# Patient Record
Sex: FEMALE | Race: WHITE | NOT HISPANIC OR LATINO | Employment: OTHER | URBAN - METROPOLITAN AREA
[De-identification: names, ages, dates, MRNs, and addresses within clinical notes are randomized per-mention and may not be internally consistent; named-entity substitution may affect disease eponyms.]

---

## 2017-01-04 ENCOUNTER — OFFICE VISIT (OUTPATIENT)
Dept: INTERNAL MEDICINE | Facility: CLINIC | Age: 67
End: 2017-01-04
Payer: COMMERCIAL

## 2017-01-04 VITALS
OXYGEN SATURATION: 95 % | SYSTOLIC BLOOD PRESSURE: 127 MMHG | HEIGHT: 68 IN | HEART RATE: 90 BPM | DIASTOLIC BLOOD PRESSURE: 68 MMHG | BODY MASS INDEX: 36.56 KG/M2 | WEIGHT: 241.2 LBS | TEMPERATURE: 97 F

## 2017-01-04 DIAGNOSIS — E11.40 CONTROLLED TYPE 2 DIABETES MELLITUS WITH DIABETIC NEUROPATHY, WITHOUT LONG-TERM CURRENT USE OF INSULIN (H): ICD-10-CM

## 2017-01-04 DIAGNOSIS — Z12.31 VISIT FOR SCREENING MAMMOGRAM: ICD-10-CM

## 2017-01-04 DIAGNOSIS — Z78.0 ASYMPTOMATIC POSTMENOPAUSAL STATUS: ICD-10-CM

## 2017-01-04 DIAGNOSIS — R19.7 DIARRHEA OF PRESUMED INFECTIOUS ORIGIN: ICD-10-CM

## 2017-01-04 DIAGNOSIS — Z11.59 NEED FOR HEPATITIS C SCREENING TEST: ICD-10-CM

## 2017-01-04 DIAGNOSIS — Z23 NEED FOR PROPHYLACTIC VACCINATION AGAINST STREPTOCOCCUS PNEUMONIAE (PNEUMOCOCCUS): ICD-10-CM

## 2017-01-04 DIAGNOSIS — Z23 NEED FOR PROPHYLACTIC VACCINATION WITH TETANUS-DIPHTHERIA (TD): ICD-10-CM

## 2017-01-04 DIAGNOSIS — Z23 NEED FOR PROPHYLACTIC VACCINATION AND INOCULATION AGAINST INFLUENZA: ICD-10-CM

## 2017-01-04 DIAGNOSIS — F17.200 NEEDS SMOKING CESSATION EDUCATION: ICD-10-CM

## 2017-01-04 DIAGNOSIS — R82.90 NONSPECIFIC FINDING ON EXAMINATION OF URINE: ICD-10-CM

## 2017-01-04 DIAGNOSIS — R35.0 URINARY FREQUENCY: Primary | ICD-10-CM

## 2017-01-04 DIAGNOSIS — Z13.89 SCREENING FOR DIABETIC PERIPHERAL NEUROPATHY: ICD-10-CM

## 2017-01-04 LAB
ALBUMIN UR-MCNC: 30 MG/DL
ANION GAP SERPL CALCULATED.3IONS-SCNC: 12 MMOL/L (ref 3–14)
APPEARANCE UR: CLEAR
BACTERIA #/AREA URNS HPF: ABNORMAL /HPF
BILIRUB UR QL STRIP: NEGATIVE
BUN SERPL-MCNC: 39 MG/DL (ref 7–30)
CALCIUM SERPL-MCNC: 10 MG/DL (ref 8.5–10.1)
CHLORIDE SERPL-SCNC: 98 MMOL/L (ref 94–109)
CO2 SERPL-SCNC: 27 MMOL/L (ref 20–32)
COLOR UR AUTO: YELLOW
CREAT SERPL-MCNC: 1.82 MG/DL (ref 0.52–1.04)
GFR SERPL CREATININE-BSD FRML MDRD: 28 ML/MIN/1.7M2
GLUCOSE SERPL-MCNC: 127 MG/DL (ref 70–99)
GLUCOSE UR STRIP-MCNC: 100 MG/DL
HGB UR QL STRIP: ABNORMAL
KETONES UR STRIP-MCNC: NEGATIVE MG/DL
LDLC SERPL DIRECT ASSAY-MCNC: 135 MG/DL
LEUKOCYTE ESTERASE UR QL STRIP: ABNORMAL
NITRATE UR QL: POSITIVE
NON-SQ EPI CELLS #/AREA URNS LPF: ABNORMAL /LPF
PH UR STRIP: 6 PH (ref 5–7)
POTASSIUM SERPL-SCNC: 4.3 MMOL/L (ref 3.4–5.3)
RBC #/AREA URNS AUTO: ABNORMAL /HPF (ref 0–2)
SODIUM SERPL-SCNC: 137 MMOL/L (ref 133–144)
SP GR UR STRIP: 1.02 (ref 1–1.03)
T4 FREE SERPL-MCNC: 1.06 NG/DL (ref 0.76–1.46)
TSH SERPL DL<=0.005 MIU/L-ACNC: 5.55 MU/L (ref 0.4–4)
URN SPEC COLLECT METH UR: ABNORMAL
UROBILINOGEN UR STRIP-ACNC: 0.2 EU/DL (ref 0.2–1)
WBC #/AREA URNS AUTO: ABNORMAL /HPF (ref 0–2)
WBC CLUMPS #/AREA URNS HPF: PRESENT /HPF

## 2017-01-04 PROCEDURE — 86803 HEPATITIS C AB TEST: CPT | Mod: 90 | Performed by: INTERNAL MEDICINE

## 2017-01-04 PROCEDURE — 84439 ASSAY OF FREE THYROXINE: CPT | Performed by: INTERNAL MEDICINE

## 2017-01-04 PROCEDURE — 81001 URINALYSIS AUTO W/SCOPE: CPT | Performed by: INTERNAL MEDICINE

## 2017-01-04 PROCEDURE — 87186 SC STD MICRODIL/AGAR DIL: CPT | Mod: 90 | Performed by: INTERNAL MEDICINE

## 2017-01-04 PROCEDURE — 80048 BASIC METABOLIC PNL TOTAL CA: CPT | Performed by: INTERNAL MEDICINE

## 2017-01-04 PROCEDURE — 99203 OFFICE O/P NEW LOW 30 MIN: CPT | Mod: 25 | Performed by: INTERNAL MEDICINE

## 2017-01-04 PROCEDURE — 83721 ASSAY OF BLOOD LIPOPROTEIN: CPT | Performed by: INTERNAL MEDICINE

## 2017-01-04 PROCEDURE — 82043 UR ALBUMIN QUANTITATIVE: CPT | Performed by: INTERNAL MEDICINE

## 2017-01-04 PROCEDURE — 87086 URINE CULTURE/COLONY COUNT: CPT | Performed by: INTERNAL MEDICINE

## 2017-01-04 PROCEDURE — 99000 SPECIMEN HANDLING OFFICE-LAB: CPT | Performed by: INTERNAL MEDICINE

## 2017-01-04 PROCEDURE — 84443 ASSAY THYROID STIM HORMONE: CPT | Performed by: INTERNAL MEDICINE

## 2017-01-04 PROCEDURE — 36415 COLL VENOUS BLD VENIPUNCTURE: CPT | Performed by: INTERNAL MEDICINE

## 2017-01-04 PROCEDURE — 87088 URINE BACTERIA CULTURE: CPT | Mod: 90 | Performed by: INTERNAL MEDICINE

## 2017-01-04 RX ORDER — LEVOFLOXACIN 250 MG/1
250 TABLET, FILM COATED ORAL DAILY
Qty: 3 TABLET | Refills: 0 | Status: SHIPPED | OUTPATIENT
Start: 2017-01-04 | End: 2017-08-08

## 2017-01-04 NOTE — PATIENT INSTRUCTIONS
Please drink 8 cups (64 ounces) of non-caffeinated beverages a day.   We will let you know your test results by [facility], especially the urine test.  Please let us know if you diarrhea persists into next week or sooner if it gets worse.   You may use Imodium as needed for your diarrhea.      Please go to the Emergency room if you develop severe abdominal pain, a fever of 100.4F or above, or the inability to tolerate fluids, worsening nausea/vomiting or any blood in your urine or stool.    We do advise at least once every 6 month visits for chronic conditions.

## 2017-01-04 NOTE — PROGRESS NOTES
SUBJECTIVE:                                                    Yadi Iniguez is a 66 year old female who presents to clinic today for the following health issues:    PMH as noted, including Type 2 Diabetes Mellitus, plus the patient reports an essential tremor (and being seen by a Neurologist, Dr Voss at [facility]).    Patient is here to establish care and discuss bladder issues:    Urinary frequency:    For the past 10 days, she has had urin frequency; no dysuria. No burning on urination.   No f/c.  Her blood sugars have been in the 300s-usually between 150-180, now in the 300s.  She is on lantus 50u qhs and Novolog which she takes without a sliding scale, as 22 units with each meal.    No vomiting but she does have nausea at times. She does have a history of periods of high blood sugars which last a few weeks, without a clear etiology. No current MI or arthritis, or stress or infections symptoms other than GI below.  Denies eating a less-DM friendly diet recently.  The urinary symptoms came before the GI symptoms.  No history of kidney stones.  Had a UTI years ago.   No noted hematuria.    She does have flank pain-b/l, but it does change with position.   No vaginal discharge or itching.      Loose stools:   Also for 10 days.  BMs per day: BID usually.  vague abd pain throughout the whole abdomen and  She has noticed recent loose stools.    No f/c.   No vomiting but she does have nausea at times.  No sick contacts.   No recent travel.  No antibiotics in the last 3 months  No dark or bloody stools.   No new foods. She does not feel that certain foods make it feel worse.   There is some mucus in the stool.  Gassiness: a bit more than normal.  Heartburn? No.    No preceding constipation.    No recent changes in medications         Problem list and histories reviewed & adjusted, as indicated.  Additional history: as documented    Patient Active Problem List   Diagnosis     Hypothyroidism     Hypertension goal  BP (blood pressure) < 140/90     Type 2 diabetes, HbA1C goal < 8% (H)     Hyperlipidemia LDL goal <100     Moderate major depression (H)     Incontinence of urine     Neuropathy in diabetes (H)     Eczema     Left lumbar radiculopathy     Advanced directives, counseling/discussion     Health Care Home     Past Surgical History   Procedure Laterality Date     Cholecystectomy, open       C vaginal hysterectomy       Cl aff surgical pathology       Prophylactic repair retinal break, pneumatic retinopexy, combined Left 9/2/2016       Social History   Substance Use Topics     Smoking status: Current Every Day Smoker -- 0.50 packs/day     Types: Cigarettes     Smokeless tobacco: Not on file      Comment: 8/2005     Alcohol Use: Yes     Family History   Problem Relation Age of Onset     Musculoskeletal Disorder Mother      MD     CANCER Father      mesothelioma     HEART DISEASE Brother      CHF      Neurologic Disorder Brother      MS         Current Outpatient Prescriptions   Medication Sig Dispense Refill     Cholecalciferol (VITAMIN D3 PO) Take 10,000 Units by mouth daily       Omega-3 Fatty Acids (OMEGA-3 FISH OIL PO) Take 1 g by mouth       CARVEDILOL PO Take 12.5 mg by mouth 2 times daily (with meals)       FENOFIBRATE PO Take 54 mg by mouth daily       LEVOTHYROXINE SODIUM PO Take 150 mcg by mouth       PRAVASTATIN SODIUM PO Take 40 mg by mouth every evening       PRIMIDONE PO Take 50 mg by mouth 2 times daily       Insulin Aspart (NOVOLOG SC) Inject 20 Units Subcutaneous 3 times daily (with meals)       levofloxacin (LEVAQUIN) 250 MG tablet Take 1 tablet (250 mg) by mouth daily 3 tablet 0     insulin glargine (LANTUS) 100 UNIT/ML injection Inject 45 Units Subcutaneous At Bedtime. (Patient taking differently: Inject 50 Units Subcutaneous At Bedtime ) 3 Month 3     metoprolol (LOPRESSOR) 50 MG tablet Take 1 tablet by mouth 2 times daily. 60 tablet 11     paroxetine (PAXIL) 40 MG tablet Take 1 tablet by mouth daily.  "(Patient taking differently: Take 20 mg by mouth 2 times daily ) 90 tablet 3     Clobetasol Propionate 0.05 % LIQD Externally apply 1 dose topically 2 times daily as needed. 1 Bottle 5     ASPIRIN 81 MG OR TABS ONE DAILY*  3     VITAMIN C 1000 MG OR TABS 1 TABLET DAILY AT DINNER       VITAMIN E 400 UNIT OR TABS 1 tab a day        CENTRUM SILVER OR TABS 1 TABLET DAILY       losartan (COZAAR) 50 MG tablet Take 1 tablet (50 mg) by mouth daily       ==============================================================  ROS:  Constitutional, HEENT, cardiovascular, pulmonary, GI, , musculoskeletal, neuro, skin, endocrine and psych systems are negative, except as otherwise noted.       OBJECTIVE:                                                    /68 mmHg  Pulse 90  Temp(Src) 97  F (36.1  C) (Oral)  Ht 5' 7.5\" (1.715 m)  Wt 241 lb 3.2 oz (109.408 kg)  BMI 37.20 kg/m2  SpO2 95%  Body mass index is 37.2 kg/(m^2).     GENERAL APPEARANCE: healthy, alert and in no distress  EYES: Eyes grossly normal to inspection, and conjunctivae and sclerae normal  HENT: head normocephalic and atraumatic and mouth without ulcers or lesions, oropharynx clear and oral mucous membranes moist  NECK: no noticeable adenopathy, no asymmetry, masses, or scars   RESP: lungs clear to auscultation - no rales, rhonchi or wheezes  CV: regular rate and rhythm, normal S1 S2, no S3 or S4, no murmur, click or rub, no peripheral edema and peripheral pulses strong  ABDOMEN: soft, nontender, no hepatosplenomegaly, no masses and bowel sounds normal  MS: no musculoskeletal defects are noted and gait is age appropriate without ataxia  SKIN: no suspicious lesions or rashes  NEURO: mentation intact and speech normal  PSYCH: mentation appears normal and affect normal/bright.    Results for orders placed or performed in visit on 01/04/17   Hepatitis C Screen Reflex to HCV RNA Quant and Genotype   Result Value Ref Range    Hepatitis C Antibody  NR     " Nonreactive   Assay performance characteristics have not been established for newborns,   infants, and children     Albumin Random Urine Quantitative   Result Value Ref Range    Creatinine Urine 202 mg/dL    Albumin Urine mg/L 260 mg/L    Albumin Urine mg/g Cr 128.71 (H) 0 - 25 mg/g Cr   TSH WITH FREE T4 REFLEX   Result Value Ref Range    TSH 5.55 (H) 0.40 - 4.00 mU/L   Basic metabolic panel   Result Value Ref Range    Sodium 137 133 - 144 mmol/L    Potassium 4.3 3.4 - 5.3 mmol/L    Chloride 98 94 - 109 mmol/L    Carbon Dioxide 27 20 - 32 mmol/L    Anion Gap 12 3 - 14 mmol/L    Glucose 127 (H) 70 - 99 mg/dL    Urea Nitrogen 39 (H) 7 - 30 mg/dL    Creatinine 1.82 (H) 0.52 - 1.04 mg/dL    GFR Estimate 28 (L) >60 mL/min/1.7m2    GFR Estimate If Black 33 (L) >60 mL/min/1.7m2    Calcium 10.0 8.5 - 10.1 mg/dL   LDL cholesterol direct   Result Value Ref Range    LDL Cholesterol Direct 135 (H) <100 mg/dL   *UA reflex to Microscopic and Culture ([facility] (except [place] and [place])   Result Value Ref Range    Color Urine Yellow     Appearance Urine Clear     Glucose Urine 100 (A) NEG mg/dL    Bilirubin Urine Negative NEG    Ketones Urine Negative NEG mg/dL    Specific Gravity Urine 1.020 1.003 - 1.035    Blood Urine Trace (A) NEG    pH Urine 6.0 5.0 - 7.0 pH    Protein Albumin Urine 30 (A) NEG mg/dL    Urobilinogen Urine 0.2 0.2 - 1.0 EU/dL    Nitrite Urine Positive (A) NEG    Leukocyte Esterase Urine Small (A) NEG    Source Midstream Urine    Urine Microscopic   Result Value Ref Range    WBC Urine 25-50 (A) 0 - 2 /HPF    RBC Urine O - 2 0 - 2 /HPF    WBC Clumps Present (A) NEG /HPF    Squamous Epithelial /LPF Urine Many (A) FEW /LPF    Bacteria Urine Many (A) NEG /HPF   T4 free   Result Value Ref Range    T4 Free 1.06 0.76 - 1.46 ng/dL   Urine Culture Aerobic Bacterial   Result Value Ref Range    Specimen Description Midstream Urine     Culture Micro >100,000 colonies/mL Escherichia coli  (A)     Micro Report Status FINAL 01/06/2017     Organism: >100,000 colonies/mL Escherichia coli        Susceptibility    >100,000 colonies/ml escherichia coli (galina) -  (no method available)     AMPICILLIN >=32 Resistant  ug/mL     CEFAZOLIN Value in next row  ug/mL      <=4 SusceptibleCefazolin GALINA breakpoints are for the treatment of uncomplicated urinary tract infections.  For the treatment of systemic infections, please contact the laboratory for additional testing.     CEFOXITIN Value in next row  ug/mL      <=4 SusceptibleCefazolin GALINA breakpoints are for the treatment of uncomplicated urinary tract infections.  For the treatment of systemic infections, please contact the laboratory for additional testing.     CEFTAZIDIME Value in next row  ug/mL      <=4 SusceptibleCefazolin GALINA breakpoints are for the treatment of uncomplicated urinary tract infections.  For the treatment of systemic infections, please contact the laboratory for additional testing.     CEFTRIAXONE Value in next row  ug/mL      <=4 SusceptibleCefazolin GALINA breakpoints are for the treatment of uncomplicated urinary tract infections.  For the treatment of systemic infections, please contact the laboratory for additional testing.     CIPROFLOXACIN Value in next row  ug/mL      <=4 SusceptibleCefazolin GALINA breakpoints are for the treatment of uncomplicated urinary tract infections.  For the treatment of systemic infections, please contact the laboratory for additional testing.     GENTAMICIN Value in next row  ug/mL      <=4 SusceptibleCefazolin GALINA breakpoints are for the treatment of uncomplicated urinary tract infections.  For the treatment of systemic infections, please contact the laboratory for additional testing.     LEVOFLOXACIN Value in next row  ug/mL      <=4 SusceptibleCefazolin GALINA breakpoints are for the treatment of uncomplicated urinary tract infections.  For the treatment of systemic infections, please contact the laboratory for  additional testing.     NITROFURANTOIN Value in next row  ug/mL      <=4 SusceptibleCefazolin SULEMAN breakpoints are for the treatment of uncomplicated urinary tract infections.  For the treatment of systemic infections, please contact the laboratory for additional testing.     TOBRAMYCIN Value in next row  ug/mL      <=4 SusceptibleCefazolin SULEMAN breakpoints are for the treatment of uncomplicated urinary tract infections.  For the treatment of systemic infections, please contact the laboratory for additional testing.     Trimethoprim/Sulfa Value in next row  ug/mL      <=4 SusceptibleCefazolin SULEMAN breakpoints are for the treatment of uncomplicated urinary tract infections.  For the treatment of systemic infections, please contact the laboratory for additional testing.     AMPICILLIN/SULBACTAM Value in next row  ug/mL      <=4 SusceptibleCefazolin SULEMAN breakpoints are for the treatment of uncomplicated urinary tract infections.  For the treatment of systemic infections, please contact the laboratory for additional testing.     Piperacillin/Tazo Value in next row  ug/mL      <=4 SusceptibleCefazolin SULEMAN breakpoints are for the treatment of uncomplicated urinary tract infections.  For the treatment of systemic infections, please contact the laboratory for additional testing.     CEFEPIME Value in next row  ug/mL      <=4 SusceptibleCefazolin SULEMAN breakpoints are for the treatment of uncomplicated urinary tract infections.  For the treatment of systemic infections, please contact the laboratory for additional testing.         ASSESSMENT/PLAN:                                                        ICD-10-CM    1. Urinary frequency R35.0 *UA reflex to Microscopic and Culture ([facility] (except [place] and [place])     levofloxacin (LEVAQUIN) 250 MG tablet   2. Controlled type 2 diabetes mellitus with diabetic neuropathy, without long-term current use of insulin (H) E11.40 Albumin Random Urine  Quantitative     TSH WITH FREE T4 REFLEX     Basic metabolic panel     LDL cholesterol direct   3. Diarrhea of presumed infectious origin A09    4. Asymptomatic postmenopausal status Z78.0 DEXA HIP/PELVIS/SPINE - Future     Urine Microscopic     T4 free   5. Visit for screening mammogram Z12.31 MA SCREENING DIGITAL BILAT - Future  (s+30)   6. Need for hepatitis C screening test Z11.59    7. Screening for diabetic peripheral neuropathy Z13.89 Hepatitis C Screen Reflex to HCV RNA Quant and Genotype   8. Need for prophylactic vaccination and inoculation against influenza Z23    9. Need for prophylactic vaccination against Streptococcus pneumoniae (pneumococcus) Z23    10. Need for prophylactic vaccination with tetanus-diphtheria (TD) Z23    11. Needs smoking cessation education F17.200 NOVU Referral Smoking Cessation   12. Nonspecific finding on examination of urine R82.90 Urine Culture Aerobic Bacterial     (R35.0) Urinary frequency  (primary encounter diagnosis)  Comment: UA c/w UTI  Plan:  3 days levofloxacin was Rxed.  UA reflex to Microscopic and Culture         ([facility] and [facility] (except         [place] and [place]), levofloxacin         (LEVAQUIN) 250 MG tablet            (E11.40) Controlled type 2 diabetes mellitus with diabetic neuropathy, without long-term current use of insulin (H)  Comment:   per abstracted records from [facility]  A1C was 7.4% on 10.13.16.  Recent hyperglycemia likely d/t the current UTI and viral infectious gastroenteritis.  Plan: Albumin Random Urine Quantitative, TSH WITH         FREE T4 REFLEX, Basic metabolic panel, LDL         cholesterol direct     Continue current regimen for now, treat infection(s)    (A09) Diarrhea of presumed infectious origin  Comment:   most c/w viral infectious gastroenteritis   NOTE: labs showed an elevated Creatinine-(may well be due to hypovolemia from the diarrhea)  Plan: As per orders above and patient instructions below.   See  Results note regarding labs (and elevated Cr): decrease Losartan, return to clinic 2 weeks, to reassess by physical exam, recheck BP and recheck the BMP. (may be able to restart losartan if the suspected hypovolemia resolved and if Cr returns to its baseline...).     (Z78.0) Asymptomatic postmenopausal status  Comment:   Plan: DEXA HIP/PELVIS/SPINE - Future, Urine         Microscopic, T4 free          (Z12.31) Visit for screening mammogram  Comment:   Plan: MA SCREENING DIGITAL BILAT - Future  (s+30)         (Z11.59) Need for hepatitis C screening test    (Z13.89) Screening for diabetic peripheral neuropathy  Comment:   Plan: Hepatitis C Screen Reflex to HCV RNA Quant and         Genotype        (Z23) Need for prophylactic vaccination and inoculation against influenza    (Z23) Need for prophylactic vaccination against Streptococcus pneumoniae (pneumococcus)    (Z23) Need for prophylactic vaccination with tetanus-diphtheria (TD)      (F17.200) Needs smoking cessation education  Comment:   Plan: NOVU Referral Smoking Cessation               Patient Instructions   Please drink 8 cups (64 ounces) of non-caffeinated beverages a day.   [facility] test.  Please let us know if you diarrhea persists into next week or sooner if it gets worse.   You may use Imodium as needed for your diarrhea.      Please go to the Emergency room if you develop severe abdominal pain, a fever of 100.4F or above, or the inability to tolerate fluids, worsening nausea/vomiting or any blood in your urine or stool.    We do advise at least once every 6 month visits for chronic conditions.                     Hannah Cee MD  [facility]

## 2017-01-04 NOTE — MR AVS SNAPSHOT
After Visit Summary   1/4/2017    Yadi Iniguez    MRN: [id number]           Patient Information     Date Of Birth          1950        Visit Information        Provider Department      1/4/2017 11:20 AM Hannah Cee MD [facility]        Today's Diagnoses     Asymptomatic postmenopausal status    -  1     Visit for screening mammogram         Need for hepatitis C screening test         Screening for diabetic peripheral neuropathy         Need for prophylactic vaccination and inoculation against influenza         Need for prophylactic vaccination against Streptococcus pneumoniae (pneumococcus)         Need for prophylactic vaccination with tetanus-diphtheria (TD)         Needs smoking cessation education         Controlled type 2 diabetes mellitus with diabetic neuropathy, without long-term current use of insulin (H)         Urinary frequency           Care Instructions    Please drink 8 cups (64 ounces) of non-caffeinated beverages a day.   We will let you know your test results by MyChart, especially the urine test.  Please let us know if you diarrhea persists into next week or sooner if it gets worse.   You may use Imodium as needed for your diarrhea.      Please go to the Emergency room if you develop severe abdominal pain, a fever of 100.4F or above, or the inability to tolerate fluids, worsening nausea/vomiting or any blood in your urine or stool.    We do advise at least once every 6 month visits for chronic conditions.          Follow-ups after your visit        Additional Services     NOVU Referral Smoking Cessation       [place] online at [email or web address]                  Future tests that were ordered for you today     Open Future Orders        Priority Expected Expires Ordered    DEXA HIP/PELVIS/SPINE - Future Routine  1/4/2018 1/4/2017    MA SCREENING DIGITAL BILAT - Future  (s+30) Routine  1/4/2018 1/4/2017            Who to contact     If  "you have questions or need follow up information about today's clinic visit or your schedule please contact [facility] [place] directly at [phone].  Normal or non-critical lab and imaging results will be communicated to you by MyChart, letter or phone within 4 business days after the clinic has received the results. If you do not hear from us within 7 days, please contact the clinic through [employer] or phone. If you have a critical or abnormal lab result, we will notify you by phone as soon as possible.  Submit refill requests through [employer] or call your pharmacy and they will forward the refill request to us. Please allow 3 business days for your refill to be completed.          Additional Information About Your Visit        [employer] Information     [employer] lets you send messages to your doctor, view your test results, renew your prescriptions, schedule appointments and more. To sign up, go to [email or web address] . Click on \"Log in\" on the left side of the screen, which will take you to the Welcome page. Then click on \"Sign up Now\" on the right side of the page.     You will be asked to enter the access code listed below, as well as some personal information. Please follow the directions to create your username and password.     Your access code is: [email or web address]  Expires: 2017 12:33 PM     Your access code will  in 90 days. If you need help or a new code, please call your [place] clinic or [phone].        Care EveryWhere ID     This is your Care EveryWhere ID. This could be used by other organizations to access your [place] medical records  [id number]        Your Vitals Were     Pulse Temperature Height BMI (Body Mass Index) Pulse Oximetry       90 97  F (36.1  C) (Oral) 5' 7.5\" (1.715 m) 37.20 kg/m2 95%        Blood Pressure from Last 3 Encounters:   17 127/68   11 130/71   11 120/70    Weight from Last 3 Encounters:   17 241 lb 3.2 oz (109.408 kg) "   06/09/11 211 lb (95.709 kg)   04/28/11 211 lb 3.2 oz (95.8 kg)              We Performed the Following     *UA reflex to Microscopic and Culture ([facility] [place] [facility] (except [place] and [place])     Albumin Random Urine Quantitative     Basic metabolic panel     DEPRESSION ACTION PLAN (DAP) Order [[id number]]     Hepatitis C Screen Reflex to HCV RNA Quant and Genotype     LDL cholesterol direct     NOVU Referral Smoking Cessation     TSH WITH FREE T4 REFLEX          Today's Medication Changes          These changes are accurate as of: 1/4/17 12:33 PM.  If you have any questions, ask your nurse or doctor.               These medicines have changed or have updated prescriptions.        Dose/Directions    insulin glargine 100 UNIT/ML injection   Commonly known as:  LANTUS   This may have changed:  how much to take   Used for:  Type 2 diabetes, HbA1C goal < 8% (H)        Dose:  45 Units   Inject 45 Units Subcutaneous At Bedtime.   Quantity:  3 Month   Refills:  3       LEVOTHYROXINE SODIUM PO   This may have changed:  Another medication with the same name was removed. Continue taking this medication, and follow the directions you see here.   Changed by:  Hananh Cee MD        Dose:  150 mcg   Take 150 mcg by mouth   Refills:  0       NOVOLOG SC   This may have changed:  Another medication with the same name was removed. Continue taking this medication, and follow the directions you see here.   Changed by:  Hannah Cee MD        Dose:  20 Units   Inject 20 Units Subcutaneous 3 times daily (with meals)   Refills:  0       PARoxetine 40 MG tablet   Commonly known as:  PAXIL   This may have changed:    - how much to take  - when to take this   Used for:  Moderate major depression (H)        Dose:  1 tablet   Take 1 tablet by mouth daily.   Quantity:  90 tablet   Refills:  3                Primary Care Provider Office Phone # Fax #    Hannah Cee MD  [phone]       [facility] [address] [facility] [address]        Thank you!     Thank you for choosing [facility]  for your care. Our goal is always to provide you with excellent care. Hearing back from our patients is one way we can continue to improve our services. Please take a few minutes to complete the written survey that you may receive in the mail after your visit with us. Thank you!             Your Updated Medication List - Protect others around you: Learn how to safely use, store and throw away your medicines at www.disposemymeds.org.          This list is accurate as of: 1/4/17 12:33 PM.  Always use your most recent med list.                   Brand Name Dispense Instructions for use    ascorbic acid 1000 MG Tabs    vitamin C     1 TABLET DAILY AT DINNER       aspirin 81 MG tablet      ONE DAILY*       CARVEDILOL PO      Take 12.5 mg by mouth 2 times daily (with meals)       CENTRUM SILVER per tablet      1 TABLET DAILY       clobetasol propionate 0.05 % Liqd     1 Bottle    Externally apply 1 dose topically 2 times daily as needed.       FENOFIBRATE PO      Take 54 mg by mouth daily       insulin glargine 100 UNIT/ML injection    LANTUS    3 Month    Inject 45 Units Subcutaneous At Bedtime.       LEVOTHYROXINE SODIUM PO      Take 150 mcg by mouth       LOSARTAN POTASSIUM PO      Take 100 mg by mouth daily       metoprolol 50 MG tablet    LOPRESSOR    60 tablet    Take 1 tablet by mouth 2 times daily.       NOVOLOG SC      Inject 20 Units Subcutaneous 3 times daily (with meals)       OMEGA-3 FISH OIL PO      Take 1 g by mouth       PARoxetine 40 MG tablet    PAXIL    90 tablet    Take 1 tablet by mouth daily.       PRAVASTATIN SODIUM PO      Take 40 mg by mouth every evening       PRIMIDONE PO      Take 50 mg by mouth 2 times daily       VITAMIN D3 PO      Take 10,000 Units by mouth daily       vitamin E 400 UNITS Tabs      1 tab a day

## 2017-01-05 ENCOUNTER — TELEPHONE (OUTPATIENT)
Dept: INTERNAL MEDICINE | Facility: CLINIC | Age: 67
End: 2017-01-05

## 2017-01-05 LAB
CREAT UR-MCNC: 202 MG/DL
HCV AB SERPL QL IA: NORMAL
MICROALBUMIN UR-MCNC: 260 MG/L
MICROALBUMIN/CREAT UR: 128.71 MG/G CR (ref 0–25)

## 2017-01-05 RX ORDER — LOSARTAN POTASSIUM 50 MG/1
50 TABLET ORAL DAILY
COMMUNITY
Start: 2017-01-05 | End: 2017-01-22 | Stop reason: SINTOL

## 2017-01-05 NOTE — TELEPHONE ENCOUNTER
Please call and advise the patient as follows, and also send a [employer] message with the same text and attach recent test results [statements which are in bold and in square brackets, like this sentence, is for clinic staff and should not be included in the text of the letter to the patient]:    Dear Yadi,     The rest of your tests have finished processing.    They show that your kidneys have slowed down a bit since your last test (at [facility]) on 10.13.16. I would advise you to:  1) decrease your losartan from 100mg to 50mg daily.  2) schedule a clinic visit in 2 weeks with me where we will reassess your blood pressure and recheck your labs. [Please offer to assist the patient with making this appointment].  3) as advised before, please drink 6-8 cups (48-64 ounces) of non-caffeinated beverages a day.    Your urine shows some extra protein in it but it is likely due to your current urinary tract infection (UTI).  You have been advised regarding UTI treatment yesterday.  Your thyroid hormone level is within normal limits for you.  Your limited cholesterol level is stable.    Please use [employer] or call our clinic at [phone] if you have any questions.     Hannah Cee MD

## 2017-01-05 NOTE — TELEPHONE ENCOUNTER
Sent [employer] message and called patient informed pt of providers note as written below, pt verbalized good understanding.     Updated med list.    We made follow up appointment.    AYE Gonzalez RN

## 2017-01-05 NOTE — PROGRESS NOTES
Quick Note:    Please see Telephone message.   Regarding the UA: patient was called last night and informed of the UA being c/w UTI-she was Rxed levofloxacin (3d) and advised to contact the clinic if not better in 3 days, sooner if worse. The patient had no questions.   Dr Hannah Cee MD.     ______

## 2017-01-06 LAB
BACTERIA SPEC CULT: ABNORMAL
MICRO REPORT STATUS: ABNORMAL
MICROORGANISM SPEC CULT: ABNORMAL
SPECIMEN SOURCE: ABNORMAL

## 2017-01-06 NOTE — PROGRESS NOTES
Quick Note:    Dear Yadi,     Your test results are attached.   Your urine culture has processed.  The antibiotic that you are on should be able to kill the bacteria causing your UTI (urinary tract infection).    Please notify me via [employer] or contact the clinic at [phone] if you have any questions.    Hannah Cee MD  ______

## 2017-01-10 ENCOUNTER — RADIANT APPOINTMENT (OUTPATIENT)
Dept: MAMMOGRAPHY | Facility: CLINIC | Age: 67
End: 2017-01-10
Attending: INTERNAL MEDICINE
Payer: COMMERCIAL

## 2017-01-10 ENCOUNTER — RADIANT APPOINTMENT (OUTPATIENT)
Dept: ULTRASOUND IMAGING | Facility: CLINIC | Age: 67
End: 2017-01-10
Attending: INTERNAL MEDICINE
Payer: COMMERCIAL

## 2017-01-10 DIAGNOSIS — N64.4 BREAST PAIN, LEFT: ICD-10-CM

## 2017-01-10 PROCEDURE — 77062 BREAST TOMOSYNTHESIS BI: CPT | Performed by: RADIOLOGY

## 2017-01-10 PROCEDURE — 76642 ULTRASOUND BREAST LIMITED: CPT | Mod: LT | Performed by: RADIOLOGY

## 2017-01-10 PROCEDURE — G0204 DX MAMMO INCL CAD BI: HCPCS | Performed by: RADIOLOGY

## 2017-01-18 ENCOUNTER — OFFICE VISIT (OUTPATIENT)
Dept: INTERNAL MEDICINE | Facility: CLINIC | Age: 67
End: 2017-01-18
Payer: COMMERCIAL

## 2017-01-18 VITALS
BODY MASS INDEX: 37.6 KG/M2 | OXYGEN SATURATION: 95 % | WEIGHT: 243.8 LBS | HEART RATE: 90 BPM | SYSTOLIC BLOOD PRESSURE: 137 MMHG | DIASTOLIC BLOOD PRESSURE: 72 MMHG | TEMPERATURE: 98 F

## 2017-01-18 DIAGNOSIS — F17.200 NEEDS SMOKING CESSATION EDUCATION: ICD-10-CM

## 2017-01-18 DIAGNOSIS — Z78.0 ASYMPTOMATIC POSTMENOPAUSAL STATUS: ICD-10-CM

## 2017-01-18 DIAGNOSIS — R79.89 ELEVATED SERUM CREATININE: Primary | ICD-10-CM

## 2017-01-18 DIAGNOSIS — I10 BENIGN ESSENTIAL HYPERTENSION: ICD-10-CM

## 2017-01-18 DIAGNOSIS — Z23 NEED FOR PROPHYLACTIC VACCINATION WITH TETANUS-DIPHTHERIA (TD): ICD-10-CM

## 2017-01-18 DIAGNOSIS — Z13.89 SCREENING FOR DIABETIC PERIPHERAL NEUROPATHY: ICD-10-CM

## 2017-01-18 DIAGNOSIS — Z23 NEED FOR PROPHYLACTIC VACCINATION AGAINST STREPTOCOCCUS PNEUMONIAE (PNEUMOCOCCUS): ICD-10-CM

## 2017-01-18 LAB
ANION GAP SERPL CALCULATED.3IONS-SCNC: 8 MMOL/L (ref 3–14)
BUN SERPL-MCNC: 34 MG/DL (ref 7–30)
CALCIUM SERPL-MCNC: 9.5 MG/DL (ref 8.5–10.1)
CHLORIDE SERPL-SCNC: 97 MMOL/L (ref 94–109)
CO2 SERPL-SCNC: 30 MMOL/L (ref 20–32)
CREAT SERPL-MCNC: 1.58 MG/DL (ref 0.52–1.04)
GFR SERPL CREATININE-BSD FRML MDRD: 33 ML/MIN/1.7M2
GLUCOSE SERPL-MCNC: 208 MG/DL (ref 70–99)
POTASSIUM SERPL-SCNC: 4.5 MMOL/L (ref 3.4–5.3)
SODIUM SERPL-SCNC: 135 MMOL/L (ref 133–144)

## 2017-01-18 PROCEDURE — 36415 COLL VENOUS BLD VENIPUNCTURE: CPT | Performed by: INTERNAL MEDICINE

## 2017-01-18 PROCEDURE — 80048 BASIC METABOLIC PNL TOTAL CA: CPT | Performed by: INTERNAL MEDICINE

## 2017-01-18 PROCEDURE — 99207 C FOOT EXAM  NO CHARGE: CPT | Performed by: INTERNAL MEDICINE

## 2017-01-18 PROCEDURE — 99213 OFFICE O/P EST LOW 20 MIN: CPT | Performed by: INTERNAL MEDICINE

## 2017-01-18 NOTE — MR AVS SNAPSHOT
After Visit Summary   1/18/2017    Yadi Iniguez    MRN: [id number]           Patient Information     Date Of Birth          1950        Visit Information        Provider Department      1/18/2017 11:20 AM Hannah Cee MD [facility]        Today's Diagnoses     Asymptomatic postmenopausal status    -  1     Screening for diabetic peripheral neuropathy         Need for prophylactic vaccination against Streptococcus pneumoniae (pneumococcus)         Need for prophylactic vaccination with tetanus-diphtheria (TD)         Needs smoking cessation education         Elevated serum creatinine           Care Instructions    Please continue the losartan 50mg daily for now.  We will let you know your test results by [facility] and we will let you know if you need to make further blood pressure medication or other adjustments.   Please measure your blood pressures at home at least 3 times a week and let us know if they are consistently more than 140/90 or if they are ever less than 90/60.    We do advise at least once every 6 month visits for chronic conditions.  You have indicated that you have your next appointment with us in July (we will plan on doing the foot exam at that time).  Regarding your mood: you have indicated that you feel that your mood symptoms are well controlled. You may continue your current Paxil dose and we will plan on revisiting this during your July visit.            Follow-ups after your visit        Additional Services     NOV Referral Smoking Cessation       [place] online at [email or web address]                  Your next 10 appointments already scheduled     Jul 05, 2017 10:20 AM   Office Visit with Hannah Cee MD   [facility]    [address] [address] [phone]           Bring a current list of meds and any records pertaining to this visit.  For Physicals,  please bring immunization records and any forms needing to be filled out.  Please arrive 10 minutes early to complete paperwork.              Who to contact     If you have questions or need follow up information about today's clinic visit or your schedule please contact [facility] directly at [phone].  Normal or non-critical lab and imaging results will be communicated to you by MyChart, letter or phone within 4 business days after the clinic has received the results. If you do not hear from us within 7 days, please contact the clinic through [employer] or phone. If you have a critical or abnormal lab result, we will notify you by phone as soon as possible.  Submit refill requests through [employer] or call your pharmacy and they will forward the refill request to us. Please allow 3 business days for your refill to be completed.          Additional Information About Your Visit        MyChart Information     [employer] gives you secure access to your electronic health record. If you see a primary care provider, you can also send messages to your care team and make appointments. If you have questions, please call your primary care clinic.  If you do not have a primary care provider, please call [phone] and they will assist you.        Care EveryWhere ID     This is your Care EveryWhere ID. This could be used by other organizations to access your [facility] medical records  [id number]        Your Vitals Were     Pulse Temperature Pulse Oximetry             90 98  F (36.7  C) (Oral) 95%          Blood Pressure from Last 3 Encounters:   01/18/17 137/72   01/04/17 127/68   06/09/11 130/71    Weight from Last 3 Encounters:   01/18/17 243 lb 12.8 oz (110.587 kg)   01/04/17 241 lb 3.2 oz (109.408 kg)   06/09/11 211 lb (95.709 kg)              We Performed the Following     Basic metabolic panel     FOOT EXAM  NO CHARGE [47213.114]     NOVU Referral Smoking Cessation          Today's Medication Changes           These changes are accurate as of: 1/18/17 12:10 PM.  If you have any questions, ask your nurse or doctor.               These medicines have changed or have updated prescriptions.        Dose/Directions    insulin glargine 100 UNIT/ML injection   Commonly known as:  LANTUS   This may have changed:  how much to take   Used for:  Type 2 diabetes, HbA1C goal < 8% (H)        Dose:  45 Units   Inject 45 Units Subcutaneous At Bedtime.   Quantity:  3 Month   Refills:  3       PARoxetine 40 MG tablet   Commonly known as:  PAXIL   This may have changed:    - how much to take  - when to take this   Used for:  Moderate major depression (H)        Dose:  1 tablet   Take 1 tablet by mouth daily.   Quantity:  90 tablet   Refills:  3                Primary Care Provider Office Phone # Fax #    Hannah Cee [phone]       [facility] [address] [address] [address]        Thank you!     Thank you for choosing [facility]  for your care. Our goal is always to provide you with excellent care. Hearing back from our patients is one way we can continue to improve our services. Please take a few minutes to complete the written survey that you may receive in the mail after your visit with us. Thank you!             Your Updated Medication List - Protect others around you: Learn how to safely use, store and throw away your medicines at www.disposemymeds.org.          This list is accurate as of: 1/18/17 12:10 PM.  Always use your most recent med list.                   Brand Name Dispense Instructions for use    ascorbic acid 1000 MG Tabs    vitamin C     1 TABLET DAILY AT DINNER       aspirin 81 MG tablet      ONE DAILY*       CARVEDILOL PO      Take 12.5 mg by mouth 2 times daily (with meals)       CENTRUM SILVER per tablet      1 TABLET DAILY       clobetasol propionate 0.05 % Liqd     1 Bottle    Externally apply 1 dose topically 2 times daily as needed.        FENOFIBRATE PO      Take 54 mg by mouth daily       insulin glargine 100 UNIT/ML injection    LANTUS    3 Month    Inject 45 Units Subcutaneous At Bedtime.       levofloxacin 250 MG tablet    LEVAQUIN    3 tablet    Take 1 tablet (250 mg) by mouth daily       LEVOTHYROXINE SODIUM PO      Take 150 mcg by mouth       losartan 50 MG tablet    COZAAR     Take 1 tablet (50 mg) by mouth daily       metoprolol 50 MG tablet    LOPRESSOR    60 tablet    Take 1 tablet by mouth 2 times daily.       NOVOLOG SC      Inject 20 Units Subcutaneous 3 times daily (with meals)       OMEGA-3 FISH OIL PO      Take 1 g by mouth       PARoxetine 40 MG tablet    PAXIL    90 tablet    Take 1 tablet by mouth daily.       PRAVASTATIN SODIUM PO      Take 40 mg by mouth every evening       PRIMIDONE PO      Take 50 mg by mouth 2 times daily       VITAMIN D3 PO      Take 10,000 Units by mouth daily       vitamin E 400 UNITS Tabs      1 tab a day

## 2017-01-18 NOTE — PATIENT INSTRUCTIONS
Please continue the losartan 50mg daily for now.  We will let you know your test results by [facility] and we will let you know if you need to make further blood pressure medication or other adjustments.   Please measure your blood pressures at home at least 3 times a week and let us know if they are consistently more than 140/90 or if they are ever less than 90/60.    We do advise at least once every 6 month visits for chronic conditions.  You have indicated that you have your next appointment with us in July (we will plan on doing the foot exam at that time).  Regarding your mood: you have indicated that you feel that your mood symptoms are well-controlled. You may continue your current Paxil dose and we will plan on revisiting this during your July visit.

## 2017-01-18 NOTE — NURSING NOTE
"Chief Complaint   Patient presents with     Hypertension       Initial /72 mmHg  Pulse 90  Temp(Src) 98  F (36.7  C) (Oral)  Wt 243 lb 12.8 oz (110.587 kg)  SpO2 95% Estimated body mass index is 37.6 kg/(m^2) as calculated from the following:    Height as of 1/4/17: 5' 7.5\" (1.715 m).    Weight as of this encounter: 243 lb 12.8 oz (110.587 kg).  BP completed using cuff size: antwon Franco, ANABEL    "

## 2017-01-18 NOTE — PROGRESS NOTES
SUBJECTIVE:                                                    Yadi Iniguez is a 66 year old female who presents to clinic today for the following health issues:      Hypertension Follow-up      Outpatient blood pressures are not being checked.    Low Salt Diet: no added salt       Amount of exercise or physical activity: very little    Problems taking medications regularly: No    Medication side effects: none    Diet: regular (no restrictions) and low salt    Patient denies chest pain, chest pressure, shortness of breath, palpitations, headaches, visual changes, or any noted lower extremity swelling.     (the UTI symptoms have completely resolved).        Problem list and histories reviewed & adjusted, as indicated.  Additional history: as documented    Patient Active Problem List   Diagnosis     Hypothyroidism     Hypertension goal BP (blood pressure) < 140/90     Type 2 diabetes, HbA1C goal < 8% (H)     Hyperlipidemia LDL goal <100     Moderate major depression (H)     Incontinence of urine     Neuropathy in diabetes (H)     Eczema     Left lumbar radiculopathy     Advanced directives, counseling/discussion     Health Care Home     Past Surgical History   Procedure Laterality Date     Cholecystectomy, open       C vaginal hysterectomy       Cl aff surgical pathology       Prophylactic repair retinal break, pneumatic retinopexy, combined Left 9/2/2016       Social History   Substance Use Topics     Smoking status: Current Every Day Smoker -- 0.50 packs/day     Types: Cigarettes     Smokeless tobacco: Not on file      Comment: 8/2005     Alcohol Use: Yes     Family History   Problem Relation Age of Onset     Musculoskeletal Disorder Mother      MD     Muscular Disorder Mother      CANCER Father      mesothelioma     HEART DISEASE Brother      angioplasty     Neurologic Disorder Brother      ms         Current Outpatient Prescriptions   Medication Sig Dispense Refill     losartan (COZAAR) 50 MG tablet Take 1  tablet (50 mg) by mouth daily       Cholecalciferol (VITAMIN D3 PO) Take 10,000 Units by mouth daily       Omega-3 Fatty Acids (OMEGA-3 FISH OIL PO) Take 1 g by mouth       CARVEDILOL PO Take 12.5 mg by mouth 2 times daily (with meals)       FENOFIBRATE PO Take 54 mg by mouth daily       LEVOTHYROXINE SODIUM PO Take 150 mcg by mouth       PRAVASTATIN SODIUM PO Take 40 mg by mouth every evening       PRIMIDONE PO Take 50 mg by mouth 2 times daily       Insulin Aspart (NOVOLOG SC) Inject 20 Units Subcutaneous 3 times daily (with meals)       levofloxacin (LEVAQUIN) 250 MG tablet Take 1 tablet (250 mg) by mouth daily 3 tablet 0     insulin glargine (LANTUS) 100 UNIT/ML injection Inject 45 Units Subcutaneous At Bedtime. (Patient taking differently: Inject 50 Units Subcutaneous At Bedtime ) 3 Month 3     metoprolol (LOPRESSOR) 50 MG tablet Take 1 tablet by mouth 2 times daily. 60 tablet 11     paroxetine (PAXIL) 40 MG tablet Take 1 tablet by mouth daily. (Patient taking differently: Take 20 mg by mouth 2 times daily ) 90 tablet 3     Clobetasol Propionate 0.05 % LIQD Externally apply 1 dose topically 2 times daily as needed. 1 Bottle 5     ASPIRIN 81 MG OR TABS ONE DAILY*  3     VITAMIN C 1000 MG OR TABS 1 TABLET DAILY AT DINNER       VITAMIN E 400 UNIT OR TABS 1 tab a day        CENTRUM SILVER OR TABS 1 TABLET DAILY       ==============================================================  ROS:  Constitutional, HEENT, cardiovascular, pulmonary, GI, , musculoskeletal, neuro, skin, endocrine and psych systems are negative, except as otherwise noted.       OBJECTIVE:                                                    /72 mmHg  Pulse 90  Temp(Src) 98  F (36.7  C) (Oral)  Wt 243 lb 12.8 oz (110.587 kg)  SpO2 95%  Body mass index is 37.6 kg/(m^2).     GENERAL APPEARANCE: healthy, alert and in no distress  EYES: Eyes grossly normal to inspection, and conjunctivae and sclerae normal  HENT: head normocephalic and  atraumatic and mouth without ulcers or lesions, oropharynx clear and oral mucous membranes moist  NECK: no noticeable adenopathy, no asymmetry, masses, or scars   RESP: lungs clear to auscultation - no rales, rhonchi or wheezes  CV: regular rate and rhythm, normal S1 S2, no S3 or S4, no murmur, click or rub, no peripheral edema and peripheral pulses strong  ABDOMEN: soft, nontender, no hepatosplenomegaly, no masses and bowel sounds normal  MS: no musculoskeletal defects are noted and gait is age appropriate without ataxia  SKIN: no suspicious lesions or rashes  NEURO: mentation intact and speech normal  PSYCH: mentation appears normal and affect normal/bright.     Results for orders placed or performed in visit on 01/18/17   Basic metabolic panel   Result Value Ref Range    Sodium 135 133 - 144 mmol/L    Potassium 4.5 3.4 - 5.3 mmol/L    Chloride 97 94 - 109 mmol/L    Carbon Dioxide 30 20 - 32 mmol/L    Anion Gap 8 3 - 14 mmol/L    Glucose 208 (H) 70 - 99 mg/dL    Urea Nitrogen 34 (H) 7 - 30 mg/dL    Creatinine 1.58 (H) 0.52 - 1.04 mg/dL    GFR Estimate 33 (L) >60 mL/min/1.7m2    GFR Estimate If Black 39 (L) >60 mL/min/1.7m2    Calcium 9.5 8.5 - 10.1 mg/dL        ASSESSMENT/PLAN:                                                        ICD-10-CM    1. Elevated serum creatinine R79.89 Basic metabolic panel   2. Benign essential hypertension I10 amLODIPine (NORVASC) 10 MG tablet   3. Asymptomatic postmenopausal status Z78.0    4. Screening for diabetic peripheral neuropathy Z13.89 FOOT EXAM  NO CHARGE [46238.114]   5. Need for prophylactic vaccination against Streptococcus pneumoniae (pneumococcus) Z23    6. Need for prophylactic vaccination with tetanus-diphtheria (TD) Z23    7. Needs smoking cessation education F17.200 NOVU Referral Smoking Cessation     (R79.89) Elevated serum creatinine  (primary encounter diagnosis)  Comment: today the Cr improved but has still not returned to its 10/2016 values (see  CareEverywhere); furthermore, outside labs show a pattern od gradual but persistent worsening of the Cr. Recent urine alb a bit high, but this was in the context of a  UTI  Plan: Basic metabolic panel        See the results note-Cr still has not returned to baseline, advised to d/c losartan, start amlodipine 10mg/d and to be seen in 1 month    (I10) Benign essential hypertension  Comment: controlled, still  Plan:       See the results note-Cr still has not returned to baseline, advised to d/c losartan, start amlodipine 10mg/d and to be seen in 1 month  Continue the moderate dose of Coreg  amLODIPine (NORVASC) 10 MG tablet             (Z13.89) Screening for diabetic peripheral neuropathy  Comment:   Plan: FOOT EXAM  NO CHARGE [31877.114]      (Z23) Need for prophylactic vaccination against Streptococcus pneumoniae (pneumococcus)      (Z23) Need for prophylactic vaccination with tetanus-diphtheria (TD)      (F17.200) Needs smoking cessation education  Comment:   Plan: NOVU Referral Smoking Cessation            Patient Instructions   Please continue the losartan 50mg daily for now.  [facility] and we will let you know if you need to make further blood pressure medication or other adjustments.   Please measure your blood pressures at home at least 3 times a week and let us know if they are consistently more than 140/90 or if they are ever less than 90/60.    We do advise at least once every 6 month visits for chronic conditions.  You have indicated that you have your next appointment with us in July (we will plan on doing the foot exam at that time).  Regarding your mood: you have indicated that you feel that your mood symptoms are well-controlled. You may continue your current Paxil dose and we will plan on revisiting this during your July visit.                         Hannah Cee MD  [facility]

## 2017-01-19 ASSESSMENT — PATIENT HEALTH QUESTIONNAIRE - PHQ9: SUM OF ALL RESPONSES TO PHQ QUESTIONS 1-9: 9

## 2017-01-22 ENCOUNTER — TELEPHONE (OUTPATIENT)
Dept: INTERNAL MEDICINE | Facility: CLINIC | Age: 67
End: 2017-01-22

## 2017-01-22 RX ORDER — AMLODIPINE BESYLATE 10 MG/1
10 TABLET ORAL DAILY
Qty: 30 TABLET | Refills: 1 | Status: SHIPPED | OUTPATIENT
Start: 2017-01-22 | End: 2017-04-03

## 2017-01-22 NOTE — TELEPHONE ENCOUNTER
Please call and advise the patient as follows, and also send a [employer] message with the same text and attach recent test results [statements which are in bold and in square brackets, like this sentence, is for clinic staff and should not be included in the text of the letter to the patient]:    Dear Yadi,     Your kidney function has slightly improved, but is still not back to its October numbers.   There would therefore advise you to:  1) stop the Losartan  2) start a medication called Amlodipine-I sent a prescription to your pharmacy. Please be aware that the most common side effect of this medication is leg swelling. It is otherwise well-tolerated.  3) please return to clinic in a month, to recheck your blood pressure and symptoms and to also repeat lab tests for your kidney function at that time. [Please offer to assist the patient with making this appointment].      Please use [employer] or call our clinic at [phone] if you have any questions.     Hannah Cee MD

## 2017-01-23 ENCOUNTER — MYC MEDICAL ADVICE (OUTPATIENT)
Dept: INTERNAL MEDICINE | Facility: CLINIC | Age: 67
End: 2017-01-23

## 2017-01-23 DIAGNOSIS — I10 BENIGN ESSENTIAL HYPERTENSION: Primary | ICD-10-CM

## 2017-01-23 RX ORDER — CARVEDILOL 25 MG/1
25 TABLET ORAL 2 TIMES DAILY WITH MEALS
Qty: 60 TABLET | Refills: 1 | Status: SHIPPED | OUTPATIENT
Start: 2017-01-23 | End: 2017-04-10

## 2017-01-23 RX ORDER — HYDROCHLOROTHIAZIDE 50 MG/1
50 TABLET ORAL DAILY
Qty: 90 TABLET | Refills: 3 | COMMUNITY
Start: 2017-01-23 | End: 2017-01-23 | Stop reason: SINTOL

## 2017-01-23 NOTE — TELEPHONE ENCOUNTER
I was not aware of this, but the Reconcile Dispensed history does indeed show that the patient has been on hydrochlorothiazide 50mg daily.  Please advise the patient to stop her hydrochlorothiazide 50mg, to increase her Coreg from 12.5mg twice a day to 25mg twice a day (I will send a new prescription for the Coreg), to continue her Norvasc 10mg daily, and we will plan to see her in a month. Please let the patient know that we are stopping the hydrochlorothiazide because it can slow down kidney function, and her recent kidney function has been a bit slow.  Thank you,  Hannah Cee MD

## 2017-01-23 NOTE — TELEPHONE ENCOUNTER
Was this discussed at last ov? This is not on current med list-even as historical.  To provider to advise.  JOSE GonzalezN RN

## 2017-01-23 NOTE — TELEPHONE ENCOUNTER
Called patient, informed pt of providers note as written below, pt verbalized good understanding.      Sent [employer] message.    Made one moth follow up appointment.    JOSE GonzalezN RN

## 2017-01-24 NOTE — TELEPHONE ENCOUNTER
Last Read in [employer]   1/24/2017  7:57 AM by Yadi Iniguez           Patient read message.  AYE Gonzalez RN

## 2017-02-02 ENCOUNTER — TRANSFERRED RECORDS (OUTPATIENT)
Dept: HEALTH INFORMATION MANAGEMENT | Facility: CLINIC | Age: 67
End: 2017-02-02

## 2017-02-22 PROBLEM — N18.30 CKD (CHRONIC KIDNEY DISEASE) STAGE 3, GFR 30-59 ML/MIN (H): Status: ACTIVE | Noted: 2017-02-22

## 2017-03-01 ENCOUNTER — OFFICE VISIT (OUTPATIENT)
Dept: INTERNAL MEDICINE | Facility: CLINIC | Age: 67
End: 2017-03-01
Payer: COMMERCIAL

## 2017-03-01 ENCOUNTER — TELEPHONE (OUTPATIENT)
Dept: FAMILY MEDICINE | Facility: CLINIC | Age: 67
End: 2017-03-01

## 2017-03-01 VITALS
OXYGEN SATURATION: 95 % | WEIGHT: 241.2 LBS | BODY MASS INDEX: 37.22 KG/M2 | SYSTOLIC BLOOD PRESSURE: 118 MMHG | HEART RATE: 84 BPM | DIASTOLIC BLOOD PRESSURE: 62 MMHG | TEMPERATURE: 98 F

## 2017-03-01 DIAGNOSIS — Z78.0 ASYMPTOMATIC POSTMENOPAUSAL STATUS: ICD-10-CM

## 2017-03-01 DIAGNOSIS — I10 BENIGN ESSENTIAL HYPERTENSION: Primary | ICD-10-CM

## 2017-03-01 DIAGNOSIS — Z23 NEED FOR PROPHYLACTIC VACCINATION WITH TETANUS-DIPHTHERIA (TD): ICD-10-CM

## 2017-03-01 DIAGNOSIS — E11.21 DIABETIC NEPHROPATHY ASSOCIATED WITH TYPE 2 DIABETES MELLITUS (H): ICD-10-CM

## 2017-03-01 DIAGNOSIS — N18.30 CKD (CHRONIC KIDNEY DISEASE) STAGE 3, GFR 30-59 ML/MIN (H): ICD-10-CM

## 2017-03-01 DIAGNOSIS — Z23 NEED FOR PROPHYLACTIC VACCINATION AGAINST STREPTOCOCCUS PNEUMONIAE (PNEUMOCOCCUS): ICD-10-CM

## 2017-03-01 LAB
ANION GAP SERPL CALCULATED.3IONS-SCNC: 9 MMOL/L (ref 3–14)
BUN SERPL-MCNC: 23 MG/DL (ref 7–30)
CALCIUM SERPL-MCNC: 9.1 MG/DL (ref 8.5–10.1)
CHLORIDE SERPL-SCNC: 93 MMOL/L (ref 94–109)
CO2 SERPL-SCNC: 30 MMOL/L (ref 20–32)
CREAT SERPL-MCNC: 1.17 MG/DL (ref 0.52–1.04)
CREAT UR-MCNC: 37 MG/DL
GFR SERPL CREATININE-BSD FRML MDRD: 46 ML/MIN/1.7M2
GLUCOSE SERPL-MCNC: 659 MG/DL (ref 70–99)
HBA1C MFR BLD: 11.2 % (ref 4.3–6)
MICROALBUMIN UR-MCNC: 104 MG/L
MICROALBUMIN/CREAT UR: 280.32 MG/G CR (ref 0–25)
POTASSIUM SERPL-SCNC: 4.8 MMOL/L (ref 3.4–5.3)
SODIUM SERPL-SCNC: 132 MMOL/L (ref 133–144)

## 2017-03-01 PROCEDURE — 90715 TDAP VACCINE 7 YRS/> IM: CPT | Performed by: INTERNAL MEDICINE

## 2017-03-01 PROCEDURE — 83036 HEMOGLOBIN GLYCOSYLATED A1C: CPT | Performed by: INTERNAL MEDICINE

## 2017-03-01 PROCEDURE — 90670 PCV13 VACCINE IM: CPT | Performed by: INTERNAL MEDICINE

## 2017-03-01 PROCEDURE — 80048 BASIC METABOLIC PNL TOTAL CA: CPT | Performed by: INTERNAL MEDICINE

## 2017-03-01 PROCEDURE — 90472 IMMUNIZATION ADMIN EACH ADD: CPT | Performed by: INTERNAL MEDICINE

## 2017-03-01 PROCEDURE — 90471 IMMUNIZATION ADMIN: CPT | Performed by: INTERNAL MEDICINE

## 2017-03-01 PROCEDURE — 36415 COLL VENOUS BLD VENIPUNCTURE: CPT | Performed by: INTERNAL MEDICINE

## 2017-03-01 PROCEDURE — 99214 OFFICE O/P EST MOD 30 MIN: CPT | Mod: 25 | Performed by: INTERNAL MEDICINE

## 2017-03-01 PROCEDURE — 83930 ASSAY OF BLOOD OSMOLALITY: CPT | Performed by: INTERNAL MEDICINE

## 2017-03-01 PROCEDURE — 82043 UR ALBUMIN QUANTITATIVE: CPT | Performed by: INTERNAL MEDICINE

## 2017-03-01 NOTE — NURSING NOTE
"Chief Complaint   Patient presents with     RECHECK     HTN       Initial /62 (BP Location: Right arm, Patient Position: Chair, Cuff Size: Adult Large)  Pulse 84  Temp 98  F (36.7  C) (Oral)  Wt 241 lb 3.2 oz (109.4 kg)  SpO2 95%  BMI 37.22 kg/m2 Estimated body mass index is 37.22 kg/(m^2) as calculated from the following:    Height as of 1/4/17: 5' 7.5\" (1.715 m).    Weight as of this encounter: 241 lb 3.2 oz (109.4 kg).  Medication Reconciliation: complete    Stephanie Franco CMA  "

## 2017-03-01 NOTE — MR AVS SNAPSHOT
After Visit Summary   3/1/2017    Yadi Iniguez    MRN: [id number]           Patient Information     Date Of Birth          1950        Visit Information        Provider Department      3/1/2017 10:20 AM Hannah Cee MD [facility]        Today's Diagnoses     Benign essential hypertension    -  1    Asymptomatic postmenopausal status        Need for prophylactic vaccination against Streptococcus pneumoniae (pneumococcus)        Need for prophylactic vaccination with tetanus-diphtheria (TD)        CKD (chronic kidney disease) stage 3, GFR 30-59 ml/min        Diabetic nephropathy associated with type 2 diabetes mellitus (H)          Care Instructions    We will let you know your test results by Ivan and will advise you regarding the next appointment (and any needed medication changes) based on the results.     Please return to clinic in 6 months (we may advise a visit sooner, based on today's lab/test results).          Follow-ups after your visit        Your next 10 appointments already scheduled     Jul 05, 2017 10:20 AM CDT   Office Visit with Hannah Cee MD   [facility] ([facility])    [address] [address] [address] [phone]           Bring a current list of meds and any records pertaining to this visit.  For Physicals, please bring immunization records and any forms needing to be filled out.  Please arrive 10 minutes early to complete paperwork.              Future tests that were ordered for you today     Open Future Orders        Priority Expected Expires Ordered    DEXA HIP/PELVIS/SPINE - Future Routine  3/1/2018 3/1/2017            Who to contact     If you have questions or need follow up information about today's clinic visit or your schedule please contact [facility] directly at [phone].  Normal or non-critical lab and imaging results will be communicated to you by Ivan,  letter or phone within 4 business days after the clinic has received the results. If you do not hear from us within 7 days, please contact the clinic through [employer] or phone. If you have a critical or abnormal lab result, we will notify you by phone as soon as possible.  Submit refill requests through [employer] or call your pharmacy and they will forward the refill request to us. Please allow 3 business days for your refill to be completed.          Additional Information About Your Visit        [employer] Information     [employer] gives you secure access to your electronic health record. If you see a primary care provider, you can also send messages to your care team and make appointments. If you have questions, please call your primary care clinic.  If you do not have a primary care provider, please call [phone] and they will assist you.        Care EveryWhere ID     This is your Care EveryWhere ID. This could be used by other organizations to access your [facility] medical records  [id number]        Your Vitals Were     Pulse Temperature Pulse Oximetry BMI (Body Mass Index)          84 98  F (36.7  C) (Oral) 95% 37.22 kg/m2         Blood Pressure from Last 3 Encounters:   03/01/17 118/62   01/18/17 137/72   01/04/17 127/68    Weight from Last 3 Encounters:   03/01/17 241 lb 3.2 oz (109.4 kg)   01/18/17 243 lb 12.8 oz (110.6 kg)   01/04/17 241 lb 3.2 oz (109.4 kg)              We Performed the Following     Albumin Random Urine Quantitative     Basic metabolic panel     Hemoglobin A1c          Today's Medication Changes          These changes are accurate as of: 3/1/17 11:03 AM.  If you have any questions, ask your nurse or doctor.               Start taking these medicines.        Dose/Directions    ACE/ARB NOT PRESCRIBED (INTENTIONAL)   Used for:  Benign essential hypertension   Started by:  Hannah Cee MD        Please choose reason not prescribed, below   Refills:  0         These medicines  have changed or have updated prescriptions.        Dose/Directions    insulin glargine 100 UNIT/ML injection   Commonly known as:  LANTUS   This may have changed:  how much to take   Used for:  Type 2 diabetes, HbA1C goal < 8% (H)        Dose:  45 Units   Inject 45 Units Subcutaneous At Bedtime.   Quantity:  3 Month   Refills:  3       PARoxetine 40 MG tablet   Commonly known as:  PAXIL   This may have changed:    - how much to take  - when to take this   Used for:  Moderate major depression (H)        Dose:  1 tablet   Take 1 tablet by mouth daily.   Quantity:  90 tablet   Refills:  3            Where to get your medicines      Some of these will need a paper prescription and others can be bought over the counter.  Ask your nurse if you have questions.     You don't need a prescription for these medications     ACE/ARB NOT PRESCRIBED (INTENTIONAL)                Primary Care Provider Office Phone # Fax #    Hannah Cee [phone]       [facility] [address] [address] [address] [facility] [address]        Thank you!     Thank you for choosing [facility]  for your care. Our goal is always to provide you with excellent care. Hearing back from our patients is one way we can continue to improve our services. Please take a few minutes to complete the written survey that you may receive in the mail after your visit with us. Thank you!             Your Updated Medication List - Protect others around you: Learn how to safely use, store and throw away your medicines at www.disposemymeds.org.          This list is accurate as of: 3/1/17 11:03 AM.  Always use your most recent med list.                   Brand Name Dispense Instructions for use    ACE/ARB NOT PRESCRIBED (INTENTIONAL)      Please choose reason not prescribed, below       amLODIPine 10 MG tablet    NORVASC    30 tablet    Take 1 tablet (10 mg) by mouth daily       ascorbic acid 1000 MG Tabs    vitamin C     1  TABLET DAILY AT DINNER       aspirin 81 MG tablet      ONE DAILY*       carvedilol 25 MG tablet    COREG    60 tablet    Take 1 tablet (25 mg) by mouth 2 times daily (with meals)       CENTRUM SILVER per tablet      1 TABLET DAILY       clobetasol propionate 0.05 % Liqd     1 Bottle    Externally apply 1 dose topically 2 times daily as needed.       FENOFIBRATE PO      Take 54 mg by mouth daily       insulin glargine 100 UNIT/ML injection    LANTUS    3 Month    Inject 45 Units Subcutaneous At Bedtime.       levofloxacin 250 MG tablet    LEVAQUIN    3 tablet    Take 1 tablet (250 mg) by mouth daily       LEVOTHYROXINE SODIUM PO      Take 150 mcg by mouth       NOVOLOG SC      Inject 20 Units Subcutaneous 3 times daily (with meals)       OMEGA-3 FISH OIL PO      Take 1 g by mouth       PARoxetine 40 MG tablet    PAXIL    90 tablet    Take 1 tablet by mouth daily.       PRAVASTATIN SODIUM PO      Take 40 mg by mouth every evening       PRIMIDONE PO      Take 50 mg by mouth 2 times daily       VITAMIN D3 PO      Take 10,000 Units by mouth daily       vitamin E 400 UNITS Tabs      1 tab a day

## 2017-03-01 NOTE — PROGRESS NOTES
SUBJECTIVE:                                                    Yadi Iniguez is a 66 year old female who presents to clinic today for the following health issues:      Hypertension Follow-up      Outpatient blood pressures are being checked at home.  Results are <130/85.    Low Salt Diet: no added salt       Amount of exercise or physical activity: very little but is trying to get out and walk    Problems taking medications regularly: No    Medication side effects: none  Diet: regular (no restrictions), low salt and carbohydrate counting    See my last OV/results notes for recent changes. The patient is back to recheck her blood pressure, and the BMP.  Patient denies chest pain, chest pressure, shortness of breath, palpitations, headaches, visual changes, or any noted lower extremity swelling.       Type 2 Diabetes Mellitus:  Last A1C was 7.4%, Patient is on long and short acting insulin. Rare lows, once every 3 months about.   (update: please note that the labs from today, later back as critical with a BS of > 600; the lab call went to the on-call provider-please see that and the subsequent telephone encounters, and finally the OV visit with Dr Iglesias on 3.3.17). During the patient's conversation with the covering provider, she reported that she had not been using her insulin for several weeks, d/t problems with the price of this medication.  During the patient's conversation with me at her visit earlier that day, she did not report this.       Problem list and histories reviewed & adjusted, as indicated.  Additional history: as documented    Patient Active Problem List   Diagnosis     Hypothyroidism     Hypertension goal BP (blood pressure) < 140/90     Type 2 diabetes, HbA1C goal < 8% (H)     Hyperlipidemia LDL goal <100     Moderate major depression (H)     Incontinence of urine     Neuropathy in diabetes (H)     Eczema     Left lumbar radiculopathy     Advanced directives, counseling/discussion     Health  Care Home     CKD (chronic kidney disease) stage 3, GFR 30-59 ml/min     Type 2 diabetes mellitus with other diabetic kidney complication, with long-term current use of insulin (H)     Past Surgical History   Procedure Laterality Date     Cholecystectomy, open       C vaginal hysterectomy       Cl aff surgical pathology       Prophylactic repair retinal break, pneumatic retinopexy, combined Left 9/2/2016       Social History   Substance Use Topics     Smoking status: Current Every Day Smoker     Packs/day: 0.50     Types: Cigarettes     Smokeless tobacco: Not on file      Comment: 8/2005     Alcohol use Yes     Family History   Problem Relation Age of Onset     Musculoskeletal Disorder Mother      MD     Muscular Disorder Mother      CANCER Father      mesothelioma     HEART DISEASE Brother      angioplasty     Neurologic Disorder Brother      ms         Current Outpatient Prescriptions   Medication Sig Dispense Refill     ACE/ARB NOT PRESCRIBED, INTENTIONAL, Please choose reason not prescribed, below       carvedilol (COREG) 25 MG tablet Take 1 tablet (25 mg) by mouth 2 times daily (with meals) 60 tablet 1     amLODIPine (NORVASC) 10 MG tablet Take 1 tablet (10 mg) by mouth daily 30 tablet 1     Cholecalciferol (VITAMIN D3 PO) Take 10,000 Units by mouth daily       Omega-3 Fatty Acids (OMEGA-3 FISH OIL PO) Take 1 g by mouth       FENOFIBRATE PO Take 54 mg by mouth daily       LEVOTHYROXINE SODIUM PO Take 150 mcg by mouth       PRAVASTATIN SODIUM PO Take 40 mg by mouth every evening       PRIMIDONE PO Take 50 mg by mouth 2 times daily       Insulin Aspart (NOVOLOG SC) Inject 20 Units Subcutaneous 3 times daily (with meals)       levofloxacin (LEVAQUIN) 250 MG tablet Take 1 tablet (250 mg) by mouth daily 3 tablet 0     paroxetine (PAXIL) 40 MG tablet Take 1 tablet by mouth daily. (Patient taking differently: Take 20 mg by mouth 2 times daily ) 90 tablet 3     Clobetasol Propionate 0.05 % LIQD Externally apply 1 dose  topically 2 times daily as needed. 1 Bottle 5     ASPIRIN 81 MG OR TABS ONE DAILY*  3     VITAMIN C 1000 MG OR TABS 1 TABLET DAILY AT DINNER       VITAMIN E 400 UNIT OR TABS 1 tab a day        CENTRUM SILVER OR TABS 1 TABLET DAILY       insulin degludec (TRESIBA) 200 UNIT/ML pen Inject 60 Units Subcutaneous daily 9 mL 3     blood glucose monitoring (MUSA CONTOUR NEXT) test strip Use to test blood sugar 3 times daily or as directed. 100 strip 11     blood glucose monitoring (MUSA MICROLET) lancets Use to test blood sugar 3 times daily or as directed. 100 Box 11     insulin pen needle 32G X 4 MM Use 1 pen needles daily or as directed.  Per insurance and patient preference 100 each 3       Reviewed and updated as needed this visit by clinical staff       Reviewed and updated as needed this visit by Provider         ==============================================================  ROS:  Constitutional, HEENT, cardiovascular, pulmonary, GI, , musculoskeletal, neuro, skin, endocrine and psych systems are negative, except as otherwise noted.       OBJECTIVE:                                                    /62 (BP Location: Right arm, Patient Position: Chair, Cuff Size: Adult Large)  Pulse 84  Temp 98  F (36.7  C) (Oral)  Wt 241 lb 3.2 oz (109.4 kg)  SpO2 95%  BMI 37.22 kg/m2  Body mass index is 37.22 kg/(m^2).     GENERAL APPEARANCE: healthy, alert and in no distress  EYES: Eyes grossly normal to inspection, and conjunctivae and sclerae normal  HENT: head normocephalic and atraumatic and mouth without ulcers or lesions, oropharynx clear and oral mucous membranes moist  NECK: no noticeable adenopathy, no asymmetry, masses, or scars   RESP: lungs clear to auscultation - no rales, rhonchi or wheezes  CV: regular rate and rhythm, normal S1 S2, no S3 or S4, no murmur, click or rub, no peripheral edema and peripheral pulses strong  ABDOMEN: soft, nontender, no hepatosplenomegaly, no masses and bowel sounds normal  MS:  no musculoskeletal defects are noted and gait is age appropriate without ataxia  SKIN: no suspicious lesions or rashes  NEURO: mentation intact and speech normal  PSYCH: mentation appears normal and affect normal/bright.     Results for orders placed or performed in visit on 03/01/17   Basic metabolic panel   Result Value Ref Range    Sodium 132 (L) 133 - 144 mmol/L    Potassium 4.8 3.4 - 5.3 mmol/L    Chloride 93 (L) 94 - 109 mmol/L    Carbon Dioxide 30 20 - 32 mmol/L    Anion Gap 9 3 - 14 mmol/L    Glucose 659 (HH) 70 - 99 mg/dL    Urea Nitrogen 23 7 - 30 mg/dL    Creatinine 1.17 (H) 0.52 - 1.04 mg/dL    GFR Estimate 46 (L) >60 mL/min/1.7m2    GFR Estimate If Black 56 (L) >60 mL/min/1.7m2    Calcium 9.1 8.5 - 10.1 mg/dL   Albumin Random Urine Quantitative   Result Value Ref Range    Creatinine Urine 37 mg/dL    Albumin Urine mg/L 104 mg/L    Albumin Urine mg/g Cr 280.32 (H) 0 - 25 mg/g Cr   Hemoglobin A1c   Result Value Ref Range    Hemoglobin A1C 11.2 (H) 4.3 - 6.0 %   Osmolality   Result Value Ref Range    Osmolality 332 (H) 280 - 301 mmol/kg        ASSESSMENT/PLAN:                                                        ICD-10-CM    1. Benign essential hypertension I10 ACE/ARB NOT PRESCRIBED, INTENTIONAL,   2. CKD (chronic kidney disease) stage 3, GFR 30-59 ml/min N18.3 Basic metabolic panel     Albumin Random Urine Quantitative     DIABETES EDUCATOR REFERRAL   3. Diabetic nephropathy associated with type 2 diabetes mellitus (H) E11.21 Hemoglobin A1c     Osmolality     Osmolality     DIABETES EDUCATOR REFERRAL   4. Asymptomatic postmenopausal status Z78.0 DEXA HIP/PELVIS/SPINE - Future   5. Need for prophylactic vaccination against Streptococcus pneumoniae (pneumococcus) Z23 PNEUMOCOCCAL CONJ VACCINE 13 VALENT IM (PREVNAR 13)     ADMIN 1st VACCINE   6. Need for prophylactic vaccination with tetanus-diphtheria (TD) Z23 TDAP VACCINE (BOOSTRIX AGES 10-64)     ADMIN 1st VACCINE     (I10) Benign essential  hypertension  (primary encounter diagnosis)  (N18.3) CKD (chronic kidney disease) stage 3, GFR 30-59 ml/min  Comment: blood pressure controlled, asympt and renal function improved  Plan:   (The focus is on the patient's current hyperglycemia-will plan have a follow-up appointment for DM in April)  As per orders above and patient instructions below.     ACE/ARB NOT PRESCRIBED, INTENTIONAL,       Basic metabolic panel, Albumin Random Urine         Quantitative, DIABETES EDUCATOR REFERRAL           (E11.21) Diabetic nephropathy associated with type 2 diabetes mellitus (H)  Lab Results   Component Value Date    A1C 11.2 03/01/2017    A1C 7.4 10/13/2016    A1C 12.2 04/28/2011    A1C 11.4 07/19/2010    A1C 10.2 01/21/2010     LDL Cholesterol Calculated   Date Value Ref Range Status   10/13/2016 152 (A) <=130 mg/dL Final     LDL Cholesterol Direct   Date Value Ref Range Status   01/04/2017 135 (H) <100 mg/dL Final     Comment:     Above desirable:  100-129 mg/dl   Borderline High:  130-159 mg/dL   High:             160-189 mg/dL   Very high:       >189 mg/dl     ]    Comment:   Plan:   Please see Dr Iglesias's 3.3.17 OV note for the latest plan regarding DM-restart of insulin and a follow-up appointment with the diabetic educator on 3.9.17--I also sent a [employer] message advising a follow-up appointment with me in 4/2017          (Z78.0) Asymptomatic postmenopausal status  Comment:   Plan: DEXA HIP/PELVIS/SPINE - Future          (Z23) Need for prophylactic vaccination against Streptococcus pneumoniae (pneumococcus)  Comment:   Plan: PNEUMOCOCCAL CONJ VACCINE 13 VALENT IM (PREVNAR        13), ADMIN 1st VACCINE      (Z23) Need for prophylactic vaccination with tetanus-diphtheria (TD)  Comment:   Plan: TDAP VACCINE (BOOSTRIX AGES 10-64), ADMIN 1st         VACCINE         Patient Instructions   We will let you know your test results by [employer] and will advise you regarding the next appointment (and any needed medication changes)  based on the results.     Please return to clinic in 6 months (we may advise a visit sooner, based on today's lab/test results).                     Hannah Cee MD  [facility]

## 2017-03-01 NOTE — PATIENT INSTRUCTIONS
We will let you know your test results by [employer] and will advise you regarding the next appointment (and any needed medication changes) based on the results.     Please return to clinic in 6 months (we may advise a visit sooner, based on today's lab/test results).

## 2017-03-02 ENCOUNTER — TELEPHONE (OUTPATIENT)
Dept: INTERNAL MEDICINE | Facility: CLINIC | Age: 67
End: 2017-03-02

## 2017-03-02 LAB — OSMOLALITY SERPL: 332 MMOL/KG (ref 280–301)

## 2017-03-02 NOTE — TELEPHONE ENCOUNTER
I will send it to the clinical team to call the patient tomorrow morning (3.2.17), and help her schedule an clinic appointment for tomorrow (I will be in [place], but if [place] is too far from her, she should see a provider at [place] then).     Thank you,  Hannah Cee MD

## 2017-03-02 NOTE — TELEPHONE ENCOUNTER
On call note for critical lab:  I called patient to let her know that her blood sugar was over 600. She has been out of insulin for 3 weeks due to cost of medication. Just picked up a new glucometer today and has not been checking her blood sugar. Drinking fluids OK and not feeling sick. Told to go to emergency department if she starts to feel ill or have emesis. Drink fluids and  insulin as soon as possible. Follow up with provider and clinic tomorrow.  Katina Bosch MD

## 2017-03-03 ENCOUNTER — TELEPHONE (OUTPATIENT)
Dept: FAMILY MEDICINE | Facility: CLINIC | Age: 67
End: 2017-03-03

## 2017-03-03 ENCOUNTER — OFFICE VISIT (OUTPATIENT)
Dept: FAMILY MEDICINE | Facility: CLINIC | Age: 67
End: 2017-03-03
Payer: COMMERCIAL

## 2017-03-03 VITALS
DIASTOLIC BLOOD PRESSURE: 69 MMHG | TEMPERATURE: 98.5 F | HEART RATE: 95 BPM | SYSTOLIC BLOOD PRESSURE: 126 MMHG | WEIGHT: 236 LBS | OXYGEN SATURATION: 95 % | BODY MASS INDEX: 36.42 KG/M2

## 2017-03-03 DIAGNOSIS — Z79.4 TYPE 2 DIABETES MELLITUS WITH OTHER DIABETIC KIDNEY COMPLICATION, WITH LONG-TERM CURRENT USE OF INSULIN (H): ICD-10-CM

## 2017-03-03 DIAGNOSIS — Z79.4 TYPE 2 DIABETES MELLITUS WITH OTHER DIABETIC KIDNEY COMPLICATION, WITH LONG-TERM CURRENT USE OF INSULIN (H): Primary | ICD-10-CM

## 2017-03-03 DIAGNOSIS — E11.29 TYPE 2 DIABETES MELLITUS WITH OTHER DIABETIC KIDNEY COMPLICATION, WITH LONG-TERM CURRENT USE OF INSULIN (H): ICD-10-CM

## 2017-03-03 DIAGNOSIS — E11.29 TYPE 2 DIABETES MELLITUS WITH OTHER DIABETIC KIDNEY COMPLICATION, WITH LONG-TERM CURRENT USE OF INSULIN (H): Primary | ICD-10-CM

## 2017-03-03 LAB
ANION GAP SERPL CALCULATED.3IONS-SCNC: 9 MMOL/L (ref 3–14)
BUN SERPL-MCNC: 18 MG/DL (ref 7–30)
CALCIUM SERPL-MCNC: 9.3 MG/DL (ref 8.5–10.1)
CHLORIDE SERPL-SCNC: 97 MMOL/L (ref 94–109)
CO2 SERPL-SCNC: 32 MMOL/L (ref 20–32)
CREAT SERPL-MCNC: 1.17 MG/DL (ref 0.52–1.04)
GFR SERPL CREATININE-BSD FRML MDRD: 46 ML/MIN/1.7M2
GLUCOSE BLD-MCNC: >444 MG/DL (ref 70–99)
GLUCOSE SERPL-MCNC: 519 MG/DL (ref 70–99)
POTASSIUM SERPL-SCNC: 4.7 MMOL/L (ref 3.4–5.3)
SODIUM SERPL-SCNC: 138 MMOL/L (ref 133–144)

## 2017-03-03 PROCEDURE — 99214 OFFICE O/P EST MOD 30 MIN: CPT | Performed by: FAMILY MEDICINE

## 2017-03-03 PROCEDURE — 80048 BASIC METABOLIC PNL TOTAL CA: CPT | Performed by: FAMILY MEDICINE

## 2017-03-03 PROCEDURE — 36415 COLL VENOUS BLD VENIPUNCTURE: CPT | Performed by: FAMILY MEDICINE

## 2017-03-03 PROCEDURE — 82947 ASSAY GLUCOSE BLOOD QUANT: CPT | Performed by: FAMILY MEDICINE

## 2017-03-03 NOTE — PROGRESS NOTES
SUBJECTIVE:                                                    Yadi Iniguez is a 66 year old female who presents to clinic today for the following health issues:      High blood sugars a couple weeks ago numbers running in the 500     --------------------------------------------------------------------------------------------------------------------------------------    To access diabetes educational materials with in EPIC use <dot>ELISABETH      Put this in AFTER VISIT SUMMARY if needed for the patient to access information online [email or web address]      SUBJECTIVE:  Yadi Iniguez is a 66 year old female who presents today for a follow up appointment for management of DIABETES MELLITUS.    Patient Active Problem List    Diagnosis Date Noted     Health Care Home 06/17/2011     Priority: High     X  Unable to contact  DX V65.8 REPLACED WITH [id number] [facility]ALTH CARE HOME (04/08/2013)       Type 2 diabetes mellitus with other diabetic kidney complication, with long-term current use of insulin (H) 03/03/2017     Priority: Medium     CKD (chronic kidney disease) stage 3, GFR 30-59 ml/min 02/22/2017     Priority: Medium     Per chart review of records in CareEverywhere at least a year ago: GFR has been below 60.       Advanced directives, counseling/discussion 06/09/2011     Priority: Medium     Advance Directive Problem List Overview:   Name Relationship Phone    Primary Health Care Agent            Alternative Health Care Agent          Patient states has Advance Directive and will bring in a copy to clinic.  Anh Medina MA 6/9/2011        Hypothyroidism 04/28/2011     Priority: Medium     Hypertension goal BP (blood pressure) < 140/90 04/28/2011     Priority: Medium     Type 2 diabetes, HbA1C goal < 8% (H) 04/28/2011     Priority: Medium     Hyperlipidemia LDL goal <100 04/28/2011     Priority: Medium     Moderate major depression (H) 04/28/2011     Priority: Medium     Incontinence of urine 04/28/2011      Priority: Medium     Neuropathy in diabetes (H) 04/28/2011     Priority: Medium     Eczema 04/28/2011     Priority: Medium     ear       Left lumbar radiculopathy 04/28/2011     Priority: Medium        The patient checks her blood sugar as follows: three times daily, once daily.   Results as follows: fasting glucose- 4-500, pre-lunch glucose- 4-500 and pre-supper glucose- 4-500    They have been high for a  few weeks now.   She ran out of insulin about 1 month(s) ago.   She is still taking the lantus at bedtime still but not the novolog    She is going to have a appointment(s) with Melissa Mustafa  next week    She reports that she is feeling tired, She is urinating frequently    She denies lightheadedness or confusion  She was able to drive here without problems.       ----------------------------------------------------------------------------------------------------------------------------------------    BP Readings from Last 3 Encounters:   03/03/17 126/69   03/01/17 118/62   01/18/17 137/72     The patient reports that she IS currently smoking.  History   Smoking Status     Current Every Day Smoker     Packs/day: 0.50     Types: Cigarettes   Smokeless Tobacco     Not on file     Comment: 8/2005       The patient's coronary risk factors in addition to diabetes include:      The 10-year ASCVD risk score (Misti PAEZ Jr., et al., 2013) is: 30.2%    Values used to calculate the score:      Age: 66 years      Sex: Female      Is Non- : No      Diabetic: Yes      Tobacco smoker: Yes      Systolic Blood Pressure: 126 mmHg      Is Prescribed Antihypertensives: Yes      HDL Cholesterol: 50 mg/dL      Total Cholesterol: 240 mg/dL        Current Outpatient Prescriptions   Medication Sig Dispense Refill     ACE/ARB NOT PRESCRIBED, INTENTIONAL, Please choose reason not prescribed, below       carvedilol (COREG) 25 MG tablet Take 1 tablet (25 mg) by mouth 2 times daily (with meals) 60 tablet 1     amLODIPine  (NORVASC) 10 MG tablet Take 1 tablet (10 mg) by mouth daily 30 tablet 1     Cholecalciferol (VITAMIN D3 PO) Take 10,000 Units by mouth daily       Omega-3 Fatty Acids (OMEGA-3 FISH OIL PO) Take 1 g by mouth       FENOFIBRATE PO Take 54 mg by mouth daily       LEVOTHYROXINE SODIUM PO Take 150 mcg by mouth       PRAVASTATIN SODIUM PO Take 40 mg by mouth every evening       PRIMIDONE PO Take 50 mg by mouth 2 times daily       Insulin Aspart (NOVOLOG SC) Inject 20 Units Subcutaneous 3 times daily (with meals)       levofloxacin (LEVAQUIN) 250 MG tablet Take 1 tablet (250 mg) by mouth daily 3 tablet 0     insulin glargine (LANTUS) 100 UNIT/ML injection Inject 45 Units Subcutaneous At Bedtime. (Patient taking differently: Inject 50 Units Subcutaneous At Bedtime ) 3 Month 3     paroxetine (PAXIL) 40 MG tablet Take 1 tablet by mouth daily. (Patient taking differently: Take 20 mg by mouth 2 times daily ) 90 tablet 3     Clobetasol Propionate 0.05 % LIQD Externally apply 1 dose topically 2 times daily as needed. 1 Bottle 5     ASPIRIN 81 MG OR TABS ONE DAILY*  3     VITAMIN C 1000 MG OR TABS 1 TABLET DAILY AT DINNER       VITAMIN E 400 UNIT OR TABS 1 tab a day        CENTRUM SILVER OR TABS 1 TABLET DAILY         The patient reports that she IS taking a statin.   (Reminder all diabetics with a ASCVD risk greater or equal to 7.5% should be on a high intensity statin, otherwise on a moderate intensity statin)  The patient reports that she IS taking 81mg of aspirin daily.  (Reminder all diabetic patients with a cardiovascular risk factor and > 50 should take a daily aspirin)   y)      Immunization History   Administered Date(s) Administered     Influenza (IIV3) 11/01/2003, 10/13/2008, 09/12/2010     Pneumococcal (PCV 13) 03/01/2017     Pneumococcal 23 valent 06/17/2004, 05/01/2009     TD (ADULT, 7+) 01/01/2005     TDAP (BOOSTRIX AGES 10-64) 03/01/2017         EXAM:  /69  Pulse 95  Temp 98.5  F (36.9  C) (Oral)  Wt 236 lb  (107 kg)  SpO2 95%  BMI 36.42 kg/m2  Wt Readings from Last 4 Encounters:   03/03/17 236 lb (107 kg)   03/01/17 241 lb 3.2 oz (109.4 kg)   01/18/17 243 lb 12.8 oz (110.6 kg)   01/04/17 241 lb 3.2 oz (109.4 kg)     Body mass index is 36.42 kg/(m^2).    General:  Yadi is awake, alert, and cooperative.  NAD.    GENERAL APPEARANCE: healthy, alert and no distress  HENT: ear canals and TM's normal and nose and mouth without ulcers or lesions  RESP: lungs clear to auscultation - no rales, rhonchi or wheezes  CV: regular rates and rhythm, normal S1 S2, no S3 or S4 and no murmur, click or rub -  EYES: EOMI, PERRL  ABDOMEN: soft, nontender, no HSM or masses and bowel sounds normal                                             Foot Exam: Areas of numbess as outlined above  Right Foot none  Left Foot none  normal DP pulses, capillary refill less than 2 seconds, no trophic changes or ulcerative lesions, dry skin globally on the feet and normal monofilament exam, except for findings noted on diabetic foot graphical image.    Whole Blood Glucose today was >444        Previsit labs drawn include:  Office Visit on 03/01/2017   Component Date Value Ref Range Status     Sodium 03/01/2017 132* 133 - 144 mmol/L Final     Potassium 03/01/2017 4.8  3.4 - 5.3 mmol/L Final     Chloride 03/01/2017 93* 94 - 109 mmol/L Final     Carbon Dioxide 03/01/2017 30  20 - 32 mmol/L Final     Anion Gap 03/01/2017 9  3 - 14 mmol/L Final     Glucose 03/01/2017 659* 70 - 99 mg/dL Final    Comment: Non Fasting   Critical Value called to and read back by  DR VELIZ AT 1954 ON 03.01.2017 BY [id number].       Urea Nitrogen 03/01/2017 23  7 - 30 mg/dL Final     Creatinine 03/01/2017 1.17* 0.52 - 1.04 mg/dL Final     GFR Estimate 03/01/2017 46* >60 mL/min/1.7m2 Final    Non  GFR Calc     GFR Estimate If Black 03/01/2017 56* >60 mL/min/1.7m2 Final    African American GFR Calc     Calcium 03/01/2017 9.1  8.5 - 10.1 mg/dL Final     Creatinine Urine  "03/01/2017 37  mg/dL Final     Albumin Urine mg/L 03/01/2017 104  mg/L Final     Albumin Urine mg/g Cr 03/01/2017 280.32* 0 - 25 mg/g Cr Final     Hemoglobin A1C 03/01/2017 11.2* 4.3 - 6.0 % Final     Osmolality 03/01/2017 332* 280 - 301 mmol/kg Final         ASSESSMENT and PLAN:    Type II Diabetes, out of control but only mildly symptomatic  We gave her an injection of 60 units of Tresiba in the clinic.   She will be able to restart her novolog tonight before dinner as her daughter will get these for her.   She can give herself a second injection of the Tresiba tonight before bed.    DIABETES Type II:                       Lab Results   Component Value Date    A1C 11.2 03/01/2017       Control   poor  Lab Results   Component Value Date    A1C 11.2 03/01/2017    A1C 7.4 10/13/2016    A1C 12.2 04/28/2011     Lab Results   Component Value Date    MICROL 104 03/01/2017               BP/HTN:   BP Readings from Last 3 Encounters:   03/03/17 126/69   03/01/17 118/62   01/18/17 137/72     Control   fair    - Medication:continue the current doses of medication.  (make sure to order \"Ace not prescribed intentionally if not on an ACE/ARB)  The patient was advised to do the following therapuetic life style changes  - Dietary sodium restriction and increase potassium and Calcium intake  - Regular aerobic exercise  - Weight loss  - Discontinue smoking if applicable  - Avoid regular NSAID use if applicable  - Avoid regular decongestant use if applicable  - Follow up in clinic in 6 months for a recheck  - Check a basic metabolic panel today if needed    Patient Education: Reviewed risks of hypertension and principles of   Treatment.    HYPERCHOLESTEROLEMIA:     The 10-year ASCVD risk score (Misti JEANNINE Jr., et al., 2013) is: 30.2%    Values used to calculate the score:      Age: 66 years      Sex: Female      Is Non- : No      Diabetic: Yes      Tobacco smoker: Yes      Systolic Blood Pressure: 126 mmHg      " Is Prescribed Antihypertensives: Yes      HDL Cholesterol: 50 mg/dL      Total Cholesterol: 240 mg/dL    Lab Results   Component Value Date     01/04/2017     10/13/2016      Control   poor  Cholesterol   Date Value Ref Range Status   10/13/2016 240 (A) 100 - 199 mg/dL Final   04/28/2011 275 (H) 0 - 200 mg/dL Final     Comment:     LDL Cholesterol is the primary guide to therapy.   The NCEP recommends further evaluation of: patients with cholesterol greater   than 200 mg/dL if additional risk factors are present, cholesterol greater   than   240 mg/dL, triglycerides greater than 150 mg/dL, or HDL less than 40 mg/dL.     HDL Cholesterol   Date Value Ref Range Status   10/13/2016 50 >40 mg/dL Final   04/28/2011 35 (L) 50 - 110 mg/dL Final     LDL Cholesterol Calculated   Date Value Ref Range Status   10/13/2016 152 (A) <=130 mg/dL Final   04/28/2011  0 - 129 mg/dL Final    Cannot estimate LDL when triglyceride exceeds 400 mg/dL     LDL Cholesterol Direct   Date Value Ref Range Status   01/04/2017 135 (H) <100 mg/dL Final     Comment:     Above desirable:  100-129 mg/dl   Borderline High:  130-159 mg/dL   High:             160-189 mg/dL   Very high:       >189 mg/dl     04/28/2011 122 0 - 129 mg/dL Final     Comment:     Optimal:         <100 mg/dL   Near Optimal:     100-129 mg/dL   Borderline High:  130-159 mg/dL   High:             160-189 mg/dL   Very high:  greater than or equal to 190 mg/dL   Cannot estimate LDL when triglyceride exceeds 400 mg/dL     Triglycerides   Date Value Ref Range Status   10/13/2016 189 (A) <150 mg/dL Final   04/28/2011 734 (H) 0 - 150 mg/dL Final     Cholesterol/HDL Ratio   Date Value Ref Range Status   04/28/2011 7.9 (H) 0.0 - 5.0 Final   07/19/2010 4.9 0.0 - 5.0 Final       i will defer this management to her primary medical doctor as she was seen on a more emergent basis today for her elevated blood sugar(s).

## 2017-03-03 NOTE — PROGRESS NOTES
This has been reviewed and there was an attempt to contact the patient-see the Tele encounter which originated on 3.1.17. Dr Hannah Cee MD.

## 2017-03-03 NOTE — PATIENT INSTRUCTIONS
Take 60 units of Tresiba once a day(s) inplace of the Lantus    See Melissa in Diabetes Education next week

## 2017-03-03 NOTE — TELEPHONE ENCOUNTER
The patient was seen by another provider this morning.   I will close this message.    Hannah Cee MD

## 2017-03-03 NOTE — TELEPHONE ENCOUNTER
Tresiba Flextouch 200 units is not covered.  Insurance wants Lantus (patient failed) Levemir, or Troujeo.  Patient has a supply to get her to her appoitnment with [employer]. On 3/9/17 do you want to do prior auth or have tomasa address it on Thur. 3/9/17?      They did not give any prior auth info (ph,ID)  Nikki Gonzalez M.A.

## 2017-03-03 NOTE — NURSING NOTE
"Chief Complaint   Patient presents with     Diabetes       Initial /69  Pulse 95  Temp 98.5  F (36.9  C) (Oral)  Wt 236 lb (107 kg)  SpO2 95%  BMI 36.42 kg/m2 Estimated body mass index is 36.42 kg/(m^2) as calculated from the following:    Height as of 1/4/17: 5' 7.5\" (1.715 m).    Weight as of this encounter: 236 lb (107 kg).  Medication Reconciliation: complete  "

## 2017-03-03 NOTE — TELEPHONE ENCOUNTER
Follow-up: I see that a call had been made to the patient from out clinic this morning, but the patient has not yet called back and I do not see that she had made an appointment either today or tomorrow with any provider.  I did try to call the patient tonight (around 7pm), and I left a  and I did call her emergency contact. The reasoning is as follows: the patient's labs from yesterday were consistent with HHS (hyperosmolar hyperglycemic syndrome).  The covering physician, who received the results of these labs and spoke with the patient did not note (per the documentation) and mental status changes during that interchange and did advise her to make an urgent follow-up appointment the next day, (ie today). This is a condition that must be addressed within a 24 hour period.  The other option (which is used is utilized quite often) is to refer the patient directly to an ED where they would be treated with IV normal saline and insulin.  I will try to reach the patient tomorrow morning.     Hannah Cee MD

## 2017-03-03 NOTE — MR AVS SNAPSHOT
After Visit Summary   3/3/2017    Yadi Iniguez    MRN: [id number]           Patient Information     Date Of Birth          1950        Visit Information        Provider Department      3/3/2017 9:30 AM Wale Iglesias MD [facility]        Today's Diagnoses     Type 2 diabetes mellitus with other diabetic kidney complication, with long-term current use of insulin (H)    -  1      Care Instructions    Take 60 units of Tresiba once a day(s) inplace of the Lantus    See Melissa in Diabetes Education next week        Follow-ups after your visit        Your next 10 appointments already scheduled     Mar 13, 2017  1:00 PM CDT   DX HIP/PELVIS/SPINE with FKDX1   [facility]    [address] [place] [address] [phone]           Please do not take any of the following 48 hours prior to your exam: vitamins, calcium tablets, antacids.            Jul 05, 2017 10:20 AM CDT   Office Visit with Hannah Cee MD   [facility]    [address] [address] [phone]           Bring a current list of meds and any records pertaining to this visit.  For Physicals, please bring immunization records and any forms needing to be filled out.  Please arrive 10 minutes early to complete paperwork.              Who to contact     If you have questions or need follow up information about today's clinic visit or your schedule please contact [facility] directly at [phone].  Normal or non-critical lab and imaging results will be communicated to you by MyChart, letter or phone within 4 business days after the clinic has received the results. If you do not hear from us within 7 days, please contact the clinic through MyChart or phone. If you have a critical or abnormal lab result, we will notify you by phone as soon as possible.  Submit refill requests through  [facility] or call your pharmacy and they will forward the refill request to us. Please allow 3 business days for your refill to be completed.          Additional Information About Your Visit        [employer] Information     [facility] gives you secure access to your electronic health record. If you see a primary care provider, you can also send messages to your care team and make appointments. If you have questions, please call your primary care clinic.  If you do not have a primary care provider, please call [phone] and they will assist you.        Care EveryWhere ID     This is your Care EveryWhere ID. This could be used by other organizations to access your [place] medical records  [id number]        Your Vitals Were     Pulse Temperature Pulse Oximetry BMI (Body Mass Index)          95 98.5  F (36.9  C) (Oral) 95% 36.42 kg/m2         Blood Pressure from Last 3 Encounters:   03/03/17 126/69   03/01/17 118/62   01/18/17 137/72    Weight from Last 3 Encounters:   03/03/17 236 lb (107 kg)   03/01/17 241 lb 3.2 oz (109.4 kg)   01/18/17 243 lb 12.8 oz (110.6 kg)              We Performed the Following     Basic metabolic panel     Glucose, whole blood          Today's Medication Changes          These changes are accurate as of: 3/3/17 10:22 AM.  If you have any questions, ask your nurse or doctor.               Start taking these medicines.        Dose/Directions    blood glucose monitoring lancets   Used for:  Type 2 diabetes mellitus with other diabetic kidney complication, with long-term current use of insulin (H)   Started by:  Wale Iglesias MD        Use to test blood sugar 2 times daily or as directed.   Quantity:  100 Box   Refills:  11       blood glucose monitoring test strip   Commonly known as:  MUSA CONTOUR NEXT   Used for:  Type 2 diabetes mellitus with other diabetic kidney complication, with long-term current use of insulin (H)   Started by:  Wale Iglesias MD        Use to test blood sugar 2  times daily or as directed.   Quantity:  100 strip   Refills:  11       insulin degludec 200 UNIT/ML pen   Commonly known as:  TRESIBA   Used for:  Type 2 diabetes mellitus with other diabetic kidney complication, with long-term current use of insulin (H)   Replaces:  insulin glargine 100 UNIT/ML injection   Started by:  Wale Iglesias MD        Dose:  60 Units   Inject 60 Units Subcutaneous daily   Quantity:  9 mL   Refills:  3         These medicines have changed or have updated prescriptions.        Dose/Directions    PARoxetine 40 MG tablet   Commonly known as:  PAXIL   This may have changed:    - how much to take  - when to take this   Used for:  Moderate major depression (H)        Dose:  1 tablet   Take 1 tablet by mouth daily.   Quantity:  90 tablet   Refills:  3         Stop taking these medicines if you haven't already. Please contact your care team if you have questions.     insulin glargine 100 UNIT/ML injection   Commonly known as:  LANTUS   Replaced by:  insulin degludec 200 UNIT/ML pen   Stopped by:  Wale Iglesias MD                Where to get your medicines      These medications were sent to [facility] [place] - [address] [place] [address]  [address] [place] [address], [place] [address]     Phone:  [phone]     blood glucose monitoring lancets    blood glucose monitoring test strip    insulin degludec 200 UNIT/ML pen                Primary Care Provider Office Phone # Fax #    Hannah Cee [phone]       [facility] [address] [facility] [address]        Thank you!     Thank you for choosing [facility]  for your care. Our goal is always to provide you with excellent care. Hearing back from our patients is one way we can continue to improve our services. Please take a few minutes to complete the written survey that you may receive in the mail after your visit with us. Thank  you!             Your Updated Medication List - Protect others around you: Learn how to safely use, store and throw away your medicines at www.disposemymeds.org.          This list is accurate as of: 3/3/17 10:22 AM.  Always use your most recent med list.                   Brand Name Dispense Instructions for use    ACE/ARB NOT PRESCRIBED (INTENTIONAL)      Please choose reason not prescribed, below       amLODIPine 10 MG tablet    NORVASC    30 tablet    Take 1 tablet (10 mg) by mouth daily       ascorbic acid 1000 MG Tabs    vitamin C     1 TABLET DAILY AT DINNER       aspirin 81 MG tablet      ONE DAILY*       blood glucose monitoring lancets     100 Box    Use to test blood sugar 2 times daily or as directed.       blood glucose monitoring test strip    MSUA CONTOUR NEXT    100 strip    Use to test blood sugar 2 times daily or as directed.       carvedilol 25 MG tablet    COREG    60 tablet    Take 1 tablet (25 mg) by mouth 2 times daily (with meals)       CENTRUM SILVER per tablet      1 TABLET DAILY       clobetasol propionate 0.05 % Liqd     1 Bottle    Externally apply 1 dose topically 2 times daily as needed.       FENOFIBRATE PO      Take 54 mg by mouth daily       insulin degludec 200 UNIT/ML pen    TRESIBA    9 mL    Inject 60 Units Subcutaneous daily       levofloxacin 250 MG tablet    LEVAQUIN    3 tablet    Take 1 tablet (250 mg) by mouth daily       LEVOTHYROXINE SODIUM PO      Take 150 mcg by mouth       NOVOLOG SC      Inject 20 Units Subcutaneous 3 times daily (with meals)       OMEGA-3 FISH OIL PO      Take 1 g by mouth       PARoxetine 40 MG tablet    PAXIL    90 tablet    Take 1 tablet by mouth daily.       PRAVASTATIN SODIUM PO      Take 40 mg by mouth every evening       PRIMIDONE PO      Take 50 mg by mouth 2 times daily       VITAMIN D3 PO      Take 10,000 Units by mouth daily       vitamin E 400 UNITS Tabs      1 tab a day

## 2017-03-03 NOTE — TELEPHONE ENCOUNTER
Dr. Cee wanted to see her tomorrow but no appointments were available. Had a diabetic issue with potential diabetic HHF and wanting to see if she can be squeezed in the schedule tomorrow. Please call patient to see about getting her in. [phone] (Jeffrey)     Central Scheduler,  Fuentes WALLER.

## 2017-03-03 NOTE — NURSING NOTE
The following medication was given:  Per Verbal Orders.     MEDICATION: Tresiba  ROUTE: SQ  SITE: [facility].  DOSE: 60 U  LOT #: [facility] [id number]  :  Yadira Nordisk  EXPIRATION DATE:  06/2018  NDC [id number]    Loan Delcid RN

## 2017-03-03 NOTE — PROGRESS NOTES
Yadi,  I have reviewed the results of the laboratory tests that we recently ordered. All of the lab work performed was normal or considered normal for you. Please stick with the plan we made in clinic today and call with any problems including low sugars or continued high sugars.  Sincerely,   Wale Iglesias

## 2017-03-05 NOTE — TELEPHONE ENCOUNTER
As I will be on vacation starting on 3.6.17, I will forward this back to the clinic team, to be addressed by available providers.    Thank you,  Hannah Cee MD

## 2017-03-06 NOTE — TELEPHONE ENCOUNTER
Please let the patient know that I spoke with Melissa our diabetes educator and she suggested to switch to Levemir. I have called in the prescription(s).  Wale Iglesias MD

## 2017-03-09 ENCOUNTER — ALLIED HEALTH/NURSE VISIT (OUTPATIENT)
Dept: EDUCATION SERVICES | Facility: CLINIC | Age: 67
End: 2017-03-09
Payer: COMMERCIAL

## 2017-03-09 DIAGNOSIS — E11.9 DIABETES MELLITUS WITHOUT COMPLICATION (H): Primary | ICD-10-CM

## 2017-03-09 PROCEDURE — G0108 DIAB MANAGE TRN  PER INDIV: HCPCS

## 2017-03-09 PROCEDURE — 99207 ZZC NO CHARGE LOS: CPT

## 2017-03-09 NOTE — PROGRESS NOTES
Diabetes Self Management Training: Individual Review Visit    Yadi Iniguez presents today for education, evaluation of glucose control and modification of medication(s) related to Type 2 diabetes.    She is accompanied by self    Patient's diabetes management related comments/concerns: Had diabetes many yrs.  Out of control and getting on the right track, did not realize that I felt so bad    Patient's emotional response to diabetes: expresses readiness to learn, concern for health and well-being, acceptance and confidence diabetes can be controlled    Patient would like this visit to be focused around the following diabetes-related behaviors and goals: Diabetes pathophysiology, Assistance with making lifestyle changes, Self-care behavioral goal setting, Healthy Eating, Being Active, Monitoring, Taking Medication, Problem Solving, Reducing Risks and Healthy Coping    ASSESSMENT:  Patient Problem List and Family Medical History reviewed for relevant medical history, current medical status, and diabetes risk factors.    Current Diabetes Management per Patient:  Taking diabetes medications?   yes:     Diabetes Medication(s)     Insulin Sig    insulin detemir (LEVEMIR VIAL) 100 UNIT/ML injection Inject 50 Units Subcutaneous At Bedtime    Insulin Aspart (NOVOLOG SC) Inject 20 Units Subcutaneous 3 times daily (with meals)      ,, Injectable Medications: Tresiba 0-0-0-60 and NovoLog Insulin 22-22-22-0    *Abbreviated insulin dose documentation key: Insulin Trade Name (rhlvvszxu-gbldc-ninixe-bedtime) - i.e. Humalog 5-5-5-0 (Humalog 5 units at breakfast, 5 units at lunch, and 5 units at dinner).    Past Diabetes Education: Yes, many yrs. Ago.      Patient glucose self monitoring as follows: three times daily.   BG meter: Contour Next EZ meter  BG results: 178,137,154,108,155 before dinner, 208,204,142,132    BG values are: Not in goal  Patient's most recent   Lab Results   Component Value Date    A1C 11.2 03/01/2017    is  "not meeting goal of <7.0    Nutrition:  Patient eats 3 meals per day    Breakfast - Apple muffin, Raspberry yogurt, greek, coffee @ 6-8 AM  Lunch - Lettuce, ham and ranch salad, Diet mt, Dew, or baked potato, butter sour cream. @ noon  Dinner - salad, tuna, cheese, smashed fritos, and ranch dressing or  Eggs and ham and tomatoes, cheese, 2 toast @530-800pm  Snacks - banana and sugar free pudding    Beverages: coffee, water diet mt. Dew, or Dr. Pepper.      Cultural/Denominational diet restrictions: No     Biggest Challenge to Healthy Eating: knowing what to eat    Physical Activity:    Type: none due to walking much.      Diabetes Risk Factors:  family history and age over 45 years    Diabetes Complications:  Not discussed today.    Vitals:  There were no vitals taken for this visit.  Estimated body mass index is 36.42 kg/(m^2) as calculated from the following:    Height as of 1/4/17: 1.715 m (5' 7.5\").    Weight as of 3/3/17: 107 kg (236 lb).   Last 3 BP:   BP Readings from Last 3 Encounters:   03/03/17 126/69   03/01/17 118/62   01/18/17 137/72       History   Smoking Status     Current Every Day Smoker     Packs/day: 0.50     Types: Cigarettes   Smokeless Tobacco     Not on file     Comment: 8/2005       Labs:  Lab Results   Component Value Date    A1C 11.2 03/01/2017     Lab Results   Component Value Date     03/03/2017     Lab Results   Component Value Date     01/04/2017     10/13/2016     HDL Cholesterol   Date Value Ref Range Status   10/13/2016 50 >40 mg/dL Final   ]  GFR Estimate   Date Value Ref Range Status   03/03/2017 46 (L) >60 mL/min/1.7m2 Final     Comment:     Non  GFR Calc     GFR Estimate If Black   Date Value Ref Range Status   03/03/2017 56 (L) >60 mL/min/1.7m2 Final     Comment:      GFR Calc     Lab Results   Component Value Date    CR 1.17 03/03/2017     No results found for: MICROALBUMIN    Socio/Economic Considerations:    Support system: " family and children    Health Beliefs and Attitudes:   Patient Activation Measure Survey Score:  LESLY Score (Last Two) 6/9/2011   LESLY Raw Score 41   Activation Score 63.2   LESLY Level 3       Stage of Change: PREPARATION (Decided to change - considering how)      Diabetes knowledge and skills assessment:     Patient is knowledgeable in diabetes management concepts related to: Monitoring and Taking Medication    Patient needs further education on the following diabetes management concepts: Healthy Eating, Being Active, Monitoring and Taking Medication    Barriers to Learning Assessment: No Barriers identified    Based on learning assessment above, most appropriate setting for further diabetes education would be: Individual setting.    INTERVENTION:   Education provided today on:  AADE Self-Care Behaviors:  Healthy Eating: carbohydrate counting, consistency in amount, composition, and timing of food intake, weight reduction, heart healthy diet, eating out, portion control, plate planning method and label reading  Being Active: relationship to blood glucose and describe appropriate activity program  Monitoring: purpose, proper technique, log and interpret results, individual blood glucose targets and frequency of monitoring  Taking Medication: action of prescribed medication, drawing up, administering and storing injectable diabetes medications, proper site selection and rotation for injections, side effects of prescribed medications and when to take medications  Healthy Coping: recognize feelings about diagnosis, benefits of making appropriate lifestyle changes and utilize support systems    Opportunities for ongoing education and support in diabetes-self management were discussed.    Pt verbalized understanding of concepts discussed and recommendations provided today.       Education Materials Provided:  [facility] Understanding Diabetes Booklet, BG Log Sheet, Carbohydrate Counting and My Plate Planner    PLAN:  See  Patient Instructions for co-developed, patient-stated behavior change goals.  AVS printed and provided to patient today.    FOLLOW-UP:  Follow-up appointment scheduled on Thursday April 6.  Chart routed to referring provider.    Ongoing plan for education and support: Follow-up visit with diabetes educator in [place]    Melissa Mustafa RN/RONALDO [facility] Diabetes Educator    Time Spent: 60 minutes  Encounter Type: Individual    Any diabetes medication dose changes were made via the CDE Protocol and Collaborative Practice Agreement with the patient's primary care provider. A copy of this encounter was shared with the provider.

## 2017-03-09 NOTE — MR AVS SNAPSHOT
After Visit Summary   3/9/2017    Yadi Iniguez    MRN: [id number]           Patient Information     Date Of Birth          1950        Visit Information        Provider Department      3/9/2017 8:30 AM AN DIABETIC ED RESOURCE [facility]        Care Instructions    My Diabetes Care Goals:    Healthy Eating: Proteins with all meals, eating something every 4-5 hrs.   Getting an earlier time frame.  Goal is to eat around 6:00 pm     Being Active: 10 minutes on the elliptical.      Monitoring: check BG before every meal.  Let me know if your BG < 100 consistently.      Taking Medication: Tresiba 60 units at bedtime.    Novolog 20 units before each meal.      Check with insurance for Victoza or Trulicity  For coverage.      Follow up:  Follow-up diabetes education appointment scheduled on Thursday April 6 @ 10:30.      Bring blood glucose meter and logbook with you to all doctor and follow-up appointments.     [place] [facility] for the [facility] Area:  For Your Diabetes Education and Nutrition Appointments Call:  [phone]   For Diabetes Education or Nutrition Related Questions:   Phone: [phone]  E-mail: [email or web address]  Fax: [phone]   If you need a medication refill please contact your pharmacy. Please allow 3 business days for your refills to be completed.    Instructions for emailing the Diabetes Educators    If you need to communicate a non-urgent message to a Diabetes Educator via email, please send to [email or web address].    Please follow the following email guidelines:    Subject line: Secure: your clinic name (example: Secure: [place])  In the email please include: First name, middle initial, last name and date of birth.    We will be in touch with you within one (1) business day.           Follow-ups after your visit        Your next 10 appointments already scheduled     Mar 13, 2017  1:00 PM CDT   DX HIP/PELVIS/SPINE  with FKDX1   [facility] ([facility])    [address] [place] [address]  [place] [id number] [phone]           Please do not take any of the following 48 hours prior to your exam: vitamins, calcium tablets, antacids.            Apr 06, 2017 10:30 AM CDT   Diabetic Education with AN DIABETIC ED RESOURCE   [facility]    [address] [address] [phone]            Jul 05, 2017 10:20 AM CDT   Office Visit with Hannah Cee [facility]   [facility]    [address] [address] [phone]           Bring a current list of meds and any records pertaining to this visit.  For Physicals, please bring immunization records and any forms needing to be filled out.  Please arrive 10 minutes early to complete paperwork.              Who to contact     If you have questions or need follow up information about today's clinic visit or your schedule please contact [facility] directly at [phone].  Normal or non-critical lab and imaging results will be communicated to you by [employer], letter or phone within 4 business days after the clinic has received the results. If you do not hear from us within 7 days, please contact the clinic through [employer] or phone. If you have a critical or abnormal lab result, we will notify you by phone as soon as possible.  Submit refill requests through [employer] or call your pharmacy and they will forward the refill request to us. Please allow 3 business days for your refill to be completed.          Additional Information About Your Visit        [employer] Information     [employer] gives you secure access to your electronic health record. If you see a primary care provider, you can also send messages to your care team and make appointments. If you have questions, please call your primary care clinic.  If you do not have a primary  care provider, please call [phone] and they will assist you.        Care EveryWhere ID     This is your Care EveryWhere ID. This could be used by other organizations to access your [place] medical records  [id number]         Blood Pressure from Last 3 Encounters:   03/03/17 126/69   03/01/17 118/62   01/18/17 137/72    Weight from Last 3 Encounters:   03/03/17 107 kg (236 lb)   03/01/17 109.4 kg (241 lb 3.2 oz)   01/18/17 110.6 kg (243 lb 12.8 oz)              Today, you had the following     No orders found for display         Today's Medication Changes          These changes are accurate as of: 3/9/17  9:51 AM.  If you have any questions, ask your nurse or doctor.               These medicines have changed or have updated prescriptions.        Dose/Directions    PARoxetine 40 MG tablet   Commonly known as:  PAXIL   This may have changed:    - how much to take  - when to take this   Used for:  Moderate major depression (H)        Dose:  1 tablet   Take 1 tablet by mouth daily.   Quantity:  90 tablet   Refills:  3                Primary Care Provider Office Phone # Fax #    Hannah Cee [phone]       [facility] [id number] [place] [id number]        Thank you!     Thank you for choosing [facility]  for your care. Our goal is always to provide you with excellent care. Hearing back from our patients is one way we can continue to improve our services. Please take a few minutes to complete the written survey that you may receive in the mail after your visit with us. Thank you!             Your Updated Medication List - Protect others around you: Learn how to safely use, store and throw away your medicines at www.disposemymeds.org.          This list is accurate as of: 3/9/17  9:51 AM.  Always use your most recent med list.                   Brand Name Dispense Instructions for use    ACE/ARB NOT PRESCRIBED (INTENTIONAL)      Please choose  reason not prescribed, below       amLODIPine 10 MG tablet    NORVASC    30 tablet    Take 1 tablet (10 mg) by mouth daily       ascorbic acid 1000 MG Tabs    vitamin C     1 TABLET DAILY AT DINNER       aspirin 81 MG tablet      ONE DAILY*       blood glucose monitoring lancets     100 Box    Use to test blood sugar 3 times daily or as directed.       blood glucose monitoring test strip    MUSA CONTOUR NEXT    100 strip    Use to test blood sugar 3 times daily or as directed.       carvedilol 25 MG tablet    COREG    60 tablet    Take 1 tablet (25 mg) by mouth 2 times daily (with meals)       CENTRUM SILVER per tablet      1 TABLET DAILY       clobetasol propionate 0.05 % Liqd     1 Bottle    Externally apply 1 dose topically 2 times daily as needed.       FENOFIBRATE PO      Take 54 mg by mouth daily       insulin detemir 100 UNIT/ML injection    LEVEMIR VIAL    30 mL    Inject 50 Units Subcutaneous At Bedtime       insulin pen needle 32G X 4 MM     100 each    Use 1 pen needles daily or as directed.  Per insurance and patient preference       levofloxacin 250 MG tablet    LEVAQUIN    3 tablet    Take 1 tablet (250 mg) by mouth daily       LEVOTHYROXINE SODIUM PO      Take 150 mcg by mouth       NOVOLOG SC      Inject 20 Units Subcutaneous 3 times daily (with meals)       OMEGA-3 FISH OIL PO      Take 1 g by mouth       PARoxetine 40 MG tablet    PAXIL    90 tablet    Take 1 tablet by mouth daily.       PRAVASTATIN SODIUM PO      Take 40 mg by mouth every evening       PRIMIDONE PO      Take 50 mg by mouth 2 times daily       VITAMIN D3 PO      Take 10,000 Units by mouth daily       vitamin E 400 UNITS Tabs      1 tab a day

## 2017-03-09 NOTE — PATIENT INSTRUCTIONS
My Diabetes Care Goals:    Healthy Eating: Proteins with all meals, eating something every 4-5 hrs.   Getting an earlier time frame.  Goal is to eat around 6:00 pm     Being Active: 10 minutes on the elliptical.      Monitoring: check BG before every meal.  Let me know if your BG < 100 consistently.      Taking Medication: Tresiba 60 units at bedtime.    Novolog 20 units before each meal.      Check with insurance for Victoza or Trulicity  For coverage.      Follow up:  Follow-up diabetes education appointment scheduled on Thursday April 6 @ 10:30.      Bring blood glucose meter and logbook with you to all doctor and follow-up appointments.     [place] [facility] for the [facility] Area:  For Your Diabetes Education and Nutrition Appointments Call:  [phone]   For Diabetes Education or Nutrition Related Questions:   Phone: [phone]  E-mail: [email or web address]  Fax: [phone]   If you need a medication refill please contact your pharmacy. Please allow 3 business days for your refills to be completed.    Instructions for emailing the Diabetes Educators    If you need to communicate a non-urgent message to a Diabetes Educator via email, please send to [email or web address].    Please follow the following email guidelines:    Subject line: Secure: your clinic name (example: Secure: [place])  In the email please include: First name, middle initial, last name and date of birth.    We will be in touch with you within one (1) business day.

## 2017-03-13 ENCOUNTER — RADIANT APPOINTMENT (OUTPATIENT)
Dept: BONE DENSITY | Facility: CLINIC | Age: 67
End: 2017-03-13
Attending: INTERNAL MEDICINE
Payer: COMMERCIAL

## 2017-03-13 DIAGNOSIS — Z78.0 ASYMPTOMATIC POSTMENOPAUSAL STATUS: ICD-10-CM

## 2017-03-13 PROCEDURE — 77080 DXA BONE DENSITY AXIAL: CPT | Performed by: INTERNAL MEDICINE

## 2017-03-20 ENCOUNTER — TELEPHONE (OUTPATIENT)
Dept: INTERNAL MEDICINE | Facility: CLINIC | Age: 67
End: 2017-03-20

## 2017-03-20 DIAGNOSIS — M81.0 OSTEOPOROSIS: Primary | ICD-10-CM

## 2017-03-20 RX ORDER — ALENDRONATE SODIUM 70 MG/1
70 TABLET ORAL
Qty: 12 TABLET | Refills: 3 | Status: SHIPPED | OUTPATIENT
Start: 2017-03-20 | End: 2018-03-14

## 2017-03-20 NOTE — TELEPHONE ENCOUNTER
Called patient, informed pt of providers note as written below, pt verbalized good understanding.  She is already taking 10,000 units vit D daily and is on a multivitamin. She will look at the Calcium content in multivitamin. Adjust as needed with diet or additional supplement.    Sent patient Xtraicet message.     JOSE GonzalezN RN

## 2017-03-20 NOTE — TELEPHONE ENCOUNTER
"Please call and advise the patient as follows, and also send a Bitbar message with the same text and attach recent test results [statements which are in bold and in square brackets, like this sentence, is for clinic staff and should not be included in the text of the letter to the patient]:    Dear Yadi,     Your bone density test shows that you have a condition called Osteoporosis, which means that you are at an increased risk of getting bone fractures.      We recommend that you begin taking a medication called alendronate (also known as Fosamax) weekly-this will help keep your bones strong.  I have sent a prescription to your preferred pharmacy.  Additional recommendations are as follows:    We recommend that you get 1,200mg (milligrams) of calcium daily as well as 800 international units of vitamin D daily. You may check the \"Nutrition Facts\" on the packaging of the foods that you eat to see how much calcium and vitamin D you are getting each day.  Some people find it easier to take oral supplements of calcium and vitamin D to make sure that they are getting enough of each.  Please let me know if you would like me to send a prescription for calcium and vitamin D supplements.   If you are already on calcium and vitamin D supplements and are getting enough of each, please continue to take them.    We also recommend that you engage in weight-bearing exercise such as golf or brisk walking or dancing to strengthen your bones.  Common agents which can worsen osteoporosis include tobacco and alcohol use. We do strongly encourage you to quit smoking.    Please notify me via Bit[employer] or contact the clinic at [phone] if you have any questions.      Hannah Cee MD    "

## 2017-04-03 DIAGNOSIS — I10 BENIGN ESSENTIAL HYPERTENSION: ICD-10-CM

## 2017-04-04 DIAGNOSIS — I10 BENIGN ESSENTIAL HYPERTENSION: ICD-10-CM

## 2017-04-04 RX ORDER — AMLODIPINE BESYLATE 10 MG/1
10 TABLET ORAL DAILY
Qty: 90 TABLET | Refills: 1 | Status: SHIPPED | OUTPATIENT
Start: 2017-04-04 | End: 2017-10-26

## 2017-04-04 RX ORDER — AMLODIPINE BESYLATE 10 MG/1
TABLET ORAL
Qty: 30 TABLET | Refills: 1 | OUTPATIENT
Start: 2017-04-04

## 2017-04-06 ENCOUNTER — ALLIED HEALTH/NURSE VISIT (OUTPATIENT)
Dept: EDUCATION SERVICES | Facility: CLINIC | Age: 67
End: 2017-04-06
Payer: COMMERCIAL

## 2017-04-06 DIAGNOSIS — E11.29 TYPE 2 DIABETES MELLITUS WITH OTHER DIABETIC KIDNEY COMPLICATION, WITH LONG-TERM CURRENT USE OF INSULIN (H): Primary | ICD-10-CM

## 2017-04-06 DIAGNOSIS — Z79.4 TYPE 2 DIABETES MELLITUS WITH OTHER DIABETIC KIDNEY COMPLICATION, WITH LONG-TERM CURRENT USE OF INSULIN (H): Primary | ICD-10-CM

## 2017-04-06 DIAGNOSIS — E11.9 DIABETES MELLITUS WITHOUT COMPLICATION (H): ICD-10-CM

## 2017-04-06 PROCEDURE — 99207 ZZC NO CHARGE LOS: CPT

## 2017-04-06 PROCEDURE — G0108 DIAB MANAGE TRN  PER INDIV: HCPCS

## 2017-04-06 NOTE — PATIENT INSTRUCTIONS
My Diabetes Care Goals:    Healthy Eating: Add more carbs to lunch and dinner and be sure protein with all meals.     Monitoring: Check before each meal and then 2 hrs after your dinner    Taking Medication: Tresiba 60 units daily  Novolog 20/18/20 units pre meals.    Follow up:  Follow-up diabetes education appointment scheduled on Thursday June 1 @ 10:30.      Bring blood glucose meter and logbook with you to all doctor and follow-up appointments.     [place] [facility] for the [facility] Area:  For Your Diabetes Education and Nutrition Appointments Call:  [phone]   For Diabetes Education or Nutrition Related Questions:   Phone: [phone]  E-mail: [email or web address]  Fax: [phone]   If you need a medication refill please contact your pharmacy. Please allow 3 business days for your refills to be completed.    Instructions for emailing the Diabetes Educators    If you need to communicate a non-urgent message to a Diabetes Educator via email, please send to [email or web address].    Please follow the following email guidelines:    Subject line: Secure: your clinic name (example: Secure: [place])  In the email please include: First name, middle initial, last name and date of birth.    We will be in touch with you within one (1) business day.

## 2017-04-06 NOTE — MR AVS SNAPSHOT
After Visit Summary   4/6/2017    Yadi Iniguez    MRN: [id number]           Patient Information     Date Of Birth          1950        Visit Information        Provider Department      4/6/2017 10:30 AM AN DIABETIC ED RESOURCE [facility]        Today's Diagnoses     Type 2 diabetes mellitus with other diabetic kidney complication, with long-term current use of insulin (H)    -  1      Care Instructions    My Diabetes Care Goals:    Healthy Eating: Add more carbs to lunch and dinner and be sure protein with all meals.     Monitoring: Check before each meal and then 2 hrs after your dinner    Taking Medication: Tresiba 60 units daily  Novolog 20/18/20 units pre meals.    Follow up:  Follow-up diabetes education appointment scheduled on Thursday June 1 @ 10:30.      Bring blood glucose meter and logbook with you to all doctor and follow-up appointments.     [facility] the [facility]:  For Your Diabetes Education and Nutrition Appointments Call:  [phone]   For Diabetes Education or Nutrition Related Questions:   Phone: [phone]  E-mail: [email or web address]  Fax: [phone]   If you need a medication refill please contact your pharmacy. Please allow 3 business days for your refills to be completed.    Instructions for emailing the Diabetes Educators    If you need to communicate a non-urgent message to a Diabetes Educator via email, please send to [email or web address].    Please follow the following email guidelines:    Subject line: Secure: your clinic name (example: Secure: [facility])  In the email please include: First name, middle initial, last name and date of birth.    We will be in touch with you within one (1) business day.           Follow-ups after your visit        Your next 10 appointments already scheduled     Apr 07, 2017  9:50 AM ANGELIQUET   Ivan Ellis with Hannah Cee MD   [facility]  [place] ([facility])    [address] [facility] [address] [phone]            Jun 01, 2017 10:30 AM CDT   Diabetic Education with AN DIABETIC ED RESOURCE   [facility]    [address] [facility] [address] [phone]            Jul 05, 2017 10:20 AM CDT   Office Visit with Hannah Cee MD   [facility]    [address] [facility] [address] [phone]           Bring a current list of meds and any records pertaining to this visit.  For Physicals, please bring immunization records and any forms needing to be filled out.  Please arrive 10 minutes early to complete paperwork.              Who to contact     If you have questions or need follow up information about today's clinic visit or your schedule please contact [facility] directly at [phone].  Normal or non-critical lab and imaging results will be communicated to you by YaSabehart, letter or phone within 4 business days after the clinic has received the results. If you do not hear from us within 7 days, please contact the clinic through BrainBot or phone. If you have a critical or abnormal lab result, we will notify you by phone as soon as possible.  Submit refill requests through BrainBot or call your pharmacy and they will forward the refill request to us. Please allow 3 business days for your refill to be completed.          Additional Information About Your Visit        BrainBot Information     BrainBot gives you secure access to your electronic health record. If you see a primary care provider, you can also send messages to your care team and make appointments. If you have questions, please call your primary care clinic.  If you do not have a primary care provider, please call [phone] and they will assist you.        Care EveryWhere ID     This is your Care EveryWhere ID. This could be used by  other organizations to access your [place] medical records  [id number]         Blood Pressure from Last 3 Encounters:   03/03/17 126/69   03/01/17 118/62   01/18/17 137/72    Weight from Last 3 Encounters:   03/03/17 107 kg (236 lb)   03/01/17 109.4 kg (241 lb 3.2 oz)   01/18/17 110.6 kg (243 lb 12.8 oz)              Today, you had the following     No orders found for display         Today's Medication Changes          These changes are accurate as of: 4/6/17 11:51 AM.  If you have any questions, ask your nurse or doctor.               These medicines have changed or have updated prescriptions.        Dose/Directions    PARoxetine 40 MG tablet   Commonly known as:  PAXIL   This may have changed:    - how much to take  - when to take this   Used for:  Moderate major depression (H)        Dose:  1 tablet   Take 1 tablet by mouth daily.   Quantity:  90 tablet   Refills:  3         Stop taking these medicines if you haven't already. Please contact your care team if you have questions.     insulin detemir 100 UNIT/ML injection   Commonly known as:  LEVEMIR VIAL                    Primary Care Provider Office Phone # Fax #    Hannah Cee [phone]       [facility] [id number] [place] [address]        Thank you!     Thank you for choosing [facility]  for your care. Our goal is always to provide you with excellent care. Hearing back from our patients is one way we can continue to improve our services. Please take a few minutes to complete the written survey that you may receive in the mail after your visit with us. Thank you!             Your Updated Medication List - Protect others around you: Learn how to safely use, store and throw away your medicines at www.disposemymeds.org.          This list is accurate as of: 4/6/17 11:51 AM.  Always use your most recent med list.                   Brand Name Dispense Instructions for use    ACE/ARB NOT  PRESCRIBED (INTENTIONAL)      Please choose reason not prescribed, below       alendronate 70 MG tablet    FOSAMAX    12 tablet    Take 1 tablet (70 mg) by mouth every 7 days Take 60 minutes before am meal with 8 oz. water. Remain upright for 30 minutes.       amLODIPine 10 MG tablet    NORVASC    90 tablet    Take 1 tablet (10 mg) by mouth daily       ascorbic acid 1000 MG Tabs    vitamin C     1 TABLET DAILY AT DINNER       aspirin 81 MG tablet      ONE DAILY*       blood glucose monitoring lancets     100 Box    Use to test blood sugar 3 times daily or as directed.       blood glucose monitoring test strip    MUSA CONTOUR NEXT    100 strip    Use to test blood sugar 3 times daily or as directed.       carvedilol 25 MG tablet    COREG    60 tablet    Take 1 tablet (25 mg) by mouth 2 times daily (with meals)       CENTRUM SILVER per tablet      1 TABLET DAILY       clobetasol propionate 0.05 % Liqd     1 Bottle    Externally apply 1 dose topically 2 times daily as needed.       FENOFIBRATE PO      Take 54 mg by mouth daily       insulin pen needle 32G X 4 MM     100 each    Use 1 pen needles daily or as directed.  Per insurance and patient preference       levofloxacin 250 MG tablet    LEVAQUIN    3 tablet    Take 1 tablet (250 mg) by mouth daily       LEVOTHYROXINE SODIUM PO      Take 150 mcg by mouth       NOVOLOG SC      Inject 20 Units Subcutaneous 3 times daily (with meals)       OMEGA-3 FISH OIL PO      Take 1 g by mouth       PARoxetine 40 MG tablet    PAXIL    90 tablet    Take 1 tablet by mouth daily.       PRAVASTATIN SODIUM PO      Take 40 mg by mouth every evening       PRIMIDONE PO      Take 50 mg by mouth 2 times daily       VITAMIN D3 PO      Take 10,000 Units by mouth daily       vitamin E 400 UNITS Tabs      1 tab a day

## 2017-04-06 NOTE — PROGRESS NOTES
Diabetes Self Management Training: Follow-up Visit    Yadi Iniguez presents today for education, evaluation of glucose control and modification of medication(s) related to Type 2 diabetes.    She is accompanied by self    Patient's diabetes management related comments/concerns: no concerns with her BG    Patient would like this visit to be focused around the following diabetes-related behaviors and goals: Healthy Eating, Being Active, Monitoring and Taking Medication    ASSESSMENT:  Patient Problem List reviewed for relevant medical history and current medical status.    Current Diabetes Management per Patient:  Taking diabetes medications?   yes:     Diabetes Medication(s)     Insulin Sig    insulin detemir (LEVEMIR VIAL) 100 UNIT/ML injection Inject 50 Units Subcutaneous At Bedtime    Insulin Aspart (NOVOLOG SC) Inject 20 Units Subcutaneous 3 times daily (with meals)      , Injectable Medications: Tresiba 0-0-0-60 and NovoLog Insulin 20-20-20-0    *Abbreviated insulin dose documentation key: Insulin Trade Name ([id number]-[id number]) - i.e. Humalog 5-5-5-0 (Humalog 5 units at breakfast, 5 units at lunch, and 5 units at dinner).    Patient glucose self monitoring as follows: three times daily.   BG meter: Contour Next USB meter one  BG results: see blood glucose log attached to this encounter     BG values are: In goal  Patient's most recent   Lab Results   Component Value Date    A1C 11.2 03/01/2017       Nutrition:  Patient eats 3 meals per day    Breakfast - Greek yogurt , muffin@ 8:00  Lunch - @ 12:30 sandwich and bowl of soup.  Crackers with soup,   Snack, fruit apple or orange   Dinner - @ 6:00 2 poached eggs, 2 toast and 2 escalera.     Snacks - none    Beverages: water, milk with dinner    Cultural/Jew diet restrictions: No     Biggest Challenge to Healthy Eating: knowing what to eat    Physical Activity:    Type: eliptical 10 minutes per day.    Diabetes Complications:  Not discussed  "today.    Vitals:  There were no vitals taken for this visit.  Estimated body mass index is 36.42 kg/(m^2) as calculated from the following:    Height as of 1/4/17: 1.715 m (5' 7.5\").    Weight as of 3/3/17: 107 kg (236 lb).   Last 3 BP:   BP Readings from Last 3 Encounters:   03/03/17 126/69   03/01/17 118/62   01/18/17 137/72       History   Smoking Status     Current Every Day Smoker     Packs/day: 0.50     Types: Cigarettes   Smokeless Tobacco     Not on file     Comment: 8/2005       Labs:  Lab Results   Component Value Date    A1C 11.2 03/01/2017     Lab Results   Component Value Date     03/03/2017     Lab Results   Component Value Date     01/04/2017     10/13/2016     HDL Cholesterol   Date Value Ref Range Status   10/13/2016 50 >40 mg/dL Final   ]  GFR Estimate   Date Value Ref Range Status   03/03/2017 46 (L) >60 mL/min/1.7m2 Final     Comment:     Non  GFR Calc     GFR Estimate If Black   Date Value Ref Range Status   03/03/2017 56 (L) >60 mL/min/1.7m2 Final     Comment:      GFR Calc     Lab Results   Component Value Date    CR 1.17 03/03/2017     No results found for: MICROALBUMIN    Health Beliefs and Attitudes:   Patient Activation Measure Survey Score:  LESLY Score (Last Two) 6/9/2011   LESLY Raw Score 41   Activation Score 63.2   LESLY Level 3       Stage of Change: ACTION (Actively working towards change)    Progress toward meeting diabetes-related behavioral goals:    GOALS % Met Goal   Healthy Eating  100   Physical Activity  75   Monitoring  100   Medication Taking  100   Problem Solving     Healthy Coping     Risk Reduction           Diabetes knowledge and skills assessment:     Patient is knowledgeable in diabetes management concepts related to: Healthy Eating, Being Active, Monitoring, Taking Medication, Problem Solving, Reducing Risks and Healthy Coping    Patient needs further education on the following diabetes management concepts: Taking " Medication    Barriers to Learning Assessment: No Barriers identified    Based on learning assessment above, most appropriate setting for further diabetes education would be: Individual setting.    INTERVENTION:    Education provided today on:  AADE Self-Care Behaviors:  Healthy Eating: carbohydrate counting, consistency in amount, composition, and timing of food intake, weight reduction, heart healthy diet, eating out, portion control, plate planning method and label reading  Being Active: relationship to blood glucose and describe appropriate activity program  Monitoring: purpose, proper technique, log and interpret results, individual blood glucose targets and frequency of monitoring  Taking Medication: action of prescribed medication, drawing up, administering and storing injectable diabetes medications, proper site selection and rotation for injections, side effects of prescribed medications and when to take medications    Opportunities for ongoing education and support in diabetes-self management were discussed.    Pt verbalized understanding of concepts discussed and recommendations provided today.       Education Materials Provided:  My Plate Planner    PLAN:  See Patient Instructions for co-developed, patient-stated behavior change goals.  AVS printed and provided to patient today.    FOLLOW-UP:  Follow-up appointment scheduled on June 1.  Chart routed to referring provider.    Ongoing plan for education and support: Follow-up visit with diabetes educator in [place]    Melissa Mustafa RN/RONALDO  [place] Diabetes Educator    Time Spent: 30 minutes  Encounter Type: Individual    Any diabetes medication dose changes were made via the [facility] Protocol and Collaborative Practice Agreement with the patient's primary care provider. A copy of this encounter was shared with the provider.

## 2017-04-07 ENCOUNTER — OFFICE VISIT (OUTPATIENT)
Dept: INTERNAL MEDICINE | Facility: CLINIC | Age: 67
End: 2017-04-07
Payer: COMMERCIAL

## 2017-04-07 VITALS
HEART RATE: 83 BPM | BODY MASS INDEX: 38.21 KG/M2 | WEIGHT: 247.6 LBS | DIASTOLIC BLOOD PRESSURE: 68 MMHG | TEMPERATURE: 97.7 F | OXYGEN SATURATION: 93 % | SYSTOLIC BLOOD PRESSURE: 135 MMHG

## 2017-04-07 DIAGNOSIS — I10 BENIGN ESSENTIAL HYPERTENSION: ICD-10-CM

## 2017-04-07 DIAGNOSIS — E11.29 TYPE 2 DIABETES MELLITUS WITH OTHER DIABETIC KIDNEY COMPLICATION, WITH LONG-TERM CURRENT USE OF INSULIN (H): Primary | ICD-10-CM

## 2017-04-07 DIAGNOSIS — Z79.4 TYPE 2 DIABETES MELLITUS WITH OTHER DIABETIC KIDNEY COMPLICATION, WITH LONG-TERM CURRENT USE OF INSULIN (H): Primary | ICD-10-CM

## 2017-04-07 DIAGNOSIS — E87.0 HYPERNATREMIA: ICD-10-CM

## 2017-04-07 LAB
ANION GAP SERPL CALCULATED.3IONS-SCNC: 4 MMOL/L (ref 3–14)
BUN SERPL-MCNC: 29 MG/DL (ref 7–30)
CALCIUM SERPL-MCNC: 9.7 MG/DL (ref 8.5–10.1)
CHLORIDE SERPL-SCNC: 102 MMOL/L (ref 94–109)
CO2 SERPL-SCNC: 32 MMOL/L (ref 20–32)
CREAT SERPL-MCNC: 1.15 MG/DL (ref 0.52–1.04)
GFR SERPL CREATININE-BSD FRML MDRD: 47 ML/MIN/1.7M2
GLUCOSE SERPL-MCNC: 128 MG/DL (ref 70–99)
POTASSIUM SERPL-SCNC: 4.5 MMOL/L (ref 3.4–5.3)
SODIUM SERPL-SCNC: 138 MMOL/L (ref 133–144)

## 2017-04-07 PROCEDURE — 36415 COLL VENOUS BLD VENIPUNCTURE: CPT | Performed by: INTERNAL MEDICINE

## 2017-04-07 PROCEDURE — 99214 OFFICE O/P EST MOD 30 MIN: CPT | Performed by: INTERNAL MEDICINE

## 2017-04-07 PROCEDURE — 80048 BASIC METABOLIC PNL TOTAL CA: CPT | Performed by: INTERNAL MEDICINE

## 2017-04-07 RX ORDER — PAROXETINE 20 MG/1
20 TABLET, FILM COATED ORAL
COMMUNITY
End: 2017-05-10

## 2017-04-07 NOTE — PROGRESS NOTES
SUBJECTIVE:                                                    Yadi Iniguez is a 66 year old female who presents to clinic today for the following health issues:        Diabetes Follow-up    Patient is checking blood sugars: four times daily.    Blood sugar testing frequency justification: Uncontrolled diabetes  Results are as follows:         am -          lunchtime - 130-160         suppertime -          bedtime - 93    Diabetic concerns: None     Symptoms of hypoglycemia (low blood sugar): shaky, weak-when she gets blood sugars below 80, which happens now about once every 2 weeks; the blood sugars have gone down as low as 68.     Paresthesias (numbness or burning in feet) or sores: Yes numbness     Date of last diabetic eye exam: 9/2016     Patient has seen the Diabetic Educator twice now-see the med list.   Last labs on 3.3.17 showed a random blood glucose of 519 (this was right about at the time when the patient restarted her insulin-after she had stopped it for a 3 week period-see prev notes).   Her sodium at that time was 138, but when corrected for the (very high) glucose, was 148, confirming that she was (not surprisingly) dehydrated.   We will recheck the basic metabolic panel today, to confirm that the electrolytes have normalized.    Hyperlipidemia Follow-Up      Rate your low fat/cholesterol diet?: fair    Taking statin?  Yes, no muscle aches from statin    Other lipid medications/supplements?:  Fenofibrate, without side effects    Fish oil/Omega 3, dose  without side effects     Hypertension Follow-up      Outpatient blood pressures are being checked at home.  Results are 130's/70's.    Low Salt Diet: no added salt     Patient denies chest pain, chest pressure, shortness of breath, palpitations, headaches, visual changes, or any noted lower extremity swelling.   Patient is smoker: has cut down to 4 cigs a day. Before, she used to smoke 10 cigs a day.       Amount of exercise or physical  activity: 10 minutes everyday on elipitcal    Problems taking medications regularly: No    Medication side effects: none    Diet: low fat/cholesterol, diabetic and carbohydrate counting      Problem list and histories reviewed & adjusted, as indicated.  Additional history: as documented    Patient Active Problem List   Diagnosis     Hypothyroidism     Hypertension goal BP (blood pressure) < 140/90     Type 2 diabetes, HbA1C goal < 8% (H)     Hyperlipidemia LDL goal <100     Moderate major depression (H)     Incontinence of urine     Neuropathy in diabetes (H)     Eczema     Left lumbar radiculopathy     Advanced directives, counseling/discussion     Health Care Home     CKD (chronic kidney disease) stage 3, GFR 30-59 ml/min     Type 2 diabetes mellitus with other diabetic kidney complication, with long-term current use of insulin (H)     Osteoporosis     Past Surgical History:   Procedure Laterality Date     C VAGINAL HYSTERECTOMY       CHOLECYSTECTOMY, OPEN       CL AFF SURGICAL PATHOLOGY       PROPHYLACTIC REPAIR RETINAL BREAK, PNEUMATIC RETINOPEXY, COMBINED Left 9/2/2016       Social History   Substance Use Topics     Smoking status: Current Every Day Smoker     Packs/day: 0.50     Types: Cigarettes     Smokeless tobacco: Not on file      Comment: 8/2005     Alcohol use Yes     Family History   Problem Relation Age of Onset     Musculoskeletal Disorder Mother      MD     Muscular Disorder Mother      CANCER Father      mesothelioma     HEART DISEASE Brother      angioplasty     Neurologic Disorder Brother      ms         Current Outpatient Prescriptions   Medication Sig Dispense Refill     Exenatide (BYETTA 10 MCG PEN SC) Inject 60 Units Subcutaneous At Bedtime       PARoxetine (PAXIL) 20 MG tablet Take 20 mg by mouth 2 times daily       insulin aspart (NOVOLOG FLEXPEN) 100 UNIT/ML injection 20 units before breakfast, 20 units before lunch, 20 units before dinner 15 mL 3     amLODIPine (NORVASC) 10 MG tablet Take  1 tablet (10 mg) by mouth daily 90 tablet 1     alendronate (FOSAMAX) 70 MG tablet Take 1 tablet (70 mg) by mouth every 7 days Take 60 minutes before am meal with 8 oz. water. Remain upright for 30 minutes. 12 tablet 3     blood glucose monitoring (MUSA CONTOUR NEXT) test strip Use to test blood sugar 3 times daily or as directed. 100 strip 11     blood glucose monitoring (MUSA MICROLET) lancets Use to test blood sugar 3 times daily or as directed. 100 Box 11     insulin pen needle 32G X 4 MM Use 1 pen needles daily or as directed.  Per insurance and patient preference 100 each 3     ACE/ARB NOT PRESCRIBED, INTENTIONAL, Please choose reason not prescribed, below       carvedilol (COREG) 25 MG tablet Take 1 tablet (25 mg) by mouth 2 times daily (with meals) 60 tablet 1     Cholecalciferol (VITAMIN D3 PO) Take 10,000 Units by mouth daily       Omega-3 Fatty Acids (OMEGA-3 FISH OIL PO) Take 1 g by mouth       FENOFIBRATE PO Take 54 mg by mouth daily       LEVOTHYROXINE SODIUM PO Take 150 mcg by mouth       PRAVASTATIN SODIUM PO Take 40 mg by mouth every evening       PRIMIDONE PO Take 50 mg by mouth 2 times daily       levofloxacin (LEVAQUIN) 250 MG tablet Take 1 tablet (250 mg) by mouth daily 3 tablet 0     Clobetasol Propionate 0.05 % LIQD Externally apply 1 dose topically 2 times daily as needed. 1 Bottle 5     ASPIRIN 81 MG OR TABS ONE DAILY*  3     VITAMIN C 1000 MG OR TABS 1 TABLET DAILY AT DINNER       VITAMIN E 400 UNIT OR TABS 1 tab a day        CENTRUM SILVER OR TABS 1 TABLET DAILY       insulin detemir (LEVEMIR FLEXPEN/FLEXTOUCH) 100 UNIT/ML injection Inject 60 Units Subcutaneous At Bedtime (Patient not taking: Reported on 4/7/2017) 15 mL 3     Insulin Aspart (NOVOLOG SC) Inject 20 Units Subcutaneous 3 times daily (with meals) Reported on 4/7/2017         Reviewed and updated as needed this visit by clinical staff  Tobacco  Allergies  Meds  Med Hx  Surg Hx  Fam Hx  Soc Hx      Reviewed and updated as  needed this visit by Provider         ==============================================================  ROS:  Constitutional, HEENT, cardiovascular, pulmonary, GI, , musculoskeletal, neuro, skin, endocrine and psych systems are negative, except as otherwise noted.       OBJECTIVE:                                                    /68 (BP Location: Right arm, Patient Position: Chair, Cuff Size: Adult Large)  Pulse 83  Temp 97.7  F (36.5  C) (Oral)  Wt 247 lb 9.6 oz (112.3 kg)  SpO2 93%  BMI 38.21 kg/m2  Body mass index is 38.21 kg/(m^2).     GENERAL APPEARANCE: healthy, alert and in no distress  EYES: Eyes grossly normal to inspection, and conjunctivae and sclerae normal  HENT: head normocephalic and atraumatic and mouth without ulcers or lesions, oropharynx clear and oral mucous membranes moist  NECK: no noticeable adenopathy, no asymmetry, masses, or scars   RESP: lungs clear to auscultation - no rales, rhonchi or wheezes  CV: regular rate and rhythm, normal S1 S2, no S3 or S4, no murmur, click or rub, no peripheral edema and peripheral pulses strong  MS: no musculoskeletal defects are noted and gait is age appropriate without ataxia  SKIN: no suspicious lesions or rashes  NEURO: mentation intact and speech normal  PSYCH: mentation appears normal and affect normal/bright.     Results for orders placed or performed in visit on 04/07/17   Basic metabolic panel   Result Value Ref Range    Sodium 138 133 - 144 mmol/L    Potassium 4.5 3.4 - 5.3 mmol/L    Chloride 102 94 - 109 mmol/L    Carbon Dioxide 32 20 - 32 mmol/L    Anion Gap 4 3 - 14 mmol/L    Glucose 128 (H) 70 - 99 mg/dL    Urea Nitrogen 29 7 - 30 mg/dL    Creatinine 1.15 (H) 0.52 - 1.04 mg/dL    GFR Estimate 47 (L) >60 mL/min/1.7m2    GFR Estimate If Black 57 (L) >60 mL/min/1.7m2    Calcium 9.7 8.5 - 10.1 mg/dL        ASSESSMENT/PLAN:                                                        ICD-10-CM    1. Type 2 diabetes mellitus with other diabetic  kidney complication, with long-term current use of insulin (H) E11.29 Basic metabolic panel    Z79.4    2. Benign essential hypertension I10 Basic metabolic panel   3. Hypernatremia E87.0 Basic metabolic panel     (E11.29,  Z79.4) Type 2 diabetes mellitus with other diabetic kidney complication, with long-term current use of insulin (H)  (primary encounter diagnosis)  Lab Results   Component Value Date    A1C 11.2 03/01/2017    A1C 7.4 10/13/2016    A1C 12.2 04/28/2011    A1C 11.4 07/19/2010    A1C 10.2 01/21/2010       Comment: more recently improved, with restart of insulin (Dm Edu start long and short acting insulin)  Plan: Basic metabolic panel        As per orders above and patient instructions below.     (I10) Benign essential hypertension  Comment: controlled asymptomatic; bmp within normal limits (hx of CKD)  Plan: Basic metabolic panel        Continue current regimen. Continue current regimen.     (E87.0) Hypernatremia  Comment: history, during period of severe hyperglycemia, 2 months ago.  Plan: Basic metabolic panel        Lytes have normalized       Patient Instructions     Continue your current diabetes regimen and return to clinic for a clinic appointment in 4 months.    Continue to work on quitting smoking-see below for some tips.      Please keep your appointment with the Diabetic Educator, Melissa on 6.1.17.    We will plan on rechecking you hemoglobin A1C in about 2.5-3 months. This may be done by Melissa (the Diabetic Educator) or by me.      Coping with Smoking Withdrawal  For the first few days after you quit smoking, you may feel cranky, restless, depressed, or low on energy. These are symptoms of withdrawal. Your body needs time to recover from smoking. Your symptoms should lessen within a few days.    Coping with the urge to smoke    Deep-breathe. Inhale through your nose. Count to five. Slowly exhale through your mouth.    Drink water. Try to drink eight or more 8-ounce glasses of water a  day.    Keep your hands busy. Wash your car. Draw. Do a puzzle. Build a birdhouse.    Delay. The urge to smoke lasts only 3 to 5 minutes.  Get support  Individual, group, and telephone counseling can help keep you on track. Ask your healthcare provider for more information about resources available to you.  Control stress  After you quit, you may feel irritable and stressed. Try taking a warm bath or shower. Listen to music. Try yoga or meditate. Call friends or talk with a professional.  Exercise  Exercise helps your body and mind feel better. There are many ways to be more active. Find something you enjoy doing. See if a friend will join you for a walk or a bike ride.  Sleep better  You may feel tired but have trouble falling asleep. Try to relax before bed. Do a few stretching exercises. Read for a while. Also, avoid caffeine for at least a few hours before bedtime.  Get fit, not fat  You may notice an increased appetite. Many people who quit smoking gain a few pounds. To limit weight gain, try to watch what you eat. Cut back on fat in your diet. Snack on low-calorie foods, like fresh fruits and vegetables. Drink low-calorie liquids, especially water. Regular exercise can also help you stay fit. And remember: Your main goal is to be a nonsmoker. Stay focused on that goal.  Quit-smoking products  There are a number of products that can help you quit smoking including medications and nicotine replacement products. They are available over the counter or by prescription. Ask your healthcare provider if any of these could help you quit smoking.        For more information    smokefree.gov/zsnt-lw-ml-expert    [employer] [facility] [place] Smoking Quitline: 877-44U-QUIT (828-868-8603)        0420-0684 [employer] [address] [place] [address], [place] [address]. All rights reserved. This information is not intended as a substitute for professional medical care. Always follow your healthcare professional's  instructions.        Tips for Quitting Smoking (Cardiovascular)  Quitting smoking is a gift to yourself, one of the best things you can do to keep your heart disease from getting worse. Smoking reduces oxygen flow to your heart, speeds the buildup of plaque, and increases your risk for heart attack, also known as acute myocardial infarction, or AMI. Quitting helps reduce smoking's harmful effects. You may have tried to quit before, but don t give up. Try again. Many smokers try four or five times before they succeed.     You ll have the best chance of success if you join a stop-smoking group and have the support of family and friends.     Line up help    Ask for the support of your family and friends.    Join a quit-smoking class, or ask your health care provider for a referral to a psychologist who specializes in helping people quit smoking.     Ask your health care provider about nicotine replacement products and prescription medications that can help you quit.  Set a quit date    Choose a date within the next 2 to 4 weeks.    After picking a day, mic it in bold letters on a calendar.     Your quit list  Start by giving up cigarettes at the times you least need them. Write down a few more ideas.     Set limits    Limit where you can smoke. Pick one room or a porch, and smoke only in that place.    Make smoking outdoors a house rule. Other smokers won t tempt you as much.    Hang a list of   quit benefits  in the spot where you smoke. Put one on the refrigerator and one on your car dashboard.     For more information    smokefree.gov/gsog-mc-iw-expert    [employer] [place] Smoking Quitline: 877-44U-QUIT (144-416-2217)        4540-8617 [employer] [address] [place] [address], [place] [address]. All rights reserved. This information is not intended as a substitute for professional medical care. Always follow your healthcare professional's instructions.                         Hannah Walls  MD Coleman  [facility]

## 2017-04-07 NOTE — MR AVS SNAPSHOT
After Visit Summary   4/7/2017    Yadi Iniguez    MRN: [id number]           Patient Information     Date Of Birth          1950        Visit Information        Provider Department      4/7/2017 9:50 AM Hannah Cee MD [facility]        Today's Diagnoses     Hypernatremia    -  1    Benign essential hypertension        Type 2 diabetes mellitus with other diabetic kidney complication, with long-term current use of insulin (H)          Care Instructions    Continue your current diabetes regimen and return to clinic for a clinic appointment in 4 months.    Continue to work on quitting smoking-see below for some tips.      Please keep your appointment with the Diabetic Educator, Melissa on 6.1.17.    We will plan on rechecking you hemoglobin A1C in about 2.5-3 months. This may be done by Melissa (the Diabetic Educator) or by me.      Coping with Smoking Withdrawal  For the first few days after you quit smoking, you may feel cranky, restless, depressed, or low on energy. These are symptoms of withdrawal. Your body needs time to recover from smoking. Your symptoms should lessen within a few days.    Coping with the urge to smoke    Deep-breathe. Inhale through your nose. Count to five. Slowly exhale through your mouth.    Drink water. Try to drink eight or more 8-ounce glasses of water a day.    Keep your hands busy. Wash your car. Draw. Do a puzzle. Build a birdhouse.    Delay. The urge to smoke lasts only 3 to 5 minutes.  Get support  Individual, group, and telephone counseling can help keep you on track. Ask your healthcare provider for more information about resources available to you.  Control stress  After you quit, you may feel irritable and stressed. Try taking a warm bath or shower. Listen to music. Try yoga or meditate. Call friends or talk with a professional.  Exercise  Exercise helps your body and mind feel better. There are many ways to be more active. Find something  you enjoy doing. See if a friend will join you for a walk or a bike ride.  Sleep better  You may feel tired but have trouble falling asleep. Try to relax before bed. Do a few stretching exercises. Read for a while. Also, avoid caffeine for at least a few hours before bedtime.  Get fit, not fat  You may notice an increased appetite. Many people who quit smoking gain a few pounds. To limit weight gain, try to watch what you eat. Cut back on fat in your diet. Snack on low-calorie foods, like fresh fruits and vegetables. Drink low-calorie liquids, especially water. Regular exercise can also help you stay fit. And remember: Your main goal is to be a nonsmoker. Stay focused on that goal.  Quit-smoking products  There are a number of products that can help you quit smoking including medications and nicotine replacement products. They are available over the counter or by prescription. Ask your healthcare provider if any of these could help you quit smoking.        For more information    smokeMibioee.gov/trjh-my-ka-expert    National Cancer [place] Smoking Quitline: 877-44U-QUIT (266-265-7881)        7090-4053 [employer]. [address] [place], [place] [address]. All rights reserved. This information is not intended as a substitute for professional medical care. Always follow your healthcare professional's instructions.        Tips for Quitting Smoking (Cardiovascular)  Quitting smoking is a gift to yourself, one of the best things you can do to keep your heart disease from getting worse. Smoking reduces oxygen flow to your heart, speeds the buildup of plaque, and increases your risk for heart attack, also known as acute myocardial infarction, or AMI. Quitting helps reduce smoking's harmful effects. You may have tried to quit before, but don t give up. Try again. Many smokers try four or five times before they succeed.     You ll have the best chance of success if you join a stop-smoking group and have the  support of family and friends.     Line up help    Ask for the support of your family and friends.    Join a quit-smoking class, or ask your health care provider for a referral to a psychologist who specializes in helping people quit smoking.     Ask your health care provider about nicotine replacement products and prescription medications that can help you quit.  Set a quit date    Choose a date within the next 2 to 4 weeks.    After picking a day, mic it in bold letters on a calendar.     Your quit list  Start by giving up cigarettes at the times you least need them. Write down a few more ideas.     Set limits    Limit where you can smoke. Pick one room or a porch, and smoke only in that place.    Make smoking outdoors a house rule. Other smokers won t tempt you as much.    Hang a list of   quit benefits  in the spot where you smoke. Put one on the refrigerator and one on your car dashboard.     For more information    [employer]LawPal/pylo-sx-si-expert    National Cancer [place] Smoking Quitline: 877-44U-QUIT (893-606-7208)        3488-9153 [employer]. [address] [place] [address]. All rights reserved. This information is not intended as a substitute for professional medical care. Always follow your healthcare professional's instructions.              Follow-ups after your visit        Your next 10 appointments already scheduled     Jun 01, 2017 10:30 AM CDT   Diabetic Education with AN DIABETIC ED RESOURCE   [facility]    [address] [facility] [address]   [phone]            Jul 05, 2017 10:20 AM CDT   Office Visit with Hannah Cee MD   [facility]    [address] [facility] [address]   [phone]           Bring a current list of meds and any records pertaining to this visit.  For Physicals, please bring immunization records and any forms needing to be filled  out.  Please arrive 10 minutes early to complete paperwork.              Who to contact     If you have questions or need follow up information about today's clinic visit or your schedule please contact [facility] [place] directly at [phone].  Normal or non-critical lab and imaging results will be communicated to you by MyChart, letter or phone within 4 business days after the clinic has received the results. If you do not hear from us within 7 days, please contact the clinic through [employer] or phone. If you have a critical or abnormal lab result, we will notify you by phone as soon as possible.  Submit refill requests through [employer] or call your pharmacy and they will forward the refill request to us. Please allow 3 business days for your refill to be completed.          Additional Information About Your Visit        [employer] Information     [employer] gives you secure access to your electronic health record. If you see a primary care provider, you can also send messages to your care team and make appointments. If you have questions, please call your primary care clinic.  If you do not have a primary care provider, please call [phone] and they will assist you.        Care EveryWhere ID     This is your Care EveryWhere ID. This could be used by other organizations to access your [facility] medical records  [id number]        Your Vitals Were     Pulse Temperature Pulse Oximetry BMI (Body Mass Index)          83 97.7  F (36.5  C) (Oral) 93% 38.21 kg/m2         Blood Pressure from Last 3 Encounters:   04/07/17 135/68   03/03/17 126/69   03/01/17 118/62    Weight from Last 3 Encounters:   04/07/17 247 lb 9.6 oz (112.3 kg)   03/03/17 236 lb (107 kg)   03/01/17 241 lb 3.2 oz (109.4 kg)              We Performed the Following     Basic metabolic panel          Today's Medication Changes          These changes are accurate as of: 4/7/17 11:10 AM.  If you have any questions, ask your nurse or doctor.                These medicines have changed or have updated prescriptions.        Dose/Directions    PAXIL 20 MG tablet   This may have changed:  Another medication with the same name was removed. Continue taking this medication, and follow the directions you see here.   Generic drug:  PARoxetine   Changed by:  Hannah Cee MD        Dose:  20 mg   Take 20 mg by mouth 2 times daily   Refills:  0                Primary Care Provider Office Phone # Fax #    Hannah Cee [phone]       [facility] [address] [facility] [address]        Thank you!     Thank you for choosing [facility]  for your care. Our goal is always to provide you with excellent care. Hearing back from our patients is one way we can continue to improve our services. Please take a few minutes to complete the written survey that you may receive in the mail after your visit with us. Thank you!             Your Updated Medication List - Protect others around you: Learn how to safely use, store and throw away your medicines at www.disposemymeds.org.          This list is accurate as of: 4/7/17 11:10 AM.  Always use your most recent med list.                   Brand Name Dispense Instructions for use    ACE/ARB NOT PRESCRIBED (INTENTIONAL)      Please choose reason not prescribed, below       alendronate 70 MG tablet    FOSAMAX    12 tablet    Take 1 tablet (70 mg) by mouth every 7 days Take 60 minutes before am meal with 8 oz. water. Remain upright for 30 minutes.       amLODIPine 10 MG tablet    NORVASC    90 tablet    Take 1 tablet (10 mg) by mouth daily       ascorbic acid 1000 MG Tabs    vitamin C     1 TABLET DAILY AT DINNER       aspirin 81 MG tablet      ONE DAILY*       blood glucose monitoring lancets     100 Box    Use to test blood sugar 3 times daily or as directed.       blood glucose monitoring test strip    MUSA CONTOUR NEXT    100 strip    Use to test blood sugar 3  times daily or as directed.       BYETTA 10 MCG PEN SC      Inject 60 Units Subcutaneous At Bedtime       carvedilol 25 MG tablet    COREG    60 tablet    Take 1 tablet (25 mg) by mouth 2 times daily (with meals)       CENTRUM SILVER per tablet      1 TABLET DAILY       clobetasol propionate 0.05 % Liqd     1 Bottle    Externally apply 1 dose topically 2 times daily as needed.       FENOFIBRATE PO      Take 54 mg by mouth daily       insulin detemir 100 UNIT/ML injection    LEVEMIR FLEXPEN/FLEXTOUCH    15 mL    Inject 60 Units Subcutaneous At Bedtime       insulin pen needle 32G X 4 MM     100 each    Use 1 pen needles daily or as directed.  Per insurance and patient preference       levofloxacin 250 MG tablet    LEVAQUIN    3 tablet    Take 1 tablet (250 mg) by mouth daily       LEVOTHYROXINE SODIUM PO      Take 150 mcg by mouth       * NOVOLOG SC      Inject 20 Units Subcutaneous 3 times daily (with meals) Reported on 4/7/2017       * insulin aspart 100 UNIT/ML injection    NovoLOG FLEXPEN    15 mL    20 units before breakfast, 20 units before lunch, 20 units before dinner       OMEGA-3 FISH OIL PO      Take 1 g by mouth       PAXIL 20 MG tablet   Generic drug:  PARoxetine      Take 20 mg by mouth 2 times daily       PRAVASTATIN SODIUM PO      Take 40 mg by mouth every evening       PRIMIDONE PO      Take 50 mg by mouth 2 times daily       VITAMIN D3 PO      Take 10,000 Units by mouth daily       vitamin E 400 UNITS Tabs      1 tab a day       * Notice:  This list has 2 medication(s) that are the same as other medications prescribed for you. Read the directions carefully, and ask your doctor or other care provider to review them with you.

## 2017-04-07 NOTE — NURSING NOTE
"Chief Complaint   Patient presents with     Diabetes       Initial /68 (BP Location: Right arm, Patient Position: Chair, Cuff Size: Adult Large)  Pulse 83  Temp 97.7  F (36.5  C) (Oral)  Wt 247 lb 9.6 oz (112.3 kg)  SpO2 93%  BMI 38.21 kg/m2 Estimated body mass index is 38.21 kg/(m^2) as calculated from the following:    Height as of 1/4/17: 5' 7.5\" (1.715 m).    Weight as of this encounter: 247 lb 9.6 oz (112.3 kg).  Medication Reconciliation: complete    Stephanie Franco CMA  "

## 2017-04-07 NOTE — PATIENT INSTRUCTIONS
Continue your current diabetes regimen and return to clinic for a clinic appointment in 4 months.    Continue to work on quitting smoking-see below for some tips.      Please keep your appointment with the Diabetic Educator, Melissa on 6.1.17.    We will plan on rechecking you hemoglobin A1C in about 2.5-3 months. This may be done by Melissa (the Diabetic Educator) or by me.      Coping with Smoking Withdrawal  For the first few days after you quit smoking, you may feel cranky, restless, depressed, or low on energy. These are symptoms of withdrawal. Your body needs time to recover from smoking. Your symptoms should lessen within a few days.    Coping with the urge to smoke    Deep-breathe. Inhale through your nose. Count to five. Slowly exhale through your mouth.    Drink water. Try to drink eight or more 8-ounce glasses of water a day.    Keep your hands busy. Wash your car. Draw. Do a puzzle. Build a [employer].    Delay. The urge to smoke lasts only 3 to 5 minutes.  Get support  Individual, group, and telephone counseling can help keep you on track. Ask your healthcare provider for more information about resources available to you.  Control stress  After you quit, you may feel irritable and stressed. Try taking a warm bath or shower. Listen to music. Try yoga or meditate. Call friends or talk with a professional.  Exercise  Exercise helps your body and mind feel better. There are many ways to be more active. Find something you enjoy doing. See if a friend will join you for a walk or a bike ride.  Sleep better  You may feel tired but have trouble falling asleep. Try to relax before bed. Do a few stretching exercises. Read for a while. Also, avoid caffeine for at least a few hours before bedtime.  Get fit, not fat  You may notice an increased appetite. Many people who quit smoking gain a few pounds. To limit weight gain, try to watch what you eat. Cut back on fat in your diet. Snack on low-calorie foods, like fresh fruits  and vegetables. Drink low-calorie liquids, especially water. Regular exercise can also help you stay fit. And remember: Your main goal is to be a nonsmoker. Stay focused on that goal.  Quit-smoking products  There are a number of products that can help you quit smoking including medications and nicotine replacement products. They are available over the counter or by prescription. Ask your healthcare provider if any of these could help you quit smoking.        For more information    smokefree.gov/lesn-rz-gu-expert    National Cancer [place] Smoking Quitline: 877-44U-QUIT (461-298-7751)        6149-1669 [employer]. [address] [place] [address]. All rights reserved. This information is not intended as a substitute for professional medical care. Always follow your healthcare professional's instructions.        Tips for Quitting Smoking (Cardiovascular)  Quitting smoking is a gift to yourself, one of the best things you can do to keep your heart disease from getting worse. Smoking reduces oxygen flow to your heart, speeds the buildup of plaque, and increases your risk for heart attack, also known as acute myocardial infarction, or AMI. Quitting helps reduce smoking's harmful effects. You may have tried to quit before, but don t give up. Try again. Many smokers try four or five times before they succeed.     You ll have the best chance of success if you join a stop-smoking group and have the support of family and friends.     Line up help    Ask for the support of your family and friends.    Join a quit-smoking class, or ask your health care provider for a referral to a psychologist who specializes in helping people quit smoking.     Ask your health care provider about nicotine replacement products and prescription medications that can help you quit.  Set a quit date    Choose a date within the next 2 to 4 weeks.    After picking a day, mic it in bold letters on a calendar.     Your quit  list  Start by giving up cigarettes at the times you least need them. Write down a few more ideas.     Set limits    Limit where you can smoke. Pick one room or a porch, and smoke only in that place.    Make smoking outdoors a house rule. Other smokers won t tempt you as much.    Hang a list of   quit benefits  in the spot where you smoke. Put one on the refrigerator and one on your car dashboard.     For more information    smokefree.gov/ixlx-ab-ii-expert    National Cancer [place] Smoking Quitline: 877-44U-QUIT (476-631-5417)        6481-7139 [employer]. [address] [place] [address], [place] [address]. All rights reserved. This information is not intended as a substitute for professional medical care. Always follow your healthcare professional's instructions.

## 2017-04-09 NOTE — PROGRESS NOTES
Dear Yadi,     Your test results are attached.    Your kidney function and blood electrolytes are within normal limits for you.    Please continue the treatment plan that we discussed at your last clinic visit and let me know if you have any questions via Mimeo or by calling [phone].      Hannah Cee MD

## 2017-04-10 ENCOUNTER — TELEPHONE (OUTPATIENT)
Dept: INTERNAL MEDICINE | Facility: CLINIC | Age: 67
End: 2017-04-10

## 2017-04-10 DIAGNOSIS — I10 BENIGN ESSENTIAL HYPERTENSION: ICD-10-CM

## 2017-04-10 NOTE — TELEPHONE ENCOUNTER
Panel Management Review      Patient has the following on her problem list:     Depression / Dysthymia review  PHQ-9 SCORE 7/19/2010 4/28/2011 1/18/2017   Total Score 7 15 -   Total Score - - 9      Patient is due for:  None    Diabetes    ASA: Passed    Last A1C  Lab Results   Component Value Date    A1C 11.2 03/01/2017    A1C 7.4 10/13/2016    A1C 12.2 04/28/2011    A1C 11.4 07/19/2010    A1C 10.2 01/21/2010     A1C tested: FAILED    Last LDL:    Lab Results   Component Value Date    CHOL 240 10/13/2016     Lab Results   Component Value Date    HDL 50 10/13/2016     Lab Results   Component Value Date     01/04/2017     10/13/2016     Lab Results   Component Value Date    TRIG 189 10/13/2016     Lab Results   Component Value Date    CHOLHDLRATIO 7.9 04/28/2011     Lab Results   Component Value Date    NHDL 190 10/13/2016       Is the patient on a Statin? YES             Is the patient on Aspirin? YES    Medications     HMG CoA Reductase Inhibitors    PRAVASTATIN SODIUM PO    Salicylates    ASPIRIN 81 MG OR TABS          Last three blood pressure readings:  BP Readings from Last 3 Encounters:   04/07/17 135/68   03/03/17 126/69   03/01/17 118/62       Date of last diabetes office visit: 4/7/2017     Tobacco History:     History   Smoking Status     Current Every Day Smoker     Packs/day: 0.50     Types: Cigarettes   Smokeless Tobacco     Not on file     Comment: 8/2005         Hypertension   Last three blood pressure readings:  BP Readings from Last 3 Encounters:   04/07/17 135/68   03/03/17 126/69   03/01/17 118/62     Blood pressure: Passed    HTN Guidelines:  Age 18-59 BP range:  Less than 140/90  Age 60-85 with Diabetes:  Less than 140/90  Age 60-85 without Diabetes:  less than 150/90      Composite cancer screening  Chart review shows that this patient is due/due soon for the following None  Summary:    Patient is due/failing the following:   A1C    Action needed:   None patient was seen  4/7/2017    Type of outreach:    none    Questions for provider review:    None                                                                                                                                    Stephanie Franco, [facility]       Chart routed to close .

## 2017-04-12 RX ORDER — CARVEDILOL 25 MG/1
TABLET ORAL
Qty: 60 TABLET | Refills: 2 | Status: SHIPPED | OUTPATIENT
Start: 2017-04-12 | End: 2017-08-03

## 2017-05-10 DIAGNOSIS — F33.41 RECURRENT MAJOR DEPRESSIVE DISORDER, IN PARTIAL REMISSION (H): ICD-10-CM

## 2017-05-10 DIAGNOSIS — E03.9 HYPOTHYROIDISM, UNSPECIFIED TYPE: Primary | ICD-10-CM

## 2017-05-10 DIAGNOSIS — G25.0 ESSENTIAL TREMOR: ICD-10-CM

## 2017-05-10 RX ORDER — PAROXETINE 20 MG/1
20 TABLET, FILM COATED ORAL
Qty: 90 TABLET | Refills: 1 | Status: SHIPPED | OUTPATIENT
Start: 2017-05-10 | End: 2017-09-11

## 2017-05-10 RX ORDER — LEVOTHYROXINE SODIUM 13 UG/1
150 CAPSULE ORAL
Qty: 90 CAPSULE | Refills: 2 | Status: SHIPPED | OUTPATIENT
Start: 2017-05-10 | End: 2018-01-24

## 2017-05-10 NOTE — TELEPHONE ENCOUNTER
You have not been prescribing these, are you taking over prescribing them?  To provider to advise.  Margaret Otero RN

## 2017-05-10 NOTE — TELEPHONE ENCOUNTER
I have enough information to refill the Paxil and Levothyroxine-please ask the patient the diagnosis for which she was getting the Primidone (was it was tremors, and if so, of which body part, ie, hand/hands/leg/legs, etc?). I have left the Primidone pended for now.  Thank you,  Hannah Cee MD

## 2017-05-12 RX ORDER — PRIMIDONE 50 MG/1
50 TABLET ORAL 2 TIMES DAILY
Qty: 180 TABLET | Refills: 1 | Status: SHIPPED | OUTPATIENT
Start: 2017-05-12 | End: 2017-11-20

## 2017-05-12 NOTE — TELEPHONE ENCOUNTER
"Patient returned the call, patient is taking primidone for her \"essential  Tremor\" which affects her arms, hands, head and neck.    AYE Gonzalez RN   "

## 2017-06-01 ENCOUNTER — ALLIED HEALTH/NURSE VISIT (OUTPATIENT)
Dept: EDUCATION SERVICES | Facility: CLINIC | Age: 67
End: 2017-06-01
Payer: COMMERCIAL

## 2017-06-01 DIAGNOSIS — E11.9 DIABETES MELLITUS WITHOUT COMPLICATION (H): ICD-10-CM

## 2017-06-01 DIAGNOSIS — E11.29 TYPE 2 DIABETES MELLITUS WITH OTHER DIABETIC KIDNEY COMPLICATION, WITH LONG-TERM CURRENT USE OF INSULIN (H): Primary | ICD-10-CM

## 2017-06-01 DIAGNOSIS — Z79.4 TYPE 2 DIABETES MELLITUS WITH OTHER DIABETIC KIDNEY COMPLICATION, WITH LONG-TERM CURRENT USE OF INSULIN (H): Primary | ICD-10-CM

## 2017-06-01 LAB — HBA1C MFR BLD: 5.7 % (ref 4.3–6)

## 2017-06-01 PROCEDURE — G0108 DIAB MANAGE TRN  PER INDIV: HCPCS

## 2017-06-01 PROCEDURE — 83036 HEMOGLOBIN GLYCOSYLATED A1C: CPT | Performed by: INTERNAL MEDICINE

## 2017-06-01 PROCEDURE — 99207 ZZC NO CHARGE LOS: CPT

## 2017-06-01 PROCEDURE — 36415 COLL VENOUS BLD VENIPUNCTURE: CPT | Performed by: INTERNAL MEDICINE

## 2017-06-01 NOTE — MR AVS SNAPSHOT
After Visit Summary   6/1/2017    Yadi Iniguez    MRN: [id number]           Patient Information     Date Of Birth          1950        Visit Information        Provider Department      6/1/2017 10:30 AM AN DIABETIC ED RESOURCE [facility]        Today's Diagnoses     Type 2 diabetes mellitus with other diabetic kidney complication, with long-term current use of insulin (H)    -  1      Care Instructions    My Diabetes Care Goals:    Healthy Eating: Be sure to have more fruits or proteins with meals of snacks.    Being Active: Continue to work out as you have.    Monitoring: Monitor before each meal and as you have been doing    Taking Medication: Lememir 60 units daily  Novolog 20 breakfast, 16 with lunch and 20 with dinner   Call me if having more lows.     Follow up:  Follow-up diabetes education appointment scheduled on Thursday Sept. 14 @ 10:30.      Bring blood glucose meter and logbook with you to all doctor and follow-up appointments.     [place] for the [facility]:  For Your Diabetes Education and Nutrition Appointments Call:  [phone]   For Diabetes Education or Nutrition Related Questions:   Phone: [phone]  E-mail: [email or web address]  Fax: [phone]   If you need a medication refill please contact your pharmacy. Please allow 3 business days for your refills to be completed.    Instructions for emailing the Diabetes Educators    If you need to communicate a non-urgent message to a Diabetes Educator via email, please send to [email or web address].    Please follow the following email guidelines:    Subject line: Secure: your clinic name (example: Secure: [facility])  In the email please include: First name, middle initial, last name and date of birth.    We will be in touch with you within one (1) business day.             Follow-ups after your visit        Your next 10 appointments already scheduled     Aug  11, 2017  7:30 AM CDT   Office Visit with Hannah Cee MD   [facility] ([facility])    [address] [facility] [address] [phone]           Bring a current list of meds and any records pertaining to this visit.  For Physicals, please bring immunization records and any forms needing to be filled out.  Please arrive 10 minutes early to complete paperwork.            Sep 14, 2017 10:30 AM CDT   Diabetic Education with AN DIABETIC ED RESOURCE   [facility] ([facility])    [address] [facility] [address] [phone]              Who to contact     If you have questions or need follow up information about today's clinic visit or your schedule please contact [facility] directly at [phone].  Normal or non-critical lab and imaging results will be communicated to you by MyChart, letter or phone within 4 business days after the clinic has received the results. If you do not hear from us within 7 days, please contact the clinic through [employer] or phone. If you have a critical or abnormal lab result, we will notify you by phone as soon as possible.  Submit refill requests through [employer] or call your pharmacy and they will forward the refill request to us. Please allow 3 business days for your refill to be completed.          Additional Information About Your Visit        MyChart Information     [employer] gives you secure access to your electronic health record. If you see a primary care provider, you can also send messages to your care team and make appointments. If you have questions, please call your primary care clinic.  If you do not have a primary care provider, please call [phone] and they will assist you.        Care EveryWhere ID     This is your Care EveryWhere ID. This could be used by other organizations to access your [facility] medical records  [id number]         Blood Pressure from Last 3  Encounters:   04/07/17 135/68   03/03/17 126/69   03/01/17 118/62    Weight from Last 3 Encounters:   04/07/17 112.3 kg (247 lb 9.6 oz)   03/03/17 107 kg (236 lb)   03/01/17 109.4 kg (241 lb 3.2 oz)              We Performed the Following     Hemoglobin A1c        Primary Care Provider Office Phone # Fax #    Hannah Cee [phone]       [facility] [address] [facility] [address]        Thank you!     Thank you for choosing [facility]  for your care. Our goal is always to provide you with excellent care. Hearing back from our patients is one way we can continue to improve our services. Please take a few minutes to complete the written survey that you may receive in the mail after your visit with us. Thank you!             Your Updated Medication List - Protect others around you: Learn how to safely use, store and throw away your medicines at www.disposemymeds.org.          This list is accurate as of: 6/1/17 11:31 AM.  Always use your most recent med list.                   Brand Name Dispense Instructions for use    ACE/ARB NOT PRESCRIBED (INTENTIONAL)      Please choose reason not prescribed, below       alendronate 70 MG tablet    FOSAMAX    12 tablet    Take 1 tablet (70 mg) by mouth every 7 days Take 60 minutes before am meal with 8 oz. water. Remain upright for 30 minutes.       amLODIPine 10 MG tablet    NORVASC    90 tablet    Take 1 tablet (10 mg) by mouth daily       ascorbic acid 1000 MG Tabs    vitamin C     1 TABLET DAILY AT DINNER       aspirin 81 MG tablet      ONE DAILY*       blood glucose monitoring lancets     100 Box    Use to test blood sugar 3 times daily or as directed.       blood glucose monitoring test strip    MUSA CONTOUR NEXT    100 strip    Use to test blood sugar 3 times daily or as directed.       carvedilol 25 MG tablet    COREG    60 tablet    TAKE 1 TABLET BY MOUTH 2 TIMES DAILY WITH MEALS       CENTRUM SILVER  per tablet      1 TABLET DAILY       clobetasol propionate 0.05 % Liqd     1 Bottle    Externally apply 1 dose topically 2 times daily as needed.       FENOFIBRATE PO      Take 54 mg by mouth daily       insulin detemir 100 UNIT/ML injection    LEVEMIR FLEXPEN/FLEXTOUCH    15 mL    Inject 60 Units Subcutaneous At Bedtime       insulin pen needle 32G X 4 MM     100 each    Use 1 pen needles daily or as directed.  Per insurance and patient preference       levofloxacin 250 MG tablet    LEVAQUIN    3 tablet    Take 1 tablet (250 mg) by mouth daily       Levothyroxine Sodium 150 MCG Caps     90 capsule    Take 150 mcg by mouth daily (with breakfast)       * NOVOLOG SC      Inject 20 Units Subcutaneous 3 times daily (with meals) Reported on 4/7/2017       * insulin aspart 100 UNIT/ML injection    NovoLOG FLEXPEN    15 mL    20 units before breakfast, 20 units before lunch, 20 units before dinner       OMEGA-3 FISH OIL PO      Take 1 g by mouth       PARoxetine 20 MG tablet    PAXIL    90 tablet    Take 1 tablet (20 mg) by mouth 2 times daily       PRAVASTATIN SODIUM PO      Take 40 mg by mouth every evening       primidone 50 MG tablet    MYSOLINE    180 tablet    Take 1 tablet (50 mg) by mouth 2 times daily       VITAMIN D3 PO      Take 10,000 Units by mouth daily       vitamin E 400 UNITS Tabs      1 tab a day       * Notice:  This list has 2 medication(s) that are the same as other medications prescribed for you. Read the directions carefully, and ask your doctor or other care provider to review them with you.

## 2017-06-01 NOTE — PATIENT INSTRUCTIONS
My Diabetes Care Goals:    Healthy Eating: Be sure to have more fruits or proteins with meals of snacks.    Being Active: Continue to work out as you have.    Monitoring: Monitor before each meal and as you have been doing    Taking Medication: Lememir 60 units daily  Novolog 20 breakfast, 16 with lunch and 20 with dinner   Call me if having more lows.     Follow up:  Follow-up diabetes education appointment scheduled on Thursday Sept. 14 @ 10:30.      Bring blood glucose meter and logbook with you to all doctor and follow-up appointments.     [place] [facility] for the [facility]:  For Your Diabetes Education and Nutrition Appointments Call:  [phone]   For Diabetes Education or Nutrition Related Questions:   Phone: [phone]  E-mail: [email or web address]  Fax: [phone]   If you need a medication refill please contact your pharmacy. Please allow 3 business days for your refills to be completed.    Instructions for emailing the Diabetes Educators    If you need to communicate a non-urgent message to a Diabetes Educator via email, please send to [email or web address].    Please follow the following email guidelines:    Subject line: Secure: your clinic name (example: Secure: [facility])  In the email please include: First name, middle initial, last name and date of birth.    We will be in touch with you within one (1) business day.

## 2017-06-01 NOTE — PROGRESS NOTES
Diabetes Self Management Training: Follow-up Visit    Yadi Iniguez presents today for education, evaluation of glucose control and modification of medication(s) related to Type 2 diabetes.    She is accompanied by self    Patient's diabetes management related comments/concerns: went back to [facility] as insurance would not cover [facility]    Patient would like this visit to be focused around the following diabetes-related behaviors and goals: Healthy Eating, Being Active, Monitoring and Taking Medication    ASSESSMENT:  Patient Problem List reviewed for relevant medical history and current medical status.    Current Diabetes Management per Patient:  Taking diabetes medications?   yes:     Diabetes Medication(s)     Insulin Sig    insulin aspart (NOVOLOG FLEXPEN) 100 UNIT/ML injection 20 units before breakfast, 20 units before lunch, 20 units before dinner    insulin detemir (LEVEMIR FLEXPEN/FLEXTOUCH) 100 UNIT/ML injection Inject 60 Units Subcutaneous At Bedtime     Patient not taking:  Reported on 4/7/2017    Insulin Aspart (NOVOLOG SC) Inject 20 Units Subcutaneous 3 times daily (with meals) Reported on 4/7/2017    Incretin Mimetic Agents (GLP-1 Receptor Agonists) Sig    Exenatide (BYETTA 10 MCG PEN SC) Inject 60 Units Subcutaneous At Bedtime      , Injectable Medications: Levemir 0-0-0-60 and NovoLog Insulin 20-18-20-0    *Abbreviated insulin dose documentation key: Insulin Trade Name (lpxesmpgs-thngs-gvkuhl-bedtime) - i.e. Humalog 5-5-5-0 (Humalog 5 units at breakfast, 5 units at lunch, and 5 units at dinner).    Patient glucose self monitoring as follows: three times daily.   BG meter: Contour Next  meter  BG results: see blood glucose log attached to this encounter     BG values are: In goal  Patient's most recent   Lab Results   Component Value Date    A1C 5.7% 06/01/2017    is meeting goal of <7.0    Nutrition:  Patient eats 3 meals per day    Breakfast - Muffin, greek yogurt and coffee  Lunch - sandwich, or  "1/2 or salad with protein   Dinner - stove top dressing, small chicken breast.    Snacks - some days    Beverages: coffee, ice,     Cultural/Evangelical diet restrictions: No     Biggest Challenge to Healthy Eating: none    Physical Activity:    Type: elliptical, up to 15 minutes each day.    Diabetes Complications:  Not discussed today.    Vitals:  There were no vitals taken for this visit.  Estimated body mass index is 38.21 kg/(m^2) as calculated from the following:    Height as of 1/4/17: 1.715 m (5' 7.5\").    Weight as of 4/7/17: 112.3 kg (247 lb 9.6 oz).   Last 3 BP:   BP Readings from Last 3 Encounters:   04/07/17 135/68   03/03/17 126/69   03/01/17 118/62       History   Smoking Status     Current Every Day Smoker     Packs/day: 0.50     Types: Cigarettes   Smokeless Tobacco     Not on file     Comment: 8/2005       Labs:  Lab Results   Component Value Date    A1C 11.2 03/01/2017     Lab Results   Component Value Date     04/07/2017     Lab Results   Component Value Date     01/04/2017     10/13/2016     HDL Cholesterol   Date Value Ref Range Status   10/13/2016 50 >40 mg/dL Final   ]  GFR Estimate   Date Value Ref Range Status   04/07/2017 47 (L) >60 mL/min/1.7m2 Final     Comment:     Non  GFR Calc     GFR Estimate If Black   Date Value Ref Range Status   04/07/2017 57 (L) >60 mL/min/1.7m2 Final     Comment:      GFR Calc     Lab Results   Component Value Date    CR 1.15 04/07/2017     No results found for: MICROALBUMIN    Health Beliefs and Attitudes:   Patient Activation Measure Survey Score:  LESLY Score (Last Two) 6/9/2011   LESLY Raw Score 41   Activation Score 63.2   LESLY Level 3       Stage of Change: ACTION (Actively working towards change)    Progress toward meeting diabetes-related behavioral goals:    GOALS % Met Goal   Healthy Eating  100   Physical Activity  100   Monitoring  100   Medication Taking  100   Problem Solving  100   Healthy Coping  100 "   Risk Reduction  100         Diabetes knowledge and skills assessment:     Patient is knowledgeable in diabetes management concepts related to: Healthy Eating, Being Active, Monitoring and Taking Medication    Patient needs further education on the following diabetes management concepts: Healthy Eating, Being Active, Monitoring and Taking Medication    Barriers to Learning Assessment: No Barriers identified    Based on learning assessment above, most appropriate setting for further diabetes education would be: Individual setting.    INTERVENTION:    Education provided today on:  AADE Self-Care Behaviors:  Taking Medication: action of prescribed medication, drawing up, administering and storing injectable diabetes medications, proper site selection and rotation for injections, side effects of prescribed medications and when to take medications  Problem Solving: low blood glucose - causes, signs/symptoms, treatment and prevention and when to call health care provider    Opportunities for ongoing education and support in diabetes-self management were discussed.    Pt verbalized understanding of concepts discussed and recommendations provided today.       Education Materials Provided:  Carbohydrate Counting    PLAN:  See Patient Instructions for co-developed, patient-stated behavior change goals.  AVS printed and provided to patient today.    FOLLOW-UP:  Follow-up appointment scheduled on Sept 14..  Chart routed to referring provider.    Ongoing plan for education and support: Follow-up visit with diabetes educator in [place]    Melissa Mustafa RN/[facility] Diabetes Educator    Time Spent: 30 minutes  Encounter Type: Individual    Any diabetes medication dose changes were made via the [facility] Protocol and Collaborative Practice Agreement with the patient's primary care provider. A copy of this encounter was shared with the provider.

## 2017-06-05 NOTE — PROGRESS NOTES
Dear Yadi,     Your test results are attached.   Your diabetes control has improved tremendously!  I also see that you have very recently seen the Diabetic Educator.  Please let me know if you have had any recent low blood sugars (below 80) and if not, then please continue your current diabetes treatment regimen and I will see you in August for follow-up of your chronic health conditions.    Please notify me via [facility] [employer] or contact the clinic at [phone] if you have any questions.    Hannah Cee MD

## 2017-08-03 DIAGNOSIS — I10 BENIGN ESSENTIAL HYPERTENSION: ICD-10-CM

## 2017-08-03 RX ORDER — CARVEDILOL 25 MG/1
TABLET ORAL
Qty: 60 TABLET | Refills: 0 | Status: SHIPPED | OUTPATIENT
Start: 2017-08-03 | End: 2017-09-27

## 2017-08-08 ENCOUNTER — TRANSFERRED RECORDS (OUTPATIENT)
Dept: HEALTH INFORMATION MANAGEMENT | Facility: CLINIC | Age: 67
End: 2017-08-08

## 2017-08-08 ENCOUNTER — RADIANT APPOINTMENT (OUTPATIENT)
Dept: GENERAL RADIOLOGY | Facility: CLINIC | Age: 67
End: 2017-08-08
Attending: PHYSICIAN ASSISTANT
Payer: COMMERCIAL

## 2017-08-08 ENCOUNTER — OFFICE VISIT (OUTPATIENT)
Dept: URGENT CARE | Facility: URGENT CARE | Age: 67
End: 2017-08-08
Payer: COMMERCIAL

## 2017-08-08 VITALS
TEMPERATURE: 99.4 F | BODY MASS INDEX: 38.89 KG/M2 | OXYGEN SATURATION: 89 % | DIASTOLIC BLOOD PRESSURE: 76 MMHG | WEIGHT: 252 LBS | RESPIRATION RATE: 24 BRPM | HEART RATE: 101 BPM | SYSTOLIC BLOOD PRESSURE: 147 MMHG

## 2017-08-08 DIAGNOSIS — R09.02 HYPOXIA: ICD-10-CM

## 2017-08-08 DIAGNOSIS — R05.9 COUGH: ICD-10-CM

## 2017-08-08 DIAGNOSIS — R06.02 SOB (SHORTNESS OF BREATH): Primary | ICD-10-CM

## 2017-08-08 LAB
BASOPHILS # BLD AUTO: 0.1 10E9/L (ref 0–0.2)
BASOPHILS NFR BLD AUTO: 1 %
DIFFERENTIAL METHOD BLD: ABNORMAL
EOSINOPHIL # BLD AUTO: 0.2 10E9/L (ref 0–0.7)
EOSINOPHIL NFR BLD AUTO: 3.3 %
ERYTHROCYTE [DISTWIDTH] IN BLOOD BY AUTOMATED COUNT: 13.4 % (ref 10–15)
HCT VFR BLD AUTO: 38 % (ref 35–47)
HGB BLD-MCNC: 13.1 G/DL (ref 11.7–15.7)
LYMPHOCYTES # BLD AUTO: 1.5 10E9/L (ref 0.8–5.3)
LYMPHOCYTES NFR BLD AUTO: 28.1 %
MCH RBC QN AUTO: 34.5 PG (ref 26.5–33)
MCHC RBC AUTO-ENTMCNC: 34.5 G/DL (ref 31.5–36.5)
MCV RBC AUTO: 100 FL (ref 78–100)
MONOCYTES # BLD AUTO: 0.6 10E9/L (ref 0–1.3)
MONOCYTES NFR BLD AUTO: 11.2 %
NEUTROPHILS # BLD AUTO: 2.9 10E9/L (ref 1.6–8.3)
NEUTROPHILS NFR BLD AUTO: 56.4 %
PLATELET # BLD AUTO: 138 10E9/L (ref 150–450)
RBC # BLD AUTO: 3.8 10E12/L (ref 3.8–5.2)
WBC # BLD AUTO: 5.2 10E9/L (ref 4–11)

## 2017-08-08 PROCEDURE — 36415 COLL VENOUS BLD VENIPUNCTURE: CPT | Performed by: PHYSICIAN ASSISTANT

## 2017-08-08 PROCEDURE — 71020 XR CHEST 2 VW: CPT

## 2017-08-08 PROCEDURE — 85025 COMPLETE CBC W/AUTO DIFF WBC: CPT | Performed by: PHYSICIAN ASSISTANT

## 2017-08-08 PROCEDURE — 99214 OFFICE O/P EST MOD 30 MIN: CPT | Performed by: PHYSICIAN ASSISTANT

## 2017-08-08 ASSESSMENT — ENCOUNTER SYMPTOMS
DIAPHORESIS: 0
NEUROLOGICAL NEGATIVE: 1
COUGH: 1
CLAUDICATION: 0
PALPITATIONS: 0
WEIGHT LOSS: 0
GASTROINTESTINAL NEGATIVE: 1
EYE PAIN: 0
PND: 0
EYES NEGATIVE: 1
SORE THROAT: 1
CHILLS: 1
FEVER: 1
ORTHOPNEA: 0
HEMOPTYSIS: 0

## 2017-08-08 NOTE — PROGRESS NOTES
SUBJECTIVE:                                                      HPI  Yadi Iniguez is a 67 year old female who presents to clinic today for the following health issues:  Also accompanied by daughter, Nikki, today as well.  ENT Symptoms           Symptoms: cc Present Absent Comment   Fever/Chills  x  Tactile fevers   Fatigue  x     Muscle Aches  x  Stomach and chest sore from coughing.  No n/v, constipation, diarrhea, bloody or black tarry stools.     Eye Irritation   x    Sneezing   x    Nasal Andrea/Drg  x     Sinus Pressure/Pain  x  pressure   Loss of smell   x    Dental pain   x    Sore Throat  x     Swollen Glands   x    Ear Pain/Fullness   x    Cough x   Productive cough but no hemoptysis.   Wheeze  x     Chest Pain  x  No palpitations, orthopnea, PND or peripheral edema.     Shortness of breath  x     Rash   x    Other         Symptom duration:  5days   Symptom severity:  Moderate/severe, worsening   Treatments tried:  none, rest and fluids   Contacts:  ill contacts at home.  Remote hx of asthma and is smoker.  They are concerned about a pneumonia.        Reviewed PMH.  Patient Active Problem List   Diagnosis     Hypothyroidism     Hypertension goal BP (blood pressure) < 140/90     Type 2 diabetes, HbA1C goal < 8% (H)     Hyperlipidemia LDL goal <100     Moderate major depression (H)     Incontinence of urine     Neuropathy in diabetes (H)     Eczema     Left lumbar radiculopathy     Advanced directives, counseling/discussion     Health Care Home     CKD (chronic kidney disease) stage 3, GFR 30-59 ml/min     Type 2 diabetes mellitus with other diabetic kidney complication, with long-term current use of insulin (H)     Osteoporosis     Current Outpatient Prescriptions   Medication Sig Dispense Refill     carvedilol (COREG) 25 MG tablet TAKE 1 TABLET BY MOUTH 2 TIMES DAILY WITH MEALS 60 tablet 0     primidone (MYSOLINE) 50 MG tablet Take 1 tablet (50 mg) by mouth 2 times daily 180 tablet 1     Levothyroxine  Sodium 150 MCG CAPS Take 150 mcg by mouth daily (with breakfast) 90 capsule 2     PARoxetine (PAXIL) 20 MG tablet Take 1 tablet (20 mg) by mouth 2 times daily 90 tablet 1     insulin aspart (NOVOLOG FLEXPEN) 100 UNIT/ML injection 20 units before breakfast, 20 units before lunch, 20 units before dinner 15 mL 3     insulin detemir (LEVEMIR FLEXPEN/FLEXTOUCH) 100 UNIT/ML injection Inject 60 Units Subcutaneous At Bedtime 15 mL 3     amLODIPine (NORVASC) 10 MG tablet Take 1 tablet (10 mg) by mouth daily 90 tablet 1     alendronate (FOSAMAX) 70 MG tablet Take 1 tablet (70 mg) by mouth every 7 days Take 60 minutes before am meal with 8 oz. water. Remain upright for 30 minutes. 12 tablet 3     blood glucose monitoring (MUSA CONTOUR NEXT) test strip Use to test blood sugar 3 times daily or as directed. 100 strip 11     blood glucose monitoring (MUSA MICROLET) lancets Use to test blood sugar 3 times daily or as directed. 100 Box 11     insulin pen needle 32G X 4 MM Use 1 pen needles daily or as directed.  Per insurance and patient preference 100 each 3     ACE/ARB NOT PRESCRIBED, INTENTIONAL, Please choose reason not prescribed, below       Cholecalciferol (VITAMIN D3 PO) Take 10,000 Units by mouth daily       Omega-3 Fatty Acids (OMEGA-3 FISH OIL PO) Take 1 g by mouth       FENOFIBRATE PO Take 54 mg by mouth daily       PRAVASTATIN SODIUM PO Take 40 mg by mouth every evening       Insulin Aspart (NOVOLOG SC) Inject 20 Units Subcutaneous 3 times daily (with meals) Reported on 4/7/2017       Clobetasol Propionate 0.05 % LIQD Externally apply 1 dose topically 2 times daily as needed. 1 Bottle 5     ASPIRIN 81 MG OR TABS ONE DAILY*  3     VITAMIN C 1000 MG OR TABS 1 TABLET DAILY AT DINNER       VITAMIN E 400 UNIT OR TABS 1 tab a day        CENTRUM SILVER OR TABS 1 TABLET DAILY       No Known Allergies      Review of Systems   Constitutional: Positive for chills, fever and malaise/fatigue. Negative for diaphoresis and weight  loss.   HENT: Positive for congestion and sore throat.    Eyes: Negative.  Negative for pain.   Respiratory: Positive for cough. Negative for hemoptysis.    Cardiovascular: Positive for chest pain. Negative for palpitations, orthopnea, claudication, leg swelling and PND.   Gastrointestinal: Negative.    Skin: Negative.    Neurological: Negative.    Endo/Heme/Allergies: Negative for environmental allergies.   All other systems reviewed and are negative.        Physical Exam   Constitutional: She is oriented to person, place, and time and well-developed, well-nourished, and in no distress. No distress.   HENT:   Head: Normocephalic and atraumatic.   Nose: Nose normal.   Mouth/Throat: Oropharynx is clear and moist. No oropharyngeal exudate.   TMs are intact without any erythema or bulging bilaterally.  Airway is patent.   Eyes: Conjunctivae and EOM are normal. Pupils are equal, round, and reactive to light. No scleral icterus.   Neck: Normal range of motion. Neck supple. No thyromegaly present.   Cardiovascular: Normal rate, regular rhythm, normal heart sounds and intact distal pulses.  Exam reveals no gallop and no friction rub.    No murmur heard.  Pulmonary/Chest: Effort normal. No accessory muscle usage. No tachypnea. No respiratory distress. She has decreased breath sounds (across all lung fields). She has wheezes in the right middle field and the left middle field. She has rhonchi in the right upper field and the left upper field. She has no rales.   Lymphadenopathy:     She has no cervical adenopathy.   Neurological: She is alert and oriented to person, place, and time.   Skin: Skin is warm, dry and intact. No cyanosis. Nails show no clubbing.   Distal pulses are 2+ and symmetric.  No peripheral edema.   Psychiatric: Mood and affect normal.   Nursing note and vitals reviewed.  CXR PA/lateral:  Hyperinflation of the lungs.  No infiltrates, effusions or pneumothorax.  No suspicious nodules or lesions. No  fractures.   Will send for overread.      Assessment/Plan:  SOB (shortness of breath):  CXR is negative, will send for overread.  CBC was also normal.  She continues to be hypoxic (O2 sats were 89% but baseline is usually 93-95%) despite duoneb treatment in clinic.  H&P is concerning for ?asthma/COPD exacerbation vs pneumonia/bronchitis vs PE vs cardiac.  Recommend further evaluation and management in the ER. Patient has declined transportation via ambulance and will have family drive her.  Understands risks and benefits of ambulance transfer and she has declined.  Call 911 if worsening symptoms.  [facility] has been notified.  F/u with PCP after ER visit.      -     XR Chest 2 Views    Cough  -     CBC with platelets differential  -     XR Chest 2 Views    Hypoxia          Camille Kinney PA-C      Chart reviewed.  Encounter was reviewed with provider after the visit.  Patient was not examined by me.  Adali Bernardo M.D.

## 2017-08-08 NOTE — NURSING NOTE
"Chief Complaint   Patient presents with     URI       Initial /76 (BP Location: Right arm, Patient Position: Sitting, Cuff Size: Adult Large)  Pulse 101  Temp 99.4  F (37.4  C) (Oral)  Resp 24  Wt 252 lb (114.3 kg)  SpO2 (!) 89%  BMI 38.89 kg/m2 Estimated body mass index is 38.89 kg/(m^2) as calculated from the following:    Height as of 1/4/17: 5' 7.5\" (1.715 m).    Weight as of this encounter: 252 lb (114.3 kg).  Medication Reconciliation: complete     Hazel ROSS, Certified Medical Assistant (AAMA)August 8, 2017 6:32 PM      "

## 2017-08-08 NOTE — MR AVS SNAPSHOT
After Visit Summary   8/8/2017    Yadi Iniguez    MRN: [id number]           Patient Information     Date Of Birth          1950        Visit Information        Provider Department      8/8/2017 6:15 PM Camille Kinney PA-C [facility]        Today's Diagnoses     Cough    -  1       Follow-ups after your visit        Your next 10 appointments already scheduled     Aug 11, 2017  7:30 AM CDT   Office Visit with Hannah Cee MD   [facility] ([facility])    [address] [facility] [address] [phone]           Bring a current list of meds and any records pertaining to this visit. For Physicals, please bring immunization records and any forms needing to be filled out. Please arrive 10 minutes early to complete paperwork.            Sep 14, 2017 10:30 AM CDT   Diabetic Education with AN DIABETIC ED RESOURCE   [facility] ([facility])    [address] [address] [phone]              Who to contact     If you have questions or need follow up information about today's clinic visit or your schedule please contact [facility] directly at [phone].  Normal or non-critical lab and imaging results will be communicated to you by [employer], letter or phone within 4 business days after the clinic has received the results. If you do not hear from us within 7 days, please contact the clinic through [employer] or phone. If you have a critical or abnormal lab result, we will notify you by phone as soon as possible.  Submit refill requests through [employer] or call your pharmacy and they will forward the refill request to us. Please allow 3 business days for your refill to be completed.          Additional Information About Your Visit        [employer] Information     [employer] gives you secure access to your electronic health record. If you see a primary care provider, you can also send  messages to your care team and make appointments. If you have questions, please call your primary care clinic.  If you do not have a primary care provider, please call [phone] and they will assist you.        Care EveryWhere ID     This is your Care EveryWhere ID. This could be used by other organizations to access your [place] medical records  [id number]        Your Vitals Were     Pulse Temperature Respirations Pulse Oximetry BMI (Body Mass Index)       101 99.4  F (37.4  C) (Oral) 24 89% 38.89 kg/m2        Blood Pressure from Last 3 Encounters:   08/08/17 147/76   04/07/17 135/68   03/03/17 126/69    Weight from Last 3 Encounters:   08/08/17 252 lb (114.3 kg)   04/07/17 247 lb 9.6 oz (112.3 kg)   03/03/17 236 lb (107 kg)              We Performed the Following     CBC with platelets differential     XR Chest 2 Views        Primary Care Provider Office Phone # Fax #    Hannah Cee [phone] [address] [facility] [address]        Equal Access to Services     [facility]: Hadii aad ku hadasho Socomfortali, waaxda luqadaha, qaybta kaalmada aderoselynyada, chandra piper . So [facility] [phone].    ATENCIÓN: Si habla español, tiene a dunbar disposición servicios gratuitos de asistencia lingüística. Llame al [phone].    We comply with applicable federal civil rights laws and [place] laws. We do not discriminate on the basis of race, color, national origin, age, disability sex, sexual orientation or gender identity.            Thank you!     Thank you for choosing [facility]  for your care. Our goal is always to provide you with excellent care. Hearing back from our patients is one way we can continue to improve our services. Please take a few minutes to complete the written survey that you may receive in the mail after your visit with us. Thank you!             Your Updated Medication List - Protect others around you: Learn how  to safely use, store and throw away your medicines at www.disposemymeds.org.          This list is accurate as of: 8/8/17  7:35 PM.  Always use your most recent med list.                   Brand Name Dispense Instructions for use Diagnosis    ACE/ARB NOT PRESCRIBED (INTENTIONAL)      Please choose reason not prescribed, below    Benign essential hypertension       alendronate 70 MG tablet    FOSAMAX    12 tablet    Take 1 tablet (70 mg) by mouth every 7 days Take 60 minutes before am meal with 8 oz. water. Remain upright for 30 minutes.    Osteoporosis       amLODIPine 10 MG tablet    NORVASC    90 tablet    Take 1 tablet (10 mg) by mouth daily    Benign essential hypertension       ascorbic acid 1000 MG Tabs    vitamin C     1 TABLET DAILY AT DINNER        aspirin 81 MG tablet      ONE DAILY*        blood glucose monitoring lancets     100 Box    Use to test blood sugar 3 times daily or as directed.    Type 2 diabetes mellitus with other diabetic kidney complication, with long-term current use of insulin (H)       blood glucose monitoring test strip    MUSA CONTOUR NEXT    100 strip    Use to test blood sugar 3 times daily or as directed.    Type 2 diabetes mellitus with other diabetic kidney complication, with long-term current use of insulin (H)       carvedilol 25 MG tablet    COREG    60 tablet    TAKE 1 TABLET BY MOUTH 2 TIMES DAILY WITH MEALS    Benign essential hypertension       CENTRUM SILVER per tablet      1 TABLET DAILY        clobetasol propionate 0.05 % Liqd     1 Bottle    Externally apply 1 dose topically 2 times daily as needed.    Skin rash       FENOFIBRATE PO      Take 54 mg by mouth daily        insulin detemir 100 UNIT/ML injection    LEVEMIR FLEXPEN/FLEXTOUCH    15 mL    Inject 60 Units Subcutaneous At Bedtime    Type 2 diabetes mellitus with other diabetic kidney complication, with long-term current use of insulin (H)       insulin pen needle 32G X 4 MM     100 each    Use 1 pen needles  daily or as directed.  Per insurance and patient preference    Type 2 diabetes mellitus with other diabetic kidney complication, with long-term current use of insulin (H)       Levothyroxine Sodium 150 MCG Caps     90 capsule    Take 150 mcg by mouth daily (with breakfast)    Hypothyroidism, unspecified type       * NOVOLOG SC      Inject 20 Units Subcutaneous 3 times daily (with meals) Reported on 4/7/2017        * insulin aspart 100 UNIT/ML injection    NovoLOG FLEXPEN    15 mL    20 units before breakfast, 20 units before lunch, 20 units before dinner    Type 2 diabetes mellitus with other diabetic kidney complication, with long-term current use of insulin (H)       OMEGA-3 FISH OIL PO      Take 1 g by mouth        PARoxetine 20 MG tablet    PAXIL    90 tablet    Take 1 tablet (20 mg) by mouth 2 times daily    Recurrent major depressive disorder, in partial remission (H)       PRAVASTATIN SODIUM PO      Take 40 mg by mouth every evening        primidone 50 MG tablet    MYSOLINE    180 tablet    Take 1 tablet (50 mg) by mouth 2 times daily    Essential tremor       VITAMIN D3 PO      Take 10,000 Units by mouth daily        vitamin E 400 UNITS Tabs      1 tab a day        * Notice:  This list has 2 medication(s) that are the same as other medications prescribed for you. Read the directions carefully, and ask your doctor or other care provider to review them with you.

## 2017-08-09 NOTE — NURSING NOTE
The following medication was given:     MEDICATION: Ipratropium Bromide and Albuterol Sulfate  ROUTE: PO  SITE: Medication was given orally   DOSE: 0.5MG/ 3MG  LOT #: [id number]  :  Sathya fong  EXPIRATION DATE:  11/01/2018  NDC#: 5918-0713-41  Given per Camille See Gregoria Khan

## 2017-08-10 ENCOUNTER — TELEPHONE (OUTPATIENT)
Dept: INTERNAL MEDICINE | Facility: CLINIC | Age: 67
End: 2017-08-10

## 2017-08-10 NOTE — TELEPHONE ENCOUNTER
Panel Management Review      Patient has the following on her problem list:     Depression / Dysthymia review  PHQ-9 SCORE 7/19/2010 4/28/2011 1/18/2017   Total Score 7 15 -   Total Score - - 9      Patient is due for:  PHQ9    Diabetes    ASA: Passed    Last A1C  Lab Results   Component Value Date    A1C 5.7 06/01/2017    A1C 11.2 03/01/2017    A1C 7.4 10/13/2016    A1C 12.2 04/28/2011    A1C 11.4 07/19/2010     A1C tested: Passed    Last LDL:    Lab Results   Component Value Date    CHOL 240 10/13/2016     Lab Results   Component Value Date    HDL 50 10/13/2016     Lab Results   Component Value Date     01/04/2017     10/13/2016     Lab Results   Component Value Date    TRIG 189 10/13/2016     Lab Results   Component Value Date    CHOLHDLRATIO 7.9 04/28/2011     Lab Results   Component Value Date    NHDL 190 10/13/2016       Is the patient on a Statin? YES             Is the patient on Aspirin? YES    Medications     HMG CoA Reductase Inhibitors    PRAVASTATIN SODIUM PO    Salicylates    ASPIRIN 81 MG OR TABS          Last three blood pressure readings:  BP Readings from Last 3 Encounters:   08/08/17 147/76   04/07/17 135/68   03/03/17 126/69       Date of last diabetes office visit: 4/7/2017     Tobacco History:     History   Smoking Status     Current Every Day Smoker     Packs/day: 0.50     Types: Cigarettes   Smokeless Tobacco     Never Used     Comment: 8/2005         Hypertension   Last three blood pressure readings:  BP Readings from Last 3 Encounters:   08/08/17 147/76   04/07/17 135/68   03/03/17 126/69     Blood pressure: FAILED    HTN Guidelines:  Age 18-59 BP range:  Less than 140/90  Age 60-85 with Diabetes:  Less than 140/90  Age 60-85 without Diabetes:  less than 150/90            Composite cancer screening  Chart review shows that this patient is due/due soon for the following None  Summary:    Patient is due/failing the following:   BP CHECK and PHQ9    Action needed:   Patient  needs office visit for diabetes and Hypertension.    Type of outreach:    Sent letter.    Questions for provider review:    None                                                                                                                                    Stephanie Franco CMA       Chart routed to close .

## 2017-08-10 NOTE — LETTER
[facility]  [address] [facility] [address]  Phone: [phone]  Fax: [phone]    08/10/17      Yadi RETA Iniguez  [address]  [place] [address]        To whom it may concern:     Our records indicate that you are due for a appointment with  Dr Cee for a diabetes and hypertension check.   Monitoring and managing your preventative and chronic health conditions are very important to us.     If you have received your health care elsewhere, please provide us with that information so it can be documented in your chart.    Please call [phone] or message us through your [employer] account to schedule an appointment or provide information for your chart.     I look forward to seeing you and working with you on your health care needs.         Sincerely,       ANABEL Brown   on behalf of   Hannah Cee MD

## 2017-08-14 ENCOUNTER — TELEPHONE (OUTPATIENT)
Dept: INTERNAL MEDICINE | Facility: CLINIC | Age: 67
End: 2017-08-14

## 2017-08-14 NOTE — TELEPHONE ENCOUNTER
This patient was discharged on 8.12.17 after a hospitalization.  Please contact them within 2 (business) days of the hospital discharge, and assist them in scheduling a follow-up appointment with me within 1-2 weeks after the hospital discharge. If you cannot reach the patient, please make 2 documented attempts at reaching the patient within 2 days of the hospital discharge (that would allow for the future doctor's visit to be appropriately coded as a hospital follow-up visit).  Thank you,  Hannah Cee MD

## 2017-08-15 NOTE — TELEPHONE ENCOUNTER
"Call for acute condition Discharge Protocol  \"Hi, my name is Sanam Parker, a registered nurse, and I am calling from [facility].  I am calling to follow up and see how things are going for you after your recent emergency visit.\"  Tell me how you are doing now that you are home?\" \"well, better than expected\" not smoking.     Discharge Instructions  \"Let's review your discharge instructions.  What is/are the follow-up recommendations?  Pt. Response: follow up with PCP  \"Has an appointment with your primary care provider been scheduled?\"  Yes. (confirm and remind to bring meds) 8/18/17  Medications  \"Tell me what changed about your medicines when you discharged?\"   Albuterol nebs 4x/per day. Prednisone taper    \"What questions do you have about your medications?\"  None   Call Summary  \"What questions or concerns do you have about your recent visit and your follow-up care?\"    none. Advised patient to follow all discharge paperwork with worsening symptoms. Patient verbalized understanding.   \"If you have questions or things don't continue to improve, we encourage you contact us through the main clinic number [phone].  Even if the clinic is not open, triage nurses are available 24/7 to help you.   We would like you to know that our clinic has extended hours (provide information).  We also have urgent care (provide details on closest location and hours/contact info)\"  \"Thank you for your time and take care!\" AYE Gonzalez RN                 "

## 2017-08-16 NOTE — PROGRESS NOTES
SUBJECTIVE:                                                    Yadi Iniguez is a 67 year old female who presents to clinic today for the following health issues:          Hospital Follow-up Visit:    Hospital/Nursing Home/IP Rehab Facility: [facility]  Date of Admission: 8/9/2017  Date of Discharge: 8/12/2017  Reason(s) for Admission: COPD            Problems taking medications regularly:  None       Medication changes since discharge: None       Problems adhering to non-medication therapy:  None    Summary of hospitalization:  CareEverywhere information obtained and reviewed  Diagnostic Tests/Treatments reviewed.  Follow up needed: PCP to recheck wheezing  Other Healthcare Providers Involved in Patient s Care:         None  Update since discharge: improved.       Lung disease, with suspected COPD, but restricted pattern on spirometry:  Hospitalized for suspected COPD exacerbation d/t bronchiectasis (and URI)-improving with Abx/prednisone and qid duonebs. Patient says she is not sure if she is back to her baseline in terms of breathing, because she has likely had shortness of breath for a long time and had not realized it until she was hospitalized for this flare-up.    Patient has been tobacco free for the past 10 days; on patches only (has not needed the nicotine inhaler). She was congratulated.   Spirometry was completed today with plain albuterol nebulizer as previous dx of asthma and now suspected dx of COPD-would like to see the effect of an asthma-med treatment alone. (Was rxed albuterol in combo with muscarinic antagonist inpatient).   Spirometry today shows a severe restrictive pattern, and NOT an obstructive pattern, though she did feel better after the alb neb: differential diagnosis: KIN (no recent snoring, though patient is unsure; has increased BMI); no e/o ILD on CT at [facility] a few days ago, which showed some bronchiectasis, mucous plugging in one area, (and atherosclerosis). This was not a HR  CT.    Type 2 Diabetes Mellitus: BS recently per handwritten log today: AM fastin-140, PM right before dinner: at lower 200s. No lows. On levemir 60u qhs (as b/f) and on novolou TID (vs 20u BID before).         Post Discharge Medication Reconciliation: discharge medications reconciled and changed, per note/orders (see AVS).  Plan of care communicated with patient     Coding guidelines for this visit:  Type of Medical   Decision Making Face-to-Face Visit       within 7 Days of discharge Face-to-Face Visit        within 14 days of discharge   Moderate Complexity [id number]   High Complexity [id number]              Problem list and histories reviewed & adjusted, as indicated.  Additional history: as documented    Patient Active Problem List   Diagnosis     Hypothyroidism     Hypertension goal BP (blood pressure) < 140/90     Type 2 diabetes, HbA1C goal < 8% (H)     Hyperlipidemia LDL goal <100     Moderate major depression (H)     Incontinence of urine     Neuropathy in diabetes (H)     Eczema     Left lumbar radiculopathy     Advanced directives, counseling/discussion     Health Care Home     CKD (chronic kidney disease) stage 3, GFR 30-59 ml/min     Type 2 diabetes mellitus with other diabetic kidney complication, with long-term current use of insulin (H)     Osteoporosis     Past Surgical History:   Procedure Laterality Date     ABDOMEN SURGERY      Gall Bladder  January       hysterectomy  Sept     BIOPSY  2013    from left corner of mouth       muscule biopsy     C VAGINAL HYSTERECTOMY       CHOLECYSTECTOMY, OPEN       CL AFF SURGICAL PATHOLOGY       COLONOSCOPY  2007     EYE SURGERY      ongoing     HEAD & NECK SURGERY  2006    Removal of saliva gland left side     PROPHYLACTIC REPAIR RETINAL BREAK, PNEUMATIC RETINOPEXY, COMBINED Left 2016       Social History   Substance Use Topics     Smoking status: Current Every Day Smoker     Packs/day: 0.50     Years: 50.00     Types:  Cigarettes     Start date: 1/1/1967     Last attempt to quit: 8/8/2017     Smokeless tobacco: Never Used      Comment: I have tried many times     Alcohol use Yes      Comment: Maybe a drink every few months     Family History   Problem Relation Age of Onset     Musculoskeletal Disorder Mother      MD     Muscular Disorder Mother      Depression Mother      OSTEOPOROSIS Mother      Obesity Mother      CANCER Father      mesothelioma     Obesity Father      HEART DISEASE Brother      angioplasty     Neurologic Disorder Brother      ms     DIABETES Brother      Hypertension Brother      Hyperlipidemia Brother      Obesity Brother      4 brothers     Depression Paternal Grandmother      Obesity Paternal Grandmother      Genetic Disorder Brother      MS         Current Outpatient Prescriptions   Medication Sig Dispense Refill     LOSARTAN POTASSIUM PO Take 50 mg by mouth daily       MAGNESIUM PO Take 250 mg by mouth daily       albuterol (2.5 MG/3ML) 0.083% neb solution Take 1 vial (2.5 mg) by nebulization every 6 hours as needed for shortness of breath / dyspnea or wheezing 1 vial used in clinic today. 1 vial 1     carvedilol (COREG) 25 MG tablet TAKE 1 TABLET BY MOUTH 2 TIMES DAILY WITH MEALS 60 tablet 0     primidone (MYSOLINE) 50 MG tablet Take 1 tablet (50 mg) by mouth 2 times daily 180 tablet 1     Levothyroxine Sodium 150 MCG CAPS Take 150 mcg by mouth daily (with breakfast) 90 capsule 2     PARoxetine (PAXIL) 20 MG tablet Take 1 tablet (20 mg) by mouth 2 times daily 90 tablet 1     insulin aspart (NOVOLOG FLEXPEN) 100 UNIT/ML injection 20 units before breakfast, 20 units before lunch, 20 units before dinner 15 mL 3     insulin detemir (LEVEMIR FLEXPEN/FLEXTOUCH) 100 UNIT/ML injection Inject 60 Units Subcutaneous At Bedtime 15 mL 3     amLODIPine (NORVASC) 10 MG tablet Take 1 tablet (10 mg) by mouth daily 90 tablet 1     alendronate (FOSAMAX) 70 MG tablet Take 1 tablet (70 mg) by mouth every 7 days Take 60  minutes before am meal with 8 oz. water. Remain upright for 30 minutes. 12 tablet 3     blood glucose monitoring (MUSA CONTOUR NEXT) test strip Use to test blood sugar 3 times daily or as directed. 100 strip 11     blood glucose monitoring (MUSA MICROLET) lancets Use to test blood sugar 3 times daily or as directed. 100 Box 11     insulin pen needle 32G X 4 MM Use 1 pen needles daily or as directed.  Per insurance and patient preference 100 each 3     ACE/ARB NOT PRESCRIBED, INTENTIONAL, Please choose reason not prescribed, below       Cholecalciferol (VITAMIN D3 PO) Take 10,000 Units by mouth daily       Omega-3 Fatty Acids (OMEGA-3 FISH OIL PO) Take 1 g by mouth       FENOFIBRATE PO Take 54 mg by mouth daily       PRAVASTATIN SODIUM PO Take 40 mg by mouth every evening       Clobetasol Propionate 0.05 % LIQD Externally apply 1 dose topically 2 times daily as needed. 1 Bottle 5     ASPIRIN 81 MG OR TABS ONE DAILY*  3     VITAMIN C 1000 MG OR TABS 1 TABLET DAILY AT DINNER       VITAMIN E 400 UNIT OR TABS 1 tab a day        CENTRUM SILVER OR TABS 1 TABLET DAILY       Insulin Aspart (NOVOLOG SC) Inject 20 Units Subcutaneous 3 times daily (with meals) Reported on 4/7/2017           Reviewed and updated as needed this visit by clinical staff       Reviewed and updated as needed this visit by Provider         ==============================================================  ROS:  Constitutional, HEENT, cardiovascular, pulmonary, GI, , musculoskeletal, neuro, skin, endocrine and psych systems are negative, except as otherwise noted.      OBJECTIVE:                                                    /68 (BP Location: Right arm, Patient Position: Chair, Cuff Size: Adult Large)  Pulse 87  Temp 97.9  F (36.6  C) (Oral)  Wt 247 lb 3.2 oz (112.1 kg)  SpO2 90%  BMI 38.15 kg/m2  Body mass index is 38.15 kg/(m^2).     GENERAL APPEARANCE: healthy, alert and in no distress  EYES: Eyes grossly normal to inspection, and  conjunctivae and sclerae normal  HENT: head normocephalic and atraumatic and mouth without ulcers or lesions, oropharynx clear and oral mucous membranes moist  NECK: no noticeable adenopathy, no asymmetry, masses, or scars   RESP: wheezing which improved to just very mild wheezing after forced coughing.   CV: regular rate and rhythm, normal S1 S2, no S3 or S4, no murmur, click or rub, no peripheral edema and peripheral pulses strong  ABDOMEN: soft, nontender, no hepatosplenomegaly, no masses and bowel sounds normal  MS: no musculoskeletal defects are noted and gait is age appropriate without ataxia  SKIN: no suspicious lesions or rashes  NEURO: mentation intact and speech normal  PSYCH: mentation appears normal and affect normal/bright.     Results for orders placed or performed in visit on 08/18/17   Spirometry, Breathing Capacity: Normal Order, Clinic Performed   Result Value Ref Range    FEV-1      FVC      FEV1/FVC      FEF 25/75        The 10-year ASCVD risk score (Misti PAEZ Jr, et al., 2013) is: 34.2%    Values used to calculate the score:      Age: 67 years      Sex: Female      Is Non- : No      Diabetic: Yes      Tobacco smoker: Yes      Systolic Blood Pressure: 131 mmHg      Is BP treated: Yes      HDL Cholesterol: 50 mg/dL      Total Cholesterol: 240 mg/dL    ASSESSMENT/PLAN:                                                        ICD-10-CM    1. Chronic obstructive pulmonary disease, unspecified COPD type (H) J44.9 Spirometry, Breathing Capacity: Normal Order, Clinic Performed     albuterol (2.5 MG/3ML) 0.083% neb solution   2. Type 2 diabetes mellitus with other diabetic kidney complication, with long-term current use of insulin (H) E11.29     Z79.4      COPD, restrictive lung pattern:  ASSESSMENT:   with exacerbation d/t bronchiectasis, improving with Abx/prednisone and qid duonebs  Somewhat unexpectedly with e/o severe restrictive pattern on spirometry today.  PLAN:  As per orders  "above and patient instructions below.   Due to the complete workup requiring complete PFTs (and because only the spirometry can performed in our clinic), the patient will be referred to Pulmonology for complete PFTs and further input regarding the \"severe restrictive pattern\" result.      Type 2 Diabetes Mellitus and HLD:  Lab Results   Component Value Date    A1C 5.7 06/01/2017    A1C 11.2 03/01/2017    A1C 7.4 10/13/2016     LDL Cholesterol Calculated   Date Value Ref Range Status   10/13/2016 152 (A) <=130 mg/dL Final     LDL Cholesterol Direct   Date Value Ref Range Status   01/04/2017 135 (H) <100 mg/dL Final     Comment:     Above desirable:  100-129 mg/dl   Borderline High:  130-159 mg/dL   High:             160-189 mg/dL   Very high:       >189 mg/dl     ]   Patient was from what I can tell, begun on pravastatin 40mg/d since 10/16, (though unclear if compliance was optimal). Also did just quit smoking. 10 yr CVD risk is high.   PLAN:  at follow-up appointment in a month: will intensity lipid therapy   As per orders above and patient instructions below.    Patient did just quit smoking.      Patient Instructions   Please continue the duoneb nebulizer 4 times a day, and finish your prednisone course and antibiotic course.   At that next appointment, we can consider adjusting your COPD medications a bit.     Please call the clinic if your sugars are ever below 80.  Please continue to measure your blood sugars in the morning fasting and before dinner.  Please bring your glucometer to each appointment.  Please return to clinic in a month for follow-up on your COPD, (recent smoking cessation) and on diabetes.    Please continue your yearly eye doctor appointments-your next eye doctor appointment will be due in October.                   Hannah Cee MD  [facility]  "

## 2017-08-18 ENCOUNTER — OFFICE VISIT (OUTPATIENT)
Dept: INTERNAL MEDICINE | Facility: CLINIC | Age: 67
End: 2017-08-18
Payer: COMMERCIAL

## 2017-08-18 VITALS
DIASTOLIC BLOOD PRESSURE: 68 MMHG | BODY MASS INDEX: 38.15 KG/M2 | TEMPERATURE: 97.9 F | HEART RATE: 87 BPM | OXYGEN SATURATION: 90 % | SYSTOLIC BLOOD PRESSURE: 131 MMHG | WEIGHT: 247.2 LBS

## 2017-08-18 DIAGNOSIS — J44.9 CHRONIC OBSTRUCTIVE PULMONARY DISEASE, UNSPECIFIED COPD TYPE (H): Primary | ICD-10-CM

## 2017-08-18 DIAGNOSIS — Z79.4 TYPE 2 DIABETES MELLITUS WITH OTHER DIABETIC KIDNEY COMPLICATION, WITH LONG-TERM CURRENT USE OF INSULIN (H): ICD-10-CM

## 2017-08-18 DIAGNOSIS — E11.29 TYPE 2 DIABETES MELLITUS WITH OTHER DIABETIC KIDNEY COMPLICATION, WITH LONG-TERM CURRENT USE OF INSULIN (H): ICD-10-CM

## 2017-08-18 LAB
FEF 25/75: NORMAL
FEV-1: NORMAL
FEV1/FVC: NORMAL
FVC: NORMAL

## 2017-08-18 PROCEDURE — 99214 OFFICE O/P EST MOD 30 MIN: CPT | Mod: 25 | Performed by: INTERNAL MEDICINE

## 2017-08-18 PROCEDURE — 94010 BREATHING CAPACITY TEST: CPT | Performed by: INTERNAL MEDICINE

## 2017-08-18 RX ORDER — ALBUTEROL SULFATE 0.83 MG/ML
1 SOLUTION RESPIRATORY (INHALATION) EVERY 6 HOURS PRN
Qty: 1 VIAL | Refills: 1
Start: 2017-08-18 | End: 2019-07-16

## 2017-08-18 ASSESSMENT — PATIENT HEALTH QUESTIONNAIRE - PHQ9: SUM OF ALL RESPONSES TO PHQ QUESTIONS 1-9: 3

## 2017-08-18 NOTE — NURSING NOTE
"Chief Complaint   Patient presents with     Hospital F/U       Initial /68 (BP Location: Right arm, Patient Position: Chair, Cuff Size: Adult Large)  Pulse 87  Temp 97.9  F (36.6  C) (Oral)  Wt 247 lb 3.2 oz (112.1 kg)  SpO2 90%  BMI 38.15 kg/m2 Estimated body mass index is 38.15 kg/(m^2) as calculated from the following:    Height as of 1/4/17: 5' 7.5\" (1.715 m).    Weight as of this encounter: 247 lb 3.2 oz (112.1 kg).  Medication Reconciliation: complete    Stephanie Franco CMA  "

## 2017-08-18 NOTE — PATIENT INSTRUCTIONS
Please continue the duoneb nebulizer 4 times a day, and finish your prednisone course and antibiotic course.   At that next appointment, we can consider adjusting your COPD medications a bit.     Please call the clinic if your sugars are ever below 80.  Please continue to measure your blood sugars in the morning fasting and before dinner.  Please bring your glucometer to each appointment.  Please return to clinic in a month for follow-up on your COPD, (recent smoking cessation) and on diabetes.    Please continue your yearly eye doctor appointments-your next eye doctor appointment will be due in October.

## 2017-08-18 NOTE — NURSING NOTE
The following nebulizer treatment was given:     MEDICATION: Albuterol Sulfate 2.5 mg  : [employer]  LOT #: [id number]  EXPIRATION DATE:  6/2018  NDC # 1050-9617-05    Stephanie Franco CMA

## 2017-08-18 NOTE — MR AVS SNAPSHOT
After Visit Summary   8/18/2017    Yadi Iniguez    MRN: [id number]           Patient Information     Date Of Birth          1950        Visit Information        Provider Department      8/18/2017 7:30 AM Hannah Cee MD [facility]        Today's Diagnoses     Chronic obstructive pulmonary disease, unspecified COPD type (H)    -  1      Care Instructions    Please continue the duoneb nebulizer 4 times a day, and finish your prednisone course and antibiotic course.   At that next appointment, we can consider adjusting your COPD medications a bit.     Please call the clinic if your sugars are ever below 80.  Please continue to measure your blood sugars in the morning fasting and before dinner.  Please bring your glucometer to each appointment.  Please return to clinic in a month for follow-up on your COPD, (recent smoking cessation) and on diabetes.    Please continue your yearly eye doctor appointments-your next eye doctor appointment will be due in October.           Follow-ups after your visit        Your next 10 appointments already scheduled     Sep 14, 2017 10:30 AM CDT   Diabetic Education with AN DIABETIC ED RESOURCE   [facility] ([facility]    [address] [address] [phone]              Who to contact     If you have questions or need follow up information about today's clinic visit or your schedule please contact [facility] directly at [phone].  Normal or non-critical lab and imaging results will be communicated to you by MyChart, letter or phone within 4 business days after the clinic has received the results. If you do not hear from us within 7 days, please contact the clinic through MyChart or phone. If you have a critical or abnormal lab result, we will notify you by phone as soon as possible.  Submit refill requests through [employer] or call your pharmacy and they will forward the  refill request to us. Please allow 3 business days for your refill to be completed.          Additional Information About Your Visit        MyChart Information     [employer] gives you secure access to your electronic health record. If you see a primary care provider, you can also send messages to your care team and make appointments. If you have questions, please call your primary care clinic.  If you do not have a primary care provider, please call [phone] and they will assist you.        Care EveryWhere ID     This is your Care EveryWhere ID. This could be used by other organizations to access your [facility] medical records  [id number]        Your Vitals Were     Pulse Temperature Pulse Oximetry BMI (Body Mass Index)          87 97.9  F (36.6  C) (Oral) 90% 38.15 kg/m2         Blood Pressure from Last 3 Encounters:   08/18/17 131/68   08/08/17 147/76   04/07/17 135/68    Weight from Last 3 Encounters:   08/18/17 247 lb 3.2 oz (112.1 kg)   08/08/17 252 lb (114.3 kg)   04/07/17 247 lb 9.6 oz (112.3 kg)              We Performed the Following     Spirometry, Breathing Capacity: Normal Order, Clinic Performed        Primary Care Provider Office Phone # Fax #    Hannah Cee [phone] [address] [facility] [address] [facility] [address]        Equal Access to Services     SHO DAVIS : Hadii bunny ku hadasho Soomaali, waaxda luqadaha, qaybta kaalmada chandra arce . So [facility] [phone].    ATENCIÓN: Si habla español, tiene a dunbar disposición servicios gratuitos de asistencia lingüística. Gilbert al [phone].    We comply with applicable federal civil rights laws and [place] laws. We do not discriminate on the basis of race, color, national origin, age, disability sex, sexual orientation or gender identity.            Thank you!     Thank you for choosing [facility]  for your care. Our goal is always to provide you with excellent care.  Hearing back from our patients is one way we can continue to improve our services. Please take a few minutes to complete the written survey that you may receive in the mail after your visit with us. Thank you!             Your Updated Medication List - Protect others around you: Learn how to safely use, store and throw away your medicines at www.disposemymeds.org.          This list is accurate as of: 8/18/17  8:44 AM.  Always use your most recent med list.                   Brand Name Dispense Instructions for use Diagnosis    ACE/ARB NOT PRESCRIBED (INTENTIONAL)      Please choose reason not prescribed, below    Benign essential hypertension       alendronate 70 MG tablet    FOSAMAX    12 tablet    Take 1 tablet (70 mg) by mouth every 7 days Take 60 minutes before am meal with 8 oz. water. Remain upright for 30 minutes.    Osteoporosis       amLODIPine 10 MG tablet    NORVASC    90 tablet    Take 1 tablet (10 mg) by mouth daily    Benign essential hypertension       ascorbic acid 1000 MG Tabs    vitamin C     1 TABLET DAILY AT DINNER        aspirin 81 MG tablet      ONE DAILY*        blood glucose monitoring lancets     100 Box    Use to test blood sugar 3 times daily or as directed.    Type 2 diabetes mellitus with other diabetic kidney complication, with long-term current use of insulin (H)       blood glucose monitoring test strip    MUSA CONTOUR NEXT    100 strip    Use to test blood sugar 3 times daily or as directed.    Type 2 diabetes mellitus with other diabetic kidney complication, with long-term current use of insulin (H)       carvedilol 25 MG tablet    COREG    60 tablet    TAKE 1 TABLET BY MOUTH 2 TIMES DAILY WITH MEALS    Benign essential hypertension       CENTRUM SILVER per tablet      1 TABLET DAILY        clobetasol propionate 0.05 % Liqd     1 Bottle    Externally apply 1 dose topically 2 times daily as needed.    Skin rash       FENOFIBRATE PO      Take 54 mg by mouth daily        insulin  detemir 100 UNIT/ML injection    LEVEMIR FLEXPEN/FLEXTOUCH    15 mL    Inject 60 Units Subcutaneous At Bedtime    Type 2 diabetes mellitus with other diabetic kidney complication, with long-term current use of insulin (H)       insulin pen needle 32G X 4 MM     100 each    Use 1 pen needles daily or as directed.  Per insurance and patient preference    Type 2 diabetes mellitus with other diabetic kidney complication, with long-term current use of insulin (H)       Levothyroxine Sodium 150 MCG Caps     90 capsule    Take 150 mcg by mouth daily (with breakfast)    Hypothyroidism, unspecified type       LOSARTAN POTASSIUM PO      Take 50 mg by mouth daily        MAGNESIUM PO      Take 250 mg by mouth daily        * NOVOLOG SC      Inject 20 Units Subcutaneous 3 times daily (with meals) Reported on 4/7/2017        * insulin aspart 100 UNIT/ML injection    NovoLOG FLEXPEN    15 mL    20 units before breakfast, 20 units before lunch, 20 units before dinner    Type 2 diabetes mellitus with other diabetic kidney complication, with long-term current use of insulin (H)       OMEGA-3 FISH OIL PO      Take 1 g by mouth        PARoxetine 20 MG tablet    PAXIL    90 tablet    Take 1 tablet (20 mg) by mouth 2 times daily    Recurrent major depressive disorder, in partial remission (H)       PRAVASTATIN SODIUM PO      Take 40 mg by mouth every evening        PREDNISONE PO      Take 5 mg by mouth        primidone 50 MG tablet    MYSOLINE    180 tablet    Take 1 tablet (50 mg) by mouth 2 times daily    Essential tremor       VITAMIN D3 PO      Take 10,000 Units by mouth daily        vitamin E 400 UNITS Tabs      1 tab a day        * Notice:  This list has 2 medication(s) that are the same as other medications prescribed for you. Read the directions carefully, and ask your doctor or other care provider to review them with you.

## 2017-08-20 ENCOUNTER — TELEPHONE (OUTPATIENT)
Dept: INTERNAL MEDICINE | Facility: CLINIC | Age: 67
End: 2017-08-20

## 2017-08-20 DIAGNOSIS — J98.4 RESTRICTIVE LUNG DISEASE: Primary | ICD-10-CM

## 2017-08-20 NOTE — TELEPHONE ENCOUNTER
Please call and advise the patient as follows, and also send a [facility] message with the same text and attach recent test results [statements which are in bold and in square brackets, like this sentence, is for clinic staff and should not be included in the text of the letter to the patient]:      Dear Yadi,     In order to look into all the possible causes of the breathing pattern noted on your spirometry, I would advise you to undergo a complete Pulmonary Function Test (PFT) and discuss the results with a Pulmonologist.  Please call one of these Pulmonology offices, to make an appointment with the Pulmonologist soon:  [facility]: [facility]Ad[facility] Pediatric[facility] - [place] [phone]     Or:  [facility]: [place] [facility] [phone]    Please use [employer] or call our clinic at [phone] if you have any questions.     Hannah Cee MD

## 2017-08-21 NOTE — TELEPHONE ENCOUNTER
Called patient, informed pt of providers note as written below, pt verbalized good understanding.   Sent [facility] message.  Patient declined taking the phone #'s below as she prefers to log into [facility] and print off the information.   JOSE GonzalezN RN

## 2017-09-11 ENCOUNTER — TELEPHONE (OUTPATIENT)
Dept: INTERNAL MEDICINE | Facility: CLINIC | Age: 67
End: 2017-09-11

## 2017-09-11 DIAGNOSIS — F32.1 MODERATE MAJOR DEPRESSION (H): Primary | ICD-10-CM

## 2017-09-11 RX ORDER — SERTRALINE HYDROCHLORIDE 100 MG/1
100 TABLET, FILM COATED ORAL DAILY
Qty: 30 TABLET | Refills: 1 | Status: SHIPPED | OUTPATIENT
Start: 2017-09-11 | End: 2017-10-25

## 2017-09-12 NOTE — TELEPHONE ENCOUNTER
"Called patient, informed pt of providers note as written below, pt verbalized good understanding.    Patient inquired on how to dispose of old medication, gave patient the education per the [place] pollution control agency \"pharmaceutical waste: disposing of unwanted medications.\" Patient verbalized understanding  JOSE GonzalezN RN   "

## 2017-09-12 NOTE — TELEPHONE ENCOUNTER
If she has been off of the Paxil for 4 days now w/o noted withdrawal, she can just stay off the Paxil and continue the Zoloft as 100mg daily.  Thank you,  Hannah Cee MD

## 2017-09-12 NOTE — TELEPHONE ENCOUNTER
Spoke with patient.   She states that she'd run out of the paxil so the pharmacist gave her 5 of the zoloft.  She states has been on the zoloft since Saturday.  She does not know what strength she was given.  Did make her aware you had wanted her to wean down on the paxil before starting the zoloft 100mg but this is day 4 without the paxil.  Please advise if still wanting her to taper at all.  She denies any side effects as of yet without tapering.  Hazel Rg RN  She at this time is going to continue with the zoloft.  Aware new prescription sent to her pharmacy.  Hazel Rg RN

## 2017-09-18 NOTE — PROGRESS NOTES
SUBJECTIVE:   Yadi Iniguez is a 67 year old female who presents to clinic today for the following health issues:      Diabetes Follow-up    Patient is checking blood sugars: once daily.  Results are as follows:       am - 202 to 284        Diabetic concerns: None and blood sugar frequently over 200     Symptoms of hypoglycemia (low blood sugar): none     Paresthesias (numbness or burning in feet) or sores: No     Date of last diabetic eye exam: 2016    Patient reports that her blood sugars have started being high since 8/30/2017.  Its possible that she is eating more since she also quit smoking around that time.   Patient has her glucometer with her: since 8.30.17, the BS are higher than previously.  A possible trigger is that the patient found out that her brother-in-law passed away recently, right around 8.30.17.   no s/s of infection. She does report a pain for the right upper quadrant and radiating to the back, and its worse with movements, not with food.  No nausea, no diarrhea, no yellowing of the skin or the eyes.   Very rare ETOH use.   No CP.    Hyperlipidemia Follow-Up      Rate your low fat/cholesterol diet?: fair    Taking statin?  Yes, no muscle aches from statin    Other lipid medications/supplements?:  none  LDL in 1/2017 in the 130s; in a diabetic.   Will likely increase pravastatin from 40mg daily to  40mg bid (and plan on rehcecking lipids in 2-6 months)   Health Care Maintenance:FIT vs colono    Depression/anx:  Patient recently changed from Paxil to Zoloft, 3 weeks ago-will ASK regarding s/e, and if any effects on mood yet      Hypertension Follow-up      Outpatient blood pressures are not being checked.    Low Salt Diet: no added salt    COPD(and RLD on PFTs)-will see Pulmonlogy in 12/2017.    The patient is still smoke-free since 8.8.17        Amount of exercise or physical activity: 2-3 days/week for an average of 15-30 minutes    Problems taking medications regularly: No    Medication  side effects: none  Diet: regular (no restrictions)      Problem list and histories reviewed & adjusted, as indicated.  Additional history: as documented    Patient Active Problem List   Diagnosis     Hypothyroidism     Hypertension goal BP (blood pressure) < 140/90     Type 2 diabetes, HbA1C goal < 8% (H)     Hyperlipidemia LDL goal <100     Moderate major depression (H)     Incontinence of urine     Neuropathy in diabetes (H)     Eczema     Left lumbar radiculopathy     ACP (advance care planning)     Health Care Home     CKD (chronic kidney disease) stage 3, GFR 30-59 ml/min     Type 2 diabetes mellitus with other diabetic kidney complication, with long-term current use of insulin (H)     Osteoporosis     Morbid obesity (H)     Past Surgical History:   Procedure Laterality Date     ABDOMEN SURGERY  1978    Gall Bladder  January       hysterectomy  Sept     BIOPSY  2013 1993    from left corner of mouth       muscule biopsy     C VAGINAL HYSTERECTOMY       CHOLECYSTECTOMY, OPEN       CL AFF SURGICAL PATHOLOGY       COLONOSCOPY  2007     EYE SURGERY  2014    ongoing     HEAD & NECK SURGERY  2006    Removal of saliva gland left side     PROPHYLACTIC REPAIR RETINAL BREAK, PNEUMATIC RETINOPEXY, COMBINED Left 9/2/2016       Social History   Substance Use Topics     Smoking status: Former Smoker     Packs/day: 0.50     Years: 50.00     Types: Cigarettes     Start date: 1/1/1967     Quit date: 8/8/2017     Smokeless tobacco: Never Used      Comment: I have tried many times     Alcohol use Yes      Comment: Maybe a drink every few months     Family History   Problem Relation Age of Onset     Musculoskeletal Disorder Mother      MD     Muscular Disorder Mother      Depression Mother      OSTEOPOROSIS Mother      Obesity Mother      CANCER Father      mesothelioma     Obesity Father      HEART DISEASE Brother      angioplasty     Neurologic Disorder Brother      ms     DIABETES Brother      Hypertension Brother       Hyperlipidemia Brother      Obesity Brother      4 brothers     Depression Paternal Grandmother      Obesity Paternal Grandmother      Genetic Disorder Brother      MS         Current Outpatient Prescriptions   Medication Sig Dispense Refill     sertraline (ZOLOFT) 100 MG tablet Take 1 tablet (100 mg) by mouth daily 30 tablet 1     insulin aspart (NOVOLOG FLEXPEN) 100 UNIT/ML injection 20 units before breakfast, 20 units before lunch, 20 units before dinner 18 mL 3     insulin detemir (LEVEMIR FLEXPEN/FLEXTOUCH) 100 UNIT/ML injection Inject 60 Units Subcutaneous At Bedtime 18 mL 0     LOSARTAN POTASSIUM PO Take 50 mg by mouth daily       MAGNESIUM PO Take 250 mg by mouth daily       albuterol (2.5 MG/3ML) 0.083% neb solution Take 1 vial (2.5 mg) by nebulization every 6 hours as needed for shortness of breath / dyspnea or wheezing 1 vial used in clinic today. 1 vial 1     carvedilol (COREG) 25 MG tablet TAKE 1 TABLET BY MOUTH 2 TIMES DAILY WITH MEALS 60 tablet 0     primidone (MYSOLINE) 50 MG tablet Take 1 tablet (50 mg) by mouth 2 times daily 180 tablet 1     Levothyroxine Sodium 150 MCG CAPS Take 150 mcg by mouth daily (with breakfast) 90 capsule 2     amLODIPine (NORVASC) 10 MG tablet Take 1 tablet (10 mg) by mouth daily 90 tablet 1     alendronate (FOSAMAX) 70 MG tablet Take 1 tablet (70 mg) by mouth every 7 days Take 60 minutes before am meal with 8 oz. water. Remain upright for 30 minutes. 12 tablet 3     blood glucose monitoring (MUSA CONTOUR NEXT) test strip Use to test blood sugar 3 times daily or as directed. 100 strip 11     blood glucose monitoring (MUSA MICROLET) lancets Use to test blood sugar 3 times daily or as directed. 100 Box 11     insulin pen needle 32G X 4 MM Use 1 pen needles daily or as directed.  Per insurance and patient preference 100 each 3     ACE/ARB NOT PRESCRIBED, INTENTIONAL, Please choose reason not prescribed, below       Cholecalciferol (VITAMIN D3 PO) Take 10,000 Units by mouth  "daily       Omega-3 Fatty Acids (OMEGA-3 FISH OIL PO) Take 1 g by mouth       FENOFIBRATE PO Take 54 mg by mouth daily       PRAVASTATIN SODIUM PO Take 40 mg by mouth every evening       Insulin Aspart (NOVOLOG SC) Inject 20 Units Subcutaneous 3 times daily (with meals) Reported on 4/7/2017       Clobetasol Propionate 0.05 % LIQD Externally apply 1 dose topically 2 times daily as needed. 1 Bottle 5     ASPIRIN 81 MG OR TABS ONE DAILY*  3     VITAMIN C 1000 MG OR TABS 1 TABLET DAILY AT DINNER       VITAMIN E 400 UNIT OR TABS 1 tab a day        CENTRUM SILVER OR TABS 1 TABLET DAILY           Reviewed and updated as needed this visit by clinical staff     Reviewed and updated as needed this visit by Provider         ==============================================================  ROS:  Constitutional, HEENT, cardiovascular, pulmonary, GI, , musculoskeletal, neuro, skin, endocrine and psych systems are negative, except as otherwise noted.        OBJECTIVE:                                                    /70  Pulse 107  Ht 5' 7.5\" (1.715 m)  Wt 258 lb (117 kg)  SpO2 96%  BMI 39.81 kg/m2  Body mass index is 39.81 kg/(m^2).     GENERAL APPEARANCE: healthy, alert and in no distress  EYES: Eyes grossly normal to inspection, and conjunctivae and sclerae normal  HENT: head normocephalic and atraumatic and mouth without ulcers or lesions, oropharynx clear and oral mucous membranes moist  NECK: no noticeable adenopathy, no asymmetry, masses, or scars   RESP: lungs clear to auscultation - no rales, rhonchi or wheezes  CV: regular rate and rhythm, normal S1 S2, no S3 or S4, no murmur, click or rub, no peripheral edema and peripheral pulses strong  ABDOMEN:   right upper quadrant tender to palpation, negative Martini's sign  o/w, soft, nontender, no hepatosplenomegaly, no masses and bowel sounds normal  MS: no musculoskeletal defects are noted and gait is age appropriate without ataxia  SKIN: no suspicious lesions or " rashes  NEURO: mentation intact and speech normal  PSYCH: mentation appears normal and affect normal/bright.     Results for orders placed or performed in visit on 09/25/17   CBC with platelets   Result Value Ref Range    WBC 7.0 4.0 - 11.0 10e9/L    RBC Count 3.85 3.8 - 5.2 10e12/L    Hemoglobin 13.0 11.7 - 15.7 g/dL    Hematocrit 37.8 35.0 - 47.0 %    MCV 98 78 - 100 fl    MCH 33.8 (H) 26.5 - 33.0 pg    MCHC 34.4 31.5 - 36.5 g/dL    RDW 12.9 10.0 - 15.0 %    Platelet Count 143 (L) 150 - 450 10e9/L   **A1C FUTURE anytime   Result Value Ref Range    Hemoglobin A1C 7.9 (H) 4.3 - 6.0 %   Comprehensive metabolic panel   Result Value Ref Range    Sodium 137 133 - 144 mmol/L    Potassium 4.3 3.4 - 5.3 mmol/L    Chloride 99 94 - 109 mmol/L    Carbon Dioxide 29 20 - 32 mmol/L    Anion Gap 9 3 - 14 mmol/L    Glucose 237 (H) 70 - 99 mg/dL    Urea Nitrogen 22 7 - 30 mg/dL    Creatinine 1.02 0.52 - 1.04 mg/dL    GFR Estimate 54 (L) >60 mL/min/1.7m2    GFR Estimate If Black 65 >60 mL/min/1.7m2    Calcium 9.3 8.5 - 10.1 mg/dL    Bilirubin Total 0.4 0.2 - 1.3 mg/dL    Albumin 3.3 (L) 3.4 - 5.0 g/dL    Protein Total 7.2 6.8 - 8.8 g/dL    Alkaline Phosphatase 45 40 - 150 U/L    ALT 25 0 - 50 U/L    AST 18 0 - 45 U/L   Lipase   Result Value Ref Range    Lipase 168 73 - 393 U/L      ASSESSMENT/PLAN:                                                        ICD-10-CM    1. Thrombocytopenia (H) D69.6 CBC with platelets     CBC with platelets   2. Type 2 diabetes mellitus with other diabetic kidney complication, with long-term current use of insulin (H) E11.29 **A1C FUTURE anytime    Z79.4 **A1C FUTURE anytime   3. Need for prophylactic vaccination and inoculation against influenza Z23 FLU VACCINE, INCREASED ANTIGEN, PRESV FREE, AGE 65+ [32112]     Vaccine Administration, Initial [28070]   4. Screen for colon cancer Z12.11 GASTROENTEROLOGY ADULT REF PROCEDURE ONLY   5. Right flank pain R10.9 Comprehensive metabolic panel     Lipase     US  Abdomen Limited     (D69.6) Thrombocytopenia (H)  (primary encounter diagnosis)  Comment: mild, and per chart review (in CareEverywhere) this started some time between 10/2016 and 8/2017. H/H within normal limits.  Differential diagnosis: med s/evs ITP vs liver disease (though low plts preceded the right upper quadrant pain, and LFTs are within normal limits)  Plan:   Would plan to do CBC with platelets q 6-12 months.        Liver U/S    (E11.29,  Z79.4) Type 2 diabetes mellitus with other diabetic kidney complication, with long-term current use of insulin (H)  Comment: worsening control over the past month, possible triggers include social stressor and quitting smoking  Plan: **A1C FUTURE anytime        As per orders above and patient instructions below.   (will also recheck how the patient is feeling on the change SSRI from Paxil to Zoloft, at the next visit in a month)    (Z23) Need for prophylactic vaccination and inoculation against influenza  Comment:   Plan: FLU VACCINE, INCREASED ANTIGEN, PRESV FREE, AGE        65+ [30424], Vaccine Administration, Initial         [37353]      (Z12.11) Screen for colon cancer  Comment:   Plan: GASTROENTEROLOGY ADULT REF PROCEDURE ONLY        As per orders above and patient instructions below.     (R10.9) Right flank pain  Comment: history of isabella; no n/v, no f/c; no diarrhea. No trauma to the area; no urinary symptoms. LFts and lipase within normal limits.  Differential diagnosis: fatty liver; Given low platelets, need an US as a screen for liver cirrhosis; (less likely): Alpha-1 Antitrypsin Deficiency.  Plan: Comprehensive metabolic panel, Lipase, US         Abdomen Limited  As per orders above and patient instructions below.   If the right upper quadrant  US is within normal limits but suspicions for liver cirrhosis should persist, would consider a Fibroscan.     Patient Instructions   Regarding your diabetes:  Please increase your Levemir from 60 units to 64 units at  bedtime.  Continue your Novolog.  return to clinic in a month.    Regarding your right-sided pain:  We will check some labs today and if they are are within normal limits, we will likely increase your pravastatin.  Please call [phone] to schedule the ultrasound of the liver soon.     Please call [phone] to schedule a screening colonoscopy soon.    Please make sure to have yearly eye exams.  Keep your appointment with Dr Chiu (re your lungs) on 12.11.17.                        Hannah Cee MD  [facility]

## 2017-09-19 ENCOUNTER — DOCUMENTATION ONLY (OUTPATIENT)
Dept: OTHER | Facility: CLINIC | Age: 67
End: 2017-09-19

## 2017-09-19 DIAGNOSIS — Z71.89 ACP (ADVANCE CARE PLANNING): Chronic | ICD-10-CM

## 2017-09-25 ENCOUNTER — OFFICE VISIT (OUTPATIENT)
Dept: INTERNAL MEDICINE | Facility: CLINIC | Age: 67
End: 2017-09-25
Payer: COMMERCIAL

## 2017-09-25 VITALS
WEIGHT: 258 LBS | DIASTOLIC BLOOD PRESSURE: 70 MMHG | SYSTOLIC BLOOD PRESSURE: 138 MMHG | BODY MASS INDEX: 39.1 KG/M2 | OXYGEN SATURATION: 96 % | HEIGHT: 68 IN | HEART RATE: 107 BPM

## 2017-09-25 DIAGNOSIS — Z79.4 TYPE 2 DIABETES MELLITUS WITH OTHER DIABETIC KIDNEY COMPLICATION, WITH LONG-TERM CURRENT USE OF INSULIN (H): ICD-10-CM

## 2017-09-25 DIAGNOSIS — E78.5 HYPERLIPIDEMIA LDL GOAL <100: ICD-10-CM

## 2017-09-25 DIAGNOSIS — E66.01 MORBID OBESITY DUE TO EXCESS CALORIES (H): ICD-10-CM

## 2017-09-25 DIAGNOSIS — D69.6 THROMBOCYTOPENIA (H): Primary | ICD-10-CM

## 2017-09-25 DIAGNOSIS — R10.9 RIGHT FLANK PAIN: ICD-10-CM

## 2017-09-25 DIAGNOSIS — Z12.11 SCREEN FOR COLON CANCER: ICD-10-CM

## 2017-09-25 DIAGNOSIS — E11.29 TYPE 2 DIABETES MELLITUS WITH OTHER DIABETIC KIDNEY COMPLICATION, WITH LONG-TERM CURRENT USE OF INSULIN (H): ICD-10-CM

## 2017-09-25 DIAGNOSIS — Z23 NEED FOR PROPHYLACTIC VACCINATION AND INOCULATION AGAINST INFLUENZA: ICD-10-CM

## 2017-09-25 LAB
ALBUMIN SERPL-MCNC: 3.3 G/DL (ref 3.4–5)
ALP SERPL-CCNC: 45 U/L (ref 40–150)
ALT SERPL W P-5'-P-CCNC: 25 U/L (ref 0–50)
ANION GAP SERPL CALCULATED.3IONS-SCNC: 9 MMOL/L (ref 3–14)
AST SERPL W P-5'-P-CCNC: 18 U/L (ref 0–45)
BILIRUB SERPL-MCNC: 0.4 MG/DL (ref 0.2–1.3)
BUN SERPL-MCNC: 22 MG/DL (ref 7–30)
CALCIUM SERPL-MCNC: 9.3 MG/DL (ref 8.5–10.1)
CHLORIDE SERPL-SCNC: 99 MMOL/L (ref 94–109)
CO2 SERPL-SCNC: 29 MMOL/L (ref 20–32)
CREAT SERPL-MCNC: 1.02 MG/DL (ref 0.52–1.04)
ERYTHROCYTE [DISTWIDTH] IN BLOOD BY AUTOMATED COUNT: 12.9 % (ref 10–15)
GFR SERPL CREATININE-BSD FRML MDRD: 54 ML/MIN/1.7M2
GLUCOSE SERPL-MCNC: 237 MG/DL (ref 70–99)
HBA1C MFR BLD: 7.9 % (ref 4.3–6)
HCT VFR BLD AUTO: 37.8 % (ref 35–47)
HGB BLD-MCNC: 13 G/DL (ref 11.7–15.7)
LIPASE SERPL-CCNC: 168 U/L (ref 73–393)
MCH RBC QN AUTO: 33.8 PG (ref 26.5–33)
MCHC RBC AUTO-ENTMCNC: 34.4 G/DL (ref 31.5–36.5)
MCV RBC AUTO: 98 FL (ref 78–100)
PLATELET # BLD AUTO: 143 10E9/L (ref 150–450)
POTASSIUM SERPL-SCNC: 4.3 MMOL/L (ref 3.4–5.3)
PROT SERPL-MCNC: 7.2 G/DL (ref 6.8–8.8)
RBC # BLD AUTO: 3.85 10E12/L (ref 3.8–5.2)
SODIUM SERPL-SCNC: 137 MMOL/L (ref 133–144)
WBC # BLD AUTO: 7 10E9/L (ref 4–11)

## 2017-09-25 PROCEDURE — 83036 HEMOGLOBIN GLYCOSYLATED A1C: CPT | Performed by: INTERNAL MEDICINE

## 2017-09-25 PROCEDURE — G0008 ADMIN INFLUENZA VIRUS VAC: HCPCS | Performed by: INTERNAL MEDICINE

## 2017-09-25 PROCEDURE — 36415 COLL VENOUS BLD VENIPUNCTURE: CPT | Performed by: INTERNAL MEDICINE

## 2017-09-25 PROCEDURE — 83690 ASSAY OF LIPASE: CPT | Performed by: INTERNAL MEDICINE

## 2017-09-25 PROCEDURE — 99214 OFFICE O/P EST MOD 30 MIN: CPT | Mod: 25 | Performed by: INTERNAL MEDICINE

## 2017-09-25 PROCEDURE — 90662 IIV NO PRSV INCREASED AG IM: CPT | Performed by: INTERNAL MEDICINE

## 2017-09-25 PROCEDURE — 80053 COMPREHEN METABOLIC PANEL: CPT | Performed by: INTERNAL MEDICINE

## 2017-09-25 PROCEDURE — 85027 COMPLETE CBC AUTOMATED: CPT | Performed by: INTERNAL MEDICINE

## 2017-09-25 NOTE — MR AVS SNAPSHOT
After Visit Summary   9/25/2017    Yadi Iniguez    MRN: [id number]           Patient Information     Date Of Birth          1950        Visit Information        Provider Department      9/25/2017 10:30 AM Hannah Cee MD [facility] [place]        Today's Diagnoses     Thrombocytopenia (H)    -  1    Type 2 diabetes mellitus with other diabetic kidney complication, with long-term current use of insulin (H)        Need for prophylactic vaccination and inoculation against influenza        Screen for colon cancer        Right flank pain          Care Instructions    Regarding your diabetes:  Please increase your Levemir from 60 units to 64 units at bedtime.  Continue your Novolog.  return to clinic in a month.    Regarding your right-sided pain:  We will check some labs today and if they are are within normal limits, we will likely increase your pravastatin.  Please call [phone] to schedule the ultrasound of the liver soon.     Please call [phone] to schedule a screening colonoscopy soon.    Please make sure to have yearly eye exams.  Keep your appointment with Dr Chiu (re your lungs) on 12.11.17.               Follow-ups after your visit        Additional Services     GASTROENTEROLOGY ADULT REF PROCEDURE ONLY       Last Lab Result: Creatinine (mg/dL)       Date                     Value                 04/07/2017               1.15 (H)         ----------  Body mass index is 39.81 kg/(m^2).     Needed:  No  Language:  English    Patient will be contacted to schedule procedure.     Please be aware that coverage of these services is subject to the terms and limitations of your health insurance plan.  Call member services at your health plan with any benefit or coverage questions.  Any procedures must be performed at a [facility] facility OR coordinated by your clinic's referral office.    Please bring the following with you to your appointment:    (1) Any  X-Rays, CTs or MRIs which have been performed.  Contact the facility where they were done to arrange for  prior to your scheduled appointment.    (2) List of current medications   (3) This referral request   (4) Any documents/labs given to you for this referral                  Your next 10 appointments already scheduled     Dec 11, 2017  9:00 AM CST   [facility] LAB   [facility]    [id number] [place] [id number] [phone]           Bring a current list of meds and any records pertaining to this visit. For Physicals, please bring immunization records and any forms needing to be filled out. Please arrive 10 minutes early to complete paperwork.            Dec 11, 2017 10:00 AM CST   New Visit with Jh Chiu MD   [facility])    [id number] [place] [phone]              Future tests that were ordered for you today     Open Future Orders        Priority Expected Expires Ordered    US Abdomen Limited Routine  9/25/2018 9/25/2017            Who to contact     If you have questions or need follow up information about today's clinic visit or your schedule please contact [facility] directly at [phone].  Normal or non-critical lab and imaging results will be communicated to you by MyChart, letter or phone within 4 business days after the clinic has received the results. If you do not hear from us within 7 days, please contact the clinic through MyChart or phone. If you have a critical or abnormal lab result, we will notify you by phone as soon as possible.  Submit refill requests through [facility] or call your pharmacy and they will forward the refill request to us. Please allow 3 business days for your refill to be completed.          Additional Information About Your Visit        [facility] Information     [facility] gives you secure  "access to your electronic health record. If you see a primary care provider, you can also send messages to your care team and make appointments. If you have questions, please call your primary care clinic.  If you do not have a primary care provider, please call [phone] and they will assist you.        Care EveryWhere ID     This is your Care EveryWhere ID. This could be used by other organizations to access your [facility] medical records  [id number]        Your Vitals Were     Pulse Height Pulse Oximetry BMI (Body Mass Index)          107 5' 7.5\" (1.715 m) 96% 39.81 kg/m2         Blood Pressure from Last 3 Encounters:   09/25/17 138/70   08/18/17 131/68   08/08/17 147/76    Weight from Last 3 Encounters:   09/25/17 258 lb (117 kg)   08/18/17 247 lb 3.2 oz (112.1 kg)   08/08/17 252 lb (114.3 kg)              We Performed the Following     Comprehensive metabolic panel     FLU VACCINE, INCREASED ANTIGEN, PRESV FREE, AGE 65+ [35322]     GASTROENTEROLOGY ADULT REF PROCEDURE ONLY     Lipase     Vaccine Administration, Initial [83204]        Primary Care Provider Office Phone # Fax #    Hannah Cee [phone] [address] [facility] [address]        Equal Access to Services     SHO DAVIS : Hadii aad ku hadasho Soomaali, waaxda luqadaha, qaybta kaalmada adeegyada, chandra jalloh hayjosé luis piper . So [facility] [phone].    ATENCIÓN: Si habla español, tiene a dunbar disposición servicios gratuitos de asistencia lingüística. Llame al [phone].    We comply with applicable federal civil rights laws and [place] laws. We do not discriminate on the basis of race, color, national origin, age, disability sex, sexual orientation or gender identity.            Thank you!     Thank you for choosing [facility]  for your care. Our goal is always to provide you with excellent care. Hearing back from our patients is one way we can continue to improve our " services. Please take a few minutes to complete the written survey that you may receive in the mail after your visit with us. Thank you!             Your Updated Medication List - Protect others around you: Learn how to safely use, store and throw away your medicines at www.disposemymeds.org.          This list is accurate as of: 9/25/17 11:11 AM.  Always use your most recent med list.                   Brand Name Dispense Instructions for use Diagnosis    ACE/ARB NOT PRESCRIBED (INTENTIONAL)      Please choose reason not prescribed, below    Benign essential hypertension       albuterol (2.5 MG/3ML) 0.083% neb solution     1 vial    Take 1 vial (2.5 mg) by nebulization every 6 hours as needed for shortness of breath / dyspnea or wheezing 1 vial used in clinic today.    Chronic obstructive pulmonary disease, unspecified COPD type (H)       alendronate 70 MG tablet    FOSAMAX    12 tablet    Take 1 tablet (70 mg) by mouth every 7 days Take 60 minutes before am meal with 8 oz. water. Remain upright for 30 minutes.    Osteoporosis       amLODIPine 10 MG tablet    NORVASC    90 tablet    Take 1 tablet (10 mg) by mouth daily    Benign essential hypertension       ascorbic acid 1000 MG Tabs    vitamin C     1 TABLET DAILY AT DINNER        aspirin 81 MG tablet      ONE DAILY*        blood glucose monitoring lancets     100 Box    Use to test blood sugar 3 times daily or as directed.    Type 2 diabetes mellitus with other diabetic kidney complication, with long-term current use of insulin (H)       blood glucose monitoring test strip    MUSA CONTOUR NEXT    100 strip    Use to test blood sugar 3 times daily or as directed.    Type 2 diabetes mellitus with other diabetic kidney complication, with long-term current use of insulin (H)       carvedilol 25 MG tablet    COREG    60 tablet    TAKE 1 TABLET BY MOUTH 2 TIMES DAILY WITH MEALS    Benign essential hypertension       CENTRUM SILVER per tablet      1 TABLET DAILY         clobetasol propionate 0.05 % Liqd     1 Bottle    Externally apply 1 dose topically 2 times daily as needed.    Skin rash       FENOFIBRATE PO      Take 54 mg by mouth daily        insulin detemir 100 UNIT/ML injection    LEVEMIR FLEXPEN/FLEXTOUCH    18 mL    Inject 60 Units Subcutaneous At Bedtime    Type 2 diabetes mellitus with other diabetic kidney complication, with long-term current use of insulin (H)       insulin pen needle 32G X 4 MM     100 each    Use 1 pen needles daily or as directed.  Per insurance and patient preference    Type 2 diabetes mellitus with other diabetic kidney complication, with long-term current use of insulin (H)       Levothyroxine Sodium 150 MCG Caps     90 capsule    Take 150 mcg by mouth daily (with breakfast)    Hypothyroidism, unspecified type       LOSARTAN POTASSIUM PO      Take 50 mg by mouth daily        MAGNESIUM PO      Take 250 mg by mouth daily        * NOVOLOG SC      Inject 20 Units Subcutaneous 3 times daily (with meals) Reported on 4/7/2017        * insulin aspart 100 UNIT/ML injection    NovoLOG FLEXPEN    18 mL    20 units before breakfast, 20 units before lunch, 20 units before dinner    Type 2 diabetes mellitus with other diabetic kidney complication, with long-term current use of insulin (H)       OMEGA-3 FISH OIL PO      Take 1 g by mouth        PRAVASTATIN SODIUM PO      Take 40 mg by mouth every evening        primidone 50 MG tablet    MYSOLINE    180 tablet    Take 1 tablet (50 mg) by mouth 2 times daily    Essential tremor       sertraline 100 MG tablet    ZOLOFT    30 tablet    Take 1 tablet (100 mg) by mouth daily    Moderate major depression (H)       VITAMIN D3 PO      Take 10,000 Units by mouth daily        vitamin E 400 UNITS Tabs      1 tab a day        * Notice:  This list has 2 medication(s) that are the same as other medications prescribed for you. Read the directions carefully, and ask your doctor or other care provider to review them with you.

## 2017-09-25 NOTE — PATIENT INSTRUCTIONS
Regarding your diabetes:  Please increase your Levemir from 60 units to 64 units at bedtime.  Continue your Novolog.  return to clinic in a month.    Regarding your right-sided pain:  We will check some labs today and if they are are within normal limits, we will likely increase your pravastatin.  Please call [phone] to schedule the ultrasound of the liver soon.     Please call [phone] to schedule a screening colonoscopy soon.    Please make sure to have yearly eye exams.  Keep your appointment with Dr Chiu (re your lungs) on 12.11.17.

## 2017-09-25 NOTE — PROGRESS NOTES
Injectable Influenza Immunization Documentation    1.  Is the person to be vaccinated sick today?   No    2. Does the person to be vaccinated have an allergy to a component   of the vaccine?   No    3. Has the person to be vaccinated ever had a serious reaction   to influenza vaccine in the past?   No    4. Has the person to be vaccinated ever had Guillain-Barré syndrome?   No    Form completed by ANABEL Anderson   10:41 AM  9/25/2017

## 2017-09-26 PROBLEM — D69.6 THROMBOCYTOPENIA (H): Status: ACTIVE | Noted: 2017-09-26

## 2017-09-26 PROBLEM — E66.01 MORBID OBESITY (H): Status: ACTIVE | Noted: 2017-09-26

## 2017-09-26 RX ORDER — PRAVASTATIN SODIUM 80 MG/1
80 TABLET ORAL EVERY EVENING
Qty: 90 TABLET | Refills: 3 | Status: SHIPPED | OUTPATIENT
Start: 2017-09-26 | End: 2018-01-25

## 2017-09-26 NOTE — PROGRESS NOTES
"Dear Yadi,     Your test results are attached.    Your kidney function has slightly improved.  Your platelet number is stable.  Your diabetes number, the A1C is worse (as expected) though it is \"horrible.\"  Your liver and pancreas numbers are within normal limits. Please increase the pravastatin from 40mg to 80mg daily and complete the liver ultrasound soon.    Otherwise, please continue the treatment plan that we discussed at your last clinic visit and let me know if you have any questions via [employer] or by calling [phone].      Please notify me via [employer] or contact the clinic at [phone] if you have any questions.    Hannah Cee MD"

## 2017-09-27 DIAGNOSIS — I10 BENIGN ESSENTIAL HYPERTENSION: ICD-10-CM

## 2017-09-27 RX ORDER — CARVEDILOL 25 MG/1
25 TABLET ORAL 2 TIMES DAILY WITH MEALS
Qty: 60 TABLET | Refills: 1 | Status: SHIPPED | OUTPATIENT
Start: 2017-09-27 | End: 2017-10-25

## 2017-10-02 DIAGNOSIS — E78.5 HYPERLIPIDEMIA, UNSPECIFIED HYPERLIPIDEMIA TYPE: Primary | ICD-10-CM

## 2017-10-02 RX ORDER — FENOFIBRATE 54 MG/1
54 TABLET ORAL DAILY
Qty: 90 TABLET | Refills: 3 | Status: SHIPPED | OUTPATIENT
Start: 2017-10-02 | End: 2018-08-24

## 2017-10-02 NOTE — TELEPHONE ENCOUNTER
Listed as historical.  Patient also on pravachol 80 mg daily.    Routing to provider to advise.  Yaa Turner RN

## 2017-10-09 ENCOUNTER — RADIANT APPOINTMENT (OUTPATIENT)
Dept: ULTRASOUND IMAGING | Facility: CLINIC | Age: 67
End: 2017-10-09
Attending: INTERNAL MEDICINE
Payer: COMMERCIAL

## 2017-10-09 DIAGNOSIS — R10.9 RIGHT FLANK PAIN: ICD-10-CM

## 2017-10-09 PROCEDURE — 76705 ECHO EXAM OF ABDOMEN: CPT

## 2017-10-11 NOTE — PROGRESS NOTES
Dear Yadi,     Your test results are attached.    Your liver ultrasound is within normal limits.    Please notify me via MyChart or contact the clinic at [phone] if you have any questions.    Hannah Cee MD

## 2017-10-23 ENCOUNTER — MYC MEDICAL ADVICE (OUTPATIENT)
Dept: OTHER | Age: 67
End: 2017-10-23

## 2017-10-23 DIAGNOSIS — Z79.4 TYPE 2 DIABETES MELLITUS WITH OTHER DIABETIC KIDNEY COMPLICATION, WITH LONG-TERM CURRENT USE OF INSULIN (H): ICD-10-CM

## 2017-10-23 DIAGNOSIS — E11.29 TYPE 2 DIABETES MELLITUS WITH OTHER DIABETIC KIDNEY COMPLICATION, WITH LONG-TERM CURRENT USE OF INSULIN (H): ICD-10-CM

## 2017-10-23 PROCEDURE — 99207 ZZC NO CHARGE LOS: CPT

## 2017-10-25 ENCOUNTER — OFFICE VISIT (OUTPATIENT)
Dept: INTERNAL MEDICINE | Facility: CLINIC | Age: 67
End: 2017-10-25
Payer: COMMERCIAL

## 2017-10-25 VITALS
HEIGHT: 68 IN | DIASTOLIC BLOOD PRESSURE: 80 MMHG | OXYGEN SATURATION: 96 % | SYSTOLIC BLOOD PRESSURE: 170 MMHG | HEART RATE: 73 BPM | TEMPERATURE: 97.4 F | WEIGHT: 257 LBS | BODY MASS INDEX: 38.95 KG/M2

## 2017-10-25 DIAGNOSIS — F32.1 MODERATE MAJOR DEPRESSION (H): ICD-10-CM

## 2017-10-25 DIAGNOSIS — Z79.4 TYPE 2 DIABETES MELLITUS WITH OTHER DIABETIC KIDNEY COMPLICATION, WITH LONG-TERM CURRENT USE OF INSULIN (H): ICD-10-CM

## 2017-10-25 DIAGNOSIS — I10 BENIGN ESSENTIAL HYPERTENSION: ICD-10-CM

## 2017-10-25 DIAGNOSIS — E11.29 TYPE 2 DIABETES MELLITUS WITH OTHER DIABETIC KIDNEY COMPLICATION, WITH LONG-TERM CURRENT USE OF INSULIN (H): ICD-10-CM

## 2017-10-25 DIAGNOSIS — E11.29 TYPE 2 DIABETES MELLITUS WITH OTHER KIDNEY COMPLICATION, UNSPECIFIED LONG TERM INSULIN USE STATUS: Primary | ICD-10-CM

## 2017-10-25 PROCEDURE — 99214 OFFICE O/P EST MOD 30 MIN: CPT | Performed by: INTERNAL MEDICINE

## 2017-10-25 RX ORDER — SERTRALINE HYDROCHLORIDE 100 MG/1
50 TABLET, FILM COATED ORAL DAILY
Qty: 30 TABLET | Refills: 1
Start: 2017-10-25 | End: 2018-01-24

## 2017-10-25 RX ORDER — PAROXETINE 20 MG/1
20 TABLET, FILM COATED ORAL 2 TIMES DAILY
Qty: 180 TABLET | Refills: 1 | Status: SHIPPED | OUTPATIENT
Start: 2017-10-25 | End: 2018-05-07

## 2017-10-25 RX ORDER — CARVEDILOL 25 MG/1
25 TABLET ORAL 2 TIMES DAILY WITH MEALS
Qty: 60 TABLET | Refills: 1 | Status: SHIPPED | OUTPATIENT
Start: 2017-10-25 | End: 2018-01-24

## 2017-10-25 ASSESSMENT — PATIENT HEALTH QUESTIONNAIRE - PHQ9: SUM OF ALL RESPONSES TO PHQ QUESTIONS 1-9: 9

## 2017-10-25 NOTE — NURSING NOTE
"Chief Complaint   Patient presents with     Diabetes       Initial /83  Pulse 73  Temp 97.4  F (36.3  C) (Oral)  Ht 5' 7.5\" (1.715 m)  Wt 257 lb (116.6 kg)  SpO2 96%  BMI 39.66 kg/m2 Estimated body mass index is 39.66 kg/(m^2) as calculated from the following:    Height as of this encounter: 5' 7.5\" (1.715 m).    Weight as of this encounter: 257 lb (116.6 kg).  Medication Reconciliation: complete  "

## 2017-10-25 NOTE — MR AVS SNAPSHOT
After Visit Summary   10/25/2017    Yadi Iniguez    MRN: [id number]           Patient Information     Date Of Birth          1950        Visit Information        Provider Department      10/25/2017 10:40 AM Hannah Cee MD [facility]        Today's Diagnoses     Type 2 diabetes mellitus with other diabetic kidney complication, with long-term current use of insulin (H)        Moderate major depression (H)        Benign essential hypertension          Care Instructions    For your Depression:  Please decrease the Zoloft now from 100mg to 50mg daily for 2 weeks and then stop this medication completely.  Also start the Paxil as 20mg twice a day now, as well.  return to clinic in 3 months.    For your Diabetes:  Please continue the increased Levemir dose (and your current Novolog dose) and follow-up with the Diabetic Educator next month, as planned.      For your Hypertension:  Schedule an ancillary nurse visit in 2 weeks to recheck your blood pressures.      Please call [phone] to schedule a screening colonoscopy soon. You have indicated that you will wait until 2018 to do this, (due to your current need to address your right eye issue).      Keep your appointment with Dr Chiu (re your lungs) on 12.11.17.          Follow-ups after your visit        Your next 10 appointments already scheduled     Nov 06, 2017 10:30 AM CST   MyChart Diabetes Education with AN DIABETIC ED RESOURCE   [facility] ([facility]    [address] [facility] [address] [facility] [address] [phone]           Please bring the following with you to your appointment:   1. Blood glucose monitor and log book 2. Food records for the 3 days prior to your visit 3. List of current medications 4. List of blood glucose monitor brands that are covered by your insurance plan            Dec 11, 2017  9:00 AM CST   Office Visit with PFT LAB   [facility]  "[facility])    [address] [place] [address] [phone]           Bring a current list of meds and any records pertaining to this visit. For Physicals, please bring immunization records and any forms needing to be filled out. Please arrive 10 minutes early to complete paperwork.            Dec 11, 2017 10:00 AM CST   New Visit with Jh Chiu MD   [facility] ([facility]    [address] [place] [address] [phone]              Who to contact     If you have questions or need follow up information about today's clinic visit or your schedule please contact [facility] directly at [phone].  Normal or non-critical lab and imaging results will be communicated to you by MyChart, letter or phone within 4 business days after the clinic has received the results. If you do not hear from us within 7 days, please contact the clinic through MyChart or phone. If you have a critical or abnormal lab result, we will notify you by phone as soon as possible.  Submit refill requests through [employer] or call your pharmacy and they will forward the refill request to us. Please allow 3 business days for your refill to be completed.          Additional Information About Your Visit        [employer] Information     [employer] gives you secure access to your electronic health record. If you see a primary care provider, you can also send messages to your care team and make appointments. If you have questions, please call your primary care clinic.  If you do not have a primary care provider, please call [phone] and they will assist you.        Care EveryWhere ID     This is your Care EveryWhere ID. This could be used by other organizations to access your [place] medical records  [id number]        Your Vitals Were     Pulse Temperature Height Pulse Oximetry BMI (Body Mass Index)       73 97.4  F (36.3  C) (Oral) 5' 7.5\" (1.715 m) " 96% 39.66 kg/m2        Blood Pressure from Last 3 Encounters:   10/25/17 170/80   09/25/17 138/70   08/18/17 131/68    Weight from Last 3 Encounters:   10/25/17 257 lb (116.6 kg)   09/25/17 258 lb (117 kg)   08/18/17 247 lb 3.2 oz (112.1 kg)              Today, you had the following     No orders found for display         Today's Medication Changes          These changes are accurate as of: 10/25/17 11:17 AM.  If you have any questions, ask your nurse or doctor.               Start taking these medicines.        Dose/Directions    PARoxetine 20 MG tablet   Commonly known as:  PAXIL   Used for:  Moderate major depression (H)   Started by:  Hannah Cee MD        Dose:  20 mg   Take 1 tablet (20 mg) by mouth 2 times daily   Quantity:  180 tablet   Refills:  1         These medicines have changed or have updated prescriptions.        Dose/Directions    sertraline 100 MG tablet   Commonly known as:  ZOLOFT   This may have changed:    - how much to take  - additional instructions   Used for:  Moderate major depression (H)   Changed by:  Hannah Cee MD        Dose:  50 mg   Take 0.5 tablets (50 mg) by mouth daily Patient will decrease to 50mg/d x2 wks and stop d/t lack of effect.   Quantity:  30 tablet   Refills:  1            Where to get your medicines      These medications were sent to [facility] [place] - [address]  [address] [place] [address], [place] [address]     Phone:  [phone]     carvedilol 25 MG tablet    PARoxetine 20 MG tablet         Some of these will need a paper prescription and others can be bought over the counter.  Ask your nurse if you have questions.     You don't need a prescription for these medications     sertraline 100 MG tablet                Primary Care Provider Office Phone # Fax #    Hannah Cee [phone] [address] [facility] [address]         Equal Access to Services     [facility] : Hadii bunny marques ena Moy, watanoda luqadaha, qaybta karaynemartir arce, chandra randolph. So [facility] [phone].    ATENCIÓN: Si habla español, tiene a dunbar disposición servicios gratuitos de asistencia lingüística. Llame al [phone].    We comply with applicable federal civil rights laws and [place] laws. We do not discriminate on the basis of race, color, national origin, age, disability, sex, sexual orientation, or gender identity.            Thank you!     Thank you for choosing [facility]  for your care. Our goal is always to provide you with excellent care. Hearing back from our patients is one way we can continue to improve our services. Please take a few minutes to complete the written survey that you may receive in the mail after your visit with us. Thank you!             Your Updated Medication List - Protect others around you: Learn how to safely use, store and throw away your medicines at www.disposemymeds.org.          This list is accurate as of: 10/25/17 11:17 AM.  Always use your most recent med list.                   Brand Name Dispense Instructions for use Diagnosis    ACE/ARB/ARNI NOT PRESCRIBED (INTENTIONAL)      Please choose reason not prescribed, below    Benign essential hypertension       albuterol (2.5 MG/3ML) 0.083% neb solution     1 vial    Take 1 vial (2.5 mg) by nebulization every 6 hours as needed for shortness of breath / dyspnea or wheezing 1 vial used in clinic today.    Chronic obstructive pulmonary disease, unspecified COPD type (H)       alendronate 70 MG tablet    FOSAMAX    12 tablet    Take 1 tablet (70 mg) by mouth every 7 days Take 60 minutes before am meal with 8 oz. water. Remain upright for 30 minutes.    Osteoporosis       amLODIPine 10 MG tablet    NORVASC    90 tablet    Take 1 tablet (10 mg) by mouth daily    Benign essential hypertension       ascorbic acid 1000 MG Tabs     vitamin C     1 TABLET DAILY AT DINNER        aspirin 81 MG tablet      ONE DAILY*        blood glucose monitoring lancets     100 Box    Use to test blood sugar 3 times daily or as directed.    Type 2 diabetes mellitus with other diabetic kidney complication, with long-term current use of insulin (H)       blood glucose monitoring test strip    MUSA CONTOUR NEXT    100 strip    Use to test blood sugar 3 times daily or as directed.    Type 2 diabetes mellitus with other diabetic kidney complication, with long-term current use of insulin (H)       carvedilol 25 MG tablet    COREG    60 tablet    Take 1 tablet (25 mg) by mouth 2 times daily (with meals)    Benign essential hypertension       CENTRUM SILVER per tablet      1 TABLET DAILY        clobetasol propionate 0.05 % Liqd     1 Bottle    Externally apply 1 dose topically 2 times daily as needed.    Skin rash       * FENOFIBRATE PO      Take 54 mg by mouth daily        * fenofibrate 54 MG tablet     90 tablet    Take 1 tablet (54 mg) by mouth daily    Hyperlipidemia, unspecified hyperlipidemia type       insulin detemir 100 UNIT/ML injection    LEVEMIR FLEXPEN/FLEXTOUCH    18 mL    Inject 66 Units Subcutaneous At Bedtime    Type 2 diabetes mellitus with other diabetic kidney complication, with long-term current use of insulin (H)       insulin pen needle 32G X 4 MM     100 each    Use 1 pen needles daily or as directed.  Per insurance and patient preference    Type 2 diabetes mellitus with other diabetic kidney complication, with long-term current use of insulin (H)       Levothyroxine Sodium 150 MCG Caps     90 capsule    Take 150 mcg by mouth daily (with breakfast)    Hypothyroidism, unspecified type       LOSARTAN POTASSIUM PO      Take 50 mg by mouth daily        MAGNESIUM PO      Take 250 mg by mouth daily        * NOVOLOG SC      Inject 20 Units Subcutaneous 3 times daily (with meals) Reported on 4/7/2017        * insulin aspart 100 UNIT/ML injection     NovoLOG FLEXPEN    18 mL    20 units before breakfast, 20 units before lunch, 20 units before dinner    Type 2 diabetes mellitus with other diabetic kidney complication, with long-term current use of insulin (H)       OMEGA-3 FISH OIL PO      Take 1 g by mouth        PARoxetine 20 MG tablet    PAXIL    180 tablet    Take 1 tablet (20 mg) by mouth 2 times daily    Moderate major depression (H)       pravastatin 80 MG tablet    PRAVACHOL    90 tablet    Take 1 tablet (80 mg) by mouth every evening    Type 2 diabetes mellitus with other diabetic kidney complication, with long-term current use of insulin (H), Hyperlipidemia LDL goal <100       primidone 50 MG tablet    MYSOLINE    180 tablet    Take 1 tablet (50 mg) by mouth 2 times daily    Essential tremor       sertraline 100 MG tablet    ZOLOFT    30 tablet    Take 0.5 tablets (50 mg) by mouth daily Patient will decrease to 50mg/d x2 wks and stop d/t lack of effect.    Moderate major depression (H)       VITAMIN D3 PO      Take 10,000 Units by mouth daily        vitamin E 400 UNITS Tabs      1 tab a day        * Notice:  This list has 4 medication(s) that are the same as other medications prescribed for you. Read the directions carefully, and ask your doctor or other care provider to review them with you.

## 2017-10-25 NOTE — PATIENT INSTRUCTIONS
For your Depression:  Please decrease the Zoloft now from 100mg to 50mg daily for 2 weeks and then stop this medication completely.  Also start the Paxil as 20mg twice a day now, as well.  return to clinic in 3 months.    For your Diabetes:  Please continue the increased Levemir dose (and your current Novolog dose) and follow-up with the Diabetic Educator next month, as planned.      For your Hypertension:  Schedule an ancillary nurse visit in 2 weeks to recheck your blood pressures.      Please call [phone] to schedule a screening colonoscopy soon. You have indicated that you will wait until 2018 to do this, (due to your current need to address your right eye issue).      Keep your appointment with Dr Chiu (re your lungs) on 12.11.17.

## 2017-10-25 NOTE — PROGRESS NOTES
SUBJECTIVE:   Yadi Iniguez is a 67 year old female who presents to clinic today for the following health issues:      Depression Followup    Status since last visit: Stable     See PHQ-9 for current symptoms.  Other associated symptoms: None    Complicating factors:   Significant life event:  No   Current substance abuse:  None  Anxiety or Panic symptoms:  No    PHQ-9 Score and MyChart F/U Questions 1/18/2017 8/18/2017   Total Score 9 3   Q9: Suicide Ideation Not at all Not at all     PHQ-9 SCORE 1/18/2017 8/18/2017 10/25/2017   Total Score - - -   Total Score 9 3 9       Changed from Paxil to Zoloft 6 weeks ago.  Today, we ll recheck how the patient is feeling on the change SSRI from Paxil to Zoloft,           Diabetes Follow-up    Patient is checking blood sugars two to  four times daily.    Blood sugar testing frequency justification: Adjustment of medication(s)  Results are as follows:       am - 180-260       lunchtime - 180-200       suppertime - 180-240       bedtime - dont have good handle on that number        Diabetic concerns: blood sugar frequently over 200     Symptoms of hypoglycemia (low blood sugar): none     Paresthesias (numbness or burning in feet) or sores: No     Date of last diabetic eye exam: 10/2016  At the last visit a month ago, we had the pt increase her Levemir from 60 units to 64 units at bedtime and had the pt continue her Novolog.  Per pt recently saw Melissa from diabetes edu: levemir was increased to 66 qhs    Hyperlipidemia Follow-Up      Rate your low fat/cholesterol diet?: fair    Taking statin?  Yes, no muscle aches from statin    Other lipid medications/supplements?:  none    At the last visit a month ago: pt was advised to: increase the pravastatin from 40mg to 80mg daily and complete the liver ultrasound soon. US as noted,  Within normal limits; statin was increased.     Hypertension Follow-up      Outpatient blood pressures are not being checked.    Low Salt Diet: no added  salt        Amount of exercise or physical activity: 4-5 days/week for an average of less than 15 minutes    Problems taking medications regularly: No    Medication side effects: none  Diet: low salt and carbohydrate counting    Wt Readings from Last 4 Encounters:   10/25/17 257 lb (116.6 kg)   09/25/17 258 lb (117 kg)   08/18/17 247 lb 3.2 oz (112.1 kg)   08/08/17 252 lb (114.3 kg)             Reviewed and updated as needed this visit by clinical staff  Tobacco  Allergies  Meds  Problems  Med Hx  Surg Hx  Fam Hx  Soc Hx        Reviewed and updated as needed this visit by Provider  Meds  Problems         Patient Active Problem List   Diagnosis     Hypothyroidism     Hypertension goal BP (blood pressure) < 140/90     Type 2 diabetes, HbA1C goal < 8% (H)     Hyperlipidemia LDL goal <100     Moderate major depression (H)     Incontinence of urine     Neuropathy in diabetes (H)     Eczema     Left lumbar radiculopathy     ACP (advance care planning)     [facility]     CKD (chronic kidney disease) stage 3, GFR 30-59 ml/min     Type 2 diabetes mellitus with other diabetic kidney complication, with long-term current use of insulin (H)     Osteoporosis     Morbid obesity (H)     Thrombocytopenia (H)       Past Medical History:   Diagnosis Date     Arthritis 1975     Broken ribs 8/28/2016    3,4,5,6,7, left side     Cancer (H) 2013    Skin cancer     COPD (chronic obstructive pulmonary disease) (H) 08/2017     Depression      Depressive disorder 1988     Dysuria      Glycosuria      Hypertension 1980     Mixed incontinence urge and stress      Other and unspecified hyperlipidemia      Right shoulder pain      Type II or unspecified type diabetes mellitus without mention of complication, uncontrolled      Uncomplicated asthma 2017     Unspecified hypothyroidism        Past Surgical History:   Procedure Laterality Date     ABDOMEN SURGERY  1978    Gall Bladder  January       hysterectomy  Sept     BIOPSY  2013   1993    from left corner of mouth       muscule biopsy     C VAGINAL HYSTERECTOMY       CHOLECYSTECTOMY, OPEN       CL AFF SURGICAL PATHOLOGY       COLONOSCOPY  2007     EYE SURGERY  2014    ongoing     HEAD & NECK SURGERY  2006    Removal of saliva gland left side     PROPHYLACTIC REPAIR RETINAL BREAK, PNEUMATIC RETINOPEXY, COMBINED Left 9/2/2016     SOFT TISSUE SURGERY  1996    Carpal Tunnel left wrist       Family History   Problem Relation Age of Onset     Musculoskeletal Disorder Mother      MD     Muscular Disorder Mother      Depression Mother      OSTEOPOROSIS Mother      Obesity Mother      CANCER Father      mesothelioma     Obesity Father      HEART DISEASE Brother      angioplasty     Neurologic Disorder Brother      ms     DIABETES Brother      Hypertension Brother      Hyperlipidemia Brother      Obesity Brother      4 brothers     Depression Paternal Grandmother      Obesity Paternal Grandmother      Genetic Disorder Brother      MS       Social History   Substance Use Topics     Smoking status: Former Smoker     Packs/day: 0.50     Years: 50.00     Types: Cigarettes     Start date: 1/1/1967     Quit date: 8/8/2017     Smokeless tobacco: Never Used      Comment: I have tried many times     Alcohol use Yes      Comment: Maybe a drink every few months       Current Outpatient Prescriptions   Medication     sertraline (ZOLOFT) 100 MG tablet     carvedilol (COREG) 25 MG tablet     PARoxetine (PAXIL) 20 MG tablet     insulin detemir (LEVEMIR FLEXPEN/FLEXTOUCH) 100 UNIT/ML injection     fenofibrate 54 MG tablet     pravastatin (PRAVACHOL) 80 MG tablet     insulin aspart (NOVOLOG FLEXPEN) 100 UNIT/ML injection     LOSARTAN POTASSIUM PO     MAGNESIUM PO     albuterol (2.5 MG/3ML) 0.083% neb solution     primidone (MYSOLINE) 50 MG tablet     Levothyroxine Sodium 150 MCG CAPS     alendronate (FOSAMAX) 70 MG tablet     blood glucose monitoring (MUSA CONTOUR NEXT) test strip     blood glucose monitoring (MUSA  "MICROLET) lancets     insulin pen needle 32G X 4 MM     ACE/ARB NOT PRESCRIBED, INTENTIONAL,     Cholecalciferol (VITAMIN D3 PO)     Omega-3 Fatty Acids (OMEGA-3 FISH OIL PO)     FENOFIBRATE PO     Insulin Aspart (NOVOLOG SC)     Clobetasol Propionate 0.05 % LIQD     ASPIRIN 81 MG OR TABS     VITAMIN C 1000 MG OR TABS     VITAMIN E 400 UNIT OR TABS     CENTRUM SILVER OR TABS     amLODIPine (NORVASC) 10 MG tablet     No current facility-administered medications for this visit.          ROS:  Constitutional, HEENT, cardiovascular, pulmonary, GI, , musculoskeletal, neuro, skin, endocrine and psych systems are negative, except as otherwise noted.     OBJECTIVE:                                                    /80  Pulse 73  Temp 97.4  F (36.3  C) (Oral)  Ht 5' 7.5\" (1.715 m)  Wt 257 lb (116.6 kg)  SpO2 96%  BMI 39.66 kg/m2     Vitals:    10/25/17 1040 10/25/17 1104   BP: 172/83 170/80   Pulse: 73    Temp: 97.4  F (36.3  C)    TempSrc: Oral    SpO2: 96%    Weight: 257 lb (116.6 kg)    Height: 5' 7.5\" (1.715 m)        GENERAL APPEARANCE: healthy, alert and in no distress  EYES: Eyes grossly normal to inspection, and conjunctivae and sclerae normal  HENT: head normocephalic and atraumatic and mouth without ulcers or lesions, oropharynx clear and oral mucous membranes moist  NECK: no noticeable adenopathy, no asymmetry, masses, or scars   RESP: lungs clear to auscultation - no rales, rhonchi or wheezes  CV: regular rate and rhythm, normal S1 S2, no S3 or S4, no murmur, click or rub, no peripheral edema and peripheral pulses strong  ABDOMEN: soft, nontender, no hepatosplenomegaly, no masses and bowel sounds normal  MS: no musculoskeletal defects are noted and gait is age appropriate without ataxia  SKIN: no suspicious lesions or rashes  NEURO: mentation intact and speech normal  PSYCH: mentation appears normal and affect normal/bright.    Results for orders placed or performed in visit on 10/09/17   US " Abdomen Limited    Narrative    ULTRASOUND ABDOMEN LIMITED October 9, 2017 10:55 AM     HISTORY: Unspecified abdominal pain.     FINDINGS: Liver is normal in echogenicity without focal lesions. The  gallbladder is surgically absent. Common bile duct is normal in  diameter. Pancreas is normal where visualized. Examination of the  right kidney is unremarkable.      Impression    IMPRESSION: Prior cholecystectomy. No bile duct dilatation. Right  upper quadrant abdominal organs are otherwise within normal limits  where visualized.    LI GUZMAN MD       Recent Results (from the past 744 hour(s))   US Abdomen Limited    Narrative    ULTRASOUND ABDOMEN LIMITED October 9, 2017 10:55 AM     HISTORY: Unspecified abdominal pain.     FINDINGS: Liver is normal in echogenicity without focal lesions. The  gallbladder is surgically absent. Common bile duct is normal in  diameter. Pancreas is normal where visualized. Examination of the  right kidney is unremarkable.      Impression    IMPRESSION: Prior cholecystectomy. No bile duct dilatation. Right  upper quadrant abdominal organs are otherwise within normal limits  where visualized.    LI GUZMAN MD         ASSESSMENT/PLAN:                                                        ICD-10-CM    1. Type 2 diabetes mellitus with other kidney complication, unspecified long term insulin use status (H) E11.29    2. Type 2 diabetes mellitus with other diabetic kidney complication, with long-term current use of insulin (H) E11.29     Z79.4    3. Moderate major depression (H) F32.1 sertraline (ZOLOFT) 100 MG tablet     PARoxetine (PAXIL) 20 MG tablet   4. Benign essential hypertension I10 carvedilol (COREG) 25 MG tablet       Patient Instructions   For your Depression:  Please decrease the Zoloft now from 100mg to 50mg daily for 2 weeks and then stop this medication completely.  Also start the Paxil as 20mg twice a day now, as well.  return to clinic in 3 months.    For your  Diabetes:  Please continue the increased Levemir dose (and your current Novolog dose) and follow-up with the Diabetic Educator next month, as planned.      For your Hypertension:  Schedule an ancillary nurse visit in 2 weeks to recheck your blood pressures.      Please call [phone] to schedule a screening colonoscopy soon. You have indicated that you will wait until 2018 to do this, (due to your current need to address your right eye issue).      Keep your appointment with Dr Chiu (re your lungs) on 12.11.17.      Hannah Cee MD    [facility]  [address] [place] [address] [phone]

## 2017-10-26 DIAGNOSIS — I10 BENIGN ESSENTIAL HYPERTENSION: ICD-10-CM

## 2017-10-26 DIAGNOSIS — E11.29 TYPE 2 DIABETES MELLITUS WITH OTHER DIABETIC KIDNEY COMPLICATION, WITH LONG-TERM CURRENT USE OF INSULIN (H): ICD-10-CM

## 2017-10-26 DIAGNOSIS — Z79.4 TYPE 2 DIABETES MELLITUS WITH OTHER DIABETIC KIDNEY COMPLICATION, WITH LONG-TERM CURRENT USE OF INSULIN (H): ICD-10-CM

## 2017-10-26 RX ORDER — AMLODIPINE BESYLATE 10 MG/1
TABLET ORAL
Qty: 90 TABLET | Refills: 0 | Status: SHIPPED | OUTPATIENT
Start: 2017-10-26 | End: 2018-01-24

## 2017-10-26 RX ORDER — INSULIN DETEMIR 100 [IU]/ML
INJECTION, SOLUTION SUBCUTANEOUS
Qty: 15 ML | Refills: 0 | OUTPATIENT
Start: 2017-10-26

## 2017-10-31 ENCOUNTER — PRE VISIT (OUTPATIENT)
Dept: PULMONOLOGY | Facility: CLINIC | Age: 67
End: 2017-10-31

## 2017-11-06 ENCOUNTER — TELEPHONE (OUTPATIENT)
Dept: INTERNAL MEDICINE | Facility: CLINIC | Age: 67
End: 2017-11-06

## 2017-11-06 ENCOUNTER — ALLIED HEALTH/NURSE VISIT (OUTPATIENT)
Dept: NURSING | Facility: CLINIC | Age: 67
End: 2017-11-06
Payer: COMMERCIAL

## 2017-11-06 ENCOUNTER — TELEPHONE (OUTPATIENT)
Dept: EDUCATION SERVICES | Facility: CLINIC | Age: 67
End: 2017-11-06

## 2017-11-06 ENCOUNTER — ALLIED HEALTH/NURSE VISIT (OUTPATIENT)
Dept: EDUCATION SERVICES | Facility: CLINIC | Age: 67
End: 2017-11-06
Payer: COMMERCIAL

## 2017-11-06 VITALS — HEART RATE: 79 BPM | DIASTOLIC BLOOD PRESSURE: 78 MMHG | SYSTOLIC BLOOD PRESSURE: 136 MMHG

## 2017-11-06 DIAGNOSIS — E11.29 TYPE 2 DIABETES MELLITUS WITH OTHER DIABETIC KIDNEY COMPLICATION, WITH LONG-TERM CURRENT USE OF INSULIN (H): ICD-10-CM

## 2017-11-06 DIAGNOSIS — Z79.4 TYPE 2 DIABETES MELLITUS WITH OTHER DIABETIC KIDNEY COMPLICATION, WITH LONG-TERM CURRENT USE OF INSULIN (H): ICD-10-CM

## 2017-11-06 DIAGNOSIS — Z01.30 BLOOD PRESSURE CHECK: Primary | ICD-10-CM

## 2017-11-06 DIAGNOSIS — E11.9 DIABETES MELLITUS WITHOUT COMPLICATION (H): Primary | ICD-10-CM

## 2017-11-06 PROCEDURE — 99207 ZZC NO CHARGE NURSE ONLY: CPT | Performed by: FAMILY MEDICINE

## 2017-11-06 PROCEDURE — G0108 DIAB MANAGE TRN  PER INDIV: HCPCS

## 2017-11-06 PROCEDURE — 95250 CONT GLUC MNTR PHYS/QHP EQP: CPT

## 2017-11-06 PROCEDURE — 99207 ZZC DROP WITH A PROCEDURE: CPT

## 2017-11-06 NOTE — TELEPHONE ENCOUNTER
I sent another prescription with a modified patient sig to [facility].    Thank you,  Hannah Cee MD

## 2017-11-06 NOTE — PROGRESS NOTES
Diabetes Self Management Training: Follow-up Visit    Yadi Iniguez presents today for education, evaluation of glucose control and modification of medication(s) related to Type 2 diabetes.    She is accompanied by self    Patient's diabetes management related comments/concerns: having eye surgery soon.  Needing to make sure my BG are in control.  She has been using the same needle with her insulin daily.  We did discuss how she should use a new needle each time.    Patient would like this visit to be focused around the following diabetes-related behaviors and goals: Healthy Eating, Being Active, Monitoring and Taking Medication    ASSESSMENT:  Patient Problem List reviewed for relevant medical history and current medical status.    Current Diabetes Management per Patient:  Taking diabetes medications?   yes:     Diabetes Medication(s)     Insulin Sig    insulin detemir (LEVEMIR FLEXPEN/FLEXTOUCH) 100 UNIT/ML injection Inject 66 Units Subcutaneous At Bedtime    insulin aspart (NOVOLOG FLEXPEN) 100 UNIT/ML injection 20 units before breakfast, 20 units before lunch, 20 units before dinner    Insulin Aspart (NOVOLOG SC) Inject 20 Units Subcutaneous 3 times daily (with meals) Reported on 4/7/2017       Injectable Medications: Levemir 0-0-0-66 and NovoLog Insulin 20-20-20-0    *Abbreviated insulin dose documentation key: Insulin Trade Name (chhwxsboq-qvgfh-teekyk-bedtime) - i.e. Humalog 5-5-5-0 (Humalog 5 units at breakfast, 5 units at lunch, and 5 units at dinner).    Patient glucose self monitoring as follows: three times daily.   BG meter: Contour Next One meter  BG results: see blood glucose log attached to this encounter     BG values are: Not in goal  Patient's most recent   Lab Results   Component Value Date    A1C 7.9 09/25/2017    is not meeting goal of <7.0    Nutrition:  Patient eats 3 meals per day    Breakfast - muffin and greek yogurt and coffee and creamer @ 8:00  Lunch - bowl of soup and sandwich, with  "protein on wheat bread @ 12:30-1:00   Snack: pretzels, few potato chips or carrots and dip  Dinner - salad and tuna and cheese, and ranch dressing.  Or spaghetti and meatballs. @  6-7:00  Snacks - no bedtime snack.      Beverages: coffee 2 cups and then 17 oz of flavored water all day    Cultural/Zoroastrianism diet restrictions: No     Biggest Challenge to Healthy Eating: none, may not get in enough carbs.    Physical Activity:    Type: back pain causes her not to walk much.      Diabetes Complications:  Not discussed today.    Vitals:  There were no vitals taken for this visit.  Estimated body mass index is 39.66 kg/(m^2) as calculated from the following:    Height as of 10/25/17: 1.715 m (5' 7.5\").    Weight as of 10/25/17: 116.6 kg (257 lb).   Last 3 BP:   BP Readings from Last 3 Encounters:   10/25/17 170/80   09/25/17 138/70   08/18/17 131/68       History   Smoking Status     Former Smoker     Packs/day: 0.50     Years: 50.00     Types: Cigarettes     Start date: 1/1/1967     Quit date: 8/8/2017   Smokeless Tobacco     Never Used     Comment: I have tried many times       Labs:  Lab Results   Component Value Date    A1C 7.9 09/25/2017     Lab Results   Component Value Date     09/25/2017     Lab Results   Component Value Date     01/04/2017     10/13/2016     HDL Cholesterol   Date Value Ref Range Status   10/13/2016 50 >40 mg/dL Final   ]  GFR Estimate   Date Value Ref Range Status   09/25/2017 54 (L) >60 mL/min/1.7m2 Final     Comment:     Non  GFR Calc     GFR Estimate If Black   Date Value Ref Range Status   09/25/2017 65 >60 mL/min/1.7m2 Final     Comment:      GFR Calc     Lab Results   Component Value Date    CR 1.02 09/25/2017     No results found for: MICROALBUMIN    Health Beliefs and Attitudes:   Patient Activation Measure Survey Score:  LESLY Score (Last Two) 6/9/2011   LESLY Raw Score 41   Activation Score 63.2   LESLY Level 3       Stage of Change: " PREPARATION (Decided to change - considering how)    Progress toward meeting diabetes-related behavioral goals:    GOALS % Met Goal   Healthy Eating  75   Physical Activity  25   Monitoring  75   Medication Taking  100   Problem Solving     Healthy Coping     Risk Reduction             Diabetes knowledge and skills assessment:     Patient is knowledgeable in diabetes management concepts related to: Healthy Eating, Being Active, Monitoring and Taking Medication    Patient needs further education on the following diabetes management concepts: Healthy Eating, Being Active, Monitoring and Taking Medication    Barriers to Learning Assessment: No Barriers identified    Based on learning assessment above, most appropriate setting for further diabetes education would be: Individual setting.    INTERVENTION:    Education provided today on:  AADE Self-Care Behaviors:  Healthy Eating: carbohydrate counting, consistency in amount, composition, and timing of food intake, weight reduction, heart healthy diet, eating out, portion control, plate planning method and label reading  Being Active: relationship to blood glucose and describe appropriate activity program  Monitoring: purpose, proper technique, log and interpret results, individual blood glucose targets and frequency of monitoring  Taking Medication: action of prescribed medication, drawing up, administering and storing injectable diabetes medications, proper site selection and rotation for injections, side effects of prescribed medications and when to take medications    Opportunities for ongoing education and support in diabetes-self management were discussed.    Pt verbalized understanding of concepts discussed and recommendations provided today.       Education Materials Provided:  Carbohydrate Counting, My Plate Planner and food recording    PLAN:  See Patient Instructions for co-developed, patient-stated behavior change goals.  AVS printed and provided to patient  today.    FOLLOW-UP:  Follow-up appointment scheduled on Dec. 5.  Follow-up with PCP recommended.  Chart routed to referring provider.    Ongoing plan for education and support: Follow-up visit with diabetes educator in [place]    Melissa Mustafa RN/RONALDO [facility] Diabetes Educator    Time Spent: 60 minutes  Encounter Type: Individual    Any diabetes medication dose changes were made via the CDE Protocol and Collaborative Practice Agreement with the patient's primary care provider. A copy of this encounter was shared with the provider.

## 2017-11-06 NOTE — TELEPHONE ENCOUNTER
insulin detemir (LEVEMIR FLEXPEN/FLEXTOUCH) 100 UNIT/ML injection 18 mL 1 11/6/2017  No      Sig: Inject 70 Units Subcutaneous At Bedtime To take 35 units with breakfast and 35 units with dinner       Needing clarification on direction. Please advise.    Bessy GARCIAN, RN, CPN

## 2017-11-06 NOTE — PROGRESS NOTES
SUBJECTIVE:  Yadi Iniguez is an 67 year old female who presents for a blood pressure check.    The reason for the visit is:  bp check    The patient reports that she IS taking the medication as prescribed.     Current Outpatient Prescriptions   Medication     insulin detemir (LEVEMIR FLEXPEN/FLEXTOUCH) 100 UNIT/ML injection     insulin pen needle 32G X 4 MM     amLODIPine (NORVASC) 10 MG tablet     sertraline (ZOLOFT) 100 MG tablet     carvedilol (COREG) 25 MG tablet     PARoxetine (PAXIL) 20 MG tablet     [DISCONTINUED] insulin detemir (LEVEMIR FLEXPEN/FLEXTOUCH) 100 UNIT/ML injection     fenofibrate 54 MG tablet     pravastatin (PRAVACHOL) 80 MG tablet     insulin aspart (NOVOLOG FLEXPEN) 100 UNIT/ML injection     LOSARTAN POTASSIUM PO     MAGNESIUM PO     albuterol (2.5 MG/3ML) 0.083% neb solution     primidone (MYSOLINE) 50 MG tablet     Levothyroxine Sodium 150 MCG CAPS     alendronate (FOSAMAX) 70 MG tablet     blood glucose monitoring (MUSA CONTOUR NEXT) test strip     blood glucose monitoring (MUSA MICROLET) lancets     ACE/ARB NOT PRESCRIBED, INTENTIONAL,     Cholecalciferol (VITAMIN D3 PO)     Omega-3 Fatty Acids (OMEGA-3 FISH OIL PO)     FENOFIBRATE PO     Insulin Aspart (NOVOLOG SC)     Clobetasol Propionate 0.05 % LIQD     ASPIRIN 81 MG OR TABS     VITAMIN C 1000 MG OR TABS     VITAMIN E 400 UNIT OR TABS     CENTRUM SILVER OR TABS     No current facility-administered medications for this visit.        No Known Allergies      OBJECTIVE:  Please get a blood pressure AND a pulse.  A height is also needed if has not been done in the past year.    /78  Pulse 79        If patient has diagnosis of HYPERTENSION, is there a goal on the problem list? Yes  Patient Active Problem List   Diagnosis     Hypothyroidism     Hypertension goal BP (blood pressure) < 140/90     Type 2 diabetes, HbA1C goal < 8% (H)     Hyperlipidemia LDL goal <100     Moderate major depression (H)     Incontinence of urine      Neuropathy in diabetes (H)     Eczema     Left lumbar radiculopathy     ACP (advance care planning)     Health Care Home     CKD (chronic kidney disease) stage 3, GFR 30-59 ml/min     Type 2 diabetes mellitus with other diabetic kidney complication, with long-term current use of insulin (H)     Osteoporosis     Morbid obesity (H)     Thrombocytopenia (H)       Plan:    Systolic BP    Greater than or equal to 100  OR Less than  140    Diastolic BP    Greater than or equal to 70 OR less than 90    Pulse    Pulse greater than 60 or less than 100      If all the above three parameters are met, patient to go home. Chart CC to Primary Care Provider  If any one of the above three parameters is not met notify RN or provider.

## 2017-11-06 NOTE — PATIENT INSTRUCTIONS
My Diabetes Care Goals:    Healthy Eating: write down all of the foods that eat.  Proteins all meals and include more fruits and be sure to have carbs with your salads.    Monitoring: check before each meal    Taking Medication: Levemir 35 units with breakfast and 35 units with dinner  Novolog 20 units before each meal .     Follow up:  Follow-up diabetes education appointment scheduled on Tuesday Dec. 5 @ 9:30.      Bring blood glucose meter and logbook with you to all doctor and follow-up appointments.     [facility]:  For Your Diabetes Education and Nutrition Appointments Call:  [phone]   For Diabetes Education or Nutrition Related Questions:   Phone: [phone]  E-mail: [email or web address]  Fax: [phone]   If you need a medication refill please contact your pharmacy. Please allow 3 business days for your refills to be completed.    Instructions for emailing the Diabetes Educators    If you need to communicate a non-urgent message to a Diabetes Educator via email, please send to [email or web address].    Please follow the following email guidelines:    Subject line: Secure: your clinic name (example: Secure: [facility])  In the email please include: First name, middle initial, last name and date of birth.    We will be in touch with you within one (1) business day.

## 2017-11-06 NOTE — TELEPHONE ENCOUNTER
Cynthia states she would like clarification on the directions for a prescription that was sent.  Please call.    Thank you.

## 2017-11-06 NOTE — TELEPHONE ENCOUNTER
Met with Yadi and having a difficult time seeing what her BG and insulin doses are really effecting her BG.  I did place a CGM on her to help determine her doses, worried she may be going low and not know it, I am placing the CGM referral order for you to cosign as well and dividing up her Levemir to 35 units with breakfast and with lunch to see if we can get less fluctuating of her BG numbers.  She will follow up with me early Dec. To look over her data.    If you approve, please let me know, thank you    Melissa Mustafa RN/RONALDO  [facility] Diabetes Educator

## 2017-11-06 NOTE — NURSING NOTE
"Chief Complaint   Patient presents with     Hypertension       Initial /78  Pulse 79 Estimated body mass index is 39.66 kg/(m^2) as calculated from the following:    Height as of 10/25/17: 5' 7.5\" (1.715 m).    Weight as of 10/25/17: 257 lb (116.6 kg).  Medication Reconciliation: complete    MADISON Alonzo MA    "

## 2017-11-06 NOTE — MR AVS SNAPSHOT
After Visit Summary   11/6/2017    Yadi Iniguez    MRN: [id number]           Patient Information     Date Of Birth          1950        Visit Information        Provider Department      11/6/2017 10:30 AM AN DIABETIC ED RESOURCE [facility]        Today's Diagnoses     Type 2 diabetes mellitus with other diabetic kidney complication, with long-term current use of insulin (H)          Care Instructions    My Diabetes Care Goals:    Healthy Eating: write down all of the foods that eat.  Proteins all meals and include more fruits and be sure to have carbs with your salads.    Monitoring: check before each meal    Taking Medication: Levemir 35 units with breakfast and 35 units with dinner  Novolog 20 units before each meal .     Follow up:  Follow-up diabetes education appointment scheduled on Tuesday Dec. 5 @ 9:30.      Bring blood glucose meter and logbook with you to all doctor and follow-up appointments.     [facility] the [facility]:  For Your Diabetes Education and Nutrition Appointments Call:  [phone]   For Diabetes Education or Nutrition Related Questions:   Phone: [phone]  E-mail: [email or web address]  Fax: [phone]   If you need a medication refill please contact your pharmacy. Please allow 3 business days for your refills to be completed.    Instructions for emailing the Diabetes Educators    If you need to communicate a non-urgent message to a Diabetes Educator via email, please send to [email or web address].    Please follow the following email guidelines:    Subject line: Secure: your clinic name (example: [facility]: [facility])  In the email please include: First name, middle initial, last name and date of birth.    We will be in touch with you within one (1) business day.             Follow-ups after your visit        Your next 10 appointments already scheduled     Nov 06, 2017 11:45 AM CST   [facility]    [address] [facility] [address] [phone]            Dec 05, 2017  9:30 AM CST   Diabetic Education with AN DIABETIC ED RESOURCE   [facility]    [address] [facility] [address] [phone]            Dec 11, 2017  9:00 AM CST   Office Visit with PFT LAB   [facility])    [address] [place] [id number] [phone]           Bring a current list of meds and any records pertaining to this visit. For Physicals, please bring immunization records and any forms needing to be filled out. Please arrive 10 minutes early to complete paperwork.            Dec 11, 2017 10:00 AM CST   New Visit with Jh Chiu MD   [facility])    [address] [place] [id number] [phone]            Jan 24, 2018 10:20 AM CST   Office Visit with Hannah Cee MD   [facility]    [address] [facility] [address] [phone]           Bring a current list of meds and any records pertaining to this visit. For Physicals, please bring immunization records and any forms needing to be filled out. Please arrive 10 minutes early to complete paperwork.              Who to contact     If you have questions or need follow up information about today's clinic visit or your schedule please contact [facility] directly at [phone].  Normal or non-critical lab and imaging results will be communicated to you by MyChart, letter or phone within 4 business days after the clinic has received the results. If you do not hear from us within 7 days, please contact the clinic through MyChart or phone. If you have a critical or abnormal lab result, we will notify you by phone as soon as possible.  Submit  refill requests through [facility] or call your pharmacy and they will forward the refill request to us. Please allow 3 business days for your refill to be completed.          Additional Information About Your Visit        Integrated biometricshart Information     [facility] gives you secure access to your electronic health record. If you see a primary care provider, you can also send messages to your care team and make appointments. If you have questions, please call your primary care clinic.  If you do not have a primary care provider, please call [phone] and they will assist you.        Care EveryWhere ID     This is your Care EveryWhere ID. This could be used by other organizations to access your [facility] medical records  [id number]         Blood Pressure from Last 3 Encounters:   10/25/17 170/80   09/25/17 138/70   08/18/17 131/68    Weight from Last 3 Encounters:   10/25/17 116.6 kg (257 lb)   09/25/17 117 kg (258 lb)   08/18/17 112.1 kg (247 lb 3.2 oz)              Today, you had the following     No orders found for display         Today's Medication Changes          These changes are accurate as of: 11/6/17 11:15 AM.  If you have any questions, ask your nurse or doctor.               These medicines have changed or have updated prescriptions.        Dose/Directions    insulin detemir 100 UNIT/ML injection   Commonly known as:  LEVEMIR FLEXPEN/FLEXTOUCH   This may have changed:    - how much to take  - additional instructions   Used for:  Type 2 diabetes mellitus with other diabetic kidney complication, with long-term current use of insulin (H)        Dose:  70 Units   Inject 70 Units Subcutaneous At Bedtime To take 35 units with breakfast and 35 units with dinner.   Quantity:  18 mL   Refills:  1       insulin pen needle 32G X 4 MM   This may have changed:  additional instructions   Used for:  Type 2 diabetes mellitus with other diabetic kidney complication, with long-term current use of insulin (H)        Use 5 pen needles  daily or as directed.  Per insurance and patient preference   Quantity:  450 each   Refills:  3            Where to get your medicines      These medications were sent to [facility] [place] - [address] [place] [address]  [address] [place] [address], [place] [address]     Phone:  [phone]     insulin detemir 100 UNIT/ML injection    insulin pen needle 32G X 4 MM                Primary Care Provider Office Phone # Fax #    Hannah Cee [phone] [address] [facility] [address]        Equal Access to Services     [facility]: Hadii aad ku hadasho Soomaali, waaxda luqadaha, qaybta kaalmada adeegyada, chandra jalloh hayjosé luis piper . So [facility] [phone].    ATENCIÓN: Si habla español, tiene a dunbar disposición servicios gratuitos de asistencia lingüística. [facility] [phone].    We comply with applicable federal civil rights laws and [place] laws. We do not discriminate on the basis of race, color, national origin, age, disability, sex, sexual orientation, or gender identity.            Thank you!     Thank you for choosing [facility]  for your care. Our goal is always to provide you with excellent care. Hearing back from our patients is one way we can continue to improve our services. Please take a few minutes to complete the written survey that you may receive in the mail after your visit with us. Thank you!             Your Updated Medication List - Protect others around you: Learn how to safely use, store and throw away your medicines at www.disposemymeds.org.          This list is accurate as of: 11/6/17 11:15 AM.  Always use your most recent med list.                   Brand Name Dispense Instructions for use Diagnosis    ACE/ARB/ARNI NOT PRESCRIBED (INTENTIONAL)      Please choose reason not prescribed, below    Benign essential hypertension       albuterol (2.5 MG/3ML) 0.083% neb solution      1 vial    Take 1 vial (2.5 mg) by nebulization every 6 hours as needed for shortness of breath / dyspnea or wheezing 1 vial used in clinic today.    Chronic obstructive pulmonary disease, unspecified COPD type (H)       alendronate 70 MG tablet    FOSAMAX    12 tablet    Take 1 tablet (70 mg) by mouth every 7 days Take 60 minutes before am meal with 8 oz. water. Remain upright for 30 minutes.    Osteoporosis       amLODIPine 10 MG tablet    NORVASC    90 tablet    TAKE 1 TABLET (10 MG) BY MOUTH DAILY    Benign essential hypertension       ascorbic acid 1000 MG Tabs    vitamin C     1 TABLET DAILY AT DINNER        aspirin 81 MG tablet      ONE DAILY*        blood glucose monitoring lancets     100 Box    Use to test blood sugar 3 times daily or as directed.    Type 2 diabetes mellitus with other diabetic kidney complication, with long-term current use of insulin (H)       blood glucose monitoring test strip    MUSA CONTOUR NEXT    100 strip    Use to test blood sugar 3 times daily or as directed.    Type 2 diabetes mellitus with other diabetic kidney complication, with long-term current use of insulin (H)       carvedilol 25 MG tablet    COREG    60 tablet    Take 1 tablet (25 mg) by mouth 2 times daily (with meals)    Benign essential hypertension       CENTRUM SILVER per tablet      1 TABLET DAILY        clobetasol propionate 0.05 % Liqd     1 Bottle    Externally apply 1 dose topically 2 times daily as needed.    Skin rash       * FENOFIBRATE PO      Take 54 mg by mouth daily        * fenofibrate 54 MG tablet     90 tablet    Take 1 tablet (54 mg) by mouth daily    Hyperlipidemia, unspecified hyperlipidemia type       insulin detemir 100 UNIT/ML injection    LEVEMIR FLEXPEN/FLEXTOUCH    18 mL    Inject 70 Units Subcutaneous At Bedtime To take 35 units with breakfast and 35 units with dinner.    Type 2 diabetes mellitus with other diabetic kidney complication, with long-term current use of insulin (H)        insulin pen needle 32G X 4 MM     450 each    Use 5 pen needles daily or as directed.  Per insurance and patient preference    Type 2 diabetes mellitus with other diabetic kidney complication, with long-term current use of insulin (H)       Levothyroxine Sodium 150 MCG Caps     90 capsule    Take 150 mcg by mouth daily (with breakfast)    Hypothyroidism, unspecified type       LOSARTAN POTASSIUM PO      Take 50 mg by mouth daily        MAGNESIUM PO      Take 250 mg by mouth daily        * NOVOLOG SC      Inject 20 Units Subcutaneous 3 times daily (with meals) Reported on 4/7/2017        * insulin aspart 100 UNIT/ML injection    NovoLOG FLEXPEN    18 mL    20 units before breakfast, 20 units before lunch, 20 units before dinner    Type 2 diabetes mellitus with other diabetic kidney complication, with long-term current use of insulin (H)       OMEGA-3 FISH OIL PO      Take 1 g by mouth        PARoxetine 20 MG tablet    PAXIL    180 tablet    Take 1 tablet (20 mg) by mouth 2 times daily    Moderate major depression (H)       pravastatin 80 MG tablet    PRAVACHOL    90 tablet    Take 1 tablet (80 mg) by mouth every evening    Type 2 diabetes mellitus with other diabetic kidney complication, with long-term current use of insulin (H), Hyperlipidemia LDL goal <100       primidone 50 MG tablet    MYSOLINE    180 tablet    Take 1 tablet (50 mg) by mouth 2 times daily    Essential tremor       sertraline 100 MG tablet    ZOLOFT    30 tablet    Take 0.5 tablets (50 mg) by mouth daily Patient will decrease to 50mg/d x2 wks and stop d/t lack of effect.    Moderate major depression (H)       VITAMIN D3 PO      Take 10,000 Units by mouth daily        vitamin E 400 UNITS Tabs      1 tab a day        * Notice:  This list has 4 medication(s) that are the same as other medications prescribed for you. Read the directions carefully, and ask your doctor or other care provider to review them with you.

## 2017-11-06 NOTE — PROGRESS NOTES
Diabetes Self Management Training: Professional Continuous Glucose Monitor Insertion    SUBJECTIVE:   Yadi Iniguez is accompanied by self    Patient concerns: fluctuating BG and needing insulin dose changes    OBJECTIVE:    Lab Results:  Lab Results   Component Value Date    A1C 7.9 09/25/2017      Lab Results   Component Value Date     09/25/2017     Lab Results   Component Value Date    HDL 50 10/13/2016       Vitals:  There were no vitals taken for this visit.    ASSESSMENT:    LibrePro sensor started today. (Lot # [id number], Serial # [id number], Expiration date 1/31/2018) Sensor was inserted with no resistance or bleeding at insertion site.    Pt will plan to wear the sensor through  11/21/2017.    WRITTEN AND VERBAL INFORMATION GIVEN TO SUPPORT UNDERSTANDING OF:  LibrePro CGM: Sensor insertion, intention of monitoring for 14 days. Keep records of BG, food intake, exercise, and medication dosing during wear.       Patient verbalizes understanding of how to remove sensor and all instructions provided.     Educational and other materials:  Food/exercise/medication log sheets  Contact information  Return Check List    PLAN:  Pt was given instructions for tracking BG, medications, food intake and activity.    See Patient Instructions, AVS printed and provided to patient today.    Follow-up:    Patient to return all items associated with professional Continuous Glucose Monitor System to the [facility] by 11/21/2017.     Melissa Mustafa RN/[facility] Diabetes Educator    Time Spent: done while with me for her education  Encounter Type: Individual

## 2017-11-06 NOTE — MR AVS SNAPSHOT
After Visit Summary   11/6/2017    Yadi Iniguez    MRN: [id number]           Patient Information     Date Of Birth          1950        Visit Information        Provider Department      11/6/2017 11:45 AM [facility]        Today's Diagnoses     Blood pressure check    -  1       Follow-ups after your visit        Your next 10 appointments already scheduled     Dec 05, 2017  9:30 AM CST   Diabetic [facility] with AN [facility] ED [facility]    [address] [facility] [address] [phone]            Dec 11, 2017  9:00 AM CST   Office Visit with PFT LAB   [facility]    [address] [place] [id number] [phone]           Bring a current list of meds and any records pertaining to this visit. For Physicals, please bring immunization records and any forms needing to be filled out. Please arrive 10 minutes early to complete paperwork.            Dec 11, 2017 10:00 AM CST   New Visit with Jh Chiu MD   [facility] ([facility])    [address] [place] [id number]   [phone]            Jan 24, 2018 10:20 AM CST   [facility]   [facility]    [address] [facility] [address] [phone]           Bring a current list of meds and any records pertaining to this visit. For Physicals, please bring immunization records and any forms needing to be filled out. Please arrive 10 minutes early to complete paperwork.              Who to contact     If you have questions or need follow up information about today's clinic visit or your schedule please contact [facility] directly at [phone].  Normal or non-critical lab and imaging results will be communicated to you by MyChart, letter or  phone within 4 business days after the clinic has received the results. If you do not hear from us within 7 days, please contact the clinic through [employer] or phone. If you have a critical or abnormal lab result, we will notify you by phone as soon as possible.  Submit refill requests through [employer] or call your pharmacy and they will forward the refill request to us. Please allow 3 business days for your refill to be completed.          Additional Information About Your Visit        [facility] Information     [employer] gives you secure access to your electronic health record. If you see a primary care provider, you can also send messages to your care team and make appointments. If you have questions, please call your primary care clinic.  If you do not have a primary care provider, please call [phone] and they will assist you.        Care EveryWhere ID     This is your Care EveryWhere ID. This could be used by other organizations to access your [facility] medical records  [id number]        Your Vitals Were     Pulse                   79            Blood Pressure from Last 3 Encounters:   11/06/17 136/78   10/25/17 170/80   09/25/17 138/70    Weight from Last 3 Encounters:   10/25/17 257 lb (116.6 kg)   09/25/17 258 lb (117 kg)   08/18/17 247 lb 3.2 oz (112.1 kg)              Today, you had the following     No orders found for display         Today's Medication Changes          These changes are accurate as of: 11/6/17 12:27 PM.  If you have any questions, ask your nurse or doctor.               These medicines have changed or have updated prescriptions.        Dose/Directions    insulin detemir 100 UNIT/ML injection   Commonly known as:  LEVEMIR FLEXPEN/FLEXTOUCH   This may have changed:    - how much to take  - additional instructions   Used for:  Type 2 diabetes mellitus with other diabetic kidney complication, with long-term current use of insulin (H)        Dose:  70 Units   Inject 70 Units Subcutaneous At  Bedtime To take 35 units with breakfast and 35 units with dinner.   Quantity:  18 mL   Refills:  1       insulin pen needle 32G X 4 MM   This may have changed:  additional instructions   Used for:  Type 2 diabetes mellitus with other diabetic kidney complication, with long-term current use of insulin (H)        Use 5 pen needles daily or as directed.  Per insurance and patient preference   Quantity:  450 each   Refills:  3            Where to get your medicines      These medications were sent to [facility] [place] - [address] [place] [address]  [address] [place] [address], [place] [address]     Phone:  [phone]     insulin detemir 100 UNIT/ML injection    insulin pen needle 32G X 4 MM                Primary Care Provider Office Phone # Fax #    Hannah Cee [phone] [address] [facility] [address]        Equal Access to Services     [facility]: Hadii aad ku hadasho Soomaali, waaxda luqadaha, qaybta kaalmada adeegyada, waxay idiin hayaan luis piper . So [facility] [phone].    ATENCIÓN: Si habla español, tiene a dnubar disposición servicios gratuitos de asistencia lingüística. [facility] [phone].    We comply with applicable federal civil rights laws and [place] laws. We do not discriminate on the basis of race, color, national origin, age, disability, sex, sexual orientation, or gender identity.            Thank you!     Thank you for choosing [facility]  for your care. Our goal is always to provide you with excellent care. Hearing back from our patients is one way we can continue to improve our services. Please take a few minutes to complete the written survey that you may receive in the mail after your visit with us. Thank you!             Your Updated Medication List - Protect others around you: Learn how to safely use, store and throw away your medicines at www.disposemymeds.org.           This list is accurate as of: 11/6/17 12:27 PM.  Always use your most recent med list.                   Brand Name Dispense Instructions for use Diagnosis    ACE/ARB/ARNI NOT PRESCRIBED (INTENTIONAL)      Please choose reason not prescribed, below    Benign essential hypertension       albuterol (2.5 MG/3ML) 0.083% neb solution     1 vial    Take 1 vial (2.5 mg) by nebulization every 6 hours as needed for shortness of breath / dyspnea or wheezing 1 vial used in clinic today.    Chronic obstructive pulmonary disease, unspecified COPD type (H)       alendronate 70 MG tablet    FOSAMAX    12 tablet    Take 1 tablet (70 mg) by mouth every 7 days Take 60 minutes before am meal with 8 oz. water. Remain upright for 30 minutes.    Osteoporosis       amLODIPine 10 MG tablet    NORVASC    90 tablet    TAKE 1 TABLET (10 MG) BY MOUTH DAILY    Benign essential hypertension       ascorbic acid 1000 MG Tabs    vitamin C     1 TABLET DAILY AT DINNER        aspirin 81 MG tablet      ONE DAILY*        blood glucose monitoring lancets     100 Box    Use to test blood sugar 3 times daily or as directed.    Type 2 diabetes mellitus with other diabetic kidney complication, with long-term current use of insulin (H)       blood glucose monitoring test strip    MUSA CONTOUR NEXT    100 strip    Use to test blood sugar 3 times daily or as directed.    Type 2 diabetes mellitus with other diabetic kidney complication, with long-term current use of insulin (H)       carvedilol 25 MG tablet    COREG    60 tablet    Take 1 tablet (25 mg) by mouth 2 times daily (with meals)    Benign essential hypertension       CENTRUM SILVER per tablet      1 TABLET DAILY        clobetasol propionate 0.05 % Liqd     1 Bottle    Externally apply 1 dose topically 2 times daily as needed.    Skin rash       * FENOFIBRATE PO      Take 54 mg by mouth daily        * fenofibrate 54 MG tablet     90 tablet    Take 1 tablet (54 mg) by mouth daily    Hyperlipidemia,  unspecified hyperlipidemia type       insulin detemir 100 UNIT/ML injection    LEVEMIR FLEXPEN/FLEXTOUCH    18 mL    Inject 70 Units Subcutaneous At Bedtime To take 35 units with breakfast and 35 units with dinner.    Type 2 diabetes mellitus with other diabetic kidney complication, with long-term current use of insulin (H)       insulin pen needle 32G X 4 MM     450 each    Use 5 pen needles daily or as directed.  Per insurance and patient preference    Type 2 diabetes mellitus with other diabetic kidney complication, with long-term current use of insulin (H)       Levothyroxine Sodium 150 MCG Caps     90 capsule    Take 150 mcg by mouth daily (with breakfast)    Hypothyroidism, unspecified type       LOSARTAN POTASSIUM PO      Take 50 mg by mouth daily        MAGNESIUM PO      Take 250 mg by mouth daily        * NOVOLOG SC      Inject 20 Units Subcutaneous 3 times daily (with meals) Reported on 4/7/2017        * insulin aspart 100 UNIT/ML injection    NovoLOG FLEXPEN    18 mL    20 units before breakfast, 20 units before lunch, 20 units before dinner    Type 2 diabetes mellitus with other diabetic kidney complication, with long-term current use of insulin (H)       OMEGA-3 FISH OIL PO      Take 1 g by mouth        PARoxetine 20 MG tablet    PAXIL    180 tablet    Take 1 tablet (20 mg) by mouth 2 times daily    Moderate major depression (H)       pravastatin 80 MG tablet    PRAVACHOL    90 tablet    Take 1 tablet (80 mg) by mouth every evening    Type 2 diabetes mellitus with other diabetic kidney complication, with long-term current use of insulin (H), Hyperlipidemia LDL goal <100       primidone 50 MG tablet    MYSOLINE    180 tablet    Take 1 tablet (50 mg) by mouth 2 times daily    Essential tremor       sertraline 100 MG tablet    ZOLOFT    30 tablet    Take 0.5 tablets (50 mg) by mouth daily Patient will decrease to 50mg/d x2 wks and stop d/t lack of effect.    Moderate major depression (H)       VITAMIN D3  PO      Take 10,000 Units by mouth daily        vitamin E 400 UNITS Tabs      1 tab a day        * Notice:  This list has 4 medication(s) that are the same as other medications prescribed for you. Read the directions carefully, and ask your doctor or other care provider to review them with you.

## 2017-11-07 NOTE — TELEPHONE ENCOUNTER
PULMONARY NEW PATIENT PRE-VISIT ASSESSMENT    Date Patient Contacted: 11/7/2017    Confirmed appointment times and location OR  Message left confirming appointment times and location. Requested patient return call to review medical history and outside records.    (Sleep Consult Only) Verified sleep packet received,patient reminded to complete prior to the appointment and hand carry to appointment. If not completed, please arrive 30 minutes early to complete paperwork.     1. Patient is scheduled to see Dr Chiu on 12/11/2017  2. Reason for visit: Asthma  3. Referring provider Hannah Cee MD    4. Has patient seen previous specialist? ???- NO      5. Previous Imaging: In Epic: Last CXR done: 8/8/17, Last Chest CT done: NA-      Outside Imaging: Location/Date/Type/Status (Requested, Received, Patient will hand carry disc, Pushed to iSite, Disc will be mailed) ??? 8/9/2017 [facility]        6.  Pulmonary Function Tests: Scheduled at  12/11/2017       Outside PFT Lab:  Location/Date/Status (Requested, Received, Patient will hand carry) ???       Oxygen? ???- No    Will route to care team for follow-up.    Patient Reminders Given:  Please, make sure you bring an updated list of your medications.   If you need to cancel or reschedule, please call [phone].

## 2017-11-10 ENCOUNTER — TELEPHONE (OUTPATIENT)
Dept: FAMILY MEDICINE | Facility: CLINIC | Age: 67
End: 2017-11-10

## 2017-11-10 DIAGNOSIS — Z12.11 SCREEN FOR COLON CANCER: Primary | ICD-10-CM

## 2017-11-10 NOTE — TELEPHONE ENCOUNTER
Panel Management Review      Patient has the following on her problem list: None      Composite cancer screening  Chart review shows that this patient is due/due soon for the following Colonoscopy and Fecal Colorectal (FIT)  Summary:    Patient is due/failing the following:   COLONOSCOPY and FIT    Action needed:   Patient needs referral/order: fit or colonoscopy    Type of outreach:    None, routed to provider for review.    Questions for provider review:    None                                                                                                                                    Ethel Riojas M.A.       Chart routed to Provider .

## 2017-11-10 NOTE — LETTER
Yadi Iniguez  [address]  [place] [address]    November 10, 2017      Dear Yadi Iniguez      Our records indicate that you have not scheduled a Colonoscopy or FIT Test which was recommended by your health care team. Monitoring and managing your preventative and chronic health conditions are very important to us.  Please read the following note by your provider:  I've put in the orders for the colonoscopy and FIT test.  Please contact the patient and remind them that they are due for colon cancer screening. They may do the colonoscopy or FIT test, but the colonoscopy is the more reliable test.     Thank you, Hannah Cee MD        If you have received your health care elsewhere, please provide us with that information so it can be documented in your chart.    Please call [phone] or message us through your [employer] account to schedule an appointment or provide information for your chart.     We look forward to seeing you and working with you on your health care needs.     Sincerely,   Hannah Cee MD          *If you have already scheduled an appointment, please disregard this reminder

## 2017-11-10 NOTE — TELEPHONE ENCOUNTER
I've put in the orders for the colonoscopy and FIT test.  Please contact the patient and remind them that they are due for colon cancer screening. They may do the colonoscopy or FIT test, but the colonoscopy is the more reliable test.    Thank you,  Hannah Cee MD

## 2017-11-13 ENCOUNTER — TRANSFERRED RECORDS (OUTPATIENT)
Dept: HEALTH INFORMATION MANAGEMENT | Facility: CLINIC | Age: 67
End: 2017-11-13

## 2017-11-24 ENCOUNTER — TELEPHONE (OUTPATIENT)
Dept: INTERNAL MEDICINE | Facility: CLINIC | Age: 67
End: 2017-11-24

## 2017-11-24 NOTE — TELEPHONE ENCOUNTER
Patient calling to check on status of refill request for her Levemir, and primidone. Running very low will be out this weekend. Has not heard back on request please call to advise.

## 2017-11-26 NOTE — PROGRESS NOTES
SUBJECTIVE:   Yadi Iniguez is a 67 year old female who presents to clinic today for the following health issues:      Hypertension Follow-up      Outpatient blood pressures are not being checked.    Low Salt Diet: no added salt      Depression Followup    Status since last visit: Improved     See PHQ-9 for current symptoms.  Other associated symptoms: None    Complicating factors:   Significant life event:  No   Current substance abuse:  None  Anxiety or Panic symptoms:  No    PHQ-9 8/18/2017 10/25/2017 1/24/2018   Total Score 3 9 5   Q9: Suicide Ideation Not at all Not at all Not at all       Patient is on Paxil 20mg bid. PHQ-9 score today is 5. She was advised a trial of a higher Paxil dose, but she would like to remain on the current dose for now.       PHQ-9  English  PHQ-9   Any Language  Suicide Assessment Five-step Evaluation and Treatment (SAFE-T)    Amount of exercise or physical activity: None    Problems taking medications regularly: No    Medication side effects: none    Diet: diabetic        Patient is listed as being due for the following HCCs diagnoses:  Coagulation Defects and Other Specified Hematological Disorders   Major Depressive, Bipolar, and Paranoid Disorders   Morbid Obesity       No known coagulation disorders. No known VTE or hard-to-control bleeding. No fam history of the same. Patient is also not aware of any history of this.       Asthma Follow-Up    Was ACT completed today?    Yes    ACT Total Scores 1/24/2018   ACT TOTAL SCORE (Goal Greater than or Equal to 20) 23   In the past 12 months, how many times did you visit the emergency room for your asthma without being admitted to the hospital? 0   In the past 12 months, how many times were you hospitalized overnight because of your asthma? 0       Recent asthma triggers that patient is dealing with: cold air      Morbid obesity:      Amount of exercise or physical activity: 6-7 days/week for an average of 15-30 minutes    Problems  taking medications regularly: No    Medication side effects: none    Diet: diabetic             Reviewed and updated as needed this visit by clinical staff  Tobacco  Allergies  Meds  Problems  Med Hx  Surg Hx  Fam Hx  Soc Hx        Reviewed and updated as needed this visit by Provider  Meds  Problems           Patient Active Problem List   Diagnosis     Hypothyroidism     Hypertension goal BP (blood pressure) < 140/90     Type 2 diabetes, HbA1C goal < 8% (H)     Hyperlipidemia LDL goal <100     Moderate major depression (H)     Incontinence of urine     Neuropathy in diabetes (H)     Eczema     Left lumbar radiculopathy     ACP (advance care planning)     Health Care Home     CKD (chronic kidney disease) stage 3, GFR 30-59 ml/min     Type 2 diabetes mellitus with other diabetic kidney complication, with long-term current use of insulin (H)     Osteoporosis     Morbid obesity (H)     Thrombocytopenia (H)       Past Medical History:   Diagnosis Date     Arthritis 1975     Broken ribs 8/28/2016    3,4,5,6,7, left side     Cancer (H) 2013    Skin cancer     COPD (chronic obstructive pulmonary disease) (H) 08/2017     Depression      Depressive disorder 1988     Dysuria      Glycosuria      Hypertension 1980     Mixed incontinence urge and stress      Other and unspecified hyperlipidemia      Right shoulder pain      Type II or unspecified type diabetes mellitus without mention of complication, uncontrolled      Uncomplicated asthma 2017     Unspecified hypothyroidism        Past Surgical History:   Procedure Laterality Date     ABDOMEN SURGERY  1978    Gall Bladder  January       hysterectomy  Sept     BIOPSY  2013 1993    from left corner of mouth       muscule biopsy     C VAGINAL HYSTERECTOMY       CHOLECYSTECTOMY, OPEN       CL AFF SURGICAL PATHOLOGY       COLONOSCOPY  2007     EYE SURGERY  2014    ongoing     HEAD & NECK SURGERY  2006    Removal of saliva gland left side     PROPHYLACTIC REPAIR RETINAL  BREAK, PNEUMATIC RETINOPEXY, COMBINED Left 9/2/2016     SOFT TISSUE SURGERY  1996    Carpal Tunnel left wrist       Family History   Problem Relation Age of Onset     Musculoskeletal Disorder Mother      MD     Muscular Disorder Mother      Depression Mother      OSTEOPOROSIS Mother      Obesity Mother      CANCER Father      mesothelioma     Obesity Father      HEART DISEASE Brother      angioplasty     Neurologic Disorder Brother      ms     DIABETES Brother      Hypertension Brother      Hyperlipidemia Brother      Obesity Brother      4 brothers     Depression Paternal Grandmother      Obesity Paternal Grandmother      Genetic Disorder Brother      MS     DIABETES Brother      Hypertension Brother      Hyperlipidemia Brother        Social History   Substance Use Topics     Smoking status: Former Smoker     Packs/day: 0.50     Years: 50.00     Types: Cigarettes     Start date: 1/1/1967     Quit date: 8/8/2017     Smokeless tobacco: Never Used      Comment: I have tried many times     Alcohol use Yes      Comment: Maybe a drink every few months       Current Outpatient Prescriptions   Medication     amLODIPine (NORVASC) 10 MG tablet     carvedilol (COREG) 25 MG tablet     clobetasol propionate 0.05 % LIQD     Levothyroxine Sodium 150 MCG CAPS     losartan (COZAAR) 100 MG tablet     insulin detemir (LEVEMIR FLEXPEN/FLEXTOUCH) 100 UNIT/ML injection     insulin aspart (NOVOLOG FLEXPEN) 100 UNIT/ML injection     blood glucose monitoring (ONETOUCH VERIO IQ) test strip     ONETOUCH DELICA LANCETS 33G MISC     primidone (MYSOLINE) 50 MG tablet     insulin pen needle 32G X 4 MM     PARoxetine (PAXIL) 20 MG tablet     fenofibrate 54 MG tablet     MAGNESIUM PO     albuterol (2.5 MG/3ML) 0.083% neb solution     alendronate (FOSAMAX) 70 MG tablet     blood glucose monitoring (MUSA CONTOUR NEXT) test strip     blood glucose monitoring (MUSA MICROLET) lancets     ACE/ARB NOT PRESCRIBED, INTENTIONAL,     Cholecalciferol  (VITAMIN D3 PO)     Omega-3 Fatty Acids (OMEGA-3 FISH OIL PO)     FENOFIBRATE PO     ASPIRIN 81 MG OR TABS     VITAMIN C 1000 MG OR TABS     VITAMIN E 400 UNIT OR TABS     CENTRUM SILVER OR TABS     atorvastatin (LIPITOR) 40 MG tablet     No current facility-administered medications for this visit.          ROS:  Constitutional, HEENT, cardiovascular, pulmonary, GI, , musculoskeletal, neuro, skin, endocrine and psych systems are negative, except as otherwise noted.     OBJECTIVE:                                                    /75  Pulse 92  Temp 97.9  F (36.6  C) (Oral)  Resp 16  Wt 260 lb (117.9 kg)  SpO2 96%  Breastfeeding? No  BMI 39.24 kg/m2     GENERAL APPEARANCE: healthy, alert and in no distress  EYES: Eyes grossly normal to inspection, and conjunctivae and sclerae normal  HENT: head normocephalic and atraumatic and mouth without ulcers or lesions, oropharynx clear and oral mucous membranes moist  NECK: no noticeable adenopathy, no asymmetry, masses, or scars   RESP: lungs clear to auscultation - no rales, rhonchi or wheezes  CV: regular rate and rhythm, normal S1 S2, no S3 or S4, no murmur, click or rub, no peripheral edema and peripheral pulses strong  ABDOMEN: soft, nontender, no hepatosplenomegaly, no masses and bowel sounds normal  MS: no musculoskeletal defects are noted and gait is age appropriate without ataxia  SKIN: no suspicious lesions or rashes  NEURO: mentation intact and speech normal  PSYCH: mentation appears normal and affect normal/bright.    Results for orders placed or performed in visit on 01/24/18   HEMOGLOBIN A1C   Result Value Ref Range    Hemoglobin A1C 6.7 (H) 4.3 - 6.0 %   Lipid panel reflex to direct LDL Fasting   Result Value Ref Range    Cholesterol 231 (H) <200 mg/dL    Triglycerides 196 (H) <150 mg/dL    HDL Cholesterol 49 (L) >49 mg/dL    LDL Cholesterol Calculated 143 (H) <100 mg/dL    Non HDL Cholesterol 182 (H) <130 mg/dL       No results found for this  or any previous visit (from the past 744 hour(s)).    The 10-year ASCVD risk score (Westonashlee PAEZ Jr, et al., 2013) is: 23.4%    Values used to calculate the score:      Age: 67 years      Sex: Female      Is Non- : No      Diabetic: Yes      Tobacco smoker: No      Systolic Blood Pressure: 138 mmHg      Is BP treated: Yes      HDL Cholesterol: 49 mg/dL      Total Cholesterol: 231 mg/dL    Lab Results   Component Value Date    WBC 7.0 09/25/2017     Lab Results   Component Value Date    RBC 3.85 09/25/2017     Lab Results   Component Value Date    HGB 12.9 12/06/2017     Lab Results   Component Value Date    HCT 37.8 09/25/2017     No components found for: MCT  Lab Results   Component Value Date    MCV 98 09/25/2017     Lab Results   Component Value Date    MCH 33.8 09/25/2017     Lab Results   Component Value Date    MCHC 34.4 09/25/2017     Lab Results   Component Value Date    RDW 12.9 09/25/2017     Lab Results   Component Value Date     09/25/2017         ASSESSMENT/PLAN:                                                        ICD-10-CM    1. Type 2 diabetes mellitus with other kidney complication, unspecified long term insulin use status (H) E11.29 HEMOGLOBIN A1C     Lipid panel reflex to direct LDL Fasting     OPTOMETRY REFERRAL     Lipid panel reflex to direct LDL Fasting   2. Morbid obesity (H) E66.01    3. Thrombocytopenia (H) D69.6    4. Moderate major depression (H) F32.1    5. Hyperlipidemia LDL goal <100 E78.5    6. Hypothyroidism, unspecified type E03.9 Levothyroxine Sodium 150 MCG CAPS   7. Skin rash R21 clobetasol propionate 0.05 % LIQD   8. Screen for colon cancer Z12.11    9. At risk for falling Z91.81    10. Screening for diabetic peripheral neuropathy Z13.89 FOOT EXAM  NO CHARGE [54009.114]   11. Benign essential hypertension I10 amLODIPine (NORVASC) 10 MG tablet     carvedilol (COREG) 25 MG tablet     1:   Lab Results   Component Value Date    A1C 6.7 01/24/2018  "  ASSESSMENT: stable  PLAN: continue current regimen    2: Body mass index is 39.24 kg/(m^2).. PLAN: see below-wt management handouts provided.    3: plt count slightly low, stable, w/o anemia, without reported bleeding.  PLAN: continue to monitor    4:  PHQ-9 SCORE 8/18/2017 10/25/2017 1/24/2018   Total Score - - -   Total Score 3 9 5     ASSESSMENT: nearly controlled. Patient was offered but declined to change her psychotropic regimen at this time.PLAN: Continue current regimen.     5: LDL is still high on max pravastatin-I checked with the patient and she is not aware of any a/e to stronger statins.  PLAN:   She was thus advised now to: \"please stop the pravastatin, start the Lipitor 40mg daily (I sent the prescription to your [place] pharmacy) and make a fasting lab appointment in 3 months and an appointment with me a few days later.\"     Patient Instructions     We will let you know [facility], and we will advise the next visit based on the lab results.    If your A1C today is 8.0% or above, we will likely advise an increase in your evening insulin.   Please see the advise below regarding weight loss:      Weight Management: Exercise and Activity    Studies show that people who exercise are the most likely to lose weight and keep it off. Exercise burns calories. It helps build muscle to make your body stronger. Make exercise an important part of your weight-management plan.  Make activity part of your day  You may not think you have the time to exercise. But you can work activity into your daily life--you just need to be committed. Take 10 minutes out of your lunch hour to take a walk. Walk to the [employer] to get your paper instead of having it delivered. Make it a habit to take the stairs instead of the elevator. Park in a far away parking spot instead of the closest. You ll be surprised at how fast these little changes can make a difference.  Some people really cannot walk very far, and " tire out quickly with exercise. Instead of becoming discouraged, resolve to do what you can do, and work to make that a regular frequent habit.   The benefits of exercise  Exercise offers many benefits including:     Exercise increases your metabolism (the speed at which your body burns calories).    Regular exercise can increase the amount of muscle in your body. Muscle burns calories faster than fat. The more muscle you have, the more calories you burn.    Exercise gives you energy and curbs your appetite.    Exercise decreases stress and helps you sleep better.  Make exercise fun  Exercise can be fun. Choose an activity you enjoy. You may even get a friend to do it with you:    Take a resistance-training or aerobics class    Join a team sport    Take a dance class    Walk the dog    Ride a bike  If you have health problems, be sure to ask your healthcare provider before you start an exercise program. Have a  help you develop a plan that s safe for you.   Date Last Reviewed: 2/4/2016 2000-2017 [employer]. [address] [place] [address]. All rights reserved. This information is not intended as a substitute for professional medical care. Always follow your healthcare professional's instructions.        Weight Management: Fact and Fiction    Knowing the truth about losing weight can help you separate what works from what doesn t. Don t be taken in by expensive weight-loss fads like pills, herbs, and special foods that promise unbelievable results. There s no magic way to lose weight. If you have questions about weight loss, ask your healthcare provider.  Fiction:  The faster I lose weight, the better.   Fact: Rapid weight loss is usually due to loss of water or muscle mass. What you re trying to get rid of is extra fat. Aim to lose a 1/2 pound to 2 pounds a week. Then you re more likely to lose fat rather than water or muscle.  Fiction:  Skipping meals will help me  lose weight.   Fact: When you skip meals, you don t give your body the energy it needs to work. Hunger makes you more likely to overeat later on. It s best to spread your meals throughout the day. Eat at least three meals a day.  Fiction:  I can t start exercising until I lose weight.   Fact: The sooner you start exercising the better. Exercise helps burn more calories, tone your muscles, and keep your appetite in check. People who continue to exercise after they lose weight are more likely to keep the weight off.  Fiction:  The fewer calories I eat, the better.   Fact: This seems like it should be true, but it s not. When you eat too few calories, your body acts as if it s on a desert island. It thinks food is scarce, so it slows down your metabolism (how fast you burn calories) to save energy. By eating too few calories, you make it harder to lose weight.  Fiction:  Once I lose weight, I can go back to living the way I did before.   Fact: Going back to your old eating habits and giving up exercise is a sure way to regain any weight you ve lost. The lifestyle changes that help you lose extra weight can also help keep it off. This is why you need to make realistic changes you can stick with.  Fiction:  Low-fat and fat-free mean low-calorie.   Fact: All foods, even fat-free ones, have calories. Eat too many calories and you ll gain weight. It s OK to treat yourself to a fat-free cookie or two. Just don t eat the whole box! A dietitian will help you figure this out, and will likely recommend that you eat three meals a day, with protein with each meal.   Date Last Reviewed: 2/4/2016 2000-2017 [employer]. [address] [place] [address], [place] [address]. All rights reserved. This information is not intended as a substitute for professional medical care. Always follow your healthcare professional's instructions.        Weight Management: Getting Started  Healthy bodies come in all shapes and sizes. Not all  bodies are made to be thin. For some people, a healthy weight is higher than the average weight listed on weight charts. Your healthcare provider can help you decide on a healthy weight for you.    Reasons to lose weight  Losing weight can help with some health problems, such as high blood pressure, heart disease, diabetes, sleep apnea, and arthritis. You may also feel more energy.  Set your long-term goal  Your goal doesn't even have to be a specific weight. You may decide on a fitness goal (such as being able to walk 10 miles a week), or a health goal (such as lowering your blood pressure). Choose a goal that is measurable and reasonable, so you know when you've reached it. A goal of reaching a BMI of less than 25 is not always reasonable (or possible).   Make an action plan  Habits don t change overnight. Setting your goals too high can leave you feeling discouraged if you can t reach them. Be realistic. Choose one or two small changes you can make now. Set an action plan for how you are going to make these changes. When you can stick to this plan, keep making a few more small changes. Taking small steps will help you stay on the path to success.  Track your progress  Write down your goals. Then, keep a daily record of your progress. Write down what you eat and how active you are. This record lets you look back on how much you ve done. It may also help when you re feeling frustrated. Reward yourself for success. Even if you don t reach every goal, give yourself credit for what you do get done.  Get support  Encouragement from others can help make losing weight easier. Ask your family members and friends for support. They may even want to join you. Also look to your healthcare provider, registered dietitian, and  for help. Your local hospital can give you more information about nutrition, exercise, and weight loss.  Date Last Reviewed: 1/31/2016 2000-2017 [employer] [address]  [place] [address]. All rights reserved. This information is not intended as a substitute for professional medical care. Always follow your healthcare professional's instructions.        Weight Management: Healthy Eating  Food is your body s fuel. You can t live without it. The key is to give your body enough nutrients and energy without eating too much. Reading food labels can help you make healthy choices. Also, learn new eating habits to manage your weight.     All the values on the label are based on one serving. The serving size is the average portion. Remember to multiply the values on the label by the number of servings you eat.   Eat less fat  A gram of fat has almost 2.5 times the calories of a gram of protein or carbohydrates. Try to balance your food choices so that only 20% to 35% of your calories comes from total fat. This means an average of 2  to 3  grams of fat for each 100 calories you eat.  Eat more fiber  High-fiber foods are digested more slowly than low-fiber foods, so you feel full longer. Try to get at least 25 grams of fiber each day for a 2000 calorie diet. Foods high in fiber include:    Vegetables and fruits    Whole-grain or bran breads, pastas, and cereals    Legumes (beans) and peas  As you begin to eat more fiber, be sure to drink plenty of water to keep your digestive system working smoothly.  Tips  Do's and don'ts include:     Don t skip meals. This often leads to overeating later on. It s best to spread your eating throughout the day.    Eat a variety of foods, not just a few favorites.    If you find yourself eating when you re not hungry, ask yourself why. Many of us eat when we re bored, stressed, or just to be polite. Listen to your body. If you re not hungry, get busy doing something else instead of eating.    Eat slower, shooting for 20 to 30 minutes for each meal. It takes 20 minutes for your stomach to tell your brain that it s full. Slow eaters tend to  eat less and are still satisfied, while fast eaters may tend to be overeaters.     Pay attention to what you eat. Don t read or watch TV during your meal.  Date Last Reviewed: 1/31/2016 2000-2017 [employer]. [address] [place] [address], [place] [address]. All rights reserved. This information is not intended as a substitute for professional medical care. Always follow your healthcare professional's instructions.        Weight Management: Overcoming Your Barriers    You may have many reasons why you re not ready to lose weight. You may not feel you have the time or the skills. You may be afraid of losing weight and gaining it back again. Well, you can lose weight. And you can keep the weight off, if you make changes slowly and stick with them. Remember that you may never find the perfect time to lose weight. Decide that the right time to be healthier is now.  Common barriers  Barrier 1: I don t want to deny myself.  Barrier Buster: You don t have to! Moderation is the key:    Watch portion sizes and know when you're eating more than one serving.    Plan to ask for a doggy bag when you eat out.    Have just one.    Choose lower-fat and lower-calorie versions of your favorites.    Use a small plate instead of a normal-sized plate.   Barrier 2: I lost weight before but I gained it right back.  Barrier Buster: Make this time different:    List what worked and didn t work last time and what you can try this time.    Choose changes that you are willing to stick with.    Work exercise into your weight-loss plan.    Be realistic about what is possible. Your plan has to fit into your life in a balanced way that works for you.   Barrier 3: I don t have the time to be active.  Barrier Buster: It takes just a few minutes a day!    Be active with a pet or the kids.    Block off activity time in your schedule.    Borrow some time that you usually spend watching TV.    You are too important not to take time to  exercise--it is your life!   Feel good about yourself  Do you eat more because you feel bad about yourself, then feel even worse as you gain weight? This is a  vicious cycle.  Breaking this cycle is not easy. You may need group support or counseling. Always remember that you are a valuable person, no matter what size or shape you are.  Do you have a health problem? If so, don t use it as an excuse for not losing weight. Ask your healthcare provider or dietitian about methods to lose weight that are safe for you. For example, even if you have severe arthritis, it may be easier for you to exercise in a pool. Get advice from a .    Date Last Reviewed: 2/2/2016 2000-2017 [employer]. [address] [place] [address], [place] [address]. All rights reserved. This information is not intended as a substitute for professional medical care. Always follow your healthcare professional's instructions.        Weight Management: Take It Off and Keep It Off    It s easy to be motivated when you first start. The key is to stay motivated all along the way and to have realistic and achievable goals. There are things you can do to keep yourself on the path to success.  Don t focus on daily weight gains and losses. Instead, weigh yourself no more than once a week at the same time of day. Weighing yourself each day will probably only frustrate you.    Stay motivated  Here are suggestions to keep you motivated:     Remind yourself of your goals. Post them near the refrigerator or desk where you work.    Make daily entries in your diary or journal about your activity and eating. A visual reminder of success, like a gold star, can help keep you going.    Every week, take time to look back on how much you ve accomplished, and the changes you may have made.    Try taking a nutrition class. It can help you learn new shopping, cooking, and eating skills, and also meet new people. You might try a low-fat cooking or  yoga class.    Don t be hard on yourself or give up if you slip. Be patient. Learn from your mistakes and adjust your plan if you need to. Then get right back to it.    Be realistic about your goals. Talk with a dietitian or your healthcare provider about what goals are reasonable for you.   Believe that you can do it  How you think about yourself is just as important as what you do. If you don t think you can succeed, chances are you won t. Believe that you can stick to your plan and meet your goals:    If you don t meet a goal, don t use it as an excuse to give up on your whole plan. Adjust your goal and try again.    Try to understand your own attitude about food.  Are you subject to emotional eating?    Learn how to accept compliments. Even if you get embarrassed, just say  thank you.     Make a list of the things that others like about you and that you like about yourself. Add something new from time to time. Keep this list to look at when you need a lift.  Resources    The [employer] s [place] on Fitness, Sports & Nutritionwww.fitness.gov    Academy of Nutrition and Dieteticswww.eatright.org    Healthfinderwww.healthfinder.gov  Date Last Reviewed: 2/4/2016 2000-2017 [employer]. [address] [place] [id number]. All rights reserved. This information is not intended as a substitute for professional medical care. Always follow your healthcare professional's instructions.        Losing Weight for Heart Health  Excess weight is a major risk factor for heart disease. Losing weight has many benefits including lowering your blood pressure, improving your cholesterol level, and decreasing your risk for diseases such as diabetes and heart disease. It may help keep your arteries open so that your heart can get the oxygen-rich blood it needs. All in all, losing weight makes you healthier.     Exercise with a friend. When activity is fun, you're more likely to stick with it.   Calories and weight  loss    Calories are the fuel your body burns for energy. You get the calories you need from the food you eat. For healthy weight loss, women should eat at least 1,200 calories a day, men at least 1,500.    When you eat more calories than you need, your body stores the extra calories as fat. One pound of fat equals 3,500 calories.    To lose weight, try to reduce your total calorie intake by 500 calories. To do this, eat 250 calories less each day. Add activity to burn the other 250 calories. Walking 2.5 miles burns about 250 calories. Other more intense activities can burn more calories in the time you spend doing them, such as swimming and running. It is important to understand that reducing calorie intake is much more effective at weight loss than is exercise.    Eat a variety of healthy foods to get the nutrients you need.  Tips for losing weight    Drink 8 to 10 glasses of water a day.    Don t skip meals. Instead, eat smaller portions.    Eat your meals earlier in the day.    Cut out sugary drinks such as soda and fruit juices.    Make your later meals lighter than your earlier meals.   Brisk activity is best  Brisk activity gets your heart pumping faster and it makes it healthier. It s also a great way to burn calories. In fact, your body may keep burning calories for hours after you stop a brisk activity:    Begin by walking 10 minutes most days.    Add more time and speed to your walk. Build up as you feel able.    Aim for 3 to 4 sessions of aerobic exercise a week. Each session should last about 40 minutes and include moderate to vigorous physical activity.    The most important part of the activity is that you break a sweat. This indicates your heart is working hard enough to burn fat.  Date Last Reviewed: 6/1/2016 2000-2017 [employer]. [address] [place] [address], [place] [address]. All rights reserved. This information is not intended as a substitute for professional medical care. Always  follow your healthcare professional's instructions.        Finding Your Ideal Weight  Most of the people in magazines and on TV are far slimmer than average, yet this is the  ideal  that many people aim for. Before you decide that you won t be happy until you get down to a certain number of pounds, consider:      Your age. You probably wish you could get back to your college weight. But normal changes in metabolism and hormone levels that occur with aging can make this more difficult.    Your gender. In general, men have more muscle and heavier bones than women, which means that healthy men usually weigh more than healthy women of the same height.    Your current weight. If you are very heavy, focus on losing a smaller amount (such as 10 percent of your body weight). Losing just 5 to 10 pounds can improve your health.  Your body fat percentage  A pound of muscle weighs the same as a pound of fat, but it takes up less space. Think of a trained athlete and a  couch potato.  Even though they may be the same height and weight, the athlete looks fitter, is healthier, and probably wears a smaller size of clothing. If you are muscular, a body fat test may be a more accurate measure of your ideal weight than the bathroom scale. Talk to your healthcare provider, who can help you set appropriate goals for yourself.  Body mass index (BMI)  Your body mass index is a number calculated from your weight and height. It is a fairly reliable indicator of body fatness for most people. To calculate your BMI, see this BMI calculator from the CDC: http://www.cdc.gov/healthyweight/assessing/bmi/adult_bmi/english_bmi_calculator/bmi_calculator.html  Date Last Reviewed: 6/1/2017 2000-2017 [employer]. [address] [place] [address], [place] [address]. All rights reserved. This information is not intended as a substitute for professional medical care. Always follow your healthcare professional's instructions.            Hannah  Kavita Cee MD    [facility]  [address] [facility] [address] [phone]

## 2017-12-05 ENCOUNTER — ALLIED HEALTH/NURSE VISIT (OUTPATIENT)
Dept: EDUCATION SERVICES | Facility: CLINIC | Age: 67
End: 2017-12-05
Payer: COMMERCIAL

## 2017-12-05 ENCOUNTER — TELEPHONE (OUTPATIENT)
Dept: EDUCATION SERVICES | Facility: CLINIC | Age: 67
End: 2017-12-05

## 2017-12-05 DIAGNOSIS — E11.29 TYPE 2 DIABETES MELLITUS WITH OTHER DIABETIC KIDNEY COMPLICATION, WITH LONG-TERM CURRENT USE OF INSULIN (H): ICD-10-CM

## 2017-12-05 DIAGNOSIS — Z79.4 TYPE 2 DIABETES MELLITUS WITH OTHER DIABETIC KIDNEY COMPLICATION, WITH LONG-TERM CURRENT USE OF INSULIN (H): ICD-10-CM

## 2017-12-05 PROCEDURE — 99207 ZZC DROP WITH A PROCEDURE: CPT

## 2017-12-05 PROCEDURE — 95250 CONT GLUC MNTR PHYS/QHP EQP: CPT

## 2017-12-05 RX ORDER — LANCETS 33 GAUGE
1 EACH MISCELLANEOUS
Qty: 100 EACH | Refills: 11 | Status: SHIPPED | OUTPATIENT
Start: 2017-12-05 | End: 2018-08-24

## 2017-12-05 NOTE — PROGRESS NOTES
LibrePro Continuous Glucose Monitor Interpretation     Patient History:   1. Type of Diabetes: Type 2 diabetes  2. Duration of diabetes or year of diagnosis: about 20-30 yrs.  3. Current treatment regimen (include all diabetes medications, dose & dosing frequency/timing):   yes:     Diabetes Medication(s)     Insulin Sig    insulin detemir (LEVEMIR FLEXPEN/FLEXTOUCH) 100 UNIT/ML injection To take 35 units with breakfast and 35 units with dinner. Subcutaneously.    insulin aspart (NOVOLOG FLEXPEN) 100 UNIT/ML injection 20 units before breakfast, 20 units before lunch, 20 units before dinner    Insulin Aspart (NOVOLOG SC) Inject 20 Units Subcutaneous 3 times daily (with meals) Reported on 2017       Injectable Medications: Levemir 35-0-35-0 and NovoLog Insulin 20-20-20-0  *Abbreviated insulin dose documentation key: Insulin Trade Name (qhqksehmf-ynpfx-eakjus-bedtime) - i.e. Humalog 5-5-5-0 (Humalog 5 units at breakfast, 5 units at lunch, and 5 units at dinner).  4. Most Recent A1c Result:    Lab Results   Component Value Date    A1C 7.9 2017     5. Indication/s for LibrePro study: Difficult to manage hypoglycemia and/or hyperglycemia, despite multiple adjustments in self-monitoring of blood glucose and diabetes medication administration.    Statistics:   1. Sensor worn for 14 days.  2. Glucose excursions:   Percent in target is: 19%  Percent above target is: 81%  Percent below target is: 0%  3. Estimated A1c: 6.6%                        Data evaluation:   1. Sensor modal day evaluation shows the followin. Consistent day-to-day patterns noted: pattern of daytime hyperglycemia.  2. Average glucose: 143 mg/dL.  3. Low glucose events: 0    Patient's Logbook shows the following:   Carbohydrate counting is: accurate  Medication and/or insulin dosing is: accurate     Assessment and Plan:  Recommend increase to insulin - Levemir from 35 to 38 units with breakfast, keep 35 units with dinner  Novolog from 20  units with breakfast to 22 units with breakfast, lunch from 20 units to 26 units, and dinner, keep at 20 units  (22-26-20-0)   She had one low during the night time, due to missing lunch meal that day.  Her diet is well proportioned.      Melissa Mustafa RN/ANGELIQUEE  [place] [facility] Educator    Time Spent: 30 minutes  Any diabetes medication dose changes were made via the CDE Protocol and Collaborative Practice Agreement with the patient's primary care provider. A copy of this encounter was shared with the provider.

## 2017-12-05 NOTE — TELEPHONE ENCOUNTER
Please see scanned continuous glucose monitoring (CGM) reports, interpretation and recommendations. As a provider, you can bill for a non face-to-face interpretation of the sensor report. If you feel it is appropriate, please create a 'Documentation Only' encounter noting the interpretation and your recommended plan and bill for this glucose sensor interpretation using code [id number].    LibrePro Continuous Glucose Monitor Interpretation     Patient History:   1. Type of Diabetes: Type 2 diabetes  2. Duration of diabetes or year of diagnosis: about 20-30 yrs.  3. Current treatment regimen (include all diabetes medications, dose & dosing frequency/timing):   yes:     Diabetes Medication(s)     Insulin Sig    insulin detemir (LEVEMIR FLEXPEN/FLEXTOUCH) 100 UNIT/ML injection To take 35 units with breakfast and 35 units with dinner. Subcutaneously.    insulin aspart (NOVOLOG FLEXPEN) 100 UNIT/ML injection 20 units before breakfast, 20 units before lunch, 20 units before dinner    Insulin Aspart (NOVOLOG SC) Inject 20 Units Subcutaneous 3 times daily (with meals) Reported on 2017       Injectable Medications: Levemir 35-0-35-0 and NovoLog Insulin 20-20-20-0  *Abbreviated insulin dose documentation key: Insulin Trade Name (ycnsrpjbp-husff-rpmfgz-bedtime) - i.e. Humalog 5-5-5-0 (Humalog 5 units at breakfast, 5 units at lunch, and 5 units at dinner).  4. Most Recent A1c Result:    Lab Results   Component Value Date    A1C 7.9 2017     5. Indication/s for LibrePro study: Difficult to manage hypoglycemia and/or hyperglycemia, despite multiple adjustments in self-monitoring of blood glucose and diabetes medication administration.    Statistics:   1. Sensor worn for 14 days.  2. Glucose excursions:   Percent in target is: 19%  Percent above target is: 81%  Percent below target is: 0%  3. Estimated A1c: 6.6%                        Data evaluation:   1. Sensor modal day evaluation shows the followin. Consistent  day-to-day patterns noted: pattern of daytime hyperglycemia.  2. Average glucose: 143 mg/dL.  3. Low glucose events: 0    Patient's Logbook shows the following:   Carbohydrate counting is: accurate  Medication and/or insulin dosing is: accurate     Assessment and Plan:  Recommend increase to insulin - Levemir from 35 to 38 units with breakfast, keep 35 units with dinner  Novolog from 20 units with breakfast to 22 units with breakfast, lunch from 20 units to 26 units, and dinner, keep at 20 units  (22-26-20-0)   She had one low during the night time, due to missing lunch meal that day.  Her diet is well proportioned.      Melissa Mustafa RN/[facility] Diabetes Educator    Let me know if you approve the changes made for her diabetes management.    Melissa Mustafa RN/[facility] Diabetes Educator

## 2017-12-05 NOTE — PROGRESS NOTES
[facility]  [address] [address] [phone]  Dept: [phone]    PRE-OP EVALUATION:  Today's date: 2017    Yadi Iniguez (: 1950) presents for pre-operative evaluation assessment as requested by Dr. crowley.  She requires evaluation and anesthesia risk assessment prior to undergoing surgery/procedure for treatment of vitrectomy right eye diabetic retinopathy.  Proposed procedure: vitrectomy and cauterize blood behind right eye.    Date of Surgery/ Procedure: 12/15/17  Time of Surgery/ Procedure: 0600am  Hospital/Surgical Facility: [facility] fax: [phone].    Primary Physician: Hannah Cee  Type of Anesthesia Anticipated: General    Patient has a Health Care Directive or Living Will:  YES     1. NO - Do you have a history of heart attack, stroke, stent, bypass or surgery on an artery in the head, neck, heart or legs?  2. NO - Do you ever have any pain or discomfort in your chest?  3. NO - Do you have a history of  Heart Failure?  4. YES - ARE YOUR TROUBLED BY SHORTNESS OF BREATH WHEN WALKING ON THE LEVEL, UP A SLIGHT HILL OR AT NIGHT? Yes, all of the above-these symptoms have improved since the patient started COPD treatment a few months ago.  5. NO - Do you currently have a cold, bronchitis or other respiratory infection?  6. NO - Do you have a cough, shortness of breath or wheezing?  7. NO - Do you sometimes get pains in the calves of your legs when you walk?  8. NO - Do you or anyone in your family have previous history of blood clots?  9. NO - Do you or does anyone in your family have a serious bleeding problem such as prolonged bleeding following surgeries or cuts?  10. NO - Have you ever had problems with anemia or been told to take iron pills?  11. NO - Have you had any abnormal blood loss such as black, tarry or bloody stools, or abnormal vaginal bleeding?  12. NO - Have you ever had a blood transfusion?  13. NO - Have  you or any of your relatives ever had problems with anesthesia?  14. NO - Do you have sleep apnea, excessive snoring or daytime drowsiness?  15. NO - Do you have any prosthetic heart valves?  16. NO - Do you have prosthetic joints?  17. NO - Is there any chance that you may be pregnant?        HPI:                                                      Brief HPI related to upcoming procedure: see above    LAFB noted on today's EKG; no recent symptoms of CAD or of arrhythmia are reported by the patient.    DIABETES - Patient has a longstanding history of DiabetesType Type II . Patient is being treated with see med list and denies significant side effects. Control has been good. Complicating factors include but are not limited to: obesity, HLD.                                                                                                             .  HYPERTENSION - Patient has longstanding history of mod-severe HTN , currently denies any symptoms referable to elevated blood pressure. Specifically denies chest pain, palpitations, dyspnea, orthopnea, PND or peripheral edema. Blood pressure readings have been in normal range. Current medication regimen is as listed below. Patient denies any side effects of medication.                                                                                                                                                                                          .  HYPERLIPIDEMIA - Patient has a long history of significant Hyperlipidemia requiring medication for treatment with recent fair control. Patient reports no problems or side effects with the medication.                                                                                                                                                       .  DEPRESSION - Patient has a long history of Depression of moderate severity requiring medication for control with recent symptoms being stable..Current symptoms of depression  include none.                                                                                                                                                                                    .  PHQ-9 SCORE 1/18/2017 8/18/2017 10/25/2017   Total Score - - -   Total Score 9 3 9       HYPOTHYROIDISM - Patient has a longstanding history of chronic Hypothyroidism. Patient has been doing well, noting no tremor, insomnia, hair loss or changes in skin texture. Last TSH value of 5.55. Continues to take medications as directed, without adverse reactions or side effects.                                                                                                                                                                                                                        .  RENAL INSUFFICIENCY - Patient has a longstanding history of chronic renal insufficiency of moderate severity. Exacerbating triggers in the past have been HTN. Last Cr 1.14 today, at/near baseline.       TSH   Date Value Ref Range Status   01/04/2017 5.55 (H) 0.40 - 4.00 mU/L Final   ]                                                                                                                                                                               .  SLEEP PROBLEM - Patient has a longstanding history of snoring of moderate severity. Patient has tried OTC medications with limited success.                                                                                                                                         .    MEDICAL HISTORY:                                                    Patient Active Problem List    Diagnosis Date Noted     Health Care Home 06/17/2011     Priority: High     X  Unable to contact  DX V65.8 REPLACED WITH 03190 [facility]ALTH CARE HOME (04/08/2013)       Morbid obesity (H) 09/26/2017     Priority: Medium     Thrombocytopenia (H) 09/26/2017     Priority: Medium     Osteoporosis 03/20/2017     Priority: Medium      Type 2 diabetes mellitus with other diabetic kidney complication, with long-term current use of insulin (H) 03/03/2017     Priority: Medium     CKD (chronic kidney disease) stage 3, GFR 30-59 ml/min 02/22/2017     Priority: Medium     Per chart review of records in [facility] at least a year ago: GFR has been below 60.       ACP (advance care planning) 06/09/2011     Priority: Medium     Patient states has Advance Directive and will bring in a copy to clinic. Anh Medina MA 6/9/2011        Hypothyroidism 04/28/2011     Priority: Medium     Hypertension goal BP (blood pressure) < 140/90 04/28/2011     Priority: Medium     Type 2 diabetes, HbA1C goal < 8% (H) 04/28/2011     Priority: Medium     Hyperlipidemia LDL goal <100 04/28/2011     Priority: Medium     Moderate major depression (H) 04/28/2011     Priority: Medium     Incontinence of urine 04/28/2011     Priority: Medium     Neuropathy in diabetes (H) 04/28/2011     Priority: Medium     Eczema 04/28/2011     Priority: Medium     ear       Left lumbar radiculopathy 04/28/2011     Priority: Medium      Past Medical History:   Diagnosis Date     Arthritis 1975     Broken ribs 8/28/2016    3,4,5,6,7, left side     Cancer (H) 2013    Skin cancer     COPD (chronic obstructive pulmonary disease) (H) 08/2017     Depression      Depressive disorder 1988     Dysuria      Glycosuria      Hypertension 1980     Mixed incontinence urge and stress      Other and unspecified hyperlipidemia      Right shoulder pain      Type II or unspecified type diabetes mellitus without mention of complication, uncontrolled      Uncomplicated asthma 2017     Unspecified hypothyroidism      Past Surgical History:   Procedure Laterality Date     ABDOMEN SURGERY  1978    Gall Bladder  January       hysterectomy  Sept     BIOPSY  2013 1993    from left corner of mouth       muscule biopsy     C VAGINAL HYSTERECTOMY       CHOLECYSTECTOMY, OPEN       CL AFF SURGICAL PATHOLOGY        COLONOSCOPY  2007     EYE SURGERY  2014    ongoing     HEAD & NECK SURGERY  2006    Removal of saliva gland left side     PROPHYLACTIC REPAIR RETINAL BREAK, PNEUMATIC RETINOPEXY, COMBINED Left 9/2/2016     SOFT TISSUE SURGERY  1996    Carpal Tunnel left wrist     Current Outpatient Prescriptions   Medication Sig Dispense Refill     insulin detemir (LEVEMIR FLEXPEN/FLEXTOUCH) 100 UNIT/ML injection To take 38 units with breakfast and 35 units with dinner. Subcutaneously. 18 mL 1     insulin aspart (NOVOLOG FLEXPEN) 100 UNIT/ML injection 22 units before breakfast, 26 units before lunch, 20 units before dinner 18 mL 3     blood glucose monitoring (ONETOUCH VERIO IQ) test strip Use to test blood sugar 4 times daily or as directed.  Ok to substitute alternative if insurance prefers. 400 strip 11     ONETOUCH DELICA LANCETS 33G MISC 1 Box 4 times daily (before meals and nightly) 100 each 11     primidone (MYSOLINE) 50 MG tablet TAKE ONE TABLET(S) BY MOUTH TWICE DAILY 180 tablet 1     insulin pen needle 32G X 4 MM Use 5 pen needles daily or as directed.  Per insurance and patient preference 450 each 3     amLODIPine (NORVASC) 10 MG tablet TAKE 1 TABLET (10 MG) BY MOUTH DAILY 90 tablet 0     sertraline (ZOLOFT) 100 MG tablet Take 0.5 tablets (50 mg) by mouth daily Patient will decrease to 50mg/d x2 wks and stop d/t lack of effect. 30 tablet 1     carvedilol (COREG) 25 MG tablet Take 1 tablet (25 mg) by mouth 2 times daily (with meals) 60 tablet 1     PARoxetine (PAXIL) 20 MG tablet Take 1 tablet (20 mg) by mouth 2 times daily 180 tablet 1     fenofibrate 54 MG tablet Take 1 tablet (54 mg) by mouth daily 90 tablet 3     pravastatin (PRAVACHOL) 80 MG tablet Take 1 tablet (80 mg) by mouth every evening 90 tablet 3     LOSARTAN POTASSIUM PO Take 50 mg by mouth daily       MAGNESIUM PO Take 250 mg by mouth daily       albuterol (2.5 MG/3ML) 0.083% neb solution Take 1 vial (2.5 mg) by nebulization every 6 hours as needed for  shortness of breath / dyspnea or wheezing 1 vial used in clinic today. 1 vial 1     Levothyroxine Sodium 150 MCG CAPS Take 150 mcg by mouth daily (with breakfast) 90 capsule 2     alendronate (FOSAMAX) 70 MG tablet Take 1 tablet (70 mg) by mouth every 7 days Take 60 minutes before am meal with 8 oz. water. Remain upright for 30 minutes. 12 tablet 3     blood glucose monitoring (MUSA CONTOUR NEXT) test strip Use to test blood sugar 3 times daily or as directed. 100 strip 11     blood glucose monitoring (MUSA MICROLET) lancets Use to test blood sugar 3 times daily or as directed. 100 Box 11     ACE/ARB NOT PRESCRIBED, INTENTIONAL, Please choose reason not prescribed, below       Cholecalciferol (VITAMIN D3 PO) Take 10,000 Units by mouth daily       Omega-3 Fatty Acids (OMEGA-3 FISH OIL PO) Take 1 g by mouth       FENOFIBRATE PO Take 54 mg by mouth daily       Insulin Aspart (NOVOLOG SC) Inject 20 Units Subcutaneous 3 times daily (with meals) Reported on 4/7/2017       Clobetasol Propionate 0.05 % LIQD Externally apply 1 dose topically 2 times daily as needed. 1 Bottle 5     ASPIRIN 81 MG OR TABS ONE DAILY*  3     VITAMIN C 1000 MG OR TABS 1 TABLET DAILY AT DINNER       VITAMIN E 400 UNIT OR TABS 1 tab a day        CENTRUM SILVER OR TABS 1 TABLET DAILY       OTC products: None, except as noted above    No Known Allergies   Latex Allergy: NO    Social History   Substance Use Topics     Smoking status: Former Smoker     Packs/day: 0.50     Years: 50.00     Types: Cigarettes     Start date: 1/1/1967     Quit date: 8/8/2017     Smokeless tobacco: Never Used      Comment: I have tried many times     Alcohol use Yes      Comment: Maybe a drink every few months     History   Drug Use No       REVIEW OF SYSTEMS:                                                    Constitutional, neuro, ENT, endocrine, pulmonary, cardiac, gastrointestinal, genitourinary, musculoskeletal, integument and psychiatric systems are negative, except  "as otherwise noted.      EXAM:                                                    /63  Pulse 95  Temp 97.6  F (36.4  C) (Oral)  Ht 5' 7.5\" (1.715 m)  Wt 258 lb (117 kg)  SpO2 95%  BMI 39.81 kg/m2      GENERAL APPEARANCE: healthy, alert and no distress     EYES: EOMI,- PERRL     HENT: ear canals and TM's normal and nose and mouth without ulcers or lesions     NECK: no adenopathy, no asymmetry, masses, or scars and thyroid normal to palpation     RESP: lungs clear to auscultation - no rales, rhonchi or wheezes     CV: regular rates and rhythm, normal S1 S2, no S3 or S4 and no murmur, click or rub -     ABDOMEN:  soft, nontender, no HSM or masses and bowel sounds normal     MS: extremities normal- no gross deformities noted, no evidence of inflammation in joints, FROM in all extremities.     SKIN: no suspicious lesions or rashes     NEURO: Normal strength and tone, sensory exam grossly normal, mentation intact and speech normal     PSYCH: mentation appears normal. and affect normal/bright     LYMPHATICS: No cervical or supraclavicular nodes      DIAGNOSTICS:                                                    EKG today: Sinus  Rhythm   -Left axis -anterior fascicular block.    Voltage criteria for LVH  (R(aVL) exceeds 1.01 mV)  -Voltage criteria w/o ST/T abnormality may be normal.         Results for orders placed or performed in visit on 12/06/17   Basic metabolic panel   Result Value Ref Range    Sodium 139 133 - 144 mmol/L    Potassium 4.7 3.4 - 5.3 mmol/L    Chloride 103 94 - 109 mmol/L    Carbon Dioxide 31 20 - 32 mmol/L    Anion Gap 5 3 - 14 mmol/L    Glucose 197 (H) 70 - 99 mg/dL    Urea Nitrogen 21 7 - 30 mg/dL    Creatinine 1.14 (H) 0.52 - 1.04 mg/dL    GFR Estimate 47 (L) >60 mL/min/1.7m2    GFR Estimate If Black 57 (L) >60 mL/min/1.7m2    Calcium 9.2 8.5 - 10.1 mg/dL   Hemoglobin   Result Value Ref Range    Hemoglobin 12.9 11.7 - 15.7 g/dL        Recent Labs   Lab Test  09/25/17   1117  08/08/17   " 1900  06/01/17   1058  04/07/17   1124   HGB  13.0  13.1   --    --    PLT  143*  138*   --    --    NA  137   --    --   138   POTASSIUM  4.3   --    --   4.5   CR  1.02   --    --   1.15*   A1C  7.9*   --   5.7   --         IMPRESSION:                                                    Reason for surgery/procedure: see above  Diagnosis/reason for consult: see above    The proposed surgical procedure is considered LOW risk.    REVISED CARDIAC RISK INDEX  The patient has the following serious cardiovascular risks for perioperative complications such as (MI, PE, VFib and 3  AV Block):  Diabetes Mellitus (on Insulin)  INTERPRETATION: 1 risks: Class II (low risk - 0.9% complication rate)    The patient has the following additional risks for perioperative complications:  No identified additional risks      ICD-10-CM    1. Preop general physical exam Z01.818        RECOMMENDATIONS:                                                      Given the left anterior fascicular block noted on today's EKG, consider intraoperative and postoperative cardiac monitoring, for 2 to 4 hours after the surgical procedure.       Patient Instructions   Please stop your aspirin starting 7 days before your surgery or procedure. You can restart it as soon as your surgeon tells you that it is OK to do so.    Please do not take losartan on the day of the procedure or surgery-you can restart it the next day.  Regarding the Levemir insulin: take half of your usual dose (ie, take 17 units instead of 35 units) the evening before the procedure and take half of your usual dose (ie, take 19 units instead of 38 units) the morning of the procedure.  You may take your Novolog, with food, as usual.   .  Otherwise, you may continue to take all of your other medications.    Please call our clinic if you have any questions    Your next appointment with us is on: 1.24.18.    Before Your Surgery      Call your surgeon if there is any change in your health. This  includes signs of a cold or flu (such as a sore throat, runny nose, cough, rash or fever).    Do not smoke, drink alcohol or take over the counter medicine (unless your surgeon or primary care doctor tells you to) for the 24 hours before and after surgery.    If you take prescribed drugs: Follow your doctor s orders about which medicines to take and which to stop until after surgery.    Eating and drinking prior to surgery: follow the instructions from your surgeon    Take a shower or bath the night before surgery. Use the soap your surgeon gave you to gently clean your skin. If you do not have soap from your surgeon, use your regular soap. Do not shave or scrub the surgery site.  Wear clean pajamas and have clean sheets on your bed.        APPROVAL GIVEN to proceed with proposed procedure, without further diagnostic evaluation       Signed Electronically by: Hannah Cee MD    Copy of this evaluation report is provided to requesting physician.    [facility] Preop Guidelines

## 2017-12-05 NOTE — MR AVS SNAPSHOT
After Visit Summary   12/5/2017    Yadi Iniguez    MRN: [id number]           Patient Information     Date Of Birth          1950        Visit Information        Provider Department      12/5/2017 9:30 AM AN DIABETIC ED RESOURCE [facility]        Today's Diagnoses     Type 2 diabetes mellitus with other diabetic kidney complication, with long-term current use of insulin (H)          Care Instructions    My Diabetes Care Goals:    Healthy Eating: Do not skip meals during the day, just have a snack of something if not hungry.  Think about having more fruits.     Taking Medication: Levemir 38 units with breakfast and 35 units with dinner  Novolog 22 units with breakfast and 26 units with lunch and 20 units with dinner.      Follow up:  Follow-up diabetes education appointment scheduled on Tuesday Feb. 6 @ 9:30.      Bring blood glucose meter and logbook with you to all doctor and follow-up appointments.     [facility] for the [facility] Area:  For Your Diabetes Education and Nutrition Appointments Call:  [phone]   For Diabetes Education or Nutrition Related Questions:   Phone: [phone]  E-mail: [email or web address]  Fax: [phone]   If you need a medication refill please contact your pharmacy. Please allow 3 business days for your refills to be completed.    Instructions for emailing the Diabetes Educators    If you need to communicate a non-urgent message to a Diabetes Educator via email, please send to [email or web address].    Please follow the following email guidelines:    Subject line: Secure: your clinic name (example: [facility]: [facility])  In the email please include: First name, middle initial, last name and date of birth.    We will be in touch with you within one (1) business day.             Follow-ups after your visit        Your next 10 appointments already scheduled     Dec 06, 2017  9:40 AM CST   [facility] Long [facility]  [facility]    [address] [facility] [address] [phone]            Dec 11, 2017  9:00 AM CST   [facility] Visit with [facility]   [facility]    [address] [facility] [address]   [phone]           Bring a current list of meds and any records pertaining to this visit. For Physicals, please bring immunization records and any forms needing to be filled out. Please arrive 10 minutes early to complete paperwork.            Dec 11, 2017 10:00 AM CST   New Visit with Jh Chiu MD   [facility])    [address] [place] [address]   [phone]            Jan 24, 2018 10:20 AM CST   [facility]   [facility]    [address] [facility] [address]   [phone]           Bring a current list of meds and any records pertaining to this visit. For Physicals, please bring immunization records and any forms needing to be filled out. Please arrive 10 minutes early to complete paperwork.            Feb 06, 2018  9:30 AM CST   Diabetic Education with AN DIABETIC ED RESOURCE   [facility]    [address] [facility] [address] [phone]              Who to contact     If you have questions or need follow up information about today's clinic visit or your schedule please contact [facility] directly at [phone].  Normal or non-critical lab and imaging results will be communicated to you by MyChart, letter or phone within 4 business days after the clinic has received the results. If you do not hear from us within 7 days, please contact the clinic through MyChart or phone. If you have a critical or abnormal lab result, we will notify you by phone as soon as  possible.  Submit refill requests through [employer] or call your pharmacy and they will forward the refill request to us. Please allow 3 business days for your refill to be completed.          Additional Information About Your Visit        [employer] Information     [employer] gives you secure access to your electronic health record. If you see a primary care provider, you can also send messages to your care team and make appointments. If you have questions, please call your primary care clinic.  If you do not have a primary care provider, please call [phone] and they will assist you.        Care EveryWhere ID     This is your Care EveryWhere ID. This could be used by other organizations to access your [facility] medical records  [id number]         Blood Pressure from Last 3 Encounters:   11/06/17 136/78   10/25/17 170/80   09/25/17 138/70    Weight from Last 3 Encounters:   10/25/17 116.6 kg (257 lb)   09/25/17 117 kg (258 lb)   08/18/17 112.1 kg (247 lb 3.2 oz)              Today, you had the following     No orders found for display         Today's Medication Changes          These changes are accurate as of: 12/5/17 10:18 AM.  If you have any questions, ask your nurse or doctor.               These medicines have changed or have updated prescriptions.        Dose/Directions    insulin detemir 100 UNIT/ML injection   Commonly known as:  LEVEMIR FLEXPEN/FLEXTOUCH   This may have changed:  additional instructions   Used for:  Type 2 diabetes mellitus with other diabetic kidney complication, with long-term current use of insulin (H)        To take 38 units with breakfast and 35 units with dinner. Subcutaneously.   Quantity:  18 mL   Refills:  1       * NOVOLOG SC   This may have changed:  Another medication with the same name was changed. Make sure you understand how and when to take each.        Dose:  20 Units   Inject 20 Units Subcutaneous 3 times daily (with meals) Reported on 4/7/2017   Refills:  0       * insulin  aspart 100 UNIT/ML injection   Commonly known as:  NovoLOG FLEXPEN   This may have changed:  additional instructions   Used for:  Type 2 diabetes mellitus with other diabetic kidney complication, with long-term current use of insulin (H)        22 units before breakfast, 26 units before lunch, 20 units before dinner   Quantity:  18 mL   Refills:  3       * Notice:  This list has 2 medication(s) that are the same as other medications prescribed for you. Read the directions carefully, and ask your doctor or other care provider to review them with you.         Where to get your medicines      These medications were sent to [facility] [facility] [place] [address]  [address] [place] [address], [place] [address]     Phone:  [phone]     insulin aspart 100 UNIT/ML injection    insulin detemir 100 UNIT/ML injection                Primary Care Provider Office Phone # Fax #    Hannah Cee [phone] [address] [facility] [address]        Equal Access to Services     [place] campusANASTASIA : Hadii aad ku hadasho Soomaali, waaxda luqadaha, qaybta kaalmada adeegyada, waxay idiin hayshayen luis piper . So [facility] [phone].    ATENCIÓN: Si habla español, tiene a dunbar disposición servicios gratuitos de asistencia lingüística. [facility] [phone].    We comply with applicable federal civil rights laws and [place] laws. We do not discriminate on the basis of race, color, national origin, age, disability, sex, sexual orientation, or gender identity.            Thank you!     Thank you for choosing [facility]  for your care. Our goal is always to provide you with excellent care. Hearing back from our patients is one way we can continue to improve our services. Please take a few minutes to complete the written survey that you may receive in the mail after your visit with us. Thank you!             Your Updated Medication  List - Protect others around you: Learn how to safely use, store and throw away your medicines at www.disposemymeds.org.          This list is accurate as of: 12/5/17 10:18 AM.  Always use your most recent med list.                   Brand Name Dispense Instructions for use Diagnosis    ACE/ARB/ARNI NOT PRESCRIBED (INTENTIONAL)      Please choose reason not prescribed, below    Benign essential hypertension       albuterol (2.5 MG/3ML) 0.083% neb solution     1 vial    Take 1 vial (2.5 mg) by nebulization every 6 hours as needed for shortness of breath / dyspnea or wheezing 1 vial used in clinic today.    Chronic obstructive pulmonary disease, unspecified COPD type (H)       alendronate 70 MG tablet    FOSAMAX    12 tablet    Take 1 tablet (70 mg) by mouth every 7 days Take 60 minutes before am meal with 8 oz. water. Remain upright for 30 minutes.    Osteoporosis       amLODIPine 10 MG tablet    NORVASC    90 tablet    TAKE 1 TABLET (10 MG) BY MOUTH DAILY    Benign essential hypertension       ascorbic acid 1000 MG Tabs    vitamin C     1 TABLET DAILY AT DINNER        aspirin 81 MG tablet      ONE DAILY*        blood glucose monitoring lancets     100 Box    Use to test blood sugar 3 times daily or as directed.    Type 2 diabetes mellitus with other diabetic kidney complication, with long-term current use of insulin (H)       blood glucose monitoring test strip    MUSA CONTOUR NEXT    100 strip    Use to test blood sugar 3 times daily or as directed.    Type 2 diabetes mellitus with other diabetic kidney complication, with long-term current use of insulin (H)       carvedilol 25 MG tablet    COREG    60 tablet    Take 1 tablet (25 mg) by mouth 2 times daily (with meals)    Benign essential hypertension       CENTRUM SILVER per tablet      1 TABLET DAILY        clobetasol propionate 0.05 % Liqd     1 Bottle    Externally apply 1 dose topically 2 times daily as needed.    Skin rash       * FENOFIBRATE PO      Take 54  mg by mouth daily        * fenofibrate 54 MG tablet     90 tablet    Take 1 tablet (54 mg) by mouth daily    Hyperlipidemia, unspecified hyperlipidemia type       insulin detemir 100 UNIT/ML injection    LEVEMIR FLEXPEN/FLEXTOUCH    18 mL    To take 38 units with breakfast and 35 units with dinner. Subcutaneously.    Type 2 diabetes mellitus with other diabetic kidney complication, with long-term current use of insulin (H)       insulin pen needle 32G X 4 MM     450 each    Use 5 pen needles daily or as directed.  Per insurance and patient preference    Type 2 diabetes mellitus with other diabetic kidney complication, with long-term current use of insulin (H)       Levothyroxine Sodium 150 MCG Caps     90 capsule    Take 150 mcg by mouth daily (with breakfast)    Hypothyroidism, unspecified type       LOSARTAN POTASSIUM PO      Take 50 mg by mouth daily        MAGNESIUM PO      Take 250 mg by mouth daily        * NOVOLOG SC      Inject 20 Units Subcutaneous 3 times daily (with meals) Reported on 4/7/2017        * insulin aspart 100 UNIT/ML injection    NovoLOG FLEXPEN    18 mL    22 units before breakfast, 26 units before lunch, 20 units before dinner    Type 2 diabetes mellitus with other diabetic kidney complication, with long-term current use of insulin (H)       OMEGA-3 FISH OIL PO      Take 1 g by mouth        PARoxetine 20 MG tablet    PAXIL    180 tablet    Take 1 tablet (20 mg) by mouth 2 times daily    Moderate major depression (H)       pravastatin 80 MG tablet    PRAVACHOL    90 tablet    Take 1 tablet (80 mg) by mouth every evening    Type 2 diabetes mellitus with other diabetic kidney complication, with long-term current use of insulin (H), Hyperlipidemia LDL goal <100       primidone 50 MG tablet    MYSOLINE    180 tablet    TAKE ONE TABLET(S) BY MOUTH TWICE DAILY    Essential tremor       sertraline 100 MG tablet    ZOLOFT    30 tablet    Take 0.5 tablets (50 mg) by mouth daily Patient will decrease  to 50mg/d x2 wks and stop d/t lack of effect.    Moderate major depression (H)       VITAMIN D3 PO      Take 10,000 Units by mouth daily        vitamin E 400 UNITS Tabs      1 tab a day        * Notice:  This list has 4 medication(s) that are the same as other medications prescribed for you. Read the directions carefully, and ask your doctor or other care provider to review them with you.

## 2017-12-05 NOTE — TELEPHONE ENCOUNTER
Yes, I can help you with it the next time you are in either Thursday or Friday.    Thank you    Melissa

## 2017-12-05 NOTE — TELEPHONE ENCOUNTER
I approve the proposed plan for insulin adjustment.   Let me know if a separate documentation encounter is still required.  Hannah Cee MD

## 2017-12-05 NOTE — PATIENT INSTRUCTIONS
Please stop your aspirin starting 7 days before your surgery or procedure. You can restart it as soon as your surgeon tells you that it is OK to do so.    Please do not take losartan on the day of the procedure or surgery-you can restart it the next day.  Regarding the Levemir insulin: take half of your usual dose (ie, take 17 units instead of 35 units) the evening before the procedure and take half of your usual dose (ie, take 19 units instead of 38 units) the morning of the procedure.  You may take your Novolog, with food, as usual.   .  Otherwise, you may continue to take all of your other medications.    Please call our clinic if you have any questions    Your next appointment with us is on: 1.24.18.    Before Your Surgery      Call your surgeon if there is any change in your health. This includes signs of a cold or flu (such as a sore throat, runny nose, cough, rash or fever).    Do not smoke, drink alcohol or take over the counter medicine (unless your surgeon or primary care doctor tells you to) for the 24 hours before and after surgery.    If you take prescribed drugs: Follow your doctor s orders about which medicines to take and which to stop until after surgery.    Eating and drinking prior to surgery: follow the instructions from your surgeon    Take a shower or bath the night before surgery. Use the soap your surgeon gave you to gently clean your skin. If you do not have soap from your surgeon, use your regular soap. Do not shave or scrub the surgery site.  Wear clean pajamas and have clean sheets on your bed.

## 2017-12-05 NOTE — PATIENT INSTRUCTIONS
My Diabetes Care Goals:    Healthy Eating: Do not skip meals during the day, just have a snack of something if not hungry.  Think about having more fruits.     Taking Medication: Levemir 38 units with breakfast and 35 units with dinner  Novolog 22 units with breakfast and 26 units with lunch and 20 units with dinner.      Follow up:  Follow-up diabetes education appointment scheduled on Tuesday Feb. 6 @ 9:30.      Bring blood glucose meter and logbook with you to all doctor and follow-up appointments.     [facility] for the [facility] Area:  For Your Diabetes Education and Nutrition Appointments Call:  [phone]   For Diabetes Education or Nutrition Related Questions:   Phone: [phone]  E-mail: [email or web address]  Fax: [phone]   If you need a medication refill please contact your pharmacy. Please allow 3 business days for your refills to be completed.    Instructions for emailing the Diabetes Educators    If you need to communicate a non-urgent message to a Diabetes Educator via email, please send to [email or web address].    Please follow the following email guidelines:    Subject line: Secure: your clinic name (example: [facility]: [facility])  In the email please include: First name, middle initial, last name and date of birth.    We will be in touch with you within one (1) business day.

## 2017-12-06 ENCOUNTER — OFFICE VISIT (OUTPATIENT)
Dept: INTERNAL MEDICINE | Facility: CLINIC | Age: 67
End: 2017-12-06
Payer: COMMERCIAL

## 2017-12-06 VITALS
OXYGEN SATURATION: 95 % | SYSTOLIC BLOOD PRESSURE: 122 MMHG | TEMPERATURE: 97.6 F | HEART RATE: 95 BPM | BODY MASS INDEX: 39.1 KG/M2 | HEIGHT: 68 IN | WEIGHT: 258 LBS | DIASTOLIC BLOOD PRESSURE: 63 MMHG

## 2017-12-06 DIAGNOSIS — E11.29 TYPE 2 DIABETES MELLITUS WITH OTHER KIDNEY COMPLICATION, UNSPECIFIED LONG TERM INSULIN USE STATUS: ICD-10-CM

## 2017-12-06 DIAGNOSIS — E11.319 DIABETIC RETINOPATHY ASSOCIATED WITH TYPE 2 DIABETES MELLITUS, MACULAR EDEMA PRESENCE UNSPECIFIED, UNSPECIFIED LATERALITY, UNSPECIFIED RETINOPATHY SEVERITY (H): ICD-10-CM

## 2017-12-06 DIAGNOSIS — Z01.818 PREOP GENERAL PHYSICAL EXAM: Primary | ICD-10-CM

## 2017-12-06 LAB
ANION GAP SERPL CALCULATED.3IONS-SCNC: 5 MMOL/L (ref 3–14)
BUN SERPL-MCNC: 21 MG/DL (ref 7–30)
CALCIUM SERPL-MCNC: 9.2 MG/DL (ref 8.5–10.1)
CHLORIDE SERPL-SCNC: 103 MMOL/L (ref 94–109)
CO2 SERPL-SCNC: 31 MMOL/L (ref 20–32)
CREAT SERPL-MCNC: 1.14 MG/DL (ref 0.52–1.04)
GFR SERPL CREATININE-BSD FRML MDRD: 47 ML/MIN/1.7M2
GLUCOSE SERPL-MCNC: 197 MG/DL (ref 70–99)
HGB BLD-MCNC: 12.9 G/DL (ref 11.7–15.7)
POTASSIUM SERPL-SCNC: 4.7 MMOL/L (ref 3.4–5.3)
SODIUM SERPL-SCNC: 139 MMOL/L (ref 133–144)

## 2017-12-06 PROCEDURE — 80048 BASIC METABOLIC PNL TOTAL CA: CPT | Performed by: INTERNAL MEDICINE

## 2017-12-06 PROCEDURE — 36415 COLL VENOUS BLD VENIPUNCTURE: CPT | Performed by: INTERNAL MEDICINE

## 2017-12-06 PROCEDURE — 93000 ELECTROCARDIOGRAM COMPLETE: CPT | Performed by: INTERNAL MEDICINE

## 2017-12-06 PROCEDURE — 99215 OFFICE O/P EST HI 40 MIN: CPT | Performed by: INTERNAL MEDICINE

## 2017-12-06 PROCEDURE — 85018 HEMOGLOBIN: CPT | Performed by: INTERNAL MEDICINE

## 2017-12-06 NOTE — TELEPHONE ENCOUNTER
I approve the proposed plan for insulin adjustment.     Approval for the insulin changes.    Melissa Mustafa RN/ANGELIQUEE  [facility] Diabetes Educator

## 2017-12-06 NOTE — MR AVS SNAPSHOT
After Visit Summary   12/6/2017    Yadi Iniguez    MRN: [id number]           Patient Information     Date Of Birth          1950        Visit Information        Provider Department      12/6/2017 9:40 AM Hannah Cee MD [facility]        Today's Diagnoses     Preop general physical exam    -  1      Care Instructions    Please stop your aspirin starting 7 days before your surgery or procedure. You can restart it as soon as your surgeon tells you that it is OK to do so.    Please do not take losartan on the day of the procedure or surgery-you can restart it the next day.  Regarding the Levemir insulin: take half of your usual dose (ie, take 17 units instead of 35 units) the evening before the procedure and take half of your usual dose (ie, take 19 units instead of 38 units) the morning of the procedure.  You may take your Novolog, with food, as usual.   .  Otherwise, you may continue to take all of your other medications.    Please call our clinic if you have any questions    Your next appointment with us is on: 1.24.18.    Before Your Surgery      Call your surgeon if there is any change in your health. This includes signs of a cold or flu (such as a sore throat, runny nose, cough, rash or fever).    Do not smoke, drink alcohol or take over the counter medicine (unless your surgeon or primary care doctor tells you to) for the 24 hours before and after surgery.    If you take prescribed drugs: Follow your doctor s orders about which medicines to take and which to stop until after surgery.    Eating and drinking prior to surgery: follow the instructions from your surgeon    Take a shower or bath the night before surgery. Use the soap your surgeon gave you to gently clean your skin. If you do not have soap from your surgeon, use your regular soap. Do not shave or scrub the surgery site.  Wear clean pajamas and have clean sheets on your bed.           Follow-ups after  your visit        Your next 10 appointments already scheduled     Dec 11, 2017  9:00 AM CST   [facility]    [address] [facility] [address] [phone]           Bring a current list of meds and any records pertaining to this visit. For Physicals, please bring immunization records and any forms needing to be filled out. Please arrive 10 minutes early to complete paperwork.            Dec 11, 2017 10:00 AM CST   [facility] Visit [facility]   [facility]    [address] [facility] [address]   [phone]            Jan 24, 2018 10:20 AM CST   [facility]    [address] [facility] [address] [phone]           Bring a current list of meds and any records pertaining to this visit. For Physicals, please bring immunization records and any forms needing to be filled out. Please arrive 10 minutes early to complete paperwork.            Feb 06, 2018  9:30 AM CST   Diabetic Education with AN DIABETIC ED RESOURCE   [facility]    [address] [facility] [address] [phone]              Who to contact     If you have questions or need follow up information about today's clinic visit or your schedule please contact [facility] directly at [phone].  Normal or non-critical lab and imaging results will be communicated to you by MyChart, letter or phone within 4 business days after the clinic has received the results. If you do not hear from us within 7 days, please contact the clinic through MyChart or phone. If you have a critical or abnormal lab result, we will notify you by phone as soon as possible.  Submit refill requests through Snipshot or call your pharmacy and they will forward the  "refill request to us. Please allow 3 business days for your refill to be completed.          Additional Information About Your Visit        BarEyehart Information     [employer] gives you secure access to your electronic health record. If you see a primary care provider, you can also send messages to your care team and make appointments. If you have questions, please call your primary care clinic.  If you do not have a primary care provider, please call [phone] and they will assist you.        Care EveryWhere ID     This is your Care EveryWhere ID. This could be used by other organizations to access your [facility] medical records  [id number]        Your Vitals Were     Pulse Temperature Height Pulse Oximetry BMI (Body Mass Index)       95 97.6  F (36.4  C) (Oral) 5' 7.5\" (1.715 m) 95% 39.81 kg/m2        Blood Pressure from Last 3 Encounters:   12/06/17 122/63   11/06/17 136/78   10/25/17 170/80    Weight from Last 3 Encounters:   12/06/17 258 lb (117 kg)   10/25/17 257 lb (116.6 kg)   09/25/17 258 lb (117 kg)              We Performed the Following     Basic metabolic panel     EKG 12-lead complete w/read - Clinics     Hemoglobin        Primary Care Provider Office Phone # Fax #    Hannah Cee [phone] [address] [facility] [address]        Equal Access to Services     [facility]: Hadii aad ku hadasho Soomaali, waaxda luqadaha, qaybta kaalmada aderoselynyada, chandra piper . So [facility] [phone].    ATENCIÓN: Si habla español, tiene a dunbar disposición servicios gratuitos de asistencia lingüística. Llame al [phone].    We comply with applicable federal civil rights laws and [place] laws. We do not discriminate on the basis of race, color, national origin, age, disability, sex, sexual orientation, or gender identity.            Thank you!     Thank you for choosing [facility]  for your care. Our goal is always to provide you " with excellent care. Hearing back from our patients is one way we can continue to improve our services. Please take a few minutes to complete the written survey that you may receive in the mail after your visit with us. Thank you!             Your Updated Medication List - Protect others around you: Learn how to safely use, store and throw away your medicines at www.disposemymeds.org.          This list is accurate as of: 12/6/17  9:55 AM.  Always use your most recent med list.                   Brand Name Dispense Instructions for use Diagnosis    ACE/ARB/ARNI NOT PRESCRIBED (INTENTIONAL)      Please choose reason not prescribed, below    Benign essential hypertension       albuterol (2.5 MG/3ML) 0.083% neb solution     1 vial    Take 1 vial (2.5 mg) by nebulization every 6 hours as needed for shortness of breath / dyspnea or wheezing 1 vial used in clinic today.    Chronic obstructive pulmonary disease, unspecified COPD type (H)       alendronate 70 MG tablet    FOSAMAX    12 tablet    Take 1 tablet (70 mg) by mouth every 7 days Take 60 minutes before am meal with 8 oz. water. Remain upright for 30 minutes.    Osteoporosis       amLODIPine 10 MG tablet    NORVASC    90 tablet    TAKE 1 TABLET (10 MG) BY MOUTH DAILY    Benign essential hypertension       ascorbic acid 1000 MG Tabs    vitamin C     1 TABLET DAILY AT DINNER        aspirin 81 MG tablet      ONE DAILY*        * blood glucose monitoring lancets     100 Box    Use to test blood sugar 3 times daily or as directed.    Type 2 diabetes mellitus with other diabetic kidney complication, with long-term current use of insulin (H)       * ONETOUCH DELICA LANCETS 33G Misc     100 each    1 Box 4 times daily (before meals and nightly)    Type 2 diabetes mellitus with other diabetic kidney complication, with long-term current use of insulin (H)       * blood glucose monitoring test strip    MUSA CONTOUR NEXT    100 strip    Use to test blood sugar 3 times daily or  as directed.    Type 2 diabetes mellitus with other diabetic kidney complication, with long-term current use of insulin (H)       * blood glucose monitoring test strip    ONETOUCH VERIO IQ    400 strip    Use to test blood sugar 4 times daily or as directed.  Ok to substitute alternative if insurance prefers.    Type 2 diabetes mellitus with other diabetic kidney complication, with long-term current use of insulin (H)       carvedilol 25 MG tablet    COREG    60 tablet    Take 1 tablet (25 mg) by mouth 2 times daily (with meals)    Benign essential hypertension       CENTRUM SILVER per tablet      1 TABLET DAILY        clobetasol propionate 0.05 % Liqd     1 Bottle    Externally apply 1 dose topically 2 times daily as needed.    Skin rash       * FENOFIBRATE PO      Take 54 mg by mouth daily        * fenofibrate 54 MG tablet     90 tablet    Take 1 tablet (54 mg) by mouth daily    Hyperlipidemia, unspecified hyperlipidemia type       insulin detemir 100 UNIT/ML injection    LEVEMIR FLEXPEN/FLEXTOUCH    18 mL    To take 38 units with breakfast and 35 units with dinner. Subcutaneously.    Type 2 diabetes mellitus with other diabetic kidney complication, with long-term current use of insulin (H)       insulin pen needle 32G X 4 MM     450 each    Use 5 pen needles daily or as directed.  Per insurance and patient preference    Type 2 diabetes mellitus with other diabetic kidney complication, with long-term current use of insulin (H)       Levothyroxine Sodium 150 MCG Caps     90 capsule    Take 150 mcg by mouth daily (with breakfast)    Hypothyroidism, unspecified type       LOSARTAN POTASSIUM PO      Take 50 mg by mouth daily        MAGNESIUM PO      Take 250 mg by mouth daily        * NOVOLOG SC      Inject 20 Units Subcutaneous 3 times daily (with meals) Reported on 4/7/2017        * insulin aspart 100 UNIT/ML injection    NovoLOG FLEXPEN    18 mL    22 units before breakfast, 26 units before lunch, 20 units before  dinner    Type 2 diabetes mellitus with other diabetic kidney complication, with long-term current use of insulin (H)       OMEGA-3 FISH OIL PO      Take 1 g by mouth        PARoxetine 20 MG tablet    PAXIL    180 tablet    Take 1 tablet (20 mg) by mouth 2 times daily    Moderate major depression (H)       pravastatin 80 MG tablet    PRAVACHOL    90 tablet    Take 1 tablet (80 mg) by mouth every evening    Type 2 diabetes mellitus with other diabetic kidney complication, with long-term current use of insulin (H), Hyperlipidemia LDL goal <100       primidone 50 MG tablet    MYSOLINE    180 tablet    TAKE ONE TABLET(S) BY MOUTH TWICE DAILY    Essential tremor       sertraline 100 MG tablet    ZOLOFT    30 tablet    Take 0.5 tablets (50 mg) by mouth daily Patient will decrease to 50mg/d x2 wks and stop d/t lack of effect.    Moderate major depression (H)       VITAMIN D3 PO      Take 10,000 Units by mouth daily        vitamin E 400 UNITS Tabs      1 tab a day        * Notice:  This list has 8 medication(s) that are the same as other medications prescribed for you. Read the directions carefully, and ask your doctor or other care provider to review them with you.

## 2017-12-06 NOTE — Clinical Note
Please fax my preoperative note to the Surgeon's fax number, for the patient's planned surgery on 12.15.17, and include the recent EKG. Thank you,  Hannah Cee MD

## 2017-12-08 ENCOUNTER — DOCUMENTATION ONLY (OUTPATIENT)
Dept: INTERNAL MEDICINE | Facility: CLINIC | Age: 67
End: 2017-12-08
Payer: COMMERCIAL

## 2017-12-08 DIAGNOSIS — Z79.4 TYPE 2 DIABETES MELLITUS WITH OTHER DIABETIC KIDNEY COMPLICATION, WITH LONG-TERM CURRENT USE OF INSULIN (H): Primary | ICD-10-CM

## 2017-12-08 DIAGNOSIS — E11.29 TYPE 2 DIABETES MELLITUS WITH OTHER DIABETIC KIDNEY COMPLICATION, WITH LONG-TERM CURRENT USE OF INSULIN (H): Primary | ICD-10-CM

## 2017-12-08 PROCEDURE — 95251 CONT GLUC MNTR ANALYSIS I&R: CPT | Performed by: INTERNAL MEDICINE

## 2017-12-08 NOTE — PROGRESS NOTES
Dear Yadi,     Your test results are attached.    Your preoperative labs are within normal limits.    Please notify me via MyChart or contact the clinic at [phone] if you have any questions.    Hannah Cee MD

## 2017-12-08 NOTE — PROGRESS NOTES
LibrePro Continuous Glucose Monitor Interpretation      Patient History:   1. Type of Diabetes: Type 2 diabetes  2. Duration of diabetes or year of diagnosis: about 20-30 yrs.  3. Current treatment regimen (include all diabetes medications, dose & dosing frequency/timing):   yes:     Diabetes Medication(s)     Insulin Sig     insulin detemir (LEVEMIR FLEXPEN/FLEXTOUCH) 100 UNIT/ML injection To take 35 units with breakfast and 35 units with dinner. Subcutaneously.     insulin aspart (NOVOLOG FLEXPEN) 100 UNIT/ML injection 20 units before breakfast, 20 units before lunch, 20 units before dinner     Insulin Aspart (NOVOLOG SC) Inject 20 Units Subcutaneous 3 times daily (with meals) Reported on 2017       Injectable Medications: Levemir 35-0-35-0 and NovoLog Insulin 20-20-20-0  *Abbreviated insulin dose documentation key: Insulin Trade Name (cupphertn-braaz-hrxjlr-bedtime) - i.e. Humalog 5-5-5-0 (Humalog 5 units at breakfast, 5 units at lunch, and 5 units at dinner).  4. Most Recent A1c Result:          Lab Results   Component Value Date     A1C 7.9 2017      5. Indication/s for LibrePro study: Difficult to manage hypoglycemia and/or hyperglycemia, despite multiple adjustments in self-monitoring of blood glucose and diabetes medication administration.     Statistics:   1. Sensor worn for 14 days.  2. Glucose excursions:   Percent in target is: 19%  Percent above target is: 81%  Percent below target is: 0%  3. Estimated A1c: 6.6%      Data evaluation:   1. Sensor modal day evaluation shows the followin. Consistent day-to-day patterns noted: pattern of daytime hyperglycemia.  2. Average glucose: 143 mg/dL.  3. Low glucose events: 0     Patient's Logbook shows the following:   Carbohydrate counting is: accurate  Medication and/or insulin dosing is: accurate      Assessment and Plan:  Recommend increase to insulin - Levemir from 35 to 38 units with breakfast, keep 35 units with dinner  Novolog from 20  units with breakfast to 22 units with breakfast, lunch from 20 units to 26 units, and dinner, keep at 20 units  (22-26-20-0)   She had one low during the night time, due to missing lunch meal that day.  Her diet is well proportioned.        Melissa Mustafa RN/RONALDO  [place] [facility] Educator      I agree with the above plan.      Hannah Cee MD

## 2017-12-11 ENCOUNTER — OFFICE VISIT (OUTPATIENT)
Dept: NURSING | Facility: CLINIC | Age: 67
End: 2017-12-11
Attending: INTERNAL MEDICINE
Payer: COMMERCIAL

## 2017-12-11 ENCOUNTER — OFFICE VISIT (OUTPATIENT)
Dept: PULMONOLOGY | Facility: CLINIC | Age: 67
End: 2017-12-11
Attending: INTERNAL MEDICINE
Payer: COMMERCIAL

## 2017-12-11 VITALS
HEIGHT: 68 IN | BODY MASS INDEX: 38.89 KG/M2 | HEART RATE: 79 BPM | SYSTOLIC BLOOD PRESSURE: 138 MMHG | WEIGHT: 256.6 LBS | DIASTOLIC BLOOD PRESSURE: 69 MMHG | OXYGEN SATURATION: 93 %

## 2017-12-11 DIAGNOSIS — Z87.891 HISTORY OF TOBACCO USE: Primary | ICD-10-CM

## 2017-12-11 DIAGNOSIS — J45.909 ASTHMA: Primary | ICD-10-CM

## 2017-12-11 LAB
DLCOUNC-%PRED-PRE: 77 %
DLCOUNC-PRE: 17.42 ML/MIN/MMHG
DLCOUNC-PRED: 22.43 ML/MIN/MMHG
ERV-%PRED-PRE: 48 %
ERV-PRE: 0.19 L
ERV-PRED: 0.39 L
EXPTIME-PRE: 5.6 SEC
FEF2575-%PRED-POST: 63 %
FEF2575-%PRED-PRE: 61 %
FEF2575-POST: 1.34 L/SEC
FEF2575-PRE: 1.29 L/SEC
FEF2575-PRED: 2.12 L/SEC
FEFMAX-%PRED-PRE: 62 %
FEFMAX-PRE: 4.05 L/SEC
FEFMAX-PRED: 6.44 L/SEC
FEV1-%PRED-PRE: 54 %
FEV1-PRE: 1.41 L
FEV1FEV6-PRE: 82 %
FEV1FEV6-PRED: 79 %
FEV1FVC-PRE: 82 %
FEV1FVC-PRED: 78 %
FEV1SVC-PRE: 73 %
FEV1SVC-PRED: 71 %
FIFMAX-PRE: 1.93 L/SEC
FRCPLETH-%PRED-PRE: 103 %
FRCPLETH-PRE: 3.07 L
FRCPLETH-PRED: 2.95 L
FVC-%PRED-PRE: 51 %
FVC-PRE: 1.73 L
FVC-PRED: 3.36 L
IC-%PRED-PRE: 53 %
IC-PRE: 1.75 L
IC-PRED: 3.25 L
RVPLETH-%PRED-PRE: 129 %
RVPLETH-PRE: 2.88 L
RVPLETH-PRED: 2.23 L
TLCPLETH-%PRED-PRE: 85 %
TLCPLETH-PRE: 4.82 L
TLCPLETH-PRED: 5.65 L
VA-%PRED-PRE: 66 %
VA-PRE: 3.82 L
VC-%PRED-PRE: 53 %
VC-PRE: 1.94 L
VC-PRED: 3.64 L

## 2017-12-11 PROCEDURE — 94729 DIFFUSING CAPACITY: CPT | Performed by: INTERNAL MEDICINE

## 2017-12-11 PROCEDURE — G0296 VISIT TO DETERM LDCT ELIG: HCPCS | Performed by: INTERNAL MEDICINE

## 2017-12-11 PROCEDURE — 99207 ZZC DROP WITH A PROCEDURE: CPT | Performed by: INTERNAL MEDICINE

## 2017-12-11 PROCEDURE — 94726 PLETHYSMOGRAPHY LUNG VOLUMES: CPT | Performed by: INTERNAL MEDICINE

## 2017-12-11 PROCEDURE — 99215 OFFICE O/P EST HI 40 MIN: CPT | Mod: 25 | Performed by: INTERNAL MEDICINE

## 2017-12-11 PROCEDURE — 94060 EVALUATION OF WHEEZING: CPT | Performed by: INTERNAL MEDICINE

## 2017-12-11 NOTE — PROGRESS NOTES
Pulmonary Clinic     We have been asked by Dr. Cee to evaluate this patient in regards to   Chief Complaint   Patient presents with     Consult         HPI:     This is a 67-year-old female with multiple medical comorbidities who was referred to me secondary to dyspnea. She reports that she has had episodes of dyspnea and had to go to the ER.  This occurred in August 8, 2017 and she was admitted with COPD exacerbation. She was was treated with nebs, steroids, and abx. She was in the hospital for 5 days. She had wheezing and productive cough at the time of admission. She was not dx'd with pna at that time. She was discharged on prednisone and albuterol prn. She is using the albuterol daily. She does not have much of a productive cough in the AM. She does have significant rhinitis which is chronic for the last 1-2 years with post nasal drip. She had a CT scan on 08/2017 - small amount of bronchiectasis, evidence of granulomatous dz, and mucus plugging. She is able to walk 1 city block and she was have to stop from back pain. She would have mild shortness of breath after 1 block. She could do one flight of stairs. Weight is stable.    She has family hx of asthma.  UTD on PNA vaccination and flu vaccination.        Review of Systems:   10 point ROS performed with pertinent +/- noted in the HPI.  The remainder of the ROS was otherwise negative.        Pertinent Medications     Current Outpatient Prescriptions   Medication     insulin detemir (LEVEMIR FLEXPEN/FLEXTOUCH) 100 UNIT/ML injection     insulin aspart (NOVOLOG FLEXPEN) 100 UNIT/ML injection     blood glucose monitoring (ONETOUCH VERIO IQ) test strip     ONETOUCH DELICA LANCETS 33G MISC     primidone (MYSOLINE) 50 MG tablet     insulin pen needle 32G X 4 MM     amLODIPine (NORVASC) 10 MG tablet     sertraline (ZOLOFT) 100 MG tablet     carvedilol (COREG) 25 MG tablet     PARoxetine (PAXIL) 20 MG tablet     fenofibrate 54 MG tablet     pravastatin (PRAVACHOL)  "80 MG tablet     LOSARTAN POTASSIUM PO     MAGNESIUM PO     albuterol (2.5 MG/3ML) 0.083% neb solution     Levothyroxine Sodium 150 MCG CAPS     alendronate (FOSAMAX) 70 MG tablet     blood glucose monitoring (MUSA CONTOUR NEXT) test strip     blood glucose monitoring (MUSA MICROLET) lancets     ACE/ARB NOT PRESCRIBED, INTENTIONAL,     Cholecalciferol (VITAMIN D3 PO)     Omega-3 Fatty Acids (OMEGA-3 FISH OIL PO)     FENOFIBRATE PO     Clobetasol Propionate 0.05 % LIQD     ASPIRIN 81 MG OR TABS     VITAMIN C 1000 MG OR TABS     VITAMIN E 400 UNIT OR TABS     CENTRUM SILVER OR TABS     [DISCONTINUED] Insulin Aspart (NOVOLOG SC)     No current facility-administered medications for this visit.         Allergies:    No Known Allergies       Past Medical Hx:      Hx of tobacco use      CKD (chronic kidney disease) stage 3, GFR 30-59 ml/min           Hypothyroidism - Hx of Grave's Dz      Hypertension goal BP (blood pressure) < 140/90      Type 2 diabetes, HbA1C goal < 8% (H)      Hyperlipidemia LDL goal <100      Moderate major depression (H) - controlled paxil      Incontinence of urine      Neuropathy in diabetes (H)      Eczema      Essential tremor       Family Hx:     Mother - muscular dystrophy  Brother - muscular dystrophy and MS  Father - mesothelioma       Social Hx:   The patient has a 50 pk yr tobacco hx (50 years, 1 ppd).  She has no active use since August of 2017.  Alcohol use is rare alcoholic drinks per week.  She denies use of recreational drugs.      She is retired. She was a  for school.       The patient is .  Has 1 child.    Hot Tub Exposure: NO  Recent Travel:  NO   Hx of incarceration:  NO  Bird Exposure:    NO  Animal Exposure:  Cat  Inhalation Exposure:  NO         Objective   Vitals:  /69 (BP Location: Left arm, Patient Position: Chair, Cuff Size: Adult Large)  Pulse 79  Ht 1.734 m (5' 8.25\")  Wt 116.4 kg (256 lb 9.6 oz)  SpO2 93%  BMI 38.73 kg/m2    General:  Adult " female;appears stated age; no acute distress; the patient is a good historian  HEENT:  NCAT; EOMI; No icterus; no injection; MMM  Neck: Supple, full range of motion, no lymphadenopathy  Pulm: CTAB, normal to percussion  CV: RRR, no murmurs, rubs or gallops        Labs / Imaging/Studies     CBC RESULTS:   Recent Labs   Lab Test  17   1019  17   1117  17   1900   WBC   --   7.0  5.2   RBC   --   3.85  3.80   HGB  12.9  13.0  13.1   HCT   --   37.8  38.0   MCV   --   98  100   MCH   --   33.8*  34.5*   MCHC   --   34.4  34.5   RDW   --   12.9  13.4   PLT   --   143*  138*          Lab Results   Component Value Date     2017     2017     2017    Lab Results   Component Value Date    CHLORIDE 103 2017    CHLORIDE 99 2017    CHLORIDE 102 2017    Lab Results   Component Value Date    BUN 21 2017    BUN 22 2017    BUN 29 2017      Lab Results   Component Value Date    POTASSIUM 4.7 2017    POTASSIUM 4.3 2017    POTASSIUM 4.5 2017    Lab Results   Component Value Date    CO2 31 2017    CO2 29 2017    CO2 32 2017    Lab Results   Component Value Date    CR 1.14 2017    CR 1.02 2017    CR 1.15 2017        Imagin/2017:   Impression:   1. Small areas of right lung atelectasis adjacent to segments of mild bronchiectasis.  2. Evidence of granulomatous disease.  3. Atherosclerosis.  4. Small area of left lower lobe mucus plugging    Pulmonary Function Tests:  2017:  Ratio 82, FEV1 1.41 (54), FVC 1.73 (51), TLC 4.82 (85), DLCO 17.42 (77)         Assessment and Plan:   Assessment: 67-year-old female who was recently hospitalized for a reported COPD exacerbation and she was treated with antibiotics, steroids, bronchodilators with subsequent improvement.  She had a CT scan at that time which demonstrated very minimal area of bronchiectasis and some small pulmonary nodules.  Since  discharge she was weaned off of steroids and has continued to take daily albuterol via nebulizer.  She has limited ambulation secondary to back pain.  She had pulmonary function tests today which demonstrate a mild decrease in diffusion capacity is 77% predicted and a pattern which is a nonspecific pattern on PFTs otherwise.  Total lung capacities did not demonstrate any restrictive lung disease.  At this point she may have very mild obstruction and a mild reduction diffusion capacity but does not need additional treatment. Nonspecific pattern most likely 2/2 obesity (BMI 38).  She may have had reactive airway disease in the setting of an infection back in August which would explain the reason why she had such a good response to steroids and antibiotics.      We did discuss a lung cancer screening CT scan in August 2018 given she had here tobacco use history.  We discussed the risks and the benefits of a lung cancer screening CT scan including the possibility of a false positives and radiation expsoure.     1. History of tobacco use  See above  - Prof fee: Shared Decisionmaking for Lung Cancer Screening  - CT Chest Lung Cancer Scrn Low Dose wo; Future    I spent 45 minutes with direct face to face interaction with this patient and provided at least 50% of this time counseling and coordinating care for dyspnea, lung cancer screening, pulmonary nodules as noted above in the assessment and plan.        Jh Chiu MD  Pulmonary and Critical Care  [facility]  Pager:  [phone]

## 2017-12-11 NOTE — NURSING NOTE
"Yadi Iniguez's goals for this visit include: Asthma  She requests these members of her care team be copied on today's visit information: yes    PCP: Hannah Cee    Referring Provider:  Hannah Cee MD  [address] [place] [address]    Chief Complaint   Patient presents with     COPD       Initial /69 (BP Location: Left arm, Patient Position: Chair, Cuff Size: Adult Large)  Pulse 79  Ht 1.734 m (5' 8.25\")  Wt 116.4 kg (256 lb 9.6 oz)  SpO2 93%  BMI 38.73 kg/m2 Estimated body mass index is 38.73 kg/(m^2) as calculated from the following:    Height as of this encounter: 1.734 m (5' 8.25\").    Weight as of this encounter: 116.4 kg (256 lb 9.6 oz).  Medication Reconciliation: complete    Do you need any medication refills at today's visit? no    "

## 2017-12-11 NOTE — PATIENT INSTRUCTIONS
1.  Have lung cancer screening CT scan in 08/2018  2.  You can stop the albuterol  3.  If your breathing worsens then come back sooner

## 2017-12-11 NOTE — MR AVS SNAPSHOT
After Visit Summary   12/11/2017    Yadi Iniguez    MRN: [id number]           Patient Information     Date Of Birth          1950        Visit Information        Provider Department      12/11/2017 9:00 AM PFT LAB [facility]        Today's Diagnoses     Asthma    -  1       Follow-ups after your visit        Your next 10 appointments already scheduled     Dec 11, 2017 10:00 AM CST   New Visit with Jh Chiu MD   [facility] ([facility])    [address] [place] [id number] [phone]            Jan 24, 2018 10:20 AM CST   Office Visit with Hannah Cee MD   [facility] ([facility])    [address] [facility] [address] [phone]           Bring a current list of meds and any records pertaining to this visit. For Physicals, please bring immunization records and any forms needing to be filled out. Please arrive 10 minutes early to complete paperwork.            Feb 06, 2018  9:30 AM CST   Diabetic Education with AN DIABETIC ED RESOURCE   [facility] ([facility])    [address] [address] [phone]              Who to contact     If you have questions or need follow up information about today's clinic visit or your schedule please contact [facility] directly at [phone].  Normal or non-critical lab and imaging results will be communicated to you by MyChart, letter or phone within 4 business days after the clinic has received the results. If you do not hear from us within 7 days, please contact the clinic through MyChart or phone. If you have a critical or abnormal lab result, we will notify you by phone as soon as possible.  Submit refill requests through [employer] or call your pharmacy and they will forward the refill request to us. Please allow 3 business days for your refill to be  completed.          Additional Information About Your Visit        [email or web address] Information     [employer] gives you secure access to your electronic health record. If you see a primary care provider, you can also send messages to your care team and make appointments. If you have questions, please call your primary care clinic.  If you do not have a primary care provider, please call [phone] and they will assist you.      [employer] is an electronic gateway that provides easy, online access to your medical records. With [employer], you can request a clinic appointment, read your test results, renew a prescription or communicate with your care team.     To access your existing account, please contact your [facility] or call [phone] for assistance.        Care EveryWhere ID     This is your Care EveryWhere ID. This could be used by other organizations to access your [facility] [place] medical records  [id number]         Blood Pressure from Last 3 Encounters:   12/06/17 122/63   11/06/17 136/78   10/25/17 170/80    Weight from Last 3 Encounters:   12/06/17 117 kg (258 lb)   10/25/17 116.6 kg (257 lb)   09/25/17 117 kg (258 lb)              We Performed the Following     General PFT Lab (Please always keep checked)     HC DIFFUSING CAPACITY     HC PLETHYSMOGRAPHY LUNG VOLUMES W/WO AIRWAY RESIST     RESPIRATORY FLOW VOLUME LOOP        Primary Care Provider Office Phone # Fax #    Hannah Cee [phone] [address] [facility] [address]        Equal Access to Services     SHO DAVIS : Hadii bunny ku hadasho Soomaali, waaxda luqadaha, qaybta kaalmada adeegyada, chandra piper . So [facility] [phone].    ATENCIÓN: Si habla español, tiene a dunbar disposición servicios gratuitos de asistencia lingüística. Llame al [phone].    We comply with applicable federal civil rights laws and [place] laws. We do not discriminate on the basis of  race, color, national origin, age, disability, sex, sexual orientation, or gender identity.            Thank you!     Thank you for choosing [facility]  for your care. Our goal is always to provide you with excellent care. Hearing back from our patients is one way we can continue to improve our services. Please take a few minutes to complete the written survey that you may receive in the mail after your visit with us. Thank you!             Your Updated Medication List - Protect others around you: Learn how to safely use, store and throw away your medicines at www.disposemymeds.org.          This list is accurate as of: 12/11/17  9:39 AM.  Always use your most recent med list.                   Brand Name Dispense Instructions for use Diagnosis    ACE/ARB/ARNI NOT PRESCRIBED (INTENTIONAL)      Please choose reason not prescribed, below    Benign essential hypertension       albuterol (2.5 MG/3ML) 0.083% neb solution     1 vial    Take 1 vial (2.5 mg) by nebulization every 6 hours as needed for shortness of breath / dyspnea or wheezing 1 vial used in clinic today.    Chronic obstructive pulmonary disease, unspecified COPD type (H)       alendronate 70 MG tablet    FOSAMAX    12 tablet    Take 1 tablet (70 mg) by mouth every 7 days Take 60 minutes before am meal with 8 oz. water. Remain upright for 30 minutes.    Osteoporosis       amLODIPine 10 MG tablet    NORVASC    90 tablet    TAKE 1 TABLET (10 MG) BY MOUTH DAILY    Benign essential hypertension       ascorbic acid 1000 MG Tabs    vitamin C     1 TABLET DAILY AT DINNER        aspirin 81 MG tablet      ONE DAILY*        * blood glucose monitoring lancets     100 Box    Use to test blood sugar 3 times daily or as directed.    Type 2 diabetes mellitus with other diabetic kidney complication, with long-term current use of insulin (H)       * ONETOUCH DELICA LANCETS 33G Misc     100 each    1 Box 4 times daily (before meals and nightly)    Type 2  diabetes mellitus with other diabetic kidney complication, with long-term current use of insulin (H)       * blood glucose monitoring test strip    MUSA CONTOUR NEXT    100 strip    Use to test blood sugar 3 times daily or as directed.    Type 2 diabetes mellitus with other diabetic kidney complication, with long-term current use of insulin (H)       * blood glucose monitoring test strip    ONETOUCH VERIO IQ    400 strip    Use to test blood sugar 4 times daily or as directed.  Ok to substitute alternative if insurance prefers.    Type 2 diabetes mellitus with other diabetic kidney complication, with long-term current use of insulin (H)       carvedilol 25 MG tablet    COREG    60 tablet    Take 1 tablet (25 mg) by mouth 2 times daily (with meals)    Benign essential hypertension       CENTRUM SILVER per tablet      1 TABLET DAILY        clobetasol propionate 0.05 % Liqd     1 Bottle    Externally apply 1 dose topically 2 times daily as needed.    Skin rash       * FENOFIBRATE PO      Take 54 mg by mouth daily        * fenofibrate 54 MG tablet     90 tablet    Take 1 tablet (54 mg) by mouth daily    Hyperlipidemia, unspecified hyperlipidemia type       insulin detemir 100 UNIT/ML injection    LEVEMIR FLEXPEN/FLEXTOUCH    18 mL    To take 38 units with breakfast and 35 units with dinner. Subcutaneously.    Type 2 diabetes mellitus with other diabetic kidney complication, with long-term current use of insulin (H)       insulin pen needle 32G X 4 MM     450 each    Use 5 pen needles daily or as directed.  Per insurance and patient preference    Type 2 diabetes mellitus with other diabetic kidney complication, with long-term current use of insulin (H)       Levothyroxine Sodium 150 MCG Caps     90 capsule    Take 150 mcg by mouth daily (with breakfast)    Hypothyroidism, unspecified type       LOSARTAN POTASSIUM PO      Take 50 mg by mouth daily        MAGNESIUM PO      Take 250 mg by mouth daily        * NOVOLOG SC       Inject 20 Units Subcutaneous 3 times daily (with meals) Reported on 4/7/2017        * insulin aspart 100 UNIT/ML injection    NovoLOG FLEXPEN    18 mL    22 units before breakfast, 26 units before lunch, 20 units before dinner    Type 2 diabetes mellitus with other diabetic kidney complication, with long-term current use of insulin (H)       OMEGA-3 FISH OIL PO      Take 1 g by mouth        PARoxetine 20 MG tablet    PAXIL    180 tablet    Take 1 tablet (20 mg) by mouth 2 times daily    Moderate major depression (H)       pravastatin 80 MG tablet    PRAVACHOL    90 tablet    Take 1 tablet (80 mg) by mouth every evening    Type 2 diabetes mellitus with other diabetic kidney complication, with long-term current use of insulin (H), Hyperlipidemia LDL goal <100       primidone 50 MG tablet    MYSOLINE    180 tablet    TAKE ONE TABLET(S) BY MOUTH TWICE DAILY    Essential tremor       sertraline 100 MG tablet    ZOLOFT    30 tablet    Take 0.5 tablets (50 mg) by mouth daily Patient will decrease to 50mg/d x2 wks and stop d/t lack of effect.    Moderate major depression (H)       VITAMIN D3 PO      Take 10,000 Units by mouth daily        vitamin E 400 UNITS Tabs      1 tab a day        * Notice:  This list has 8 medication(s) that are the same as other medications prescribed for you. Read the directions carefully, and ask your doctor or other care provider to review them with you.

## 2017-12-12 NOTE — PROGRESS NOTES
Dear Yadi,     Your test results are attached.    Please follow Dr Chiu's advise regarding optimal COPD lung disease treatment.    Please notify me via MyChart or contact the clinic at [phone] if you have any questions.    Hannah Cee MD

## 2017-12-18 ENCOUNTER — TRANSFERRED RECORDS (OUTPATIENT)
Dept: HEALTH INFORMATION MANAGEMENT | Facility: CLINIC | Age: 67
End: 2017-12-18

## 2017-12-22 ENCOUNTER — TRANSFERRED RECORDS (OUTPATIENT)
Dept: HEALTH INFORMATION MANAGEMENT | Facility: CLINIC | Age: 67
End: 2017-12-22

## 2017-12-23 ENCOUNTER — TRANSFERRED RECORDS (OUTPATIENT)
Dept: HEALTH INFORMATION MANAGEMENT | Facility: CLINIC | Age: 67
End: 2017-12-23

## 2018-01-08 ENCOUNTER — TRANSFERRED RECORDS (OUTPATIENT)
Dept: HEALTH INFORMATION MANAGEMENT | Facility: CLINIC | Age: 68
End: 2018-01-08

## 2018-01-12 DIAGNOSIS — I10 HYPERTENSION GOAL BP (BLOOD PRESSURE) < 140/90: Primary | ICD-10-CM

## 2018-01-12 RX ORDER — LOSARTAN POTASSIUM 100 MG/1
100 TABLET ORAL DAILY
Qty: 30 TABLET | Refills: 10 | Status: SHIPPED | OUTPATIENT
Start: 2018-01-12 | End: 2018-08-24

## 2018-01-24 ENCOUNTER — OFFICE VISIT (OUTPATIENT)
Dept: INTERNAL MEDICINE | Facility: CLINIC | Age: 68
End: 2018-01-24
Payer: COMMERCIAL

## 2018-01-24 VITALS
WEIGHT: 260 LBS | SYSTOLIC BLOOD PRESSURE: 138 MMHG | HEART RATE: 92 BPM | OXYGEN SATURATION: 96 % | DIASTOLIC BLOOD PRESSURE: 75 MMHG | BODY MASS INDEX: 39.24 KG/M2 | RESPIRATION RATE: 16 BRPM | TEMPERATURE: 97.9 F

## 2018-01-24 DIAGNOSIS — F32.1 MODERATE MAJOR DEPRESSION (H): ICD-10-CM

## 2018-01-24 DIAGNOSIS — E03.9 HYPOTHYROIDISM, UNSPECIFIED TYPE: ICD-10-CM

## 2018-01-24 DIAGNOSIS — Z12.11 SCREEN FOR COLON CANCER: ICD-10-CM

## 2018-01-24 DIAGNOSIS — D69.6 THROMBOCYTOPENIA (H): ICD-10-CM

## 2018-01-24 DIAGNOSIS — E78.5 HYPERLIPIDEMIA LDL GOAL <100: ICD-10-CM

## 2018-01-24 DIAGNOSIS — E66.01 MORBID OBESITY (H): ICD-10-CM

## 2018-01-24 DIAGNOSIS — Z91.81 AT RISK FOR FALLING: ICD-10-CM

## 2018-01-24 DIAGNOSIS — I10 BENIGN ESSENTIAL HYPERTENSION: ICD-10-CM

## 2018-01-24 DIAGNOSIS — R21 SKIN RASH: ICD-10-CM

## 2018-01-24 DIAGNOSIS — Z13.89 SCREENING FOR DIABETIC PERIPHERAL NEUROPATHY: ICD-10-CM

## 2018-01-24 DIAGNOSIS — E11.29 TYPE 2 DIABETES MELLITUS WITH OTHER KIDNEY COMPLICATION, UNSPECIFIED LONG TERM INSULIN USE STATUS: Primary | ICD-10-CM

## 2018-01-24 LAB
CHOLEST SERPL-MCNC: 231 MG/DL
HBA1C MFR BLD: 6.7 % (ref 4.3–6)
HDLC SERPL-MCNC: 49 MG/DL
LDLC SERPL CALC-MCNC: 143 MG/DL
NONHDLC SERPL-MCNC: 182 MG/DL
TRIGL SERPL-MCNC: 196 MG/DL

## 2018-01-24 PROCEDURE — 80061 LIPID PANEL: CPT | Performed by: INTERNAL MEDICINE

## 2018-01-24 PROCEDURE — 99214 OFFICE O/P EST MOD 30 MIN: CPT | Performed by: INTERNAL MEDICINE

## 2018-01-24 PROCEDURE — 99207 C FOOT EXAM  NO CHARGE: CPT | Mod: 25 | Performed by: INTERNAL MEDICINE

## 2018-01-24 PROCEDURE — 83036 HEMOGLOBIN GLYCOSYLATED A1C: CPT | Performed by: INTERNAL MEDICINE

## 2018-01-24 PROCEDURE — 36415 COLL VENOUS BLD VENIPUNCTURE: CPT | Performed by: INTERNAL MEDICINE

## 2018-01-24 RX ORDER — LEVOTHYROXINE SODIUM 13 UG/1
150 CAPSULE ORAL
Qty: 90 CAPSULE | Refills: 3 | Status: SHIPPED | OUTPATIENT
Start: 2018-01-24 | End: 2019-01-31

## 2018-01-24 RX ORDER — CLOBETASOL PROPIONATE 0.05 G/ML
1 SPRAY TOPICAL 2 TIMES DAILY PRN
Qty: 1 BOTTLE | Refills: 5 | Status: SHIPPED | OUTPATIENT
Start: 2018-01-24 | End: 2019-01-31

## 2018-01-24 RX ORDER — AMLODIPINE BESYLATE 10 MG/1
10 TABLET ORAL DAILY
Qty: 90 TABLET | Refills: 1 | Status: SHIPPED | OUTPATIENT
Start: 2018-01-24 | End: 2018-08-07

## 2018-01-24 RX ORDER — CARVEDILOL 25 MG/1
25 TABLET ORAL 2 TIMES DAILY WITH MEALS
Qty: 180 TABLET | Refills: 1 | Status: SHIPPED | OUTPATIENT
Start: 2018-01-24 | End: 2018-08-07

## 2018-01-24 ASSESSMENT — ANXIETY QUESTIONNAIRES
IF YOU CHECKED OFF ANY PROBLEMS ON THIS QUESTIONNAIRE, HOW DIFFICULT HAVE THESE PROBLEMS MADE IT FOR YOU TO DO YOUR WORK, TAKE CARE OF THINGS AT HOME, OR GET ALONG WITH OTHER PEOPLE: NOT DIFFICULT AT ALL
6. BECOMING EASILY ANNOYED OR IRRITABLE: SEVERAL DAYS
3. WORRYING TOO MUCH ABOUT DIFFERENT THINGS: SEVERAL DAYS
GAD7 TOTAL SCORE: 3
1. FEELING NERVOUS, ANXIOUS, OR ON EDGE: NOT AT ALL
5. BEING SO RESTLESS THAT IT IS HARD TO SIT STILL: NOT AT ALL
7. FEELING AFRAID AS IF SOMETHING AWFUL MIGHT HAPPEN: NOT AT ALL
2. NOT BEING ABLE TO STOP OR CONTROL WORRYING: SEVERAL DAYS

## 2018-01-24 ASSESSMENT — PATIENT HEALTH QUESTIONNAIRE - PHQ9
5. POOR APPETITE OR OVEREATING: NOT AT ALL
SUM OF ALL RESPONSES TO PHQ QUESTIONS 1-9: 5

## 2018-01-24 NOTE — LETTER
My Asthma Action Plan  Name: Yadi Iniguez   YOB: 1950  Date: 1/24/2018   My doctor: Hannah Cee MD   My clinic: [facility]        My Control Medicine: { :173684}  My Rescue Medicine: { :968647}  {AAP include Oral Steroid:085226} My Asthma Severity: { :119484}  Avoid your asthma triggers: { :186926}        {Is patient a child or adult?:171715}       GREEN ZONE   Good Control    I feel good    No cough or wheeze    Can work, sleep and play without asthma symptoms       Take your asthma control medicine every day.     1. If exercise triggers your asthma, take your rescue medication    15 minutes before exercise or sports, and    During exercise if you have asthma symptoms  2. Spacer to use with inhaler: If you have a spacer, make sure to use it with your inhaler             YELLOW ZONE Getting Worse  I have ANY of these:    I do not feel good    Cough or wheeze    Chest feels tight    Wake up at night   1. Keep taking your Green Zone medications  2. Start taking your rescue medicine:    every 20 minutes for up to 1 hour. Then every 4 hours for 24-48 hours.  3. If you stay in the Yellow Zone for more than 12-24 hours, contact your doctor.  4. If you do not return to the Green Zone in 12-24 hours or you get worse, start taking your oral steroid medicine if prescribed by your provider.           RED ZONE Medical Alert - Get Help  I have ANY of these:    I feel awful    Medicine is not helping    Breathing getting harder    Trouble walking or talking    Nose opens wide to breathe       1. Take your rescue medicine NOW  2. If your provider has prescribed an oral steroid medicine, start taking it NOW  3. Call your doctor NOW  4. If you are still in the Red Zone after 20 minutes and you have not reached your doctor:    Take your rescue medicine again and    Call 911 or go to the emergency room right away    See your regular doctor within 2 weeks of an Emergency Room or Urgent  Care visit for follow-up treatment.        Electronically signed by: Valerie Maldonado, January 24, 2018    Annual Reminders:  Meet with Asthma Educator,  Flu Shot in the Fall, consider Pneumonia Vaccination for patients with asthma (aged 19 and older).    Pharmacy:    [id number] - [place], [place] - [address] AT [facility]  [address]  [id number] - [place], [place] - [address] [facility] [address]  [facility] [place] - [address] [facility] [address] - [place], [place] - [address] [facility]                    Asthma Triggers  How To Control Things That Make Your Asthma Worse    Triggers are things that make your asthma worse.  Look at the list below to help you find your triggers and what you can do about them.  You can help prevent asthma flare-ups by staying away from your triggers.      Trigger                                                          What you can do   Cigarette Smoke  Tobacco smoke can make asthma worse. Do not allow smoking in your home, car or around you.  Be sure no one smokes at a child s day care or school.  If you smoke, ask your health care provider for ways to help you quit.  Ask family members to quit too.  Ask your health care provider for a referral to Quit Plan to help you quit smoking, or call 7-496-200-PLAN.     Colds, Flu, Bronchitis  These are common triggers of asthma. Wash your hands often.  Don t touch your eyes, nose or mouth.  Get a flu shot every year.     Dust Mites  These are tiny bugs that live in cloth or carpet. They are too small to see. Wash sheets and blankets in hot water every week.   Encase pillows and mattress in dust mite proof covers.  Avoid having carpet if you can. If you have carpet, vacuum weekly.   Use a dust mask and HEPA vacuum.   Pollen and Outdoor Mold  Some people are allergic to trees, grass, or weed pollen, or molds. Try to keep your windows  closed.  Limit time out doors when pollen count is high.   Ask you health care provider about taking medicine during allergy season.     Animal Dander  Some people are allergic to skin flakes, urine or saliva from pets with fur or feathers. Keep pets with fur or feathers out of your home.    If you can t keep the pet outdoors, then keep the pet out of your bedroom.  Keep the bedroom door closed.  Keep pets off cloth furniture and away from stuffed toys.     Mice, Rats, and Cockroaches  Some people are allergic to the waste from these pests.   Cover food and garbage.  Clean up spills and food crumbs.  Store grease in the refrigerator.   Keep food out of the bedroom.   Indoor Mold  This can be a trigger if your home has high moisture. Fix leaking faucets, pipes, or other sources of water.   Clean moldy surfaces.  Dehumidify basement if it is damp and smelly.   Smoke, Strong Odors, and Sprays  These can reduce air quality. Stay away from strong odors and sprays, such as perfume, powder, hair spray, paints, smoke incense, paint, cleaning products, candles and new carpet.   Exercise or Sports  Some people with asthma have this trigger. Be active!  Ask your doctor about taking medicine before sports or exercise to prevent symptoms.    Warm up for 5-10 minutes before and after sports or exercise.     Other Triggers of Asthma  Cold air:  Cover your nose and mouth with a scarf.  Sometimes laughing or crying can be a trigger.  Some medicines and food can trigger asthma.

## 2018-01-24 NOTE — PATIENT INSTRUCTIONS
We will let you know your test results by Ivan, and we will advise the next visit based on the lab results.    If your A1C today is 8.0% or above, we will likely advise an increase in your evening insulin.   Please see the advise below regarding weight loss:      Weight Management: Exercise and Activity    Studies show that people who exercise are the most likely to lose weight and keep it off. Exercise burns calories. It helps build muscle to make your body stronger. Make exercise an important part of your weight-management plan.  Make activity part of your day  You may not think you have the time to exercise. But you can work activity into your daily life you just need to be committed. Take 10 minutes out of your lunch hour to take a walk. Walk to the [employer] to get your paper instead of having it delivered. Make it a habit to take the stairs instead of the elevator. Park in a far away parking spot instead of the closest. You ll be surprised at how fast these little changes can make a difference.  Some people really cannot walk very far, and tire out quickly with exercise. Instead of becoming discouraged, resolve to do what you can do, and work to make that a regular frequent habit.   The benefits of exercise  Exercise offers many benefits including:     Exercise increases your metabolism (the speed at which your body burns calories).    Regular exercise can increase the amount of muscle in your body. Muscle burns calories faster than fat. The more muscle you have, the more calories you burn.    Exercise gives you energy and curbs your appetite.    Exercise decreases stress and helps you sleep better.  Make exercise fun  Exercise can be fun. Choose an activity you enjoy. You may even get a friend to do it with you:    Take a resistance-training or aerobics class    Join a team sport    Take a dance class    Walk the dog    Ride a bike  If you have health problems, be sure to ask your healthcare provider  before you start an exercise program. Have a  help you develop a plan that s safe for you.   Date Last Reviewed: 2/4/2016 2000-2017 [employer]. [address] [place] [address], [place] [address]. All rights reserved. This information is not intended as a substitute for professional medical care. Always follow your healthcare professional's instructions.        Weight Management: Fact and Fiction    Knowing the truth about losing weight can help you separate what works from what doesn t. Don t be taken in by expensive weight-loss fads like pills, herbs, and special foods that promise unbelievable results. There s no magic way to lose weight. If you have questions about weight loss, ask your healthcare provider.  Fiction:  The faster I lose weight, the better.   Fact: Rapid weight loss is usually due to loss of water or muscle mass. What you re trying to get rid of is extra fat. Aim to lose a 1/2 pound to 2 pounds a week. Then you re more likely to lose fat rather than water or muscle.  Fiction:  Skipping meals will help me lose weight.   Fact: When you skip meals, you don t give your body the energy it needs to work. Hunger makes you more likely to overeat later on. It s best to spread your meals throughout the day. Eat at least three meals a day.  Fiction:  I can t start exercising until I lose weight.   Fact: The sooner you start exercising the better. Exercise helps burn more calories, tone your muscles, and keep your appetite in check. People who continue to exercise after they lose weight are more likely to keep the weight off.  Fiction:  The fewer calories I eat, the better.   Fact: This seems like it should be true, but it s not. When you eat too few calories, your body acts as if it s on a desert island. It thinks food is scarce, so it slows down your metabolism (how fast you burn calories) to save energy. By eating too few calories, you make it harder to lose weight.  Fiction:   Once I lose weight, I can go back to living the way I did before.   Fact: Going back to your old eating habits and giving up exercise is a sure way to regain any weight you ve lost. The lifestyle changes that help you lose extra weight can also help keep it off. This is why you need to make realistic changes you can stick with.  Fiction:  Low-fat and fat-free mean low-calorie.   Fact: All foods, even fat-free ones, have calories. Eat too many calories and you ll gain weight. It s OK to treat yourself to a fat-free cookie or two. Just don t eat the whole box! A dietitian will help you figure this out, and will likely recommend that you eat three meals a day, with protein with each meal.   Date Last Reviewed: 2/4/2016 2000-2017 [employer]. [address] [place] [address]. All rights reserved. This information is not intended as a substitute for professional medical care. Always follow your healthcare professional's instructions.        Weight Management: Getting Started  Healthy bodies come in all shapes and sizes. Not all bodies are made to be thin. For some people, a healthy weight is higher than the average weight listed on weight charts. Your healthcare provider can help you decide on a healthy weight for you.    Reasons to lose weight  Losing weight can help with some health problems, such as high blood pressure, heart disease, diabetes, sleep apnea, and arthritis. You may also feel more energy.  Set your long-term goal  Your goal doesn't even have to be a specific weight. You may decide on a fitness goal (such as being able to walk 10 miles a week), or a health goal (such as lowering your blood pressure). Choose a goal that is measurable and reasonable, so you know when you've reached it. A goal of reaching a BMI of less than 25 is not always reasonable (or possible).   Make an action plan  Habits don t change overnight. Setting your goals too high can leave you feeling discouraged  if you can t reach them. Be realistic. Choose one or two small changes you can make now. Set an action plan for how you are going to make these changes. When you can stick to this plan, keep making a few more small changes. Taking small steps will help you stay on the path to success.  Track your progress  Write down your goals. Then, keep a daily record of your progress. Write down what you eat and how active you are. This record lets you look back on how much you ve done. It may also help when you re feeling frustrated. Reward yourself for success. Even if you don t reach every goal, give yourself credit for what you do get done.  Get support  Encouragement from others can help make losing weight easier. Ask your family members and friends for support. They may even want to join you. Also look to your healthcare provider, registered dietitian, and  for help. Your local hospital can give you more information about nutrition, exercise, and weight loss.  Date Last Reviewed: 1/31/2016 2000-2017 [employer]. [address] [place] [address]. All rights reserved. This information is not intended as a substitute for professional medical care. Always follow your healthcare professional's instructions.        Weight Management: Healthy Eating  Food is your body s fuel. You can t live without it. The key is to give your body enough nutrients and energy without eating too much. Reading food labels can help you make healthy choices. Also, learn new eating habits to manage your weight.     All the values on the label are based on one serving. The serving size is the average portion. Remember to multiply the values on the label by the number of servings you eat.   Eat less fat  A gram of fat has almost 2.5 times the calories of a gram of protein or carbohydrates. Try to balance your food choices so that only 20% to 35% of your calories comes from total fat. This means an average of  2  to 3  grams of fat for each 100 calories you eat.  Eat more fiber  High-fiber foods are digested more slowly than low-fiber foods, so you feel full longer. Try to get at least 25 grams of fiber each day for a 2000 calorie diet. Foods high in fiber include:    Vegetables and fruits    Whole-grain or bran breads, pastas, and cereals    Legumes (beans) and peas  As you begin to eat more fiber, be sure to drink plenty of water to keep your digestive system working smoothly.  Tips  Do's and don'ts include:     Don t skip meals. This often leads to overeating later on. It s best to spread your eating throughout the day.    Eat a variety of foods, not just a few favorites.    If you find yourself eating when you re not hungry, ask yourself why. Many of us eat when we re bored, stressed, or just to be polite. Listen to your body. If you re not hungry, get busy doing something else instead of eating.    Eat slower, shooting for 20 to 30 minutes for each meal. It takes 20 minutes for your stomach to tell your brain that it s full. Slow eaters tend to eat less and are still satisfied, while fast eaters may tend to be overeaters.     Pay attention to what you eat. Don t read or watch TV during your meal.  Date Last Reviewed: 1/31/2016 2000-2017 [employer]. [address] [place] [address]. All rights reserved. This information is not intended as a substitute for professional medical care. Always follow your healthcare professional's instructions.        Weight Management: Overcoming Your Barriers    You may have many reasons why you re not ready to lose weight. You may not feel you have the time or the skills. You may be afraid of losing weight and gaining it back again. Well, you can lose weight. And you can keep the weight off, if you make changes slowly and stick with them. Remember that you may never find the perfect time to lose weight. Decide that the right time to be healthier is  now.  Common barriers  Barrier 1: I don t want to deny myself.  Barrier Buster: You don t have to! Moderation is the key:    Watch portion sizes and know when you're eating more than one serving.    Plan to ask for a doggy bag when you eat out.    Have just one.    Choose lower-fat and lower-calorie versions of your favorites.    Use a small plate instead of a normal-sized plate.   Barrier 2: I lost weight before but I gained it right back.  Barrier Buster: Make this time different:    List what worked and didn t work last time and what you can try this time.    Choose changes that you are willing to stick with.    Work exercise into your weight-loss plan.    Be realistic about what is possible. Your plan has to fit into your life in a balanced way that works for you.   Barrier 3: I don t have the time to be active.  Barrier Buster: It takes just a few minutes a day!    Be active with a pet or the kids.    Block off activity time in your schedule.    Borrow some time that you usually spend watching TV.    You are too important not to take time to exercise it is your life!   Feel good about yourself  Do you eat more because you feel bad about yourself, then feel even worse as you gain weight? This is a  vicious cycle.  Breaking this cycle is not easy. You may need group support or counseling. Always remember that you are a valuable person, no matter what size or shape you are.  Do you have a health problem? If so, don t use it as an excuse for not losing weight. Ask your healthcare provider or dietitian about methods to lose weight that are safe for you. For example, even if you have severe arthritis, it may be easier for you to exercise in a pool. Get advice from a .    Date Last Reviewed: 2/2/2016 2000-2017 [employer]. [address] [facility], [place] [address]. All rights reserved. This information is not intended as a substitute for professional medical care. Always follow  your healthcare professional's instructions.        Weight Management: Take It Off and Keep It Off    It s easy to be motivated when you first start. The key is to stay motivated all along the way and to have realistic and achievable goals. There are things you can do to keep yourself on the path to success.  Don t focus on daily weight gains and losses. Instead, weigh yourself no more than once a week at the same time of day. Weighing yourself each day will probably only frustrate you.    Stay motivated  Here are suggestions to keep you motivated:     Remind yourself of your goals. Post them near the refrigerator or desk where you work.    Make daily entries in your diary or journal about your activity and eating. A visual reminder of success, like a gold star, can help keep you going.    Every week, take time to look back on how much you ve accomplished, and the changes you may have made.    Try taking a nutrition class. It can help you learn new shopping, cooking, and eating skills, and also meet new people. You might try a low-fat cooking or yoga class.    Don t be hard on yourself or give up if you slip. Be patient. Learn from your mistakes and adjust your plan if you need to. Then get right back to it.    Be realistic about your goals. Talk with a dietitian or your healthcare provider about what goals are reasonable for you.   Believe that you can do it  How you think about yourself is just as important as what you do. If you don t think you can succeed, chances are you won t. Believe that you can stick to your plan and meet your goals:    If you don t meet a goal, don t use it as an excuse to give up on your whole plan. Adjust your goal and try again.    Try to understand your own attitude about food.  Are you subject to emotional eating?    Learn how to accept compliments. Even if you get embarrassed, just say  thank you.     Make a list of the things that others like about you and that you like about  yourself. Add something new from time to time. Keep this list to look at when you need a lift.  Resources    The President s [place] on Fitness, Sports & Nutritionwww.fitness.gov    Academy of Nutrition and Dieteticswww.eatright.org    Healthfinderwww.healthfinder.gov  Date Last Reviewed: 2/4/2016 2000-2017 [employer]. [address] [place] [address], [place] [address]. All rights reserved. This information is not intended as a substitute for professional medical care. Always follow your healthcare professional's instructions.        Losing Weight for Heart Health  Excess weight is a major risk factor for heart disease. Losing weight has many benefits including lowering your blood pressure, improving your cholesterol level, and decreasing your risk for diseases such as diabetes and heart disease. It may help keep your arteries open so that your heart can get the oxygen-rich blood it needs. All in all, losing weight makes you healthier.     Exercise with a friend. When activity is fun, you're more likely to stick with it.   Calories and weight loss    Calories are the fuel your body burns for energy. You get the calories you need from the food you eat. For healthy weight loss, women should eat at least 1,200 calories a day, men at least 1,500.    When you eat more calories than you need, your body stores the extra calories as fat. One pound of fat equals 3,500 calories.    To lose weight, try to reduce your total calorie intake by 500 calories. To do this, eat 250 calories less each day. Add activity to burn the other 250 calories. Walking 2.5 miles burns about 250 calories. Other more intense activities can burn more calories in the time you spend doing them, such as swimming and running. It is important to understand that reducing calorie intake is much more effective at weight loss than is exercise.    Eat a variety of healthy foods to get the nutrients you need.  Tips for losing weight    Drink 8 to 10  glasses of water a day.    Don t skip meals. Instead, eat smaller portions.    Eat your meals earlier in the day.    Cut out sugary drinks such as soda and fruit juices.    Make your later meals lighter than your earlier meals.   Brisk activity is best  Brisk activity gets your heart pumping faster and it makes it healthier. It s also a great way to burn calories. In fact, your body may keep burning calories for hours after you stop a brisk activity:    Begin by walking 10 minutes most days.    Add more time and speed to your walk. Build up as you feel able.    Aim for 3 to 4 sessions of aerobic exercise a week. Each session should last about 40 minutes and include moderate to vigorous physical activity.    The most important part of the activity is that you break a sweat. This indicates your heart is working hard enough to burn fat.  Date Last Reviewed: 6/1/2016 2000-2017 [employer]. [address] [place] [address]. All rights reserved. This information is not intended as a substitute for professional medical care. Always follow your healthcare professional's instructions.        Finding Your Ideal Weight  Most of the people in magazines and on TV are far slimmer than average, yet this is the  ideal  that many people aim for. Before you decide that you won t be happy until you get down to a certain number of pounds, consider:      Your age. You probably wish you could get back to your college weight. But normal changes in metabolism and hormone levels that occur with aging can make this more difficult.    Your gender. In general, men have more muscle and heavier bones than women, which means that healthy men usually weigh more than healthy women of the same height.    Your current weight. If you are very heavy, focus on losing a smaller amount (such as 10 percent of your body weight). Losing just 5 to 10 pounds can improve your health.  Your body fat percentage  A pound of muscle weighs  the same as a pound of fat, but it takes up less space. Think of a trained athlete and a  couch potato.  Even though they may be the same height and weight, the athlete looks fitter, is healthier, and probably wears a smaller size of clothing. If you are muscular, a body fat test may be a more accurate measure of your ideal weight than the bathroom scale. Talk to your healthcare provider, who can help you set appropriate goals for yourself.  Body mass index (BMI)  Your body mass index is a number calculated from your weight and height. It is a fairly reliable indicator of body fatness for most people. To calculate your BMI, see this BMI calculator from the CDC: http://www.cdc.gov/healthyweight/assessing/bmi/adult_bmi/english_bmi_calculator/bmi_calculator.html  Date Last Reviewed: 6/1/2017 2000-2017 [employer] [address] [place] [address], [place] [address]. All rights reserved. This information is not intended as a substitute for professional medical care. Always follow your healthcare professional's instructions.

## 2018-01-24 NOTE — LETTER
My Depression Action Plan  Name: Yadi Iniguez   Date of Birth 1950  Date: 1/24/2018    My doctor: Hannah Cee   My clinic: [address] [address] [phone]          GREEN    ZONE   Good Control    What it looks like:     Things are going generally well. You have normal up s and down s. You may even feel depressed from time to time, but bad moods usually last less than a day.   What you need to do:  1. Continue to care for yourself (see self care plan)  2. Check your depression survival kit and update it as needed  3. Follow your physician s recommendations including any medication.  4. Do not stop taking medication unless you consult with your physician first.           YELLOW         ZONE Getting Worse    What it looks like:     Depression is starting to interfere with your life.     It may be hard to get out of bed; you may be starting to isolate yourself from others.    Symptoms of depression are starting to last most all day and this has happened for several days.     You may have suicidal thoughts but they are not constant.   What you need to do:     1. Call your care team, your response to treatment will improve if you keep your care team informed of your progress. Yellow periods are signs an adjustment may need to be made.     2. Continue your self-care, even if you have to fake it!    3. Talk to someone in your support network    4. Open up your depression survival kit           RED    ZONE Medical Alert - Get Help    What it looks like:     Depression is seriously interfering with your life.     You may experience these or other symptoms: You can t get out of bed most days, can t work or engage in other necessary activities, you have trouble taking care of basic hygiene, or basic responsibilities, thoughts of suicide or death that will not go away, self-injurious behavior.     What you need to do:  1. Call your care team  and request a same-day appointment. If they are not available (weekends or after hours) call your local crisis line, emergency room or 911.      Electronically signed by: Valerie Maldonado, January 24, 2018    Depression Self Care Plan / Survival Kit    Self-Care for Depression  Here s the deal. Your body and mind are really not as separate as most people think.  What you do and think affects how you feel and how you feel influences what you do and think. This means if you do things that people who feel good do, it will help you feel better.  Sometimes this is all it takes.  There is also a place for medication and therapy depending on how severe your depression is, so be sure to consult with your medical provider and/ or Behavioral Health Consultant if your symptoms are worsening or not improving.     In order to better manage my stress, I will:    Exercise  Get some form of exercise, every day. This will help reduce pain and release endorphins, the  feel good  chemicals in your brain. This is almost as good as taking antidepressants!  This is not the same as joining a gym and then never going! (they count on that by the way ) It can be as simple as just going for a walk or doing some gardening, anything that will get you moving.      Hygiene   Maintain good hygiene (Get out of bed in the morning, Make your bed, Brush your teeth, Take a shower, and Get dressed like you were going to work, even if you are unemployed).  If your clothes don't fit try to get ones that do.    Diet  I will strive to eat foods that are good for me, drink plenty of water, and avoid excessive sugar, caffeine, alcohol, and other mood-altering substances.  Some foods that are helpful in depression are: complex carbohydrates, B vitamins, flaxseed, fish or fish oil, fresh fruits and vegetables.    Psychotherapy  I agree to participate in Individual Therapy (if recommended).    Medication  If prescribed medications, I agree to take them.   Missing doses can result in serious side effects.  I understand that drinking alcohol, or other illicit drug use, may cause potential side effects.  I will not stop my medication abruptly without first discussing it with my provider.    Staying Connected With Others  I will stay in touch with my friends, family members, and my primary care provider/team.    Use your imagination  Be creative.  We all have a creative side; it doesn t matter if it s oil painting, sand castles, or mud pies! This will also kick up the endorphins.    Witness Beauty  (AKA stop and smell the roses) Take a look outside, even in mid-winter. Notice colors, textures. Watch the squirrels and birds.     Service to others  Be of service to others.  There is always someone else in need.  By helping others we can  get out of ourselves  and remember the really important things.  This also provides opportunities for practicing all the other parts of the program.    Humor  Laugh and be silly!  Adjust your TV habits for less news and crime-drama and more comedy.    Control your stress  Try breathing deep, massage therapy, biofeedback, and meditation. Find time to relax each day.     My support system    Clinic Contact:  Phone number:    Contact 1:  Phone number:    Contact 2:  Phone number:    Evangel[facility]/:  Phone number:    Therapist:  Phone number:    Local crisis center:    Phone number:    Other community support:  Phone number:

## 2018-01-24 NOTE — MR AVS SNAPSHOT
After Visit Summary   1/24/2018    Yadi Iniguez    MRN: [id number]           Patient Information     Date Of Birth          1950        Visit Information        Provider Department      1/24/2018 10:20 AM Hannah Cee MD [facility]        Today's Diagnoses     Morbid obesity (H)    -  1    Thrombocytopenia (H)        Moderate major depression (H)        Screen for colon cancer        At risk for falling        Screening for diabetic peripheral neuropathy        Type 2 diabetes mellitus with other kidney complication, unspecified long term insulin use status (H)        Benign essential hypertension        Skin rash        Hypothyroidism, unspecified type          Care Instructions    [facility], and we will advise the next visit based on the lab results.    If your A1C today is 8.0% or above, we will likely advise an increase in your evening insulin.   Please see the advise below regarding weight loss:      Weight Management: Exercise and Activity    Studies show that people who exercise are the most likely to lose weight and keep it off. Exercise burns calories. It helps build muscle to make your body stronger. Make exercise an important part of your weight-management plan.  Make activity part of your day  You may not think you have the time to exercise. But you can work activity into your daily life--you just need to be committed. Take 10 minutes out of your lunch hour to take a walk. Walk to the [employer] to get your paper instead of having it delivered. Make it a habit to take the stairs instead of the elevator. Park in a far away parking spot instead of the closest. You ll be surprised at how fast these little changes can make a difference.  Some people really cannot walk very far, and tire out quickly with exercise. Instead of becoming discouraged, resolve to do what you can do, and work to make that a regular frequent  habit.   The benefits of exercise  Exercise offers many benefits including:     Exercise increases your metabolism (the speed at which your body burns calories).    Regular exercise can increase the amount of muscle in your body. Muscle burns calories faster than fat. The more muscle you have, the more calories you burn.    Exercise gives you energy and curbs your appetite.    Exercise decreases stress and helps you sleep better.  Make exercise fun  Exercise can be fun. Choose an activity you enjoy. You may even get a friend to do it with you:    Take a resistance-training or aerobics class    Join a team sport    Take a dance class    Walk the dog    Ride a bike  If you have health problems, be sure to ask your healthcare provider before you start an exercise program. Have a  help you develop a plan that s safe for you.   Date Last Reviewed: 2/4/2016 2000-2017 [employer]. [address] [place] [address]. All rights reserved. This information is not intended as a substitute for professional medical care. Always follow your healthcare professional's instructions.        Weight Management: Fact and Fiction    Knowing the truth about losing weight can help you separate what works from what doesn t. Don t be taken in by expensive weight-loss fads like pills, herbs, and special foods that promise unbelievable results. There s no magic way to lose weight. If you have questions about weight loss, ask your healthcare provider.  Fiction:  The faster I lose weight, the better.   Fact: Rapid weight loss is usually due to loss of water or muscle mass. What you re trying to get rid of is extra fat. Aim to lose a 1/2 pound to 2 pounds a week. Then you re more likely to lose fat rather than water or muscle.  Fiction:  Skipping meals will help me lose weight.   Fact: When you skip meals, you don t give your body the energy it needs to work. Hunger makes you more likely to overeat  later on. It s best to spread your meals throughout the day. Eat at least three meals a day.  Fiction:  I can t start exercising until I lose weight.   Fact: The sooner you start exercising the better. Exercise helps burn more calories, tone your muscles, and keep your appetite in check. People who continue to exercise after they lose weight are more likely to keep the weight off.  Fiction:  The fewer calories I eat, the better.   Fact: This seems like it should be true, but it s not. When you eat too few calories, your body acts as if it s on a desert island. It thinks food is scarce, so it slows down your metabolism (how fast you burn calories) to save energy. By eating too few calories, you make it harder to lose weight.  Fiction:  Once I lose weight, I can go back to living the way I did before.   Fact: Going back to your old eating habits and giving up exercise is a sure way to regain any weight you ve lost. The lifestyle changes that help you lose extra weight can also help keep it off. This is why you need to make realistic changes you can stick with.  Fiction:  Low-fat and fat-free mean low-calorie.   Fact: All foods, even fat-free ones, have calories. Eat too many calories and you ll gain weight. It s OK to treat yourself to a fat-free cookie or two. Just don t eat the whole box! A dietitian will help you figure this out, and will likely recommend that you eat three meals a day, with protein with each meal.   Date Last Reviewed: 2/4/2016 2000-2017 [employer]. [address] [place] [address], [place] [address]. All rights reserved. This information is not intended as a substitute for professional medical care. Always follow your healthcare professional's instructions.        Weight Management: Getting Started  Healthy bodies come in all shapes and sizes. Not all bodies are made to be thin. For some people, a healthy weight is higher than the average weight listed on weight charts. Your healthcare  provider can help you decide on a healthy weight for you.    Reasons to lose weight  Losing weight can help with some health problems, such as high blood pressure, heart disease, diabetes, sleep apnea, and arthritis. You may also feel more energy.  Set your long-term goal  Your goal doesn't even have to be a specific weight. You may decide on a fitness goal (such as being able to walk 10 miles a week), or a health goal (such as lowering your blood pressure). Choose a goal that is measurable and reasonable, so you know when you've reached it. A goal of reaching a BMI of less than 25 is not always reasonable (or possible).   Make an action plan  Habits don t change overnight. Setting your goals too high can leave you feeling discouraged if you can t reach them. Be realistic. Choose one or two small changes you can make now. Set an action plan for how you are going to make these changes. When you can stick to this plan, keep making a few more small changes. Taking small steps will help you stay on the path to success.  Track your progress  Write down your goals. Then, keep a daily record of your progress. Write down what you eat and how active you are. This record lets you look back on how much you ve done. It may also help when you re feeling frustrated. Reward yourself for success. Even if you don t reach every goal, give yourself credit for what you do get done.  Get support  Encouragement from others can help make losing weight easier. Ask your family members and friends for support. They may even want to join you. Also look to your healthcare provider, registered dietitian, and  for help. Your local hospital can give you more information about nutrition, exercise, and weight loss.  Date Last Reviewed: 1/31/2016 2000-2017 [employer]. [address] [place] [address], [place] [address]. All rights reserved. This information is not intended as a substitute for professional medical care.  Always follow your healthcare professional's instructions.        Weight Management: Healthy Eating  Food is your body s fuel. You can t live without it. The key is to give your body enough nutrients and energy without eating too much. Reading food labels can help you make healthy choices. Also, learn new eating habits to manage your weight.     All the values on the label are based on one serving. The serving size is the average portion. Remember to multiply the values on the label by the number of servings you eat.   Eat less fat  A gram of fat has almost 2.5 times the calories of a gram of protein or carbohydrates. Try to balance your food choices so that only 20% to 35% of your calories comes from total fat. This means an average of 2  to 3  grams of fat for each 100 calories you eat.  Eat more fiber  High-fiber foods are digested more slowly than low-fiber foods, so you feel full longer. Try to get at least 25 grams of fiber each day for a 2000 calorie diet. Foods high in fiber include:    Vegetables and fruits    Whole-grain or bran breads, pastas, and cereals    Legumes (beans) and peas  As you begin to eat more fiber, be sure to drink plenty of water to keep your digestive system working smoothly.  Tips  Do's and don'ts include:     Don t skip meals. This often leads to overeating later on. It s best to spread your eating throughout the day.    Eat a variety of foods, not just a few favorites.    If you find yourself eating when you re not hungry, ask yourself why. Many of us eat when we re bored, stressed, or just to be polite. Listen to your body. If you re not hungry, get busy doing something else instead of eating.    Eat slower, shooting for 20 to 30 minutes for each meal. It takes 20 minutes for your stomach to tell your brain that it s full. Slow eaters tend to eat less and are still satisfied, while fast eaters may tend to be overeaters.     Pay attention to what you eat. Don t read or watch TV  during your meal.  Date Last Reviewed: 1/31/2016 2000-2017 [employer]. [address] [place] [address], [place] [address]. All rights reserved. This information is not intended as a substitute for professional medical care. Always follow your healthcare professional's instructions.        Weight Management: Overcoming Your Barriers    You may have many reasons why you re not ready to lose weight. You may not feel you have the time or the skills. You may be afraid of losing weight and gaining it back again. Well, you can lose weight. And you can keep the weight off, if you make changes slowly and stick with them. Remember that you may never find the perfect time to lose weight. Decide that the right time to be healthier is now.  Common barriers  Barrier 1: I don t want to deny myself.  Barrier Buster: You don t have to! Moderation is the key:    Watch portion sizes and know when you're eating more than one serving.    Plan to ask for a doggy bag when you eat out.    Have just one.    Choose lower-fat and lower-calorie versions of your favorites.    Use a small plate instead of a normal-sized plate.   Barrier 2: I lost weight before but I gained it right back.  Barrier Buster: Make this time different:    List what worked and didn t work last time and what you can try this time.    Choose changes that you are willing to stick with.    Work exercise into your weight-loss plan.    Be realistic about what is possible. Your plan has to fit into your life in a balanced way that works for you.   Barrier 3: I don t have the time to be active.  Barrier Buster: It takes just a few minutes a day!    Be active with a pet or the kids.    Block off activity time in your schedule.    Borrow some time that you usually spend watching TV.    You are too important not to take time to exercise--it is your life!   Feel good about yourself  Do you eat more because you feel bad about yourself, then feel even worse as you gain  weight? This is a  vicious cycle.  Breaking this cycle is not easy. You may need group support or counseling. Always remember that you are a valuable person, no matter what size or shape you are.  Do you have a health problem? If so, don t use it as an excuse for not losing weight. Ask your healthcare provider or dietitian about methods to lose weight that are safe for you. For example, even if you have severe arthritis, it may be easier for you to exercise in a pool. Get advice from a .    Date Last Reviewed: 2/2/2016 2000-2017 [employer]. [address] [place] [address], [place] [address]. All rights reserved. This information is not intended as a substitute for professional medical care. Always follow your healthcare professional's instructions.        Weight Management: Take It Off and Keep It Off    It s easy to be motivated when you first start. The key is to stay motivated all along the way and to have realistic and achievable goals. There are things you can do to keep yourself on the path to success.  Don t focus on daily weight gains and losses. Instead, weigh yourself no more than once a week at the same time of day. Weighing yourself each day will probably only frustrate you.    Stay motivated  Here are suggestions to keep you motivated:     Remind yourself of your goals. Post them near the refrigerator or desk where you work.    Make daily entries in your diary or journal about your activity and eating. A visual reminder of success, like a gold star, can help keep you going.    Every week, take time to look back on how much you ve accomplished, and the changes you may have made.    Try taking a nutrition class. It can help you learn new shopping, cooking, and eating skills, and also meet new people. You might try a low-fat cooking or yoga class.    Don t be hard on yourself or give up if you slip. Be patient. Learn from your mistakes and adjust your plan if you need to. Then  get right back to it.    Be realistic about your goals. Talk with a dietitian or your healthcare provider about what goals are reasonable for you.   Believe that you can do it  How you think about yourself is just as important as what you do. If you don t think you can succeed, chances are you won t. Believe that you can stick to your plan and meet your goals:    If you don t meet a goal, don t use it as an excuse to give up on your whole plan. Adjust your goal and try again.    Try to understand your own attitude about food.  Are you subject to emotional eating?    Learn how to accept compliments. Even if you get embarrassed, just say  thank you.     Make a list of the things that others like about you and that you like about yourself. Add something new from time to time. Keep this list to look at when you need a lift.  Resources    The President s [employer] on Fitness, Sports & Nutritionwww.fitness.gov    Academy of Nutrition and Dieteticswww.eatright.org    Healthfinderwww.healthfinder.gov  Date Last Reviewed: 2/4/2016 2000-2017 [employer]. [address] [place] [address]. All rights reserved. This information is not intended as a substitute for professional medical care. Always follow your healthcare professional's instructions.        Losing Weight for Heart Health  Excess weight is a major risk factor for heart disease. Losing weight has many benefits including lowering your blood pressure, improving your cholesterol level, and decreasing your risk for diseases such as diabetes and heart disease. It may help keep your arteries open so that your heart can get the oxygen-rich blood it needs. All in all, losing weight makes you healthier.     Exercise with a friend. When activity is fun, you're more likely to stick with it.   Calories and weight loss    Calories are the fuel your body burns for energy. You get the calories you need from the food you eat. For healthy weight loss, women  should eat at least 1,200 calories a day, men at least 1,500.    When you eat more calories than you need, your body stores the extra calories as fat. One pound of fat equals 3,500 calories.    To lose weight, try to reduce your total calorie intake by 500 calories. To do this, eat 250 calories less each day. Add activity to burn the other 250 calories. Walking 2.5 miles burns about 250 calories. Other more intense activities can burn more calories in the time you spend doing them, such as swimming and running. It is important to understand that reducing calorie intake is much more effective at weight loss than is exercise.    Eat a variety of healthy foods to get the nutrients you need.  Tips for losing weight    Drink 8 to 10 glasses of water a day.    Don t skip meals. Instead, eat smaller portions.    Eat your meals earlier in the day.    Cut out sugary drinks such as soda and fruit juices.    Make your later meals lighter than your earlier meals.   Brisk activity is best  Brisk activity gets your heart pumping faster and it makes it healthier. It s also a great way to burn calories. In fact, your body may keep burning calories for hours after you stop a brisk activity:    Begin by walking 10 minutes most days.    Add more time and speed to your walk. Build up as you feel able.    Aim for 3 to 4 sessions of aerobic exercise a week. Each session should last about 40 minutes and include moderate to vigorous physical activity.    The most important part of the activity is that you break a sweat. This indicates your heart is working hard enough to burn fat.  Date Last Reviewed: 6/1/2016 2000-2017 [employer]. [address] [place] [address], [place] [address]. All rights reserved. This information is not intended as a substitute for professional medical care. Always follow your healthcare professional's instructions.        Finding Your Ideal Weight  Most of the people in magazines and on TV are far slimmer  than average, yet this is the  ideal  that many people aim for. Before you decide that you won t be happy until you get down to a certain number of pounds, consider:      Your age. You probably wish you could get back to your college weight. But normal changes in metabolism and hormone levels that occur with aging can make this more difficult.    Your gender. In general, men have more muscle and heavier bones than women, which means that healthy men usually weigh more than healthy women of the same height.    Your current weight. If you are very heavy, focus on losing a smaller amount (such as 10 percent of your body weight). Losing just 5 to 10 pounds can improve your health.  Your body fat percentage  A pound of muscle weighs the same as a pound of fat, but it takes up less space. Think of a trained athlete and a  couch potato.  Even though they may be the same height and weight, the athlete looks fitter, is healthier, and probably wears a smaller size of clothing. If you are muscular, a body fat test may be a more accurate measure of your ideal weight than the bathroom scale. Talk to your healthcare provider, who can help you set appropriate goals for yourself.  Body mass index (BMI)  Your body mass index is a number calculated from your weight and height. It is a fairly reliable indicator of body fatness for most people. To calculate your BMI, see this BMI calculator from the CDC: http://www.cdc.gov/healthyweight/assessing/bmi/adult_bmi/english_bmi_calculator/bmi_calculator.html  Date Last Reviewed: 6/1/2017 2000-2017 [employer]. [address] [place] [address], [place] [address]. All rights reserved. This information is not intended as a substitute for professional medical care. Always follow your healthcare professional's instructions.                Follow-ups after your visit        Additional Services     OPTOMETRY REFERRAL       Your provider has referred you to:  [place]: [facility]  [place] [phone]   [email or web address]    Please be aware that coverage of these services is subject to the terms and limitations of your health insurance plan.  Call member services at your health plan with any benefit or coverage questions.      Please bring the following to your appointment:  >>   Any x-rays, CTs or MRIs which have been performed.  Contact the facility where they were done to arrange for  prior to your scheduled appointment.  Any new CT, MRI or other procedures ordered by your specialist must be performed at a [facility] facility or coordinated by your clinic's referral office.    >>   List of current medications   >>   This referral request   >>   Any documents/labs given to you for this referral                  Your next 10 appointments already scheduled     Feb 06, 2018  9:30 AM CST   Diabetic Education with AN DIABETIC ED RESOURCE   [facility] [place] ([facility])    [address] [facility] [address] [phone]              Who to contact     If you have questions or need follow up information about today's clinic visit or your schedule please contact [facility] directly at [phone].  Normal or non-critical lab and imaging results will be communicated to you by MyChart, letter or phone within 4 business days after the clinic has received the results. If you do not hear from us within 7 days, please contact the clinic through MyChart or phone. If you have a critical or abnormal lab result, we will notify you by phone as soon as possible.  Submit refill requests through [facility] or call your pharmacy and they will forward the refill request to us. Please allow 3 business days for your refill to be completed.          Additional Information About Your Visit        [facility] Information     [facility] gives you secure access to your electronic health record. If you see a primary care provider, you can also  send messages to your care team and make appointments. If you have questions, please call your primary care clinic.  If you do not have a primary care provider, please call [phone] and they will assist you.        Care EveryWhere ID     This is your Care EveryWhere ID. This could be used by other organizations to access your [facility] medical records  [id number]        Your Vitals Were     Pulse Temperature Respirations Pulse Oximetry Breastfeeding? BMI (Body Mass Index)    92 97.9  F (36.6  C) (Oral) 16 96% No 39.24 kg/m2       Blood Pressure from Last 3 Encounters:   01/24/18 138/75   12/11/17 138/69   12/06/17 122/63    Weight from Last 3 Encounters:   01/24/18 260 lb (117.9 kg)   12/11/17 256 lb 9.6 oz (116.4 kg)   12/06/17 258 lb (117 kg)              We Performed the Following     FOOT EXAM  NO CHARGE [[id number]]     HEMOGLOBIN A1C     Lipid panel reflex to direct LDL Fasting     OPTOMETRY REFERRAL          Today's Medication Changes          These changes are accurate as of 1/24/18 10:43 AM.  If you have any questions, ask your nurse or doctor.               These medicines have changed or have updated prescriptions.        Dose/Directions    amLODIPine 10 MG tablet   Commonly known as:  NORVASC   This may have changed:  See the new instructions.   Used for:  Benign essential hypertension   Changed by:  Hannah Cee MD        Dose:  10 mg   Take 1 tablet (10 mg) by mouth daily   Quantity:  90 tablet   Refills:  1            Where to get your medicines      These medications were sent to [facility] [place] - [address]  [address] [place] [address], [place] [address]     Phone:  [phone]     amLODIPine 10 MG tablet    carvedilol 25 MG tablet    clobetasol propionate 0.05 % Liqd    Levothyroxine Sodium 150 MCG Caps                Primary Care Provider Office Phone # Fax #    Hannah Cee [phone]  [phone]       [address] [facility] [address]        Equal Access to Services     SHO DAVIS : Hadii bunny marques ena Socarter, watanoda luqadaha, qaybta kaalmada claude, chandra erinin hayaalex personroselyn mendes feliz randolph. So [facility] [phone].    ATENCIÓN: Si habla español, tiene a dunbar disposición servicios gratuitos de asistencia lingüística. Llame al [phone].    We comply with applicable federal civil rights laws and [place] laws. We do not discriminate on the basis of race, color, national origin, age, disability, sex, sexual orientation, or gender identity.            Thank you!     Thank you for choosing [facility]  for your care. Our goal is always to provide you with excellent care. Hearing back from our patients is one way we can continue to improve our services. Please take a few minutes to complete the written survey that you may receive in the mail after your visit with us. Thank you!             Your Updated Medication List - Protect others around you: Learn how to safely use, store and throw away your medicines at www.disposemymeds.org.          This list is accurate as of 1/24/18 10:43 AM.  Always use your most recent med list.                   Brand Name Dispense Instructions for use Diagnosis    ACE/ARB/ARNI NOT PRESCRIBED (INTENTIONAL)      Please choose reason not prescribed, below    Benign essential hypertension       albuterol (2.5 MG/3ML) 0.083% neb solution     1 vial    Take 1 vial (2.5 mg) by nebulization every 6 hours as needed for shortness of breath / dyspnea or wheezing 1 vial used in clinic today.    Chronic obstructive pulmonary disease, unspecified COPD type (H)       alendronate 70 MG tablet    FOSAMAX    12 tablet    Take 1 tablet (70 mg) by mouth every 7 days Take 60 minutes before am meal with 8 oz. water. Remain upright for 30 minutes.    Osteoporosis       amLODIPine 10 MG tablet    NORVASC    90 tablet    Take 1 tablet (10 mg) by mouth daily    Benign  essential hypertension       ascorbic acid 1000 MG Tabs    vitamin C     1 TABLET DAILY AT DINNER        aspirin 81 MG tablet      ONE DAILY*        * blood glucose monitoring lancets     100 Box    Use to test blood sugar 3 times daily or as directed.    Type 2 diabetes mellitus with other diabetic kidney complication, with long-term current use of insulin (H)       * ONETOUCH DELICA LANCETS 33G Misc     100 each    1 Box 4 times daily (before meals and nightly)    Type 2 diabetes mellitus with other diabetic kidney complication, with long-term current use of insulin (H)       * blood glucose monitoring test strip    MUSA CONTOUR NEXT    100 strip    Use to test blood sugar 3 times daily or as directed.    Type 2 diabetes mellitus with other diabetic kidney complication, with long-term current use of insulin (H)       * blood glucose monitoring test strip    ONETOUCH VERIO IQ    400 strip    Use to test blood sugar 4 times daily or as directed.  Ok to substitute alternative if insurance prefers.    Type 2 diabetes mellitus with other diabetic kidney complication, with long-term current use of insulin (H)       carvedilol 25 MG tablet    COREG    180 tablet    Take 1 tablet (25 mg) by mouth 2 times daily (with meals)    Benign essential hypertension       CENTRUM SILVER per tablet      1 TABLET DAILY        clobetasol propionate 0.05 % Liqd     1 Bottle    Externally apply 1 dose. topically 2 times daily as needed    Skin rash       * FENOFIBRATE PO      Take 54 mg by mouth daily        * fenofibrate 54 MG tablet     90 tablet    Take 1 tablet (54 mg) by mouth daily    Hyperlipidemia, unspecified hyperlipidemia type       insulin aspart 100 UNIT/ML injection    NovoLOG FLEXPEN    18 mL    22 units before breakfast, 26 units before lunch, 20 units before dinner    Type 2 diabetes mellitus with other diabetic kidney complication, with long-term current use of insulin (H)       insulin detemir 100 UNIT/ML injection     LEVEMIR FLEXPEN/FLEXTOUCH    18 mL    To take 38 units with breakfast and 35 units with dinner. Subcutaneously.    Type 2 diabetes mellitus with other diabetic kidney complication, with long-term current use of insulin (H)       insulin pen needle 32G X 4 MM     450 each    Use 5 pen needles daily or as directed.  Per insurance and patient preference    Type 2 diabetes mellitus with other diabetic kidney complication, with long-term current use of insulin (H)       Levothyroxine Sodium 150 MCG Caps     90 capsule    Take 150 mcg by mouth daily (with breakfast)    Hypothyroidism, unspecified type       losartan 100 MG tablet    COZAAR    30 tablet    Take 1 tablet (100 mg) by mouth daily    Hypertension goal BP (blood pressure) < 140/90       MAGNESIUM PO      Take 250 mg by mouth daily        OMEGA-3 FISH OIL PO      Take 1 g by mouth        PARoxetine 20 MG tablet    PAXIL    180 tablet    Take 1 tablet (20 mg) by mouth 2 times daily    Moderate major depression (H)       pravastatin 80 MG tablet    PRAVACHOL    90 tablet    Take 1 tablet (80 mg) by mouth every evening    Type 2 diabetes mellitus with other diabetic kidney complication, with long-term current use of insulin (H), Hyperlipidemia LDL goal <100       primidone 50 MG tablet    MYSOLINE    180 tablet    TAKE ONE TABLET(S) BY MOUTH TWICE DAILY    Essential tremor       VITAMIN D3 PO      Take 10,000 Units by mouth daily        vitamin E 400 UNITS Tabs      1 tab a day        * Notice:  This list has 6 medication(s) that are the same as other medications prescribed for you. Read the directions carefully, and ask your doctor or other care provider to review them with you.

## 2018-01-25 ENCOUNTER — MYC MEDICAL ADVICE (OUTPATIENT)
Dept: INTERNAL MEDICINE | Facility: CLINIC | Age: 68
End: 2018-01-25

## 2018-01-25 DIAGNOSIS — E78.5 HYPERLIPIDEMIA, UNSPECIFIED HYPERLIPIDEMIA TYPE: Primary | ICD-10-CM

## 2018-01-25 RX ORDER — ATORVASTATIN CALCIUM 40 MG/1
40 TABLET, FILM COATED ORAL DAILY
Qty: 90 TABLET | Refills: 3 | Status: SHIPPED | OUTPATIENT
Start: 2018-01-25 | End: 2019-01-31

## 2018-01-25 ASSESSMENT — ANXIETY QUESTIONNAIRES: GAD7 TOTAL SCORE: 3

## 2018-01-25 ASSESSMENT — ASTHMA QUESTIONNAIRES: ACT_TOTALSCORE: 23

## 2018-01-25 NOTE — TELEPHONE ENCOUNTER
Per yesterdays result note:  Please let me know, if you remember trying Atorvastatin (Lipitor) or Crestor (Rosuvastatin) in the past, and if you had any side effects from them. I will then be able to advise you further    To provider to advise.   Thank you, Sanam Parker, JOSEN RN

## 2018-01-25 NOTE — PROGRESS NOTES
Dear Yadi,     Your test results are attached.    Your diabetes control is currently very good, according to the A1C.  Please continue to current regimen, keep your appointment with the Diabetic Educator next month and report any extreme blood sugars (below 80, above 300).  Your cholesterol levels are still high. You are on fenofibrate and pravastatin 80mg daily. I would like to try a stronger statin, instead of the Pravastatin. From the [facility] records, it does not look like you've tried statins other than Pravastatin. Please let me know, if you remember trying Atorvastatin (Lipitor) or Crestor (Rosuvastatin) in the past, and if you had any side effects from them. I will then be able to advise you further. (After we make the cholesterol treatment changes now, I would plan on you completing a fasting lab appointment for a cholesterol recheck in 3 months, and a clinic appointment a few days later, to discuss the results).      Please notify me via [facility] or contact the clinic at [phone] if you have any questions.    Hannah Cee MD

## 2018-02-15 DIAGNOSIS — E11.29 TYPE 2 DIABETES MELLITUS WITH OTHER DIABETIC KIDNEY COMPLICATION, WITH LONG-TERM CURRENT USE OF INSULIN (H): ICD-10-CM

## 2018-02-15 DIAGNOSIS — Z79.4 TYPE 2 DIABETES MELLITUS WITH OTHER DIABETIC KIDNEY COMPLICATION, WITH LONG-TERM CURRENT USE OF INSULIN (H): ICD-10-CM

## 2018-02-16 RX ORDER — INSULIN ASPART 100 [IU]/ML
INJECTION, SOLUTION INTRAVENOUS; SUBCUTANEOUS
Qty: 15 ML | Refills: 0 | Status: SHIPPED | OUTPATIENT
Start: 2018-02-16 | End: 2018-03-14

## 2018-02-22 DIAGNOSIS — E11.29 TYPE 2 DIABETES MELLITUS WITH OTHER DIABETIC KIDNEY COMPLICATION, WITH LONG-TERM CURRENT USE OF INSULIN (H): ICD-10-CM

## 2018-02-22 DIAGNOSIS — Z79.4 TYPE 2 DIABETES MELLITUS WITH OTHER DIABETIC KIDNEY COMPLICATION, WITH LONG-TERM CURRENT USE OF INSULIN (H): ICD-10-CM

## 2018-02-22 RX ORDER — INSULIN DETEMIR 100 [IU]/ML
INJECTION, SOLUTION SUBCUTANEOUS
Qty: 15 ML | Refills: 1 | Status: SHIPPED | OUTPATIENT
Start: 2018-02-22 | End: 2018-04-09

## 2018-03-14 DIAGNOSIS — E11.29 TYPE 2 DIABETES MELLITUS WITH OTHER DIABETIC KIDNEY COMPLICATION, WITH LONG-TERM CURRENT USE OF INSULIN (H): ICD-10-CM

## 2018-03-14 DIAGNOSIS — Z79.4 TYPE 2 DIABETES MELLITUS WITH OTHER DIABETIC KIDNEY COMPLICATION, WITH LONG-TERM CURRENT USE OF INSULIN (H): ICD-10-CM

## 2018-03-14 DIAGNOSIS — M81.0 OSTEOPOROSIS: ICD-10-CM

## 2018-03-15 RX ORDER — ALENDRONATE SODIUM 70 MG/1
TABLET ORAL
Qty: 12 TABLET | Refills: 0 | Status: SHIPPED | OUTPATIENT
Start: 2018-03-15 | End: 2018-05-30

## 2018-03-15 RX ORDER — INSULIN ASPART 100 [IU]/ML
INJECTION, SOLUTION INTRAVENOUS; SUBCUTANEOUS
Qty: 15 ML | Refills: 0 | Status: SHIPPED | OUTPATIENT
Start: 2018-03-15 | End: 2018-04-09

## 2018-04-09 ENCOUNTER — TRANSFERRED RECORDS (OUTPATIENT)
Dept: HEALTH INFORMATION MANAGEMENT | Facility: CLINIC | Age: 68
End: 2018-04-09

## 2018-04-09 DIAGNOSIS — Z79.4 TYPE 2 DIABETES MELLITUS WITH OTHER DIABETIC KIDNEY COMPLICATION, WITH LONG-TERM CURRENT USE OF INSULIN (H): ICD-10-CM

## 2018-04-09 DIAGNOSIS — E11.29 TYPE 2 DIABETES MELLITUS WITH OTHER DIABETIC KIDNEY COMPLICATION, WITH LONG-TERM CURRENT USE OF INSULIN (H): ICD-10-CM

## 2018-04-09 RX ORDER — INSULIN ASPART 100 [IU]/ML
INJECTION, SOLUTION INTRAVENOUS; SUBCUTANEOUS
Qty: 15 ML | Refills: 0 | Status: SHIPPED | OUTPATIENT
Start: 2018-04-09 | End: 2018-05-25

## 2018-04-09 RX ORDER — INSULIN DETEMIR 100 [IU]/ML
INJECTION, SOLUTION SUBCUTANEOUS
Qty: 15 ML | Refills: 0 | Status: SHIPPED | OUTPATIENT
Start: 2018-04-09 | End: 2018-05-07

## 2018-04-12 ENCOUNTER — DOCUMENTATION ONLY (OUTPATIENT)
Dept: LAB | Facility: CLINIC | Age: 68
End: 2018-04-12

## 2018-04-12 DIAGNOSIS — E11.29 TYPE 2 DIABETES MELLITUS WITH OTHER KIDNEY COMPLICATION, UNSPECIFIED LONG TERM INSULIN USE STATUS: ICD-10-CM

## 2018-04-12 DIAGNOSIS — I10 HYPERTENSION GOAL BP (BLOOD PRESSURE) < 140/90: ICD-10-CM

## 2018-04-12 DIAGNOSIS — N18.30 CKD (CHRONIC KIDNEY DISEASE) STAGE 3, GFR 30-59 ML/MIN (H): ICD-10-CM

## 2018-04-12 DIAGNOSIS — Z79.4 TYPE 2 DIABETES MELLITUS WITH OTHER DIABETIC KIDNEY COMPLICATION, WITH LONG-TERM CURRENT USE OF INSULIN (H): ICD-10-CM

## 2018-04-12 DIAGNOSIS — E11.29 TYPE 2 DIABETES MELLITUS WITH OTHER DIABETIC KIDNEY COMPLICATION, WITH LONG-TERM CURRENT USE OF INSULIN (H): ICD-10-CM

## 2018-04-12 DIAGNOSIS — E03.9 HYPOTHYROIDISM, UNSPECIFIED TYPE: Primary | ICD-10-CM

## 2018-04-12 NOTE — PROGRESS NOTES
Patient coming in on 4/20/18. Please review and or place future orders.    Thank you,  Augustina West  LAB

## 2018-04-16 ENCOUNTER — TELEPHONE (OUTPATIENT)
Dept: INTERNAL MEDICINE | Facility: CLINIC | Age: 68
End: 2018-04-16

## 2018-04-16 NOTE — TELEPHONE ENCOUNTER
I canceled the second appointment and I added back pain onto the first appointment notes.  Oralia Kong,

## 2018-04-20 DIAGNOSIS — I10 HYPERTENSION GOAL BP (BLOOD PRESSURE) < 140/90: ICD-10-CM

## 2018-04-20 DIAGNOSIS — E03.9 HYPOTHYROIDISM, UNSPECIFIED TYPE: ICD-10-CM

## 2018-04-20 DIAGNOSIS — Z12.31 VISIT FOR SCREENING MAMMOGRAM: ICD-10-CM

## 2018-04-20 DIAGNOSIS — Z79.4 TYPE 2 DIABETES MELLITUS WITH OTHER DIABETIC KIDNEY COMPLICATION, WITH LONG-TERM CURRENT USE OF INSULIN (H): ICD-10-CM

## 2018-04-20 DIAGNOSIS — N18.30 CKD (CHRONIC KIDNEY DISEASE) STAGE 3, GFR 30-59 ML/MIN (H): ICD-10-CM

## 2018-04-20 DIAGNOSIS — E11.29 TYPE 2 DIABETES MELLITUS WITH OTHER KIDNEY COMPLICATION, UNSPECIFIED LONG TERM INSULIN USE STATUS: ICD-10-CM

## 2018-04-20 DIAGNOSIS — E11.29 TYPE 2 DIABETES MELLITUS WITH OTHER DIABETIC KIDNEY COMPLICATION, WITH LONG-TERM CURRENT USE OF INSULIN (H): ICD-10-CM

## 2018-04-20 LAB
ANION GAP SERPL CALCULATED.3IONS-SCNC: 4 MMOL/L (ref 3–14)
BUN SERPL-MCNC: 21 MG/DL (ref 7–30)
CALCIUM SERPL-MCNC: 9.2 MG/DL (ref 8.5–10.1)
CHLORIDE SERPL-SCNC: 104 MMOL/L (ref 94–109)
CHOLEST SERPL-MCNC: 174 MG/DL
CO2 SERPL-SCNC: 33 MMOL/L (ref 20–32)
CREAT SERPL-MCNC: 1.45 MG/DL (ref 0.52–1.04)
CREAT UR-MCNC: 239 MG/DL
GFR SERPL CREATININE-BSD FRML MDRD: 36 ML/MIN/1.7M2
GLUCOSE SERPL-MCNC: 188 MG/DL (ref 70–99)
HBA1C MFR BLD: 7.4 % (ref 0–5.6)
HDLC SERPL-MCNC: 39 MG/DL
LDLC SERPL CALC-MCNC: 88 MG/DL
MICROALBUMIN UR-MCNC: 1100 MG/L
MICROALBUMIN/CREAT UR: 460.25 MG/G CR (ref 0–25)
NONHDLC SERPL-MCNC: 135 MG/DL
POTASSIUM SERPL-SCNC: 4.4 MMOL/L (ref 3.4–5.3)
SODIUM SERPL-SCNC: 141 MMOL/L (ref 133–144)
TRIGL SERPL-MCNC: 234 MG/DL
TSH SERPL DL<=0.005 MIU/L-ACNC: 1.18 MU/L (ref 0.4–4)

## 2018-04-20 PROCEDURE — 77067 SCR MAMMO BI INCL CAD: CPT | Mod: TC

## 2018-04-20 PROCEDURE — 80061 LIPID PANEL: CPT | Performed by: INTERNAL MEDICINE

## 2018-04-20 PROCEDURE — 83036 HEMOGLOBIN GLYCOSYLATED A1C: CPT | Performed by: INTERNAL MEDICINE

## 2018-04-20 PROCEDURE — 84443 ASSAY THYROID STIM HORMONE: CPT | Performed by: INTERNAL MEDICINE

## 2018-04-20 PROCEDURE — 36415 COLL VENOUS BLD VENIPUNCTURE: CPT | Performed by: INTERNAL MEDICINE

## 2018-04-20 PROCEDURE — 82043 UR ALBUMIN QUANTITATIVE: CPT | Performed by: INTERNAL MEDICINE

## 2018-04-20 PROCEDURE — 80048 BASIC METABOLIC PNL TOTAL CA: CPT | Performed by: INTERNAL MEDICINE

## 2018-04-22 NOTE — PROGRESS NOTES
Plan to discuss at upcoming visit:  1) Creatinine just hitting the SUZE criteria (increased Creatinine,and urine albumin has doubled from ~ 200 to 400. Will check history for nephrotoxins (ex:NSAIDs, excess diuretics, contrast,etc), BP extremes,hypovolemia;recent illness; recent history of ACEI/ARB use. (today's A1C shows that the DM appears to be overall well-controlled).    2) Lipids better.  A1C slightly worse (but below 8%).  TSH within normal limits.    . Dr Hannah Cee MD.

## 2018-04-23 ENCOUNTER — TELEPHONE (OUTPATIENT)
Dept: PULMONOLOGY | Facility: CLINIC | Age: 68
End: 2018-04-23

## 2018-04-23 NOTE — TELEPHONE ENCOUNTER
1st attempt to reach patient and schedule a follow up with Dr. Sotelo, offered 11/8/18 with chest CT first, PFT at 10 and appointment with Dr. Sotelo at 11 a.m., new with Dr. Sotelo. Schedule from wait list; lvm and sent Can[facility] message.    [facility]   [phone]

## 2018-04-24 NOTE — PROGRESS NOTES
SUBJECTIVE:                                                    Yadi Iniguez is 67 year old female   Chief Complaint   Patient presents with     Lipids     Health Maintenance     PHQ-9, Eye exam     Back Pain     -Patient would also like to discuss back pain  Diabetes Follow-up    Patient is checking blood sugars: three times daily.   Blood sugar testing frequency justification: Uncontrolled diabetes  Results are as follows:         am - 180-240s         lunchtime - 200-240s         suppertime - 170-250s    Diabetic concerns: blood sugar frequently over 200     Symptoms of hypoglycemia (low blood sugar): none     Paresthesias (numbness or burning in feet) or sores: Yes numbness     Date of last diabetic eye exam: September 2017 - has appointment for 5/14/18    See my PVL (previsit labs) results notes:  1) Creatinine just hitting the SUZE criteria (increased Creatinine,and urine albumin has doubled from ~ 200 to 400. Will check history for nephrotoxins (ex:NSAIDs, excess diuretics, contrast,etc), BP extremes,hypovolemia;recent illness; recent history of ACEI/ARB use. (today's A1C shows that the DM appears to be overall well-controlled).    2) Lipids better.  A1C slightly worse (but below 8%).  TSH within normal limits.    Hyperlipidemia Follow-Up      Rate your low fat/cholesterol diet?: fair    Taking statin?  Yes, no muscle aches from statin    Other lipid medications/supplements?:  none    Hypertension Follow-up      Outpatient blood pressures are not being checked.    Low Salt Diet: no added salt    BP Readings from Last 2 Encounters:   04/25/18 112/62   01/24/18 138/75     Hemoglobin A1C (%)   Date Value   04/20/2018 7.4 (H)   01/24/2018 6.7 (H)     LDL Cholesterol Calculated (mg/dL)   Date Value   04/20/2018 88   01/24/2018 143 (H)       Amount of exercise or physical activity: with increased back pain - none    Problems taking medications regularly: No    Medication side effects: none    Diet: carbohydrate  counting    Patient also has ongoing low back pain that has increasingly gotten much worse, cannot stand up straight.  For years.  B/l. L >R.  Most comfortable position is lying down.  If she does not have her cane when walking, the more the back hurts, the more she bends and then the more it hurts and the more it bends.  In both legs, but mostly the left leg, there is pain on the side of the leg, going down.  She does report a trauma when she was 12 yo, when she tried lifting a heavy person. Since that time, she has been unable to jump on the trampoline or horseback ride.   Treatments tried: acetaminophen-1,000mg only takes it when she takes when she has something physically active planned, so about twice a week and aspirin 81mg daily. She has not yet tried heat, ice or massage.     LBP: red flags: some weakness in the legs, for decades, now worse, but appears to be related to the pain.  And numbness on the outside of both legs. No problems with bowel movement control. Patient does report urinary incontinence and urinary urgency for the past few months at least if not longer; intermittently. It has been worse in the past few weeks. No burning on urination. (she has felt feverish at times lately but is also getting over a cold).     Does the patient wake the patient up at night? No.  No IVDU.   No *recent* trauma to the back.  No history of neoplasm.     Problem list and histories reviewed & adjusted, as indicated.  Additional history: as documented    Patient Active Problem List   Diagnosis     Hypothyroidism     Hypertension goal BP (blood pressure) < 140/90     Type 2 diabetes, HbA1C goal < 8% (H)     Hyperlipidemia LDL goal <100     Moderate major depression (H)     Incontinence of urine     Neuropathy in diabetes (H)     Eczema     Left lumbar radiculopathy     ACP (advance care planning)     Health Care Home     CKD (chronic kidney disease) stage 3, GFR 30-59 ml/min     Type 2 diabetes mellitus with other  diabetic kidney complication, with long-term current use of insulin (H)     Osteoporosis     Morbid obesity (H)     Thrombocytopenia (H)     Chronic obstructive pulmonary disease, unspecified COPD type (H)     Past Surgical History:   Procedure Laterality Date     ABDOMEN SURGERY  1978    Gall Bladder  January       hysterectomy  Sept     BIOPSY  2013 1993    from left corner of mouth       muscule biopsy     C VAGINAL HYSTERECTOMY       CHOLECYSTECTOMY, OPEN       CL AFF SURGICAL PATHOLOGY       COLONOSCOPY  2007     EYE SURGERY  2014    ongoing     HEAD & NECK SURGERY  2006    Removal of saliva gland left side     PROPHYLACTIC REPAIR RETINAL BREAK, PNEUMATIC RETINOPEXY, COMBINED Left 9/2/2016     SOFT TISSUE SURGERY  1996    Carpal Tunnel left wrist       Social History   Substance Use Topics     Smoking status: Former Smoker     Packs/day: 0.50     Years: 50.00     Types: Cigarettes     Start date: 1/1/1967     Quit date: 8/8/2017     Smokeless tobacco: Never Used      Comment: I have tried many times     Alcohol use Yes      Comment: Maybe a drink every few months     Family History   Problem Relation Age of Onset     Musculoskeletal Disorder Mother      MD     Muscular Disorder Mother      Depression Mother      OSTEOPOROSIS Mother      Obesity Mother      CANCER Father      mesothelioma     Obesity Father      HEART DISEASE Brother      angioplasty     Neurologic Disorder Brother      ms     DIABETES Brother      Hypertension Brother      Hyperlipidemia Brother      Obesity Brother      4 brothers     Depression Paternal Grandmother      Obesity Paternal Grandmother      Genetic Disorder Brother      MS     DIABETES Brother      Hypertension Brother      Hyperlipidemia Brother          Current Outpatient Prescriptions   Medication Sig Dispense Refill     ACE/ARB NOT PRESCRIBED, INTENTIONAL, Please choose reason not prescribed, below       alendronate (FOSAMAX) 70 MG tablet TAKE 1 TABLET (70 MG) BY MOUTH EVERY  7 DAYS. TAKE 60 MINUTES BEFORE MORNING MEAL WITH 8 OZ. WATER. REMAIN UPRIGHT FOR 30 MINUTES. 12 tablet 0     amLODIPine (NORVASC) 10 MG tablet Take 1 tablet (10 mg) by mouth daily 90 tablet 1     ASPIRIN 81 MG OR TABS ONE DAILY*  3     atorvastatin (LIPITOR) 40 MG tablet Take 1 tablet (40 mg) by mouth daily 90 tablet 3     blood glucose monitoring (MUSA CONTOUR NEXT) test strip Use to test blood sugar 3 times daily or as directed. 100 strip 11     blood glucose monitoring (MUSA MICROLET) lancets Use to test blood sugar 3 times daily or as directed. 100 Box 11     blood glucose monitoring (ONETOUCH VERIO IQ) test strip Use to test blood sugar 4 times daily or as directed.  Ok to substitute alternative if insurance prefers. 400 strip 11     carvedilol (COREG) 25 MG tablet Take 1 tablet (25 mg) by mouth 2 times daily (with meals) 180 tablet 1     clobetasol propionate 0.05 % LIQD Externally apply 1 dose. topically 2 times daily as needed 1 Bottle 5     fenofibrate 54 MG tablet Take 1 tablet (54 mg) by mouth daily 90 tablet 3     FENOFIBRATE PO Take 54 mg by mouth daily       insulin aspart (NOVOLOG FLEXPEN) 100 UNIT/ML injection 22 units before breakfast, 26 units before lunch, 20 units before dinner 18 mL 3     insulin pen needle 32G X 4 MM Use 5 pen needles daily or as directed.  Per insurance and patient preference 450 each 3     LEVEMIR FLEXTOUCH 100 UNIT/ML injection TO TAKE 38 UNITS WITH BREAKFAST AND 35 UNITS WITH DINNER. SUBCUTANEOUSLY. 15 mL 0     Levothyroxine Sodium 150 MCG CAPS Take 150 mcg by mouth daily (with breakfast) 90 capsule 3     losartan (COZAAR) 100 MG tablet Take 1 tablet (100 mg) by mouth daily 30 tablet 10     NOVOLOG FLEXPEN 100 UNIT/ML soln INJECT 20 UNITS BEFORE BREAKFAST, 20 UNITS BEFORE LUNCH, 20 UNITS BEFORE DINNER 15 mL 0     Omega-3 Fatty Acids (OMEGA-3 FISH OIL PO) Take 1 g by mouth       ONETOUCH DELICA LANCETS 33G MISC 1 Box 4 times daily (before meals and nightly) 100 each 11      PARoxetine (PAXIL) 20 MG tablet Take 1 tablet (20 mg) by mouth 2 times daily 180 tablet 1     primidone (MYSOLINE) 50 MG tablet TAKE ONE TABLET(S) BY MOUTH TWICE DAILY 180 tablet 1     albuterol (2.5 MG/3ML) 0.083% neb solution Take 1 vial (2.5 mg) by nebulization every 6 hours as needed for shortness of breath / dyspnea or wheezing 1 vial used in clinic today. (Patient not taking: Reported on 4/25/2018) 1 vial 1     CENTRUM SILVER OR TABS 1 TABLET DAILY       Cholecalciferol (VITAMIN D3 PO) Take 10,000 Units by mouth daily       MAGNESIUM PO Take 250 mg by mouth daily       VITAMIN C 1000 MG OR TABS 1 TABLET DAILY AT DINNER       VITAMIN E 400 UNIT OR TABS 1 tab a day        No Known Allergies  Recent Labs   Lab Test  04/20/18   0954  01/24/18   1047  12/06/17   1019  09/25/17   1117   01/04/17   1241 10/13/16  04/28/11   0758   07/19/10   0851   A1C  7.4*  6.7*   --   7.9*   < >   --   7.4*  12.2*   --   11.4*   LDL  88  143*   --    --    --   135*  152*  Cannot estimate LDL when triglyceride exceeds 400 mg/dL  122   < >  96   HDL  39*  49*   --    --    --    --   50  35*   --   34*   TRIG  234*  196*   --    --    --    --   189*  734*   --   193*   ALT   --    --    --   25   --    --    --   18   --   28   CR  1.45*   --   1.14*  1.02   < >  1.82*   --   0.76   --   1.19*   GFRESTIMATED  36*   --   47*  54*   < >  28*   --   77   --   46*   GFRESTBLACK  43*   --   57*  65   < >  33*   --   >90   --   56*   POTASSIUM  4.4   --   4.7  4.3   < >  4.3   --   4.6   --   5.0   TSH  1.18   --    --    --    --   5.55*   --   0.14*   --   0.24*    < > = values in this interval not displayed.      BP Readings from Last 3 Encounters:   04/25/18 112/62   01/24/18 138/75   12/11/17 138/69    Wt Readings from Last 3 Encounters:   04/25/18 259 lb (117.5 kg)   01/24/18 260 lb (117.9 kg)   12/11/17 256 lb 9.6 oz (116.4 kg)         ROS:  Constitutional, HEENT, cardiovascular, pulmonary, gi and gu systems are negative, except  as otherwise noted.    OBJECTIVE:                                                    /62  Pulse 81  Temp 99.6  F (37.6  C) (Oral)  Resp 14  Wt 259 lb (117.5 kg)  SpO2 94%  BMI 39.09 kg/m2    GENERAL APPEARANCE: healthy, alert and no distress  EYES: Eyes grossly normal to inspection, PERRL and conjunctivae and sclerae normal  HENT: ear canals and TM's normal, nose and mouth without ulcers or lesions, oropharynx clear and oral mucous membranes moist  NECK: no adenopathy, no asymmetry, masses, or scars and thyroid normal to palpation  RESP: lungs clear to auscultation - no rales, rhonchi or wheezes  CV: regular rates and rhythm, normal S1 S2, no S3 or S4, no murmur, click or rub, no peripheral edema and peripheral pulses strong  ABDOMEN: soft, nontender, no hepatosplenomegaly, no masses and bowel sounds normal  MS:   IT band pain on the left and less so, on the right with the crossing of the legs. O/w back exam within normal limits. Increased muscle tone throughout.  ow, no musculoskeletal defects are noted and gait is age appropriate without ataxia  SKIN: no suspicious lesions or rashes  NEURO: Normal strength and tone, sensory exam grossly normal, mentation intact and speech normal  PSYCH: mentation appears normal and affect normal/bright.            ASSESSMENT/PLAN:                                                        ICD-10-CM    1. Acute kidney injury superimposed on CKD (H) N17.9     N18.9    2. Type 2 diabetes mellitus with other kidney complication, unspecified long term insulin use status (H) E11.29    3. Chronic obstructive pulmonary disease, unspecified COPD type (H) J44.9    4. CKD (chronic kidney disease) stage 3, GFR 30-59 ml/min N18.3    5. Back strain, initial encounter S39.012A    6. Screen for colon cancer Z12.11 GASTROENTEROLOGY ADULT REF PROCEDURE ONLY   7. Need for prophylactic vaccination against Streptococcus pneumoniae (pneumococcus) Z23      1: SUZE on CKD: differential diagnosis:  hypovolemia from modest po intake plus recent fever from her URTI.    5: IT band syndrome b/l and lumbago. PLAN: below.  Other issues: ASSESSMENT: stable  PLAN: continue current regimen         Patient Instructions   Please take the Extra strength Tylenol or acetaminophen 500mg tablets as 1,000mg every 8 hours (whether you have pain or not at that time) for 2-4 weeks.  Take the muscle relaxant Robaxin, as per prescription directions.  Do not drive or operate heavy machinery 4 hours after taking.  Take the above medications and rest for 2 days and on the third day, start doing range of motion exercises with the affected area.  Use cooling or heating packs-whatever makes your pain better.    If you develop new or worse numbness or weakness of your legs or an inability to hold in urine or feces, please call the clinic promptly to discuss this. If you develop an inability to get your urine out, please call please call the clinic promptly to discuss this. If you are calling after regular business hours and cannot reach a live person in our clinic, I would advise you to go to the Emergency Room for these symptoms.     For your diabetes:  Please try to keep a more strict diet.    For your slowed down recent (blood and urine) kidney function tests:  Please drink 4 of your 17 once water bottles per day.    return to clinic in a month to recheck your back pain, diabetes (please bring your glucometer with you), and on the kidney function (we will have you repeat the blood and urine kidney tests at that appointment).    Please call [phone] to schedule the colonoscopy in a month or so.          Hannah Cee MD  [facility]

## 2018-04-25 ENCOUNTER — OFFICE VISIT (OUTPATIENT)
Dept: INTERNAL MEDICINE | Facility: CLINIC | Age: 68
End: 2018-04-25
Payer: COMMERCIAL

## 2018-04-25 VITALS
TEMPERATURE: 99.6 F | SYSTOLIC BLOOD PRESSURE: 112 MMHG | BODY MASS INDEX: 39.09 KG/M2 | WEIGHT: 259 LBS | OXYGEN SATURATION: 94 % | DIASTOLIC BLOOD PRESSURE: 62 MMHG | RESPIRATION RATE: 14 BRPM | HEART RATE: 81 BPM

## 2018-04-25 DIAGNOSIS — J44.9 CHRONIC OBSTRUCTIVE PULMONARY DISEASE, UNSPECIFIED COPD TYPE (H): ICD-10-CM

## 2018-04-25 DIAGNOSIS — E11.29 TYPE 2 DIABETES MELLITUS WITH OTHER KIDNEY COMPLICATION, UNSPECIFIED LONG TERM INSULIN USE STATUS: ICD-10-CM

## 2018-04-25 DIAGNOSIS — S39.012A BACK STRAIN, INITIAL ENCOUNTER: ICD-10-CM

## 2018-04-25 DIAGNOSIS — N17.9 ACUTE KIDNEY INJURY SUPERIMPOSED ON CKD (H): Primary | ICD-10-CM

## 2018-04-25 DIAGNOSIS — Z12.11 SCREEN FOR COLON CANCER: ICD-10-CM

## 2018-04-25 DIAGNOSIS — N18.30 CKD (CHRONIC KIDNEY DISEASE) STAGE 3, GFR 30-59 ML/MIN (H): ICD-10-CM

## 2018-04-25 DIAGNOSIS — Z23 NEED FOR PROPHYLACTIC VACCINATION AGAINST STREPTOCOCCUS PNEUMONIAE (PNEUMOCOCCUS): ICD-10-CM

## 2018-04-25 DIAGNOSIS — N18.9 ACUTE KIDNEY INJURY SUPERIMPOSED ON CKD (H): Primary | ICD-10-CM

## 2018-04-25 PROCEDURE — 99214 OFFICE O/P EST MOD 30 MIN: CPT | Performed by: INTERNAL MEDICINE

## 2018-04-25 NOTE — LETTER
My Asthma Action Plan  Name: Yadi Iniguez   YOB: 1950  Date: 4/24/2018   My doctor: Hannah Cee MD   My clinic: [facility]        My Control Medicine: None  My Rescue Medicine: Albuterol nebulizer solution .   My Asthma Severity: intermittent  Avoid your asthma triggers: smoke  cold air            GREEN ZONE   Good Control    I feel good    No cough or wheeze    Can work, sleep and play without asthma symptoms       Take your asthma control medicine every day.     1. If exercise triggers your asthma, take your rescue medication    15 minutes before exercise or sports, and    During exercise if you have asthma symptoms  2. Spacer to use with inhaler: If you have a spacer, make sure to use it with your inhaler             YELLOW ZONE Getting Worse  I have ANY of these:    I do not feel good    Cough or wheeze    Chest feels tight    Wake up at night   1. Keep taking your Green Zone medications  2. Start taking your rescue medicine:    every 20 minutes for up to 1 hour. Then every 4 hours for 24-48 hours.  3. If you stay in the Yellow Zone for more than 12-24 hours, contact your doctor.  4. If you do not return to the Green Zone in 12-24 hours or you get worse, start taking your oral steroid medicine if prescribed by your provider.           RED ZONE Medical Alert - Get Help  I have ANY of these:    I feel awful    Medicine is not helping    Breathing getting harder    Trouble walking or talking    Nose opens wide to breathe       1. Take your rescue medicine NOW  2. If your provider has prescribed an oral steroid medicine, start taking it NOW  3. Call your doctor NOW  4. If you are still in the Red Zone after 20 minutes and you have not reached your doctor:    Take your rescue medicine again and    Call 911 or go to the emergency room right away    See your regular doctor within 2 weeks of an Emergency Room or Urgent Care visit for follow-up treatment.          Annual  Reminders:  Meet with Asthma Educator,  Flu Shot in the Fall, consider Pneumonia Vaccination for patients with asthma (aged 19 and older).    Pharmacy:    [id number] - [place], [place] - [address] AT [facility]  [address]  [id number] - [place], [place] - [address] [facility] [address]  [facility] - [facility] - [place], [place] - [address] [place] [address] [facility] - [place], [place] - [address] [facility]                      Asthma Triggers  How To Control Things That Make Your Asthma Worse    Triggers are things that make your asthma worse.  Look at the list below to help you find your triggers and what you can do about them.  You can help prevent asthma flare-ups by staying away from your triggers.      Trigger                                                          What you can do   Cigarette Smoke  Tobacco smoke can make asthma worse. Do not allow smoking in your home, car or around you.  Be sure no one smokes at a child s day care or school.  If you smoke, ask your health care provider for ways to help you quit.  Ask family members to quit too.  Ask your health care provider for a referral to Quit Plan to help you quit smoking, or call 0-356-042-PLAN.     Colds, Flu, Bronchitis  These are common triggers of asthma. Wash your hands often.  Don t touch your eyes, nose or mouth.  Get a flu shot every year.     Dust Mites  These are tiny bugs that live in cloth or carpet. They are too small to see. Wash sheets and blankets in hot water every week.   Encase pillows and mattress in dust mite proof covers.  Avoid having carpet if you can. If you have carpet, vacuum weekly.   Use a dust mask and HEPA vacuum.   Pollen and Outdoor Mold  Some people are allergic to trees, grass, or weed pollen, or molds. Try to keep your windows closed.  Limit time out doors when pollen count is high.   Ask you health care provider about taking medicine during  allergy season.     Animal Dander  Some people are allergic to skin flakes, urine or saliva from pets with fur or feathers. Keep pets with fur or feathers out of your home.    If you can t keep the pet outdoors, then keep the pet out of your bedroom.  Keep the bedroom door closed.  Keep pets off cloth furniture and away from stuffed toys.     Mice, Rats, and Cockroaches  Some people are allergic to the waste from these pests.   Cover food and garbage.  Clean up spills and food crumbs.  Store grease in the refrigerator.   Keep food out of the bedroom.   Indoor Mold  This can be a trigger if your home has high moisture. Fix leaking faucets, pipes, or other sources of water.   Clean moldy surfaces.  Dehumidify basement if it is damp and smelly.   Smoke, Strong Odors, and Sprays  These can reduce air quality. Stay away from strong odors and sprays, such as perfume, powder, hair spray, paints, smoke incense, paint, cleaning products, candles and new carpet.   Exercise or Sports  Some people with asthma have this trigger. Be active!  Ask your doctor about taking medicine before sports or exercise to prevent symptoms.    Warm up for 5-10 minutes before and after sports or exercise.     Other Triggers of Asthma  Cold air:  Cover your nose and mouth with a scarf.  Sometimes laughing or crying can be a trigger.  Some medicines and food can trigger asthma.

## 2018-04-25 NOTE — LETTER
My Depression Action Plan  Name: Yadi Iniguez   Date of Birth 1950  Date: 4/24/2018    My doctor: Hannah Cee   My clinic: [address] [address] [phone]          GREEN    ZONE   Good Control    What it looks like:     Things are going generally well. You have normal up s and down s. You may even feel depressed from time to time, but bad moods usually last less than a day.   What you need to do:  1. Continue to care for yourself (see self care plan)  2. Check your depression survival kit and update it as needed  3. Follow your physician s recommendations including any medication.  4. Do not stop taking medication unless you consult with your physician first.           YELLOW         ZONE Getting Worse    What it looks like:     Depression is starting to interfere with your life.     It may be hard to get out of bed; you may be starting to isolate yourself from others.    Symptoms of depression are starting to last most all day and this has happened for several days.     You may have suicidal thoughts but they are not constant.   What you need to do:     1. Call your care team, your response to treatment will improve if you keep your care team informed of your progress. Yellow periods are signs an adjustment may need to be made.     2. Continue your self-care, even if you have to fake it!    3. Talk to someone in your support network    4. Open up your depression survival kit           RED    ZONE Medical Alert - Get Help    What it looks like:     Depression is seriously interfering with your life.     You may experience these or other symptoms: You can t get out of bed most days, can t work or engage in other necessary activities, you have trouble taking care of basic hygiene, or basic responsibilities, thoughts of suicide or death that will not go away, self-injurious behavior.     What you need to do:  1. Call your care team  and request a same-day appointment. If they are not available (weekends or after hours) call your local crisis line, emergency room or 911.            Depression Self Care Plan / Survival Kit    Self-Care for Depression  Here s the deal. Your body and mind are really not as separate as most people think.  What you do and think affects how you feel and how you feel influences what you do and think. This means if you do things that people who feel good do, it will help you feel better.  Sometimes this is all it takes.  There is also a place for medication and therapy depending on how severe your depression is, so be sure to consult with your medical provider and/ or Behavioral Health Consultant if your symptoms are worsening or not improving.     In order to better manage my stress, I will:    Exercise  Get some form of exercise, every day. This will help reduce pain and release endorphins, the  feel good  chemicals in your brain. This is almost as good as taking antidepressants!  This is not the same as joining a gym and then never going! (they count on that by the way ) It can be as simple as just going for a walk or doing some gardening, anything that will get you moving.      Hygiene   Maintain good hygiene (Get out of bed in the morning, Make your bed, Brush your teeth, Take a shower, and Get dressed like you were going to work, even if you are unemployed).  If your clothes don't fit try to get ones that do.    Diet  I will strive to eat foods that are good for me, drink plenty of water, and avoid excessive sugar, caffeine, alcohol, and other mood-altering substances.  Some foods that are helpful in depression are: complex carbohydrates, B vitamins, flaxseed, fish or fish oil, fresh fruits and vegetables.    Psychotherapy  I agree to participate in Individual Therapy (if recommended).    Medication  If prescribed medications, I agree to take them.  Missing doses can result in serious side effects.  I understand  that drinking alcohol, or other illicit drug use, may cause potential side effects.  I will not stop my medication abruptly without first discussing it with my provider.    Staying Connected With Others  I will stay in touch with my friends, family members, and my primary care provider/team.    Use your imagination  Be creative.  We all have a creative side; it doesn t matter if it s oil painting, sand castles, or mud pies! This will also kick up the endorphins.    Witness Beauty  (AKA stop and smell the roses) Take a look outside, even in mid-winter. Notice colors, textures. Watch the squirrels and birds.     Service to others  Be of service to others.  There is always someone else in need.  By helping others we can  get out of ourselves  and remember the really important things.  This also provides opportunities for practicing all the other parts of the program.    Humor  Laugh and be silly!  Adjust your TV habits for less news and crime-drama and more comedy.    Control your stress  Try breathing deep, massage therapy, biofeedback, and meditation. Find time to relax each day.     My support system    Clinic Contact:  Phone number:    Contact 1:  Phone number:    Contact 2:  Phone number:    Buddhism/:  Phone number:    Therapist:  Phone number:    Local crisis center:    Phone number:    Other community support:  Phone number:

## 2018-04-25 NOTE — PATIENT INSTRUCTIONS
Please take the Extra strength Tylenol or acetaminophen 500mg tablets as 1,000mg every 8 hours (whether you have pain or not at that time) for 2-4 weeks.  Take the muscle relaxant Robaxin, as per prescription directions.  Do not drive or operate heavy machinery 4 hours after taking.  Take the above medications and rest for 2 days and on the third day, start doing range of motion exercises with the affected area.  Use cooling or heating packs-whatever makes your pain better.    If you develop new or worse numbness or weakness of your legs or an inability to hold in urine or feces, please call the clinic promptly to discuss this. If you develop an inability to get your urine out, please call please call the clinic promptly to discuss this. If you are calling after regular business hours and cannot reach a live person in our clinic, I would advise you to go to the Emergency Room for these symptoms.     For your diabetes:  Please try to keep a more strict diet.    For your slowed down recent (blood and urine) kidney function tests:  Please drink 4 of your 17 once water bottles per day.    return to clinic in a month to recheck your back pain, diabetes (please bring your glucometer with you), and on the kidney function (we will have you repeat the blood and urine kidney tests at that appointment).    Please call [phone] to schedule the colonoscopy in a month or so.

## 2018-04-25 NOTE — MR AVS SNAPSHOT
After Visit Summary   4/25/2018    Yadi Iniguez    MRN: [id number]           Patient Information     Date Of Birth          1950        Visit Information        Provider Department      4/25/2018 10:20 AM Hannah Cee MD [facility]        Today's Diagnoses     Screen for colon cancer        Need for prophylactic vaccination against Streptococcus pneumoniae (pneumococcus)          Care Instructions    Please take the Extra strength Tylenol or acetaminophen 500mg tablets as 1,000mg every 8 hours (whether you have pain or not at that time) for 2-4 weeks.  Take the muscle relaxant Robaxin, as per prescription directions.  Do not drive or operate heavy machinery 4 hours after taking.  Take the above medications and rest for 2 days and on the third day, start doing range of motion exercises with the affected area.  Use cooling or heating packs-whatever makes your pain better.    If you develop new or worse numbness or weakness of your legs or an inability to hold in urine or feces, please call the clinic promptly to discuss this. If you develop an inability to get your urine out, please call please call the clinic promptly to discuss this. If you are calling after regular business hours and cannot reach a live person in our clinic, I would advise you to go to the Emergency Room for these symptoms.     For your diabetes:  Please try to keep a more strict diet.    For your slowed down recent (blood and urine) kidney function tests:  Please drink 4 of your 17 once water bottles per day.    return to clinic in a month to recheck your back pain, diabetes (please bring your glucometer with you), and on the kidney function (we will have you repeat the blood and urine kidney tests at that appointment).    Please call [phone] to schedule the colonoscopy in a month or so.            Follow-ups after your visit        Additional Services     GASTROENTEROLOGY ADULT REF PROCEDURE  ONLY       Last Lab Result: Creatinine (mg/dL)       Date                     Value                 04/20/2018               1.45 (H)         ----------  Body mass index is 39.09 kg/(m^2).     Needed:  No  Language:  English    Patient will be contacted to schedule procedure.     Please be aware that coverage of these services is subject to the terms and limitations of your health insurance plan.  Call member services at your health plan with any benefit or coverage questions.  Any procedures must be performed at a [facility] facility OR coordinated by your clinic's referral office.    Please bring the following with you to your appointment:    (1) Any X-Rays, CTs or MRIs which have been performed.  Contact the facility where they were done to arrange for  prior to your scheduled appointment.    (2) List of current medications   (3) This referral request   (4) Any documents/labs given to you for this referral                  Your next 10 appointments already scheduled     May 14, 2018 12:30 PM CDT   New Visit with Wil Bowers MD, [facility] NURSE ONLY   [facility]    [address] [place] [id number] [phone]            Aug 14, 2018  7:30 AM CDT   (Arrive by 7:15 AM)   CT LUNG RESEARCH MONDRAGON with MGCT2   [facility])    [address] [place] [id number] [phone]           Please bring any scans or X-rays taken at other hospitals, if similar tests were done. Also bring a list of your medicines, including vitamins, minerals and over-the-counter drugs. It is safest to leave personal items at home.  Be sure to tell your doctor:   If you have any allergies.   If there s any chance you are pregnant.   If you are breastfeeding.  You do not need to do anything special to prepare for this exam.  Please wear loose clothing, such as a sweat suit or jogging clothes.  Avoid snaps, zippers and other metal. We may ask you to undress and put on a hospital gown.            Nov 08, 2018  1:00 PM CST   [facility] Visit with PFT LAB   [facility]    [address] [place] [id number] [phone]           Bring a current list of meds and any records pertaining to this visit. For Physicals, please bring immunization records and any forms needing to be filled out. Please arrive 10 minutes early to complete paperwork.            Nov 08, 2018  2:00 PM CST   New Visit with Emilia Sotelo MD   [facility])    [address] [place] [id number] [phone]              Who to contact     If you have questions or need follow up information about today's clinic visit or your schedule please contact [facility] directly at [phone].  Normal or non-critical lab and imaging results will be communicated to you by [employer]hart, letter or phone within 4 business days after the clinic has received the results. If you do not hear from us within 7 days, please contact the clinic through [employer] or phone. If you have a critical or abnormal lab result, we will notify you by phone as soon as possible.  Submit refill requests through [employer] or call your pharmacy and they will forward the refill request to us. Please allow 3 business days for your refill to be completed.          Additional Information About Your Visit        [employer] Information     [employer] gives you secure access to your electronic health record. If you see a primary care provider, you can also send messages to your care team and make appointments. If you have questions, please call your primary care clinic.  If you do not have a primary care provider, please call [phone] and they will assist you.        Care EveryWhere ID     This is your Care EveryWhere ID. This could be used by other  organizations to access your [facility] medical records  [id number]        Your Vitals Were     Pulse Temperature Respirations Pulse Oximetry BMI (Body Mass Index)       81 99.6  F (37.6  C) (Oral) 14 94% 39.09 kg/m2        Blood Pressure from Last 3 Encounters:   04/25/18 112/62   01/24/18 138/75   12/11/17 138/69    Weight from Last 3 Encounters:   04/25/18 259 lb (117.5 kg)   01/24/18 260 lb (117.9 kg)   12/11/17 256 lb 9.6 oz (116.4 kg)              We Performed the Following     GASTROENTEROLOGY ADULT REF PROCEDURE ONLY        Primary Care Provider Office Phone # Fax #    Hannah Cee [phone] [address] [facility] [address]        Equal Access to Services     [facility]: Hadii bunny ku hadasho Soomaali, waaxda luqadaha, qaybta kaalmada adeegyada, chandra jalloh hayjosé luis piper . So [facility] [phone].    ATENCIÓN: Si habla español, tiene a dunbar disposición servicios gratuitos de asistencia lingüística. Llame al [phone].    We comply with applicable federal civil rights laws and [place] laws. We do not discriminate on the basis of race, color, national origin, age, disability, sex, sexual orientation, or gender identity.            Thank you!     Thank you for choosing [facility]  for your care. Our goal is always to provide you with excellent care. Hearing back from our patients is one way we can continue to improve our services. Please take a few minutes to complete the written survey that you may receive in the mail after your visit with us. Thank you!             Your Updated Medication List - Protect others around you: Learn how to safely use, store and throw away your medicines at www.disposemymeds.org.          This list is accurate as of 4/25/18 11:44 AM.  Always use your most recent med list.                   Brand Name Dispense Instructions for use Diagnosis    ACE/ARB/ARNI NOT PRESCRIBED (INTENTIONAL)      Please choose  reason not prescribed, below    Benign essential hypertension       albuterol (2.5 MG/3ML) 0.083% neb solution     1 vial    Take 1 vial (2.5 mg) by nebulization every 6 hours as needed for shortness of breath / dyspnea or wheezing 1 vial used in clinic today.    Chronic obstructive pulmonary disease, unspecified COPD type (H)       alendronate 70 MG tablet    FOSAMAX    12 tablet    TAKE 1 TABLET (70 MG) BY MOUTH EVERY 7 DAYS. TAKE 60 MINUTES BEFORE MORNING MEAL WITH 8 OZ. WATER. REMAIN UPRIGHT FOR 30 MINUTES.    Osteoporosis       amLODIPine 10 MG tablet    NORVASC    90 tablet    Take 1 tablet (10 mg) by mouth daily    Benign essential hypertension       ascorbic acid 1000 MG Tabs    vitamin C     1 TABLET DAILY AT DINNER        aspirin 81 MG tablet      ONE DAILY*        atorvastatin 40 MG tablet    LIPITOR    90 tablet    Take 1 tablet (40 mg) by mouth daily    Hyperlipidemia, unspecified hyperlipidemia type       * blood glucose monitoring lancets     100 Box    Use to test blood sugar 3 times daily or as directed.    Type 2 diabetes mellitus with other diabetic kidney complication, with long-term current use of insulin (H)       * ONETOUCH DELICA LANCETS 33G Misc     100 each    1 Box 4 times daily (before meals and nightly)    Type 2 diabetes mellitus with other diabetic kidney complication, with long-term current use of insulin (H)       * blood glucose monitoring test strip    Runa CONTOUR NEXT    100 strip    Use to test blood sugar 3 times daily or as directed.    Type 2 diabetes mellitus with other diabetic kidney complication, with long-term current use of insulin (H)       * blood glucose monitoring test strip    ONETOUCH VERIO IQ    400 strip    Use to test blood sugar 4 times daily or as directed.  Ok to substitute alternative if insurance prefers.    Type 2 diabetes mellitus with other diabetic kidney complication, with long-term current use of insulin (H)       carvedilol 25 MG tablet    COREG     180 tablet    Take 1 tablet (25 mg) by mouth 2 times daily (with meals)    Benign essential hypertension       CENTRUM SILVER per tablet      1 TABLET DAILY        clobetasol propionate 0.05 % Liqd     1 Bottle    Externally apply 1 dose. topically 2 times daily as needed    Skin rash       * FENOFIBRATE PO      Take 54 mg by mouth daily        * fenofibrate 54 MG tablet     90 tablet    Take 1 tablet (54 mg) by mouth daily    Hyperlipidemia, unspecified hyperlipidemia type       * insulin aspart 100 UNIT/ML injection    NovoLOG FLEXPEN    18 mL    22 units before breakfast, 26 units before lunch, 20 units before dinner    Type 2 diabetes mellitus with other diabetic kidney complication, with long-term current use of insulin (H)       * NovoLOG FLEXPEN 100 UNIT/ML injection   Generic drug:  insulin aspart     15 mL    INJECT 20 UNITS BEFORE BREAKFAST, 20 UNITS BEFORE LUNCH, 20 UNITS BEFORE DINNER    Type 2 diabetes mellitus with other diabetic kidney complication, with long-term current use of insulin (H)       insulin pen needle 32G X 4 MM     450 each    Use 5 pen needles daily or as directed.  Per insurance and patient preference    Type 2 diabetes mellitus with other diabetic kidney complication, with long-term current use of insulin (H)       LEVEMIR FLEXTOUCH 100 UNIT/ML injection   Generic drug:  insulin detemir     15 mL    TO TAKE 38 UNITS WITH BREAKFAST AND 35 UNITS WITH DINNER. SUBCUTANEOUSLY.    Type 2 diabetes mellitus with other diabetic kidney complication, with long-term current use of insulin (H)       Levothyroxine Sodium 150 MCG Caps     90 capsule    Take 150 mcg by mouth daily (with breakfast)    Hypothyroidism, unspecified type       losartan 100 MG tablet    COZAAR    30 tablet    Take 1 tablet (100 mg) by mouth daily    Hypertension goal BP (blood pressure) < 140/90       MAGNESIUM PO      Take 250 mg by mouth daily        OMEGA-3 FISH OIL PO      Take 1 g by mouth        PARoxetine 20 MG  tablet    PAXIL    180 tablet    Take 1 tablet (20 mg) by mouth 2 times daily    Moderate major depression (H)       primidone 50 MG tablet    MYSOLINE    180 tablet    TAKE ONE TABLET(S) BY MOUTH TWICE DAILY    Essential tremor       VITAMIN D3 PO      Take 10,000 Units by mouth daily        vitamin E 400 units Tabs      1 tab a day        * Notice:  This list has 8 medication(s) that are the same as other medications prescribed for you. Read the directions carefully, and ask your doctor or other care provider to review them with you.

## 2018-04-26 ASSESSMENT — PATIENT HEALTH QUESTIONNAIRE - PHQ9: SUM OF ALL RESPONSES TO PHQ QUESTIONS 1-9: 2

## 2018-05-01 ENCOUNTER — MYC MEDICAL ADVICE (OUTPATIENT)
Dept: INTERNAL MEDICINE | Facility: CLINIC | Age: 68
End: 2018-05-01

## 2018-05-01 DIAGNOSIS — M76.30 ILIOTIBIAL BAND SYNDROME, UNSPECIFIED LATERALITY: Primary | ICD-10-CM

## 2018-05-01 PROBLEM — J44.9 CHRONIC OBSTRUCTIVE PULMONARY DISEASE, UNSPECIFIED COPD TYPE (H): Status: ACTIVE | Noted: 2018-05-01

## 2018-05-01 RX ORDER — METHOCARBAMOL 500 MG/1
1000 TABLET, FILM COATED ORAL 3 TIMES DAILY PRN
Qty: 30 TABLET | Refills: 1 | Status: SHIPPED | OUTPATIENT
Start: 2018-05-01 | End: 2018-10-19

## 2018-05-01 NOTE — TELEPHONE ENCOUNTER
Per 4/25/18 AVS:   Take the muscle relaxant Robaxin, as per prescription directions.  Do not drive or operate heavy machinery 4 hours after taking.    Pharmacy pending. To provider to advise on prescription.     Thank you, Sanam Parker, JOSEN RN

## 2018-05-07 DIAGNOSIS — F32.1 MODERATE MAJOR DEPRESSION (H): ICD-10-CM

## 2018-05-07 DIAGNOSIS — E11.29 TYPE 2 DIABETES MELLITUS WITH OTHER DIABETIC KIDNEY COMPLICATION, WITH LONG-TERM CURRENT USE OF INSULIN (H): ICD-10-CM

## 2018-05-07 DIAGNOSIS — Z79.4 TYPE 2 DIABETES MELLITUS WITH OTHER DIABETIC KIDNEY COMPLICATION, WITH LONG-TERM CURRENT USE OF INSULIN (H): ICD-10-CM

## 2018-05-08 DIAGNOSIS — E11.29 TYPE 2 DIABETES MELLITUS WITH OTHER DIABETIC KIDNEY COMPLICATION, WITH LONG-TERM CURRENT USE OF INSULIN (H): ICD-10-CM

## 2018-05-08 DIAGNOSIS — Z79.4 TYPE 2 DIABETES MELLITUS WITH OTHER DIABETIC KIDNEY COMPLICATION, WITH LONG-TERM CURRENT USE OF INSULIN (H): ICD-10-CM

## 2018-05-08 RX ORDER — PAROXETINE 20 MG/1
TABLET, FILM COATED ORAL
Qty: 180 TABLET | Refills: 0 | Status: SHIPPED | OUTPATIENT
Start: 2018-05-08 | End: 2018-08-07

## 2018-05-08 RX ORDER — INSULIN DETEMIR 100 [IU]/ML
INJECTION, SOLUTION SUBCUTANEOUS
Qty: 15 ML | Refills: 0 | Status: SHIPPED | OUTPATIENT
Start: 2018-05-08 | End: 2018-05-25

## 2018-05-08 RX ORDER — INSULIN ASPART 100 [IU]/ML
INJECTION, SOLUTION INTRAVENOUS; SUBCUTANEOUS
Qty: 15 ML | Refills: 0
Start: 2018-05-08

## 2018-05-08 NOTE — TELEPHONE ENCOUNTER
Patient has an appointment 5/25/18 with MD  Prescription approved per [facility] Refill Protocol.  AYE Gonzalez RN

## 2018-05-10 RX ORDER — INSULIN ASPART 100 [IU]/ML
INJECTION, SOLUTION INTRAVENOUS; SUBCUTANEOUS
Qty: 15 ML | Refills: 0
Start: 2018-05-10

## 2018-05-10 NOTE — TELEPHONE ENCOUNTER
Called pharmacy, they received the incorrect prescription 4/9/18 for novolog 20/20/20.    Per 12/5/17 DM ed appointment  Dose was increased.     Then per 1/24/18 ov  With PCP:  Lab Results   Component Value Date     A1C 6.7 01/24/2018   ASSESSMENT: stable  PLAN: continue current regimen    Sent the correct dose to pharmacy per [facility] protocol. Patient has a pending appointment with PCP as advised.   JOSE GonzalezN RN

## 2018-05-14 ENCOUNTER — OFFICE VISIT (OUTPATIENT)
Dept: OPHTHALMOLOGY | Facility: CLINIC | Age: 68
End: 2018-05-14
Payer: COMMERCIAL

## 2018-05-14 DIAGNOSIS — H52.13 MYOPIA OF BOTH EYES WITH ASTIGMATISM AND PRESBYOPIA: Primary | ICD-10-CM

## 2018-05-14 DIAGNOSIS — H52.203 MYOPIA OF BOTH EYES WITH ASTIGMATISM AND PRESBYOPIA: Primary | ICD-10-CM

## 2018-05-14 DIAGNOSIS — H52.4 MYOPIA OF BOTH EYES WITH ASTIGMATISM AND PRESBYOPIA: Primary | ICD-10-CM

## 2018-05-14 DIAGNOSIS — E10.3413 SEVERE NONPROLIFERATIVE DIABETIC RETINOPATHY OF BOTH EYES WITH MACULAR EDEMA ASSOCIATED WITH TYPE 1 DIABETES MELLITUS (H): ICD-10-CM

## 2018-05-14 PROCEDURE — 92250 FUNDUS PHOTOGRAPHY W/I&R: CPT | Performed by: OPHTHALMOLOGY

## 2018-05-14 PROCEDURE — 92004 COMPRE OPH EXAM NEW PT 1/>: CPT | Performed by: OPHTHALMOLOGY

## 2018-05-14 PROCEDURE — 92015 DETERMINE REFRACTIVE STATE: CPT | Performed by: OPHTHALMOLOGY

## 2018-05-14 ASSESSMENT — VISUAL ACUITY
CORRECTION_TYPE: GLASSES
OS_CC: 20/40
OS_CC+: -2
OS_CC: 20/25
OD_CC: 20/60
OD_CC+: -1
OD_CC: 20/25
METHOD: SNELLEN - LINEAR

## 2018-05-14 ASSESSMENT — CONF VISUAL FIELD
OD_NORMAL: 1
OS_NORMAL: 1

## 2018-05-14 ASSESSMENT — TONOMETRY
OD_IOP_MMHG: 14
IOP_METHOD: TONOPEN
OS_IOP_MMHG: 17

## 2018-05-14 ASSESSMENT — REFRACTION_MANIFEST
OD_SPHERE: -1.00
OS_CYLINDER: +0.75
OS_ADD: +2.50
OD_CYLINDER: SPHERE
OS_AXIS: 075
OD_ADD: +2.50
OS_SPHERE: -1.00

## 2018-05-14 ASSESSMENT — EXTERNAL EXAM - LEFT EYE: OS_EXAM: NORMAL

## 2018-05-14 ASSESSMENT — REFRACTION_WEARINGRX
OD_ADD: +2.50
OS_CYLINDER: +0.75
OD_SPHERE: -1.25
OS_AXIS: 100
OS_SPHERE: -2.00
OD_CYLINDER: SPHERE
OS_ADD: +2.50
SPECS_TYPE: BIFOCAL

## 2018-05-14 ASSESSMENT — EXTERNAL EXAM - RIGHT EYE: OD_EXAM: NORMAL

## 2018-05-14 ASSESSMENT — CUP TO DISC RATIO
OS_RATIO: 0.4
OD_RATIO: 0.4

## 2018-05-14 ASSESSMENT — SLIT LAMP EXAM - LIDS
COMMENTS: NORMAL
COMMENTS: NORMAL

## 2018-05-14 NOTE — PROGRESS NOTES
Assessment & Plan   Yadi Iniguez is a 67 year old female who presents with:   Review of systems for the eyes was negative other than the pertinent positives and negatives noted in the HPI.  History is obtained from the patient.    Chief Complaint   Patient presents with     Diabetic Eye Exam     Pt is type II DM,  this am, A1c  done 4/20/18. 7.4     COMPREHENSIVE EYE EXAM       Lab Results   Component Value Date    A1C 7.4 04/20/2018    A1C 6.7 01/24/2018    A1C 7.9 09/25/2017    A1C 5.7 06/01/2017    A1C 11.2 03/01/2017         Myopia of both eyes with astigmatism and presbyopia  - Rx per MR for glasses (optional)    Severe nonproliferative diabetic retinopathy of both eyes with macular edema associated with type 1 diabetes mellitus (H)  - s/p PRP OU  - SD-OCT Macula Optovue OU (both eyes)  - Fundus Photos OU (both eyes)    Return in 6 mo for DFE, OCT    Documentation for today's encounter was performed by Nikki Murray COA. OSC. Acting as a scribe in my presence. I have reviewed and verified that it is an accurate recording of today's encounter.    Attending Physician Attestation:  Complete documentation of historical and exam elements from today's encounter can be found in the full encounter summary report (not reduplicated in this progress note).  I personally obtained the chief complaint(s) and history of present illness.  I confirmed and edited as necessary the review of systems, past medical/surgical history, family history, social history, and examination findings as documented by others; and I examined the patient myself.  I personally reviewed the relevant tests, images, and reports as documented above.  I formulated and edited as necessary the assessment and plan and discussed the findings and management plan with the patient and family. - Wil Bowers MD

## 2018-05-14 NOTE — NURSING NOTE
Patient presents with:  Diabetic Eye Exam: Pt is type II DM,  this am, A1c  done 4/20/18. 7.4  COMPREHENSIVE EYE EXAM      Referring Provider:  No referring provider defined for this encounter.    HPI    Last Eye Exam:  9/14/16   Informant(s):  Per pt   Affected eye(s):  Both   Symptoms:     Blurred vision   Decreased vision   Difficulty with reading   Difficulty watching television   Difficulty with driving   Dryness      Duration:  6 months   Frequency:  Daily       Do you have eye pain now?:  No      Comments:  Pt has had cataract surgery OU in 2006.  Has been seeing [facility] for DM lasers.  Last one OD  in 12/17 OD.  Also Had retinal tear OS 2016- Did have it repaired. Last seen there in March.  Was told to see them in October.   Has been getting shots in the back of both eyes.  Last shot was last year sometime.  Pt denies any gtts. .             Camilla Sheets, COT

## 2018-05-14 NOTE — MR AVS SNAPSHOT
After Visit Summary   5/14/2018    Yadi Iniguez    MRN: [id number]           Patient Information     Date Of Birth          1950        Visit Information        Provider Department      5/14/2018 12:30 PM Wil Bowers MD; MG OPHTH NURSE ONLY [facility]        Today's Diagnoses     Myopia of both eyes with astigmatism and presbyopia    -  1    Severe nonproliferative diabetic retinopathy of both eyes with macular edema associated with type 1 diabetes mellitus (H)           Follow-ups after your visit        Follow-up notes from your care team     Return for 6 mo recheck, Diabetic eye exam, OCT.      Your next 10 appointments already scheduled     May 25, 2018 10:10 AM CDT   MyChart Long with Hannah Cee MD   [facility]    [address] [facility] [id number] [phone]            Jun 22, 2018   Procedure with David Parson MD   [facility]    [address] [facility] [id number] [phone]            Aug 14, 2018  7:30 AM CDT   CT LUNG RESEARCH MONDRAGON with MGCT[facility]   [facility]    [address] [facility] [address] [place] [facility] [id number] [phone]           Please bring any scans or X-rays taken at other hospitals, if similar tests were done. Also bring a list of your medicines, including vitamins, minerals and over-the-counter drugs. It is safest to leave personal items at home.  Be sure to tell your doctor:   If you have any allergies.   If there s any chance you are pregnant.   If you are breastfeeding.  You do not need to do anything special to prepare for this exam.  Please wear loose clothing, such as a sweat suit or jogging clothes. Avoid snaps, zippers and other metal. We may ask you to undress and put on a hospital gown.            Nov 08, 2018  1:00 PM CST   Office Visit with PFT LAB   [facility] ([facility])    [address] [place] [address] [phone]           Bring a current list of meds and any records pertaining to this visit. For Physicals, please bring immunization records and any forms needing to be filled out. Please arrive 10 minutes early to complete paperwork.            Nov 08, 2018  2:00 PM CST   New Visit with Emilia Sotelo MD   [facility]    [address] [place] [address] [phone]              Who to contact     If you have questions or need follow up information about today's clinic visit or your schedule please contact [facility] directly at [phone].  Normal or non-critical lab and imaging results will be communicated to you by [employer], letter or phone within 4 business days after the clinic has received the results. If you do not hear from us within 7 days, please contact the clinic through [employer] or phone. If you have a critical or abnormal lab result, we will notify you by phone as soon as possible.  Submit refill requests through [employer] or call your pharmacy and they will forward the refill request to us. Please allow 3 business days for your refill to be completed.          Additional Information About Your Visit        [employer] Information     [employer] gives you secure access to your electronic health record. If you see a primary care provider, you can also send messages to your care team and make appointments. If you have questions, please call your primary care clinic.  If you do not have a primary care provider, please call [phone] and they will assist you.      [employer] is an electronic gateway that provides easy, online access to your medical records. With [employer], you can request a clinic appointment, read your test results, renew a prescription or communicate with your care team.     To access your existing account,  please contact your [facility] or call [phone] for assistance.        Care EveryWhere ID     This is your Care EveryWhere ID. This could be used by other organizations to access your [facility] medical records  [id number]         Blood Pressure from Last 3 Encounters:   04/25/18 112/62   01/24/18 138/75   12/11/17 138/69    Weight from Last 3 Encounters:   04/25/18 117.5 kg (259 lb)   01/24/18 117.9 kg (260 lb)   12/11/17 116.4 kg (256 lb 9.6 oz)              We Performed the Following     EYE EXAM, NEW PATIENT,COMPREHESV     Fundus Photos OU (both eyes)     REFRACTION     SD-OCT Macula Optovue OU (both eyes)        Primary Care Provider Office Phone # Fax #    Hannah Cee [phone] [address] [facility] [address]        Equal Access to Services     SHO DAVIS : Hadii aad ku hadasho Soomaali, waaxda luqadaha, qaybta kaalmada adeegyada, waxay idiin hayshayen luis khbryon piper . So [facility] [phone].    ATENCIÓN: Si ping espmiguel, tiene a dunbar disposición servicios gratuitos de asistencia lingüística. Llame al [phone].    We comply with applicable federal civil rights laws and [place] laws. We do not discriminate on the basis of race, color, national origin, age, disability, sex, sexual orientation, or gender identity.            Thank you!     Thank you for choosing [facility]  for your care. Our goal is always to provide you with excellent care. Hearing back from our patients is one way we can continue to improve our services. Please take a few minutes to complete the written survey that you may receive in the mail after your visit with us. Thank you!             Your Updated Medication List - Protect others around you: Learn how to safely use, store and throw away your medicines at www.disposemymeds.org.          This list is accurate as of 5/14/18 11:59 PM.  Always use your most recent med list.                    Brand Name Dispense Instructions for use Diagnosis    ACE/ARB/ARNI NOT PRESCRIBED (INTENTIONAL)      Please choose reason not prescribed, below    Benign essential hypertension       albuterol (2.5 MG/3ML) 0.083% neb solution     1 vial    Take 1 vial (2.5 mg) by nebulization every 6 hours as needed for shortness of breath / dyspnea or wheezing 1 vial used in clinic today.    Chronic obstructive pulmonary disease, unspecified COPD type (H)       alendronate 70 MG tablet    FOSAMAX    12 tablet    TAKE 1 TABLET (70 MG) BY MOUTH EVERY 7 DAYS. TAKE 60 MINUTES BEFORE MORNING MEAL WITH 8 OZ. WATER. REMAIN UPRIGHT FOR 30 MINUTES.    Osteoporosis       amLODIPine 10 MG tablet    NORVASC    90 tablet    Take 1 tablet (10 mg) by mouth daily    Benign essential hypertension       ascorbic acid 1000 MG Tabs    vitamin C     1 TABLET DAILY AT DINNER        aspirin 81 MG tablet      ONE DAILY*        atorvastatin 40 MG tablet    LIPITOR    90 tablet    Take 1 tablet (40 mg) by mouth daily    Hyperlipidemia, unspecified hyperlipidemia type       * blood glucose monitoring lancets     100 Box    Use to test blood sugar 3 times daily or as directed.    Type 2 diabetes mellitus with other diabetic kidney complication, with long-term current use of insulin (H)       * ONETOUCH DELICA LANCETS 33G Misc     100 each    1 Box 4 times daily (before meals and nightly)    Type 2 diabetes mellitus with other diabetic kidney complication, with long-term current use of insulin (H)       * blood glucose monitoring test strip    MUSA CONTOUR NEXT    100 strip    Use to test blood sugar 3 times daily or as directed.    Type 2 diabetes mellitus with other diabetic kidney complication, with long-term current use of insulin (H)       * blood glucose monitoring test strip    ONETOUCH VERIO IQ    400 strip    Use to test blood sugar 4 times daily or as directed.  Ok to substitute alternative if insurance prefers.    Type 2 diabetes mellitus  with other diabetic kidney complication, with long-term current use of insulin (H)       carvedilol 25 MG tablet    COREG    180 tablet    Take 1 tablet (25 mg) by mouth 2 times daily (with meals)    Benign essential hypertension       CENTRUM SILVER per tablet      1 TABLET DAILY        clobetasol propionate 0.05 % Liqd     1 Bottle    Externally apply 1 dose. topically 2 times daily as needed    Skin rash       * FENOFIBRATE PO      Take 54 mg by mouth daily        * fenofibrate 54 MG tablet     90 tablet    Take 1 tablet (54 mg) by mouth daily    Hyperlipidemia, unspecified hyperlipidemia type       insulin pen needle 32G X 4 MM     450 each    Use 5 pen needles daily or as directed.  Per insurance and patient preference    Type 2 diabetes mellitus with other diabetic kidney complication, with long-term current use of insulin (H)       LEVEMIR FLEXTOUCH 100 UNIT/ML injection   Generic drug:  insulin detemir     15 mL    TO TAKE 38 UNITS WITH BREAKFAST AND 35 UNITS WITH DINNER. SUBCUTANEOUSLY.    Type 2 diabetes mellitus with other diabetic kidney complication, with long-term current use of insulin (H)       Levothyroxine Sodium 150 MCG Caps     90 capsule    Take 150 mcg by mouth daily (with breakfast)    Hypothyroidism, unspecified type       losartan 100 MG tablet    COZAAR    30 tablet    Take 1 tablet (100 mg) by mouth daily    Hypertension goal BP (blood pressure) < 140/90       MAGNESIUM PO      Take 250 mg by mouth daily        methocarbamol 500 MG tablet    ROBAXIN    30 tablet    Take 2 tablets (1,000 mg) by mouth 3 times daily as needed for muscle spasms    Iliotibial band syndrome, unspecified laterality       * NovoLOG FLEXPEN 100 UNIT/ML injection   Generic drug:  insulin aspart     15 mL    INJECT 20 UNITS BEFORE BREAKFAST, 20 UNITS BEFORE LUNCH, 20 UNITS BEFORE DINNER    Type 2 diabetes mellitus with other diabetic kidney complication, with long-term current use of insulin (H)       * insulin  aspart 100 UNIT/ML injection    NovoLOG FLEXPEN    18 mL    22 units before breakfast, 26 units before lunch, 20 units before dinner    Type 2 diabetes mellitus with other diabetic kidney complication, with long-term current use of insulin (H)       OMEGA-3 FISH OIL PO      Take 1 g by mouth        PARoxetine 20 MG tablet    PAXIL    180 tablet    TAKE ONE TABLET BY MOUTH TWICE DAILY    Moderate major depression (H)       primidone 50 MG tablet    MYSOLINE    180 tablet    TAKE ONE TABLET(S) BY MOUTH TWICE DAILY    Essential tremor       VITAMIN D3 PO      Take 10,000 Units by mouth daily        vitamin E 400 units Tabs      1 tab a day        * Notice:  This list has 8 medication(s) that are the same as other medications prescribed for you. Read the directions carefully, and ask your doctor or other care provider to review them with you.

## 2018-05-16 ENCOUNTER — TELEPHONE (OUTPATIENT)
Dept: INTERNAL MEDICINE | Facility: CLINIC | Age: 68
End: 2018-05-16

## 2018-05-16 NOTE — TELEPHONE ENCOUNTER
Pharmacy has patient taking both Fenofibrate and Atorvastatin.   Wondering if patient is to continue to be taking both meds or should be taking just the Atorvastatin    To provider to advise  Bessy GARCIAN, RN, CPN

## 2018-05-16 NOTE — TELEPHONE ENCOUNTER
Pharmacy calling from Adrienne Mitchell, needing to review patients current med list. Please call to discuss.

## 2018-05-17 NOTE — TELEPHONE ENCOUNTER
Pharmacy is currently closed. Left detailed message for the pharmacist as written below. Also left main line if the pharmacy has additional questions.   JOSE GonzalezN RN

## 2018-05-25 ENCOUNTER — OFFICE VISIT (OUTPATIENT)
Dept: INTERNAL MEDICINE | Facility: CLINIC | Age: 68
End: 2018-05-25
Payer: COMMERCIAL

## 2018-05-25 VITALS
HEIGHT: 68 IN | TEMPERATURE: 98.9 F | SYSTOLIC BLOOD PRESSURE: 110 MMHG | WEIGHT: 257 LBS | HEART RATE: 111 BPM | OXYGEN SATURATION: 95 % | BODY MASS INDEX: 38.95 KG/M2 | RESPIRATION RATE: 20 BRPM | DIASTOLIC BLOOD PRESSURE: 60 MMHG

## 2018-05-25 DIAGNOSIS — Z23 NEED FOR PROPHYLACTIC VACCINATION AGAINST STREPTOCOCCUS PNEUMONIAE (PNEUMOCOCCUS): ICD-10-CM

## 2018-05-25 DIAGNOSIS — Z79.4 TYPE 2 DIABETES MELLITUS WITH OTHER DIABETIC KIDNEY COMPLICATION, WITH LONG-TERM CURRENT USE OF INSULIN (H): ICD-10-CM

## 2018-05-25 DIAGNOSIS — E11.29 TYPE 2 DIABETES MELLITUS WITH OTHER DIABETIC KIDNEY COMPLICATION, WITH LONG-TERM CURRENT USE OF INSULIN (H): ICD-10-CM

## 2018-05-25 DIAGNOSIS — Z12.11 SCREEN FOR COLON CANCER: ICD-10-CM

## 2018-05-25 DIAGNOSIS — J44.9 CHRONIC OBSTRUCTIVE PULMONARY DISEASE, UNSPECIFIED COPD TYPE (H): Primary | ICD-10-CM

## 2018-05-25 PROCEDURE — 99214 OFFICE O/P EST MOD 30 MIN: CPT | Performed by: INTERNAL MEDICINE

## 2018-05-25 NOTE — PATIENT INSTRUCTIONS
Please increase your dinnertime dose from 35 units to 38 units. Keep your other insulin doses the same.    Please return to clinic in 2 months and bring your glucometer and blood sugar log with you.    Keep the appointment for your colonoscopy in June. Please cut the dose of your Levemir the night before the colonoscopy to 19 units.

## 2018-05-25 NOTE — PROGRESS NOTES
SUBJECTIVE:   Yadi Iniguez is a 67 year old female who presents to clinic today for the following health issues:      Diabetes Follow-up    Patient is checking blood sugars: twice daily.    Blood sugar testing frequency justification: Uncontrolled diabetes  Results are as follows:         See sheet    Diabetic concerns: None     Symptoms of hypoglycemia (low blood sugar): none     Paresthesias (numbness or burning in feet) or sores: Yes feet are numb/ tingling/ no pain      Date of last diabetic eye exam: 05-    Patient brings a log of her blood sugars and they have been this past month: 180-250 in the morning, fasting, and mostly below 180 for the periprandial periods. No lows     will send asthma and COPD action plans by [employer].    Hyperlipidemia Follow-Up      Rate your low fat/cholesterol diet?: watch carbs    Taking statin?  Yes, muscle cramps in legs /she is drinking water    Other lipid medications/supplements?:  no    Hypertension Follow-up      Outpatient blood pressures are not being checked.    Low Salt Diet: no added salt    BP Readings from Last 2 Encounters:   05/25/18 110/60   04/25/18 112/62     Hemoglobin A1C (%)   Date Value   04/20/2018 7.4 (H)   01/24/2018 6.7 (H)     LDL Cholesterol Calculated (mg/dL)   Date Value   04/20/2018 88   01/24/2018 143 (H)       Amount of exercise or physical activity: None    Problems taking medications regularly: No    Medication side effects: none    Diet: low salt and carbohydrate counting      Back Pain  Duration of complaint: for years/ lower back gotten worse in the last 2 years        Reviewed and updated as needed this visit by clinical staff  Tobacco  Allergies  Meds  Problems       Reviewed and updated as needed this visit by Provider  Meds  Problems           Patient Active Problem List   Diagnosis     Hypothyroidism     Hypertension goal BP (blood pressure) < 140/90     Type 2 diabetes, HbA1C goal < 8% (H)     Hyperlipidemia LDL goal  <100     Moderate major depression (H)     Incontinence of urine     Neuropathy in diabetes (H)     Eczema     Left lumbar radiculopathy     ACP (advance care planning)     Health Care Home     CKD (chronic kidney disease) stage 3, GFR 30-59 ml/min     Type 2 diabetes mellitus with other diabetic kidney complication, with long-term current use of insulin (H)     Osteoporosis     Morbid obesity (H)     Thrombocytopenia (H)     Chronic obstructive pulmonary disease, unspecified COPD type (H)       Past Medical History:   Diagnosis Date     Arthritis 1975     Broken ribs 8/28/2016    3,4,5,6,7, left side     Cancer (H) 2013    Skin cancer     COPD (chronic obstructive pulmonary disease) (H) 08/2017     Depression      Depressive disorder 1988     Dysuria      Glycosuria      Hypertension 1980     Mixed incontinence urge and stress      Other and unspecified hyperlipidemia      Right shoulder pain      Type II or unspecified type diabetes mellitus without mention of complication, uncontrolled      Uncomplicated asthma 2017     Unspecified hypothyroidism        Past Surgical History:   Procedure Laterality Date     ABDOMEN SURGERY  1978    Gall Bladder  January       hysterectomy  Sept     BIOPSY  2013 1993    from left corner of mouth       muscule biopsy     C VAGINAL HYSTERECTOMY       CATARACT IOL, RT/LT Bilateral     2006     CHOLECYSTECTOMY, OPEN       CL AFF SURGICAL PATHOLOGY       COLONOSCOPY  2007     EYE SURGERY  2014    ongoing     HEAD & NECK SURGERY  2006    Removal of saliva gland left side     PROPHYLACTIC REPAIR RETINAL BREAK, PNEUMATIC RETINOPEXY, COMBINED Left 9/2/2016     SOFT TISSUE SURGERY  1996    Carpal Tunnel left wrist       Family History   Problem Relation Age of Onset     Musculoskeletal Disorder Mother      MD     Muscular Disorder Mother      Depression Mother      OSTEOPOROSIS Mother      Obesity Mother      Cataracts Mother      CANCER Father      mesothelioma     Obesity Father       HEART DISEASE Brother      angioplasty     Neurologic Disorder Brother      ms     DIABETES Brother      Hypertension Brother      Hyperlipidemia Brother      Obesity Brother      4 brothers     Depression Paternal Grandmother      Obesity Paternal Grandmother      Genetic Disorder Brother      MS     DIABETES Brother      Hypertension Brother      Hyperlipidemia Brother        Social History   Substance Use Topics     Smoking status: Former Smoker     Packs/day: 0.50     Years: 50.00     Types: Cigarettes     Start date: 1/1/1967     Quit date: 8/8/2017     Smokeless tobacco: Never Used      Comment: I have tried many times     Alcohol use Yes      Comment: Maybe a drink every few months       Current Outpatient Prescriptions   Medication     ACE/ARB NOT PRESCRIBED, INTENTIONAL,     albuterol (2.5 MG/3ML) 0.083% neb solution     alendronate (FOSAMAX) 70 MG tablet     amLODIPine (NORVASC) 10 MG tablet     ASPIRIN 81 MG OR TABS     atorvastatin (LIPITOR) 40 MG tablet     blood glucose monitoring (MUSA CONTOUR NEXT) test strip     blood glucose monitoring (MUSA MICROLET) lancets     blood glucose monitoring (ONETOUCH VERIO IQ) test strip     carvedilol (COREG) 25 MG tablet     CENTRUM SILVER OR TABS     Cholecalciferol (VITAMIN D3 PO)     clobetasol propionate 0.05 % LIQD     fenofibrate 54 MG tablet     FENOFIBRATE PO     insulin aspart (NOVOLOG FLEXPEN) 100 UNIT/ML injection     insulin pen needle 32G X 4 MM     Levothyroxine Sodium 150 MCG CAPS     losartan (COZAAR) 100 MG tablet     MAGNESIUM PO     methocarbamol (ROBAXIN) 500 MG tablet     Omega-3 Fatty Acids (OMEGA-3 FISH OIL PO)     ONETOUCH DELICA LANCETS 33G MISC     PARoxetine (PAXIL) 20 MG tablet     primidone (MYSOLINE) 50 MG tablet     VITAMIN C 1000 MG OR TABS     VITAMIN E 400 UNIT OR TABS     insulin detemir (LEVEMIR FLEXTOUCH) 100 UNIT/ML injection     No current facility-administered medications for this visit.          ROS:  Constitutional, HEENT,  "cardiovascular, pulmonary, GI, , musculoskeletal, neuro, skin, endocrine and psych systems are negative, except as otherwise noted.     OBJECTIVE:                                                    /60  Pulse 111  Temp 98.9  F (37.2  C) (Oral)  Resp 20  Ht 5' 8.25\" (1.734 m)  Wt 257 lb (116.6 kg)  SpO2 95%  BMI 38.79 kg/m2     GENERAL APPEARANCE: healthy, alert and in no distress  EYES: Eyes grossly normal to inspection, and conjunctivae and sclerae normal  HENT: head normocephalic and atraumatic and mouth without ulcers or lesions, oropharynx clear and oral mucous membranes moist  NECK: no noticeable adenopathy, no asymmetry, masses, or scars   RESP: lungs clear to auscultation - no rales, rhonchi or wheezes  CV: regular rate and rhythm, normal S1 S2, no S3 or S4, no murmur, click or rub, no peripheral edema and peripheral pulses strong  ABDOMEN: soft, nontender, no hepatosplenomegaly, no masses and bowel sounds normal  MS: no musculoskeletal defects are noted and gait is age appropriate without ataxia  SKIN: no suspicious lesions or rashes  NEURO: mentation intact and speech normal  PSYCH: mentation appears normal and affect normal/bright.    Results for orders placed or performed in visit on 05/14/18   SD-OCT Macula Optovue OU (both eyes)    Narrative    Performed by: Nikki MCDONALD   . Patient cooperation: Reliable   . Start time: 1:47 PM   . Stop Time: 1:47 PM   .     Right Eye   Reliability of the test: Good   . Findings normal/abnormal: Abnormal OCT   . Interpretation: Retinal disease   . Plan: Monitor   . Interval: Initial   .     Left Eye   Reliability of the test: Good   . Findings normal/abnormal: Abnormal OCT   . Interpretation: Retinal disease, DME   . Plan: Monitor   . Interval: Initial   .    Fundus Photos OU (both eyes)    Narrative    Performed by: Nikki MCDONALD   . Patient cooperation: Reliable   . Start time: 1:47 PM   . Stop Time: 1:47 PM   .     Right Eye   Reliability " of the test: Good   . Interpretation: Abnormal, Retinal disease   . Plan: Monitor   . Interval: Initial   .     Left Eye   Reliability of the test: Good   . Interpretation: Abnormal, Retinal disease   . Plan: Monitor   . Interval: Initial   .     Notes  Severe NPDR OU  PRP OU       No results found for this or any previous visit (from the past 744 hour(s)).      ASSESSMENT/PLAN:                                                        ICD-10-CM    1. Chronic obstructive pulmonary disease, unspecified COPD type (H) J44.9 COPD ACTION PLAN   2. Type 2 diabetes mellitus with other diabetic kidney complication, with long-term current use of insulin (H) E11.29 insulin detemir (LEVEMIR FLEXTOUCH) 100 UNIT/ML injection    Z79.4    3. Screen for colon cancer Z12.11    4. Need for prophylactic vaccination against Streptococcus pneumoniae (pneumococcus) Z23        Patient Instructions   Please increase your dinnertime dose from 35 units to 38 units. Keep your other insulin doses the same.    Please return to clinic in 2 months and bring your glucometer and blood sugar log with you.    Keep the appointment for your colonoscopy in June. Please cut the dose of your Levemir the night before the colonoscopy to 19 units.             Hannah Cee MD    [facility]  [id number] [place] [id number] [phone]

## 2018-05-25 NOTE — LETTER
My Depression Action Plan  Name: Yadi Iniguez   Date of Birth 1950  Date: 5/18/2018    My doctor: Hannah Cee   My clinic: [address] [address] [phone]          GREEN    ZONE   Good Control    What it looks like:     Things are going generally well. You have normal up s and down s. You may even feel depressed from time to time, but bad moods usually last less than a day.   What you need to do:  1. Continue to care for yourself (see self care plan)  2. Check your depression survival kit and update it as needed  3. Follow your physician s recommendations including any medication.  4. Do not stop taking medication unless you consult with your physician first.           YELLOW         ZONE Getting Worse    What it looks like:     Depression is starting to interfere with your life.     It may be hard to get out of bed; you may be starting to isolate yourself from others.    Symptoms of depression are starting to last most all day and this has happened for several days.     You may have suicidal thoughts but they are not constant.   What you need to do:     1. Call your care team, your response to treatment will improve if you keep your care team informed of your progress. Yellow periods are signs an adjustment may need to be made.     2. Continue your self-care, even if you have to fake it!    3. Talk to someone in your support network    4. Open up your depression survival kit           RED    ZONE Medical Alert - Get Help    What it looks like:     Depression is seriously interfering with your life.     You may experience these or other symptoms: You can t get out of bed most days, can t work or engage in other necessary activities, you have trouble taking care of basic hygiene, or basic responsibilities, thoughts of suicide or death that will not go away, self-injurious behavior.     What you need to do:  1. Call your care team  and request a same-day appointment. If they are not available (weekends or after hours) call your local crisis line, emergency room or 911.            Depression Self Care Plan / Survival Kit    Self-Care for Depression  Here s the deal. Your body and mind are really not as separate as most people think.  What you do and think affects how you feel and how you feel influences what you do and think. This means if you do things that people who feel good do, it will help you feel better.  Sometimes this is all it takes.  There is also a place for medication and therapy depending on how severe your depression is, so be sure to consult with your medical provider and/ or Behavioral Health Consultant if your symptoms are worsening or not improving.     In order to better manage my stress, I will:    Exercise  Get some form of exercise, every day. This will help reduce pain and release endorphins, the  feel good  chemicals in your brain. This is almost as good as taking antidepressants!  This is not the same as joining a gym and then never going! (they count on that by the way ) It can be as simple as just going for a walk or doing some gardening, anything that will get you moving.      Hygiene   Maintain good hygiene (Get out of bed in the morning, Make your bed, Brush your teeth, Take a shower, and Get dressed like you were going to work, even if you are unemployed).  If your clothes don't fit try to get ones that do.    Diet  I will strive to eat foods that are good for me, drink plenty of water, and avoid excessive sugar, caffeine, alcohol, and other mood-altering substances.  Some foods that are helpful in depression are: complex carbohydrates, B vitamins, flaxseed, fish or fish oil, fresh fruits and vegetables.    Psychotherapy  I agree to participate in Individual Therapy (if recommended).    Medication  If prescribed medications, I agree to take them.  Missing doses can result in serious side effects.  I understand  that drinking alcohol, or other illicit drug use, may cause potential side effects.  I will not stop my medication abruptly without first discussing it with my provider.    Staying Connected With Others  I will stay in touch with my friends, family members, and my primary care provider/team.    Use your imagination  Be creative.  We all have a creative side; it doesn t matter if it s oil painting, sand castles, or mud pies! This will also kick up the endorphins.    Witness Beauty  (AKA stop and smell the roses) Take a look outside, even in mid-winter. Notice colors, textures. Watch the squirrels and birds.     Service to others  Be of service to others.  There is always someone else in need.  By helping others we can  get out of ourselves  and remember the really important things.  This also provides opportunities for practicing all the other parts of the program.    Humor  Laugh and be silly!  Adjust your TV habits for less news and crime-drama and more comedy.    Control your stress  Try breathing deep, massage therapy, biofeedback, and meditation. Find time to relax each day.     My support system    Clinic Contact:  Phone number:    Contact 1:  Phone number:    Contact 2:  Phone number:    Mu-ism/:  Phone number:    Therapist:  Phone number:    Local crisis center:    Phone number:    Other community support:  Phone number:

## 2018-05-25 NOTE — MR AVS SNAPSHOT
After Visit Summary   5/25/2018    Yadi Iniguez    MRN: [id number]           Patient Information     Date Of Birth          1950        Visit Information        Provider Department      5/25/2018 10:10 AM Hannah Cee MD [facility]        Today's Diagnoses     Chronic obstructive pulmonary disease, unspecified COPD type (H)    -  1    Screen for colon cancer        Need for prophylactic vaccination against Streptococcus pneumoniae (pneumococcus)        Type 2 diabetes mellitus with other diabetic kidney complication, with long-term current use of insulin (H)          Care Instructions    Please increase your dinnertime dose from 35 units to 38 units. Keep your other insulin doses the same.    Please return to clinic in 2 months and bring your glucometer and blood sugar log with you.    Keep the appointment for your colonoscopy in June. Please cut the dose of your Levemir the night before the colonoscopy to 19 units.                 Follow-ups after your visit        Your next 10 appointments already scheduled     Jun 22, 2018   Procedure with David Parson MD   [facility] (--)    [address] [facility] [id number] [phone]            Aug 14, 2018  7:30 AM CDT   CT LUNG RESEARCH MONDRAGON with MGCT2   [facility]    [id number] [place] [id number] [phone]           Please bring any scans or X-rays taken at other hospitals, if similar tests were done. Also bring a list of your medicines, including vitamins, minerals and over-the-counter drugs. It is safest to leave personal items at home.  Be sure to tell your doctor:   If you have any allergies.   If there s any chance you are pregnant.   If you are breastfeeding.  You do not need to do anything special to prepare for this exam.  Please wear loose clothing, such as a sweat suit or jogging clothes. Avoid snaps,  zippers and other metal. We may ask you to undress and put on a hospital gown.            Nov 08, 2018  1:00 PM CST   [facility] Visit with [facility]   [facility]    [address] [place] [address] [phone]           Bring a current list of meds and any records pertaining to this visit. For Physicals, please bring immunization records and any forms needing to be filled out. Please arrive 10 minutes early to complete paperwork.            Nov 08, 2018  2:00 PM CST   New Visit with Emilia Sotelo MD   [facility]    [address] [place] [address] [phone]              Future tests that were ordered for you today     Open Future Orders        Priority Expected Expires Ordered    PAF COMPLETED Routine  5/25/2019 5/25/2018            Who to contact     If you have questions or need follow up information about today's clinic visit or your schedule please contact [facility] directly at [phone].  Normal or non-critical lab and imaging results will be communicated to you by [employer], letter or phone within 4 business days after the clinic has received the results. If you do not hear from us within 7 days, please contact the clinic through [employer] or phone. If you have a critical or abnormal lab result, we will notify you by phone as soon as possible.  Submit refill requests through [employer] or call your pharmacy and they will forward the refill request to us. Please allow 3 business days for your refill to be completed.          Additional Information About Your Visit        [employer] Information     [employer] gives you secure access to your electronic health record. If you see a primary care provider, you can also send messages to your care team and make appointments. If you have questions, please call your primary care clinic.  If you do not have a primary care  "provider, please call [phone] and they will assist you.        Care EveryWhere ID     This is your Care EveryWhere ID. This could be used by other organizations to access your [place] medical records  [id number]        Your Vitals Were     Pulse Temperature Respirations Height Pulse Oximetry BMI (Body Mass Index)    111 98.9  F (37.2  C) (Oral) 20 5' 8.25\" (1.734 m) 95% 38.79 kg/m2       Blood Pressure from Last 3 Encounters:   05/25/18 110/60   04/25/18 112/62   01/24/18 138/75    Weight from Last 3 Encounters:   05/25/18 257 lb (116.6 kg)   04/25/18 259 lb (117.5 kg)   01/24/18 260 lb (117.9 kg)              We Performed the Following     Asthma Action Plan (AAP)     COPD ACTION PLAN     DEPRESSION ACTION PLAN (DAP)          Today's Medication Changes          These changes are accurate as of 5/25/18 11:06 AM.  If you have any questions, ask your nurse or doctor.               These medicines have changed or have updated prescriptions.        Dose/Directions    insulin detemir 100 UNIT/ML injection   Commonly known as:  LEVEMIR FLEXTOUCH   This may have changed:  See the new instructions.   Used for:  Type 2 diabetes mellitus with other diabetic kidney complication, with long-term current use of insulin (H)   Changed by:  Hannah Cee MD        Dose:  38 Units   Inject 38 Units Subcutaneous 2 times daily   Quantity:  24 mL   Refills:  1            Where to get your medicines      These medications were sent to [facility] [place] [address] [place] [address]  [address] [place] [address], [place] [address]     Phone:  [phone]     insulin detemir 100 UNIT/ML injection                Primary Care Provider Office Phone # Fax #    Hannah Cee [phone] [address] [facility] [address]        Equal Access to Services     SHO DAVIS AH: Hadii aad jerald drivero Socarter, waaxda luqadaha, qaybta kaalmada " chandra arceroselyn pintoaan ah. Lisa [facility] [phone].    ATENCIÓN: Si bettyela noelle, tiene a dunbar disposición servicios gratuitos de asistencia lingüística. Gilbert al [phone].    We comply with applicable federal civil rights laws and [place] laws. We do not discriminate on the basis of race, color, national origin, age, disability, sex, sexual orientation, or gender identity.            Thank you!     Thank you for choosing [facility]  for your care. Our goal is always to provide you with excellent care. Hearing back from our patients is one way we can continue to improve our services. Please take a few minutes to complete the written survey that you may receive in the mail after your visit with us. Thank you!             Your Updated Medication List - Protect others around you: Learn how to safely use, store and throw away your medicines at www.disposemymeds.org.          This list is accurate as of 5/25/18 11:06 AM.  Always use your most recent med list.                   Brand Name Dispense Instructions for use Diagnosis    ACE/ARB/ARNI NOT PRESCRIBED (INTENTIONAL)      Please choose reason not prescribed, below    Benign essential hypertension       albuterol (2.5 MG/3ML) 0.083% neb solution     1 vial    Take 1 vial (2.5 mg) by nebulization every 6 hours as needed for shortness of breath / dyspnea or wheezing 1 vial used in clinic today.    Chronic obstructive pulmonary disease, unspecified COPD type (H)       alendronate 70 MG tablet    FOSAMAX    12 tablet    TAKE 1 TABLET (70 MG) BY MOUTH EVERY 7 DAYS. TAKE 60 MINUTES BEFORE MORNING MEAL WITH 8 OZ. WATER. REMAIN UPRIGHT FOR 30 MINUTES.    Osteoporosis       amLODIPine 10 MG tablet    NORVASC    90 tablet    Take 1 tablet (10 mg) by mouth daily    Benign essential hypertension       ascorbic acid 1000 MG Tabs    vitamin C     1 TABLET DAILY AT DINNER        aspirin 81 MG tablet      ONE DAILY*        atorvastatin 40 MG  tablet    LIPITOR    90 tablet    Take 1 tablet (40 mg) by mouth daily    Hyperlipidemia, unspecified hyperlipidemia type       * blood glucose monitoring lancets     100 Box    Use to test blood sugar 3 times daily or as directed.    Type 2 diabetes mellitus with other diabetic kidney complication, with long-term current use of insulin (H)       * ONETOUCH DELICA LANCETS 33G Misc     100 each    1 Box 4 times daily (before meals and nightly)    Type 2 diabetes mellitus with other diabetic kidney complication, with long-term current use of insulin (H)       * blood glucose monitoring test strip    MUSA CONTOUR NEXT    100 strip    Use to test blood sugar 3 times daily or as directed.    Type 2 diabetes mellitus with other diabetic kidney complication, with long-term current use of insulin (H)       * blood glucose monitoring test strip    ONETOUCH VERIO IQ    400 strip    Use to test blood sugar 4 times daily or as directed.  Ok to substitute alternative if insurance prefers.    Type 2 diabetes mellitus with other diabetic kidney complication, with long-term current use of insulin (H)       carvedilol 25 MG tablet    COREG    180 tablet    Take 1 tablet (25 mg) by mouth 2 times daily (with meals)    Benign essential hypertension       CENTRUM SILVER per tablet      1 TABLET DAILY        clobetasol propionate 0.05 % Liqd     1 Bottle    Externally apply 1 dose. topically 2 times daily as needed    Skin rash       * FENOFIBRATE PO      Take 54 mg by mouth daily        * fenofibrate 54 MG tablet     90 tablet    Take 1 tablet (54 mg) by mouth daily    Hyperlipidemia, unspecified hyperlipidemia type       insulin aspart 100 UNIT/ML injection    NovoLOG FLEXPEN    18 mL    22 units before breakfast, 26 units before lunch, 20 units before dinner    Type 2 diabetes mellitus with other diabetic kidney complication, with long-term current use of insulin (H)       insulin detemir 100 UNIT/ML injection    LEVEMIR FLEXTOUCH     24 mL    Inject 38 Units Subcutaneous 2 times daily    Type 2 diabetes mellitus with other diabetic kidney complication, with long-term current use of insulin (H)       insulin pen needle 32G X 4 MM     450 each    Use 5 pen needles daily or as directed.  Per insurance and patient preference    Type 2 diabetes mellitus with other diabetic kidney complication, with long-term current use of insulin (H)       Levothyroxine Sodium 150 MCG Caps     90 capsule    Take 150 mcg by mouth daily (with breakfast)    Hypothyroidism, unspecified type       losartan 100 MG tablet    COZAAR    30 tablet    Take 1 tablet (100 mg) by mouth daily    Hypertension goal BP (blood pressure) < 140/90       MAGNESIUM PO      Take 250 mg by mouth daily        methocarbamol 500 MG tablet    ROBAXIN    30 tablet    Take 2 tablets (1,000 mg) by mouth 3 times daily as needed for muscle spasms    Iliotibial band syndrome, unspecified laterality       OMEGA-3 FISH OIL PO      Take 1 g by mouth        PARoxetine 20 MG tablet    PAXIL    180 tablet    TAKE ONE TABLET BY MOUTH TWICE DAILY    Moderate major depression (H)       primidone 50 MG tablet    MYSOLINE    180 tablet    TAKE ONE TABLET(S) BY MOUTH TWICE DAILY    Essential tremor       VITAMIN D3 PO      Take 10,000 Units by mouth daily        vitamin E 400 units Tabs      1 tab a day        * Notice:  This list has 6 medication(s) that are the same as other medications prescribed for you. Read the directions carefully, and ask your doctor or other care provider to review them with you.

## 2018-05-25 NOTE — NURSING NOTE
"Chief Complaint   Patient presents with     Diabetes     Back Pain     Health Maintenance       Initial /60  Pulse 111  Temp 98.9  F (37.2  C) (Oral)  Resp 20  Ht 5' 8.25\" (1.734 m)  Wt 257 lb (116.6 kg)  SpO2 95%  BMI 38.79 kg/m2 Estimated body mass index is 38.79 kg/(m^2) as calculated from the following:    Height as of this encounter: 5' 8.25\" (1.734 m).    Weight as of this encounter: 257 lb (116.6 kg).    Margaret Aguila, ANABEL    "

## 2018-05-25 NOTE — LETTER
My Asthma Action Plan  Name: Yadi Iniguez   YOB: 1950  Date: 5/18/2018   My doctor: Hannah Cee MD   My clinic: [facility]        My Control Medicine: { :732644}  My Rescue Medicine: { :782012}  {AAP include Oral Steroid:167570} My Asthma Severity: { :793421}  Avoid your asthma triggers: { :854483}  cold air     {Is patient a child or adult?:284865}       GREEN ZONE   Good Control    I feel good    No cough or wheeze    Can work, sleep and play without asthma symptoms       Take your asthma control medicine every day.     1. If exercise triggers your asthma, take your rescue medication    15 minutes before exercise or sports, and    During exercise if you have asthma symptoms  2. Spacer to use with inhaler: If you have a spacer, make sure to use it with your inhaler             YELLOW ZONE Getting Worse  I have ANY of these:    I do not feel good    Cough or wheeze    Chest feels tight    Wake up at night   1. Keep taking your Green Zone medications  2. Start taking your rescue medicine:    every 20 minutes for up to 1 hour. Then every 4 hours for 24-48 hours.  3. If you stay in the Yellow Zone for more than 12-24 hours, contact your doctor.  4. If you do not return to the Green Zone in 12-24 hours or you get worse, start taking your oral steroid medicine if prescribed by your provider.           RED ZONE Medical Alert - Get Help  I have ANY of these:    I feel awful    Medicine is not helping    Breathing getting harder    Trouble walking or talking    Nose opens wide to breathe       1. Take your rescue medicine NOW  2. If your provider has prescribed an oral steroid medicine, start taking it NOW  3. Call your doctor NOW  4. If you are still in the Red Zone after 20 minutes and you have not reached your doctor:    Take your rescue medicine again and    Call 911 or go to the emergency room right away    See your regular doctor within 2 weeks of an Emergency Room or  Urgent Care visit for follow-up treatment.          Annual Reminders:  Meet with Asthma Educator,  Flu Shot in the Fall, consider Pneumonia Vaccination for patients with asthma (aged 19 and older).    Pharmacy:    [facility] - [place], [place] - [address] AT [facility]  [address]  [facility] - [place], [place] - [address] [facility] [address]  [facility] [place] - [address] [place] [place] [address] [facility] - [place], [place] - [address] [facility]                      Asthma Triggers  How To Control Things That Make Your Asthma Worse    Triggers are things that make your asthma worse.  Look at the list below to help you find your triggers and what you can do about them.  You can help prevent asthma flare-ups by staying away from your triggers.      Trigger                                                          What you can do   Cigarette Smoke  Tobacco smoke can make asthma worse. Do not allow smoking in your home, car or around you.  Be sure no one smokes at a child s day care or school.  If you smoke, ask your health care provider for ways to help you quit.  Ask family members to quit too.  Ask your health care provider for a referral to Quit Plan to help you quit smoking, or call 6-219-229-PLAN.     Colds, Flu, Bronchitis  These are common triggers of asthma. Wash your hands often.  Don t touch your eyes, nose or mouth.  Get a flu shot every year.     Dust Mites  These are tiny bugs that live in cloth or carpet. They are too small to see. Wash sheets and blankets in hot water every week.   Encase pillows and mattress in dust mite proof covers.  Avoid having carpet if you can. If you have carpet, vacuum weekly.   Use a dust mask and HEPA vacuum.   Pollen and Outdoor Mold  Some people are allergic to trees, grass, or weed pollen, or molds. Try to keep your windows closed.  Limit time out doors when pollen count is high.    Ask you health care provider about taking medicine during allergy season.     Animal Dander  Some people are allergic to skin flakes, urine or saliva from pets with fur or feathers. Keep pets with fur or feathers out of your home.    If you can t keep the pet outdoors, then keep the pet out of your bedroom.  Keep the bedroom door closed.  Keep pets off cloth furniture and away from stuffed toys.     Mice, Rats, and Cockroaches  Some people are allergic to the waste from these pests.   Cover food and garbage.  Clean up spills and food crumbs.  Store grease in the refrigerator.   Keep food out of the bedroom.   Indoor Mold  This can be a trigger if your home has high moisture. Fix leaking faucets, pipes, or other sources of water.   Clean moldy surfaces.  Dehumidify basement if it is damp and smelly.   Smoke, Strong Odors, and Sprays  These can reduce air quality. Stay away from strong odors and sprays, such as perfume, powder, hair spray, paints, smoke incense, paint, cleaning products, candles and new carpet.   Exercise or Sports  Some people with asthma have this trigger. Be active!  Ask your doctor about taking medicine before sports or exercise to prevent symptoms.    Warm up for 5-10 minutes before and after sports or exercise.     Other Triggers of Asthma  Cold air:  Cover your nose and mouth with a scarf.  Sometimes laughing or crying can be a trigger.  Some medicines and food can trigger asthma.

## 2018-05-25 NOTE — LETTER
My Asthma Action Plan  Name: Yadi Iniguez   YOB: 1950  Date: 5/18/2018   My doctor: Hannah Cee MD   My clinic: [facility]        My Control Medicine: .  My Rescue Medicine: Albuterol nebulizer solution .   My Asthma Severity: intermittent  Avoid your asthma triggers: smoke  cold air            GREEN ZONE   Good Control    I feel good    No cough or wheeze    Can work, sleep and play without asthma symptoms       Take your asthma control medicine every day.     1. If exercise triggers your asthma, take your rescue medication    15 minutes before exercise or sports, and    During exercise if you have asthma symptoms  2. Spacer to use with inhaler: If you have a spacer, make sure to use it with your inhaler             YELLOW ZONE Getting Worse  I have ANY of these:    I do not feel good    Cough or wheeze    Chest feels tight    Wake up at night   1. Keep taking your Green Zone medications  2. Start taking your rescue medicine:    every 20 minutes for up to 1 hour. Then every 4 hours for 24-48 hours.  3. If you stay in the Yellow Zone for more than 12-24 hours, contact your doctor.  4. If you do not return to the Green Zone in 12-24 hours or you get worse, start taking your oral steroid medicine if prescribed by your provider.           RED ZONE Medical Alert - Get Help  I have ANY of these:    I feel awful    Medicine is not helping    Breathing getting harder    Trouble walking or talking    Nose opens wide to breathe       1. Take your rescue medicine NOW  2. If your provider has prescribed an oral steroid medicine, start taking it NOW  3. Call your doctor NOW  4. If you are still in the Red Zone after 20 minutes and you have not reached your doctor:    Take your rescue medicine again and    Call 911 or go to the emergency room right away    See your regular doctor within 2 weeks of an Emergency Room or Urgent Care visit for follow-up treatment.          Annual  Reminders:  Meet with Asthma Educator,  Flu Shot in the Fall, consider Pneumonia Vaccination for patients with asthma (aged 19 and older).    Pharmacy:    [id number] - [place], [place] - [address] AT [facility]  [address]  [id number] - [place], [place] - [address] [facility] [address]  [facility] - [place], [place] - [address] [place] [address] [facility] - [place], [place] - [address] [facility]                      Asthma Triggers  How To Control Things That Make Your Asthma Worse    Triggers are things that make your asthma worse.  Look at the list below to help you find your triggers and what you can do about them.  You can help prevent asthma flare-ups by staying away from your triggers.      Trigger                                                          What you can do   Cigarette Smoke  Tobacco smoke can make asthma worse. Do not allow smoking in your home, car or around you.  Be sure no one smokes at a child s day care or school.  If you smoke, ask your health care provider for ways to help you quit.  Ask family members to quit too.  Ask your health care provider for a referral to Quit Plan to help you quit smoking, or call 2-909-971-PLAN.     Colds, Flu, Bronchitis  These are common triggers of asthma. Wash your hands often.  Don t touch your eyes, nose or mouth.  Get a flu shot every year.     Dust Mites  These are tiny bugs that live in cloth or carpet. They are too small to see. Wash sheets and blankets in hot water every week.   Encase pillows and mattress in dust mite proof covers.  Avoid having carpet if you can. If you have carpet, vacuum weekly.   Use a dust mask and HEPA vacuum.   Pollen and Outdoor Mold  Some people are allergic to trees, grass, or weed pollen, or molds. Try to keep your windows closed.  Limit time out doors when pollen count is high.   Ask you health care provider about taking medicine during  allergy season.     Animal Dander  Some people are allergic to skin flakes, urine or saliva from pets with fur or feathers. Keep pets with fur or feathers out of your home.    If you can t keep the pet outdoors, then keep the pet out of your bedroom.  Keep the bedroom door closed.  Keep pets off cloth furniture and away from stuffed toys.     Mice, Rats, and Cockroaches  Some people are allergic to the waste from these pests.   Cover food and garbage.  Clean up spills and food crumbs.  Store grease in the refrigerator.   Keep food out of the bedroom.   Indoor Mold  This can be a trigger if your home has high moisture. Fix leaking faucets, pipes, or other sources of water.   Clean moldy surfaces.  Dehumidify basement if it is damp and smelly.   Smoke, Strong Odors, and Sprays  These can reduce air quality. Stay away from strong odors and sprays, such as perfume, powder, hair spray, paints, smoke incense, paint, cleaning products, candles and new carpet.   Exercise or Sports  Some people with asthma have this trigger. Be active!  Ask your doctor about taking medicine before sports or exercise to prevent symptoms.    Warm up for 5-10 minutes before and after sports or exercise.     Other Triggers of Asthma  Cold air:  Cover your nose and mouth with a scarf.  Sometimes laughing or crying can be a trigger.  Some medicines and food can trigger asthma.

## 2018-06-13 ENCOUNTER — TELEPHONE (OUTPATIENT)
Dept: PULMONOLOGY | Facility: CLINIC | Age: 68
End: 2018-06-13

## 2018-06-13 NOTE — TELEPHONE ENCOUNTER
Please call the patient and schedule with Luana with PFT in August around Chest CT to coordinator all the appointment together

## 2018-06-22 ENCOUNTER — SURGERY (OUTPATIENT)
Age: 68
End: 2018-06-22
Payer: COMMERCIAL

## 2018-06-22 ENCOUNTER — HOSPITAL ENCOUNTER (OUTPATIENT)
Facility: AMBULATORY SURGERY CENTER | Age: 68
Discharge: HOME | End: 2018-06-22
Attending: INTERNAL MEDICINE | Admitting: INTERNAL MEDICINE
Payer: COMMERCIAL

## 2018-06-22 VITALS
RESPIRATION RATE: 13 BRPM | OXYGEN SATURATION: 95 % | SYSTOLIC BLOOD PRESSURE: 133 MMHG | DIASTOLIC BLOOD PRESSURE: 56 MMHG | TEMPERATURE: 97.6 F

## 2018-06-22 LAB
COLONOSCOPY: NORMAL
GLUCOSE BLDC GLUCOMTR-MCNC: 212 MG/DL (ref 70–99)

## 2018-06-22 PROCEDURE — G8918 PT W/O PREOP ORDER IV AB PRO: HCPCS

## 2018-06-22 PROCEDURE — 45380 COLONOSCOPY AND BIOPSY: CPT | Mod: 33 | Performed by: INTERNAL MEDICINE

## 2018-06-22 PROCEDURE — G8907 PT DOC NO EVENTS ON DISCHARG: HCPCS

## 2018-06-22 PROCEDURE — 45380 COLONOSCOPY AND BIOPSY: CPT | Mod: XS,PT

## 2018-06-22 PROCEDURE — 45385 COLONOSCOPY W/LESION REMOVAL: CPT | Mod: 33 | Performed by: INTERNAL MEDICINE

## 2018-06-22 PROCEDURE — 45385 COLONOSCOPY W/LESION REMOVAL: CPT | Mod: PT

## 2018-06-22 PROCEDURE — 88305 TISSUE EXAM BY PATHOLOGIST: CPT | Performed by: INTERNAL MEDICINE

## 2018-06-22 RX ORDER — ONDANSETRON 2 MG/ML
4 INJECTION INTRAMUSCULAR; INTRAVENOUS
Status: DISCONTINUED | OUTPATIENT
Start: 2018-06-22 | End: 2018-06-23 | Stop reason: HOSPADM

## 2018-06-22 RX ORDER — LIDOCAINE 40 MG/G
CREAM TOPICAL
Status: DISCONTINUED | OUTPATIENT
Start: 2018-06-22 | End: 2018-06-23 | Stop reason: HOSPADM

## 2018-06-22 RX ORDER — FENTANYL CITRATE 50 UG/ML
INJECTION, SOLUTION INTRAMUSCULAR; INTRAVENOUS PRN
Status: DISCONTINUED | OUTPATIENT
Start: 2018-06-22 | End: 2018-06-22 | Stop reason: HOSPADM

## 2018-06-22 RX ADMIN — FENTANYL CITRATE 50 MCG: 50 INJECTION, SOLUTION INTRAMUSCULAR; INTRAVENOUS at 08:19

## 2018-06-22 RX ADMIN — FENTANYL CITRATE 50 MCG: 50 INJECTION, SOLUTION INTRAMUSCULAR; INTRAVENOUS at 08:13

## 2018-06-22 RX ADMIN — FENTANYL CITRATE 50 MCG: 50 INJECTION, SOLUTION INTRAMUSCULAR; INTRAVENOUS at 08:10

## 2018-06-26 LAB — COPATH REPORT: NORMAL

## 2018-07-10 DIAGNOSIS — G25.0 ESSENTIAL TREMOR: ICD-10-CM

## 2018-07-12 RX ORDER — PRIMIDONE 50 MG/1
TABLET ORAL
Qty: 180 TABLET | Refills: 1 | Status: SHIPPED | OUTPATIENT
Start: 2018-07-12 | End: 2019-01-23

## 2018-07-12 NOTE — TELEPHONE ENCOUNTER
Routing refill request to provider for review/approval because:  Drug not on the [facility] refill protocol   Margaret Otero BSN, RN

## 2018-08-07 DIAGNOSIS — F32.1 MODERATE MAJOR DEPRESSION (H): ICD-10-CM

## 2018-08-07 DIAGNOSIS — I10 BENIGN ESSENTIAL HYPERTENSION: ICD-10-CM

## 2018-08-07 NOTE — LETTER
August 9, 2018    Yadi Iniguez  [address]  [place] [address]    Dear Yadi,       We recently received a refill request for carvedilol, amlodipine and paroxetine.  We have refilled this for a one time 90 day supply only because you are due for a:    Diabetesoffice visit and  lab appointment-please bring your glucometer and blood sugar log to your appointment.      Please schedule this lab appointment 4-5 days prior to the office visit.     Please call at your earliest convenience so that there will not be a delay with your future refills.          Thank you,   Your [facility] Team/  [phone]

## 2018-08-08 RX ORDER — CARVEDILOL 25 MG/1
TABLET ORAL
Qty: 180 TABLET | Refills: 0 | Status: SHIPPED | OUTPATIENT
Start: 2018-08-08 | End: 2018-11-09

## 2018-08-08 RX ORDER — PAROXETINE 20 MG/1
20 TABLET, FILM COATED ORAL 2 TIMES DAILY
Qty: 180 TABLET | Refills: 0 | Status: SHIPPED | OUTPATIENT
Start: 2018-08-08 | End: 2018-11-09

## 2018-08-08 RX ORDER — AMLODIPINE BESYLATE 10 MG/1
TABLET ORAL
Qty: 90 TABLET | Refills: 0 | Status: SHIPPED | OUTPATIENT
Start: 2018-08-08 | End: 2018-11-09

## 2018-08-08 NOTE — TELEPHONE ENCOUNTER
Medication is being filled for 1 time refill only due to:  Patient needs to be seen for fasting lab appointment and appointment with the provider for further refills.  Diabetes, she should bring glucometer and blood sugar log to appointment.  Margaret GARCIAN, RN

## 2018-08-15 ENCOUNTER — DOCUMENTATION ONLY (OUTPATIENT)
Dept: LAB | Facility: CLINIC | Age: 68
End: 2018-08-15

## 2018-08-15 DIAGNOSIS — Z79.4 TYPE 2 DIABETES MELLITUS WITH OTHER DIABETIC KIDNEY COMPLICATION, WITH LONG-TERM CURRENT USE OF INSULIN (H): Primary | ICD-10-CM

## 2018-08-15 DIAGNOSIS — E11.29 TYPE 2 DIABETES MELLITUS WITH OTHER DIABETIC KIDNEY COMPLICATION, WITH LONG-TERM CURRENT USE OF INSULIN (H): Primary | ICD-10-CM

## 2018-08-15 NOTE — PROGRESS NOTES
PLEASE REVIEW, PLACE FUTURE ORDERS OR SIGN PENDED ORDERS FOR PATIENT'S UPCOMING LAB ONLY APPOINTMENT ON 08.17.2018.    THANK YOU.   LILO GONZALEZ

## 2018-08-17 DIAGNOSIS — Z79.4 TYPE 2 DIABETES MELLITUS WITH OTHER DIABETIC KIDNEY COMPLICATION, WITH LONG-TERM CURRENT USE OF INSULIN (H): ICD-10-CM

## 2018-08-17 DIAGNOSIS — E11.29 TYPE 2 DIABETES MELLITUS WITH OTHER DIABETIC KIDNEY COMPLICATION, WITH LONG-TERM CURRENT USE OF INSULIN (H): ICD-10-CM

## 2018-08-17 LAB
ANION GAP SERPL CALCULATED.3IONS-SCNC: 6 MMOL/L (ref 3–14)
BUN SERPL-MCNC: 18 MG/DL (ref 7–30)
CALCIUM SERPL-MCNC: 9.1 MG/DL (ref 8.5–10.1)
CHLORIDE SERPL-SCNC: 102 MMOL/L (ref 94–109)
CO2 SERPL-SCNC: 31 MMOL/L (ref 20–32)
CREAT SERPL-MCNC: 1.39 MG/DL (ref 0.52–1.04)
GFR SERPL CREATININE-BSD FRML MDRD: 38 ML/MIN/1.7M2
GLUCOSE SERPL-MCNC: 189 MG/DL (ref 70–99)
HBA1C MFR BLD: 7.9 % (ref 0–5.6)
POTASSIUM SERPL-SCNC: 4.2 MMOL/L (ref 3.4–5.3)
SODIUM SERPL-SCNC: 139 MMOL/L (ref 133–144)

## 2018-08-17 PROCEDURE — 36415 COLL VENOUS BLD VENIPUNCTURE: CPT | Performed by: INTERNAL MEDICINE

## 2018-08-17 PROCEDURE — 83036 HEMOGLOBIN GLYCOSYLATED A1C: CPT | Performed by: INTERNAL MEDICINE

## 2018-08-17 PROCEDURE — 80048 BASIC METABOLIC PNL TOTAL CA: CPT | Performed by: INTERNAL MEDICINE

## 2018-08-21 DIAGNOSIS — E11.29 TYPE 2 DIABETES MELLITUS WITH OTHER DIABETIC KIDNEY COMPLICATION, WITH LONG-TERM CURRENT USE OF INSULIN (H): ICD-10-CM

## 2018-08-21 DIAGNOSIS — Z79.4 TYPE 2 DIABETES MELLITUS WITH OTHER DIABETIC KIDNEY COMPLICATION, WITH LONG-TERM CURRENT USE OF INSULIN (H): ICD-10-CM

## 2018-08-21 LAB
CREAT UR-MCNC: 154 MG/DL
MICROALBUMIN UR-MCNC: 469 MG/L
MICROALBUMIN/CREAT UR: 304.54 MG/G CR (ref 0–25)

## 2018-08-21 PROCEDURE — 82043 UR ALBUMIN QUANTITATIVE: CPT | Performed by: INTERNAL MEDICINE

## 2018-08-22 ENCOUNTER — TELEPHONE (OUTPATIENT)
Dept: PULMONOLOGY | Facility: CLINIC | Age: 68
End: 2018-08-22

## 2018-08-22 NOTE — PROGRESS NOTES
Plan to discuss at upcoming visit.   Urine albumin mildly improved in concordance with improved SUZE on CKD (from 4 months a go).  Overall urine albumin has been in the 300 range in the last year. Patient is already on max dose of ARB (losartan 100mg/d).  . Dr Hannah Cee MD.

## 2018-08-22 NOTE — TELEPHONE ENCOUNTER
1st attempt to reach patient and schedule an appointment for CT chest/PFT/Dr. Craft all the same day due in Aug  2018; lvm.    Lily Rodriguez  [facility]~Specialty/Med Surg   [phone]

## 2018-08-22 NOTE — PROGRESS NOTES
Plan to discuss at upcoming visit:  1) In 4/2018 patient had some SUZE on CKD thought to be d/t low po fluid intake,was advised to increase-creatinine has somewhat improved now (though previously closer to 1.1; now its ~ 1.3).  2) A1C has worsened, just below 8%now-at the 4/2018 visit, it was worsening, and patient had been advised to increase her dinnertime insulin from 35 to 38u. If the patient's AM blood sugars are still high now, would increase the bedtime Levemir dose...fup with DM Edu.      . Dr Hananh Cee MD.

## 2018-08-23 NOTE — PROGRESS NOTES
SUBJECTIVE:   Yadi Iniguez is a 68 year old female who presents to clinic today for the following health issues:      Diabetes Follow-up    Patient is checking blood sugars: twice daily.    Blood sugar testing frequency justification: on insulin  Results are as follows:         am - 150-200         suppertime - 150-220    Diabetic concerns: None and blood sugar frequently over 200     Symptoms of hypoglycemia (low blood sugar): none     Paresthesias (numbness or burning in feet) or sores: Yes numbness     Date of last diabetic eye exam: yes    previsit labs completed:  1) In 4/2018 patient had some SUZE on CKD thought to be d/t low po fluid intake, was advised to increase fluids-creatinine has somewhat improved now (though previously closer to 1.1; now its ~ 1.3).  2) A1C has worsened, just below 8%now-at the 4/2018 visit, it was worsening, and patient had been advised to increase her dinnertime insulin from 35 to 38u. If the patient's AM blood sugars are still high now, would increase the bedtime Levemir dose...fup with DM Edu. NOTE: patient had actually increased her levemir at that time to 38 units bid (not just the evening dose). She is also on Novolog tid, as per med list.   She showed me a paper log of her blood sugars: ~150-200 each in the morning fasting and right before dinner.       Diabetes Management Resources    Hyperlipidemia Follow-Up      Rate your low fat/cholesterol diet?: good    Taking statin?  No    Other lipid medications/supplements?:  Fenofibrate, without side effects    Hypertension Follow-up      Outpatient blood pressures are not being checked.    Low Salt Diet: no added salt    BP Readings from Last 2 Encounters:   08/24/18 120/60   06/22/18 133/56     Hemoglobin A1C (%)   Date Value   08/17/2018 7.9 (H)   04/20/2018 7.4 (H)     LDL Cholesterol Calculated (mg/dL)   Date Value   04/20/2018 88   01/24/2018 143 (H)       Amount of exercise or physical activity: None    Problems taking  medications regularly: No    Medication side effects: none    Diet: regular (no restrictions) and carbohydrate counting    LBP:  For years, may be gradually worsening.  No LBP red flag s/s.  Patient was advised a trial of acetaminophen at the last appointment in 4/2018. She has not tried this yet.     Reviewed and updated as needed this visit by clinical staff  Tobacco  Allergies  Meds  Problems  Med Hx  Surg Hx  Fam Hx  Soc Hx        Reviewed and updated as needed this visit by Provider  Tobacco  Problems           Patient Active Problem List   Diagnosis     Hypothyroidism     Hypertension goal BP (blood pressure) < 140/90     Type 2 diabetes, HbA1C goal < 8% (H)     Hyperlipidemia LDL goal <100     Moderate major depression (H)     Incontinence of urine     Neuropathy in diabetes (H)     Eczema     Left lumbar radiculopathy     ACP (advance care planning)     Health Care Home     CKD (chronic kidney disease) stage 3, GFR 30-59 ml/min     Type 2 diabetes mellitus with other diabetic kidney complication, with long-term current use of insulin (H)     Osteoporosis     Morbid obesity (H)     Thrombocytopenia (H)     Chronic obstructive pulmonary disease, unspecified COPD type (H)       Past Medical History:   Diagnosis Date     Arthritis 1975     Broken ribs 8/28/2016    3,4,5,6,7, left side     Cancer (H) 2013    Skin cancer     COPD (chronic obstructive pulmonary disease) (H) 08/2017     Depression      Depressive disorder 1988     Dysuria      Glycosuria      Hypertension 1980     Mixed incontinence urge and stress      Other and unspecified hyperlipidemia      Right shoulder pain      Type II or unspecified type diabetes mellitus without mention of complication, uncontrolled      Uncomplicated asthma 2017     Unspecified hypothyroidism        Past Surgical History:   Procedure Laterality Date     ABDOMEN SURGERY  1978    Gall Bladder  January       hysterectomy  Sept     BIOPSY  2013 1993    from left  corner of mouth       muscule biopsy     C VAGINAL HYSTERECTOMY       CATARACT IOL, RT/LT Bilateral     2006     CHOLECYSTECTOMY, OPEN       CL AFF SURGICAL PATHOLOGY       COLONOSCOPY  2007     COLONOSCOPY N/A 6/22/2018    Procedure: COMBINED COLONOSCOPY, SINGLE OR MULTIPLE BIOPSY/POLYPECTOMY BY BIOPSY;;  Surgeon: David Parson MD;  Location: [facility] OR     COLONOSCOPY WITH CO2 INSUFFLATION N/A 6/22/2018    Procedure: COLONOSCOPY WITH CO2 INSUFFLATION;  Colonscopy, ;  Surgeon: David Parson MD;  Location: [facility] OR     EYE SURGERY  2014    ongoing     HEAD & NECK SURGERY  2006    Removal of saliva gland left side     PROPHYLACTIC REPAIR RETINAL BREAK, PNEUMATIC RETINOPEXY, COMBINED Left 9/2/2016     SOFT TISSUE SURGERY  1996    Carpal Tunnel left wrist       Family History   Problem Relation Age of Onset     Musculoskeletal Disorder Mother      MD     Muscular Disorder Mother      Depression Mother      Osteoperosis Mother      Obesity Mother      Cataracts Mother      Genetic Disorder Mother      MD     Cancer Father      mesothelioma     Obesity Father      HEART DISEASE Brother      angioplasty     Neurologic Disorder Brother      ms     Diabetes Brother      Hypertension Brother      Hyperlipidemia Brother      Obesity Brother      4 brothers     Depression Paternal Grandmother      Obesity Paternal Grandmother      Genetic Disorder Brother      MS     Diabetes Brother      Hypertension Brother      Genetic Disorder Brother      MD     Hyperlipidemia Brother      Hypertension Brother      Genetic Disorder Brother      MS       Social History   Substance Use Topics     Smoking status: Former Smoker     Packs/day: 0.50     Years: 50.00     Types: Cigarettes     Start date: 1/1/1967     Quit date: 8/8/2017     Smokeless tobacco: Never Used      Comment: I have tried many times     Alcohol use Yes      Comment: Maybe a drink every few months       Current Outpatient Prescriptions   Medication      ACE/ARB NOT PRESCRIBED, INTENTIONAL,     albuterol (2.5 MG/3ML) 0.083% neb solution     alendronate (FOSAMAX) 70 MG tablet     amLODIPine (NORVASC) 10 MG tablet     ASPIRIN 81 MG OR TABS     atorvastatin (LIPITOR) 40 MG tablet     blood glucose monitoring (MUSA CONTOUR NEXT) test strip     blood glucose monitoring (MUSA MICROLET) lancets     carvedilol (COREG) 25 MG tablet     CENTRUM SILVER OR TABS     Cholecalciferol (VITAMIN D3 PO)     clobetasol propionate 0.05 % LIQD     fenofibrate 54 MG tablet     insulin aspart (NOVOLOG FLEXPEN) 100 UNIT/ML injection     insulin detemir (LEVEMIR FLEXTOUCH) 100 UNIT/ML injection     insulin pen needle 32G X 4 MM     Levothyroxine Sodium 150 MCG CAPS     losartan (COZAAR) 100 MG tablet     MAGNESIUM PO     methocarbamol (ROBAXIN) 500 MG tablet     Omega-3 Fatty Acids (OMEGA-3 FISH OIL PO)     PARoxetine (PAXIL) 20 MG tablet     primidone (MYSOLINE) 50 MG tablet     VITAMIN C 1000 MG OR TABS     VITAMIN E 400 UNIT OR TABS     [DISCONTINUED] alendronate (FOSAMAX) 70 MG tablet     [DISCONTINUED] fenofibrate 54 MG tablet     [DISCONTINUED] FENOFIBRATE PO     [DISCONTINUED] insulin aspart (NOVOLOG FLEXPEN) 100 UNIT/ML injection     [DISCONTINUED] insulin detemir (LEVEMIR FLEXTOUCH) 100 UNIT/ML injection     [DISCONTINUED] losartan (COZAAR) 100 MG tablet     No current facility-administered medications for this visit.          ROS:  Constitutional, HEENT, cardiovascular, pulmonary, GI, , musculoskeletal, neuro, skin, endocrine and psych systems are negative, except as otherwise noted.     OBJECTIVE:                                                    /60  Pulse 88  Temp 98  F (36.7  C) (Oral)  Resp 16  Wt 256 lb (116.1 kg)  SpO2 97%  BMI 38.64 kg/m2     GENERAL APPEARANCE: healthy, alert and in no distress  EYES: Eyes grossly normal to inspection, and conjunctivae and sclerae normal  HENT: head normocephalic and atraumatic and mouth without ulcers or lesions,  oropharynx clear and oral mucous membranes moist  NECK: no noticeable adenopathy, no asymmetry, masses, or scars   RESP: lungs clear to auscultation - no rales, rhonchi or wheezes  CV: regular rate and rhythm, normal S1 S2, no S3 or S4, no murmur, click or rub, no peripheral edema and peripheral pulses strong  ABDOMEN: soft, nontender, no hepatosplenomegaly, no masses and bowel sounds normal  MS: no musculoskeletal defects are noted and gait is age appropriate without ataxia  SKIN: no suspicious lesions or rashes  NEURO: mentation intact and speech normal  PSYCH: mentation appears normal and affect normal/bright.    Results for orders placed or performed in visit on 08/21/18   Albumin Random Urine Quantitative with Creat Ratio   Result Value Ref Range    Creatinine Urine 154 mg/dL    Albumin Urine mg/L 469 mg/L    Albumin Urine mg/g Cr 304.54 (H) 0 - 25 mg/g Cr       No results found for this or any previous visit (from the past 744 hour(s)).    Last Comprehensive Metabolic Panel:  Sodium   Date Value Ref Range Status   08/17/2018 139 133 - 144 mmol/L Final     Potassium   Date Value Ref Range Status   08/17/2018 4.2 3.4 - 5.3 mmol/L Final     Chloride   Date Value Ref Range Status   08/17/2018 102 94 - 109 mmol/L Final     Carbon Dioxide   Date Value Ref Range Status   08/17/2018 31 20 - 32 mmol/L Final     Anion Gap   Date Value Ref Range Status   08/17/2018 6 3 - 14 mmol/L Final     Glucose   Date Value Ref Range Status   08/17/2018 189 (H) 70 - 99 mg/dL Final     Comment:     Fasting specimen     Urea Nitrogen   Date Value Ref Range Status   08/17/2018 18 7 - 30 mg/dL Final     Creatinine   Date Value Ref Range Status   08/17/2018 1.39 (H) 0.52 - 1.04 mg/dL Final     GFR Estimate   Date Value Ref Range Status   08/17/2018 38 (L) >60 mL/min/1.7m2 Final     Comment:     Non  GFR Calc     Calcium   Date Value Ref Range Status   08/17/2018 9.1 8.5 - 10.1 mg/dL Final     Lab Results   Component Value  Date    A1C 7.9 08/17/2018    A1C 7.4 04/20/2018    A1C 6.7 01/24/2018    A1C 7.9 09/25/2017    A1C 5.7 06/01/2017         ASSESSMENT/PLAN:                                                        ICD-10-CM    1. Type 2 diabetes mellitus with other diabetic kidney complication, with long-term current use of insulin (H) E11.29 insulin detemir (LEVEMIR FLEXTOUCH) 100 UNIT/ML injection    Z79.4 insulin aspart (NOVOLOG FLEXPEN) 100 UNIT/ML injection   2. Hypertension goal BP (blood pressure) < 140/90 I10 losartan (COZAAR) 100 MG tablet   3. Hyperlipidemia, unspecified hyperlipidemia type E78.5 fenofibrate 54 MG tablet   4. CKD (chronic kidney disease) stage 3, GFR 30-59 ml/min N18.3    5. Osteoporosis, unspecified osteoporosis type, unspecified pathological fracture presence M81.0 alendronate (FOSAMAX) 70 MG tablet     1: gradually worsening, still under 8%. PLAN below.  2,3,5: ASSESSMENT: stable  PLAN: continue current regimen   4: improved and nearly normalized after patient was advised to increase PO fluids in 4/2018. PLAN continue current management and adequate fluids.  LBP: most c/w OA, PLAN below    Patient Instructions   For your diabetes:  Please increase the Levemir from 38 units twice a day to 41 units twice a day.  Continue your current Novolog.  Keep a strict diabetic diet.  Continue to keep a record of your blood sugars.   Please make an appointment with the Diabetic Educator, to be seen by them within the next few weeks, to continue adjusting your insulin as needed (your Novolog dose may need to be increased in the future).     I would advise the next visit with a primary care provider in 3 months.    For your back pain:  Please take the Extra strength Tylenol or acetaminophen 500mg tablets as 1,000mg every 8 hours (whether you have pain or not at that time) for 2-4 weeks.  If you develop new or worse numbness or weakness of your legs or an inability to hold in urine or feces, please call the clinic  promptly to discuss this. If you develop an inability to get your urine out, please call please call the clinic promptly to discuss this. If you are calling after regular business hours and cannot reach a live person in our clinic, I would advise you to go to the Emergency Room for these symptoms.        Hannah Cee MD    [facility]  [address] [facility] [address] [phone]

## 2018-08-24 ENCOUNTER — OFFICE VISIT (OUTPATIENT)
Dept: INTERNAL MEDICINE | Facility: CLINIC | Age: 68
End: 2018-08-24
Payer: COMMERCIAL

## 2018-08-24 VITALS
HEART RATE: 88 BPM | BODY MASS INDEX: 38.64 KG/M2 | TEMPERATURE: 98 F | SYSTOLIC BLOOD PRESSURE: 120 MMHG | DIASTOLIC BLOOD PRESSURE: 60 MMHG | RESPIRATION RATE: 16 BRPM | OXYGEN SATURATION: 97 % | WEIGHT: 256 LBS

## 2018-08-24 DIAGNOSIS — E11.29 TYPE 2 DIABETES MELLITUS WITH OTHER DIABETIC KIDNEY COMPLICATION, WITH LONG-TERM CURRENT USE OF INSULIN (H): Primary | ICD-10-CM

## 2018-08-24 DIAGNOSIS — M81.0 OSTEOPOROSIS, UNSPECIFIED OSTEOPOROSIS TYPE, UNSPECIFIED PATHOLOGICAL FRACTURE PRESENCE: ICD-10-CM

## 2018-08-24 DIAGNOSIS — N18.30 CKD (CHRONIC KIDNEY DISEASE) STAGE 3, GFR 30-59 ML/MIN (H): ICD-10-CM

## 2018-08-24 DIAGNOSIS — Z79.4 TYPE 2 DIABETES MELLITUS WITH OTHER DIABETIC KIDNEY COMPLICATION, WITH LONG-TERM CURRENT USE OF INSULIN (H): Primary | ICD-10-CM

## 2018-08-24 DIAGNOSIS — I10 HYPERTENSION GOAL BP (BLOOD PRESSURE) < 140/90: ICD-10-CM

## 2018-08-24 DIAGNOSIS — E78.5 HYPERLIPIDEMIA, UNSPECIFIED HYPERLIPIDEMIA TYPE: ICD-10-CM

## 2018-08-24 PROCEDURE — 99214 OFFICE O/P EST MOD 30 MIN: CPT | Performed by: INTERNAL MEDICINE

## 2018-08-24 RX ORDER — LOSARTAN POTASSIUM 100 MG/1
100 TABLET ORAL DAILY
Qty: 90 TABLET | Refills: 1 | Status: SHIPPED | OUTPATIENT
Start: 2018-08-24 | End: 2019-07-16

## 2018-08-24 RX ORDER — FENOFIBRATE 54 MG/1
54 TABLET ORAL DAILY
Qty: 90 TABLET | Refills: 3 | Status: SHIPPED | OUTPATIENT
Start: 2018-08-24 | End: 2019-01-31

## 2018-08-24 RX ORDER — ALENDRONATE SODIUM 70 MG/1
TABLET ORAL
Qty: 12 TABLET | Refills: 3 | Status: SHIPPED | OUTPATIENT
Start: 2018-08-24 | End: 2019-01-31

## 2018-08-24 ASSESSMENT — ANXIETY QUESTIONNAIRES
5. BEING SO RESTLESS THAT IT IS HARD TO SIT STILL: NOT AT ALL
IF YOU CHECKED OFF ANY PROBLEMS ON THIS QUESTIONNAIRE, HOW DIFFICULT HAVE THESE PROBLEMS MADE IT FOR YOU TO DO YOUR WORK, TAKE CARE OF THINGS AT HOME, OR GET ALONG WITH OTHER PEOPLE: NOT DIFFICULT AT ALL
GAD7 TOTAL SCORE: 3
6. BECOMING EASILY ANNOYED OR IRRITABLE: NOT AT ALL
1. FEELING NERVOUS, ANXIOUS, OR ON EDGE: SEVERAL DAYS
3. WORRYING TOO MUCH ABOUT DIFFERENT THINGS: SEVERAL DAYS
7. FEELING AFRAID AS IF SOMETHING AWFUL MIGHT HAPPEN: NOT AT ALL
2. NOT BEING ABLE TO STOP OR CONTROL WORRYING: SEVERAL DAYS

## 2018-08-24 ASSESSMENT — PATIENT HEALTH QUESTIONNAIRE - PHQ9: 5. POOR APPETITE OR OVEREATING: NOT AT ALL

## 2018-08-24 ASSESSMENT — PAIN SCALES - GENERAL: PAINLEVEL: EXTREME PAIN (8)

## 2018-08-24 NOTE — PATIENT INSTRUCTIONS
For your diabetes:  Please increase the Levemir from 38 units twice a day to 41 units twice a day.  Continue your current Novolog.  Keep a strict diabetic diet.  Continue to keep a record of your blood sugars.   Please make an appointment with the Diabetic Educator, to be seen by them within the next few weeks, to continue adjusting your insulin as needed (your Novolog dose may need to be increased in the future).     I would advise the next visit with a primary care provider in 3 months.    For your back pain:  Please take the Extra strength Tylenol or acetaminophen 500mg tablets as 1,000mg every 8 hours (whether you have pain or not at that time) for 2-4 weeks.  If you develop new or worse numbness or weakness of your legs or an inability to hold in urine or feces, please call the clinic promptly to discuss this. If you develop an inability to get your urine out, please call please call the clinic promptly to discuss this. If you are calling after regular business hours and cannot reach a live person in our clinic, I would advise you to go to the Emergency Room for these symptoms.

## 2018-08-24 NOTE — MR AVS SNAPSHOT
After Visit Summary   8/24/2018    Yadi Iniguez    MRN: [id number]           Patient Information     Date Of Birth          1950        Visit Information        Provider Department      8/24/2018 7:30 AM Hannah Cee MD [facility]        Today's Diagnoses     Need for prophylactic vaccination against Streptococcus pneumoniae (pneumococcus)    -  1    Type 2 diabetes mellitus with other diabetic kidney complication, with long-term current use of insulin (H)        Hypertension goal BP (blood pressure) < 140/90        Hyperlipidemia, unspecified hyperlipidemia type        Osteoporosis, unspecified osteoporosis type, unspecified pathological fracture presence          Care Instructions    For your diabetes:  Please increase the Levemir from 38 units twice a day to 41 units twice a day.  Continue your current Novolog.  Keep a strict diabetic diet.  Continue to keep a record of your blood sugars.   Please make an appointment with the Diabetic Educator, to be seen by them within the next few weeks, to continue adjusting your insulin as needed (your Novolog dose may need to be increased in the future).     I would advise the next visit with a primary care provider in 3 months.    For your back pain:  Please take the Extra strength Tylenol or acetaminophen 500mg tablets as 1,000mg every 8 hours (whether you have pain or not at that time) for 2-4 weeks.  If you develop new or worse numbness or weakness of your legs or an inability to hold in urine or feces, please call the clinic promptly to discuss this. If you develop an inability to get your urine out, please call please call the clinic promptly to discuss this. If you are calling after regular business hours and cannot reach a live person in our clinic, I would advise you to go to the Emergency Room for these symptoms.            Follow-ups after your visit        Who to contact     If you have questions or need follow up  information about today's clinic visit or your schedule please contact [facility] directly at [phone].  Normal or non-critical lab and imaging results will be communicated to you by MyChart, letter or phone within 4 business days after the clinic has received the results. If you do not hear from us within 7 days, please contact the clinic through [employer] or phone. If you have a critical or abnormal lab result, we will notify you by phone as soon as possible.  Submit refill requests through [employer] or call your pharmacy and they will forward the refill request to us. Please allow 3 business days for your refill to be completed.          Additional Information About Your Visit        MyChart Information     [employer] gives you secure access to your electronic health record. If you see a primary care provider, you can also send messages to your care team and make appointments. If you have questions, please call your primary care clinic.  If you do not have a primary care provider, please call [phone] and they will assist you.        Care EveryWhere ID     This is your Care EveryWhere ID. This could be used by other organizations to access your [facility] medical records  [id number]        Your Vitals Were     Pulse Temperature Respirations Pulse Oximetry BMI (Body Mass Index)       88 98  F (36.7  C) (Oral) 16 97% 38.64 kg/m2        Blood Pressure from Last 3 Encounters:   08/24/18 120/60   06/22/18 133/56   05/25/18 110/60    Weight from Last 3 Encounters:   08/24/18 256 lb (116.1 kg)   05/25/18 257 lb (116.6 kg)   04/25/18 259 lb (117.5 kg)              Today, you had the following     No orders found for display         Today's Medication Changes          These changes are accurate as of 8/24/18  8:21 AM.  If you have any questions, ask your nurse or doctor.               These medicines have changed or have updated prescriptions.        Dose/Directions    blood glucose monitoring lancets    This may have changed:  Another medication with the same name was removed. Continue taking this medication, and follow the directions you see here.   Used for:  Type 2 diabetes mellitus with other diabetic kidney complication, with long-term current use of insulin (H)   Changed by:  Hannah Cee MD        Use to test blood sugar 3 times daily or as directed.   Quantity:  100 Box   Refills:  11       blood glucose monitoring test strip   Commonly known as:  MUSA CONTOUR NEXT   This may have changed:  Another medication with the same name was removed. Continue taking this medication, and follow the directions you see here.   Used for:  Type 2 diabetes mellitus with other diabetic kidney complication, with long-term current use of insulin (H)   Changed by:  Hannah Cee MD        Use to test blood sugar 3 times daily or as directed.   Quantity:  100 strip   Refills:  11       fenofibrate 54 MG tablet   This may have changed:  Another medication with the same name was removed. Continue taking this medication, and follow the directions you see here.   Used for:  Hyperlipidemia, unspecified hyperlipidemia type   Changed by:  Hannah Cee MD        Dose:  54 mg   Take 1 tablet (54 mg) by mouth daily   Quantity:  90 tablet   Refills:  3       insulin detemir 100 UNIT/ML injection   Commonly known as:  LEVEMIR FLEXTOUCH   This may have changed:  how much to take   Used for:  Type 2 diabetes mellitus with other diabetic kidney complication, with long-term current use of insulin (H)   Changed by:  Hannah Cee MD        Dose:  41 Units   Inject 41 Units Subcutaneous 2 times daily   Quantity:  35 mL   Refills:  1            Where to get your medicines      These medications were sent to [facility] [place] - [address] [place] [address]  [address] [place] [address], [place] [address]     Phone:  [phone]      alendronate 70 MG tablet    fenofibrate 54 MG tablet    insulin aspart 100 UNIT/ML injection    insulin detemir 100 UNIT/ML injection    losartan 100 MG tablet                Primary Care Provider Office Phone # Fax #    Hannah Cee [phone] [address] [facility] [address]        Equal Access to Services     [facility]: Hadii aad ku hadasho Soomaali, waaxda luqadaha, qaybta kaalmada adeegyada, waxay idiin hayaan adeeg suesamuel labethanie . So [facility] [phone].    ATENCIÓN: Si habla español, tiene a dunbar disposición servicios gratuitos de asistencia lingüística. Patricame al [phone].    We comply with applicable federal civil rights laws and [place] laws. We do not discriminate on the basis of race, color, national origin, age, disability, sex, sexual orientation, or gender identity.            Thank you!     Thank you for choosing [facility]  for your care. Our goal is always to provide you with excellent care. Hearing back from our patients is one way we can continue to improve our services. Please take a few minutes to complete the written survey that you may receive in the mail after your visit with us. Thank you!             Your Updated Medication List - Protect others around you: Learn how to safely use, store and throw away your medicines at www.disposemymeds.org.          This list is accurate as of 8/24/18  8:21 AM.  Always use your most recent med list.                   Brand Name Dispense Instructions for use Diagnosis    ACE/ARB/ARNI NOT PRESCRIBED (INTENTIONAL)      Please choose reason not prescribed, below    Benign essential hypertension       albuterol (2.5 MG/3ML) 0.083% neb solution     1 vial    Take 1 vial (2.5 mg) by nebulization every 6 hours as needed for shortness of breath / dyspnea or wheezing 1 vial used in clinic today.    Chronic obstructive pulmonary disease, unspecified COPD type (H)       alendronate 70 MG tablet     FOSAMAX    12 tablet    TAKE 1 TABLET (70 MG) BY MOUTH EVERY 7 DAYS. TAKE 60 MINUTES BEFORE MORNING MEAL WITH 8 OZ. WATER. REMAIN UPRIGHT FOR 30 MINUTES.    Osteoporosis, unspecified osteoporosis type, unspecified pathological fracture presence       amLODIPine 10 MG tablet    NORVASC    90 tablet    TAKE 1 TABLET (10 MG) BY MOUTH DAILY    Benign essential hypertension       ascorbic acid 1000 MG Tabs    vitamin C     1 TABLET DAILY AT DINNER        aspirin 81 MG tablet      ONE DAILY*        atorvastatin 40 MG tablet    LIPITOR    90 tablet    Take 1 tablet (40 mg) by mouth daily    Hyperlipidemia, unspecified hyperlipidemia type       blood glucose monitoring lancets     100 Box    Use to test blood sugar 3 times daily or as directed.    Type 2 diabetes mellitus with other diabetic kidney complication, with long-term current use of insulin (H)       blood glucose monitoring test strip    MUSA CONTOUR NEXT    100 strip    Use to test blood sugar 3 times daily or as directed.    Type 2 diabetes mellitus with other diabetic kidney complication, with long-term current use of insulin (H)       carvedilol 25 MG tablet    COREG    180 tablet    TAKE 1 TABLET (25 MG) BY MOUTH 2 TIMES DAILY WITH MEALS    Benign essential hypertension       CENTRUM SILVER per tablet      1 TABLET DAILY        clobetasol propionate 0.05 % Liqd     1 Bottle    Externally apply 1 dose. topically 2 times daily as needed    Skin rash       fenofibrate 54 MG tablet     90 tablet    Take 1 tablet (54 mg) by mouth daily    Hyperlipidemia, unspecified hyperlipidemia type       insulin aspart 100 UNIT/ML injection    NovoLOG FLEXPEN    18 mL    22 units before breakfast, 26 units before lunch, 20 units before dinner    Type 2 diabetes mellitus with other diabetic kidney complication, with long-term current use of insulin (H)       insulin detemir 100 UNIT/ML injection    LEVEMIR FLEXTOUCH    35 mL    Inject 41 Units Subcutaneous 2 times daily     Type 2 diabetes mellitus with other diabetic kidney complication, with long-term current use of insulin (H)       insulin pen needle 32G X 4 MM     450 each    Use 5 pen needles daily or as directed.  Per insurance and patient preference    Type 2 diabetes mellitus with other diabetic kidney complication, with long-term current use of insulin (H)       Levothyroxine Sodium 150 MCG Caps     90 capsule    Take 150 mcg by mouth daily (with breakfast)    Hypothyroidism, unspecified type       losartan 100 MG tablet    COZAAR    90 tablet    Take 1 tablet (100 mg) by mouth daily    Hypertension goal BP (blood pressure) < 140/90       MAGNESIUM PO      Take 250 mg by mouth daily        methocarbamol 500 MG tablet    ROBAXIN    30 tablet    Take 2 tablets (1,000 mg) by mouth 3 times daily as needed for muscle spasms    Iliotibial band syndrome, unspecified laterality       OMEGA-3 FISH OIL PO      Take 1 g by mouth        PARoxetine 20 MG tablet    PAXIL    180 tablet    Take 1 tablet (20 mg) by mouth 2 times daily    Moderate major depression (H)       primidone 50 MG tablet    MYSOLINE    180 tablet    TAKE ONE TABLET(S) BY MOUTH TWICE DAILY    Essential tremor       VITAMIN D3 PO      Take 10,000 Units by mouth daily        vitamin E 400 units Tabs      1 tab a day

## 2018-08-24 NOTE — NURSING NOTE
"Chief Complaint   Patient presents with     Diabetes     Health Maintenance     PHQ-9, ACT, pneumo 23       Initial BP (!) 88/54  Pulse 88  Temp 98  F (36.7  C) (Oral)  Resp 16  Wt 256 lb (116.1 kg)  SpO2 97%  BMI 38.64 kg/m2 Estimated body mass index is 38.64 kg/(m^2) as calculated from the following:    Height as of 5/25/18: 5' 8.25\" (1.734 m).    Weight as of this encounter: 256 lb (116.1 kg).  Medication Reconciliation: complete  Laure Oconnor CMA  "

## 2018-08-25 ASSESSMENT — ASTHMA QUESTIONNAIRES: ACT_TOTALSCORE: 21

## 2018-08-25 ASSESSMENT — ANXIETY QUESTIONNAIRES: GAD7 TOTAL SCORE: 3

## 2018-08-25 ASSESSMENT — PATIENT HEALTH QUESTIONNAIRE - PHQ9: SUM OF ALL RESPONSES TO PHQ QUESTIONS 1-9: 6

## 2018-09-06 ENCOUNTER — PATIENT OUTREACH (OUTPATIENT)
Dept: CARE COORDINATION | Facility: CLINIC | Age: 68
End: 2018-09-06

## 2018-09-06 NOTE — LETTER
Health Care Home - Access Care Plan    About Me  Patient Name:  Yadi Iniguez    YOB: 1950  Age:                             68 year old   [facility] MRN:            [id number] Telephone Information:     Home Phone [phone]   Mobile [phone]       Address:    [address] [place] [address] Email address:  [email or web address]      Emergency Contact(s)  Name Relationship Lgrony Grd Work Phone Home Phone Mobile Phone   1. MARIANO FERRO Daughter No [phone]             Health Maintenance:      My Access Plan  Medical Emergency 911   Questions or concerns during clinic hours Primary Clinic Line, I will call the clinic directly: [facility] - [phone]   24 Hour Appointment Line [phone] or  [phone]) (toll free)   24 Hour Nurse Line [phone] (toll free)   Questions or concerns outside clinic hours 24 Hour Appointment Line, I will call the after-hours on-call line:   [facility] [phone] or [phone]) (toll-free)   Preferred Urgent Care [facility] [phone]   Preferred Hospital [facility]  [phone]   Preferred Pharmacy [facility] [address] [place] [address] [address] [facility] [address]     Behavioral Health Crisis Line The National Suicide Prevention Lifeline at 1-523.879.7428 or 911     My Care Team Members  Patient Care Team       Relationship Specialty Notifications Start End    Hannah Cee MD PCP - General Internal Medicine  1/4/17     Phone: [phone] Fax: [phone]         [address] [facility] [address]    Maranda Mustafa, RN Registered Nurse   3/9/17     Phone: [phone] Fax: [phone]        Cocchiarella, Sanam S, RN Clinic Care Coordinator  Admissions 9/7/18     Phone: [phone]                My Medical and Care Information  Problem List   Patient Active Problem List    Diagnosis     Hypothyroidism     Hypertension goal BP (blood pressure) < 140/90     Type 2 diabetes, HbA1C goal < 8% (H)     Hyperlipidemia LDL goal <100     Moderate major depression (H)     Incontinence of urine     Neuropathy in diabetes (H)     Eczema     Left lumbar radiculopathy     ACP (advance care planning)     Health Care Home     CKD (chronic kidney disease) stage 3, GFR 30-59 ml/min     Type 2 diabetes mellitus with other diabetic kidney complication, with long-term current use of insulin (H)     Osteoporosis     Morbid obesity (H)     Thrombocytopenia (H)     Chronic obstructive pulmonary disease, unspecified COPD type (H)

## 2018-09-06 NOTE — LETTER
[place] CARE COORDINATION  [address] [address] [facility] [address]    September 7, 2018    Yadi Iniguez  [address] [place] [address]      Dear Yadi,    I am a clinic care coordinator who works with Hannah Cee MD at [place].I wanted to  provide you with my contact information so that you can call me with questions or concerns about your health care. Below is a description of clinic care coordination and how I can further assist you.     The clinic care coordinator is a registered nurse and/or  who understand the health care system. The goal of clinic care coordination is to help you manage your health and improve access to the [facility] system in the most efficient manner. The registered nurse can assist you in meeting your health care goals by providing education, coordinating services, and strengthening the communication among your providers. The  can assist you with financial, behavioral, psychosocial, chemical dependency, counseling, and/or psychiatric resources.    Please feel free to contact me at [phone], with any questions or concerns. We at [facility] are focused on providing you with the highest-quality healthcare experience possible and that all starts with you.     Sincerely,     AYE Gonzalez RN Clinic Care Coordinator   [place], [place], [place]  Phone: [phone]     Enclosed: I have enclosed a copy of a 24 Hour Access Plan. This has helpful phone numbers for you to call when needed. Please keep this in an easy to access place to use as needed.

## 2018-09-07 ENCOUNTER — TELEPHONE (OUTPATIENT)
Dept: INTERNAL MEDICINE | Facility: CLINIC | Age: 68
End: 2018-09-07

## 2018-09-07 DIAGNOSIS — M79.605 PAIN OF LEFT LOWER EXTREMITY: Primary | ICD-10-CM

## 2018-09-07 DIAGNOSIS — S39.012D BACK STRAIN, SUBSEQUENT ENCOUNTER: ICD-10-CM

## 2018-09-07 ASSESSMENT — ACTIVITIES OF DAILY LIVING (ADL): DEPENDENT_IADLS:: INDEPENDENT

## 2018-09-07 NOTE — TELEPHONE ENCOUNTER
Please have the patient call [phone] to schedule the MRI ASAP. We will know further steps (including who to refer to, ex: to a neurosurgeon or to P.T., etc) once the MRI results process.    Thank you,  Hannah Cee MD

## 2018-09-07 NOTE — PROGRESS NOTES
"Clinic Care Coordination Contact  Clinic Care Coordination Contact  OUTREACH  Referral Information:  Referral Source: Care Team  Primary Diagnosis: Other (include Comment box) (Lumbar radicular )    Chief Complaint   Patient presents with     Clinic Care Coordination - Post Hospital     DC'd from [facility] on 9/5/2018 to home self care, no PCP appt.     Difficulty keeping appointments:: No  Utilization    Last refreshed: 9/6/2018  2:55 PM:  No Show Count (past year) 0       Last refreshed: 9/6/2018  2:55 PM:  ED visits 0       Last refreshed: 9/6/2018  2:55 PM:  Hospital admissions 0          Current as of: 9/6/2018  2:55 PM         Clinical Concerns:  Current Medical Concerns:  Patient is reporting her back and leg pain has significantly improved. She stated she is doing \"really good\" she is using the prednisone and ultram and not experiencing any side effects. Per discharge, she was to see PCP, however patient verbally stated she was advised to follow up with neurosurgery. Patient was given the phone # to call and schedule with this [facility] provider. She is planning to see them. We discussed that in the interim if her symptoms worsen, change or she developed new concerning symptoms that she will need to come see a provider in clinic. Patient verbalized understanding.     Current Behavioral Concerns: none     Education Provided to patient: RN CC educated about Care Coordination Services, discharge instructions, medications reviewed and follow up   Pain  Pain (GOAL):: Yes  Type: Acute (<3mo)  Radiating: Yes  Progression: Improving  Chronic pain severity:: 2  Description: Able to do most things most days with some rest  Alleviating Factors: Rest, Pain Medication  Aggravating Factors: Activity  Health Maintenance Reviewed:    Clinical Pathway: None    Medication Management:  Patient independent in medication management and verbalizes adherence and understanding of medication regimen.        Functional " Status:  Dependent ADLs:: Independent  Dependent IADLs:: Independent  Mobility Status: Independent    Living Situation:  Current living arrangement:: I live alone  Type of residence:: Private home - stairs    Transportation:  Transportation concerns (GOAL):: no    Patient/Caregiver understanding: yes    Plan:   1. Patient will follow up with neurosurgery as advised.   2.  If her symptoms worsen, change or she developed new concerning symptoms that she will need to come see a provider in clinic. Patient verbalized understanding.   3. Sent access plan and intro letter.    AYE Gonzalez RN Clinic Care Coordinator   [place], [place], [place]  Phone: [phone]

## 2018-09-07 NOTE — TELEPHONE ENCOUNTER
Called patient and gave providers message/ instructions.  Patient states she understands this. She will call to schedule MRI.  JOSE PlunkettN RN

## 2018-09-07 NOTE — TELEPHONE ENCOUNTER
Patient will need a referral for an MRI or a CT to see Neuro surgery. She was seen for lumbar pain at [facility]. Feel free to call for more info. If needed.

## 2018-09-07 NOTE — TELEPHONE ENCOUNTER
Patient was seen in [facility] ER for left leg pain and back pain.   They recommended patient have some more follow up for her symptoms.   Patient requesting referral for imaging or neurology.     Routing to provider to advise.  JOSE PlunkettN RN

## 2018-09-12 ENCOUNTER — RADIANT APPOINTMENT (OUTPATIENT)
Dept: MRI IMAGING | Facility: CLINIC | Age: 68
End: 2018-09-12
Attending: INTERNAL MEDICINE
Payer: COMMERCIAL

## 2018-09-12 DIAGNOSIS — S39.012D BACK STRAIN, SUBSEQUENT ENCOUNTER: ICD-10-CM

## 2018-09-12 DIAGNOSIS — M79.605 PAIN OF LEFT LOWER EXTREMITY: ICD-10-CM

## 2018-09-12 PROCEDURE — 72148 MRI LUMBAR SPINE W/O DYE: CPT | Mod: TC

## 2018-09-13 ENCOUNTER — TELEPHONE (OUTPATIENT)
Dept: INTERNAL MEDICINE | Facility: CLINIC | Age: 68
End: 2018-09-13

## 2018-09-13 DIAGNOSIS — M48.061 SPINAL STENOSIS OF LUMBAR REGION, UNSPECIFIED WHETHER NEUROGENIC CLAUDICATION PRESENT: Primary | ICD-10-CM

## 2018-09-13 NOTE — TELEPHONE ENCOUNTER
Left message on voicemail for patient to call back.     Direct number given: [phone].  Yaa Turner, JOSEN, RN

## 2018-09-13 NOTE — TELEPHONE ENCOUNTER
Patient called back.     This RN gave providers message/ instructions.  Patient states she understands this.   She prefers that below message and referral information go through [employer].   This RN released results with providers message.     JOSE PlunkettN RN

## 2018-09-13 NOTE — TELEPHONE ENCOUNTER
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 This is the likely cause of your back pain.   Please call to schedule an appointment with one of the Neurosurgeons below to be seen by them within the next 2-4 weeks (check for insurance coverage before seeing one of these specialists):  [facility]: [facility] [phone]   [email or web address]  [facility]: Neurosurgery Clinic [facility] [phone]   [email or web address]  [facility] [place] [phone]   [email or web address] [phone]   [email or web address] [phone]   [email or web address]  [place] [phone]   [email or web address]    Please let me know if you have any questions.    Hannah Cee MD       "

## 2018-10-02 ENCOUNTER — OFFICE VISIT (OUTPATIENT)
Dept: NEUROSURGERY | Facility: CLINIC | Age: 68
End: 2018-10-02
Payer: COMMERCIAL

## 2018-10-02 VITALS
HEART RATE: 93 BPM | SYSTOLIC BLOOD PRESSURE: 172 MMHG | OXYGEN SATURATION: 92 % | DIASTOLIC BLOOD PRESSURE: 93 MMHG | TEMPERATURE: 97.5 F

## 2018-10-02 DIAGNOSIS — R29.898 WEAKNESS OF LEFT LEG: ICD-10-CM

## 2018-10-02 DIAGNOSIS — M54.42 CHRONIC BILATERAL LOW BACK PAIN WITH LEFT-SIDED SCIATICA: Primary | ICD-10-CM

## 2018-10-02 DIAGNOSIS — G89.29 CHRONIC BILATERAL LOW BACK PAIN WITH LEFT-SIDED SCIATICA: Primary | ICD-10-CM

## 2018-10-02 DIAGNOSIS — M71.38 SYNOVIAL CYST OF LUMBAR SPINE: ICD-10-CM

## 2018-10-02 PROCEDURE — 99204 OFFICE O/P NEW MOD 45 MIN: CPT | Performed by: NEUROLOGICAL SURGERY

## 2018-10-02 RX ORDER — ACETAMINOPHEN 500 MG
1000 TABLET ORAL 2 TIMES DAILY
COMMUNITY

## 2018-10-02 ASSESSMENT — PAIN SCALES - GENERAL: PAINLEVEL: SEVERE PAIN (7)

## 2018-10-02 NOTE — PROGRESS NOTES
Neurosurgery Consult Note   [facility]        CC: Low back and left lower extremity pain    Primary care Provider: Hannah Cee    I was asked to see the patient by:  Hannah Cee MD  [address] [place] [address]      [place]: Yadi Iniguez is a 68 year old female that presents to clinic with a complaint of low back and left lower extremity pain in the L5 distribution.  The patient describes having chronic low back pain for many years and left lower extremity pain for 2 months or so.  The pain radiates down her left lower extremity is causing her to walk with a cane limp and she feels weakness in dorsiflexion.  History of diabetes and Graves' disease.  He has not tried physical therapy nor epidural steroid injections and does not feel that those will help.      Past Medical History:   Diagnosis Date     Arthritis 1975     Broken ribs 8/28/2016    3,4,5,6,7, left side     Cancer (H) 2013    Skin cancer     COPD (chronic obstructive pulmonary disease) (H) 08/2017     Depression      Depressive disorder 1988     Dysuria      Glycosuria      Hypertension 1980     Mixed incontinence urge and stress      Other and unspecified hyperlipidemia      Right shoulder pain      Type II or unspecified type diabetes mellitus without mention of complication, uncontrolled      Uncomplicated asthma 2017     Unspecified hypothyroidism        Past Surgical History:   Procedure Laterality Date     ABDOMEN SURGERY  1978    Gall Bladder  January       hysterectomy  Sept     BIOPSY  2013 1993    from left corner of mouth       muscule biopsy     C VAGINAL HYSTERECTOMY       CATARACT IOL, RT/LT Bilateral     2006     CHOLECYSTECTOMY, OPEN       CL AFF SURGICAL PATHOLOGY       COLONOSCOPY  2007     COLONOSCOPY N/A 6/22/2018    Procedure: COMBINED COLONOSCOPY, SINGLE OR MULTIPLE BIOPSY/POLYPECTOMY BY BIOPSY;;  Surgeon: David Parson MD;  Location:  OR      COLONOSCOPY WITH CO2 INSUFFLATION N/A 6/22/2018    Procedure: COLONOSCOPY WITH CO2 INSUFFLATION;  Colonscopy, ;  Surgeon: David Parson MD;  Location: [facility] OR     EYE SURGERY  2014    ongoing     HEAD & NECK SURGERY  2006    Removal of saliva gland left side     PROPHYLACTIC REPAIR RETINAL BREAK, PNEUMATIC RETINOPEXY, COMBINED Left 9/2/2016     SOFT TISSUE SURGERY  1996    Carpal Tunnel left wrist       Current Outpatient Prescriptions   Medication     ACETAMINOPHEN PO     alendronate (FOSAMAX) 70 MG tablet     amLODIPine (NORVASC) 10 MG tablet     ASPIRIN 81 MG OR TABS     atorvastatin (LIPITOR) 40 MG tablet     blood glucose monitoring (MUSA CONTOUR NEXT) test strip     blood glucose monitoring (MUSA MICROLET) lancets     carvedilol (COREG) 25 MG tablet     clobetasol propionate 0.05 % LIQD     fenofibrate 54 MG tablet     insulin aspart (NOVOLOG FLEXPEN) 100 UNIT/ML injection     insulin detemir (LEVEMIR FLEXTOUCH) 100 UNIT/ML injection     insulin pen needle 32G X 4 MM     Levothyroxine Sodium 150 MCG CAPS     losartan (COZAAR) 100 MG tablet     PARoxetine (PAXIL) 20 MG tablet     primidone (MYSOLINE) 50 MG tablet     ACE/ARB NOT PRESCRIBED, INTENTIONAL,     albuterol (2.5 MG/3ML) 0.083% neb solution     CENTRUM SILVER OR TABS     Cholecalciferol (VITAMIN D3 PO)     MAGNESIUM PO     methocarbamol (ROBAXIN) 500 MG tablet     Omega-3 Fatty Acids (OMEGA-3 FISH OIL PO)     VITAMIN C 1000 MG OR TABS     VITAMIN E 400 UNIT OR TABS     No current facility-administered medications for this visit.        No Known Allergies    Social History     Social History     Marital status: Single     Spouse name: N/A     Number of children: N/A     Years of education: N/A     Social History Main Topics     Smoking status: Former Smoker     Packs/day: 0.50     Years: 50.00     Types: Cigarettes     Start date: 1/1/1967     Quit date: 8/8/2017     Smokeless tobacco: Never Used      Comment: former smoker     Alcohol use  Yes      Comment: Maybe a drink every few months     Drug use: No     Sexual activity: No     Other Topics Concern     Parent/Sibling W/ Cabg, Mi Or Angioplasty Before 65f 55m? Yes     fatherr and 2 brothers     Social History Narrative       Family History   Problem Relation Age of Onset     Musculoskeletal Disorder Mother      MD     Muscular Disorder Mother      Depression Mother      Osteoporosis Mother      Obesity Mother      Cataracts Mother      Genetic Disorder Mother      MD     Cancer Father      mesothelioma     Obesity Father      HEART DISEASE Brother      angioplasty     Neurologic Disorder Brother      ms     Diabetes Brother      Hypertension Brother      Hyperlipidemia Brother      Obesity Brother      4 brothers     Depression Paternal Grandmother      Obesity Paternal Grandmother      Genetic Disorder Brother      MS     Diabetes Brother      Hypertension Brother      Genetic Disorder Brother      MD     Hyperlipidemia Brother      Hypertension Brother      Genetic Disorder Brother      MS         Review Of Systems  Skin: negative  Eyes: negative  Ears/Nose/Throat: negative  Respiratory: No shortness of breath, dyspnea on exertion, cough, or hemoptysis  Cardiovascular: negative  Gastrointestinal: negative  Genitourinary: negative  Musculoskeletal: as above  Neurologic: as above  Psychiatric: negative  Hematologic/Lymphatic/Immunologic: negative  Endocrine: negative    B/P: 172/93, T: 97.5, P: 93, R: Data Unavailable    Examination:  Normal affect and mood  No obvious labored respiration  No skin lesions noted   No obvious pitting edema  No abnormal psychiatric behavior  No obvious musculoskeletal abnormalities   Awake  Alert  Oriented x 3  Speech clear  Cranial nerves II - XII intact  Face symmetric  Back nontender  Normal ROM of back  Motor exam symmetric motor strength in left dorsiflexion of 4/5 and right dorsiflexion of 4+/5  Sensation decreased in the left L5 distribution  Clonus  negative  DTR 1 +  Mildly positive Lase'madhu's sign on the left  Ambulation with cane and limps and has difficulty ambulate    Imaging:   MRI lumbar spine reveals a grade 1 spondylolisthesis at L4-5 with severe left-sided stenosis and a likely cystic mass compressing the L5 root      Assessment/Plan:   I recommended a left L4-5 minimally invasive transforaminal lumbar interbody fusion with resection of the left-sided cystic mass.I discussed with the patient the risk of surgery to include, but, not be limited to; nerve injury, pseudoarthrosis, failure of hardware, failure of improvement of symptoms, CSF leak,  infection, post op hematoma, the need for recurrent surgery, paralysis, coma and death       Hernando Hill MD, MS, FAANS  Neurosurgeon  [facility]  [address] [place] [address]. [place]  [place] [address]  Tel [phone]

## 2018-10-02 NOTE — PATIENT INSTRUCTIONS
Patient Instructions  Pre-Operative:  -Surgery scheduled at [facility] for Left L4-5 MIS TLIF (transforaminal lumbar interbody fusion)  -Surgical risks: blood clots in the leg or lung, problems urinating, nerve damage, drainage from the incision, infection,stiffness  - Pre-operative physical with primary care physician within 30 days of surgical date.   -2-4 night hospitalization.    -Shower procedure: please shower with antimicrobial soap the night before surgery and morning of surgery. Please refer to showering instruction sheet in folder.  -Stop all solid foods 8 hours before surgery.  -Keep drinking clear liquids until 4 hours before surgery. Clear liquids include water, clear juice, black coffee, or clear tea without milk, Gatorade, clear soda.   - Discontinue Aspirin, NSAIDs (Advil/Ibuprofen, Naproxen,Nuprin,Relafen/Nabumetone, Diclofenac,Meloxicam, Aleve, Celebrex) x 7 days prior to surgical date.  - May try tylenol for pain 1000 mg three times per day for pain      Post-Operative:  -Do not begin taking Non-Steroidal Anti-Inflammatory Drugs or NSAIDs (Advil/ibuprofen, Naproxen,Nuprin, Relafen/Nabumetone, Diclofenac,Meloxicam, Aleve, Celebrex, Aspirin, etc.) until 12 weeks after surgery. May cause bleeding and interfere with bone healing.   - Post operative incisional pain x 1-2 weeks which will require pain medications and muscle relaxants. You will receive medication upon discharge.  -Do NOT drive while taking narcotic pain medication.  -Do not drink alcohol while using any pain medication  -Post operative incision care- Watch for signs of infection: redness, swelling, warmth, drainage, and fever of 101 degrees or higher. [facility] [phone].  -No submerging incision in water such as pools, hot tubs,baths for at least 8 weeks or until incision is healed. Showers are fine.   - Post operative activity limitations: 6-8 weeks, no lifting > 10 pounds, no bending, twisting, or overhead  reaching.  -Orthotics will fit you for a brace in the hospital.Lumbar Fusion: wear for 6-8 weeks   - Post op follow up appointments: 2 weeks suture/staple removal and 6 week post op follow up visit with Nurse practitioner and x-ray prior to appointment.Please call to schedule follow up appointment at [phone].  -If you are currently employed, you will need to be off work for 4-6 weeks for post op recovery and healing.

## 2018-10-02 NOTE — NURSING NOTE
Pre-operative Education    Education included but not limited to:  - Pre-operative physical with primary care physician within 30 days of surgical date.   - Pre-operative clearance (cardiology, hematology, etc).   - Discontinue Aspirin, NSAIDs, Diclofenac x 7 days prior to surgical date.   -May try tylenol for pain 1000 mg three times per day for pain  -Do not begin taking Non-Steroidal Anti-Inflammatory Drugs or NSAIDs (Advil,Motrin, Ibuprofen,Nuprin, Diclofenac,Meloxicam, Aleve, Celebrex, Aspirin, etc.) until 12 weeks after surgery if you had a fusion. May cause bleeding and interfere with bone healing.    -Patient is a former smoker. Patient quit in 2017. Patient smoked for 25 years.   -Forms to be completed- Retired  -Surgical risks: blood clots in the leg or lung, problems urinating, nerve damage, drainage from the incision, infection,stiffness  -Preparation timeline  - When to start NPO           -Special bathing procedure.Patient received Hibiclens and showering instruction sheet.   Hospital stay:  Checking in  The surgery itself   Recovery room  - Transition to the hospital room where you will begin your recovery  - Managing pain   - 2-3 night hospitalization.   - Post operative incisional pain x 1-2 weeks which will require pain medications and muscle relaxants.   -Do NOT drive or drink alcohol while taking narcotic pain medication.  -Post operative incision care-Keep your incision clean and dry at all times. OK to remove dressing on postop day 2. OK to shower on postop day 3 and allow water to run over incision, pat dry after shower. No bathing, swimming or submerging in water. Call immediately or come to ED if any drainage occurs, or you develop new pain. Watch for signs of infection: redness, swelling, warmth, drainage, and fever of 101 degrees or higher. Notify clinic.   - Post operative activity limitations: 6-8 weeks, no lifting > 10 pounds, no bending, twisting, or overhead reaching.  -Orthotics will  fit you for a brace in the hospital.Lumbar Fusion: wear for 6-8 weeks   - Post op follow up appointments: 2 weeks for suture/staple removal and 6 weeks  and 3 months with  Nurse Practitioner with X-ray prior. Please call to schedule follow up appointment at [phone].   - Spine Education book was also given to the patient for further review.      Patient verbalized understanding of above instructions. All questions were answered to the best of my ability and the patient's satisfaction. Patient advised to call with any additional questions or concerns.  Any House RN

## 2018-10-02 NOTE — LETTER
10/2/2018         RE: Yadi Iniguez  [address] [place] [address]        Dear Colleague,    Thank you for referring your patient, Yadi Iniguez, to the [facility]. Please see a copy of my visit note below.    Neurosurgery Consult Note   [facility]        CC: Low back and left lower extremity pain    Primary care Provider: Hannah Cee    I was asked to see the patient by:  Hannah Cee MD  [address] [place] [address]      [place]: Yadi Iniguez is a 68 year old female that presents to clinic with a complaint of low back and left lower extremity pain in the L5 distribution.  The patient describes having chronic low back pain for many years and left lower extremity pain for 2 months or so.  The pain radiates down her left lower extremity is causing her to walk with a cane limp and she feels weakness in dorsiflexion.  History of diabetes and Graves' disease.  He has not tried physical therapy nor epidural steroid injections and does not feel that those will help.      Past Medical History:   Diagnosis Date     Arthritis 1975     Broken ribs 8/28/2016    3,4,5,6,7, left side     Cancer (H) 2013    Skin cancer     COPD (chronic obstructive pulmonary disease) (H) 08/2017     Depression      Depressive disorder 1988     Dysuria      Glycosuria      Hypertension 1980     Mixed incontinence urge and stress      Other and unspecified hyperlipidemia      Right shoulder pain      Type II or unspecified type diabetes mellitus without mention of complication, uncontrolled      Uncomplicated asthma 2017     Unspecified hypothyroidism        Past Surgical History:   Procedure Laterality Date     ABDOMEN SURGERY  1978    Gall Bladder  January       hysterectomy  Sept     BIOPSY  2013 1993    from left corner of mouth       muscule biopsy     C VAGINAL HYSTERECTOMY       CATARACT IOL, RT/LT Bilateral     2006      CHOLECYSTECTOMY, OPEN       CL AFF SURGICAL PATHOLOGY       COLONOSCOPY  2007     COLONOSCOPY N/A 6/22/2018    Procedure: COMBINED COLONOSCOPY, SINGLE OR MULTIPLE BIOPSY/POLYPECTOMY BY BIOPSY;;  Surgeon: David Parson MD;  Location: [facility] OR     COLONOSCOPY WITH CO2 INSUFFLATION N/A 6/22/2018    Procedure: COLONOSCOPY WITH CO2 INSUFFLATION;  Colonscopy, ;  Surgeon: David Parson MD;  Location: [facility] OR     EYE SURGERY  2014    ongoing     HEAD & NECK SURGERY  2006    Removal of saliva gland left side     PROPHYLACTIC REPAIR RETINAL BREAK, PNEUMATIC RETINOPEXY, COMBINED Left 9/2/2016     SOFT TISSUE SURGERY  1996    Carpal Tunnel left wrist       Current Outpatient Prescriptions   Medication     ACETAMINOPHEN PO     alendronate (FOSAMAX) 70 MG tablet     amLODIPine (NORVASC) 10 MG tablet     ASPIRIN 81 MG OR TABS     atorvastatin (LIPITOR) 40 MG tablet     blood glucose monitoring (MUSA CONTOUR NEXT) test strip     blood glucose monitoring ([employer] MICROLET) lancets     carvedilol (COREG) 25 MG tablet     clobetasol propionate 0.05 % LIQD     fenofibrate 54 MG tablet     insulin aspart (NOVOLOG FLEXPEN) 100 UNIT/ML injection     insulin detemir (LEVEMIR FLEXTOUCH) 100 UNIT/ML injection     insulin pen needle 32G X 4 MM     Levothyroxine Sodium 150 MCG CAPS     losartan (COZAAR) 100 MG tablet     PARoxetine (PAXIL) 20 MG tablet     primidone (MYSOLINE) 50 MG tablet     ACE/ARB NOT PRESCRIBED, INTENTIONAL,     albuterol (2.5 MG/3ML) 0.083% neb solution     CENTRUM SILVER OR TABS     Cholecalciferol (VITAMIN D3 PO)     MAGNESIUM PO     methocarbamol (ROBAXIN) 500 MG tablet     Omega-3 Fatty Acids (OMEGA-3 FISH OIL PO)     VITAMIN C 1000 MG OR TABS     VITAMIN E 400 UNIT OR TABS     No current facility-administered medications for this visit.        No Known Allergies    Social History     Social History     Marital status: Single     Spouse name: N/A     Number of children: N/A     Years of education:  N/A     Social History Main Topics     Smoking status: Former Smoker     Packs/day: 0.50     Years: 50.00     Types: Cigarettes     Start date: 1/1/1967     Quit date: 8/8/2017     Smokeless tobacco: Never Used      Comment: former smoker     Alcohol use Yes      Comment: Maybe a drink every few months     Drug use: No     Sexual activity: No     Other Topics Concern     Parent/Sibling W/ Cabg, Mi Or Angioplasty Before 65f 55m? Yes     fatherr and 2 brothers     Social History Narrative       Family History   Problem Relation Age of Onset     Musculoskeletal Disorder Mother      MD     Muscular Disorder Mother      Depression Mother      Osteoporosis Mother      Obesity Mother      Cataracts Mother      Genetic Disorder Mother      MD     Cancer Father      mesothelioma     Obesity Father      HEART DISEASE Brother      angioplasty     Neurologic Disorder Brother      ms     Diabetes Brother      Hypertension Brother      Hyperlipidemia Brother      Obesity Brother      4 brothers     Depression Paternal Grandmother      Obesity Paternal Grandmother      Genetic Disorder Brother      MS     Diabetes Brother      Hypertension Brother      Genetic Disorder Brother      MD     Hyperlipidemia Brother      Hypertension Brother      Genetic Disorder Brother      MS         Review Of Systems  Skin: negative  Eyes: negative  Ears/Nose/Throat: negative  Respiratory: No shortness of breath, dyspnea on exertion, cough, or hemoptysis  Cardiovascular: negative  Gastrointestinal: negative  Genitourinary: negative  Musculoskeletal: as above  Neurologic: as above  Psychiatric: negative  Hematologic/Lymphatic/Immunologic: negative  Endocrine: negative    B/P: 172/93, T: 97.5, P: 93, R: Data Unavailable    Examination:  Normal affect and mood  No obvious labored respiration  No skin lesions noted   No obvious pitting edema  No abnormal psychiatric behavior  No obvious musculoskeletal abnormalities   Awake  Alert  Oriented x 3  Speech  clear  Cranial nerves II - XII intact  Face symmetric  Back nontender  Normal ROM of back  Motor exam symmetric motor strength in left dorsiflexion of 4/5 and right dorsiflexion of 4+/5  Sensation decreased in the left L5 distribution  Clonus negative  DTR 1 +  Mildly positive Lase'madhu's sign on the left  Ambulation with cane and limps and has difficulty ambulate    Imaging:   MRI lumbar spine reveals a grade 1 spondylolisthesis at L4-5 with severe left-sided stenosis and a likely cystic mass compressing the L5 root      Assessment/Plan:   I recommended a left L4-5 minimally invasive transforaminal lumbar interbody fusion with resection of the left-sided cystic mass.I discussed with the patient the risk of surgery to include, but, not be limited to; nerve injury, pseudoarthrosis, failure of hardware, failure of improvement of symptoms, CSF leak,  infection, post op hematoma, the need for recurrent surgery, paralysis, coma and death       Hernando Hill MD, MS, FAANS  Neurosurgeon  [facility]  [address] [place] [address] [place] [address]  Tel [phone]    Again, thank you for allowing me to participate in the care of your patient.        Sincerely,        Hernando Hill MD

## 2018-10-02 NOTE — NURSING NOTE
"Yadi Iniguez is a 68 year old female who presents for:  Chief Complaint   Patient presents with     Neurologic Problem     referral from Dr. Cee for low back pain with left sciatica        Vitals:    There were no vitals filed for this visit.    BMI:  Estimated body mass index is 38.64 kg/(m^2) as calculated from the following:    Height as of 5/25/18: 5' 8.25\" (1.734 m).    Weight as of 8/24/18: 256 lb (116.1 kg).    Pain Score:  Data Unavailable        Lauryn Mann, ANABEL, AAS        "

## 2018-10-02 NOTE — MR AVS SNAPSHOT
After Visit Summary   10/2/2018    Yadi Iniguez    MRN: [id number]           Patient Information     Date Of Birth          1950        Visit Information        Provider Department      10/2/2018 1:00 PM Hernando Hill MD [facility] Instructions    Patient Instructions  Pre-Operative:  -Surgery scheduled at [facility] for Left L4-5 MIS TLIF (transforaminal lumbar interbody fusion)  -Surgical risks: blood clots in the leg or lung, problems urinating, nerve damage, drainage from the incision, infection,stiffness  - Pre-operative physical with primary care physician within 30 days of surgical date.   -2-4 night hospitalization.    -Shower procedure: please shower with antimicrobial soap the night before surgery and morning of surgery. Please refer to showering instruction sheet in folder.  -Stop all solid foods 8 hours before surgery.  -Keep drinking clear liquids until 4 hours before surgery. Clear liquids include water, clear juice, black coffee, or clear tea without milk, Gatorade, clear soda.   - Discontinue Aspirin, NSAIDs (Advil/Ibuprofen, Naproxen,Nuprin,Relafen/Nabumetone, Diclofenac,Meloxicam, Aleve, Celebrex) x 7 days prior to surgical date.  - May try tylenol for pain 1000 mg three times per day for pain      Post-Operative:  -Do not begin taking Non-Steroidal Anti-Inflammatory Drugs or NSAIDs (Advil/ibuprofen, Naproxen,Nuprin, Relafen/Nabumetone, Diclofenac,Meloxicam, Aleve, Celebrex, Aspirin, etc.) until 12 weeks after surgery. May cause bleeding and interfere with bone healing.   - Post operative incisional pain x 1-2 weeks which will require pain medications and muscle relaxants. You will receive medication upon discharge.  -Do NOT drive while taking narcotic pain medication.  -Do not drink alcohol while using any pain medication  -Post operative incision care- Watch for signs of infection: redness, swelling, warmth, drainage, and  fever of 101 degrees or higher. Notify clinic [phone].  -No submerging incision in water such as pools, hot tubs,baths for at least 8 weeks or until incision is healed. Showers are fine.   - Post operative activity limitations: 6-8 weeks, no lifting > 10 pounds, no bending, twisting, or overhead reaching.  -Orthotics will fit you for a brace in the hospital.Lumbar Fusion: wear for 6-8 weeks   - Post op follow up appointments: 2 weeks suture/staple removal and 6 week post op follow up visit with Nurse practitioner and x-ray prior to appointment.Please call to schedule follow up appointment at [phone].  -If you are currently employed, you will need to be off work for 4-6 weeks for post op recovery and healing.                                          Follow-ups after your visit        Who to contact     If you have questions or need follow up information about today's clinic visit or your schedule please contact [facility] directly at [phone].  Normal or non-critical lab and imaging results will be communicated to you by [employer], letter or phone within 4 business days after the clinic has received the results. If you do not hear from us within 7 days, please contact the clinic through [employer] or phone. If you have a critical or abnormal lab result, we will notify you by phone as soon as possible.  Submit refill requests through [employer] or call your pharmacy and they will forward the refill request to us. Please allow 3 business days for your refill to be completed.          Additional Information About Your Visit        [employer] Information     [employer] gives you secure access to your electronic health record. If you see a primary care provider, you can also send messages to your care team and make appointments. If you have questions, please call your primary care clinic.  If you do not have a primary care provider, please call [phone] and they will assist you.        Care EveryWhere ID      This is your Care EveryWhere ID. This could be used by other organizations to access your [facility] medical records  [id number]        Your Vitals Were     Pulse Temperature Pulse Oximetry Breastfeeding?          93 97.5  F (36.4  C) (Oral) 92% No         Blood Pressure from Last 3 Encounters:   10/02/18 (!) 172/93   08/24/18 120/60   06/22/18 133/56    Weight from Last 3 Encounters:   08/24/18 256 lb (116.1 kg)   05/25/18 257 lb (116.6 kg)   04/25/18 259 lb (117.5 kg)              Today, you had the following     No orders found for display       Primary Care Provider Office Phone # Fax #    Hannah Cee [phone] [id number] [facility] [id number]        Equal Access to Services     [facility]: Hadii bunny marques hadasho Socarter, waaxda luqadaha, qaybta kaalmada adeegyada, chandra piper . So [facility] [place] [facility] [phone].    ATENCIÓN: Si habla español, tiene a dunbar disposición servicios gratuitos de asistencia lingüística. Patricame al [phone].    We comply with applicable federal civil rights laws and [place] laws. We do not discriminate on the basis of race, color, national origin, age, disability, sex, sexual orientation, or gender identity.            Thank you!     Thank you for choosing [facility]  for your care. Our goal is always to provide you with excellent care. Hearing back from our patients is one way we can continue to improve our services. Please take a few minutes to complete the written survey that you may receive in the mail after your visit with us. Thank you!             Your Updated Medication List - Protect others around you: Learn how to safely use, store and throw away your medicines at www.disposemymeds.org.          This list is accurate as of 10/2/18  1:26 PM.  Always use your most recent med list.                   Brand Name Dispense Instructions for use Diagnosis    ACE/ARB/ARNI NOT PRESCRIBED (INTENTIONAL)       Please choose reason not prescribed, below    Benign essential hypertension       ACETAMINOPHEN PO      Take 500 mg by mouth 2 times daily 2 tablets twice daily        albuterol (2.5 MG/3ML) 0.083% neb solution     1 vial    Take 1 vial (2.5 mg) by nebulization every 6 hours as needed for shortness of breath / dyspnea or wheezing 1 vial used in clinic today.    Chronic obstructive pulmonary disease, unspecified COPD type (H)       alendronate 70 MG tablet    FOSAMAX    12 tablet    TAKE 1 TABLET (70 MG) BY MOUTH EVERY 7 DAYS. TAKE 60 MINUTES BEFORE MORNING MEAL WITH 8 OZ. WATER. REMAIN UPRIGHT FOR 30 MINUTES.    Osteoporosis, unspecified osteoporosis type, unspecified pathological fracture presence       amLODIPine 10 MG tablet    NORVASC    90 tablet    TAKE 1 TABLET (10 MG) BY MOUTH DAILY    Benign essential hypertension       ascorbic acid 1000 MG Tabs    vitamin C     1 TABLET DAILY AT DINNER        aspirin 81 MG tablet      ONE DAILY*        atorvastatin 40 MG tablet    LIPITOR    90 tablet    Take 1 tablet (40 mg) by mouth daily    Hyperlipidemia, unspecified hyperlipidemia type       blood glucose monitoring lancets     100 Box    Use to test blood sugar 3 times daily or as directed.    Type 2 diabetes mellitus with other diabetic kidney complication, with long-term current use of insulin (H)       blood glucose monitoring test strip    MUSA CONTOUR NEXT    100 strip    Use to test blood sugar 3 times daily or as directed.    Type 2 diabetes mellitus with other diabetic kidney complication, with long-term current use of insulin (H)       carvedilol 25 MG tablet    COREG    180 tablet    TAKE 1 TABLET (25 MG) BY MOUTH 2 TIMES DAILY WITH MEALS    Benign essential hypertension       CENTRUM SILVER per tablet      1 TABLET DAILY        clobetasol propionate 0.05 % Liqd     1 Bottle    Externally apply 1 dose. topically 2 times daily as needed    Skin rash       fenofibrate 54 MG tablet     90 tablet    Take  1 tablet (54 mg) by mouth daily    Hyperlipidemia, unspecified hyperlipidemia type       insulin aspart 100 UNIT/ML injection    NovoLOG FLEXPEN    18 mL    22 units before breakfast, 26 units before lunch, 20 units before dinner    Type 2 diabetes mellitus with other diabetic kidney complication, with long-term current use of insulin (H)       insulin detemir 100 UNIT/ML injection    LEVEMIR FLEXTOUCH    35 mL    Inject 41 Units Subcutaneous 2 times daily    Type 2 diabetes mellitus with other diabetic kidney complication, with long-term current use of insulin (H)       insulin pen needle 32G X 4 MM     450 each    Use 5 pen needles daily or as directed.  Per insurance and patient preference    Type 2 diabetes mellitus with other diabetic kidney complication, with long-term current use of insulin (H)       Levothyroxine Sodium 150 MCG Caps     90 capsule    Take 150 mcg by mouth daily (with breakfast)    Hypothyroidism, unspecified type       losartan 100 MG tablet    COZAAR    90 tablet    Take 1 tablet (100 mg) by mouth daily    Hypertension goal BP (blood pressure) < 140/90       MAGNESIUM PO      Take 250 mg by mouth daily        methocarbamol 500 MG tablet    ROBAXIN    30 tablet    Take 2 tablets (1,000 mg) by mouth 3 times daily as needed for muscle spasms    Iliotibial band syndrome, unspecified laterality       OMEGA-3 FISH OIL PO      Take 1 g by mouth        PARoxetine 20 MG tablet    PAXIL    180 tablet    Take 1 tablet (20 mg) by mouth 2 times daily    Moderate major depression (H)       primidone 50 MG tablet    MYSOLINE    180 tablet    TAKE ONE TABLET(S) BY MOUTH TWICE DAILY    Essential tremor       VITAMIN D3 PO      Take 10,000 Units by mouth daily        vitamin E 400 units Tabs      1 tab a day

## 2018-10-15 ENCOUNTER — TELEPHONE (OUTPATIENT)
Dept: NEUROSURGERY | Facility: CLINIC | Age: 68
End: 2018-10-15

## 2018-10-15 NOTE — TELEPHONE ENCOUNTER
Type of surgery: L4-5 MIS TLIF  Location of surgery: [facility]  Date and time of surgery: 11/05/2018  Surgeon: Sergio Patricio   Pre-Op Appt Date: Discussed   Post-Op Appt Date: 12/19/2018 at [facility]    Packet sent out: Yes  Pre-cert/Authorization completed:  Yes  Date: 10/15/2018

## 2018-10-17 ENCOUNTER — DOCUMENTATION ONLY (OUTPATIENT)
Dept: LAB | Facility: CLINIC | Age: 68
End: 2018-10-17

## 2018-10-17 DIAGNOSIS — Z79.4 TYPE 2 DIABETES MELLITUS WITH OTHER DIABETIC KIDNEY COMPLICATION, WITH LONG-TERM CURRENT USE OF INSULIN (H): Primary | ICD-10-CM

## 2018-10-17 DIAGNOSIS — E11.29 TYPE 2 DIABETES MELLITUS WITH OTHER DIABETIC KIDNEY COMPLICATION, WITH LONG-TERM CURRENT USE OF INSULIN (H): Primary | ICD-10-CM

## 2018-10-17 NOTE — PROGRESS NOTES
[facility]  [address] [address] [facility] [id number] [phone]  Dept: [phone]    PRE-OP EVALUATION:  Today's date: 10/22/2018    Yadi Iniguez (: 1950) presents for pre-operative evaluation assessment as requested by Dr. Hill.  She requires evaluation and anesthesia risk assessment prior to undergoing surgery/procedure for treatment of back pain .    Fax number for surgical facility: [phone]  Primary Physician: Sandy Root  Type of Anesthesia Anticipated: General    Patient has a Health Care Directive or Living Will:  YES     Preop Questions 10/18/2018   Who is doing your surgery? Dr. Hill   What are you having done? Spinal Fusion, cycst removal and 2 pins added   Date of Surgery/Procedure: 2018   Facility or Hospital where procedure/surgery will be performed: [facility]   1.  Do you have a history of Heart attack, stroke, stent, coronary bypass surgery, or other heart surgery? No   2.  Do you ever have any pain or discomfort in your chest? No   3.  Do you have a history of  Heart Failure? No   4.   Are you troubled by shortness of breath when:  walking on a level surface, or up a slight hill, or at night? YES - Pt is able to walk up a flight of stairs although limited by back pain and some sob   5.  Do you currently have a cold, bronchitis or other respiratory infection? No   6.  Do you have a cough, shortness of breath, or wheezing? No   7.  Do you sometimes get pains in the calves of your legs when you walk? YES - due to left sided sciatica   8. Do you or anyone in your family have previous history of blood clots? No   9.  Do you or does anyone in your family have a serious bleeding problem such as prolonged bleeding following surgeries or cuts? No   10. Have you ever had problems with anemia or been told to take iron pills? No   11. Have you had any abnormal blood loss such as black, tarry or bloody stools, or abnormal vaginal bleeding? No    12. Have you ever had a blood transfusion? No   13. Have you or any of your relatives ever had problems with anesthesia? No   14. Do you have sleep apnea, excessive snoring or daytime drowsiness? YES - daytime drowsiness   15. Do you have any prosthetic heart valves? No   16. Do you have prosthetic joints? No   17. Is there any chance that you may be pregnant? No         HPI:     HPI related to upcoming procedure: Pt to undergo surgical treatment of lumbar fusion of L4-L5 and cyst removal due to bilateral chronic low back pain with left sided sciatica      COPD - Patient has a longstanding history of moderate-severe COPD . Patient has been doing well overall noting NO SYMPTOMS and continues on medication regimen consisting of albuteral meds without adverse reactions or side effects. She rarely uses her albuterol inhaler                                                                                                        .  DEPRESSION - Patient has a long history of Depression of moderate severity requiring medication for control with recent symptoms being stable..Current symptoms of depression include none.                                                                                                                                                                                    .  DIABETES - Patient has a longstanding history of DiabetesType Type II . Patient is being treated with insulin injections and denies significant side effects. Control has been good. Complicating factors include but are not limited to: hypertension, hyperlipidemia and chronic kidney disease.                                                                                                                              .  HYPERLIPIDEMIA - Patient has a long history of significant Hyperlipidemia requiring medication for treatment with recent good control. Patient reports no problems or side effects with the medication.                                                                                                                                                        .  HYPERTENSION - Patient has longstanding history of HTN , currently denies any symptoms referable to elevated blood pressure. Specifically denies chest pain, palpitations, dyspnea, orthopnea, PND or peripheral edema. Blood pressure readings have been in normal range. Current medication regimen is as listed below. Patient denies any side effects of medication.                                                                                                                                                                                            .  HYPOTHYROIDISM - Patient has a longstanding history of chronic Hypothyroidism. Patient has been doing well, noting no tremor, insomnia, hair loss or changes in skin texture. Continues to take medications as directed, without adverse reactions or side effects. Last TSH   Lab Results   Component Value Date    TSH 1.18 04/20/2018   .                                                                                                                                                                                                                        .  RENAL INSUFFICIENCY - Patient has a longstanding history of moderate-severe chronic renal insufficiency. Last Cr 1.28.                                                                                                                                                                               .    MEDICAL HISTORY:     Patient Active Problem List    Diagnosis Date Noted     Health Care Home 06/17/2011     Priority: High     X  Unable to contact  DX V65.8 REPLACED WITH [id number] [facility]ALTH CARE HOME (04/08/2013)       Chronic obstructive pulmonary disease, unspecified COPD type (H) 05/01/2018     Priority: Medium     Morbid obesity (H) 09/26/2017     Priority: Medium     Thrombocytopenia (H) 09/26/2017     Priority:  Medium     Osteoporosis 03/20/2017     Priority: Medium     Type 2 diabetes mellitus with other diabetic kidney complication, with long-term current use of insulin (H) 03/03/2017     Priority: Medium     CKD (chronic kidney disease) stage 3, GFR 30-59 ml/min (H) 02/22/2017     Priority: Medium     [facility] GFR has been below 60.       ACP (advance care planning) 06/09/2011     Priority: Medium     Patient states has Advance Directive and will bring in a copy to clinic. Anh Medina MA 6/9/2011        Hypothyroidism 04/28/2011     Priority: Medium     Hypertension goal BP (blood pressure) < 140/90 04/28/2011     Priority: Medium     Type 2 diabetes, HbA1C goal < 8% (H) 04/28/2011     Priority: Medium     Hyperlipidemia LDL goal <100 04/28/2011     Priority: Medium     Moderate major depression (H) 04/28/2011     Priority: Medium     Incontinence of urine 04/28/2011     Priority: Medium     Neuropathy in diabetes (H) 04/28/2011     Priority: Medium     Eczema 04/28/2011     Priority: Medium     ear       Left lumbar radiculopathy 04/28/2011     Priority: Medium      Past Medical History:   Diagnosis Date     Arthritis 1975     Broken ribs 8/28/2016    3,4,5,6,7, left side     Cancer (H) 2013    Skin cancer     COPD (chronic obstructive pulmonary disease) (H) 08/2017     Depression      Depressive disorder 1988     Dysuria      Glycosuria      Hypertension 1980     Mixed incontinence urge and stress      Other and unspecified hyperlipidemia      Right shoulder pain      Type II or unspecified type diabetes mellitus without mention of complication, uncontrolled      Uncomplicated asthma 2017     Unspecified hypothyroidism      Past Surgical History:   Procedure Laterality Date     ABDOMEN SURGERY  1978    Gall Bladder  January       hysterectomy  Sept     BIOPSY  2013 1993    from left corner of mouth       muscule biopsy     C VAGINAL HYSTERECTOMY        CATARACT IOL, RT/LT Bilateral     2006     CHOLECYSTECTOMY, OPEN       CL AFF SURGICAL PATHOLOGY       COLONOSCOPY  2007     COLONOSCOPY N/A 6/22/2018    Procedure: COMBINED COLONOSCOPY, SINGLE OR MULTIPLE BIOPSY/POLYPECTOMY BY BIOPSY;;  Surgeon: David Parson MD;  Location: [facility] OR     COLONOSCOPY WITH CO2 INSUFFLATION N/A 6/22/2018    Procedure: COLONOSCOPY WITH CO2 INSUFFLATION;  Colonscopy, ;  Surgeon: David Parson MD;  Location: [facility] OR     EYE SURGERY  2014    ongoing     EYE SURGERY Right 2018     HEAD & NECK SURGERY  2006    Removal of saliva gland left side     PROPHYLACTIC REPAIR RETINAL BREAK, PNEUMATIC RETINOPEXY, COMBINED Left 9/2/2016     SOFT TISSUE SURGERY  1996    Carpal Tunnel left wrist     Current Outpatient Prescriptions   Medication Sig Dispense Refill     ACE/ARB NOT PRESCRIBED, INTENTIONAL, Please choose reason not prescribed, below       ACETAMINOPHEN PO Take 500 mg by mouth 2 times daily 2 tablets twice daily       albuterol (2.5 MG/3ML) 0.083% neb solution Take 1 vial (2.5 mg) by nebulization every 6 hours as needed for shortness of breath / dyspnea or wheezing 1 vial used in clinic today. 1 vial 1     alendronate (FOSAMAX) 70 MG tablet TAKE 1 TABLET (70 MG) BY MOUTH EVERY 7 DAYS. TAKE 60 MINUTES BEFORE MORNING MEAL WITH 8 OZ. WATER. REMAIN UPRIGHT FOR 30 MINUTES. 12 tablet 3     amLODIPine (NORVASC) 10 MG tablet TAKE 1 TABLET (10 MG) BY MOUTH DAILY 90 tablet 0     ASPIRIN 81 MG OR TABS ONE DAILY*  3     atorvastatin (LIPITOR) 40 MG tablet Take 1 tablet (40 mg) by mouth daily 90 tablet 3     blood glucose monitoring (MUSA CONTOUR NEXT) test strip Use to test blood sugar 3 times daily or as directed. 100 strip 11     blood glucose monitoring (MUSA MICROLET) lancets Use to test blood sugar 3 times daily or as directed. 100 Box 11     carvedilol (COREG) 25 MG tablet TAKE 1 TABLET (25 MG) BY MOUTH 2 TIMES DAILY WITH MEALS 180 tablet 0     clobetasol propionate 0.05 %  "LIQD Externally apply 1 dose. topically 2 times daily as needed 1 Bottle 5     fenofibrate 54 MG tablet Take 1 tablet (54 mg) by mouth daily 90 tablet 3     insulin aspart (NOVOLOG FLEXPEN) 100 UNIT/ML injection 22 units before breakfast, 26 units before lunch, 20 units before dinner 18 mL 1     insulin detemir (LEVEMIR FLEXTOUCH) 100 UNIT/ML injection Inject 41 Units Subcutaneous 2 times daily 35 mL 1     insulin pen needle 32G X 4 MM Use 5 pen needles daily or as directed.  Per insurance and patient preference 450 each 3     Levothyroxine Sodium 150 MCG CAPS Take 150 mcg by mouth daily (with breakfast) 90 capsule 3     losartan (COZAAR) 100 MG tablet Take 1 tablet (100 mg) by mouth daily 90 tablet 1     PARoxetine (PAXIL) 20 MG tablet Take 1 tablet (20 mg) by mouth 2 times daily 180 tablet 0     primidone (MYSOLINE) 50 MG tablet TAKE ONE TABLET(S) BY MOUTH TWICE DAILY 180 tablet 1     OTC products: None, except as noted above    No Known Allergies   Latex Allergy: NO    Social History   Substance Use Topics     Smoking status: Former Smoker     Packs/day: 0.50     Years: 50.00     Types: Cigarettes     Start date: 1/1/1967     Quit date: 8/8/2017     Smokeless tobacco: Never Used      Comment: former smoker     Alcohol use Yes      Comment: Maybe a drink every few months     History   Drug Use No       REVIEW OF SYSTEMS:   Constitutional, neuro, ENT, endocrine, pulmonary, cardiac, gastrointestinal, genitourinary, musculoskeletal, integument and psychiatric systems are negative, except as otherwise noted.    EXAM:   /57  Pulse 64  Temp 97.7  F (36.5  C) (Oral)  Resp 17  Ht 5' 8.25\" (1.734 m)  Wt 259 lb (117.5 kg)  SpO2 97%  BMI 39.09 kg/m2    GENERAL APPEARANCE: healthy, alert and no distress     EYES: EOMI, PERRL     HENT: ear canals and TM's normal and nose and mouth without ulcers or lesions     NECK: no adenopathy, no asymmetry, masses, or scars and thyroid normal to palpation     RESP: lungs clear " to auscultation - no rales, rhonchi or wheezes     CV: regular rates and rhythm, normal S1 S2, no S3 or S4 and no murmur, click or rub     ABDOMEN:  soft, nontender, no HSM or masses and bowel sounds normal     MS: extremities normal- no gross deformities noted, no evidence of inflammation in joints, FROM in all extremities.     PSYCH: mentation appears normal. and affect normal/bright    DIAGNOSTICS:   EKG: appears normal, NSR, normal axis, normal intervals, no acute ST/T changes c/w ischemia, no LVH by voltage criteria, unchanged from previous tracings, left ant fasicular block  Chest XRay : normal    Echo: normal echo  10/18    A1c = 7.7    Last Comprehensive Metabolic Panel:  Sodium   Date Value Ref Range Status   10/19/2018 139 133 - 144 mmol/L Final     Potassium   Date Value Ref Range Status   10/19/2018 4.9 3.4 - 5.3 mmol/L Final     Chloride   Date Value Ref Range Status   10/19/2018 102 94 - 109 mmol/L Final     Carbon Dioxide   Date Value Ref Range Status   10/19/2018 31 20 - 32 mmol/L Final     Anion Gap   Date Value Ref Range Status   10/19/2018 6 3 - 14 mmol/L Final     Glucose   Date Value Ref Range Status   10/19/2018 195 (H) 70 - 99 mg/dL Final     Comment:     Non Fasting     Urea Nitrogen   Date Value Ref Range Status   10/19/2018 29 7 - 30 mg/dL Final     Creatinine   Date Value Ref Range Status   10/19/2018 1.28 (H) 0.52 - 1.04 mg/dL Final     GFR Estimate   Date Value Ref Range Status   10/19/2018 41 (L) >60 mL/min/1.7m2 Final     Comment:     Non  GFR Calc     Calcium   Date Value Ref Range Status   10/19/2018 9.1 8.5 - 10.1 mg/dL Final           Lab Results   Component Value Date    WBC 7.0 09/25/2017     Lab Results   Component Value Date    RBC 3.85 09/25/2017     Lab Results   Component Value Date    HGB 12.9 12/06/2017     Lab Results   Component Value Date    HCT 37.8 09/25/2017     No components found for: MCT  Lab Results   Component Value Date    MCV 98 09/25/2017      Lab Results   Component Value Date    MCH 33.8 09/25/2017     Lab Results   Component Value Date    MCHC 34.4 09/25/2017     Lab Results   Component Value Date    RDW 12.9 09/25/2017     Lab Results   Component Value Date     09/25/2017         IMPRESSION:   Reason for surgery/procedure: fusion of L4-L5 and removal of lumbar cyst    The proposed surgical procedure is considered INTERMEDIATE risk.    REVISED CARDIAC RISK INDEX  The patient has the following serious cardiovascular risks for perioperative complications such as (MI, PE, VFib and 3  AV Block):  Diabetes Mellitus (on Insulin)  INTERPRETATION: 1 risks: Class II (low risk - 0.9% complication rate)    The patient has the following additional risks for perioperative complications:  Morbid obesity  Insulin dependent diabetes      ICD-10-CM    1. Preop general physical exam Z01.818 XR Chest 2 Views     CANCELED: Echocardiogram Complete   2. Chronic bilateral low back pain with left-sided sciatica M54.42     G89.29    3. Synovial cyst of lumbar spine M71.38    4. Type 2 diabetes mellitus with other diabetic kidney complication, with long-term current use of insulin (H) E11.29     Z79.4    5. Chronic obstructive pulmonary disease, unspecified COPD type (H) J44.9    6. CKD (chronic kidney disease) stage 3, GFR 30-59 ml/min (H) N18.3        RECOMMENDATIONS:     --Consult hospital rounder / IM to assist post-op medical management      Pulmonary Risk  Incentive spirometry post op      --Patient is to take all scheduled medications on the day of surgery EXCEPT for modifications listed below.    Diabetes Medication Use    -----Take 80% of long acting insulin (e.g. Lantus, NPH) while NPO (fasting)  -----Hold short acting insulin (e.g. Novolog, Humalog) while NPO (fasting)      ACE Inhibitor or Angiotensin Receptor Blocker (ARB) Use  Ace inhibitor or Angiotensin Receptor Blocker (ARB) and should HOLD this medication for the 24 hours prior to  surgery.      APPROVAL GIVEN to proceed with proposed procedure, without further diagnostic evaluation       Signed Electronically by: Sandy Otoole MD    Copy of this evaluation report is provided to requesting physician.    [facility] Preop Guidelines    Revised Cardiac Risk Index

## 2018-10-17 NOTE — PROGRESS NOTES
Patient has a lab appointment on 10/19/2018. Per the lab schedule scrubbing protocol they are not due for anything. Please review chart and send orders or let patient know appointment is not needed.    Thank you,  Joya Alaniz

## 2018-10-18 NOTE — PROGRESS NOTES
Please review and ADD any Labs wanted for Pre-op Apt. Nothing due at this   sign  Pre-visit  Labs 10/19/18 and Pre-op 10/22/18  Natalie POOLE

## 2018-10-19 ENCOUNTER — ALLIED HEALTH/NURSE VISIT (OUTPATIENT)
Dept: NURSING | Facility: CLINIC | Age: 68
End: 2018-10-19
Payer: COMMERCIAL

## 2018-10-19 DIAGNOSIS — Z23 NEED FOR PROPHYLACTIC VACCINATION AND INOCULATION AGAINST INFLUENZA: Primary | ICD-10-CM

## 2018-10-19 DIAGNOSIS — E11.29 TYPE 2 DIABETES MELLITUS WITH OTHER DIABETIC KIDNEY COMPLICATION, WITH LONG-TERM CURRENT USE OF INSULIN (H): ICD-10-CM

## 2018-10-19 DIAGNOSIS — Z79.4 TYPE 2 DIABETES MELLITUS WITH OTHER DIABETIC KIDNEY COMPLICATION, WITH LONG-TERM CURRENT USE OF INSULIN (H): ICD-10-CM

## 2018-10-19 LAB
ANION GAP SERPL CALCULATED.3IONS-SCNC: 6 MMOL/L (ref 3–14)
BUN SERPL-MCNC: 29 MG/DL (ref 7–30)
CALCIUM SERPL-MCNC: 9.1 MG/DL (ref 8.5–10.1)
CHLORIDE SERPL-SCNC: 102 MMOL/L (ref 94–109)
CO2 SERPL-SCNC: 31 MMOL/L (ref 20–32)
CREAT SERPL-MCNC: 1.28 MG/DL (ref 0.52–1.04)
GFR SERPL CREATININE-BSD FRML MDRD: 41 ML/MIN/1.7M2
GLUCOSE SERPL-MCNC: 195 MG/DL (ref 70–99)
HBA1C MFR BLD: 7.7 % (ref 0–5.6)
POTASSIUM SERPL-SCNC: 4.9 MMOL/L (ref 3.4–5.3)
SODIUM SERPL-SCNC: 139 MMOL/L (ref 133–144)

## 2018-10-19 PROCEDURE — 90662 IIV NO PRSV INCREASED AG IM: CPT

## 2018-10-19 PROCEDURE — 99207 ZZC NO CHARGE NURSE ONLY: CPT

## 2018-10-19 PROCEDURE — 80048 BASIC METABOLIC PNL TOTAL CA: CPT | Performed by: FAMILY MEDICINE

## 2018-10-19 PROCEDURE — 36415 COLL VENOUS BLD VENIPUNCTURE: CPT | Performed by: FAMILY MEDICINE

## 2018-10-19 PROCEDURE — 83036 HEMOGLOBIN GLYCOSYLATED A1C: CPT | Performed by: FAMILY MEDICINE

## 2018-10-19 PROCEDURE — G0008 ADMIN INFLUENZA VIRUS VAC: HCPCS

## 2018-10-19 NOTE — MR AVS SNAPSHOT
After Visit Summary   10/19/2018    Yadi Iniguez    MRN: [id number]           Patient Information     Date Of Birth          1950        Visit Information        Provider Department      10/19/2018 10:50 AM AN ANCILLARY [facility]        Today's Diagnoses     Need for prophylactic vaccination and inoculation against influenza    -  1       Follow-ups after your visit        Your next 10 appointments already scheduled     Oct 22, 2018  9:40 AM CDT   Pre-Op physical with Sandy Root MD   [facility] ([facility]    [address] [facility] [id number] [phone]            Nov 05, 2018   Procedure with Hernando Hill MD   [facility] (--)    [address] [facility] [address]   [phone]            Dec 18, 2018 10:00 AM CST   Return Visit with Nancy Miller NP   [facility] ([facility]    [address] [facility] [id number] [phone]              Who to contact     If you have questions or need follow up information about today's clinic visit or your schedule please contact [facility] directly at [phone].  Normal or non-critical lab and imaging results will be communicated to you by IDMissionhart, letter or phone within 4 business days after the clinic has received the results. If you do not hear from us within 7 days, please contact the clinic through IDMissionhart or phone. If you have a critical or abnormal lab result, we will notify you by phone as soon as possible.  Submit refill requests through SchemaLogic or call your pharmacy and they will forward the refill request to us. Please allow 3 business days for your refill to be completed.          Additional Information About Your Visit        [employer]Mission[employer] Information     SchemaLogic gives you secure access to your electronic health record. If you see a primary care provider, you can also send messages to  your care team and make appointments. If you have questions, please call your primary care clinic.  If you do not have a primary care provider, please call [phone] and they will assist you.        Care EveryWhere ID     This is your Care EveryWhere ID. This could be used by other organizations to access your [place] medical records  [id number]         Blood Pressure from Last 3 Encounters:   10/02/18 (!) 172/93   08/24/18 120/60   06/22/18 133/56    Weight from Last 3 Encounters:   08/24/18 256 lb (116.1 kg)   05/25/18 257 lb (116.6 kg)   04/25/18 259 lb (117.5 kg)              We Performed the Following     FLU VACCINE, INCREASED ANTIGEN, PRESV FREE, AGE 65+ [06102]     Vaccine Administration, Initial [11309]        Primary Care Provider Office Phone # Fax #    Sandy Root [phone] [address] [facility] [address]        Equal Access to Services     MERRILL DAVIS : Hadii aad ku hadasho Soomaali, waaxda luqadaha, qaybta kaalmada adeegyada, waxay idiin hayshayen luis piper . So [facility] [phone].    ATENCIÓN: Si habla español, tiene a dunbar disposición servicios gratuitos de asistencia lingüística. [facility] [phone].    We comply with applicable federal civil rights laws and [place] laws. We do not discriminate on the basis of race, color, national origin, age, disability, sex, sexual orientation, or gender identity.            Thank you!     Thank you for choosing [facility]  for your care. Our goal is always to provide you with excellent care. Hearing back from our patients is one way we can continue to improve our services. Please take a few minutes to complete the written survey that you may receive in the mail after your visit with us. Thank you!             Your Updated Medication List - Protect others around you: Learn how to safely use, store and throw away your medicines at www.disposemymeds.org.          This list is accurate as of 10/19/18  11:43 AM.  Always use your most recent med list.                   Brand Name Dispense Instructions for use Diagnosis    ACE/ARB/ARNI NOT PRESCRIBED (INTENTIONAL)      Please choose reason not prescribed, below    Benign essential hypertension       ACETAMINOPHEN PO      Take 500 mg by mouth 2 times daily 2 tablets twice daily        albuterol (2.5 MG/3ML) 0.083% neb solution     1 vial    Take 1 vial (2.5 mg) by nebulization every 6 hours as needed for shortness of breath / dyspnea or wheezing 1 vial used in clinic today.    Chronic obstructive pulmonary disease, unspecified COPD type (H)       alendronate 70 MG tablet    FOSAMAX    12 tablet    TAKE 1 TABLET (70 MG) BY MOUTH EVERY 7 DAYS. TAKE 60 MINUTES BEFORE MORNING MEAL WITH 8 OZ. WATER. REMAIN UPRIGHT FOR 30 MINUTES.    Osteoporosis, unspecified osteoporosis type, unspecified pathological fracture presence       amLODIPine 10 MG tablet    NORVASC    90 tablet    TAKE 1 TABLET (10 MG) BY MOUTH DAILY    Benign essential hypertension       ascorbic acid 1000 MG Tabs    vitamin C     1 TABLET DAILY AT DINNER        aspirin 81 MG tablet      ONE DAILY*        atorvastatin 40 MG tablet    LIPITOR    90 tablet    Take 1 tablet (40 mg) by mouth daily    Hyperlipidemia, unspecified hyperlipidemia type       blood glucose monitoring lancets     100 Box    Use to test blood sugar 3 times daily or as directed.    Type 2 diabetes mellitus with other diabetic kidney complication, with long-term current use of insulin (H)       blood glucose monitoring test strip    MUSA CONTOUR NEXT    100 strip    Use to test blood sugar 3 times daily or as directed.    Type 2 diabetes mellitus with other diabetic kidney complication, with long-term current use of insulin (H)       carvedilol 25 MG tablet    COREG    180 tablet    TAKE 1 TABLET (25 MG) BY MOUTH 2 TIMES DAILY WITH MEALS    Benign essential hypertension       CENTRUM SILVER per tablet      1 TABLET DAILY        clobetasol  propionate 0.05 % Liqd     1 Bottle    Externally apply 1 dose. topically 2 times daily as needed    Skin rash       fenofibrate 54 MG tablet     90 tablet    Take 1 tablet (54 mg) by mouth daily    Hyperlipidemia, unspecified hyperlipidemia type       insulin aspart 100 UNIT/ML injection    NovoLOG FLEXPEN    18 mL    22 units before breakfast, 26 units before lunch, 20 units before dinner    Type 2 diabetes mellitus with other diabetic kidney complication, with long-term current use of insulin (H)       insulin detemir 100 UNIT/ML injection    LEVEMIR FLEXTOUCH    35 mL    Inject 41 Units Subcutaneous 2 times daily    Type 2 diabetes mellitus with other diabetic kidney complication, with long-term current use of insulin (H)       insulin pen needle 32G X 4 MM     450 each    Use 5 pen needles daily or as directed.  Per insurance and patient preference    Type 2 diabetes mellitus with other diabetic kidney complication, with long-term current use of insulin (H)       Levothyroxine Sodium 150 MCG Caps     90 capsule    Take 150 mcg by mouth daily (with breakfast)    Hypothyroidism, unspecified type       losartan 100 MG tablet    COZAAR    90 tablet    Take 1 tablet (100 mg) by mouth daily    Hypertension goal BP (blood pressure) < 140/90       MAGNESIUM PO      Take 250 mg by mouth daily        methocarbamol 500 MG tablet    ROBAXIN    30 tablet    Take 2 tablets (1,000 mg) by mouth 3 times daily as needed for muscle spasms    Iliotibial band syndrome, unspecified laterality       OMEGA-3 FISH OIL PO      Take 1 g by mouth        PARoxetine 20 MG tablet    PAXIL    180 tablet    Take 1 tablet (20 mg) by mouth 2 times daily    Moderate major depression (H)       primidone 50 MG tablet    MYSOLINE    180 tablet    TAKE ONE TABLET(S) BY MOUTH TWICE DAILY    Essential tremor       VITAMIN D3 PO      Take 10,000 Units by mouth daily        vitamin E 400 units Tabs      1 tab a day

## 2018-10-19 NOTE — PROGRESS NOTES

## 2018-10-22 ENCOUNTER — RADIANT APPOINTMENT (OUTPATIENT)
Dept: GENERAL RADIOLOGY | Facility: CLINIC | Age: 68
End: 2018-10-22
Attending: FAMILY MEDICINE
Payer: COMMERCIAL

## 2018-10-22 ENCOUNTER — OFFICE VISIT (OUTPATIENT)
Dept: FAMILY MEDICINE | Facility: CLINIC | Age: 68
End: 2018-10-22
Payer: COMMERCIAL

## 2018-10-22 VITALS
HEART RATE: 64 BPM | SYSTOLIC BLOOD PRESSURE: 115 MMHG | RESPIRATION RATE: 17 BRPM | WEIGHT: 259 LBS | TEMPERATURE: 97.7 F | DIASTOLIC BLOOD PRESSURE: 57 MMHG | BODY MASS INDEX: 39.25 KG/M2 | OXYGEN SATURATION: 97 % | HEIGHT: 68 IN

## 2018-10-22 DIAGNOSIS — Z01.818 PREOP GENERAL PHYSICAL EXAM: Primary | ICD-10-CM

## 2018-10-22 DIAGNOSIS — G89.29 CHRONIC BILATERAL LOW BACK PAIN WITH LEFT-SIDED SCIATICA: ICD-10-CM

## 2018-10-22 DIAGNOSIS — Z79.4 TYPE 2 DIABETES MELLITUS WITH OTHER DIABETIC KIDNEY COMPLICATION, WITH LONG-TERM CURRENT USE OF INSULIN (H): ICD-10-CM

## 2018-10-22 DIAGNOSIS — E11.29 TYPE 2 DIABETES MELLITUS WITH OTHER DIABETIC KIDNEY COMPLICATION, WITH LONG-TERM CURRENT USE OF INSULIN (H): ICD-10-CM

## 2018-10-22 DIAGNOSIS — M54.42 CHRONIC BILATERAL LOW BACK PAIN WITH LEFT-SIDED SCIATICA: ICD-10-CM

## 2018-10-22 DIAGNOSIS — N18.30 CKD (CHRONIC KIDNEY DISEASE) STAGE 3, GFR 30-59 ML/MIN (H): ICD-10-CM

## 2018-10-22 DIAGNOSIS — Z01.818 PREOP GENERAL PHYSICAL EXAM: ICD-10-CM

## 2018-10-22 DIAGNOSIS — Z01.812 PRE-OPERATIVE LABORATORY EXAMINATION: ICD-10-CM

## 2018-10-22 DIAGNOSIS — J44.9 CHRONIC OBSTRUCTIVE PULMONARY DISEASE, UNSPECIFIED COPD TYPE (H): ICD-10-CM

## 2018-10-22 DIAGNOSIS — M71.38 SYNOVIAL CYST OF LUMBAR SPINE: ICD-10-CM

## 2018-10-22 LAB
MRSA DNA SPEC QL NAA+PROBE: NEGATIVE
SPECIMEN SOURCE: NORMAL

## 2018-10-22 PROCEDURE — 71046 X-RAY EXAM CHEST 2 VIEWS: CPT | Mod: FY

## 2018-10-22 PROCEDURE — 87640 STAPH A DNA AMP PROBE: CPT | Performed by: NEUROLOGICAL SURGERY

## 2018-10-22 PROCEDURE — 99215 OFFICE O/P EST HI 40 MIN: CPT | Performed by: FAMILY MEDICINE

## 2018-10-22 NOTE — MR AVS SNAPSHOT
After Visit Summary   10/22/2018    Yadi Iniguez    MRN: [id number]           Patient Information     Date Of Birth          1950        Visit Information        Provider Department      10/22/2018 9:40 AM Sandy Root MD [facility]        Today's Diagnoses     Preop general physical exam    -  1    Chronic bilateral low back pain with left-sided sciatica        Synovial cyst of lumbar spine        Type 2 diabetes mellitus with other diabetic kidney complication, with long-term current use of insulin (H)        Chronic obstructive pulmonary disease, unspecified COPD type (H)        CKD (chronic kidney disease) stage 3, GFR 30-59 ml/min (H)          Care Instructions      Before Your Surgery      Call your surgeon if there is any change in your health. This includes signs of a cold or flu (such as a sore throat, runny nose, cough, rash or fever).    Do not smoke, drink alcohol or take over the counter medicine (unless your surgeon or primary care doctor tells you to) for the 24 hours before and after surgery.    If you take prescribed drugs: Follow your doctor s orders about which medicines to take and which to stop until after surgery.    Eating and drinking prior to surgery: follow the instructions from your surgeon    Take a shower or bath the night before surgery. Use the soap your surgeon gave you to gently clean your skin. If you do not have soap from your surgeon, use your regular soap. Do not shave or scrub the surgery site.  Wear clean pajamas and have clean sheets on your bed.     Before Your Surgery      Call your surgeon if there is any change in your health. This includes signs of a cold or flu (such as a sore throat, runny nose, cough, rash or fever).    Do not smoke, drink alcohol or take over the counter medicine (unless your surgeon or primary care doctor tells you to) for the 24 hours before and after surgery.    If you take prescribed drugs: Follow your doctor s  orders about which medicines to take and which to stop until after surgery.    Eating and drinking prior to surgery: follow the instructions from your surgeon    Take a shower or bath the night before surgery. Use the soap your surgeon gave you to gently clean your skin. If you do not have soap from your surgeon, use your regular soap. Do not shave or scrub the surgery site.  Wear clean pajamas and have clean sheets on your bed.           Follow-ups after your visit        Follow-up notes from your care team     Return in about 3 months (around 1/22/2019) for Diabetes.      Your next 10 appointments already scheduled     Nov 05, 2018   Procedure with Hernando Hill MD   [facility] (--)    [address] [facility] [address]   [phone]            Dec 18, 2018 10:00 AM CST   Return Visit with Nancy Miller NP   [facility] ([address] [address] [phone]              Who to contact     If you have questions or need follow up information about today's clinic visit or your schedule please contact [facility] directly at [phone].  Normal or non-critical lab and imaging results will be communicated to you by Zocerehart, letter or phone within 4 business days after the clinic has received the results. If you do not hear from us within 7 days, please contact the clinic through Zocerehart or phone. If you have a critical or abnormal lab result, we will notify you by phone as soon as possible.  Submit refill requests through eFuelDepot or call your pharmacy and they will forward the refill request to us. Please allow 3 business days for your refill to be completed.          Additional Information About Your Visit        [employer] Information     eFuelDepot gives you secure access to your electronic health record. If you see a primary care provider, you can also send messages to your care team and make  "appointments. If you have questions, please call your primary care clinic.  If you do not have a primary care provider, please call [phone] and they will assist you.        Care EveryWhere ID     This is your Care EveryWhere ID. This could be used by other organizations to access your [place] medical records  [id number]        Your Vitals Were     Pulse Temperature Respirations Height Pulse Oximetry BMI (Body Mass Index)    64 97.7  F (36.5  C) (Oral) 17 5' 8.25\" (1.734 m) 97% 39.09 kg/m2       Blood Pressure from Last 3 Encounters:   10/22/18 115/57   10/02/18 (!) 172/93   08/24/18 120/60    Weight from Last 3 Encounters:   10/22/18 259 lb (117.5 kg)   08/24/18 256 lb (116.1 kg)   05/25/18 257 lb (116.6 kg)               Primary Care Provider Office Phone # Fax #    Sandy Root [phone] [address] [facility] [address]        Equal Access to Services     [facility]: Hadii bunny marques hadashjacinta Socarter, waaxda luqadaha, qaybta kaalmamartir arce, chandra piper . So [facility] [phone].    ATENCIÓN: Si habla español, tiene a dunbar disposición servicios gratuitos de asistencia lingüística. [facility] [phone].    We comply with applicable federal civil rights laws and [place] laws. We do not discriminate on the basis of race, color, national origin, age, disability, sex, sexual orientation, or gender identity.            Thank you!     Thank you for choosing [facility]  for your care. Our goal is always to provide you with excellent care. Hearing back from our patients is one way we can continue to improve our services. Please take a few minutes to complete the written survey that you may receive in the mail after your visit with us. Thank you!             Your Updated Medication List - Protect others around you: Learn how to safely use, store and throw away your medicines at www.disposemymeds.org.          This list is accurate as of " 10/22/18 11:59 PM.  Always use your most recent med list.                   Brand Name Dispense Instructions for use Diagnosis    ACE/ARB/ARNI NOT PRESCRIBED (INTENTIONAL)      Please choose reason not prescribed, below    Benign essential hypertension       ACETAMINOPHEN PO      Take 500 mg by mouth 2 times daily 2 tablets twice daily        albuterol (2.5 MG/3ML) 0.083% neb solution     1 vial    Take 1 vial (2.5 mg) by nebulization every 6 hours as needed for shortness of breath / dyspnea or wheezing 1 vial used in clinic today.    Chronic obstructive pulmonary disease, unspecified COPD type (H)       alendronate 70 MG tablet    FOSAMAX    12 tablet    TAKE 1 TABLET (70 MG) BY MOUTH EVERY 7 DAYS. TAKE 60 MINUTES BEFORE MORNING MEAL WITH 8 OZ. WATER. REMAIN UPRIGHT FOR 30 MINUTES.    Osteoporosis, unspecified osteoporosis type, unspecified pathological fracture presence       amLODIPine 10 MG tablet    NORVASC    90 tablet    TAKE 1 TABLET (10 MG) BY MOUTH DAILY    Benign essential hypertension       aspirin 81 MG tablet      ONE DAILY*        atorvastatin 40 MG tablet    LIPITOR    90 tablet    Take 1 tablet (40 mg) by mouth daily    Hyperlipidemia, unspecified hyperlipidemia type       blood glucose monitoring lancets     100 Box    Use to test blood sugar 3 times daily or as directed.    Type 2 diabetes mellitus with other diabetic kidney complication, with long-term current use of insulin (H)       blood glucose monitoring test strip    MUSA CONTOUR NEXT    100 strip    Use to test blood sugar 3 times daily or as directed.    Type 2 diabetes mellitus with other diabetic kidney complication, with long-term current use of insulin (H)       carvedilol 25 MG tablet    COREG    180 tablet    TAKE 1 TABLET (25 MG) BY MOUTH 2 TIMES DAILY WITH MEALS    Benign essential hypertension       clobetasol propionate 0.05 % Liqd     1 Bottle    Externally apply 1 dose. topically 2 times daily as needed    Skin rash        fenofibrate 54 MG tablet     90 tablet    Take 1 tablet (54 mg) by mouth daily    Hyperlipidemia, unspecified hyperlipidemia type       insulin aspart 100 UNIT/ML injection    NovoLOG FLEXPEN    18 mL    22 units before breakfast, 26 units before lunch, 20 units before dinner    Type 2 diabetes mellitus with other diabetic kidney complication, with long-term current use of insulin (H)       insulin detemir 100 UNIT/ML injection    LEVEMIR FLEXTOUCH    35 mL    Inject 41 Units Subcutaneous 2 times daily    Type 2 diabetes mellitus with other diabetic kidney complication, with long-term current use of insulin (H)       insulin pen needle 32G X 4 MM     450 each    Use 5 pen needles daily or as directed.  Per insurance and patient preference    Type 2 diabetes mellitus with other diabetic kidney complication, with long-term current use of insulin (H)       Levothyroxine Sodium 150 MCG Caps     90 capsule    Take 150 mcg by mouth daily (with breakfast)    Hypothyroidism, unspecified type       losartan 100 MG tablet    COZAAR    90 tablet    Take 1 tablet (100 mg) by mouth daily    Hypertension goal BP (blood pressure) < 140/90       PARoxetine 20 MG tablet    PAXIL    180 tablet    Take 1 tablet (20 mg) by mouth 2 times daily    Moderate major depression (H)       primidone 50 MG tablet    MYSOLINE    180 tablet    TAKE ONE TABLET(S) BY MOUTH TWICE DAILY    Essential tremor

## 2018-10-23 NOTE — TELEPHONE ENCOUNTER
Sent PlateJoy message to patient, needs to schedule with Dr. Craft, CT and PFT prior; patient having surgery 11/5/18.    Lily Rodriguez  [facility]~Specialty/Med Surg   [phone]

## 2018-10-24 ENCOUNTER — RADIANT APPOINTMENT (OUTPATIENT)
Dept: CARDIOLOGY | Facility: CLINIC | Age: 68
End: 2018-10-24
Attending: FAMILY MEDICINE
Payer: COMMERCIAL

## 2018-10-24 DIAGNOSIS — Z01.818 PREOP GENERAL PHYSICAL EXAM: ICD-10-CM

## 2018-10-24 PROCEDURE — 93306 TTE W/DOPPLER COMPLETE: CPT | Mod: GC | Performed by: INTERNAL MEDICINE

## 2018-10-24 PROCEDURE — 40000264 ZZHC STATISTIC IV PUSH SINGLE INITIAL SUBSTANCE: Performed by: INTERNAL MEDICINE

## 2018-10-24 RX ADMIN — Medication 7 ML: at 16:30

## 2018-10-30 NOTE — PROGRESS NOTES
I did not fax the Pre Op because surgery is at [facility].  Oralia Kong,

## 2018-10-31 NOTE — H&P (VIEW-ONLY)
[facility]  [address] [facility] [address] [phone]  Dept: [phone]    PRE-OP EVALUATION:  Today's date: 10/22/2018    Yadi Iniguez (: 1950) presents for pre-operative evaluation assessment as requested by Dr. Hill.  She requires evaluation and anesthesia risk assessment prior to undergoing surgery/procedure for treatment of back pain .    Fax number for surgical facility: [phone]  Primary Physician: Sandy Root  Type of Anesthesia Anticipated: General    Patient has a Health Care Directive or Living Will:  YES     Preop Questions 10/18/2018   Who is doing your surgery? Dr. Hill   What are you having done? Spinal Fusion, cycst removal and 2 pins added   Date of Surgery/Procedure: 2018   Facility or Hospital where procedure/surgery will be performed: [facility]   1.  Do you have a history of Heart attack, stroke, stent, coronary bypass surgery, or other heart surgery? No   2.  Do you ever have any pain or discomfort in your chest? No   3.  Do you have a history of  Heart Failure? No   4.   Are you troubled by shortness of breath when:  walking on a level surface, or up a slight hill, or at night? YES - Pt is able to walk up a flight of stairs although limited by back pain and some sob   5.  Do you currently have a cold, bronchitis or other respiratory infection? No   6.  Do you have a cough, shortness of breath, or wheezing? No   7.  Do you sometimes get pains in the calves of your legs when you walk? YES - due to left sided sciatica   8. Do you or anyone in your family have previous history of blood clots? No   9.  Do you or does anyone in your family have a serious bleeding problem such as prolonged bleeding following surgeries or cuts? No   10. Have you ever had problems with anemia or been told to take iron pills? No   11. Have you had any abnormal blood loss such as black, tarry or bloody stools, or abnormal vaginal bleeding? No    12. Have you ever had a blood transfusion? No   13. Have you or any of your relatives ever had problems with anesthesia? No   14. Do you have sleep apnea, excessive snoring or daytime drowsiness? YES - daytime drowsiness   15. Do you have any prosthetic heart valves? No   16. Do you have prosthetic joints? No   17. Is there any chance that you may be pregnant? No         HPI:     HPI related to upcoming procedure: Pt to undergo surgical treatment of lumbar fusion of L4-L5 and cyst removal due to bilateral chronic low back pain with left sided sciatica      COPD - Patient has a longstanding history of moderate-severe COPD . Patient has been doing well overall noting NO SYMPTOMS and continues on medication regimen consisting of albuteral meds without adverse reactions or side effects. She rarely uses her albuterol inhaler                                                                                                        .  DEPRESSION - Patient has a long history of Depression of moderate severity requiring medication for control with recent symptoms being stable..Current symptoms of depression include none.                                                                                                                                                                                    .  DIABETES - Patient has a longstanding history of DiabetesType Type II . Patient is being treated with insulin injections and denies significant side effects. Control has been good. Complicating factors include but are not limited to: hypertension, hyperlipidemia and chronic kidney disease.                                                                                                                              .  HYPERLIPIDEMIA - Patient has a long history of significant Hyperlipidemia requiring medication for treatment with recent good control. Patient reports no problems or side effects with the medication.                                                                                                                                                        .  HYPERTENSION - Patient has longstanding history of HTN , currently denies any symptoms referable to elevated blood pressure. Specifically denies chest pain, palpitations, dyspnea, orthopnea, PND or peripheral edema. Blood pressure readings have been in normal range. Current medication regimen is as listed below. Patient denies any side effects of medication.                                                                                                                                                                                            .  HYPOTHYROIDISM - Patient has a longstanding history of chronic Hypothyroidism. Patient has been doing well, noting no tremor, insomnia, hair loss or changes in skin texture. Continues to take medications as directed, without adverse reactions or side effects. Last TSH   Lab Results   Component Value Date    TSH 1.18 04/20/2018   .                                                                                                                                                                                                                        .  RENAL INSUFFICIENCY - Patient has a longstanding history of moderate-severe chronic renal insufficiency. Last Cr 1.28.                                                                                                                                                                               .    MEDICAL HISTORY:     Patient Active Problem List    Diagnosis Date Noted     Health Care Home 06/17/2011     Priority: High     X  Unable to contact  DX V65.8 REPLACED WITH [id number] HEALTH CARE HOME (04/08/2013)       Chronic obstructive pulmonary disease, unspecified COPD type (H) 05/01/2018     Priority: Medium     Morbid obesity (H) 09/26/2017     Priority: Medium     Thrombocytopenia (H) 09/26/2017     Priority:  Medium     Osteoporosis 03/20/2017     Priority: Medium     Type 2 diabetes mellitus with other diabetic kidney complication, with long-term current use of insulin (H) 03/03/2017     Priority: Medium     CKD (chronic kidney disease) stage 3, GFR 30-59 ml/min (H) 02/22/2017     Priority: Medium     [facility] GFR has been below 60.       ACP (advance care planning) 06/09/2011     Priority: Medium     Patient states has Advance Directive and will bring in a copy to clinic. Anh Medina MA 6/9/2011        Hypothyroidism 04/28/2011     Priority: Medium     Hypertension goal BP (blood pressure) < 140/90 04/28/2011     Priority: Medium     Type 2 diabetes, HbA1C goal < 8% (H) 04/28/2011     Priority: Medium     Hyperlipidemia LDL goal <100 04/28/2011     Priority: Medium     Moderate major depression (H) 04/28/2011     Priority: Medium     Incontinence of urine 04/28/2011     Priority: Medium     Neuropathy in diabetes (H) 04/28/2011     Priority: Medium     Eczema 04/28/2011     Priority: Medium     ear       Left lumbar radiculopathy 04/28/2011     Priority: Medium      Past Medical History:   Diagnosis Date     Arthritis 1975     Broken ribs 8/28/2016    3,4,5,6,7, left side     Cancer (H) 2013    Skin cancer     COPD (chronic obstructive pulmonary disease) (H) 08/2017     Depression      Depressive disorder 1988     Dysuria      Glycosuria      Hypertension 1980     Mixed incontinence urge and stress      Other and unspecified hyperlipidemia      Right shoulder pain      Type II or unspecified type diabetes mellitus without mention of complication, uncontrolled      Uncomplicated asthma 2017     Unspecified hypothyroidism      Past Surgical History:   Procedure Laterality Date     ABDOMEN SURGERY  1978    Gall Bladder  January       hysterectomy  Sept     BIOPSY  2013 1993    from left corner of mouth       muscule biopsy     C VAGINAL HYSTERECTOMY        CATARACT IOL, RT/LT Bilateral     2006     CHOLECYSTECTOMY, OPEN       CL AFF SURGICAL PATHOLOGY       COLONOSCOPY  2007     COLONOSCOPY N/A 6/22/2018    Procedure: COMBINED COLONOSCOPY, SINGLE OR MULTIPLE BIOPSY/POLYPECTOMY BY BIOPSY;;  Surgeon: David Parson MD;  Location: [facility] OR     COLONOSCOPY WITH CO2 INSUFFLATION N/A 6/22/2018    Procedure: COLONOSCOPY WITH CO2 INSUFFLATION;  Colonscopy, ;  Surgeon: David Parson MD;  Location: [facility] OR     EYE SURGERY  2014    ongoing     EYE SURGERY Right 2018     HEAD & NECK SURGERY  2006    Removal of saliva gland left side     PROPHYLACTIC REPAIR RETINAL BREAK, PNEUMATIC RETINOPEXY, COMBINED Left 9/2/2016     SOFT TISSUE SURGERY  1996    Carpal Tunnel left wrist     Current Outpatient Prescriptions   Medication Sig Dispense Refill     ACE/ARB NOT PRESCRIBED, INTENTIONAL, Please choose reason not prescribed, below       ACETAMINOPHEN PO Take 500 mg by mouth 2 times daily 2 tablets twice daily       albuterol (2.5 MG/3ML) 0.083% neb solution Take 1 vial (2.5 mg) by nebulization every 6 hours as needed for shortness of breath / dyspnea or wheezing 1 vial used in clinic today. 1 vial 1     alendronate (FOSAMAX) 70 MG tablet TAKE 1 TABLET (70 MG) BY MOUTH EVERY 7 DAYS. TAKE 60 MINUTES BEFORE MORNING MEAL WITH 8 OZ. WATER. REMAIN UPRIGHT FOR 30 MINUTES. 12 tablet 3     amLODIPine (NORVASC) 10 MG tablet TAKE 1 TABLET (10 MG) BY MOUTH DAILY 90 tablet 0     ASPIRIN 81 MG OR TABS ONE DAILY*  3     atorvastatin (LIPITOR) 40 MG tablet Take 1 tablet (40 mg) by mouth daily 90 tablet 3     blood glucose monitoring (MUSA CONTOUR NEXT) test strip Use to test blood sugar 3 times daily or as directed. 100 strip 11     blood glucose monitoring (MUSA MICROLET) lancets Use to test blood sugar 3 times daily or as directed. 100 Box 11     carvedilol (COREG) 25 MG tablet TAKE 1 TABLET (25 MG) BY MOUTH 2 TIMES DAILY WITH MEALS 180 tablet 0     clobetasol propionate 0.05 %  "LIQD Externally apply 1 dose. topically 2 times daily as needed 1 Bottle 5     fenofibrate 54 MG tablet Take 1 tablet (54 mg) by mouth daily 90 tablet 3     insulin aspart (NOVOLOG FLEXPEN) 100 UNIT/ML injection 22 units before breakfast, 26 units before lunch, 20 units before dinner 18 mL 1     insulin detemir (LEVEMIR FLEXTOUCH) 100 UNIT/ML injection Inject 41 Units Subcutaneous 2 times daily 35 mL 1     insulin pen needle 32G X 4 MM Use 5 pen needles daily or as directed.  Per insurance and patient preference 450 each 3     Levothyroxine Sodium 150 MCG CAPS Take 150 mcg by mouth daily (with breakfast) 90 capsule 3     losartan (COZAAR) 100 MG tablet Take 1 tablet (100 mg) by mouth daily 90 tablet 1     PARoxetine (PAXIL) 20 MG tablet Take 1 tablet (20 mg) by mouth 2 times daily 180 tablet 0     primidone (MYSOLINE) 50 MG tablet TAKE ONE TABLET(S) BY MOUTH TWICE DAILY 180 tablet 1     OTC products: None, except as noted above    No Known Allergies   Latex Allergy: NO    Social History   Substance Use Topics     Smoking status: Former Smoker     Packs/day: 0.50     Years: 50.00     Types: Cigarettes     Start date: 1/1/1967     Quit date: 8/8/2017     Smokeless tobacco: Never Used      Comment: former smoker     Alcohol use Yes      Comment: Maybe a drink every few months     History   Drug Use No       REVIEW OF SYSTEMS:   Constitutional, neuro, ENT, endocrine, pulmonary, cardiac, gastrointestinal, genitourinary, musculoskeletal, integument and psychiatric systems are negative, except as otherwise noted.    EXAM:   /57  Pulse 64  Temp 97.7  F (36.5  C) (Oral)  Resp 17  Ht 5' 8.25\" (1.734 m)  Wt 259 lb (117.5 kg)  SpO2 97%  BMI 39.09 kg/m2    GENERAL APPEARANCE: healthy, alert and no distress     EYES: EOMI, PERRL     HENT: ear canals and TM's normal and nose and mouth without ulcers or lesions     NECK: no adenopathy, no asymmetry, masses, or scars and thyroid normal to palpation     RESP: lungs clear " to auscultation - no rales, rhonchi or wheezes     CV: regular rates and rhythm, normal S1 S2, no S3 or S4 and no murmur, click or rub     ABDOMEN:  soft, nontender, no HSM or masses and bowel sounds normal     MS: extremities normal- no gross deformities noted, no evidence of inflammation in joints, FROM in all extremities.     PSYCH: mentation appears normal. and affect normal/bright    DIAGNOSTICS:   EKG: appears normal, NSR, normal axis, normal intervals, no acute ST/T changes c/w ischemia, no LVH by voltage criteria, unchanged from previous tracings, left ant fasicular block  Chest XRay : normal    Echo: normal echo  10/18    A1c = 7.7    Last Comprehensive Metabolic Panel:  Sodium   Date Value Ref Range Status   10/19/2018 139 133 - 144 mmol/L Final     Potassium   Date Value Ref Range Status   10/19/2018 4.9 3.4 - 5.3 mmol/L Final     Chloride   Date Value Ref Range Status   10/19/2018 102 94 - 109 mmol/L Final     Carbon Dioxide   Date Value Ref Range Status   10/19/2018 31 20 - 32 mmol/L Final     Anion Gap   Date Value Ref Range Status   10/19/2018 6 3 - 14 mmol/L Final     Glucose   Date Value Ref Range Status   10/19/2018 195 (H) 70 - 99 mg/dL Final     Comment:     Non Fasting     Urea Nitrogen   Date Value Ref Range Status   10/19/2018 29 7 - 30 mg/dL Final     Creatinine   Date Value Ref Range Status   10/19/2018 1.28 (H) 0.52 - 1.04 mg/dL Final     GFR Estimate   Date Value Ref Range Status   10/19/2018 41 (L) >60 mL/min/1.7m2 Final     Comment:     Non  GFR Calc     Calcium   Date Value Ref Range Status   10/19/2018 9.1 8.5 - 10.1 mg/dL Final           Lab Results   Component Value Date    WBC 7.0 09/25/2017     Lab Results   Component Value Date    RBC 3.85 09/25/2017     Lab Results   Component Value Date    HGB 12.9 12/06/2017     Lab Results   Component Value Date    HCT 37.8 09/25/2017     No components found for: MCT  Lab Results   Component Value Date    MCV 98 09/25/2017      Lab Results   Component Value Date    MCH 33.8 09/25/2017     Lab Results   Component Value Date    MCHC 34.4 09/25/2017     Lab Results   Component Value Date    RDW 12.9 09/25/2017     Lab Results   Component Value Date     09/25/2017         IMPRESSION:   Reason for surgery/procedure: fusion of L4-L5 and removal of lumbar cyst    The proposed surgical procedure is considered INTERMEDIATE risk.    REVISED CARDIAC RISK INDEX  The patient has the following serious cardiovascular risks for perioperative complications such as (MI, PE, VFib and 3  AV Block):  Diabetes Mellitus (on Insulin)  INTERPRETATION: 1 risks: Class II (low risk - 0.9% complication rate)    The patient has the following additional risks for perioperative complications:  Morbid obesity  Insulin dependent diabetes      ICD-10-CM    1. Preop general physical exam Z01.818 XR Chest 2 Views     CANCELED: Echocardiogram Complete   2. Chronic bilateral low back pain with left-sided sciatica M54.42     G89.29    3. Synovial cyst of lumbar spine M71.38    4. Type 2 diabetes mellitus with other diabetic kidney complication, with long-term current use of insulin (H) E11.29     Z79.4    5. Chronic obstructive pulmonary disease, unspecified COPD type (H) J44.9    6. CKD (chronic kidney disease) stage 3, GFR 30-59 ml/min (H) N18.3        RECOMMENDATIONS:     --Consult hospital rounder / IM to assist post-op medical management      Pulmonary Risk  Incentive spirometry post op      --Patient is to take all scheduled medications on the day of surgery EXCEPT for modifications listed below.    Diabetes Medication Use    -----Take 80% of long acting insulin (e.g. Lantus, NPH) while NPO (fasting)  -----Hold short acting insulin (e.g. Novolog, Humalog) while NPO (fasting)      ACE Inhibitor or Angiotensin Receptor Blocker (ARB) Use  Ace inhibitor or Angiotensin Receptor Blocker (ARB) and should HOLD this medication for the 24 hours prior to  surgery.      APPROVAL GIVEN to proceed with proposed procedure, without further diagnostic evaluation       Signed Electronically by: Sandy Otoole MD    Copy of this evaluation report is provided to requesting physician.    [facility] Preop Guidelines    Revised Cardiac Risk Index

## 2018-11-04 NOTE — PHARMACY-ADMISSION MEDICATION HISTORY
Admission medication history interview status for this patient is complete. See [facility] admission navigator for allergy information, prior to admission medications and immunization status.     Med rec completed by pre admitting RN and checked by Pharmacy    Dina Mari RN Fri Oct 19, 2018 12:23 PM           Prior to Admission medications    Medication Sig Last Dose Taking? Auth Provider   ACETAMINOPHEN PO Take 500 mg by mouth 2 times daily 2 tablets twice daily  Yes Reported, Patient   albuterol (2.5 MG/3ML) 0.083% neb solution Take 1 vial (2.5 mg) by nebulization every 6 hours as needed for shortness of breath / dyspnea or wheezing 1 vial used in clinic today.  Yes Hannah Cee MD   alendronate (FOSAMAX) 70 MG tablet TAKE 1 TABLET (70 MG) BY MOUTH EVERY 7 DAYS. TAKE 60 MINUTES BEFORE MORNING MEAL WITH 8 OZ. WATER. REMAIN UPRIGHT FOR 30 MINUTES.  Yes Hannah Cee MD   amLODIPine (NORVASC) 10 MG tablet TAKE 1 TABLET (10 MG) BY MOUTH DAILY  Yes Hannah Cee MD   ASPIRIN 81 MG OR TABS ONE DAILY*  Yes Ekaterina Varma MD   atorvastatin (LIPITOR) 40 MG tablet Take 1 tablet (40 mg) by mouth daily  Yes Hannah Cee MD   carvedilol (COREG) 25 MG tablet TAKE 1 TABLET (25 MG) BY MOUTH 2 TIMES DAILY WITH MEALS  Yes Hannah Cee MD   clobetasol propionate 0.05 % LIQD Externally apply 1 dose. topically 2 times daily as needed  Yes Hannah Cee MD   fenofibrate 54 MG tablet Take 1 tablet (54 mg) by mouth daily  Yes Hannah Cee MD   insulin aspart (NOVOLOG FLEXPEN) 100 UNIT/ML injection 22 units before breakfast, 26 units before lunch, 20 units before dinner  Yes Hannah Cee MD   insulin detemir (LEVEMIR FLEXTOUCH) 100 UNIT/ML injection Inject 41 Units Subcutaneous 2 times daily  Yes Hannah Cee MD   Levothyroxine Sodium 150 MCG CAPS Take 150 mcg by mouth daily (with breakfast)  Yes  Hannah Cee MD   losartan (COZAAR) 100 MG tablet Take 1 tablet (100 mg) by mouth daily  Yes Hannah Cee MD   PARoxetine (PAXIL) 20 MG tablet Take 1 tablet (20 mg) by mouth 2 times daily  Yes Hannah Cee MD   primidone (MYSOLINE) 50 MG tablet TAKE ONE TABLET(S) BY MOUTH TWICE DAILY  Yes Hannah Cee MD   ACE/ARB NOT PRESCRIBED, INTENTIONAL, Please choose reason not prescribed, below   Hannah Cee MD   blood glucose monitoring (MUSA CONTOUR NEXT) test strip Use to test blood sugar 3 times daily or as directed.   Wale Iglesias MD   blood glucose monitoring (MUSA MICROLET) lancets Use to test blood sugar 3 times daily or as directed.   Wale Iglesias MD   insulin pen needle 32G X 4 MM Use 5 pen needles daily or as directed.  Per insurance and patient preference   Hannah Cee MD

## 2018-11-05 ENCOUNTER — ANESTHESIA (OUTPATIENT)
Dept: SURGERY | Facility: CLINIC | Age: 68
DRG: 460 | End: 2018-11-05
Payer: COMMERCIAL

## 2018-11-05 ENCOUNTER — ANESTHESIA EVENT (OUTPATIENT)
Dept: SURGERY | Facility: CLINIC | Age: 68
DRG: 460 | End: 2018-11-05
Payer: COMMERCIAL

## 2018-11-05 ENCOUNTER — HOSPITAL ENCOUNTER (INPATIENT)
Facility: CLINIC | Age: 68
LOS: 3 days | Discharge: HOME | DRG: 460 | End: 2018-11-08
Attending: NEUROLOGICAL SURGERY | Admitting: NEUROLOGICAL SURGERY
Payer: COMMERCIAL

## 2018-11-05 ENCOUNTER — APPOINTMENT (OUTPATIENT)
Dept: GENERAL RADIOLOGY | Facility: CLINIC | Age: 68
DRG: 460 | End: 2018-11-05
Attending: NEUROLOGICAL SURGERY
Payer: COMMERCIAL

## 2018-11-05 ENCOUNTER — SURGERY (OUTPATIENT)
Age: 68
End: 2018-11-05

## 2018-11-05 DIAGNOSIS — Z98.1 S/P LUMBAR FUSION: Primary | ICD-10-CM

## 2018-11-05 LAB
ABO + RH BLD: NORMAL
ABO + RH BLD: NORMAL
BLD GP AB SCN SERPL QL: NORMAL
BLOOD BANK CMNT PATIENT-IMP: NORMAL
GLUCOSE BLDC GLUCOMTR-MCNC: 146 MG/DL (ref 70–99)
GLUCOSE BLDC GLUCOMTR-MCNC: 150 MG/DL (ref 70–99)
GLUCOSE BLDC GLUCOMTR-MCNC: 171 MG/DL (ref 70–99)
GLUCOSE BLDC GLUCOMTR-MCNC: 93 MG/DL (ref 70–99)
HBA1C MFR BLD: 7.3 % (ref 0–5.6)
HGB BLD-MCNC: 12 G/DL (ref 11.7–15.7)
SPECIMEN EXP DATE BLD: NORMAL

## 2018-11-05 PROCEDURE — 25000128 H RX IP 250 OP 636: Performed by: ANESTHESIOLOGY

## 2018-11-05 PROCEDURE — 25000132 ZZH RX MED GY IP 250 OP 250 PS 637: Performed by: NEUROLOGICAL SURGERY

## 2018-11-05 PROCEDURE — 25000566 ZZH SEVOFLURANE, EA 15 MIN: Performed by: NEUROLOGICAL SURGERY

## 2018-11-05 PROCEDURE — 0SB20ZZ EXCISION OF LUMBAR VERTEBRAL DISC, OPEN APPROACH: ICD-10-PCS | Performed by: NEUROLOGICAL SURGERY

## 2018-11-05 PROCEDURE — 71000013 ZZH RECOVERY PHASE 1 LEVEL 1 EA ADDTL HR: Performed by: NEUROLOGICAL SURGERY

## 2018-11-05 PROCEDURE — 25000132 ZZH RX MED GY IP 250 OP 250 PS 637: Performed by: PHYSICIAN ASSISTANT

## 2018-11-05 PROCEDURE — 25800025 ZZH RX 258: Performed by: NEUROLOGICAL SURGERY

## 2018-11-05 PROCEDURE — 27210995 ZZH RX 272: Performed by: NEUROLOGICAL SURGERY

## 2018-11-05 PROCEDURE — 36000077 ZZH SURGERY LEVEL 6 W FLUORO 1ST 30 MIN: Performed by: NEUROLOGICAL SURGERY

## 2018-11-05 PROCEDURE — 22840 INSERT SPINE FIXATION DEVICE: CPT | Performed by: NEUROLOGICAL SURGERY

## 2018-11-05 PROCEDURE — 40000985 XR LUMBAR SPINE PORT 1 VW: Mod: TC

## 2018-11-05 PROCEDURE — 36415 COLL VENOUS BLD VENIPUNCTURE: CPT | Performed by: ANESTHESIOLOGY

## 2018-11-05 PROCEDURE — 25000131 ZZH RX MED GY IP 250 OP 636 PS 637: Performed by: INTERNAL MEDICINE

## 2018-11-05 PROCEDURE — 22630 ARTHRD PST TQ 1NTRSPC LUM: CPT | Performed by: NEUROLOGICAL SURGERY

## 2018-11-05 PROCEDURE — 27210794 ZZH OR GENERAL SUPPLY STERILE: Performed by: NEUROLOGICAL SURGERY

## 2018-11-05 PROCEDURE — 86900 BLOOD TYPING SEROLOGIC ABO: CPT | Performed by: ANESTHESIOLOGY

## 2018-11-05 PROCEDURE — 63047 LAM FACETEC & FORAMOT LUMBAR: CPT | Mod: 59 | Performed by: NEUROLOGICAL SURGERY

## 2018-11-05 PROCEDURE — 40000306 ZZH STATISTIC PRE PROC ASSESS II: Performed by: NEUROLOGICAL SURGERY

## 2018-11-05 PROCEDURE — 25000128 H RX IP 250 OP 636: Performed by: NURSE ANESTHETIST, CERTIFIED REGISTERED

## 2018-11-05 PROCEDURE — C1762 CONN TISS, HUMAN(INC FASCIA): HCPCS | Performed by: NEUROLOGICAL SURGERY

## 2018-11-05 PROCEDURE — 83036 HEMOGLOBIN GLYCOSYLATED A1C: CPT | Performed by: ANESTHESIOLOGY

## 2018-11-05 PROCEDURE — 0SG00AJ FUSION OF LUMBAR VERTEBRAL JOINT WITH INTERBODY FUSION DEVICE, POSTERIOR APPROACH, ANTERIOR COLUMN, OPEN APPROACH: ICD-10-PCS | Performed by: NEUROLOGICAL SURGERY

## 2018-11-05 PROCEDURE — 25000125 ZZHC RX 250: Performed by: NURSE ANESTHETIST, CERTIFIED REGISTERED

## 2018-11-05 PROCEDURE — 22853 INSJ BIOMECHANICAL DEVICE: CPT | Performed by: NEUROLOGICAL SURGERY

## 2018-11-05 PROCEDURE — 99222 1ST HOSP IP/OBS MODERATE 55: CPT | Performed by: INTERNAL MEDICINE

## 2018-11-05 PROCEDURE — C1713 ANCHOR/SCREW BN/BN,TIS/BN: HCPCS | Performed by: NEUROLOGICAL SURGERY

## 2018-11-05 PROCEDURE — 25000128 H RX IP 250 OP 636: Performed by: NEUROLOGICAL SURGERY

## 2018-11-05 PROCEDURE — 86850 RBC ANTIBODY SCREEN: CPT | Performed by: ANESTHESIOLOGY

## 2018-11-05 PROCEDURE — 71000012 ZZH RECOVERY PHASE 1 LEVEL 1 FIRST HR: Performed by: NEUROLOGICAL SURGERY

## 2018-11-05 PROCEDURE — 85018 HEMOGLOBIN: CPT | Performed by: ANESTHESIOLOGY

## 2018-11-05 PROCEDURE — 01NB0ZZ RELEASE LUMBAR NERVE, OPEN APPROACH: ICD-10-PCS | Performed by: NEUROLOGICAL SURGERY

## 2018-11-05 PROCEDURE — 00000146 ZZHCL STATISTIC GLUCOSE BY METER IP

## 2018-11-05 PROCEDURE — 25000128 H RX IP 250 OP 636

## 2018-11-05 PROCEDURE — L0625 LO FLEX L1-BELOW L5 PRE OTS: HCPCS

## 2018-11-05 PROCEDURE — 12000007 ZZH R&B INTERMEDIATE

## 2018-11-05 PROCEDURE — 8E0WXBF COMPUTER ASSISTED PROCEDURE OF TRUNK REGION, WITH FLUOROSCOPY: ICD-10-PCS | Performed by: NEUROLOGICAL SURGERY

## 2018-11-05 PROCEDURE — 86901 BLOOD TYPING SEROLOGIC RH(D): CPT | Performed by: ANESTHESIOLOGY

## 2018-11-05 PROCEDURE — 25000125 ZZHC RX 250: Performed by: NEUROLOGICAL SURGERY

## 2018-11-05 PROCEDURE — 99207 ZZC CONSULT E&M CHANGED TO INITIAL LEVEL: CPT | Performed by: INTERNAL MEDICINE

## 2018-11-05 PROCEDURE — 25000300 ZZH OR RX SURGIFLO HEMOSTATIC MATRIX 10ML 199102S OPNP: Performed by: NEUROLOGICAL SURGERY

## 2018-11-05 PROCEDURE — 40000277 XR SURGERY CARM FLUORO LESS THAN 5 MIN W STILLS: Mod: TC

## 2018-11-05 PROCEDURE — 36000075 ZZH SURGERY LEVEL 6 EA 15 ADDTL MIN: Performed by: NEUROLOGICAL SURGERY

## 2018-11-05 PROCEDURE — 37000009 ZZH ANESTHESIA TECHNICAL FEE, EACH ADDTL 15 MIN: Performed by: NEUROLOGICAL SURGERY

## 2018-11-05 PROCEDURE — 37000008 ZZH ANESTHESIA TECHNICAL FEE, 1ST 30 MIN: Performed by: NEUROLOGICAL SURGERY

## 2018-11-05 DEVICE — GRAFT BONE CRUSH CANC 15ML 400075: Type: IMPLANTABLE DEVICE | Site: SPINE LUMBAR | Status: FUNCTIONAL

## 2018-11-05 DEVICE — IMPLANTABLE DEVICE: Type: IMPLANTABLE DEVICE | Site: SPINE LUMBAR | Status: FUNCTIONAL

## 2018-11-05 RX ORDER — FENTANYL CITRATE 50 UG/ML
INJECTION, SOLUTION INTRAMUSCULAR; INTRAVENOUS PRN
Status: DISCONTINUED | OUTPATIENT
Start: 2018-11-05 | End: 2018-11-05

## 2018-11-05 RX ORDER — OXYCODONE HYDROCHLORIDE 5 MG/1
5-10 TABLET ORAL
Status: DISCONTINUED | OUTPATIENT
Start: 2018-11-05 | End: 2018-11-07

## 2018-11-05 RX ORDER — LIDOCAINE 40 MG/G
CREAM TOPICAL
Status: DISCONTINUED | OUTPATIENT
Start: 2018-11-05 | End: 2018-11-08 | Stop reason: HOSPADM

## 2018-11-05 RX ORDER — ACETAMINOPHEN 325 MG/1
650 TABLET ORAL EVERY 4 HOURS PRN
Status: DISCONTINUED | OUTPATIENT
Start: 2018-11-08 | End: 2018-11-08 | Stop reason: HOSPADM

## 2018-11-05 RX ORDER — CARVEDILOL 25 MG/1
25 TABLET ORAL 2 TIMES DAILY WITH MEALS
Status: DISCONTINUED | OUTPATIENT
Start: 2018-11-05 | End: 2018-11-08 | Stop reason: HOSPADM

## 2018-11-05 RX ORDER — CEFAZOLIN SODIUM 1 G/3ML
1 INJECTION, POWDER, FOR SOLUTION INTRAMUSCULAR; INTRAVENOUS SEE ADMIN INSTRUCTIONS
Status: DISCONTINUED | OUTPATIENT
Start: 2018-11-05 | End: 2018-11-05 | Stop reason: HOSPADM

## 2018-11-05 RX ORDER — BUPIVACAINE HYDROCHLORIDE AND EPINEPHRINE 5; 5 MG/ML; UG/ML
INJECTION, SOLUTION EPIDURAL; INTRACAUDAL; PERINEURAL PRN
Status: DISCONTINUED | OUTPATIENT
Start: 2018-11-05 | End: 2018-11-05 | Stop reason: HOSPADM

## 2018-11-05 RX ORDER — ATORVASTATIN CALCIUM 40 MG/1
40 TABLET, FILM COATED ORAL DAILY
Status: DISCONTINUED | OUTPATIENT
Start: 2018-11-05 | End: 2018-11-08 | Stop reason: HOSPADM

## 2018-11-05 RX ORDER — DIPHENHYDRAMINE HYDROCHLORIDE 50 MG/ML
12.5 INJECTION INTRAMUSCULAR; INTRAVENOUS EVERY 6 HOURS PRN
Status: DISCONTINUED | OUTPATIENT
Start: 2018-11-05 | End: 2018-11-08 | Stop reason: HOSPADM

## 2018-11-05 RX ORDER — LIDOCAINE 40 MG/G
CREAM TOPICAL
Status: DISCONTINUED | OUTPATIENT
Start: 2018-11-05 | End: 2018-11-05 | Stop reason: HOSPADM

## 2018-11-05 RX ORDER — HYDROMORPHONE HYDROCHLORIDE 1 MG/ML
.3-.5 INJECTION, SOLUTION INTRAMUSCULAR; INTRAVENOUS; SUBCUTANEOUS EVERY 5 MIN PRN
Status: DISCONTINUED | OUTPATIENT
Start: 2018-11-05 | End: 2018-11-05 | Stop reason: HOSPADM

## 2018-11-05 RX ORDER — ALBUTEROL SULFATE 0.83 MG/ML
2.5 SOLUTION RESPIRATORY (INHALATION) EVERY 6 HOURS PRN
Status: DISCONTINUED | OUTPATIENT
Start: 2018-11-05 | End: 2018-11-08 | Stop reason: HOSPADM

## 2018-11-05 RX ORDER — DOCUSATE SODIUM 100 MG/1
100 CAPSULE, LIQUID FILLED ORAL 2 TIMES DAILY
Status: DISCONTINUED | OUTPATIENT
Start: 2018-11-05 | End: 2018-11-08 | Stop reason: HOSPADM

## 2018-11-05 RX ORDER — FENOFIBRATE 54 MG/1
54 TABLET ORAL DAILY
Status: DISCONTINUED | OUTPATIENT
Start: 2018-11-06 | End: 2018-11-08 | Stop reason: HOSPADM

## 2018-11-05 RX ORDER — SODIUM CHLORIDE, SODIUM LACTATE, POTASSIUM CHLORIDE, CALCIUM CHLORIDE 600; 310; 30; 20 MG/100ML; MG/100ML; MG/100ML; MG/100ML
INJECTION, SOLUTION INTRAVENOUS CONTINUOUS
Status: DISCONTINUED | OUTPATIENT
Start: 2018-11-05 | End: 2018-11-05 | Stop reason: HOSPADM

## 2018-11-05 RX ORDER — ONDANSETRON 4 MG/1
4 TABLET, ORALLY DISINTEGRATING ORAL EVERY 30 MIN PRN
Status: DISCONTINUED | OUTPATIENT
Start: 2018-11-05 | End: 2018-11-05 | Stop reason: HOSPADM

## 2018-11-05 RX ORDER — VANCOMYCIN HCL 900 MCG/MG
POWDER (GRAM) MISCELLANEOUS PRN
Status: DISCONTINUED | OUTPATIENT
Start: 2018-11-05 | End: 2018-11-05 | Stop reason: HOSPADM

## 2018-11-05 RX ORDER — ACETAMINOPHEN 325 MG/1
975 TABLET ORAL EVERY 8 HOURS
Status: DISPENSED | OUTPATIENT
Start: 2018-11-05 | End: 2018-11-08

## 2018-11-05 RX ORDER — NALOXONE HYDROCHLORIDE 0.4 MG/ML
.1-.4 INJECTION, SOLUTION INTRAMUSCULAR; INTRAVENOUS; SUBCUTANEOUS
Status: DISCONTINUED | OUTPATIENT
Start: 2018-11-05 | End: 2018-11-08 | Stop reason: HOSPADM

## 2018-11-05 RX ORDER — FENTANYL CITRATE 50 UG/ML
25-50 INJECTION, SOLUTION INTRAMUSCULAR; INTRAVENOUS
Status: DISCONTINUED | OUTPATIENT
Start: 2018-11-05 | End: 2018-11-05 | Stop reason: HOSPADM

## 2018-11-05 RX ORDER — LIDOCAINE HYDROCHLORIDE 10 MG/ML
INJECTION, SOLUTION INFILTRATION; PERINEURAL PRN
Status: DISCONTINUED | OUTPATIENT
Start: 2018-11-05 | End: 2018-11-05

## 2018-11-05 RX ORDER — PAROXETINE 20 MG/1
20 TABLET, FILM COATED ORAL 2 TIMES DAILY
Status: DISCONTINUED | OUTPATIENT
Start: 2018-11-05 | End: 2018-11-08 | Stop reason: HOSPADM

## 2018-11-05 RX ORDER — ACETAMINOPHEN 325 MG/1
975 TABLET ORAL EVERY 8 HOURS
Status: DISCONTINUED | OUTPATIENT
Start: 2018-11-05 | End: 2018-11-05

## 2018-11-05 RX ORDER — HYDRALAZINE HYDROCHLORIDE 20 MG/ML
2.5-5 INJECTION INTRAMUSCULAR; INTRAVENOUS EVERY 10 MIN PRN
Status: DISCONTINUED | OUTPATIENT
Start: 2018-11-05 | End: 2018-11-05 | Stop reason: HOSPADM

## 2018-11-05 RX ORDER — DIPHENHYDRAMINE HCL 12.5MG/5ML
12.5 LIQUID (ML) ORAL EVERY 6 HOURS PRN
Status: DISCONTINUED | OUTPATIENT
Start: 2018-11-05 | End: 2018-11-08 | Stop reason: HOSPADM

## 2018-11-05 RX ORDER — ONDANSETRON 2 MG/ML
4 INJECTION INTRAMUSCULAR; INTRAVENOUS EVERY 6 HOURS PRN
Status: DISCONTINUED | OUTPATIENT
Start: 2018-11-05 | End: 2018-11-08 | Stop reason: HOSPADM

## 2018-11-05 RX ORDER — DEXTROSE MONOHYDRATE 25 G/50ML
25-50 INJECTION, SOLUTION INTRAVENOUS
Status: DISCONTINUED | OUTPATIENT
Start: 2018-11-05 | End: 2018-11-05

## 2018-11-05 RX ORDER — NALOXONE HYDROCHLORIDE 0.4 MG/ML
.1-.4 INJECTION, SOLUTION INTRAMUSCULAR; INTRAVENOUS; SUBCUTANEOUS
Status: ACTIVE | OUTPATIENT
Start: 2018-11-05 | End: 2018-11-06

## 2018-11-05 RX ORDER — SODIUM CHLORIDE AND POTASSIUM CHLORIDE 150; 900 MG/100ML; MG/100ML
INJECTION, SOLUTION INTRAVENOUS CONTINUOUS
Status: DISCONTINUED | OUTPATIENT
Start: 2018-11-05 | End: 2018-11-08 | Stop reason: HOSPADM

## 2018-11-05 RX ORDER — NICOTINE POLACRILEX 4 MG
15-30 LOZENGE BUCCAL
Status: DISCONTINUED | OUTPATIENT
Start: 2018-11-05 | End: 2018-11-08 | Stop reason: HOSPADM

## 2018-11-05 RX ORDER — HYDROMORPHONE HYDROCHLORIDE 2 MG/1
2-4 TABLET ORAL EVERY 4 HOURS PRN
Status: DISCONTINUED | OUTPATIENT
Start: 2018-11-05 | End: 2018-11-08 | Stop reason: HOSPADM

## 2018-11-05 RX ORDER — ALBUTEROL SULFATE 0.83 MG/ML
2.5 SOLUTION RESPIRATORY (INHALATION) EVERY 4 HOURS PRN
Status: DISCONTINUED | OUTPATIENT
Start: 2018-11-05 | End: 2018-11-05 | Stop reason: HOSPADM

## 2018-11-05 RX ORDER — PROCHLORPERAZINE MALEATE 5 MG
5 TABLET ORAL EVERY 6 HOURS PRN
Status: DISCONTINUED | OUTPATIENT
Start: 2018-11-05 | End: 2018-11-08 | Stop reason: HOSPADM

## 2018-11-05 RX ORDER — PRIMIDONE 50 MG/1
50 TABLET ORAL AT BEDTIME
Status: DISCONTINUED | OUTPATIENT
Start: 2018-11-05 | End: 2018-11-08 | Stop reason: HOSPADM

## 2018-11-05 RX ORDER — LOSARTAN POTASSIUM 100 MG/1
100 TABLET ORAL DAILY
Status: DISCONTINUED | OUTPATIENT
Start: 2018-11-05 | End: 2018-11-08 | Stop reason: HOSPADM

## 2018-11-05 RX ORDER — LEVOTHYROXINE SODIUM 150 UG/1
150 TABLET ORAL
Status: DISCONTINUED | OUTPATIENT
Start: 2018-11-06 | End: 2018-11-08 | Stop reason: HOSPADM

## 2018-11-05 RX ORDER — NEOSTIGMINE METHYLSULFATE 1 MG/ML
VIAL (ML) INJECTION PRN
Status: DISCONTINUED | OUTPATIENT
Start: 2018-11-05 | End: 2018-11-05

## 2018-11-05 RX ORDER — HYDROMORPHONE HYDROCHLORIDE 1 MG/ML
.3-.5 INJECTION, SOLUTION INTRAMUSCULAR; INTRAVENOUS; SUBCUTANEOUS
Status: DISCONTINUED | OUTPATIENT
Start: 2018-11-05 | End: 2018-11-08 | Stop reason: HOSPADM

## 2018-11-05 RX ORDER — NICOTINE POLACRILEX 4 MG
15-30 LOZENGE BUCCAL
Status: DISCONTINUED | OUTPATIENT
Start: 2018-11-05 | End: 2018-11-05

## 2018-11-05 RX ORDER — METOCLOPRAMIDE 5 MG/1
5 TABLET ORAL EVERY 6 HOURS PRN
Status: DISCONTINUED | OUTPATIENT
Start: 2018-11-05 | End: 2018-11-08 | Stop reason: HOSPADM

## 2018-11-05 RX ORDER — ONDANSETRON 4 MG/1
4 TABLET, ORALLY DISINTEGRATING ORAL EVERY 6 HOURS PRN
Status: DISCONTINUED | OUTPATIENT
Start: 2018-11-05 | End: 2018-11-08 | Stop reason: HOSPADM

## 2018-11-05 RX ORDER — ACETAMINOPHEN 325 MG/1
650 TABLET ORAL EVERY 4 HOURS PRN
Status: DISCONTINUED | OUTPATIENT
Start: 2018-11-08 | End: 2018-11-05

## 2018-11-05 RX ORDER — GLYCOPYRROLATE 0.2 MG/ML
INJECTION, SOLUTION INTRAMUSCULAR; INTRAVENOUS PRN
Status: DISCONTINUED | OUTPATIENT
Start: 2018-11-05 | End: 2018-11-05

## 2018-11-05 RX ORDER — DEXTROSE MONOHYDRATE 25 G/50ML
25-50 INJECTION, SOLUTION INTRAVENOUS
Status: DISCONTINUED | OUTPATIENT
Start: 2018-11-05 | End: 2018-11-08 | Stop reason: HOSPADM

## 2018-11-05 RX ORDER — CEFAZOLIN SODIUM 2 G/100ML
2 INJECTION, SOLUTION INTRAVENOUS
Status: COMPLETED | OUTPATIENT
Start: 2018-11-05 | End: 2018-11-05

## 2018-11-05 RX ORDER — ONDANSETRON 2 MG/ML
4 INJECTION INTRAMUSCULAR; INTRAVENOUS EVERY 30 MIN PRN
Status: DISCONTINUED | OUTPATIENT
Start: 2018-11-05 | End: 2018-11-05 | Stop reason: HOSPADM

## 2018-11-05 RX ORDER — METOCLOPRAMIDE HYDROCHLORIDE 5 MG/ML
5 INJECTION INTRAMUSCULAR; INTRAVENOUS EVERY 6 HOURS PRN
Status: DISCONTINUED | OUTPATIENT
Start: 2018-11-05 | End: 2018-11-08 | Stop reason: HOSPADM

## 2018-11-05 RX ORDER — AMLODIPINE BESYLATE 10 MG/1
10 TABLET ORAL DAILY
Status: DISCONTINUED | OUTPATIENT
Start: 2018-11-05 | End: 2018-11-08 | Stop reason: HOSPADM

## 2018-11-05 RX ORDER — CEFAZOLIN SODIUM 2 G/100ML
2 INJECTION, SOLUTION INTRAVENOUS EVERY 8 HOURS
Status: DISCONTINUED | OUTPATIENT
Start: 2018-11-05 | End: 2018-11-07

## 2018-11-05 RX ADMIN — BUPIVACAINE HYDROCHLORIDE AND EPINEPHRINE 12 ML: 5; 5 INJECTION, SOLUTION EPIDURAL; INTRACAUDAL; PERINEURAL at 10:28

## 2018-11-05 RX ADMIN — LOSARTAN POTASSIUM 100 MG: 100 TABLET ORAL at 16:13

## 2018-11-05 RX ADMIN — GENTAMICIN SULFATE 1000 ML: 40 INJECTION, SOLUTION INTRAMUSCULAR; INTRAVENOUS at 10:28

## 2018-11-05 RX ADMIN — CARVEDILOL 25 MG: 25 TABLET, FILM COATED ORAL at 17:42

## 2018-11-05 RX ADMIN — AMLODIPINE BESYLATE 10 MG: 10 TABLET ORAL at 16:13

## 2018-11-05 RX ADMIN — THROMBIN, TOPICAL (BOVINE) 10000 UNITS: KIT at 10:27

## 2018-11-05 RX ADMIN — GLYCOPYRROLATE 0.6 MG: 0.2 INJECTION, SOLUTION INTRAMUSCULAR; INTRAVENOUS at 11:10

## 2018-11-05 RX ADMIN — ACETAMINOPHEN 975 MG: 325 TABLET, FILM COATED ORAL at 21:41

## 2018-11-05 RX ADMIN — GLYCOPYRROLATE 0.3 MG: 0.2 INJECTION, SOLUTION INTRAMUSCULAR; INTRAVENOUS at 07:38

## 2018-11-05 RX ADMIN — ATORVASTATIN CALCIUM 40 MG: 40 TABLET, FILM COATED ORAL at 16:13

## 2018-11-05 RX ADMIN — FIBRINOGEN HUMAN AND THROMBIN HUMAN 5 ML: KIT at 10:27

## 2018-11-05 RX ADMIN — DEXMEDETOMIDINE HYDROCHLORIDE 0.4 MCG/KG/HR: 100 INJECTION, SOLUTION INTRAVENOUS at 07:45

## 2018-11-05 RX ADMIN — FENTANYL CITRATE 25 MCG: 50 INJECTION, SOLUTION INTRAMUSCULAR; INTRAVENOUS at 10:56

## 2018-11-05 RX ADMIN — SODIUM CHLORIDE, POTASSIUM CHLORIDE, SODIUM LACTATE AND CALCIUM CHLORIDE: 600; 310; 30; 20 INJECTION, SOLUTION INTRAVENOUS at 10:39

## 2018-11-05 RX ADMIN — POTASSIUM CHLORIDE AND SODIUM CHLORIDE: 900; 150 INJECTION, SOLUTION INTRAVENOUS at 14:30

## 2018-11-05 RX ADMIN — THROMBIN HUMAN 1 KIT: 2000 POWDER, FOR SOLUTION TOPICAL at 10:27

## 2018-11-05 RX ADMIN — CEFAZOLIN SODIUM 2 G: 2 INJECTION, SOLUTION INTRAVENOUS at 07:31

## 2018-11-05 RX ADMIN — CEFAZOLIN SODIUM 2 G: 2 INJECTION, SOLUTION INTRAVENOUS at 16:11

## 2018-11-05 RX ADMIN — INSULIN ASPART 1 UNITS: 100 INJECTION, SOLUTION INTRAVENOUS; SUBCUTANEOUS at 17:45

## 2018-11-05 RX ADMIN — LIDOCAINE HYDROCHLORIDE 30 MG: 10 INJECTION, SOLUTION INFILTRATION; PERINEURAL at 07:38

## 2018-11-05 RX ADMIN — Medication: at 21:50

## 2018-11-05 RX ADMIN — SODIUM CHLORIDE, POTASSIUM CHLORIDE, SODIUM LACTATE AND CALCIUM CHLORIDE: 600; 310; 30; 20 INJECTION, SOLUTION INTRAVENOUS at 07:31

## 2018-11-05 RX ADMIN — ROCURONIUM BROMIDE 40 MG: 10 INJECTION INTRAVENOUS at 07:39

## 2018-11-05 RX ADMIN — FENTANYL CITRATE 100 MCG: 50 INJECTION, SOLUTION INTRAMUSCULAR; INTRAVENOUS at 07:38

## 2018-11-05 RX ADMIN — ROCURONIUM BROMIDE 10 MG: 10 INJECTION INTRAVENOUS at 07:38

## 2018-11-05 RX ADMIN — DOCUSATE SODIUM 100 MG: 100 CAPSULE, LIQUID FILLED ORAL at 19:37

## 2018-11-05 RX ADMIN — CEFAZOLIN SODIUM 1 G: 2 INJECTION, SOLUTION INTRAVENOUS at 09:35

## 2018-11-05 RX ADMIN — Medication 3 MG: at 11:10

## 2018-11-05 RX ADMIN — RANITIDINE 150 MG: 150 TABLET ORAL at 17:38

## 2018-11-05 RX ADMIN — MIDAZOLAM 2 MG: 1 INJECTION INTRAMUSCULAR; INTRAVENOUS at 07:31

## 2018-11-05 RX ADMIN — HYDROMORPHONE HYDROCHLORIDE 1 MG: 1 INJECTION, SOLUTION INTRAMUSCULAR; INTRAVENOUS; SUBCUTANEOUS at 08:11

## 2018-11-05 RX ADMIN — SODIUM CHLORIDE, POTASSIUM CHLORIDE, SODIUM LACTATE AND CALCIUM CHLORIDE: 600; 310; 30; 20 INJECTION, SOLUTION INTRAVENOUS at 08:15

## 2018-11-05 RX ADMIN — PAROXETINE HYDROCHLORIDE 20 MG: 20 TABLET, FILM COATED ORAL at 19:37

## 2018-11-05 RX ADMIN — Medication: at 11:56

## 2018-11-05 RX ADMIN — INSULIN DETEMIR 41 UNITS: 100 INJECTION, SOLUTION SUBCUTANEOUS at 19:38

## 2018-11-05 RX ADMIN — Medication 1 G: at 10:27

## 2018-11-05 ASSESSMENT — COPD QUESTIONNAIRES: COPD: 1

## 2018-11-05 ASSESSMENT — ACTIVITIES OF DAILY LIVING (ADL)
ADLS_ACUITY_SCORE: 12
ADLS_ACUITY_SCORE: 12

## 2018-11-05 NOTE — ANESTHESIA POSTPROCEDURE EVALUATION
Patient: Yadi Iniguez    Procedure(s):  Left L4-5 minimally invasive spine, transforaminal lumbar interbody fusion    Diagnosis:L4-5 grade one spondylothesis with cystic mass  Diagnosis Additional Information: 1. Grade 1 spondylolisthesis at L4-5 with severe left-sided stenosis and a cystic mass compressing the L5 root, Progression to grade 2 spondylolisthesis with prone positioning    Anesthesia Type:  General, ETT    Note:  Anesthesia Post Evaluation    Patient location during evaluation: PACU  Patient participation: Able to fully participate in evaluation  Level of consciousness: awake and alert  Pain management: adequate  Airway patency: patent  Cardiovascular status: acceptable  Respiratory status: acceptable  Hydration status: acceptable  PONV: none     Anesthetic complications: None          Last vitals:  Vitals:    11/05/18 1330 11/05/18 1400 11/05/18 1428   BP: 147/56 130/55 121/49   Pulse:  64 60   Resp:      Temp:      SpO2:            Electronically Signed By: Ajith Dias MD  November 5, 2018  2:32 PM

## 2018-11-05 NOTE — PLAN OF CARE
Problem: Surgery Nonspecified (Adult)  Goal: Signs and Symptoms of Listed Potential Problems Will be Absent, Minimized or Managed (Surgery Nonspecified)  Signs and symptoms of listed potential problems will be absent, minimized or managed by discharge/transition of care (reference Surgery Nonspecified (Adult) CPG).  Arrived to floor around 1300. A&Ox4. VSS ex on 2L02  With capno. Has not been OOB yet. IVF. PCA for pain control. Rates pain6/10 with goal of 5. States pain has improved from prior to surgery,incisional pain now. Dressing CDI. Plans to discharge home when ready.

## 2018-11-05 NOTE — ANESTHESIA CARE TRANSFER NOTE
Patient: Yadi Iniguez    Procedure(s):  Left L4-5 minimally invasive spine, transforaminal lumbar interbody fusion    Diagnosis: L4-5 grade one spondylothesis with cystic mass  Diagnosis Additional Information: No value filed.    Anesthesia Type:   General, ETT     Note:  Airway :Face Mask  Patient transferred to:PACU  Handoff Report: Identifed the Patient, Identified the Reponsible Provider, Reviewed the pertinent medical history, Discussed the surgical course, Reviewed Intra-OP anesthesia mangement and issues during anesthesia, Set expectations for post-procedure period and Allowed opportunity for questions and acknowledgement of understanding      Vitals: (Last set prior to Anesthesia Care Transfer)    CRNA VITALS  11/5/2018 1048 - 11/5/2018 1129      11/5/2018             Pulse: 62    SpO2: 99 %    Resp Rate (observed): 12                Electronically Signed By: KATIANA Zimmerman CRNA  November 5, 2018  11:29 AM

## 2018-11-05 NOTE — IP AVS SNAPSHOT
[facility]    [address]    [facility] [address]    Phone:  [phone]    Fax:  [phone]                                       After Visit Summary   11/5/2018    Yadi Iniguez    MRN: [id number]           After Visit Summary Signature Page     I have received my discharge instructions, and my questions have been answered. I have discussed any challenges I see with this plan with the nurse or doctor.    ..........................................................................................................................................  Patient/Patient Representative Signature      ..........................................................................................................................................  Patient Representative Print Name and Relationship to Patient    ..................................................               ................................................  Date                                   Time    ..........................................................................................................................................  Reviewed by Signature/Title    ...................................................              ..............................................  Date                                               Time          22EPIC Rev 08/18

## 2018-11-05 NOTE — IP AVS SNAPSHOT
MRN:[id number]                      After Visit Summary   11/5/2018    Yadi Iniguez    MRN: [id number]           Thank you!     Thank you for choosing [facility] for your care. Our goal is always to provide you with excellent care. Hearing back from our patients is one way we can continue to improve our services. Please take a few minutes to complete the written survey that you may receive in the mail after you visit. If you would like to speak to someone directly about your visit please contact Patient Relations at [phone]. Thank you!          Patient Information     Date Of Birth          1950        Designated Caregiver       Most Recent Value    Caregiver    Will someone help with your care after discharge? yes    Name of designated caregiver lorenzo Gonzalez    Phone number of caregiver     Caregiver address [address] [place] [address]      About your hospital stay     You were admitted on:  November 5, 2018 You last received care in the:  [facility]    You were discharged on:  November 8, 2018        Reason for your hospital stay       You were in the hospital for a minimally invasive left L4-5 TLIF                  Who to Call     For medical emergencies, please call 911.  For non-urgent questions about your medical care, please call your primary care provider or clinic, [phone]  For questions related to your surgery, please call your surgery clinic        Attending Provider     Provider Specialty    Hernando Hill MD Neurosurgery       Primary Care Provider Office Phone # Fax #    Sandy Root [phone]      After Care Instructions     Activity       Your activity upon discharge: Discharge instructions:  No lifting of more than 10 pounds, no bending, no twisting until follow up visit.  Wear brace when out of bed.    Ok to shampoo hair, shower or bathe, but, do not scrub or submerge incision  under water until evaluated post-operatively in clinic.    Ok to walk as tolerated, avoid bed rest and prolonged sitting.    No contact sports until after follow up visit  No high impact activities such as; running/jogging, snowmobile or 4 chavarria riding or any other recreational vehicles.    Do not take NSAIDS (ibuprofen, advil, aleve, naproxen, etc) for pain control.    Do NOT drive while taking narcotic pain medication.    Call the [facility] at [phone] for increasing redness, swelling or pus draining from the incision, increased pain or any other questions and concerns.            Diet       Follow this diet upon discharge: Orders Placed This Encounter      Moderate Consistent CHO Diet            Wound care and dressings       Instructions to care for your wound at home: Keep your incision clean and dry at all times.  OK to remove dressing on postop day 2.  OK to shower on postop day 3 and allow water to run over incision, pat dry after shower.  No bathing swimming or submerging in water.  Call immediately or come to ED if any drainage occurs, or you develop new pain, redness, or swelling.                  Follow-up Appointments     Follow-up and recommended labs and tests        Please follow up at the [facility] in 10-14 days for staple removal and in 6 weeks with a lumbar xray prior.  Please call the clinic at [phone] to schedule your appointment with Valerie Chamberlain CNP or Nancy Miller CNP                  Your next 10 appointments already scheduled     Dec 18, 2018 10:00 AM CST   [facility] Miller [facility]   [facility]    [address] [place] [address] [id number] [phone]              Future tests that were ordered for you     XR Lumbar Spine 2/3 Views                 Further instructions from your care team       May restart Aspirin 81 mg per day on 11-9-2018.     Pending Results     No orders found from  "11/3/2018 to 11/6/2018.            Statement of Approval     Ordered          11/08/18 1234  I have reviewed and agree with all the recommendations and orders detailed in this document.  EFFECTIVE NOW     Approved and electronically signed by:  Valerie Chamberlain APRN CNP             Admission Information     Date & Time Provider Department Dept. Phone    11/5/2018 Hernando Hill MD [facility] [phone]      Your Vitals Were     Blood Pressure Pulse Temperature Respirations Height Weight    163/74 (BP Location: Right arm) 93 98.1  F (36.7  C) (Oral) 16 1.727 m (5' 8\") 117 kg (258 lb)    Pulse Oximetry BMI (Body Mass Index)                91% 39.23 kg/m2          MyChart Information     Dial[employer] gives you secure access to your electronic health record. If you see a primary care provider, you can also send messages to your care team and make appointments. If you have questions, please call your primary care clinic.  If you do not have a primary care provider, please call [phone] and they will assist you.        Care EveryWhere ID     This is your Care EveryWhere ID. This could be used by other organizations to access your [facility] medical records  [id number]        Equal Access to Services     SHO DAVIS : Todd Moy, lydia santana, qaybta kaalmada claude, chandra randolph. So [facility] [phone].    ATENCIÓN: Si habla español, tiene a dunbar disposición servicios gratuitos de asistencia lingüística. Llame al [phone].    We comply with applicable federal civil rights laws and [place] laws. We do not discriminate on the basis of race, color, national origin, age, disability, sex, sexual orientation, or gender identity.               Review of your medicines      START taking        Dose / Directions    diazepam 5 MG tablet   Commonly known as:  VALIUM        Dose:  5 mg   Take 1 tablet (5 mg) by mouth every 6 hours as needed for " anxiety or muscle spasms   Quantity:  60 tablet   Refills:  0       HYDROmorphone 2 MG tablet   Commonly known as:  DILAUDID        Dose:  2-4 mg   Take 1-2 tablets (2-4 mg) by mouth every 4 hours as needed   Quantity:  75 tablet   Refills:  0         CONTINUE these medicines which may have CHANGED, or have new prescriptions. If we are uncertain of the size of tablets/capsules you have at home, strength may be listed as something that might have changed.        Dose / Directions    aspirin 81 MG tablet   This may have changed:  See the new instructions.   Notes to Patient:  Resume per home schedule.        Dose:  81 mg   Take 1 tablet (81 mg) by mouth daily   Quantity:  30 tablet   Refills:  3         CONTINUE these medicines which have NOT CHANGED        Dose / Directions    ACE/ARB/ARNI NOT PRESCRIBED (INTENTIONAL)   Used for:  Benign essential hypertension        Please choose reason not prescribed, below   Refills:  0       ACETAMINOPHEN PO        Dose:  500 mg   Take 500 mg by mouth 2 times daily 2 tablets twice daily   Refills:  0       albuterol (2.5 MG/3ML) 0.083% neb solution   Used for:  Chronic obstructive pulmonary disease, unspecified COPD type (H)   Notes to Patient:  Resume per home schedule.        Dose:  1 vial   Take 1 vial (2.5 mg) by nebulization every 6 hours as needed for shortness of breath / dyspnea or wheezing 1 vial used in clinic today.   Quantity:  1 vial   Refills:  1       alendronate 70 MG tablet   Commonly known as:  FOSAMAX   Used for:  Osteoporosis, unspecified osteoporosis type, unspecified pathological fracture presence   Notes to Patient:  Resume per home schedule.        TAKE 1 TABLET (70 MG) BY MOUTH EVERY 7 DAYS. TAKE 60 MINUTES BEFORE MORNING MEAL WITH 8 OZ. WATER. REMAIN UPRIGHT FOR 30 MINUTES.   Quantity:  12 tablet   Refills:  3       amLODIPine 10 MG tablet   Commonly known as:  NORVASC   Used for:  Benign essential hypertension        TAKE 1 TABLET (10 MG) BY MOUTH DAILY    Quantity:  90 tablet   Refills:  0       atorvastatin 40 MG tablet   Commonly known as:  LIPITOR   Used for:  Hyperlipidemia, unspecified hyperlipidemia type        Dose:  40 mg   Take 1 tablet (40 mg) by mouth daily   Quantity:  90 tablet   Refills:  3       blood glucose monitoring lancets   Used for:  Type 2 diabetes mellitus with other diabetic kidney complication, with long-term current use of insulin (H)        Use to test blood sugar 3 times daily or as directed.   Quantity:  100 Box   Refills:  11       blood glucose monitoring test strip   Commonly known as:  MUSA CONTOUR NEXT   Used for:  Type 2 diabetes mellitus with other diabetic kidney complication, with long-term current use of insulin (H)        Use to test blood sugar 3 times daily or as directed.   Quantity:  100 strip   Refills:  11       carvedilol 25 MG tablet   Commonly known as:  COREG   Used for:  Benign essential hypertension        TAKE 1 TABLET (25 MG) BY MOUTH 2 TIMES DAILY WITH MEALS   Quantity:  180 tablet   Refills:  0       clobetasol propionate 0.05 % Liqd   Used for:  Skin rash   Notes to Patient:  Resume per home schedule.        Dose:  1 dose.   Externally apply 1 dose. topically 2 times daily as needed   Quantity:  1 Bottle   Refills:  5       fenofibrate 54 MG tablet   Used for:  Hyperlipidemia, unspecified hyperlipidemia type        Dose:  54 mg   Take 1 tablet (54 mg) by mouth daily   Quantity:  90 tablet   Refills:  3       insulin aspart 100 UNIT/ML injection   Commonly known as:  NovoLOG FLEXPEN   Used for:  Type 2 diabetes mellitus with other diabetic kidney complication, with long-term current use of insulin (H)        22 units before breakfast, 26 units before lunch, 20 units before dinner   Quantity:  18 mL   Refills:  1       insulin detemir 100 UNIT/ML injection   Commonly known as:  LEVEMIR FLEXTOUCH   Used for:  Type 2 diabetes mellitus with other diabetic kidney complication, with long-term current use of  insulin (H)        Dose:  41 Units   Inject 41 Units Subcutaneous 2 times daily   Quantity:  35 mL   Refills:  1       insulin pen needle 32G X 4 MM   Used for:  Type 2 diabetes mellitus with other diabetic kidney complication, with long-term current use of insulin (H)        Use 5 pen needles daily or as directed.  Per insurance and patient preference   Quantity:  450 each   Refills:  3       Levothyroxine Sodium 150 MCG Caps   Used for:  Hypothyroidism, unspecified type   Notes to Patient:  Resume per home schedule.         Dose:  150 mcg   Take 150 mcg by mouth daily (with breakfast)   Quantity:  90 capsule   Refills:  3       losartan 100 MG tablet   Commonly known as:  COZAAR   Used for:  Hypertension goal BP (blood pressure) < 140/90   Notes to Patient:  Tomorrow morning. 11/9.        Dose:  100 mg   Take 1 tablet (100 mg) by mouth daily   Quantity:  90 tablet   Refills:  1       PARoxetine 20 MG tablet   Commonly known as:  PAXIL   Used for:  Moderate major depression (H)        Dose:  20 mg   Take 1 tablet (20 mg) by mouth 2 times daily   Quantity:  180 tablet   Refills:  0       primidone 50 MG tablet   Commonly known as:  MYSOLINE   Used for:  Essential tremor        TAKE ONE TABLET(S) BY MOUTH TWICE DAILY   Quantity:  180 tablet   Refills:  1            Where to get your medicines      Some of these will need a paper prescription and others can be bought over the counter. Ask your nurse if you have questions.     Bring a paper prescription for each of these medications     diazepam 5 MG tablet    HYDROmorphone 2 MG tablet       You don't need a prescription for these medications     aspirin 81 MG tablet                Protect others around you: Learn how to safely use, store and throw away your medicines at www.disposemymeds.org.        Information about OPIOIDS     PRESCRIPTION OPIOIDS: WHAT YOU NEED TO KNOW   We gave you an opioid (narcotic) pain medicine. It is important to manage your pain, but  opioids are not always the best choice. You should first try all the other options your care team gave you. Take this medicine for as short a time (and as few doses) as possible.    Some activities can increase your pain, such as bandage changes or therapy sessions. It may help to take your pain medicine 30 to 60 minutes before these activities. Reduce your stress by getting enough sleep, working on hobbies you enjoy and practicing relaxation or meditation. Talk to your care team about ways to manage your pain beyond prescription opioids.    These medicines have risks:    DO NOT drive when on new or higher doses of pain medicine. These medicines can affect your alertness and reaction times, and you could be arrested for driving under the influence (DUI). If you need to use opioids long-term, talk to your care team about driving.    DO NOT operate heavy machinery    DO NOT do any other dangerous activities while taking these medicines.    DO NOT drink any alcohol while taking these medicines.     If the opioid prescribed includes acetaminophen, DO NOT take with any other medicines that contain acetaminophen. Read all labels carefully. Look for the word  acetaminophen  or  Tylenol.  Ask your pharmacist if you have questions or are unsure.    You can get addicted to pain medicines, especially if you have a history of addiction (chemical, alcohol or substance dependence). Talk to your care team about ways to reduce this risk.    All opioids tend to cause constipation. Drink plenty of water and eat foods that have a lot of fiber, such as fruits, vegetables, prune juice, apple juice and high-fiber cereal. Take a laxative (Miralax, milk of magnesia, Colace, Senna) if you don t move your bowels at least every other day. Other side effects include upset stomach, sleepiness, dizziness, throwing up, tolerance (needing more of the medicine to have the same effect), physical dependence and slowed breathing.    Store your pills  in a secure place, locked if possible. We will not replace any lost or stolen medicine. If you don t finish your medicine, please throw away (dispose) as directed by your pharmacist. The [place] Pollution Control Agency has more information about safe disposal: https://www.pca.[email or web address].mn.us/living-green/managing-unwanted-medications             Medication List: This is a list of all your medications and when to take them. Check marks below indicate your daily home schedule. Keep this list as a reference.      Medications           Morning Afternoon Evening Bedtime As Needed    ACE/ARB/ARNI NOT PRESCRIBED (INTENTIONAL)   Please choose reason not prescribed, below                                ACETAMINOPHEN PO   Take 500 mg by mouth 2 times daily 2 tablets twice daily   Last time this was given:  975 mg on 11/8/2018  6:01 AM   Next Dose Due:  Anytime.                                       albuterol (2.5 MG/3ML) 0.083% neb solution   Take 1 vial (2.5 mg) by nebulization every 6 hours as needed for shortness of breath / dyspnea or wheezing 1 vial used in clinic today.   Notes to Patient:  Resume per home schedule.                                   alendronate 70 MG tablet   Commonly known as:  FOSAMAX   TAKE 1 TABLET (70 MG) BY MOUTH EVERY 7 DAYS. TAKE 60 MINUTES BEFORE MORNING MEAL WITH 8 OZ. WATER. REMAIN UPRIGHT FOR 30 MINUTES.   Notes to Patient:  Resume per home schedule.                                amLODIPine 10 MG tablet   Commonly known as:  NORVASC   TAKE 1 TABLET (10 MG) BY MOUTH DAILY   Last time this was given:  10 mg on 11/8/2018  8:14 AM   Next Dose Due:  Tomorrow morning. 11/9.                                   aspirin 81 MG tablet   Take 1 tablet (81 mg) by mouth daily   Notes to Patient:  Resume per home schedule.                                atorvastatin 40 MG tablet   Commonly known as:  LIPITOR   Take 1 tablet (40 mg) by mouth daily   Last time this was given:  40 mg on 11/8/2018  8:15 AM    Next Dose Due:  Tomorrow morning. 11/9.                                   blood glucose monitoring lancets   Use to test blood sugar 3 times daily or as directed.                                blood glucose monitoring test strip   Commonly known as:  MUSA CONTOUR NEXT   Use to test blood sugar 3 times daily or as directed.                                carvedilol 25 MG tablet   Commonly known as:  COREG   TAKE 1 TABLET (25 MG) BY MOUTH 2 TIMES DAILY WITH MEALS   Last time this was given:  25 mg on 11/8/2018  5:19 PM   Next Dose Due:  Tomorrow, 11/9.                                      clobetasol propionate 0.05 % Liqd   Externally apply 1 dose. topically 2 times daily as needed   Notes to Patient:  Resume per home schedule.                                   diazepam 5 MG tablet   Commonly known as:  VALIUM   Take 1 tablet (5 mg) by mouth every 6 hours as needed for anxiety or muscle spasms   Last time this was given:  5 mg on 11/6/2018  9:18 AM   Next Dose Due:  Anytime.                                    fenofibrate 54 MG tablet   Take 1 tablet (54 mg) by mouth daily   Last time this was given:  54 mg on 11/8/2018  8:15 AM   Next Dose Due:  Tomorrow morning. 11/9.                                   HYDROmorphone 2 MG tablet   Commonly known as:  DILAUDID   Take 1-2 tablets (2-4 mg) by mouth every 4 hours as needed   Last time this was given:  2 mg on 11/8/2018  2:29 PM                                   insulin aspart 100 UNIT/ML injection   Commonly known as:  NovoLOG FLEXPEN   22 units before breakfast, 26 units before lunch, 20 units before dinner   Last time this was given:  10 Units on 11/8/2018  5:20 PM                                         insulin detemir 100 UNIT/ML injection   Commonly known as:  LEVEMIR FLEXTOUCH   Inject 41 Units Subcutaneous 2 times daily   Last time this was given:  41 Units on 11/8/2018  5:20 PM                                      insulin pen needle 32G X 4 MM   Use 5 pen  needles daily or as directed.  Per insurance and patient preference                                Levothyroxine Sodium 150 MCG Caps   Take 150 mcg by mouth daily (with breakfast)   Notes to Patient:  Resume per home schedule.                                    losartan 100 MG tablet   Commonly known as:  COZAAR   Take 1 tablet (100 mg) by mouth daily   Last time this was given:  100 mg on 11/8/2018  8:14 AM   Notes to Patient:  Tomorrow morning. 11/9.                                   PARoxetine 20 MG tablet   Commonly known as:  PAXIL   Take 1 tablet (20 mg) by mouth 2 times daily   Last time this was given:  20 mg on 11/8/2018  8:15 AM   Next Dose Due:  This evening. 11/8.                                      primidone 50 MG tablet   Commonly known as:  MYSOLINE   TAKE ONE TABLET(S) BY MOUTH TWICE DAILY   Last time this was given:  50 mg on 11/7/2018  9:46 PM   Next Dose Due:  Tonight, 11/8.

## 2018-11-05 NOTE — OP NOTE
Operative Report      PREOPERATIVE DIAGNOSIS:  1. Grade 1 spondylolisthesis at L4-5 with severe left-sided stenosis and a cystic mass compressing the L5 root    POSTOPERATIVE DIAGNOSIS:   1. Progression to grade 2 spondylolisthesis with prone positioning     PROCEDURE:    1. Minimally invasive Left L4-5 transforaminal lumbar interbody fusion using the Medtronic Voyager/MetRx tubular system (far lateral hemilaminectomy with complete facetectomy and interpedicular decompression and exposure of the L4 exiting root and the L5 traversing root with complete discectomy and arthrodesis and placement of a 7-11 x 23 mm Medtronic Elevate expandable interbody graft with locally harvested bone and crushed cancellous bone placed centrally and anterior to graft.  2. Resection of synovial cystic mass adjacent to the L5 root  3. Placement of Medtronic Voyager percutaneous pedicle screws from L4-5 bilaterally  4. Open reduction of L4-5 grade 2 spondylolisthesis    5. Use of Stealth and O-arm intraoperative neuronavigation  6. Use of microscope and C-arm fluoroscopy    ASSISTANT: Fletcher Cash    INDICATION FOR PROCEDURE: The patient is a 68  year old female that presented with the above complaints and  lumbar radiculopathy and low back pain. After reviewing the imaging studies and examination, the decision was made to proceed with the above procedure. The patient understood the risk of surgery to be infection, CSF leak, nerve root injury, failure of improvement of his symptoms. The patient voiced understanding and wanted to proceed.      DESCRIPTION OF PROCEDURE: The patient was seen in the pre op area and the procedure was discussed with the patient and family once again and all questions were answered. The consent was then signed and the lumbar spine was marked with a marker. The patient was transported to the operating room on a stretcher and received general endotracheal anesthesia. The patient was placed on the operating table in  the prone position on the Joel frame with all pressure points padded. The planned operative area of the back was prepped and draped in normal sterile fashion. The Stealth reference frame was placed on the spinous process of L4 and the O-arm images were then taken. The Stealth was registered and the L45 level were identified and the planned trajectory of the pedicle screws was performed with navigation. Local anesthesia was then injected along the planned incisions bilaterally. I then planned two 26 mm incision along the planned trajectory of the pedicle screws which were approximately 4 cm bilateral of midline.    A 10 blade scalpel was used to make a linear incision with dissection down through the subcutaneous tissue to the fascia which was then incised bilaterally with the Bovie cautery. Next, the navigated Medtronic Voyager screws were then placed down the pedicles of L4 and L5 bilaterally. The sequential tubular dilators were then advance down on the left side under stereotactic guidance until the 8 cm MetRx tube was docked on the L4 lamina and facet joint. The MetRx tube was then fixed to the operating table and stereotactic navigation was used to verify the appropriate level. Subperiosteal dissection exposed the lamina facet joint at L4-5. The Midas Jey drill and the Kerrison rongeur were used to perform the hemilaminectomy, facetectomy, foraminotomy, and to remove the ligamentum flavum. The thecal sac and L4 and L5 nerve roots were identified in Kambin's triangle. The disk was identified and the annulus was then incised with the 11 blade knife and the disk was then removed with the endplate rolando size 7-8 mm, Jazmine curette and also the pituitary rongeur. The nerve roots were then noted to be decompressed and the long ball hook was used to verify that. Resection of a small synovial cystic mass from the left L5 root was then performed with meticulous dissection of the cyst from the root. A 7-11 x 23 mm  Medtronic Elevate expandable interbody graft with locally harvested autologous bone and crushed cancellous bone were placed both centrally in the graft and anterior to the graft in the L4-5 interspace. The graft was then expanded to the appropriate size. The wound was then irrigated and hemostasis was obtained with Surgiflo and bipolar cautery. Next, 5 cc of Evicel was also placed over the dura. The MetRx tube was then removed and hemostasis was applied with bipolar cautery.   The connecting rods were measured and placed down the tubular system into the L4 to L5 pedicle screws. The locking caps were then placed and secured with the counter torque system. Open reduction and internal fixation of the L4-5 grade 2 spondylolisthesis was then performed. Copious irrigation was performed with saline. The fascia was then closed with 2-0 Vicryl, the subcutaneous tissue with 3-0 Vicryl, and the skin closed with staples. The patient was then transported back to the stretcher, extubated, and sent to recovery.     At the end of the case, all counts were correct.      No complications.      ESTIMATED BLOOD LOSS: 20 ml     IV FLUID: See Anesthesia     The patient received Ancef preoperatively and Vancomycin powder in the wound    Hernando Hill MD, MS, FAANS

## 2018-11-05 NOTE — OR NURSING
After the procedure the patient has blisters to chest and redness to chest lower abdomen, and knees. Red areas are blanchable. Informed the first assist of blisters. No orders received.

## 2018-11-05 NOTE — ANESTHESIA PREPROCEDURE EVALUATION
PAC NOTE:       ANESTHESIA PRE EVALUATION:  Anesthesia Evaluation     . Pt has had prior anesthetic. Type: General    No history of anesthetic complications          ROS/MED HX    ENT/Pulmonary:     (+)Intermittent asthma COPD, , . .    Neurologic:  - neg neurologic ROS     Cardiovascular:     (+) hypertension----. : . . . :. .       METS/Exercise Tolerance:     Hematologic:  - neg hematologic  ROS       Musculoskeletal:  - neg musculoskeletal ROS       GI/Hepatic:  - neg GI/hepatic ROS       Renal/Genitourinary:     (+) chronic renal disease,       Endo:     (+) type II DM thyroid problem hypothyroidism, Obesity, .      Psychiatric:  - neg psychiatric ROS       Infectious Disease:  - neg infectious disease ROS       Malignancy:      - no malignancy   Other:    - neg other ROS                 Physical Exam  Normal systems: cardiovascular and pulmonary    Airway   Mallampati: III  TM distance: >3 FB  Neck ROM: full    Dental   (+) chipped and missing  Comment: Poor repair    Cardiovascular       Pulmonary              Anesthesia Plan      History & Physical Review  History and physical reviewed and following examination; no interval change.    ASA Status:  3 .    NPO Status:  > 8 hours    Plan for General and ETT with Intravenous and Propofol induction. Maintenance will be Balanced.    PONV prophylaxis:  Ondansetron (or other 5HT-3)       Postoperative Care  Postoperative pain management:  Oral pain medications.      Consents  Anesthetic plan, risks, benefits and alternatives discussed with:  Patient or representative and Patient..                            .

## 2018-11-05 NOTE — CONSULTS
[facility]    Hospitalist Consult Note    Name: Yadi Iniguez    MRN: [id number]  YOB: 1950    Age: 68 year old  Date of admission: 11/5/2018  Primary care provider: Sandy Root         Requesting Physician:  Dr Hill     Reason for consult:  Postop medical management         Assessment:      Yadi Iniguez is a 68 year old female  With h/o DM, COPD , HTN and CKD who was admitted for elective back surgery     Date of procedure : 11/05/2018      #1.POD#0 Following back fusion by Dr Hill   -- stable psotop , pain controlled on PCA     #2.DM -insulin requiring PTA   -- BG controlled at goal <180       #3.HTN -controlled , on home med PTA     #4.COPD - no excerbation       Recommendations:    1.Continue Postop pain control  2.I recommend some form of VTE prophylaxis and specific method deferred to primary team  3.Home medications reviewed and continued as appropriate   4.Diet as tolerated   5.Monitor Hgb closely,assess postop anemia and iron repletion for asymptomatic anemia and blood transfusion for symptomatic anemia or if hgb is less than 7  6.Physical therapy and early mobility recommended   7.Discharge planning per primary team   8.Encourage Incentive Spirometry and oxygen as needed   9.Additional recommendations:  --mobiolize early to prevent DVT   -- ADAT   -- Start PTA novolog at 20 units tid premeal , levemir tonight, add sliding scale insulin                    History of Present Illness:     Yadi Iniguez is a 68 year old female who was admitted for elective back surgery   Date of procedure:11/05/18  I was asked to consult for postop medical management   Patient seen and examined,preop H&P, operative records,PTA medications and staff note reviewed.Eastimated blood loss:20 ml    Patients current complaint- none     Patient has no chest pain ,Shortness of breath or fever.    Pain control is optimal on current medications.                  Past Medical History:     Past  Medical History:   Diagnosis Date     Arthritis 1975     Broken ribs 8/28/2016    3,4,5,6,7, left side     Cancer (H) 2013    Skin cancer     COPD (chronic obstructive pulmonary disease) (H) 08/2017     Depression      Depressive disorder 1988     Dysuria      Glycosuria      Hypertension 1980     Mixed incontinence urge and stress      Other and unspecified hyperlipidemia      Right shoulder pain      Type II or unspecified type diabetes mellitus without mention of complication, uncontrolled      Uncomplicated asthma 2017     Unspecified hypothyroidism              Past Surgical History:     Past Surgical History:   Procedure Laterality Date     ABDOMEN SURGERY  1978    Gall Bladder  January       hysterectomy  Sept     BIOPSY  2013 1993    from left corner of mouth       muscule biopsy     C VAGINAL HYSTERECTOMY       CATARACT IOL, RT/LT Bilateral     2006     CHOLECYSTECTOMY, OPEN       CL AFF SURGICAL PATHOLOGY       COLONOSCOPY  2007     COLONOSCOPY N/A 6/22/2018    Procedure: COMBINED COLONOSCOPY, SINGLE OR MULTIPLE BIOPSY/POLYPECTOMY BY BIOPSY;;  Surgeon: David Parson MD;  Location: [facility] OR     COLONOSCOPY WITH CO2 INSUFFLATION N/A 6/22/2018    Procedure: COLONOSCOPY WITH CO2 INSUFFLATION;  Colonscopy, ;  Surgeon: David Parson MD;  Location: [facility] OR     EYE SURGERY  2014    ongoing     EYE SURGERY Right 2018     HEAD & NECK SURGERY  2006    Removal of saliva gland left side     PROPHYLACTIC REPAIR RETINAL BREAK, PNEUMATIC RETINOPEXY, COMBINED Left 9/2/2016     SOFT TISSUE SURGERY  1996    Carpal Tunnel left wrist               Social History:     Social History   Substance Use Topics     Smoking status: Former Smoker     Packs/day: 0.50     Years: 50.00     Types: Cigarettes     Start date: 1/1/1967     Quit date: 8/8/2017     Smokeless tobacco: Never Used      Comment: former smoker     Alcohol use Yes      Comment: Maybe a drink every few months             Family History:     Family  History   Problem Relation Age of Onset     Musculoskeletal Disorder Mother      MD     Muscular Disorder Mother      Depression Mother      Osteoporosis Mother      Obesity Mother      Cataracts Mother      Genetic Disorder Mother      MD     Cancer Father      mesothelioma     Obesity Father      HEART DISEASE Brother      angioplasty     Neurologic Disorder Brother      ms     Diabetes Brother      Hypertension Brother      Hyperlipidemia Brother      Obesity Brother      4 brothers     Depression Paternal Grandmother      Obesity Paternal Grandmother      Genetic Disorder Brother      MS     Diabetes Brother      Hypertension Brother      Genetic Disorder Brother      MD     Hyperlipidemia Brother      Hypertension Brother      Genetic Disorder Brother      MS             Allergies:   No Known Allergies          Medications:       acetaminophen  975 mg Oral Q8H     amLODIPine  10 mg Oral Daily     atorvastatin  40 mg Oral Daily     carvedilol  25 mg Oral BID w/meals     ceFAZolin  2 g Intravenous Q8H     docusate sodium  100 mg Oral BID     ranitidine  150 mg Oral Q12H    Or     famotidine  20 mg Intravenous Q12H     [START ON 11/6/2018] fenofibrate  54 mg Oral Daily     insulin aspart  20 Units Subcutaneous TID w/meals     insulin aspart  1-7 Units Subcutaneous TID AC     insulin aspart  1-5 Units Subcutaneous At Bedtime     insulin detemir  41 Units Subcutaneous BID     [START ON 11/6/2018] levothyroxine  150 mcg Oral Daily with breakfast     losartan  100 mg Oral Daily     PARoxetine  20 mg Oral BID     primidone  50 mg Oral At Bedtime     sodium chloride (PF)  3 mL Intracatheter Q8H             Review of Systems:   A comprehensive greater than 10 system review of systems was carried out.  Pertinent positives and negatives are noted above.  Otherwise negative for contributory info.            Physical Exam:     All vitals have been reviewed  Blood pressure 153/66, pulse 66, temperature 95.6  F (35.3  C),  "temperature source Oral, resp. rate 11, height 1.727 m (5' 8\"), weight 117 kg (258 lb), SpO2 98 %, not currently breastfeeding.  I/O last 3 completed shifts:  In: 2620 [P.O.:120; I.V.:2500]  Out: 370 [Urine:350; Blood:20]  Constitutional:   awake, alert, cooperative and no apparent distress     Neck:   skin normal and no stridor     Lungs:   no increased work of breathing, good air exchange, no retractions and clear to auscultation     Cardiovascular:   normal apical pulses  and normal S1 and S2     Abdomen:   normal bowel sounds, soft and non-distended     Neurologic:   Mental Status Exam:  Level of Alertness:   awake  Cranial Nerves:  cranial nerves II-XII are grossly intact  Motor Exam:  moves all extremities well and symmetrically  Sensory:  Sensory intact          Data:        All laboratory and imaging data in the past 24 hours reviewed   All laboratory and imaging data in the past 24 hours reviewed       Recent Labs  Lab 11/05/18  0625   HGB 12.0     No results for input(s): CULT in the last 168 hours.  No results for input(s): NA, POTASSIUM, CHLORIDE, CO2, ANIONGAP, GLC, BUN, CR, GFRESTIMATED, GFRESTBLACK, NATACHA, MAG, PHOS, PROTTOTAL, ALBUMIN, BILITOTAL, ALKPHOS, AST, ALT in the last 168 hours.      Recent Labs  Lab 11/05/18  1130 11/05/18  0556   * 146*           No results for input(s): INR in the last 168 hours.        No results for input(s): TROPONIN, TROPI, TROPR in the last 168 hours.    Invalid input(s): TROP, TROPONINIES    Recent Results (from the past 48 hour(s))   XR Lumbar Spine Port 1 View    Narrative    This exam was marked as non-reportable because it will not be read by a   radiologist or a [facility] non-radiologist provider.             XR Surgery LINDA L/T 5 Min Fluoro w Stills    Narrative    This exam was marked as non-reportable because it will not be read by a   radiologist or a [facility] non-radiologist provider.                     "

## 2018-11-06 ENCOUNTER — APPOINTMENT (OUTPATIENT)
Dept: PHYSICAL THERAPY | Facility: CLINIC | Age: 68
DRG: 460 | End: 2018-11-06
Attending: NEUROLOGICAL SURGERY
Payer: COMMERCIAL

## 2018-11-06 DIAGNOSIS — Z98.1 S/P LUMBAR FUSION: Primary | ICD-10-CM

## 2018-11-06 LAB
GLUCOSE BLDC GLUCOMTR-MCNC: 111 MG/DL (ref 70–99)
GLUCOSE BLDC GLUCOMTR-MCNC: 120 MG/DL (ref 70–99)
GLUCOSE BLDC GLUCOMTR-MCNC: 123 MG/DL (ref 70–99)
GLUCOSE BLDC GLUCOMTR-MCNC: 132 MG/DL (ref 70–99)
GLUCOSE BLDC GLUCOMTR-MCNC: 83 MG/DL (ref 70–99)
GLUCOSE SERPL-MCNC: 143 MG/DL (ref 70–99)

## 2018-11-06 PROCEDURE — 12000000 ZZH R&B MED SURG/OB

## 2018-11-06 PROCEDURE — 36415 COLL VENOUS BLD VENIPUNCTURE: CPT | Performed by: INTERNAL MEDICINE

## 2018-11-06 PROCEDURE — 25000132 ZZH RX MED GY IP 250 OP 250 PS 637: Performed by: NEUROLOGICAL SURGERY

## 2018-11-06 PROCEDURE — 97161 PT EVAL LOW COMPLEX 20 MIN: CPT | Mod: GP

## 2018-11-06 PROCEDURE — 25000132 ZZH RX MED GY IP 250 OP 250 PS 637: Performed by: PHYSICIAN ASSISTANT

## 2018-11-06 PROCEDURE — 97116 GAIT TRAINING THERAPY: CPT | Mod: GP

## 2018-11-06 PROCEDURE — 40000193 ZZH STATISTIC PT WARD VISIT

## 2018-11-06 PROCEDURE — 82947 ASSAY GLUCOSE BLOOD QUANT: CPT | Performed by: INTERNAL MEDICINE

## 2018-11-06 PROCEDURE — 99232 SBSQ HOSP IP/OBS MODERATE 35: CPT | Performed by: INTERNAL MEDICINE

## 2018-11-06 PROCEDURE — 00000146 ZZHCL STATISTIC GLUCOSE BY METER IP

## 2018-11-06 PROCEDURE — 25000128 H RX IP 250 OP 636: Performed by: NEUROLOGICAL SURGERY

## 2018-11-06 PROCEDURE — 97530 THERAPEUTIC ACTIVITIES: CPT | Mod: GP

## 2018-11-06 RX ORDER — DIAZEPAM 5 MG
5 TABLET ORAL EVERY 6 HOURS PRN
Status: DISCONTINUED | OUTPATIENT
Start: 2018-11-06 | End: 2018-11-08 | Stop reason: HOSPADM

## 2018-11-06 RX ADMIN — POTASSIUM CHLORIDE AND SODIUM CHLORIDE: 900; 150 INJECTION, SOLUTION INTRAVENOUS at 05:00

## 2018-11-06 RX ADMIN — CEFAZOLIN SODIUM 2 G: 2 INJECTION, SOLUTION INTRAVENOUS at 08:47

## 2018-11-06 RX ADMIN — CEFAZOLIN SODIUM 2 G: 2 INJECTION, SOLUTION INTRAVENOUS at 23:59

## 2018-11-06 RX ADMIN — PRIMIDONE 50 MG: 50 TABLET ORAL at 22:19

## 2018-11-06 RX ADMIN — PAROXETINE HYDROCHLORIDE 20 MG: 20 TABLET, FILM COATED ORAL at 20:38

## 2018-11-06 RX ADMIN — DIAZEPAM 5 MG: 5 TABLET ORAL at 09:18

## 2018-11-06 RX ADMIN — RANITIDINE 150 MG: 150 TABLET ORAL at 18:03

## 2018-11-06 RX ADMIN — ACETAMINOPHEN 975 MG: 325 TABLET, FILM COATED ORAL at 13:27

## 2018-11-06 RX ADMIN — INSULIN DETEMIR 41 UNITS: 100 INJECTION, SOLUTION SUBCUTANEOUS at 17:09

## 2018-11-06 RX ADMIN — ACETAMINOPHEN 975 MG: 325 TABLET, FILM COATED ORAL at 05:06

## 2018-11-06 RX ADMIN — FENOFIBRATE 54 MG: 54 TABLET ORAL at 08:39

## 2018-11-06 RX ADMIN — LOSARTAN POTASSIUM 100 MG: 100 TABLET ORAL at 08:39

## 2018-11-06 RX ADMIN — DOCUSATE SODIUM 100 MG: 100 CAPSULE, LIQUID FILLED ORAL at 08:39

## 2018-11-06 RX ADMIN — HYDROMORPHONE HYDROCHLORIDE 2 MG: 2 TABLET ORAL at 13:27

## 2018-11-06 RX ADMIN — HYDROMORPHONE HYDROCHLORIDE 2 MG: 2 TABLET ORAL at 11:35

## 2018-11-06 RX ADMIN — AMLODIPINE BESYLATE 10 MG: 10 TABLET ORAL at 08:39

## 2018-11-06 RX ADMIN — RANITIDINE 150 MG: 150 TABLET ORAL at 05:06

## 2018-11-06 RX ADMIN — INSULIN DETEMIR 41 UNITS: 100 INJECTION, SOLUTION SUBCUTANEOUS at 08:39

## 2018-11-06 RX ADMIN — LEVOTHYROXINE SODIUM 150 MCG: 150 TABLET ORAL at 08:39

## 2018-11-06 RX ADMIN — ACETAMINOPHEN 975 MG: 325 TABLET, FILM COATED ORAL at 22:19

## 2018-11-06 RX ADMIN — CEFAZOLIN SODIUM 2 G: 2 INJECTION, SOLUTION INTRAVENOUS at 00:07

## 2018-11-06 RX ADMIN — CARVEDILOL 25 MG: 25 TABLET, FILM COATED ORAL at 08:39

## 2018-11-06 RX ADMIN — PAROXETINE HYDROCHLORIDE 20 MG: 20 TABLET, FILM COATED ORAL at 08:39

## 2018-11-06 RX ADMIN — DOCUSATE SODIUM 100 MG: 100 CAPSULE, LIQUID FILLED ORAL at 20:38

## 2018-11-06 RX ADMIN — CEFAZOLIN SODIUM 2 G: 2 INJECTION, SOLUTION INTRAVENOUS at 17:01

## 2018-11-06 RX ADMIN — ATORVASTATIN CALCIUM 40 MG: 40 TABLET, FILM COATED ORAL at 08:39

## 2018-11-06 RX ADMIN — CARVEDILOL 25 MG: 25 TABLET, FILM COATED ORAL at 18:03

## 2018-11-06 ASSESSMENT — ACTIVITIES OF DAILY LIVING (ADL)
ADLS_ACUITY_SCORE: 12
ADLS_ACUITY_SCORE: 10

## 2018-11-06 NOTE — PROGRESS NOTES
"  [facility]  Hospitalist Progress Note  Velasquez Gutierrez MD 11/06/2018        Reason for hospital stay:  elective back surgery        Brief Summary of Stay       Yadi Iniguez is a 68 year old female  With h/o DM, COPD , HTN and CKD who was admitted for elective back surgery       Assessment and Plan          #1.POD#1- s/p following back fusion by Dr Hill   -- stable , pain controlled   -- plan to d/c PCA     #2.Postop pain:Controlled     #3.Postop hypoxia- resolved     #4.Postop acute blood loss anemia:none          Hemoglobin   Date Value Ref Range Status   11/05/2018 12.0 11.7 - 15.7 g/dL Final   ]    #5.Acute and Chronic medical conditions.       A.DM -insulin requiring PTA   -- BG controlled at goal <180   -- continue current med           B.HTN -controlled , on home med PTA        C.COPD - no excerbation             DVT Prophylaxis: Defer to primary service    Code Status: Full Code    Discharge Dispo: per primary     Estimated Disch Date / # of Days until Disch: per primary             Interval History (Subjective):        Patient is seen and examined by me today and medical record reviewed.Overnight events noted and care discussed with nursing staff.    doing well; no cp, sob, n/v/d, or abd pain.                            Physical Exam:        Last Vital Signs:  /55 (BP Location: Right arm)  Pulse 67  Temp 99.3  F (37.4  C) (Oral)  Resp 16  Ht 1.727 m (5' 8\")  Wt 117 kg (258 lb)  SpO2 98%  BMI 39.23 kg/m2    I/O last 3 completed shifts:  In: 4179 [P.O.:970; I.V.:3209]  Out: 1370 [Urine:1350; Blood:20]  Wt Readings from Last 1 Encounters:   11/05/18 117 kg (258 lb)     Vitals:    11/05/18 0555   Weight: 117 kg (258 lb)                Physical Exam:   Blood pressure 137/55, pulse 67, temperature 99.3  F (37.4  C), temperature source Oral, resp. rate 16, height 1.727 m (5' 8\"), weight 117 kg (258 lb), SpO2 98 %, not currently breastfeeding.    Constitutional:   awake, alert and " cooperative     Neck:   supple, symmetrical, trachea midline, skin normal and no stridor     Lungs:   no increased work of breathing, good air exchange, no retractions and clear to auscultation     Cardiovascular:   normal apical pulses  and normal S1 and S2     Abdomen:   normal bowel sounds and soft     Neurologic:   Mental Status Exam:  Level of Alertness:   awake            Medications:      All current medications were reviewed with changes reflected in problem list.    Current Facility-Administered Medications   Medication     0.9% sodium chloride + KCl 20 mEq/L infusion     [START ON 11/8/2018] acetaminophen (TYLENOL) tablet 650 mg     acetaminophen (TYLENOL) tablet 975 mg     albuterol neb solution 2.5 mg     amLODIPine (NORVASC) tablet 10 mg     atorvastatin (LIPITOR) tablet 40 mg     benzocaine-menthol (CHLORASEPTIC) 6-10 MG lozenge 1 lozenge     carvedilol (COREG) tablet 25 mg     ceFAZolin (ANCEF) intermittent infusion 2 g in 100 mL dextrose PRE-MIX     glucose gel 15-30 g    Or     dextrose 50 % injection 25-50 mL    Or     glucagon injection 1 mg     diazepam (VALIUM) tablet 5 mg     diphenhydrAMINE (BENADRYL) solution 12.5 mg    Or     diphenhydrAMINE (BENADRYL) injection 12.5 mg     docusate sodium (COLACE) capsule 100 mg     ranitidine (ZANTAC) tablet 150 mg    Or     famotidine (PEPCID) injection 20 mg     fenofibrate tablet 54 mg     HYDROmorphone (DILAUDID) PCA 1 mg/mL OPIOID NAIVE     HYDROmorphone (DILAUDID) tablet 2-4 mg     HYDROmorphone (PF) (DILAUDID) injection 0.3-0.5 mg     insulin aspart (NovoLOG) inj (RAPID ACTING)     insulin aspart (NovoLOG) inj (RAPID ACTING)     insulin aspart (NovoLOG) inj (RAPID ACTING)     insulin detemir (LEVEMIR) injection 41 Units     levothyroxine (SYNTHROID/LEVOTHROID) tablet 150 mcg     lidocaine (LMX4) cream     lidocaine 1 % 1 mL     losartan (COZAAR) tablet 100 mg     metoclopramide (REGLAN) tablet 5 mg    Or     metoclopramide (REGLAN) injection 5 mg      naloxone (NARCAN) injection 0.1-0.4 mg     naloxone (NARCAN) injection 0.1-0.4 mg     ondansetron (ZOFRAN-ODT) ODT tab 4 mg    Or     ondansetron (ZOFRAN) injection 4 mg     oxyCODONE IR (ROXICODONE) tablet 5-10 mg     PARoxetine (PAXIL) tablet 20 mg     primidone (MYSOLINE) tablet 50 mg     prochlorperazine (COMPAZINE) injection 5 mg    Or     prochlorperazine (COMPAZINE) tablet 5 mg     sodium chloride (PF) 0.9% PF flush 3 mL     sodium chloride (PF) 0.9% PF flush 3 mL            Data:         Labs:  All laboratory and imaging data in the past 24 hours reviewed   All laboratory and imaging data in the past 24 hours reviewed       Recent Labs  Lab 11/05/18  0625   HGB 12.0     No results for input(s): CULT in the last 168 hours.    Recent Labs  Lab 11/06/18  1138   *         Recent Labs  Lab 11/06/18  1138 11/06/18  0700 11/06/18  0201 11/05/18  2137 11/05/18  1731 11/05/18  1130   *  --   --   --   --   --    BGM  --  111* 83 93 150* 171*           No results for input(s): INR in the last 168 hours.        No results for input(s): TROPONIN, TROPI, TROPR in the last 168 hours.    Invalid input(s): TROP, TROPONINIES    Recent Results (from the past 48 hour(s))   XR Lumbar Spine Port 1 View    Narrative    This exam was marked as non-reportable because it will not be read by a   radiologist or a [facility] non-radiologist provider.             XR Surgery LINDA L/T 5 Min Fluoro w Stills    Narrative    This exam was marked as non-reportable because it will not be read by a   radiologist or a [facility] non-radiologist provider.                         Imaging:   No results found for this or any previous visit (from the past 24 hour(s)).                  Disclaimer: This note consists of symbols derived from keyboarding, dictation and/or voice recognition software. As a result, there may be errors in the script that have gone undetected. Please consider this when interpreting information found in this  chart

## 2018-11-06 NOTE — PLAN OF CARE
Problem: Patient Care Overview  Goal: Plan of Care/Patient Progress Review  PT: Orders received, evaluation completed and treatment initiated. 68 year old female s/p L4-5 lumabr interbody fusion. Pt reports independence with household mobility PTA, mod I with a straight cane in the community. Pt lives alone in a mobile home with 4 stairs (one rail) to enter. Pt has a cane. Daughter, who is an RN, can come to stay with pt for the first week. Pt reports she struggled with sit to/from stand transfers prior to admission.     Discharge Planner PT   Patient plan for discharge: Home with daughter assist  Current status: Educated on spinal precautions. Sidelying to sit with min A and cues. Not able to don brace in sitting due to body habitus. Needed to max A to don in standing. Orthotics called post session. Trialed STS from standard bed height. Pt not able to perform. Needs almost max elevation and mod A to perform. Ambulates 100' with FWW and CGA. Mod UE and anterior lean on FWW. Sit to supine with mod A.   Barriers to return to prior living situation: Level of A with STS transfers, stairs, impaired balance  Recommendations for discharge: Home with daughter assist and FWW pending improved ability to perform STS transfers. If patient continues to need heavy assist and elevated surfaces, will need to consider TCU.   Rationale for recommendations: Pt needing heavy assist with STS transfers this session. Will continue IP PT to address strength, balance and activity tolerance deficits.        Entered by: Manisha Altman 11/06/2018 12:09 PM

## 2018-11-06 NOTE — PROGRESS NOTES
11/06/18 1000   Quick Adds   Type of Visit Initial PT Evaluation   Living Environment   Lives With alone   Living Arrangements mobile home   Home Accessibility tub/shower is not walk in;stairs to enter home   Number of Stairs to Enter Home 4  (one rail)   Number of Stairs Within Home 0   Living Environment Comment Daughter (RN) going to come and stay with pt for the first week   Self-Care   Usual Activity Tolerance fair  (not able to ambulate community distances due to back pain)   Current Activity Tolerance fair   Equipment Currently Used at Home shower chair;cane, straight  (Comfort height toilet. )   Functional Level Prior   Ambulation 0-->independent  (Cane outside, nothing indoors. )   Transferring 0-->independent   Toileting 0-->independent   Bathing 0-->independent   Dressing 0-->independent   Fall history within last six months no   General Information   Onset of Illness/Injury or Date of Surgery - Date 11/05/18   Referring Physician Sergio HORNER   Patient/Family Goals Statement Return home   Pertinent History of Current Problem (include personal factors and/or comorbidities that impact the POC) 68 year old female s/p L4-5 lumabr interbody fusion.    Precautions/Limitations fall precautions;spinal precautions   General Info Comments Brace when OOB   Cognitive Status Examination   Orientation orientation to person, place and time   Level of Consciousness alert   Pain Assessment   Patient Currently in Pain Yes, see Vital Sign flowsheet  (3/10)   Range of Motion (ROM)   ROM Comment UE/LE AROM WFLs.    Strength   Strength Comments Pt needs physical assist with mobility as noted below.    Bed Mobility   Bed Mobility Comments Sidelying to sit with min A   Transfer Skills   Transfer Comments Sit to/from stand with mod A and elevated bed height   Gait   Gait Comments Ambulates 3' with FWW and CGA   Balance   Balance Comments Requires bilateral UE support on FWW for safe mobility   Sensory Examination   Sensory  "Perception Comments Numbness in back and medial L knee.    General Therapy Interventions   Planned Therapy Interventions bed mobility training;gait training;ROM;strengthening;transfer training;home program guidelines;progressive activity/exercise   Clinical Impression   Criteria for Skilled Therapeutic Intervention yes, treatment indicated   PT Diagnosis Impaired gait   Influenced by the following impairments Pain, weakness, impaired balance, decreased activity tolerance.    Functional limitations due to impairments Decreased independence with functional mobility   Clinical Presentation Stable/Uncomplicated   Clinical Presentation Rationale Stable.    Clinical Decision Making (Complexity) Low complexity   Therapy Frequency` 2 times/day   Predicted Duration of Therapy Intervention (days/wks) 3 days   Anticipated Equipment Needs at Discharge walker   Anticipated Discharge Disposition Home with Assist;Transitional Care Facility   Risk & Benefits of therapy have been explained Yes   Patient, Family & other staff in agreement with plan of care Yes   [email or web address] \"6 Clicks\"   2016, Trustees of [facility], under license to [employer].  All rights reserved.   6 Clicks Short Forms Basic Mobility Inpatient Short Form   [facility]  \"6 Clicks\" V.2 Basic Mobility Inpatient Short Form   1. Turning from your back to your side while in a flat bed without using bedrails? 3 - A Little   2. Moving from lying on your back to sitting on the side of a flat bed without using bedrails? 3 - A Little   3. Moving to and from a bed to a chair (including a wheelchair)? 2 - A Lot   4. Standing up from a chair using your arms (e.g., wheelchair, or bedside chair)? 2 - A Lot   5. To walk in hospital room? 3 - A Little   6. Climbing 3-5 steps with a railing? 2 - A Lot   Basic Mobility Raw Score (Score out of 24.Lower scores equate to lower levels of function) 15   Total Evaluation Time   Total Evaluation Time " (Minutes) 9

## 2018-11-06 NOTE — PLAN OF CARE
Problem: Patient Care Overview  Goal: Plan of Care/Patient Progress Review  Outcome: Improving  A&O x4. Up w/ Ax2 w/ walker and corset brace. Did not get up overnight. On 2 L O2 w/capnography. BS hypoactive, denies passing flatus. Tolerating mod carb diet well. Lord cath in place and draining adequate amts. CMS intact ex: bilateral toes numbness baseline. IVF infusing, given ancef. Dressing C/D/I. Pain managed w/PCA Dilaudid and scheduled Tylenol. Plans to go home w/assist from daughter at discharge.

## 2018-11-06 NOTE — PROGRESS NOTES
"[facility]    Neurosurgery Progress Note    Date of Service (when I saw the patient): 11/06/2018     Assessment & Plan   Yadi Iniguez is a 68 year old female who was admitted on 11/5/2018. The pt presented with lumbar radicular pain.  She was found to have Grade 1 spondylolisthesis at L4-5 with severe left-sided stenosis and a cystic mass compressing the L5 root.  Spondylolisthesis increased to grade 2 with prone position.  The pt underwent a minimally invasive left L4-5 TLIF with Dr. Hernando Hill on 11-5-2018.  Today pt is lying in bed. She notes that she feels \"wonderful\". She notes only incisional pain. She will work with therapies today and switch to oral pain control.      Active Problems:    S/P lumbar fusion    Assessment: stable     Plan: PT/OT and ambulation  Brace when out of bed   Wean off PCA and start oral analgesics   Encourage use of IS and deep breathing   Suture/Staple removal in 10-14 days         I have discussed the following assessment and plan Dr. Hernando Hill  who is in agreement with initial plan and will follow up with further consultation recommendations.    Valerie Chamberlain [facility]  [facility]  [place] [address]    Tel [phone]  Pager [phone]      Interval History   Stable     Physical Exam   Temp: 99.3  F (37.4  C) Temp src: Oral BP: 137/55 Pulse: 67 Heart Rate: 84 Resp: 16 SpO2: 98 % O2 Device: Nasal cannula Oxygen Delivery: 2 LPM  Vitals:    11/05/18 0555   Weight: 258 lb (117 kg)     Vital Signs with Ranges  Temp:  [95.6  F (35.3  C)-99.4  F (37.4  C)] 99.3  F (37.4  C)  Pulse:  [60-69] 67  Heart Rate:  [59-84] 84  Resp:  [9-20] 16  BP: (114-182)/(45-89) 137/55  FiO2 (%):  [100 %] 100 %  SpO2:  [94 %-100 %] 98 %  I/O last 3 completed shifts:  In: 4179 [P.O.:970; I.V.:3209]  Out: 1370 [Urine:1350; Blood:20]    Heart Rate: 84, Blood pressure 137/55, pulse 67, temperature 99.3  F (37.4 " " C), temperature source Oral, resp. rate 16, height 5' 8\" (1.727 m), weight 258 lb (117 kg), SpO2 98 %, not currently breastfeeding.  258 lbs 0 oz  HEENT:  Normocephalic, atraumatic.  PERRLA.  EOM s intact.    Neck:  Supple, non-tender, without lymphadenopathy.  Heart:  No peripheral edema  Lungs:  No SOB  Abdomen:  Soft, non-tender, non-distended.   Skin:  Warm and dry, good capillary refill.  Extremities:  Good radial and dorsalis pedis pulses bilaterally, no edema, cyanosis or clubbing.    NEUROLOGICAL EXAMINATION:     Mental status:  Alert and Oriented x 3, speech is fluent.  Cranial nerves:  II-XII intact.   Motor:  Strength is 5/5 throughout the upper and lower extremities  Shoulder Abduction:  Right:  5/5   Left:  5/5  Biceps:                      Right:  5/5   Left:  5/5  Triceps:                     Right:  5/5   Left:  5/5  Wrist Extensors:       Right:  5/5   Left:  5/5  Wrist Flexors:           Right:  5/5   Left:  5/5  interosseus :            Right:  5/5   Left:  5/5   Hip Flexor:                Right: 5/5  Left:  5/5  Hip Adductor:             Right:  5/5  Left:  5/5  Hip Abductor:             Right:  5/5  Left:  5/5  Gastroc Soleus:        Right:  5/5  Left:  5/5  Tib/Ant:                      Right:  5/5  Left:  5/5  EHL:                     Right:  5/5  Left:  5/5  Medications     0.9% sodium chloride + KCl 20 mEq/L 75 mL/hr at 11/06/18 0500     HYDROmorphone         acetaminophen  975 mg Oral Q8H     amLODIPine  10 mg Oral Daily     atorvastatin  40 mg Oral Daily     carvedilol  25 mg Oral BID w/meals     ceFAZolin  2 g Intravenous Q8H     docusate sodium  100 mg Oral BID     ranitidine  150 mg Oral Q12H    Or     famotidine  20 mg Intravenous Q12H     fenofibrate  54 mg Oral Daily     insulin aspart  20 Units Subcutaneous TID w/meals     insulin aspart  1-7 Units Subcutaneous TID AC     insulin aspart  1-5 Units Subcutaneous At Bedtime     insulin detemir  41 Units Subcutaneous BID     " levothyroxine  150 mcg Oral Daily with breakfast     losartan  100 mg Oral Daily     PARoxetine  20 mg Oral BID     primidone  50 mg Oral At Bedtime     sodium chloride (PF)  3 mL Intracatheter Q8H       Data     All new lab and imaging data was personally reviewed by me.  CBC RESULTS:   Recent Labs   Lab Test  11/05/18   0625   09/25/17   1117   WBC   --    --   7.0   RBC   --    --   3.85   HGB  12.0   < >  13.0   HCT   --    --   37.8   MCV   --    --   98   MCH   --    --   33.8*   MCHC   --    --   34.4   RDW   --    --   12.9   PLT   --    --   143*    < > = values in this interval not displayed.     Basic Metabolic Panel:  Lab Results   Component Value Date     10/19/2018      Lab Results   Component Value Date    POTASSIUM 4.9 10/19/2018     Lab Results   Component Value Date    CHLORIDE 102 10/19/2018     Lab Results   Component Value Date    NATACHA 9.1 10/19/2018     Lab Results   Component Value Date    CO2 31 10/19/2018     Lab Results   Component Value Date    BUN 29 10/19/2018     Lab Results   Component Value Date    CR 1.28 10/19/2018     Lab Results   Component Value Date     10/19/2018     INR:  Lab Results   Component Value Date    INR 1.07 05/27/2008

## 2018-11-06 NOTE — PLAN OF CARE
Problem: Patient Care Overview  Goal: Plan of Care/Patient Progress Review  Outcome: Improving  Pt A & OX4. VSS afebrile. IV infusing. LS clear but diminished. Capnography in place and WNL. HX of COPD and asthma. Encouraged IS use. Baseline Numbness BLE toes. Drsg c/d/i. BG monitored with sliding scale and  Levemir.  and 93 this evening. Pain managed with Dilaudid PCA. Up with assist of 2 gait belt, walker and corset brace. Tolerated dangling. Standing at bedside and taking steps at bedside.  Tolerated regular diet. Lord patent with 600 ml out.  Will continue to monitor. Plans at discharge is to go home with assistance from her daughter.

## 2018-11-06 NOTE — PROGRESS NOTES
DME order for [employer] bone growth stimulator order placed per Dr Hlil request. Order sent to Mera Hernandez [employer] rep who will contact patient regarding device.

## 2018-11-06 NOTE — PLAN OF CARE
Problem: Patient Care Overview  Goal: Plan of Care/Patient Progress Review  Patient A&Ox4. VSS. Ambulates Ax2 to get up, once up patient is an Ax1 w/ WW, GB, and Brace. Patient reports baseline numbness in toes. Dressing CDI. PCA discontinued today, patient taking PRN PO dilaudid and valium for pain control. +BS, not passing flatus yet, tolerating diet, BG monitoring. Lord discontinued at 1200, patient attempted to void at 1400, is now up in chair eating lunch, will attempt to void again after eating lunch, if no success will bladder scan. Will continue to monitor.

## 2018-11-07 ENCOUNTER — APPOINTMENT (OUTPATIENT)
Dept: PHYSICAL THERAPY | Facility: CLINIC | Age: 68
DRG: 460 | End: 2018-11-07
Attending: NEUROLOGICAL SURGERY
Payer: COMMERCIAL

## 2018-11-07 ENCOUNTER — APPOINTMENT (OUTPATIENT)
Dept: OCCUPATIONAL THERAPY | Facility: CLINIC | Age: 68
DRG: 460 | End: 2018-11-07
Attending: NEUROLOGICAL SURGERY
Payer: COMMERCIAL

## 2018-11-07 LAB
GLUCOSE BLDC GLUCOMTR-MCNC: 109 MG/DL (ref 70–99)
GLUCOSE BLDC GLUCOMTR-MCNC: 145 MG/DL (ref 70–99)
GLUCOSE BLDC GLUCOMTR-MCNC: 147 MG/DL (ref 70–99)
GLUCOSE BLDC GLUCOMTR-MCNC: 183 MG/DL (ref 70–99)
GLUCOSE BLDC GLUCOMTR-MCNC: 213 MG/DL (ref 70–99)

## 2018-11-07 PROCEDURE — 97535 SELF CARE MNGMENT TRAINING: CPT | Mod: GO | Performed by: REHABILITATION PRACTITIONER

## 2018-11-07 PROCEDURE — 25000132 ZZH RX MED GY IP 250 OP 250 PS 637: Performed by: NURSE PRACTITIONER

## 2018-11-07 PROCEDURE — 99232 SBSQ HOSP IP/OBS MODERATE 35: CPT | Performed by: INTERNAL MEDICINE

## 2018-11-07 PROCEDURE — 00000146 ZZHCL STATISTIC GLUCOSE BY METER IP

## 2018-11-07 PROCEDURE — 40000193 ZZH STATISTIC PT WARD VISIT: Performed by: PHYSICAL THERAPY ASSISTANT

## 2018-11-07 PROCEDURE — 97165 OT EVAL LOW COMPLEX 30 MIN: CPT | Mod: GO | Performed by: REHABILITATION PRACTITIONER

## 2018-11-07 PROCEDURE — 25000132 ZZH RX MED GY IP 250 OP 250 PS 637: Performed by: PHYSICIAN ASSISTANT

## 2018-11-07 PROCEDURE — 40000133 ZZH STATISTIC OT WARD VISIT: Performed by: REHABILITATION PRACTITIONER

## 2018-11-07 PROCEDURE — 97116 GAIT TRAINING THERAPY: CPT | Mod: GP | Performed by: PHYSICAL THERAPY ASSISTANT

## 2018-11-07 PROCEDURE — 97530 THERAPEUTIC ACTIVITIES: CPT | Mod: GP | Performed by: PHYSICAL THERAPY ASSISTANT

## 2018-11-07 PROCEDURE — 25000132 ZZH RX MED GY IP 250 OP 250 PS 637: Performed by: NEUROLOGICAL SURGERY

## 2018-11-07 PROCEDURE — 12000000 ZZH R&B MED SURG/OB

## 2018-11-07 RX ORDER — DIAZEPAM 5 MG
5 TABLET ORAL EVERY 6 HOURS PRN
Qty: 60 TABLET | Refills: 0 | Status: SHIPPED | OUTPATIENT
Start: 2018-11-07 | End: 2019-01-31

## 2018-11-07 RX ORDER — POLYETHYLENE GLYCOL 3350 17 G/17G
17 POWDER, FOR SOLUTION ORAL DAILY
Status: DISCONTINUED | OUTPATIENT
Start: 2018-11-07 | End: 2018-11-08 | Stop reason: HOSPADM

## 2018-11-07 RX ORDER — HYDROMORPHONE HYDROCHLORIDE 2 MG/1
2-4 TABLET ORAL EVERY 4 HOURS PRN
Qty: 75 TABLET | Refills: 0 | Status: SHIPPED | OUTPATIENT
Start: 2018-11-07 | End: 2018-11-14

## 2018-11-07 RX ADMIN — HYDROMORPHONE HYDROCHLORIDE 4 MG: 2 TABLET ORAL at 01:20

## 2018-11-07 RX ADMIN — PAROXETINE HYDROCHLORIDE 20 MG: 20 TABLET, FILM COATED ORAL at 19:43

## 2018-11-07 RX ADMIN — POLYETHYLENE GLYCOL 3350 17 G: 17 POWDER, FOR SOLUTION ORAL at 16:17

## 2018-11-07 RX ADMIN — DOCUSATE SODIUM 100 MG: 100 CAPSULE, LIQUID FILLED ORAL at 09:01

## 2018-11-07 RX ADMIN — HYDROMORPHONE HYDROCHLORIDE 4 MG: 2 TABLET ORAL at 13:36

## 2018-11-07 RX ADMIN — INSULIN ASPART 2 UNITS: 100 INJECTION, SOLUTION INTRAVENOUS; SUBCUTANEOUS at 13:33

## 2018-11-07 RX ADMIN — ACETAMINOPHEN 975 MG: 325 TABLET, FILM COATED ORAL at 21:46

## 2018-11-07 RX ADMIN — HYDROMORPHONE HYDROCHLORIDE 4 MG: 2 TABLET ORAL at 09:16

## 2018-11-07 RX ADMIN — RANITIDINE 150 MG: 150 TABLET ORAL at 17:49

## 2018-11-07 RX ADMIN — INSULIN DETEMIR 41 UNITS: 100 INJECTION, SOLUTION SUBCUTANEOUS at 09:01

## 2018-11-07 RX ADMIN — LEVOTHYROXINE SODIUM 150 MCG: 150 TABLET ORAL at 09:01

## 2018-11-07 RX ADMIN — RANITIDINE 150 MG: 150 TABLET ORAL at 06:17

## 2018-11-07 RX ADMIN — ACETAMINOPHEN 975 MG: 325 TABLET, FILM COATED ORAL at 13:36

## 2018-11-07 RX ADMIN — PAROXETINE HYDROCHLORIDE 20 MG: 20 TABLET, FILM COATED ORAL at 09:01

## 2018-11-07 RX ADMIN — ACETAMINOPHEN 975 MG: 325 TABLET, FILM COATED ORAL at 06:17

## 2018-11-07 RX ADMIN — FENOFIBRATE 54 MG: 54 TABLET ORAL at 09:01

## 2018-11-07 RX ADMIN — DOCUSATE SODIUM 100 MG: 100 CAPSULE, LIQUID FILLED ORAL at 19:44

## 2018-11-07 RX ADMIN — INSULIN ASPART 1 UNITS: 100 INJECTION, SOLUTION INTRAVENOUS; SUBCUTANEOUS at 17:35

## 2018-11-07 RX ADMIN — CARVEDILOL 25 MG: 25 TABLET, FILM COATED ORAL at 09:01

## 2018-11-07 RX ADMIN — CARVEDILOL 25 MG: 25 TABLET, FILM COATED ORAL at 17:49

## 2018-11-07 RX ADMIN — ATORVASTATIN CALCIUM 40 MG: 40 TABLET, FILM COATED ORAL at 09:01

## 2018-11-07 RX ADMIN — PRIMIDONE 50 MG: 50 TABLET ORAL at 21:46

## 2018-11-07 RX ADMIN — INSULIN DETEMIR 41 UNITS: 100 INJECTION, SOLUTION SUBCUTANEOUS at 17:33

## 2018-11-07 ASSESSMENT — ACTIVITIES OF DAILY LIVING (ADL)
ADLS_ACUITY_SCORE: 12
ADLS_ACUITY_SCORE: 10
ADLS_ACUITY_SCORE: 12
ADLS_ACUITY_SCORE: 12

## 2018-11-07 NOTE — PROGRESS NOTES
11/07/18 1034   Quick Adds   Type of Visit Initial Occupational Therapy Evaluation   Living Environment   Lives With alone   Living Arrangements mobile home   Home Accessibility tub/shower is not walk in;stairs to enter home   Number of Stairs to Enter Home 4   Number of Stairs Within Home 0   Transportation Available car;family or friend will provide   Living Environment Comment Daughter (RN) going to come and stay with pt for the first week   Self-Care   Usual Activity Tolerance fair  (not able to ambulate community distances due to back pain)   Current Activity Tolerance fair   Equipment Currently Used at Home tub bench;cane, straight;walker, rolling;raised toilet   Activity/Exercise/Self-Care Comment PTA, patient was I with ADL and most IADL'. Was having difficulty with in/out of car, leading up to surgery patient needed dtr to complete grocery shopping   Functional Level Prior   Ambulation 1-->assistive equipment   Transferring 1-->assistive equipment   Toileting 0-->independent   Bathing 0-->independent   Dressing 0-->independent   Eating 0-->independent   Communication 0-->understands/communicates without difficulty   Swallowing 0-->swallows foods/liquids without difficulty   Cognition 0 - no cognition issues reported   Fall history within last six months no   Which of the above functional risks had a recent onset or change? ambulation;transferring;toileting;bathing;dressing   General Information   Onset of Illness/Injury or Date of Surgery - Date 11/05/18   Referring Physician Dr. Hill   Patient/Family Goals Statement to return home   Additional Occupational Profile Info/Pertinent History of Current Problem Patient is POD #2 for L4-5 fusion   Precautions/Limitations spinal precautions  (corset when OOB)   General Observations patient was in bed and agreeable to OT session   Cognitive Status Examination   Orientation orientation to person, place and time   Level of Consciousness alert   Able to Follow  Commands WNL/WFL   Personal Safety (Cognitive) WNL/WFL   Visual Perception   Visual Perception Wears glasses   Sensory Examination   Sensory Quick Adds No deficits were identified   Sensory Comments L LE pain has resolved   Pain Assessment   Patient Currently in Pain Yes, see Vital Sign flowsheet  (patient rating pain 2-3/10)   Posture   Posture not impaired   Range of Motion (ROM)   ROM Quick Adds No deficits were identified   Strength   Strength Comments demonstrates general weakness   Hand Strength   Hand Strength Comments intact   Muscle Tone Assessment   Muscle Tone Quick Adds No deficits were identified   Coordination   Upper Extremity Coordination No deficits were identified   Mobility   Bed Mobility Comments treatment initiated-defer to OT daily note for details   Transfer Skill: Sit to Stand   Level of [place]: Sit/Stand moderate assist (50% patients effort)   Physical Assist/Nonphysical Assist: Sit/Stand verbal cues;1 person assist   Transfer Skill: Sit to Stand full weight-bearing   Assistive Device for Transfer: Sit/Stand rolling walker   Balance   Balance Comments LOB was not noted, general unsteadiness observed and expected   Lower Body Dressing   Level of [place]: Dress Lower Body (treatment initiated-defer to OT daily note for details)   Eating/Self Feeding   Level of [place]: Eating independent   Instrumental Activities of Daily Living (IADL)   IADL Comments family and friends to A as needed   Activities of Daily Living Analysis   Impairments Contributing to Impaired Activities of Daily Living balance impaired;pain;post surgical precautions;ROM decreased;strength decreased   General Therapy Interventions   Planned Therapy Interventions ADL retraining;progressive activity/exercise;transfer training   Clinical Impression   Criteria for Skilled Therapeutic Interventions Met yes, treatment indicated   OT Diagnosis decreased ADL's and IADl's   Influenced by the following impairments  "balance impaired;pain;post surgical precautions;ROM decreased;strength decreased   Assessment of Occupational Performance 5 or more Performance Deficits   Identified Performance Deficits decreased ADL's/IADL's- dsg, toileting, bathing, community and functional mobility, household chores, driving, errands   Clinical Decision Making (Complexity) Low complexity   Therapy Frequency daily   Predicted Duration of Therapy Intervention (days/wks) 2-3 days   Anticipated Equipment Needs at Discharge bath sponge;reacher;sock aide   Anticipated Discharge Disposition Home   Risks and Benefits of Treatment have been explained. Yes   Patient, Family & other staff in agreement with plan of care Yes   [facility] TM \"6 Clicks\"   2016, Trustees of [facility], under license to [employer].  All rights reserved.   6 Clicks Short Forms Daily Activity Inpatient Short Form   [facility]  \"6 Clicks\" Daily Activity Inpatient Short Form   1. Putting on and taking off regular lower body clothing? 3 - A Little   2. Bathing (including washing, rinsing, drying)? 2 - A Lot   3. Toileting, which includes using toilet, bedpan or urinal? 2 - A Lot   4. Putting on and taking off regular upper body clothing? 3 - A Little   5. Taking care of personal grooming such as brushing teeth? 3 - A Little   6. Eating meals? 4 - None   Daily Activity Raw Score (Score out of 24.Lower scores equate to lower levels of function) 17   Total Evaluation Time   Total Evaluation Time (Minutes) 10     "

## 2018-11-07 NOTE — PLAN OF CARE
Problem: Patient Care Overview  Goal: Plan of Care/Patient Progress Review  AXOx4, VSS: afebrile. Up with Ax2/Corset/gbelt and walker, unsteady when first getting out bed. CMS:numbess toes per baseline, Drsg: C/D/I. Pain managed with scheduled Tylenol and PRN Dilaudid. Bs: 147@0200. Voiding adequate amounts, Passing flatus. Nursing continue to monitor.

## 2018-11-07 NOTE — PLAN OF CARE
Problem: Patient Care Overview  Goal: Plan of Care/Patient Progress Review    Discharge Planner PT   Patient plan for discharge: Home with daughter assist  Current status: Pt amb 250' with ww with min assist with 2 standing rest breaks leaning against the wall. Pt did require seated rest break d/t fatigue and pain in B UEs. Note pt to lean forward requiring vcs for amb closer to ww. Pt transfers sit to stand with mod assist of 1 from chair and CGA with sit to stand from elevated bed. Pt transfers stand to sit with min assist with vcs for hand placement. Pt transfers chair to bed with ww min assist. Pt transfers sit to/sidelying to supine with min assist with vcs for hand placement.     P.M. Session - Pt performed 4 steps with both rails with min assist - CGA with step to pattern. Pt transfers sit to stand from chair and toilet with min assist with improved tech. Pt transfers stand to sit with with min assist. Pt transfers chair to chair to bathroom with ww with min assist - CGA. Pt required assist with pericares. Pt transfers sit to sidelying to supine with min assist with B LE along with vcs for proper tech within the LB precautions. Pt amb 150' with ww with min - CGA with step thru gait pattern with 1 seated rest break.  Barriers to return to prior living situation: Level of A with STS transfers, stairs, impaired balance  Recommendations for discharge: Home with daughter assist and FWW pending improved ability to perform STS transfers. If patient continues to need heavy assist and elevated surfaces, will need to consider TCU. per plan established by the PT.    Rationale for recommendations: Pt needing heavy assist with STS transfers this session. Will continue IP PT to address strength, balance and activity tolerance deficits.        Entered by: Shanta Alvarado 11/07/2018 10:12 AM

## 2018-11-07 NOTE — PROGRESS NOTES
[facility]    Neurosurgery Progress Note    Date of Service (when I saw the patient): 11/07/2018     Assessment & Plan   Yadi Iniguez is a 68 year old female who was admitted on 11/5/2018. The pt presented with lumbar radicular pain.  She was found to have Grade 1 spondylolisthesis at L4-5 with severe left-sided stenosis and a cystic mass compressing the L5 root.  Spondylolisthesis increased to grade 2 with prone position.  The pt underwent a minimally invasive left L4-5 TLIF with Dr. Hernando Hill on 11-5-2018.  Today pt is sitting up in the chair.  She notes only incisional pain. Per nursing she is still a little unsteady on her feet. We will plan for her to work with PT one more day.  She will discharge home to her daughter who is a RN.  Her incision is clean and dry with staples intact. Incision open to air.         Active Problems:    S/P lumbar fusion    Assessment: stable     Plan: PT/OT and ambulation  Brace when out of bed   Pain control   Encourage use of IS and deep breathing   Suture/Staple removal in 10-14 days    Incision open to air    I have discussed the following assessment and plan Dr. Hernando Hill  who is in agreement with initial plan and will follow up with further consultation recommendations.    Valerie Chamberlain [facility]  [facility]  [place] [address]    Tel [phone]  Pager [phone]      Interval History   Stable     Physical Exam   Temp: 97.2  F (36.2  C) Temp src: Oral BP: 125/53 Pulse: 84 Heart Rate: 81 Resp: 16 SpO2: 90 % O2 Device: None (Room air) Oxygen Delivery: 2 LPM  Vitals:    11/05/18 0555   Weight: 258 lb (117 kg)     Vital Signs with Ranges  Temp:  [96.4  F (35.8  C)-100.2  F (37.9  C)] 97.2  F (36.2  C)  Pulse:  [84] 84  Heart Rate:  [78-83] 81  Resp:  [16] 16  BP: (125-144)/(42-58) 125/53  SpO2:  [90 %-97 %] 90 %  I/O last 3 completed shifts:  In: 1060 [P.O.:1060]  Out: 1000  "[Urine:1000]    Heart Rate: 81, Blood pressure 125/53, pulse 84, temperature 97.2  F (36.2  C), temperature source Oral, resp. rate 16, height 5' 8\" (1.727 m), weight 258 lb (117 kg), SpO2 90 %, not currently breastfeeding.  258 lbs 0 oz  HEENT:  Normocephalic, atraumatic.  PERRLA.  EOM s intact.    Neck:  Supple, non-tender, without lymphadenopathy.  Heart:  No peripheral edema  Lungs:  No SOB  Abdomen:  Soft, non-tender, non-distended.   Skin:  Warm and dry, good capillary refill. Incision with staples intact and open to air   Extremities:  Good radial and dorsalis pedis pulses bilaterally, no edema, cyanosis or clubbing.    NEUROLOGICAL EXAMINATION:     Mental status:  Alert and Oriented x 3, speech is fluent.  Cranial nerves:  II-XII intact.   Motor:  Strength is 5/5 throughout the upper and lower extremities  Shoulder Abduction:  Right:  5/5   Left:  5/5  Biceps:                      Right:  5/5   Left:  5/5  Triceps:                     Right:  5/5   Left:  5/5  Wrist Extensors:       Right:  5/5   Left:  5/5  Wrist Flexors:           Right:  5/5   Left:  5/5  interosseus :            Right:  5/5   Left:  5/5   Hip Flexor:                Right: 5/5  Left:  5/5  Hip Adductor:             Right:  5/5  Left:  5/5  Hip Abductor:             Right:  5/5  Left:  5/5  Gastroc Soleus:        Right:  5/5  Left:  5/5  Tib/Ant:                      Right:  5/5  Left:  5/5  EHL:                     Right:  5/5  Left:  5/5  Sensation:  intact  Gait:  Up with assist and the use of a walker. Unsteady at times.         Medications     0.9% sodium chloride + KCl 20 mEq/L 75 mL/hr at 11/06/18 0500       acetaminophen  975 mg Oral Q8H     amLODIPine  10 mg Oral Daily     atorvastatin  40 mg Oral Daily     carvedilol  25 mg Oral BID w/meals     ceFAZolin  2 g Intravenous Q8H     docusate sodium  100 mg Oral BID     ranitidine  150 mg Oral Q12H    Or     famotidine  20 mg Intravenous Q12H     fenofibrate  54 mg Oral Daily     " insulin aspart  20 Units Subcutaneous TID w/meals     insulin aspart  1-7 Units Subcutaneous TID AC     insulin aspart  1-5 Units Subcutaneous At Bedtime     insulin detemir  41 Units Subcutaneous BID     levothyroxine  150 mcg Oral Daily with breakfast     losartan  100 mg Oral Daily     PARoxetine  20 mg Oral BID     primidone  50 mg Oral At Bedtime     sodium chloride (PF)  3 mL Intracatheter Q8H       Data     All new lab and imaging data was personally reviewed by me.  CBC RESULTS:   Recent Labs   Lab Test  11/05/18   0625   09/25/17   1117   WBC   --    --   7.0   RBC   --    --   3.85   HGB  12.0   < >  13.0   HCT   --    --   37.8   MCV   --    --   98   MCH   --    --   33.8*   MCHC   --    --   34.4   RDW   --    --   12.9   PLT   --    --   143*    < > = values in this interval not displayed.     Basic Metabolic Panel:  Lab Results   Component Value Date     10/19/2018      Lab Results   Component Value Date    POTASSIUM 4.9 10/19/2018     Lab Results   Component Value Date    CHLORIDE 102 10/19/2018     Lab Results   Component Value Date    NATACHA 9.1 10/19/2018     Lab Results   Component Value Date    CO2 31 10/19/2018     Lab Results   Component Value Date    BUN 29 10/19/2018     Lab Results   Component Value Date    CR 1.28 10/19/2018     Lab Results   Component Value Date     11/06/2018     INR:  Lab Results   Component Value Date    INR 1.07 05/27/2008

## 2018-11-07 NOTE — PLAN OF CARE
Problem: Patient Care Overview  Goal: Plan of Care/Patient Progress Review  OT- eval completed and treatment initiated. Patient is POD #2 for L4-5 fusion. Pt reports independence with household mobility and ADL's PTA, mod I with a straight cane in the community. Pt lives alone in a mobile home with 4 stairs (one rail) to enter. Pt has a cane. Daughter, who is an RN, can come to stay with pt for the first week. Pt reports she struggled with sit to/from stand transfers prior to admission.    Discharge Planner OT   Patient plan for discharge: home  Current status: Educated in and provided spine handouts for spine precautions, proper body mechanics, EC/WS techniques, advancement of activity following surgery, car transfers and initiated AE recommendations, throughout education, patient was engaged in instruction and verbalized understanding. With cues and min physical A, patient transferred to EOB. After instruction, patient was mod I with donning/doffing pants and socks with use of reacher and sockaid as needed. Several attempts needed to stand from EOB, was able to achieve once bed was elevated to higher position and with mod A, fww. CGA, fww to side step to HOB. Min A and frequent vc's needed for technique to transfer from sit to sideline to supine. Increased time needed to complete transfers.   Barriers to return to prior living situation: Level of A with STS transfers, stairs, impaired balance, below baseline for mobility and ADL's   Recommendations for discharge: Home with daughter assist (dtr only available for 1 week) pending improved ability to perform STS transfers with PT. If patient continues to need heavy assist and elevated surfaces, will need to consider TCU.    Rationale for recommendations: Pt needing heavy assist with STS transfers this session. Will continue IP OT to address ADL's, functional transfers and AE needs.        Entered by: Tatum Bundy 11/07/2018 1:13 PM

## 2018-11-07 NOTE — PROVIDER NOTIFICATION
Second kelly sent regarding this, disregard.     Hi, can you confirm that pt. should be getting a scheduled 10 units of novolog along with sliding scale? BS was 143. Thanks!

## 2018-11-07 NOTE — PLAN OF CARE
Problem: Patient Care Overview  Goal: Plan of Care/Patient Progress Review  Outcome: Improving  Pt is voiding and tolerating diet. pts pain is controlled with po meds.  Pt is up with stand by assist and walker. pts cms intact.   pts incision JAMEL.  Pt is planning to discharge to home tomorrow.

## 2018-11-07 NOTE — PLAN OF CARE
Problem: Patient Care Overview  Goal: Plan of Care/Patient Progress Review  Outcome: Improving  Pt. up with assist of two using gait belt, walker and corset. Is able to walk with one assist once up on feet. Pain mild and managed with scheduled tylenol and ice. Voided during shift, but small amount. Blood sugars needed no correction. Dressing to back is clean, dry, and intact.

## 2018-11-07 NOTE — PROVIDER NOTIFICATION
09:05  #[phone] paged MD for blood sugar of 109 should I still give the 20 units of novolog ?  No response   Paged again at  09:43  #[phone]  Waiting for response.

## 2018-11-08 ENCOUNTER — APPOINTMENT (OUTPATIENT)
Dept: PHYSICAL THERAPY | Facility: CLINIC | Age: 68
DRG: 460 | End: 2018-11-08
Attending: NEUROLOGICAL SURGERY
Payer: COMMERCIAL

## 2018-11-08 ENCOUNTER — APPOINTMENT (OUTPATIENT)
Dept: OCCUPATIONAL THERAPY | Facility: CLINIC | Age: 68
DRG: 460 | End: 2018-11-08
Attending: NEUROLOGICAL SURGERY
Payer: COMMERCIAL

## 2018-11-08 VITALS
DIASTOLIC BLOOD PRESSURE: 74 MMHG | TEMPERATURE: 98.1 F | RESPIRATION RATE: 16 BRPM | WEIGHT: 258 LBS | HEIGHT: 68 IN | HEART RATE: 93 BPM | OXYGEN SATURATION: 91 % | BODY MASS INDEX: 39.1 KG/M2 | SYSTOLIC BLOOD PRESSURE: 163 MMHG

## 2018-11-08 LAB
GLUCOSE BLDC GLUCOMTR-MCNC: 143 MG/DL (ref 70–99)
GLUCOSE BLDC GLUCOMTR-MCNC: 149 MG/DL (ref 70–99)
GLUCOSE BLDC GLUCOMTR-MCNC: 154 MG/DL (ref 70–99)
GLUCOSE BLDC GLUCOMTR-MCNC: 172 MG/DL (ref 70–99)

## 2018-11-08 PROCEDURE — 97116 GAIT TRAINING THERAPY: CPT | Mod: GP

## 2018-11-08 PROCEDURE — 97530 THERAPEUTIC ACTIVITIES: CPT | Mod: GO

## 2018-11-08 PROCEDURE — 40000133 ZZH STATISTIC OT WARD VISIT

## 2018-11-08 PROCEDURE — 40000193 ZZH STATISTIC PT WARD VISIT

## 2018-11-08 PROCEDURE — 97530 THERAPEUTIC ACTIVITIES: CPT | Mod: GP

## 2018-11-08 PROCEDURE — 25000132 ZZH RX MED GY IP 250 OP 250 PS 637: Performed by: NEUROLOGICAL SURGERY

## 2018-11-08 PROCEDURE — 99232 SBSQ HOSP IP/OBS MODERATE 35: CPT | Performed by: INTERNAL MEDICINE

## 2018-11-08 PROCEDURE — 25000132 ZZH RX MED GY IP 250 OP 250 PS 637: Performed by: NURSE PRACTITIONER

## 2018-11-08 PROCEDURE — 97535 SELF CARE MNGMENT TRAINING: CPT | Mod: GO

## 2018-11-08 PROCEDURE — 00000146 ZZHCL STATISTIC GLUCOSE BY METER IP

## 2018-11-08 PROCEDURE — 25000132 ZZH RX MED GY IP 250 OP 250 PS 637: Performed by: PHYSICIAN ASSISTANT

## 2018-11-08 RX ADMIN — CARVEDILOL 25 MG: 25 TABLET, FILM COATED ORAL at 08:14

## 2018-11-08 RX ADMIN — ACETAMINOPHEN 975 MG: 325 TABLET, FILM COATED ORAL at 06:01

## 2018-11-08 RX ADMIN — ATORVASTATIN CALCIUM 40 MG: 40 TABLET, FILM COATED ORAL at 08:15

## 2018-11-08 RX ADMIN — INSULIN ASPART 1 UNITS: 100 INJECTION, SOLUTION INTRAVENOUS; SUBCUTANEOUS at 07:35

## 2018-11-08 RX ADMIN — INSULIN ASPART 1 UNITS: 100 INJECTION, SOLUTION INTRAVENOUS; SUBCUTANEOUS at 17:19

## 2018-11-08 RX ADMIN — LOSARTAN POTASSIUM 100 MG: 100 TABLET ORAL at 08:14

## 2018-11-08 RX ADMIN — PAROXETINE HYDROCHLORIDE 20 MG: 20 TABLET, FILM COATED ORAL at 08:15

## 2018-11-08 RX ADMIN — AMLODIPINE BESYLATE 10 MG: 10 TABLET ORAL at 08:14

## 2018-11-08 RX ADMIN — POLYETHYLENE GLYCOL 3350 17 G: 17 POWDER, FOR SOLUTION ORAL at 08:15

## 2018-11-08 RX ADMIN — RANITIDINE 150 MG: 150 TABLET ORAL at 06:01

## 2018-11-08 RX ADMIN — RANITIDINE 150 MG: 150 TABLET ORAL at 17:19

## 2018-11-08 RX ADMIN — HYDROMORPHONE HYDROCHLORIDE 2 MG: 2 TABLET ORAL at 06:01

## 2018-11-08 RX ADMIN — DOCUSATE SODIUM 100 MG: 100 CAPSULE, LIQUID FILLED ORAL at 08:15

## 2018-11-08 RX ADMIN — INSULIN DETEMIR 41 UNITS: 100 INJECTION, SOLUTION SUBCUTANEOUS at 07:35

## 2018-11-08 RX ADMIN — LEVOTHYROXINE SODIUM 150 MCG: 150 TABLET ORAL at 08:15

## 2018-11-08 RX ADMIN — INSULIN ASPART 1 UNITS: 100 INJECTION, SOLUTION INTRAVENOUS; SUBCUTANEOUS at 12:52

## 2018-11-08 RX ADMIN — INSULIN DETEMIR 41 UNITS: 100 INJECTION, SOLUTION SUBCUTANEOUS at 17:20

## 2018-11-08 RX ADMIN — HYDROMORPHONE HYDROCHLORIDE 2 MG: 2 TABLET ORAL at 10:08

## 2018-11-08 RX ADMIN — HYDROMORPHONE HYDROCHLORIDE 2 MG: 2 TABLET ORAL at 01:43

## 2018-11-08 RX ADMIN — CARVEDILOL 25 MG: 25 TABLET, FILM COATED ORAL at 17:19

## 2018-11-08 RX ADMIN — FENOFIBRATE 54 MG: 54 TABLET ORAL at 08:15

## 2018-11-08 RX ADMIN — HYDROMORPHONE HYDROCHLORIDE 2 MG: 2 TABLET ORAL at 14:29

## 2018-11-08 ASSESSMENT — ACTIVITIES OF DAILY LIVING (ADL)
ADLS_ACUITY_SCORE: 12

## 2018-11-08 NOTE — PLAN OF CARE
Problem: Patient Care Overview  Goal: Plan of Care/Patient Progress Review  A/O, VSS inscion JAMEL staples intact up with assist of 1 voiding adequate.   Oral dilaudid for pain control. plans on going home with son early evening.

## 2018-11-08 NOTE — PLAN OF CARE
Problem: Patient Care Overview  Goal: Plan of Care/Patient Progress Review  Discharge Planner OT   Patient plan for discharge: Home  Current status: Reviewed spinal precautions, able to verbalize 2/3, educated on 3/3 and ways to implement and maintain during ADLs. Pt completed bed mobility supine > sit EOB with log roll technique and SBA. Pt able to don corset brace while seated EOB with set up. Pt completed sit > stand from EOB to FWW with CGA, vcs provided for safe hand placement. Pt ambulated to/from BR FWW level with CGA/SBA, educated on safe toilet transfer technique, able to transfer on/off standard height toilet and complete toileting tasks with SBA. Pt ambulated to/from rehab gym, demo provided on safe tub transfer technique, able to return demo with use of grab bar/wall with CGA. Reviewed compensatory technique for LB dressing technique, home safety modifications/recommendations, DME/AE, car transfer technique and safe activities post spinal surgery, verbalized understanding.   Barriers to return to prior living situation: Stairs, pain  Recommendations for discharge: Home w/ daughter assist for ADLs/IADLs as needed  Rationale for recommendations: Anticipate pt will continue to progress to return to indep/mod I PLOF. Pt has support of daughter as needed.        Entered by: Leny Fatima 11/08/2018 9:53 AM       Occupational Therapy Discharge Summary    Reason for therapy discharge:    Discharged to home.    Progress towards therapy goal(s). See goals on Care Plan in [facility] electronic health record for goal details.  Goals partially met.  Barriers to achieving goals:   discharge from facility.    Therapy recommendation(s):    No further skilled OT needs.

## 2018-11-08 NOTE — DISCHARGE SUMMARY
[facility]    Discharge Summary  Neurosurgery    Date of Admission:  11/5/2018  Date of Discharge:  11/8/2018  Discharging Provider: Valerie Chamberlain  Date of Service (when I saw the patient): 11/08/18    Discharge Diagnoses   Active Problems:    S/P lumbar fusion      History of Present Illness   Yadi Iniguez is a 68 year old female who was admitted on 11/5/2018. The pt presented with lumbar radicular pain.  She was found to have Grade 1 spondylolisthesis at L4-5 with severe left-sided stenosis and a cystic mass compressing the L5 root.  Spondylolisthesis increased to grade 2 with prone position.  The pt underwent a minimally invasive left L4-5 TLIF with Dr. Hernando Hill on 11-5-2018.  Pt op the pt had some difficulty ambulating.  She was re-evaluated by therapies today and she has been cleared to discharge home with her daughter later today. Her daughter is a RN and will be with her full time for the next week.  The pt had no other issues.     Pts hospital stay was uneventful.  The pt was also being followed by the hospital service during their stay.  They recommendations were greatly appreciated.  Their summary is as follows:      Reason for hospital stay:  Elective back surgery      Brief Summary of Stay      Yadi Iniguez is a 68 year old female  With h/o DM, COPD , HTN and CKD who was admitted for elective back surgery, he has smooth postoperative course, pain is controlled, she was able to ambulate, no nausea or vomiting, no BM yet.        Assessment and Plan      1.POD#2- s/p lower back fusion.   - stable , pain controlled   -Pain controlled, continue to ambulate.  -Incentive spirometry     2.Postop pain:Controlled on oral pain regimen, IV Dilaudid as needed     3.diabetes mellitus type 2 insulin requiring.  -Continue sliding scale insulin and Levemir as ordered     4.Postop mild blood loss.  Hemoglobin is stable at 12          5.  HTN -controlled , on home med PTA medication including  amlodipine, Coreg, losartan      DVT Prophylaxis: Defer to primary service     Code Status: Full Code     Discharge Dispo: per primary      Estimated Disch Date / # of Days until Disch: Per primary, patient is medically stable at this point will continue to follow-up while the patient is in the hospital.       Hospital Course   Yadi Iniguez was admitted on 11/5/2018.  The following problems were addressed during her hospitalization:    Active Problems:    S/P lumbar fusion    Assessment: stable     Plan: discharge home        I have discussed the following assessment and plan with Dr. Hernando Hill  who is in agreement with initial plan and will follow up with further consultation recommendations.    Valerie Chamberlain [facility]  [facility]  [address] [address]    Tel [phone]  Pager [phone]        Significant Results and Procedures   Lumbar fusion     Pending Results   Unresulted Labs Ordered in the Past 30 Days of this Admission     No orders found from 9/6/2018 to 11/6/2018.          Code Status   Full Code    Primary Care Physician   Sandy Otoole    Physical Exam   Temp: 97  F (36.1  C) Temp src: Oral BP: 144/58 Pulse: 93 Heart Rate: 79 Resp: 16 SpO2: 95 % O2 Device: None (Room air)    Vitals:    11/05/18 0555   Weight: 258 lb (117 kg)     Vital Signs with Ranges  Temp:  [97  F (36.1  C)-99.1  F (37.3  C)] 97  F (36.1  C)  Pulse:  [93] 93  Heart Rate:  [79-83] 79  Resp:  [16-18] 16  BP: (140-144)/(49-58) 144/58  SpO2:  [92 %-95 %] 95 %  I/O last 3 completed shifts:  In: 600 [P.O.:600]  Out: 1850 [Urine:1850]    Constitutional: Awake, alert, cooperative, no apparent distress, and appears stated age.  Eyes: Lids and lashes normal, pupils equal, round and reactive to light, extra ocular muscles intact  ENT: Normocephalic, without obvious abnormality, atraumatic  Respiratory: No increased work of breathing  Skin: No bruising or  bleeding, normal skin color, texture, turgor, no redness, warmth, or swelling.  Incision with staples intact, minimal drainage, open to air.  Musculoskeletal: There is no redness, warmth, or swelling of the joints.  Full range of motion noted.  Motor strength is 5 out of 5 all extremities bilaterally.  Tone is normal.  Neurologic: Awake, alert, oriented to name, place and time.  Cranial nerves II-XII are grossly intact.  Motor is 5 out of 5 bilaterally.     Neuropsychiatric: Calm, normal eye contact, alert, normal affect, oriented to self, place, time and situation, memory for past and recent events intact and thought process normal.       Time Spent on this Encounter   I, Valerie Chamberlain, personally saw the patient today and spent greater than 30 minutes discharging this patient.    Discharge Disposition   Discharged to home  Condition at discharge: Stable    Consultations This Hospital Stay   OCCUPATIONAL THERAPY ADULT IP CONSULT  PHYSICAL THERAPY ADULT IP CONSULT  ORTHOSIS SPINAL IP CONSULT  HOSPITALIST IP CONSULT  SOCIAL WORK IP CONSULT    Discharge Orders     XR Lumbar Spine 2/3 Views     Reason for your hospital stay   You were in the hospital for a minimally invasive left L4-5 TLIF     Follow-up and recommended labs and tests    Please follow up at the [facility] in 10-14 days for staple removal and in 6 weeks with a lumbar xray prior.  Please call the clinic at [phone] to schedule your appointment with Valerie Chamberlain CNP or Nancy Miller CNP     Activity   Your activity upon discharge: Discharge instructions:  No lifting of more than 10 pounds, no bending, no twisting until follow up visit.  Wear brace when out of bed.    Ok to shampoo hair, shower or bathe, but, do not scrub or submerge incision under water until evaluated post-operatively in clinic.    Ok to walk as tolerated, avoid bed rest and prolonged sitting.    No contact sports until after follow up visit  No high impact  activities such as; running/jogging, snowmobile or 4 chavarria riding or any other recreational vehicles.    Do not take NSAIDS (ibuprofen, advil, aleve, naproxen, etc) for pain control.    Do NOT drive while taking narcotic pain medication.    Call the [facility] at [phone] for increasing redness, swelling or pus draining from the incision, increased pain or any other questions and concerns.     Wound care and dressings   Instructions to care for your wound at home: Keep your incision clean and dry at all times.  OK to remove dressing on postop day 2.  OK to shower on postop day 3 and allow water to run over incision, pat dry after shower.  No bathing swimming or submerging in water.  Call immediately or come to ED if any drainage occurs, or you develop new pain, redness, or swelling.     Diet   Follow this diet upon discharge: Orders Placed This Encounter     Moderate Consistent CHO Diet       Discharge Medications   Current Discharge Medication List      START taking these medications    Details   diazepam (VALIUM) 5 MG tablet Take 1 tablet (5 mg) by mouth every 6 hours as needed for anxiety or muscle spasms  Qty: 60 tablet, Refills: 0    Associated Diagnoses: S/P lumbar fusion      HYDROmorphone (DILAUDID) 2 MG tablet Take 1-2 tablets (2-4 mg) by mouth every 4 hours as needed  Qty: 75 tablet, Refills: 0    Associated Diagnoses: S/P lumbar fusion         CONTINUE these medications which have CHANGED    Details   aspirin 81 MG tablet Take 1 tablet (81 mg) by mouth daily  Qty: 30 tablet, Refills: 3    Comments: May resume taking on 11-9-2018  Associated Diagnoses: S/P lumbar fusion         CONTINUE these medications which have NOT CHANGED    Details   ACETAMINOPHEN PO Take 500 mg by mouth 2 times daily 2 tablets twice daily      alendronate (FOSAMAX) 70 MG tablet TAKE 1 TABLET (70 MG) BY MOUTH EVERY 7 DAYS. TAKE 60 MINUTES BEFORE MORNING MEAL WITH 8 OZ. WATER. REMAIN UPRIGHT FOR 30 MINUTES.  Qty: 12  tablet, Refills: 3    Associated Diagnoses: Osteoporosis, unspecified osteoporosis type, unspecified pathological fracture presence      amLODIPine (NORVASC) 10 MG tablet TAKE 1 TABLET (10 MG) BY MOUTH DAILY  Qty: 90 tablet, Refills: 0    Associated Diagnoses: Benign essential hypertension      atorvastatin (LIPITOR) 40 MG tablet Take 1 tablet (40 mg) by mouth daily  Qty: 90 tablet, Refills: 3    Associated Diagnoses: Hyperlipidemia, unspecified hyperlipidemia type      carvedilol (COREG) 25 MG tablet TAKE 1 TABLET (25 MG) BY MOUTH 2 TIMES DAILY WITH MEALS  Qty: 180 tablet, Refills: 0    Associated Diagnoses: Benign essential hypertension      clobetasol propionate 0.05 % LIQD Externally apply 1 dose. topically 2 times daily as needed  Qty: 1 Bottle, Refills: 5    Associated Diagnoses: Skin rash      fenofibrate 54 MG tablet Take 1 tablet (54 mg) by mouth daily  Qty: 90 tablet, Refills: 3    Associated Diagnoses: Hyperlipidemia, unspecified hyperlipidemia type      insulin aspart (NOVOLOG FLEXPEN) 100 UNIT/ML injection 22 units before breakfast, 26 units before lunch, 20 units before dinner  Qty: 18 mL, Refills: 1    Associated Diagnoses: Type 2 diabetes mellitus with other diabetic kidney complication, with long-term current use of insulin (H)      insulin detemir (LEVEMIR FLEXTOUCH) 100 UNIT/ML injection Inject 41 Units Subcutaneous 2 times daily  Qty: 35 mL, Refills: 1    Associated Diagnoses: Type 2 diabetes mellitus with other diabetic kidney complication, with long-term current use of insulin (H)      Levothyroxine Sodium 150 MCG CAPS Take 150 mcg by mouth daily (with breakfast)  Qty: 90 capsule, Refills: 3    Associated Diagnoses: Hypothyroidism, unspecified type      losartan (COZAAR) 100 MG tablet Take 1 tablet (100 mg) by mouth daily  Qty: 90 tablet, Refills: 1    Associated Diagnoses: Hypertension goal BP (blood pressure) < 140/90      PARoxetine (PAXIL) 20 MG tablet Take 1 tablet (20 mg) by mouth 2 times  daily  Qty: 180 tablet, Refills: 0    Associated Diagnoses: Moderate major depression (H)      primidone (MYSOLINE) 50 MG tablet TAKE ONE TABLET(S) BY MOUTH TWICE DAILY  Qty: 180 tablet, Refills: 1    Associated Diagnoses: Essential tremor      ACE/ARB NOT PRESCRIBED, INTENTIONAL, Please choose reason not prescribed, below    Associated Diagnoses: Benign essential hypertension      albuterol (2.5 MG/3ML) 0.083% neb solution Take 1 vial (2.5 mg) by nebulization every 6 hours as needed for shortness of breath / dyspnea or wheezing 1 vial used in clinic today.  Qty: 1 vial, Refills: 1    Comments: Given in clinic  Associated Diagnoses: Chronic obstructive pulmonary disease, unspecified COPD type (H)      blood glucose monitoring (MUSA CONTOUR NEXT) test strip Use to test blood sugar 3 times daily or as directed.  Qty: 100 strip, Refills: 11    Associated Diagnoses: Type 2 diabetes mellitus with other diabetic kidney complication, with long-term current use of insulin (H)      blood glucose monitoring (MUSA MICROLET) lancets Use to test blood sugar 3 times daily or as directed.  Qty: 100 Box, Refills: 11    Associated Diagnoses: Type 2 diabetes mellitus with other diabetic kidney complication, with long-term current use of insulin (H)      insulin pen needle 32G X 4 MM Use 5 pen needles daily or as directed.  Per insurance and patient preference  Qty: 450 each, Refills: 3    Comments: 3 months worth  Associated Diagnoses: Type 2 diabetes mellitus with other diabetic kidney complication, with long-term current use of insulin (H)           Allergies   No Known Allergies

## 2018-11-08 NOTE — PLAN OF CARE
Problem: Patient Care Overview  Goal: Plan of Care/Patient Progress Review  Outcome: Improving  Pt A&O x4. VS stable; afebrile. PO dilaudid managing pain. CMS intact. Incision's open to air-CDI. Up w/ SBA, using gait belt, walker, and brace. Voiding in good amts. Tolerating CHO diet. Plan is to discharge to home this evening. Will continue to monitor.

## 2018-11-08 NOTE — PROGRESS NOTES
[facility]    Neurosurgery Progress Note    Date of Service (when I saw the patient): 11/08/2018     Assessment & Plan   Yadi Iniguez is a 68 year old female who was admitted on 11/5/2018. The pt presented with lumbar radicular pain.  She was found to have Grade 1 spondylolisthesis at L4-5 with severe left-sided stenosis and a cystic mass compressing the L5 root.  Spondylolisthesis increased to grade 2 with prone position.  The pt underwent a minimally invasive left L4-5 TLIF with Dr. Hernando Hill on 11-5-2018.  Pt is doing well today.  She is sitting up in bed.  We discussed the PT and OT assessment of possible TCU placement. She was hoping to discharge home to her daughter who is a RN. Her daughter will only be able to be with her for 1 week.  It was explained that we will have social work see her and have her assessed by PT and OT again for discharge planning. She agreed.         Active Problems:    S/P lumbar fusion    Assessment: stable     Plan: PT/OT and ambulation  Brace when out of bed   Pain control   Encourage use of IS and deep breathing   Suture/Staple removal in 10-14 days   Social work consult   Incision open to air  I have discussed the following assessment and plan Dr. Hernando Hill  who is in agreement with initial plan and will follow up with further consultation recommendations.    Valerie Chamberlain [facility]  [facility]  [address] [address]    Tel [phone]  Pager [phone]      Interval History   Stable     Physical Exam   Temp: 97  F (36.1  C) Temp src: Oral BP: 144/58 Pulse: 93 Heart Rate: 79 Resp: 16 SpO2: 95 % O2 Device: None (Room air)    Vitals:    11/05/18 0555   Weight: 258 lb (117 kg)     Vital Signs with Ranges  Temp:  [97  F (36.1  C)-99.1  F (37.3  C)] 97  F (36.1  C)  Pulse:  [93] 93  Heart Rate:  [79-83] 79  Resp:  [16-18] 16  BP: (117-144)/(46-58) 144/58  SpO2:  [92 %-95 %] 95 %  I/O  "last 3 completed shifts:  In: 600 [P.O.:600]  Out: 1850 [Urine:1850]    Heart Rate: 79, Blood pressure 144/58, pulse 93, temperature 97  F (36.1  C), temperature source Oral, resp. rate 16, height 5' 8\" (1.727 m), weight 258 lb (117 kg), SpO2 95 %, not currently breastfeeding.  258 lbs 0 oz  HEENT:  Normocephalic, atraumatic.  PERRLA.  EOM s intact.    Neck:  Supple, non-tender, without lymphadenopathy.  Heart:  No peripheral edema  Lungs:  No SOB  Abdomen:  Soft, non-tender, non-distended.   Skin:  Warm and dry, good capillary refill.  Extremities:  Good radial and dorsalis pedis pulses bilaterally, no edema, cyanosis or clubbing.    NEUROLOGICAL EXAMINATION:     Mental status:  Alert and Oriented x 3, speech is fluent.  Cranial nerves:  II-XII intact.   Motor:  Strength is 5/5 throughout the upper and lower extremities  Shoulder Abduction:  Right:  5/5   Left:  5/5  Biceps:                      Right:  5/5   Left:  5/5  Triceps:                     Right:  5/5   Left:  5/5  Wrist Extensors:       Right:  5/5   Left:  5/5  Wrist Flexors:           Right:  5/5   Left:  5/5  interosseus :            Right:  5/5   Left:  5/5   Hip Flexor:                Right: 5/5  Left:  5/5  Hip Adductor:             Right:  5/5  Left:  5/5  Hip Abductor:             Right:  5/5  Left:  5/5  Gastroc Soleus:        Right:  5/5  Left:  5/5  Tib/Ant:                      Right:  5/5  Left:  5/5  EHL:                     Right:  5/5  Left:  5/5  Sensation:  Intact      Medications     0.9% sodium chloride + KCl 20 mEq/L 75 mL/hr at 11/06/18 0500       acetaminophen  975 mg Oral Q8H     amLODIPine  10 mg Oral Daily     atorvastatin  40 mg Oral Daily     carvedilol  25 mg Oral BID w/meals     docusate sodium  100 mg Oral BID     ranitidine  150 mg Oral Q12H    Or     famotidine  20 mg Intravenous Q12H     fenofibrate  54 mg Oral Daily     insulin aspart  10 Units Subcutaneous TID w/meals     insulin aspart  1-7 Units Subcutaneous TID AC "     insulin aspart  1-5 Units Subcutaneous At Bedtime     insulin detemir  41 Units Subcutaneous BID     levothyroxine  150 mcg Oral Daily with breakfast     losartan  100 mg Oral Daily     PARoxetine  20 mg Oral BID     polyethylene glycol  17 g Oral Daily     primidone  50 mg Oral At Bedtime     sodium chloride (PF)  3 mL Intracatheter Q8H       Data     All new lab and imaging data was personally reviewed by me.  CBC RESULTS:   Recent Labs   Lab Test  11/05/18   0625   09/25/17   1117   WBC   --    --   7.0   RBC   --    --   3.85   HGB  12.0   < >  13.0   HCT   --    --   37.8   MCV   --    --   98   MCH   --    --   33.8*   MCHC   --    --   34.4   RDW   --    --   12.9   PLT   --    --   143*    < > = values in this interval not displayed.     Basic Metabolic Panel:  Lab Results   Component Value Date     10/19/2018      Lab Results   Component Value Date    POTASSIUM 4.9 10/19/2018     Lab Results   Component Value Date    CHLORIDE 102 10/19/2018     Lab Results   Component Value Date    NATACHA 9.1 10/19/2018     Lab Results   Component Value Date    CO2 31 10/19/2018     Lab Results   Component Value Date    BUN 29 10/19/2018     Lab Results   Component Value Date    CR 1.28 10/19/2018     Lab Results   Component Value Date     11/06/2018     INR:  Lab Results   Component Value Date    INR 1.07 05/27/2008

## 2018-11-08 NOTE — CONSULTS
Care Transition Initial Assessment - RN    Reason For Consult: discharge planning, care coordination/care conference   Met with: Patient.  DATA   Active Problems:    S/P lumbar fusion       Cognitive Status: awake, alert and oriented.  Primary [facility] Clinic Name: [facility]   Primary Care MD Name: Otoole  Contact information and PCP information verified: Yes  Lives With: alone  Living Arrangements: mobile home     Description of Support System: Supportive, Involved   Who is your support system?: Children (Dtr )   Support Assessment: Adequate family and caregiver support, Adequate social supports   Insurance concerns: No Insurance issues identified  ASSESSMENT  Patient currently receives the following services:  NONE.        Identified issues/concerns regarding health management: CTS following S/p Spine pt with Co morb- CHF, COPD, DM  assessing for potential need for PCP HO/FU needs and well as discharge planing needs.   Pt is noted to live alone and prior to Surgery functioning independently with all needs. Noted plan for dtr to stay with pt full time for first week and then available as needed following that. Noted PT concern with poor progression with STS, Stairs, that TCU may be needed.   Per PT note today, pt has progressed well with previously noted barriers. This was confirmed with the pt .   Pt is anticipating returning home with assist from Daughter.   No further needs identified.     Francie Saucedo RN, BSN, Care Transitions Specialist   Care Transitions Team  [phone]

## 2018-11-08 NOTE — PLAN OF CARE
Problem: Patient Care Overview  Goal: Plan of Care/Patient Progress Review  Discharge Planner PT   Patient plan for discharge: Home with assist from daughter for 1 week, pt states other family/friends can assist when daughter leaves as well  Current status: Pt progressing well. Reviewed spine precautions, recalled 3/3. Logroll for bed mobility with SBA. SBA for STS transfers with use of FWW. Pt ambulated 200' with FWW and CGA > SBA with cues for improved posture and mechanics with, Brace donned for all OOB activity. Discussed walking program  Barriers to return to prior living situation: a x 1 for mobility, medical stability  Recommendations for discharge: Home with assist from daughter for stairs   Rationale for recommendations: Pt progressing well. SBA-CGA for all mobility, plan for PM session with focus on progressing IND with all mobility prior to discharge to home          Entered by: Niki Jack 11/08/2018 11:49 AM         Physical Therapy Discharge Summary    Reason for therapy discharge:    Discharged to Home with assist    Progress towards therapy goal(s). See goals on Care Plan in [facility] electronic health record for goal details.  Goals partially met.  Barriers to achieving goals:   discharge from facility.    Therapy recommendation(s):    Continue home exercise program.

## 2018-11-08 NOTE — PLAN OF CARE
Problem: Patient Care Overview  Goal: Plan of Care/Patient Progress Review  Outcome: Improving  Pt. up with assist of one using gait belt and walker. Denied any pain during shift. Incision to back is open to air, approximated, no drainage. Voiding appropriately, BM on 11/3. Not unusual per patient, but Miralax started tonight. Plans to discharge home tomorrow at 1830.

## 2018-11-08 NOTE — PROGRESS NOTES
"  [facility]  Hospitalist Progress Note  Tino Chau MD 11/08/2018        Reason for hospital stay:  Elective lumbar spine fusion.     Brief Summary of Stay       Yadi Iniguez is a 68 year old female  With h/o DM, COPD , HTN and CKD who was admitted for elective back surgery, he has smooth postoperative course, pain is controlled, she was able to ambulate, no nausea or vomiting, patient remained stable, pain is fairly controlled and stated is much better than before the surgery.    Assessment and Plan      1. POD#3- s/p lumbar spinal fusion.   - Stable , pain controlled   -Pain controlled, continue to ambulate.  -Incentive spirometry    2.  Postop pain:Controlled on oral pain regimen, IV Dilaudid as needed    3.  Diabetes mellitus type 2 insulin requiring.  -Continue sliding scale insulin and Levemir as ordered    4.  Postop mild blood loss.  Hemoglobin is stable at 12          5.  HTN -controlled , on home med PTA medication including amlodipine, Coreg, losartan     DVT Prophylaxis: Defer to primary service    Code Status: Full Code    Discharge Dispo: Home today per primary care team.    Estimated Disch Date / # of Days until Disch: Per primary, patient is medically stable at this point will continue to follow-up while the patient is in the hospital.  I discussed with patient the plan of care, all her question concerns addressed      Interval History (Subjective):        Patient is seen and examined, no new overnight issues, feels better, pain fairly controlled, ambulating.                  Physical Exam:        Last Vital Signs:  /58 (BP Location: Right arm)  Pulse 93  Temp 97  F (36.1  C) (Oral)  Resp 16  Ht 1.727 m (5' 8\")  Wt 117 kg (258 lb)  SpO2 95%  BMI 39.23 kg/m2    I/O last 3 completed shifts:  In: 600 [P.O.:600]  Out: 1850 [Urine:1850]  Wt Readings from Last 1 Encounters:   11/05/18 117 kg (258 lb)     Vitals:    11/05/18 0555   Weight: 117 kg (258 lb)            " "Physical Exam:   Blood pressure 144/58, pulse 93, temperature 97  F (36.1  C), temperature source Oral, resp. rate 16, height 1.727 m (5' 8\"), weight 117 kg (258 lb), SpO2 95 %, not currently breastfeeding.    Constitutional:   awake, alert and cooperative     Neck:   supple, symmetrical, trachea midline, skin normal and no stridor     Lungs:   no increased work of breathing, good air exchange, no retractions and clear to auscultation     Cardiovascular:   normal apical pulses  and normal S1 and S2     Abdomen:   normal bowel sounds and soft     Neurologic:   Mental Status Exam:  Level of Alertness:   awake            Medications:      All current medications were reviewed with changes reflected in problem list.    Current Facility-Administered Medications   Medication     0.9% sodium chloride + KCl 20 mEq/L infusion     acetaminophen (TYLENOL) tablet 650 mg     acetaminophen (TYLENOL) tablet 975 mg     albuterol neb solution 2.5 mg     amLODIPine (NORVASC) tablet 10 mg     atorvastatin (LIPITOR) tablet 40 mg     benzocaine-menthol (CHLORASEPTIC) 6-10 MG lozenge 1 lozenge     carvedilol (COREG) tablet 25 mg     glucose gel 15-30 g    Or     dextrose 50 % injection 25-50 mL    Or     glucagon injection 1 mg     diazepam (VALIUM) tablet 5 mg     diphenhydrAMINE (BENADRYL) solution 12.5 mg    Or     diphenhydrAMINE (BENADRYL) injection 12.5 mg     docusate sodium (COLACE) capsule 100 mg     ranitidine (ZANTAC) tablet 150 mg    Or     famotidine (PEPCID) injection 20 mg     fenofibrate tablet 54 mg     HYDROmorphone (DILAUDID) tablet 2-4 mg     HYDROmorphone (PF) (DILAUDID) injection 0.3-0.5 mg     insulin aspart (NovoLOG) inj (RAPID ACTING)     insulin aspart (NovoLOG) inj (RAPID ACTING)     insulin aspart (NovoLOG) inj (RAPID ACTING)     insulin detemir (LEVEMIR) injection 41 Units     levothyroxine (SYNTHROID/LEVOTHROID) tablet 150 mcg     lidocaine (LMX4) cream     lidocaine 1 % 1 mL     losartan (COZAAR) tablet 100 " mg     metoclopramide (REGLAN) tablet 5 mg    Or     metoclopramide (REGLAN) injection 5 mg     naloxone (NARCAN) injection 0.1-0.4 mg     ondansetron (ZOFRAN-ODT) ODT tab 4 mg    Or     ondansetron (ZOFRAN) injection 4 mg     PARoxetine (PAXIL) tablet 20 mg     polyethylene glycol (MIRALAX/GLYCOLAX) Packet 17 g     primidone (MYSOLINE) tablet 50 mg     prochlorperazine (COMPAZINE) injection 5 mg    Or     prochlorperazine (COMPAZINE) tablet 5 mg     sodium chloride (PF) 0.9% PF flush 3 mL     sodium chloride (PF) 0.9% PF flush 3 mL            Data:         Labs:      Recent Labs  Lab 11/05/18  0625   HGB 12.0     No results for input(s): CULT in the last 168 hours.    Recent Labs  Lab 11/06/18  1138   *         Recent Labs  Lab 11/08/18  1200 11/08/18  0701 11/08/18  0148 11/07/18  2059 11/07/18  1702  11/06/18  1138   GLC  --   --   --   --   --   --  143*   * 154* 172* 183* 145*  < >  --    < > = values in this interval not displayed.      Recent Results (from the past 48 hour(s))   XR Lumbar Spine Port 1 View    Narrative    This exam was marked as non-reportable because it will not be read by a   radiologist or a [place] non-radiologist provider.             XR Surgery LINDA L/T 5 Min Fluoro w Stills    Narrative    This exam was marked as non-reportable because it will not be read by a   radiologist or a [place] non-radiologist provider.                 Imaging:   No results found for this or any previous visit (from the past 24 hour(s)).

## 2018-11-09 ENCOUNTER — TELEPHONE (OUTPATIENT)
Dept: FAMILY MEDICINE | Facility: CLINIC | Age: 68
End: 2018-11-09

## 2018-11-09 DIAGNOSIS — F32.1 MODERATE MAJOR DEPRESSION (H): ICD-10-CM

## 2018-11-09 DIAGNOSIS — I10 BENIGN ESSENTIAL HYPERTENSION: ICD-10-CM

## 2018-11-09 RX ORDER — AMLODIPINE BESYLATE 10 MG/1
TABLET ORAL
Qty: 90 TABLET | Refills: 0 | Status: SHIPPED | OUTPATIENT
Start: 2018-11-09 | End: 2019-01-31

## 2018-11-09 RX ORDER — PAROXETINE 20 MG/1
20 TABLET, FILM COATED ORAL 2 TIMES DAILY
Qty: 180 TABLET | Refills: 0 | Status: SHIPPED | OUTPATIENT
Start: 2018-11-09 | End: 2019-01-31

## 2018-11-09 RX ORDER — CARVEDILOL 25 MG/1
TABLET ORAL
Qty: 180 TABLET | Refills: 0 | Status: SHIPPED | OUTPATIENT
Start: 2018-11-09 | End: 2019-01-31

## 2018-11-09 NOTE — TELEPHONE ENCOUNTER
Returned call to daughterNikki.    Refills of needed medications sent to preferred pharmacy.  Patient is doing well at home.  Daughter is with her.  Patient is currently sleeping now.     Patient has been taking only a HALF tablet of losartan instead of a FULL tablet as ordered in Epic.   Daughter is unsure why patient has been taking it this way but wonders if patient should continue what she's been doing or increase the losartan to a FULL tablet.    Routing to provider to advise.  Yaa Turner, BSN RN

## 2018-11-09 NOTE — TELEPHONE ENCOUNTER
Daughter lorenzo is calling to request refill for all of patients medications as patient was last seen 10/2018, Please also put these 3 RX in priority for RX: carvedilol (COREG) 25 MG tablet, PARoxetine (PAXIL) 20 MG tablet, and amLODIPine (NORVASC) 10 MG tablet. Please call when sent to pharmacy. Thank you.

## 2018-11-09 NOTE — PLAN OF CARE
Problem: Patient Care Overview  Goal: Plan of Care/Patient Progress Review  Outcome: Adequate for Discharge Date Met: 11/08/18  Pt. discharged home with daughter. Paper work reviewed and signed. Medications sent home included dilaudid. Vitals WNL, and incision clean, dry, and approximated.

## 2018-11-09 NOTE — TELEPHONE ENCOUNTER
Not sure why she is only taking 1/2 dose as just picked up pt  Have her continue on just 1/2 a dose, monitor her BP and let me know if BP is at goal < 140/90 or consistently above that.   Her BP may change as she becomes mor mobile post surgery.    Sandy Otoole MD

## 2018-11-12 ENCOUNTER — TELEPHONE (OUTPATIENT)
Dept: FAMILY MEDICINE | Facility: CLINIC | Age: 68
End: 2018-11-12

## 2018-11-12 NOTE — TELEPHONE ENCOUNTER
This pt was Discharged from [facility] on 11/8/18 for L4-5 Grade One Spondylothesis With Cystic Mass, S/P Lumber Fusion      Please note this information was received from either Arnel or [facility]NEY  IP daily report with Dr. Cee identified as the PCP.    A follow-up visit has not been scheduled.    Please follow-up with patient accordingly.

## 2018-11-12 NOTE — TELEPHONE ENCOUNTER
S/p surgery for Lumbar fusion 11/5/18.    ED/Discharge Protocol    Not done, due to post op patient.  Patient has scheduled appointment with surgeon.  Loan Delcid RN           .

## 2018-11-13 DIAGNOSIS — E11.29 TYPE 2 DIABETES MELLITUS WITH OTHER DIABETIC KIDNEY COMPLICATION, WITH LONG-TERM CURRENT USE OF INSULIN (H): ICD-10-CM

## 2018-11-13 DIAGNOSIS — Z79.4 TYPE 2 DIABETES MELLITUS WITH OTHER DIABETIC KIDNEY COMPLICATION, WITH LONG-TERM CURRENT USE OF INSULIN (H): ICD-10-CM

## 2018-11-13 NOTE — TELEPHONE ENCOUNTER
Daughter Nikki calling stating patient is having low blood sugar and need advise, please call to discuss. Thank you.

## 2018-11-13 NOTE — TELEPHONE ENCOUNTER
Diabetes Follow-up    Subjective/Objective:    Yadi Iniguez sent in blood glucose log for review. Last date of communication was: 12/5/2017.    Diabetes is being managed with   Diabetes Medication(s)     Insulin Sig    insulin aspart (NOVOLOG FLEXPEN) 100 UNIT/ML injection 22 units before breakfast, 26 units before lunch, 20 units before dinner    insulin detemir (LEVEMIR FLEXTOUCH) 100 UNIT/ML injection Inject 41 Units Subcutaneous 2 times daily          Spoke with Daughter:  Patient had back surgery 11/5/18.  Not eating like usual typically no lunch, daughter (RN) helping care for her at home.     11/11- Fasting 94  11/12- fasting 80- (41 levemir and 22 novolog @ 10am) then later in the day patient couldn't speak, couldn't stay awake- 4:20pm- BG 56 - was given 3 glucose tabs- Evening dose of Levemir held to prevent further lows.    11/13- gave 22 novolog with meal , fasting 151- breakfast 8am- muffin, yogurt (chioboni greek), coffee with cream- 11:30am-   No Levemir- given this AM to prevent low blood sugars    Daughter is staying with patient during the day for the next week.    Assessment:  Due to sleeping more and eating less post surgery Levemir insulin dose is too high.  Patient would benefit from a significantly reduced dose to prevent low blood sugars.  Current Novolog doses appear appropriate given 70mg/dl rise pre to post meal, but could be too strong if patient eats less at a meal.    Plan/Response:  Recommend a temporary decreased dose of Levemir to keep blood sugars in target between meals, but prevent from pulling her too low.  Considering patient's BG was only 21mg/dl above goal with no Levemir on board would recommend starting with 10-0-0-10 and titrating up until fasting and pre-meal readings . Routing approval of temporary Levemir decrease to Dr. Root.    Recommend continuing with same dose of Novolog with meals, hold if skipping a meal.       Daughter requesting refills of Novolog  and Fide valencia.    Ana Bhatia MS, RD, LD, CDE      Any diabetes medication dose changes were made via the CDE Protocol and Collaborative Practice Agreement with the patient's primary care provider. A copy of this encounter was shared with the provider.

## 2018-11-13 NOTE — TELEPHONE ENCOUNTER
Called back daughter to review approved Levemir dose change.  Request she call diabetes triage line tomorrow if patient again has low blood sugar, otherwise call in again on 11/15 if fasting/pre-meal readings are above goal with new Levemir dose to start adjusting.      Ana Bhatia MS, RD, LD, CDE

## 2018-11-14 DIAGNOSIS — Z98.1 S/P LUMBAR FUSION: ICD-10-CM

## 2018-11-14 RX ORDER — HYDROMORPHONE HYDROCHLORIDE 2 MG/1
2 TABLET ORAL EVERY 6 HOURS PRN
Qty: 50 TABLET | Refills: 0 | Status: SHIPPED | OUTPATIENT
Start: 2018-11-14 | End: 2018-12-18

## 2018-11-20 ENCOUNTER — OFFICE VISIT (OUTPATIENT)
Dept: NEUROSURGERY | Facility: CLINIC | Age: 68
End: 2018-11-20
Payer: COMMERCIAL

## 2018-11-20 VITALS
WEIGHT: 260 LBS | TEMPERATURE: 97.3 F | BODY MASS INDEX: 39.53 KG/M2 | HEART RATE: 97 BPM | OXYGEN SATURATION: 94 % | DIASTOLIC BLOOD PRESSURE: 61 MMHG | SYSTOLIC BLOOD PRESSURE: 129 MMHG

## 2018-11-20 DIAGNOSIS — Z98.1 S/P LUMBAR FUSION: Primary | ICD-10-CM

## 2018-11-20 PROCEDURE — 99207 ZZC NO CHARGE NURSE ONLY: CPT

## 2018-11-20 ASSESSMENT — PAIN SCALES - GENERAL: PAINLEVEL: MODERATE PAIN (5)

## 2018-11-20 NOTE — PATIENT INSTRUCTIONS
- Keep your incision clean and dry at all times. No bathing swimming or submerging in water.  Call immediately or come to ED if any drainage occurs, or you develop new pain, redness, or swelling.      - Return in 4 weeks for follow up appointment and xray prior on 12/18/18.     - Brace on when out of bed. No lifting greater than 10-15 pounds. No bending, twisting, or overhead reaching.

## 2018-11-20 NOTE — MR AVS SNAPSHOT
After Visit Summary   11/20/2018    Yadi Iniguez    MRN: [id number]           Patient Information     Date Of Birth          1950        Visit Information        Provider Department      11/20/2018 10:00 AM Nurse, [facility]        Care Instructions    - Keep your incision clean and dry at all times. No bathing swimming or submerging in water.  Call immediately or come to ED if any drainage occurs, or you develop new pain, redness, or swelling.      - Return in 4 weeks for follow up appointment and xray prior on 12/18/18.     - Brace on when out of bed. No lifting greater than 10-15 pounds. No bending, twisting, or overhead reaching.                  Follow-ups after your visit        Your next 10 appointments already scheduled     Dec 18, 2018  9:00 AM CST   XR LUMBAR SPINE 2/3 VIEWS with [id number]   [facility] ([facility])    [address] [place] [id number]   [phone]           How do I prepare for my exam? (Food and drink instructions) No Food and Drink Restrictions.  How do I prepare for my exam? (Other instructions) You do not need to do anything special for this exam.  What should I wear: Wear comfortable clothes.  How long does the exam take: Most scans take less than 5 minutes.  What should I bring: Bring a list of your medicines, including vitamins, minerals and over-the-counter drugs. It is safest to leave personal items at home.  Do I need a :  No  is needed.  What do I need to tell my doctor: Tell your doctor if there s any chance you are pregnant.  What should I do after the exam: No restrictions, You may resume normal activities.  What is this test: An image of a specific body part shown in shades of black and white.  Who should I call with questions: If you have any questions, please call the Imaging Department where you will have your exam. Directions, parking instructions, and other information is  available on our website, [email or web address].            Dec 18, 2018 10:00 AM CST   Return Visit with Nnacy Miller NP   [facility]    [address] [place] [address] [place] [id number] [phone]              Who to contact     If you have questions or need follow up information about today's clinic visit or your schedule please contact [facility] directly at [phone].  Normal or non-critical lab and imaging results will be communicated to you by [employer], letter or phone within 4 business days after the clinic has received the results. If you do not hear from us within 7 days, please contact the clinic through [employer] or phone. If you have a critical or abnormal lab result, we will notify you by phone as soon as possible.  Submit refill requests through [employer] or call your pharmacy and they will forward the refill request to us. Please allow 3 business days for your refill to be completed.          Additional Information About Your Visit        [employer] Information     [employer] gives you secure access to your electronic health record. If you see a primary care provider, you can also send messages to your care team and make appointments. If you have questions, please call your primary care clinic.  If you do not have a primary care provider, please call [phone] and they will assist you.        Care EveryWhere ID     This is your Care EveryWhere ID. This could be used by other organizations to access your [facility] medical records  [id number]        Your Vitals Were     Pulse Temperature Pulse Oximetry Breastfeeding? BMI (Body Mass Index)       97 97.3  F (36.3  C) (Oral) 94% No 39.53 kg/m2        Blood Pressure from Last 3 Encounters:   11/20/18 129/61   11/08/18 163/74   10/22/18 115/57    Weight from Last 3 Encounters:   11/20/18 260 lb (117.9 kg)   11/05/18 258 lb (117 kg)   10/22/18 259 lb (117.5 kg)              Today, you had the following      No orders found for display       Primary Care Provider Office Phone # Fax #    Sandy Root [phone] [address] [address] [facility] [address]        Equal Access to Services     SHO DAVIS : Hadpaul bunny ku neishao Socomfortali, waaxda luqadaha, qaybta kaalmada adeegyada, chandra mendes feliz randolph. So [facility] [phone].    ATENCIÓN: Si habla español, tiene a dunbar disposición servicios gratuitos de asistencia lingüística. Llame al [phone].    We comply with applicable federal civil rights laws and [place] laws. We do not discriminate on the basis of race, color, national origin, age, disability, sex, sexual orientation, or gender identity.            Thank you!     Thank you for choosing [facility]  for your care. Our goal is always to provide you with excellent care. Hearing back from our patients is one way we can continue to improve our services. Please take a few minutes to complete the written survey that you may receive in the mail after your visit with us. Thank you!             Your Updated Medication List - Protect others around you: Learn how to safely use, store and throw away your medicines at www.disposemymeds.org.          This list is accurate as of 11/20/18 10:03 AM.  Always use your most recent med list.                   Brand Name Dispense Instructions for use Diagnosis    ACE/ARB/ARNI NOT PRESCRIBED (INTENTIONAL)      Please choose reason not prescribed, below    Benign essential hypertension       ACETAMINOPHEN PO      Take 500 mg by mouth 2 times daily 2 tablets twice daily        albuterol (2.5 MG/3ML) 0.083% neb solution     1 vial    Take 1 vial (2.5 mg) by nebulization every 6 hours as needed for shortness of breath / dyspnea or wheezing 1 vial used in clinic today.    Chronic obstructive pulmonary disease, unspecified COPD type (H)       alendronate 70 MG tablet    FOSAMAX    12 tablet    TAKE 1 TABLET (70 MG) BY MOUTH EVERY 7  DAYS. TAKE 60 MINUTES BEFORE MORNING MEAL WITH 8 OZ. WATER. REMAIN UPRIGHT FOR 30 MINUTES.    Osteoporosis, unspecified osteoporosis type, unspecified pathological fracture presence       amLODIPine 10 MG tablet    NORVASC    90 tablet    TAKE 1 TABLET (10 MG) BY MOUTH DAILY    Benign essential hypertension       aspirin 81 MG tablet     30 tablet    Take 1 tablet (81 mg) by mouth daily    S/P lumbar fusion       atorvastatin 40 MG tablet    LIPITOR    90 tablet    Take 1 tablet (40 mg) by mouth daily    Hyperlipidemia, unspecified hyperlipidemia type       blood glucose monitoring lancets     100 Box    Use to test blood sugar 3 times daily or as directed.    Type 2 diabetes mellitus with other diabetic kidney complication, with long-term current use of insulin (H)       blood glucose monitoring test strip    MUSA CONTOUR NEXT    100 strip    Use to test blood sugar 3 times daily or as directed.    Type 2 diabetes mellitus with other diabetic kidney complication, with long-term current use of insulin (H)       carvedilol 25 MG tablet    COREG    180 tablet    TAKE 1 TABLET (25 MG) BY MOUTH 2 TIMES DAILY WITH MEALS    Benign essential hypertension       clobetasol propionate 0.05 % Liqd     1 Bottle    Externally apply 1 dose. topically 2 times daily as needed    Skin rash       diazepam 5 MG tablet    VALIUM    60 tablet    Take 1 tablet (5 mg) by mouth every 6 hours as needed for anxiety or muscle spasms    S/P lumbar fusion       fenofibrate 54 MG tablet     90 tablet    Take 1 tablet (54 mg) by mouth daily    Hyperlipidemia, unspecified hyperlipidemia type       HYDROmorphone 2 MG tablet    DILAUDID    50 tablet    Take 1 tablet (2 mg) by mouth every 6 hours as needed for moderate to severe pain    S/P lumbar fusion       insulin aspart 100 UNIT/ML injection    NovoLOG FLEXPEN    18 mL    22 units before breakfast, 26 units before lunch, 20 units before dinner    Type 2 diabetes mellitus with other diabetic  kidney complication, with long-term current use of insulin (H)       insulin detemir 100 UNIT/ML injection    LEVEMIR FLEXTOUCH    35 mL    Inject 41 Units Subcutaneous 2 times daily    Type 2 diabetes mellitus with other diabetic kidney complication, with long-term current use of insulin (H)       insulin pen needle 32G X 4 MM miscellaneous    32G X 4 MM    450 each    Use 5 pen needles daily or as directed.  Per insurance and patient preference    Type 2 diabetes mellitus with other diabetic kidney complication, with long-term current use of insulin (H)       levothyroxine sodium 150 MCG capsule    TIROSINT    90 capsule    Take 150 mcg by mouth daily (with breakfast)    Hypothyroidism, unspecified type       losartan 100 MG tablet    COZAAR    90 tablet    Take 1 tablet (100 mg) by mouth daily    Hypertension goal BP (blood pressure) < 140/90       PARoxetine 20 MG tablet    PAXIL    180 tablet    Take 1 tablet (20 mg) by mouth 2 times daily    Moderate major depression (H)       primidone 50 MG tablet    MYSOLINE    180 tablet    TAKE ONE TABLET(S) BY MOUTH TWICE DAILY    Essential tremor

## 2018-11-20 NOTE — PROGRESS NOTES
Suture/Staple removal visit note:  S: Patient is s/p lumbar fusion 11/5/18 with Dr Hill. Patient has some post op pain, she reports low back and left leg pain today. Otherwise she has no current concerns or questions regarding post-surgical plan of care. Patient did not need any refills today on pain medication. Currently, taking dilaudid one tab four times per day and tylenol in between. She feels her pain is managed well with that regimen.  O:  Manisha removed by Lauryn Mann MA , wound is healing well . Mild erythema along incision, clean and dry, mild scabbing.  Non-edematous.  A: Patient returned to clinic post-op for staple removal.  Patient tolerated procedure without incident.   P:       - Keep your incision clean and dry at all times. No bathing swimming or submerging in water.  Call immediately or come to ED if any drainage occurs, or you develop new pain, redness, or swelling.      - Return in 4 weeks for follow up appointment and xray prior on 12/18/18.     - Brace on when out of bed. No lifting greater than 10-15 pounds. No bending, twisting, or overhead reaching.      Ginny Rangel RN   [facility]  [address] [address]     Tel [phone]

## 2018-11-20 NOTE — NURSING NOTE
"Yadi Iniguez is a 68 year old female who presents for:  Chief Complaint   Patient presents with     Neurologic Problem     s/p lumbar fusion 11-5-18, staple removal        Vitals:    Vitals:    11/20/18 0955   BP: 129/61   BP Location: Left arm   Patient Position: Sitting   Cuff Size: Adult Large   Pulse: 97   Temp: 97.3  F (36.3  C)   TempSrc: Oral   SpO2: 94%   Weight: 260 lb (117.9 kg)       BMI:  Estimated body mass index is 39.53 kg/(m^2) as calculated from the following:    Height as of 11/5/18: 5' 8\" (1.727 m).    Weight as of this encounter: 260 lb (117.9 kg).    Pain Score:  Moderate Pain (5)        Lauryn Mann CMA, AAS      "

## 2018-11-27 ENCOUNTER — TELEPHONE (OUTPATIENT)
Dept: EDUCATION SERVICES | Facility: CLINIC | Age: 68
End: 2018-11-27

## 2018-11-27 NOTE — TELEPHONE ENCOUNTER
Recently changed paloma's levemir from 41 in the am and pm to levemir 10 am and pm due to decreased eating patterns post surgery.  Now she is eating more regularly and wondering if you want to increase the levemir slightly due to below sugars:    11-15  Am fasting 141  Pre Dinner 178  11-16  Am fasting 159  Pre dinner   101  11-17 am fasting   160 Pre dinner   132  11-18 am fasting    159 Pre dinner    149  11-19 am fasting    171 Pre dinner   146  11-20 am fasting    172 Pre dinner    192  11-21 am fasting   167 Pre dinner    234  11-22 am fasting    171 Pre dinner     84  11-23 am fasting    175 Pre dinner  122  11-24 am fasting   195 Pre dinner   190  11-25 am fasting   180    Patient's current novolog dose is 21 units in the am, 26 units lunch, and 22 units dinner.  Please call and advise daughter if you want to increase levemir a little bit.     Thank you

## 2018-11-27 NOTE — TELEPHONE ENCOUNTER
Diabetes Follow-up    Subjective/Objective:    Yadi Iniguez sent in blood glucose log for review. Last date of communication was: 11/13/1/.    Diabetes is being managed with   Diabetes Medications   Diabetes Medication(s)     Insulin Sig    insulin aspart (NOVOLOG FLEXPEN) 100 UNIT/ML injection 22 units before breakfast, 26 units before lunch, 20 units before dinner    insulin detemir (LEVEMIR FLEXTOUCH) 100 UNIT/ML injection Inject 41 Units Subcutaneous 2 times daily      Levemir taking as 10-0-0-10    Assessment:  Patient would benefit from increasing Levemir dose to provide better long acting coverage.       Fasting blood glucose: 0% in target..  Before dinner glucose: 30% in target.    Plan/Response:  Called Nikki back:   Recommend increase to insulin - Levmeir 12-0-0-12 and continue to increase daily dose by 4 units every 3-4 days until fasting readings less than 130.  Hold dose steady if fasting readings decreasing daily until steady.     Follow up CDE appointment scheduled at [facility] 12/18/18 as patient already has 2 other appointments scheduled that morning.    Ana Bhatia MS, RD, LD, CDE      Any diabetes medication dose changes were made via the CDE Protocol and Collaborative Practice Agreement with the patient's primary care provider. A copy of this encounter was shared with the provider.

## 2018-12-18 ENCOUNTER — OFFICE VISIT (OUTPATIENT)
Dept: NEUROSURGERY | Facility: CLINIC | Age: 68
End: 2018-12-18
Payer: COMMERCIAL

## 2018-12-18 ENCOUNTER — ANCILLARY PROCEDURE (OUTPATIENT)
Dept: GENERAL RADIOLOGY | Facility: CLINIC | Age: 68
End: 2018-12-18
Attending: NURSE PRACTITIONER
Payer: COMMERCIAL

## 2018-12-18 ENCOUNTER — TELEPHONE (OUTPATIENT)
Dept: EDUCATION SERVICES | Facility: CLINIC | Age: 68
End: 2018-12-18

## 2018-12-18 ENCOUNTER — ALLIED HEALTH/NURSE VISIT (OUTPATIENT)
Dept: EDUCATION SERVICES | Facility: CLINIC | Age: 68
End: 2018-12-18
Payer: COMMERCIAL

## 2018-12-18 VITALS
TEMPERATURE: 99.5 F | HEART RATE: 87 BPM | DIASTOLIC BLOOD PRESSURE: 63 MMHG | OXYGEN SATURATION: 97 % | SYSTOLIC BLOOD PRESSURE: 125 MMHG

## 2018-12-18 VITALS — WEIGHT: 262 LBS | BODY MASS INDEX: 39.84 KG/M2

## 2018-12-18 DIAGNOSIS — Z98.1 S/P LUMBAR FUSION: ICD-10-CM

## 2018-12-18 DIAGNOSIS — E11.29 TYPE 2 DIABETES MELLITUS WITH OTHER DIABETIC KIDNEY COMPLICATION, WITH LONG-TERM CURRENT USE OF INSULIN (H): Primary | ICD-10-CM

## 2018-12-18 DIAGNOSIS — E11.29 TYPE 2 DIABETES MELLITUS WITH OTHER KIDNEY COMPLICATION, UNSPECIFIED WHETHER LONG TERM INSULIN USE (H): Primary | ICD-10-CM

## 2018-12-18 DIAGNOSIS — Z98.1 S/P LUMBAR SPINAL FUSION: Primary | ICD-10-CM

## 2018-12-18 DIAGNOSIS — Z79.4 TYPE 2 DIABETES MELLITUS WITH OTHER DIABETIC KIDNEY COMPLICATION, WITH LONG-TERM CURRENT USE OF INSULIN (H): Primary | ICD-10-CM

## 2018-12-18 PROCEDURE — 72100 X-RAY EXAM L-S SPINE 2/3 VWS: CPT

## 2018-12-18 PROCEDURE — 99024 POSTOP FOLLOW-UP VISIT: CPT | Performed by: NURSE PRACTITIONER

## 2018-12-18 PROCEDURE — G0108 DIAB MANAGE TRN  PER INDIV: HCPCS

## 2018-12-18 RX ORDER — HYDROMORPHONE HYDROCHLORIDE 2 MG/1
2 TABLET ORAL EVERY 6 HOURS PRN
Qty: 28 TABLET | Refills: 0 | Status: SHIPPED | OUTPATIENT
Start: 2018-12-18 | End: 2019-01-31

## 2018-12-18 ASSESSMENT — PAIN SCALES - GENERAL: PAINLEVEL: SEVERE PAIN (6)

## 2018-12-18 NOTE — PROGRESS NOTES
"Diabetes Self-Management Education & Support    Diabetes Education Self Management & Training    SUBJECTIVE/OBJECTIVE:  Presents for: Individual review  Accompanied by: Self, Daughter  Diabetes education in the past 24mo: Yes  Focus of Visit: Monitoring, Taking Medication, Problem Solving  Diabetes type: Type 2  Disease course: Improving  How confident are you filling out medical forms by yourself:: Extremely  Diabetes management related comments/concerns: My insurance is covering different insulin next year what should I pick?  I'd like to take Toujeo again.  Transportation concerns: No(plans to resume driving once she is cleared to drive)  Other concerns:: None  Cultural Influences/Ethnic Background:  American    Diabetes Symptoms & Complications  Blurred vision: No  Fatigue: No  Neuropathy: No  Slow healing wounds: No(reports surgical incision healing well)  Symptom course: Stable  Weight trend: Stable     Patient Problem List and Family Medical History reviewed for relevant medical history, current medical status, and diabetes risk factors.    Vitals:  Wt 118.8 kg (262 lb)   BMI 39.84 kg/m    Estimated body mass index is 39.84 kg/m  as calculated from the following:    Height as of 11/5/18: 1.727 m (5' 8\").    Weight as of this encounter: 118.8 kg (262 lb).     Wt Readings from Last 10 Encounters:   12/18/18 118.8 kg (262 lb)   11/20/18 117.9 kg (260 lb)   11/05/18 117 kg (258 lb)   10/22/18 117.5 kg (259 lb)   08/24/18 116.1 kg (256 lb)   05/25/18 116.6 kg (257 lb)   04/25/18 117.5 kg (259 lb)   01/24/18 117.9 kg (260 lb)   12/11/17 116.4 kg (256 lb 9.6 oz)   12/06/17 117 kg (258 lb)     Last 3 BP:   BP Readings from Last 3 Encounters:   12/18/18 125/63   11/20/18 129/61   11/08/18 163/74       History   Smoking Status     Former Smoker     Packs/day: 0.50     Years: 50.00     Types: Cigarettes     Start date: 1/1/1967     Quit date: 8/8/2017   Smokeless Tobacco     Never Used     Comment: former smoker " "      Quit smoking 9/1/18 - not using nicotine replacement - was smoking 1/2 to 1 pack per day    Labs:  Lab Results   Component Value Date    A1C 7.3 11/05/2018     Lab Results   Component Value Date     11/06/2018     Lab Results   Component Value Date    LDL 88 04/20/2018     HDL Cholesterol   Date Value Ref Range Status   04/20/2018 39 (L) >49 mg/dL Final   ]  GFR Estimate   Date Value Ref Range Status   10/19/2018 41 (L) >60 mL/min/1.7m2 Final     Comment:     Non  GFR Calc     GFR Estimate If Black   Date Value Ref Range Status   10/19/2018 50 (L) >60 mL/min/1.7m2 Final     Comment:      GFR Calc     Lab Results   Component Value Date    CR 1.28 10/19/2018     No results found for: MICROALBUMIN    Healthy Eating  Healthy Eating Assessed Today: Yes  Cultural/Holiness diet restrictions?: No  Patient on a regular basis: Eats 3 meals a day  Meal planning: None  Meals include: Breakfast, Lunch, Dinner, Snacks    Breakfast: always, muffin (large), yogurt (chiobani greek), coffee with sugar free cream  Lunch: Atkins drink, banana, apple  Dinner: 2 cups hashbrowns, 3 eggs, 2 pc toast-90g carbohydrate OR banquet frozen meal 38-50g carbohydrate, 12-15g protein, 600-1300mg sodium)  Beverages: Water, Coffee    Last vegetable- corn 2 days ago- 4 days ago raw broccoli  Last fruit- daily (apples and bananas)      Missing some teeth- difficult to chew raw vegetables    \"I have a problem with portion control\"    Has patient met with a dietitian in the past?: Yes    Being Active  Being Active Assessed Today: Yes  Exercise:: Currently not exercising  Barrier to exercise: Physical limitation(Following post back surgery activity instructions)    Monitoring  Monitoring Assessed Today: Yes  Did patient bring glucose meter to appointment? : No  Blood Glucose Meter: One Touch(verio)  Home Glucose (Sugar) Monitoring: 3-4 times per day  Blood glucose trend: Increasing steadily  Low Glucose Range " (mg/dL): 70-90  Overall Range (mg/dL): >200              Taking Medications  Diabetes Medication(s)     Insulin Sig    insulin aspart (NOVOLOG FLEXPEN) 100 UNIT/ML injection 22 units before breakfast, 26 units before lunch, 20 units before dinner    insulin detemir (LEVEMIR FLEXTOUCH) 100 UNIT/ML injection Inject 41 Units Subcutaneous 2 times daily     Patient taking differently:  Inject 20 Units Subcutaneous 2 times daily         Taking 20 units of Levemir two times daily- see titration on side of BG records.    Taking Medication Assessed Today: Yes  Current Treatments: Insulin Injections  Given by: Patient  Injection/Infusion sites: Abdomen  Problems taking diabetes medications regularly?: No  Diabetes medication side effects?: No  Treatment Compliance: All of the time    Problem Solving  Problem Solving Assessed Today: No  Hypoglycemia Frequency: Rarely  Hypoglycemia Treatment: Juice  Patient carries a carbohydrate source: No  Medical alert: No    Reducing Risks  Reducing Risks Assessed Today: No    Healthy Coping  Stage of change: ACTION (Actively working towards change)  Difficulty affording diabetes management supplies?: No  Support resources: None  Patient Activation Measure Survey Score:  LESLY Score (Last Two) 6/9/2011   LESLY Raw Score 41   Activation Score 63.2   LESLY Level 3       ASSESSMENT:  Overall patients blood sugars remain elevated with unexplainable drops during the day despite consistent eating and mealtime doses.  Patient would like to limit checking blood sugars but notes she would like to see improved readings.  She would qualify for a Freestyle Gallito CGM which would provide better daytime insight into blood sugar patterns as well as information overnight as to why she is waking up high.      Given her current Levemir doses are much lower than usual due to recent surgery would recommend selecting the Toujeo u300 for long acting insulin coverage in 2019 (over Lantus).  This insulin patient has  taken in the past and it worked well.  Additionally it will likely absorb better if patient returns to usual dose of 40 units twice daily.    Current eating pattern at breakfast is high in carbohydrates and overall diet is low in fruits and vegetables.  It is likely that quitting smoking could result in increased intake due to improved taste and smell of food and less suppressed appetite.  Patient uses frozen meals to assist with ease or preparation and portion size would benefit from further adding vegetables and protein to these foods.        Goals Addressed as of 12/19/2018 at 9:26 AM        Patient Stated      Healthy Eating (pt-stated)     Added 12/18/18 by Ana Bhatia RD     My Goal: I will add 1 serving of protein and vegetables to dinner meal    What I need to meet my goal: buy frozen vegetables, tuna packets, and shrimp to add to meals    I plan to meet my goal by this date: 1/18/19            Patient's most recent   Lab Results   Component Value Date    A1C 7.3 11/05/2018    is meeting goal of <8.0    INTERVENTION:   Diabetes knowledge and skills assessment:     Patient is knowledgeable in diabetes management concepts related to: Taking Medication and Problem Solving    Patient needs further education on the following diabetes management concepts: Healthy Eating, Monitoring and Reducing Risks    Based on learning assessment above, most appropriate setting for further diabetes education would be: Individual setting.    Education provided today on:  AADE Self-Care Behaviors:  Healthy Eating: portion control, plate planning method, label reading and foods to add to frozen meals.  Discussed adding vegetables and protein (tuna or shrimp) to meals to meet her needs.  Discussed using frozen vegetables as they are easy to cook and chew given her missing teeth.  Discussed considering moms meals (meal delivery service) if she needs assistance in the future.    Monitoring: Discussed using freestyle Gallito Flash  for glucose monitoring to better determine why there are BG fluctuations, especially what is happening over night.  Patient meets medicare guidelines for this product.     Taking Medication: Discussed why concentrated insulin (Toujeo) would be recommended as 1st option to switch to for next year.     Healthy Coping: Praised patient for quitting smoking.  Recommended discussing NRT with PCP if she find her self more stressed or increasing her snacking due to cravings.    Opportunities for ongoing education and support in diabetes-self management were discussed.    Pt verbalized understanding of concepts discussed and recommendations provided today.       Education Materials Provided:  My Plate Planner    PLAN:  See Patient Instructions for co-developed, patient-stated behavior change goals.  AVS printed and provided to patient today. See Follow-Up section for recommended follow-up.    Recommend increased dose of Levemir 20-0-0-20 -> 25-0-0-25.  Orders for Toujeo routed to PCP for January 2019 required switch.    Routing orders for IPLogic 14 day CGM system to PCP.  Will fill in 2018 if covered or wait until January 2019 if high cost to determine if coverage is better.    Breakfast- whole wheat english muffin (23g carbohydrate), consider switching to lower sugar chiobani  Lunch- add peanut butter OR nuts (variety of unsalted)  Dinner- add 1 serving of vegetables and protein      Consider crafting for boredom to stay quit from smoking.      Ana Bhatia MS, RD, LD, CDE    Time Spent: 62 minutes  Encounter Type: Individual    Any diabetes medication dose changes were made via the CDE Protocol and Collaborative Practice Agreement with the patient's primary care provider. A copy of this encounter was shared with the provider.

## 2018-12-18 NOTE — NURSING NOTE
"Yadi Iniguez is a 68 year old female who presents for:  Chief Complaint   Patient presents with     Neurologic Problem     s/p lumbar fusion 11-5-18        Vitals:    Vitals:    12/18/18 1041   BP: 125/63   BP Location: Right arm   Patient Position: Sitting   Cuff Size: Adult Large   Pulse: 87   Temp: 99.5  F (37.5  C)   TempSrc: Oral   SpO2: 97%       BMI:  Estimated body mass index is 39.84 kg/m  as calculated from the following:    Height as of 11/5/18: 5' 8\" (1.727 m).    Weight as of an earlier encounter on 12/18/18: 262 lb (118.8 kg).    Pain Score:  Severe Pain (6)        Lauryn Mann"

## 2018-12-18 NOTE — Clinical Note
Thanks for entering the referral! I will route your the medication change and freestyle orders in a telephone note.  Either myself or Melissa can see her yearly as needed, or recommended 1 month after starting sensor for download and review.Ana Bhatia MS, RD, LD, CDE

## 2018-12-18 NOTE — PATIENT INSTRUCTIONS
- May wean from brace as tolerated.  - May increase lifting restriction to 20 pounds.  - Follow up in 6 weeks with xray prior.  - Call the clinic at [phone] for increased pain or any other questions and concerns.

## 2018-12-18 NOTE — LETTER
12/18/2018         RE: Yadi Iniguez  [address]  [place] [address]        Dear Colleague,    Thank you for referring your patient, Yadi Iniguez, to the [facility]. Please see a copy of my visit note below.    [facility]  Neurosurgery followup:    HPI: Yadi Iniguez is a 68 year old female who presents today for her 6 week follow up s/p MIS left L4-5 TLIF with Dr. Hill on 11/5/2018. Overall she states she is feeling well. She only has mild low back pain that is well controlled with dilaudid 2mg which she has been taking twice a day. She denies radicular pain, paraesthesias, and weakness. She notes a slight increase in pain over the past 2 days which she attributes to her brace riding up.   Exam:  Constitutional:  Alert, well nourished, NAD.  HEENT: Normocephalic, atraumatic.   Pulm:  Without shortness of breath   CV:  No pitting edema of BLE.      Neurological:  Awake  Alert  Oriented x 3  Motor exam:        IP Q DF PF EHL  R   5  5   5   5    5  L   5  5   5   5    5     Able to spontaneously move L/E bilaterally  Sensation intact throughout all L/E dermatomes     Incisions:  Healing nicely.  Imaging: AP and lateral films reveal intact hardware.    A/P: Yadi Iniguez is a 68 year old female who presents today for her 6 week follow up s/p MIS left L4-5 TLIF with Dr. Hill on 11/5/2018. Overall she states she is feeling well. She only has mild low back pain that is well controlled with dilaudid 2mg which she has been taking twice a day. She denies radicular pain, paraesthesias, and weakness. She notes a slight increase in pain over the past 2 days which she attributes to her brace riding up. Discussed increasing activity to 20lbs while still limiting bending and twisting. Discussed pain medication. She states she only has 2 pills left and would like a refill. Refilled dilaudid rx today. Instructed her to follow up in 6 weeks with an x-ray prior. Patient verbalized  understanding and agreement with the plan.     Patient Instructions   - May increase lifting restriction to 20 pounds   - Follow up in 6 weeks with xray prior   - Call the clinic at [phone] for increased pain or any other questions and concerns.    Meme Samaniego CNP  [facility]  [address] [address]    Tel [phone]          Again, thank you for allowing me to participate in the care of your patient.        Sincerely,        Meme Samaniego, NP

## 2018-12-18 NOTE — PATIENT INSTRUCTIONS
Breakfast- whole wheat english muffin (23g carbohydrate), consider switching to lower sugar chiobani  Lunch- add peanut butter OR nuts (variety of unsalted)  Dinner- add 1 serving of vegetables and protein      Consider crafting for boredom to stay quit from smoking.      Increase to Levemir 25 units twice daily.    [email or web address]

## 2018-12-18 NOTE — TELEPHONE ENCOUNTER
Request updated diabetes education referral for patient to allow for continued education and insulin adjustment as needed.    Thanks,    Ana Bhatia MS, RD, LD, CDE

## 2018-12-19 ENCOUNTER — PATIENT OUTREACH (OUTPATIENT)
Dept: EDUCATION SERVICES | Facility: CLINIC | Age: 68
End: 2018-12-19

## 2018-12-19 DIAGNOSIS — Z79.4 TYPE 2 DIABETES MELLITUS WITH OTHER DIABETIC KIDNEY COMPLICATION, WITH LONG-TERM CURRENT USE OF INSULIN (H): ICD-10-CM

## 2018-12-19 DIAGNOSIS — E11.29 TYPE 2 DIABETES MELLITUS WITH OTHER DIABETIC KIDNEY COMPLICATION, WITH LONG-TERM CURRENT USE OF INSULIN (H): ICD-10-CM

## 2018-12-19 RX ORDER — FLASH GLUCOSE SENSOR
1 KIT MISCELLANEOUS
Qty: 2 EACH | Refills: 11 | Status: SHIPPED | OUTPATIENT
Start: 2018-12-19 | End: 2019-11-26

## 2018-12-19 RX ORDER — FLASH GLUCOSE SCANNING READER
1 EACH MISCELLANEOUS CONTINUOUS
Qty: 1 DEVICE | Refills: 0 | Status: SHIPPED | OUTPATIENT
Start: 2018-12-19 | End: 2021-03-26

## 2018-12-19 NOTE — PROGRESS NOTES
See Diabetes education visit 12/18/19 for complete assessment.    Patient's health insurance, [email or web address], sent patient a letter informing her Levemir will not be covered in 2019 and the covered long acting insulins are Lantus and Toujeo.  Patient reports she did well on Toujeo previously, and given concentrated dose and patient's typical daily dose of 80 units would recommend Toujeo over Lantus.  Since Toujeo does not have a peak as seen with Levemir and insulin is likely to absorb better recommend returning to once daily dosing.    Blood sugar checks 3-4 times per day reveal highly variable blood sugars.  Recommend personal CGM Freestyle Gallito Flash for improved information and insight into insulin dosing.  Given supplemental insurance send rx first to pharmacy, if denied then may need to send rx to DME Medicare supplier.     Patient additionally requested a refill of Novolog during the appointment and notes she is aware that a diabetes check appointment is due in January.    All orders are attached for PCP review and approval.    Ana Bhatia MS, RD, LD, CDE

## 2018-12-20 DIAGNOSIS — Z79.4 TYPE 2 DIABETES MELLITUS WITH OTHER DIABETIC KIDNEY COMPLICATION, WITH LONG-TERM CURRENT USE OF INSULIN (H): ICD-10-CM

## 2018-12-20 DIAGNOSIS — E11.29 TYPE 2 DIABETES MELLITUS WITH OTHER DIABETIC KIDNEY COMPLICATION, WITH LONG-TERM CURRENT USE OF INSULIN (H): ICD-10-CM

## 2019-01-23 DIAGNOSIS — G25.0 ESSENTIAL TREMOR: ICD-10-CM

## 2019-01-24 RX ORDER — PRIMIDONE 50 MG/1
TABLET ORAL
Qty: 180 TABLET | Refills: 0 | Status: SHIPPED | OUTPATIENT
Start: 2019-01-24 | End: 2019-01-31

## 2019-01-31 ENCOUNTER — OFFICE VISIT (OUTPATIENT)
Dept: NEUROSURGERY | Facility: CLINIC | Age: 69
End: 2019-01-31
Payer: COMMERCIAL

## 2019-01-31 ENCOUNTER — OFFICE VISIT (OUTPATIENT)
Dept: FAMILY MEDICINE | Facility: CLINIC | Age: 69
End: 2019-01-31
Payer: COMMERCIAL

## 2019-01-31 ENCOUNTER — ANCILLARY PROCEDURE (OUTPATIENT)
Dept: GENERAL RADIOLOGY | Facility: CLINIC | Age: 69
End: 2019-01-31
Payer: COMMERCIAL

## 2019-01-31 VITALS
DIASTOLIC BLOOD PRESSURE: 61 MMHG | WEIGHT: 267 LBS | HEART RATE: 84 BPM | TEMPERATURE: 98.5 F | OXYGEN SATURATION: 94 % | SYSTOLIC BLOOD PRESSURE: 132 MMHG | BODY MASS INDEX: 39.43 KG/M2

## 2019-01-31 VITALS
BODY MASS INDEX: 39.72 KG/M2 | HEIGHT: 69 IN | TEMPERATURE: 98.2 F | HEART RATE: 89 BPM | DIASTOLIC BLOOD PRESSURE: 64 MMHG | WEIGHT: 268.2 LBS | OXYGEN SATURATION: 94 % | SYSTOLIC BLOOD PRESSURE: 105 MMHG

## 2019-01-31 DIAGNOSIS — Z79.4 TYPE 2 DIABETES MELLITUS WITH OTHER DIABETIC KIDNEY COMPLICATION, WITH LONG-TERM CURRENT USE OF INSULIN (H): ICD-10-CM

## 2019-01-31 DIAGNOSIS — Z98.1 S/P LUMBAR SPINAL FUSION: ICD-10-CM

## 2019-01-31 DIAGNOSIS — M81.0 OSTEOPOROSIS, UNSPECIFIED OSTEOPOROSIS TYPE, UNSPECIFIED PATHOLOGICAL FRACTURE PRESENCE: ICD-10-CM

## 2019-01-31 DIAGNOSIS — Z98.1 S/P LUMBAR FUSION: Primary | ICD-10-CM

## 2019-01-31 DIAGNOSIS — E11.29 TYPE 2 DIABETES MELLITUS WITH OTHER DIABETIC KIDNEY COMPLICATION, WITH LONG-TERM CURRENT USE OF INSULIN (H): ICD-10-CM

## 2019-01-31 DIAGNOSIS — D69.6 THROMBOCYTOPENIA (H): ICD-10-CM

## 2019-01-31 DIAGNOSIS — E66.01 MORBID OBESITY (H): ICD-10-CM

## 2019-01-31 DIAGNOSIS — F32.1 MODERATE MAJOR DEPRESSION (H): ICD-10-CM

## 2019-01-31 DIAGNOSIS — R21 SKIN RASH: ICD-10-CM

## 2019-01-31 DIAGNOSIS — G25.0 ESSENTIAL TREMOR: ICD-10-CM

## 2019-01-31 DIAGNOSIS — E78.5 HYPERLIPIDEMIA, UNSPECIFIED HYPERLIPIDEMIA TYPE: ICD-10-CM

## 2019-01-31 DIAGNOSIS — I10 BENIGN ESSENTIAL HYPERTENSION: ICD-10-CM

## 2019-01-31 DIAGNOSIS — E03.9 HYPOTHYROIDISM, UNSPECIFIED TYPE: ICD-10-CM

## 2019-01-31 LAB
ALBUMIN SERPL-MCNC: 3.3 G/DL (ref 3.4–5)
ALP SERPL-CCNC: 42 U/L (ref 40–150)
ALT SERPL W P-5'-P-CCNC: 22 U/L (ref 0–50)
ANION GAP SERPL CALCULATED.3IONS-SCNC: 4 MMOL/L (ref 3–14)
AST SERPL W P-5'-P-CCNC: 15 U/L (ref 0–45)
BILIRUB SERPL-MCNC: 0.2 MG/DL (ref 0.2–1.3)
BUN SERPL-MCNC: 33 MG/DL (ref 7–30)
CALCIUM SERPL-MCNC: 8.8 MG/DL (ref 8.5–10.1)
CHLORIDE SERPL-SCNC: 105 MMOL/L (ref 94–109)
CO2 SERPL-SCNC: 29 MMOL/L (ref 20–32)
CREAT SERPL-MCNC: 1.37 MG/DL (ref 0.52–1.04)
ERYTHROCYTE [DISTWIDTH] IN BLOOD BY AUTOMATED COUNT: 12.7 % (ref 10–15)
GFR SERPL CREATININE-BSD FRML MDRD: 39 ML/MIN/{1.73_M2}
GLUCOSE SERPL-MCNC: 205 MG/DL (ref 70–99)
HBA1C MFR BLD: 7.1 % (ref 0–5.6)
HCT VFR BLD AUTO: 37.6 % (ref 35–47)
HGB BLD-MCNC: 12.4 G/DL (ref 11.7–15.7)
MCH RBC QN AUTO: 33.2 PG (ref 26.5–33)
MCHC RBC AUTO-ENTMCNC: 33 G/DL (ref 31.5–36.5)
MCV RBC AUTO: 101 FL (ref 78–100)
PLATELET # BLD AUTO: 146 10E9/L (ref 150–450)
POTASSIUM SERPL-SCNC: 4.8 MMOL/L (ref 3.4–5.3)
PROT SERPL-MCNC: 6.7 G/DL (ref 6.8–8.8)
RBC # BLD AUTO: 3.74 10E12/L (ref 3.8–5.2)
SODIUM SERPL-SCNC: 138 MMOL/L (ref 133–144)
WBC # BLD AUTO: 5.9 10E9/L (ref 4–11)

## 2019-01-31 PROCEDURE — 83036 HEMOGLOBIN GLYCOSYLATED A1C: CPT | Performed by: FAMILY MEDICINE

## 2019-01-31 PROCEDURE — 80053 COMPREHEN METABOLIC PANEL: CPT | Performed by: FAMILY MEDICINE

## 2019-01-31 PROCEDURE — 72100 X-RAY EXAM L-S SPINE 2/3 VWS: CPT

## 2019-01-31 PROCEDURE — 99214 OFFICE O/P EST MOD 30 MIN: CPT | Performed by: FAMILY MEDICINE

## 2019-01-31 PROCEDURE — 99207 C FOOT EXAM  NO CHARGE: CPT | Performed by: FAMILY MEDICINE

## 2019-01-31 PROCEDURE — 85027 COMPLETE CBC AUTOMATED: CPT | Performed by: FAMILY MEDICINE

## 2019-01-31 PROCEDURE — 36415 COLL VENOUS BLD VENIPUNCTURE: CPT | Performed by: FAMILY MEDICINE

## 2019-01-31 PROCEDURE — 99024 POSTOP FOLLOW-UP VISIT: CPT | Performed by: NURSE PRACTITIONER

## 2019-01-31 RX ORDER — FENOFIBRATE 54 MG/1
54 TABLET ORAL DAILY
Qty: 90 TABLET | Refills: 3 | Status: SHIPPED | OUTPATIENT
Start: 2019-01-31 | End: 2020-01-14

## 2019-01-31 RX ORDER — PRIMIDONE 50 MG/1
TABLET ORAL
Qty: 180 TABLET | Refills: 3 | Status: SHIPPED | OUTPATIENT
Start: 2019-01-31 | End: 2020-01-14

## 2019-01-31 RX ORDER — LEVOTHYROXINE SODIUM 13 UG/1
150 CAPSULE ORAL
Qty: 90 CAPSULE | Refills: 3 | Status: SHIPPED | OUTPATIENT
Start: 2019-01-31 | End: 2020-01-14

## 2019-01-31 RX ORDER — ATORVASTATIN CALCIUM 40 MG/1
40 TABLET, FILM COATED ORAL DAILY
Qty: 90 TABLET | Refills: 3 | Status: SHIPPED | OUTPATIENT
Start: 2019-01-31 | End: 2020-01-14

## 2019-01-31 RX ORDER — ALENDRONATE SODIUM 70 MG/1
TABLET ORAL
Qty: 12 TABLET | Refills: 3 | Status: SHIPPED | OUTPATIENT
Start: 2019-01-31 | End: 2020-01-14

## 2019-01-31 RX ORDER — CLOBETASOL PROPIONATE 0.05 G/ML
1 SPRAY TOPICAL 2 TIMES DAILY PRN
Qty: 1 BOTTLE | Refills: 5 | Status: SHIPPED | OUTPATIENT
Start: 2019-01-31 | End: 2019-10-10

## 2019-01-31 RX ORDER — PAROXETINE 20 MG/1
20 TABLET, FILM COATED ORAL 2 TIMES DAILY
Qty: 180 TABLET | Refills: 3 | Status: SHIPPED | OUTPATIENT
Start: 2019-01-31 | End: 2020-01-14

## 2019-01-31 RX ORDER — AMLODIPINE BESYLATE 10 MG/1
TABLET ORAL
Qty: 90 TABLET | Refills: 3 | Status: SHIPPED | OUTPATIENT
Start: 2019-01-31 | End: 2020-01-14

## 2019-01-31 RX ORDER — LOSARTAN POTASSIUM 50 MG/1
50 TABLET ORAL DAILY
Qty: 90 TABLET | Refills: 3 | Status: SHIPPED | OUTPATIENT
Start: 2019-01-31 | End: 2020-01-14

## 2019-01-31 RX ORDER — CARVEDILOL 25 MG/1
TABLET ORAL
Qty: 180 TABLET | Refills: 3 | Status: SHIPPED | OUTPATIENT
Start: 2019-01-31 | End: 2020-01-14

## 2019-01-31 RX ORDER — LOSARTAN POTASSIUM 100 MG/1
100 TABLET ORAL DAILY
Qty: 90 TABLET | Refills: 1 | Status: CANCELLED | OUTPATIENT
Start: 2019-01-31

## 2019-01-31 ASSESSMENT — PATIENT HEALTH QUESTIONNAIRE - PHQ9: SUM OF ALL RESPONSES TO PHQ QUESTIONS 1-9: 7

## 2019-01-31 ASSESSMENT — PAIN SCALES - GENERAL: PAINLEVEL: MODERATE PAIN (4)

## 2019-01-31 ASSESSMENT — MIFFLIN-ST. JEOR: SCORE: 1805.93

## 2019-01-31 NOTE — NURSING NOTE
"Yadi Iniguez is a 68 year old female who presents for:  Chief Complaint   Patient presents with     Surgical Followup        Initial Vitals:  /64 (BP Location: Left arm, Patient Position: Sitting, Cuff Size: Adult Large)   Pulse 89   Temp 98.2  F (36.8  C) (Oral)   Ht 5' 9\" (1.753 m)   Wt 268 lb 3.2 oz (121.7 kg)   SpO2 94%   BMI 39.61 kg/m   Estimated body mass index is 39.61 kg/m  as calculated from the following:    Height as of this encounter: 5' 9\" (1.753 m).    Weight as of this encounter: 268 lb 3.2 oz (121.7 kg).. Body surface area is 2.43 meters squared. BP completed using cuff size: large  Moderate Pain (4)        Nursing Comments: 12 week post surgery follow up         Dolly De La Vega    "

## 2019-01-31 NOTE — PATIENT INSTRUCTIONS
-Return in 3 months with Xray prior for follow up  -Ease into normal activities  -Please contact the clinic if pain persists at [phone].

## 2019-01-31 NOTE — PROGRESS NOTES
SUBJECTIVE:   Yadi Iniguez is a 68 year old female who presents to clinic today for the following health issues:      Diabetes Follow-up    Patient is checking blood sugars: three times daily.   Blood sugar testing frequency justification: Uncontrolled diabetes  Results are as follows:         150-180    Diabetic concerns: blood sugar frequently over 200     Symptoms of hypoglycemia (low blood sugar): none     Paresthesias (numbness or burning in feet) or sores: No     Date of last diabetic eye exam: 4/2018    Diabetes Management Resources    Hyperlipidemia Follow-Up      Rate your low fat/cholesterol diet?: not monitoring fat    Taking statin?  No    Other lipid medications/supplements?:  Fenofibrate, without side effects    Hypertension Follow-up      Outpatient blood pressures are not being checked.    Low Salt Diet: no added salt    BP Readings from Last 2 Encounters:   01/31/19 105/64   12/18/18 125/63     Hemoglobin A1C (%)   Date Value   11/05/2018 7.3 (H)   10/19/2018 7.7 (H)     LDL Cholesterol Calculated (mg/dL)   Date Value   04/20/2018 88   01/24/2018 143 (H)       Amount of exercise or physical activity: None    Problems taking medications regularly: No    Medication side effects: no muscle aches and muscle aches    Diet: regular (no restrictions) and carbohydrate counting    Pt with diabetes, well controlled on insulin  Is on an arb and statin and asa  Pt with HTN, controlled on medication  Pt with elevated cholesterol on statin  Pt with hypothyroidism on medication. Labs UTD  Pt with essential tremor controlled on meds  Pt with osteoporosis on fosamax, last dexa completed almost 2 years ago  Pt with some mild thrombocytopenia. Will recheck platelets  Pt with obesity, encouraged to exercise now that recovered from back surgery  Pt with depression controlled on meds        Problem list and histories reviewed & adjusted, as indicated.  Additional history: as documented    Labs reviewed in  EPIC    Reviewed and updated as needed this visit by clinical staff  Allergies  Meds       Reviewed and updated as needed this visit by Provider         ROS:  Constitutional, HEENT, cardiovascular, pulmonary, gi and gu systems are negative, except as otherwise noted.    OBJECTIVE:     /61   Pulse 84   Temp 98.5  F (36.9  C) (Oral)   Wt 121.1 kg (267 lb)   SpO2 94%   Breastfeeding? No   BMI 39.43 kg/m    Body mass index is 39.43 kg/m .  GENERAL: healthy, alert and no distress  NECK: no adenopathy, no asymmetry, masses, or scars and thyroid normal to palpation  RESP: lungs clear to auscultation - no rales, rhonchi or wheezes  CV: regular rate and rhythm, normal S1 S2, no S3 or S4, 2/6 systolic murmur over aorta radiating to carotids, click or rub, no peripheral edema and peripheral pulses strong  ABDOMEN: soft, nontender, no hepatosplenomegaly, no masses and bowel sounds normal    Diagnostic Test Results:  none     ASSESSMENT/PLAN:     1. Type 2 diabetes mellitus with other diabetic kidney complication, with long-term current use of insulin (H)  Well controlled on meds, follow-up in 6 months  - blood glucose (MUSA CONTOUR NEXT) test strip; Use to test blood sugar 3 times daily or as directed.  Dispense: 100 strip; Refill: 11  - blood glucose monitoring (MUSA MICROLET) lancets; Use to test blood sugar 3 times daily or as directed.  Dispense: 100 each; Refill: 3  - insulin aspart (NOVOLOG FLEXPEN) 100 UNIT/ML pen; 22 units before breakfast, 26 units before lunch, 20 units before dinner  Dispense: 18 mL; Refill: 1  - insulin glargine U-300 (TOUJEO) 300 UNIT/ML injection; Inject 50 Units Subcutaneous At Bedtime Order pen needles too.  Dispense: 15 mL; Refill: 3  - insulin pen needle (32G X 4 MM) 32G X 4 MM miscellaneous; Use 5 pen needles daily or as directed.  Per insurance and patient preference  Dispense: 450 each; Refill: 3  - Hemoglobin A1c  - FOOT EXAM    2. Morbid obesity (H)  Encourage exercise now  that recovered from back surgery    3. Moderate major depression (H)  Refill as needed  - PARoxetine (PAXIL) 20 MG tablet; Take 1 tablet (20 mg) by mouth 2 times daily  Dispense: 180 tablet; Refill: 3    4. Benign essential hypertension  At goal on meds  - amLODIPine (NORVASC) 10 MG tablet; TAKE 1 TABLET (10 MG) BY MOUTH DAILY  Dispense: 90 tablet; Refill: 3  - carvedilol (COREG) 25 MG tablet; TAKE 1 TABLET (25 MG) BY MOUTH 2 TIMES DAILY WITH MEALS  Dispense: 180 tablet; Refill: 3  - losartan (COZAAR) 50 MG tablet; Take 1 tablet (50 mg) by mouth daily  Dispense: 90 tablet; Refill: 3  - CBC with platelets  - Comprehensive metabolic panel    5. Thrombocytopenia (H)  recheck    6. Hyperlipidemia, unspecified hyperlipidemia type  On statin  - atorvastatin (LIPITOR) 40 MG tablet; Take 1 tablet (40 mg) by mouth daily  Dispense: 90 tablet; Refill: 3  - fenofibrate (LOFIBRA) 54 MG tablet; Take 1 tablet (54 mg) by mouth daily  Dispense: 90 tablet; Refill: 3    7. Skin rash  Refill as needed  - clobetasol propionate 0.05 % LIQD; Externally apply 1 dose. topically 2 times daily as needed  Dispense: 1 Bottle; Refill: 5    8. Hypothyroidism, unspecified type  Refill as TSH at goal  - levothyroxine (TIROSINT) 150 MCG capsule; Take 150 mcg by mouth daily (with breakfast)  Dispense: 90 capsule; Refill: 3    9. Essential tremor  refill  - primidone (MYSOLINE) 50 MG tablet; TAKE ONE TABLET(S) BY MOUTH TWICE DAILY  Dispense: 180 tablet; Refill: 3    10. Osteoporosis, unspecified osteoporosis type, unspecified pathological fracture presence  Consider dexa next visit  - alendronate (FOSAMAX) 70 MG tablet; TAKE 1 TABLET (70 MG) BY MOUTH EVERY 7 DAYS. TAKE 60 MINUTES BEFORE MORNING MEAL WITH 8 OZ. WATER. REMAIN UPRIGHT FOR 30 MINUTES.  Dispense: 12 tablet; Refill: 3        Sandy Otoole MD  [facility]

## 2019-01-31 NOTE — LETTER
"    1/31/2019         RE: Yadi Iniguez  [address]  [place] [address]        Dear Colleague,    [facility] Please see a copy of my visit note below.    [facility]  Neurosurgery followup:    HPI: Yadi Iniguez is a 68 year old female with a history of grade I spondylolisthesis at L4-5 with severe left-sided stenosis and a cystic mass compressing the L5 root.  She is s/p MIS left L4-5 TLIF with Dr. Hill on 11/5/2018. She is here today for her 3 month post op appointment.  She states she has intermittent low back pain, a dull ache.  Otherwise, she states she has left knee pain \"sometimes\".  She denies falls since surgery. She denies weakness to BLEI. She denies changes to bowel or bladder.     Exam:  Constitutional:  Alert, well nourished, NAD.  HEENT: Normocephalic, atraumatic.   Pulm:  Without shortness of breath   CV:  No pitting edema of BLE.      Neurological:  Awake  Alert  Oriented x 3  Motor exam:        IP Q DF PF EHL  R   5  5   5   5    5  L   5  5   5   5    5     Able to spontaneously move L/E bilaterally  Sensation intact throughout all L/E dermatomes    Ambulates independently with a cane (used cane prior to surgery)     Incision: Clean, dry and intact.  Imaging: Per XR fusion intact and stable    A/P:  Yadi Iniguez is a 68 year old female with a history of grade I spondylolisthesis at L4-5 with severe left-sided stenosis and a cystic mass compressing the L5 root.  She is s/p MIS left L4-5 TLIF with Dr. Hill on 11/5/2018. She is here today for her 3 month post op appointment.  She states she has intermittent low back pain, a dull ache.  Otherwise, she states she has left knee pain \"sometimes\".  She denies falls since surgery. She denies weakness to BLE. She denies changes to bowel or bladder. We reviewed expectations following surgery and easing into normal activities.  Patient will return to " clinic in 3 months for follow up.     Patient Instructions   -Return in 3 months with Xray prior for follow up  -Ease into normal activities  -Please contact the clinic if pain persists at [phone].        Nancy Miller CNP  [facility]  [address] [address]    Tel [phone]  Pager [phone]      Again, thank you for allowing me to participate in the care of your patient.        Sincerely,        Nancy Miller, NP

## 2019-03-08 ENCOUNTER — TELEPHONE (OUTPATIENT)
Dept: FAMILY MEDICINE | Facility: CLINIC | Age: 69
End: 2019-03-08

## 2019-03-08 NOTE — TELEPHONE ENCOUNTER
Lucinda is a NP for [employer] who does assessments for insurance companies and would like to report patient has caroltid arteries bruit and is at risk for a stroke.    Thank you.

## 2019-04-04 ENCOUNTER — OFFICE VISIT (OUTPATIENT)
Dept: NEUROSURGERY | Facility: CLINIC | Age: 69
End: 2019-04-04
Payer: COMMERCIAL

## 2019-04-04 ENCOUNTER — ANCILLARY PROCEDURE (OUTPATIENT)
Dept: GENERAL RADIOLOGY | Facility: CLINIC | Age: 69
End: 2019-04-04
Attending: NURSE PRACTITIONER
Payer: COMMERCIAL

## 2019-04-04 VITALS
SYSTOLIC BLOOD PRESSURE: 131 MMHG | DIASTOLIC BLOOD PRESSURE: 75 MMHG | BODY MASS INDEX: 38.51 KG/M2 | OXYGEN SATURATION: 95 % | HEART RATE: 85 BPM | TEMPERATURE: 98.4 F | WEIGHT: 260 LBS | HEIGHT: 69 IN

## 2019-04-04 DIAGNOSIS — Z98.1 S/P LUMBAR FUSION: Primary | ICD-10-CM

## 2019-04-04 DIAGNOSIS — Z98.1 S/P LUMBAR FUSION: ICD-10-CM

## 2019-04-04 PROCEDURE — 72100 X-RAY EXAM L-S SPINE 2/3 VWS: CPT

## 2019-04-04 PROCEDURE — 99213 OFFICE O/P EST LOW 20 MIN: CPT | Performed by: NURSE PRACTITIONER

## 2019-04-04 ASSESSMENT — MIFFLIN-ST. JEOR: SCORE: 1768.73

## 2019-04-04 ASSESSMENT — PAIN SCALES - GENERAL: PAINLEVEL: MODERATE PAIN (4)

## 2019-04-04 NOTE — PROGRESS NOTES
"[facility]  Neurosurgery followup:    HPI: Yadi Iniguez is a 68 year old female with a history of grade I spondylolisthesis at L4-5 with severe left-sided stenosis and a cystic mass compressing the L5 root.  She is s/p MIS left L4-5 TLIF with Dr. Hill on 11/5/2018. She is here for her 6 month post op appointment.  She continues to do well and has no complaints.  She states she has achiness to the lower back with weather changes but otherwise states her pain is minimal.  She continues to use a cane which she did prior to surgery as well.  She denies falls or changes to bowel or bladder.       Exam:  Constitutional:  Alert, well nourished, NAD.  HEENT: Normocephalic, atraumatic.   Pulm:  Without shortness of breath   CV:  No pitting edema of BLE.      Neurological:  Awake  Alert  Oriented x 3  Motor exam:        IP Q DF PF EHL  R   5  5   5   5    5  L   5  5   5   5    5     Able to spontaneously move L/E bilaterally  Sensation intact throughout all L/E dermatomes     Incision: Clean, dry and intact; white, dry flakiness to areas.    Imaging: Per XR fusion intact and stable    A/P: Yadi Iniguez is a 68 year old female with a history of grade I spondylolisthesis at L4-5 with severe left-sided stenosis and a cystic mass compressing the L5 root.  She is s/p MIS left L4-5 TLIF with Dr. Hill on 11/5/2018. She is here for her 6 month post op appointment.  She continues to do well and has no complaints.  She states she has achiness to the lower back with weather changes but otherwise states her pain is minimal.  She continues to use a cane which she did prior to surgery as well.  She denies falls or changes to bowel or bladder. She states her incision is sometimes \"itchy\" on the left.  It was noted to be overly dry, although intact and healed.  We discussed putting aloe or martha E to the are to help with the dryness.  She will contact us if any issues otherwise return in 6 months for her one year follow up " appointment.       Patient Instructions   - Followup in 6 months with xray prior wither here or at [facility] with Dr. Hill  - Call the clinic at [phone] for increased pain or any other questions and concerns.            Nancy Miller [facility]  [facility]  [address] [address]    Tel [phone]  Pager [phone]

## 2019-04-04 NOTE — PATIENT INSTRUCTIONS
- Followup in 6 months with xray prior wither here or at [facility] with Dr. Hill  - Call the clinic at [phone] for increased pain or any other questions and concerns.

## 2019-04-04 NOTE — LETTER
4/4/2019         RE: Yadi Iniguez  [address]  [place] [address]        Dear Colleague,    Thank you for referring your patient, Yadi Iniguez, to the [facility]. Please see a copy of my visit note below.    [facility]  Neurosurgery followup:    HPI: Yadi Iniguez is a 68 year old female with a history of grade I spondylolisthesis at L4-5 with severe left-sided stenosis and a cystic mass compressing the L5 root.  She is s/p MIS left L4-5 TLIF with Dr. Hill on 11/5/2018.  She is here for her 6 month post op appointment.  She continues to do well and has no complaints.  She states she has achiness to the lower back with weather changes but otherwise states her pain is minimal.  She continues to use a cane which she did prior to surgery as well.  She denies falls or changes to bowel or bladder.       Exam:  Constitutional:  Alert, well nourished, NAD.  HEENT: Normocephalic, atraumatic.   Pulm:  Without shortness of breath   CV:  No pitting edema of BLE.      Neurological:  Awake  Alert  Oriented x 3  Motor exam:        IP Q DF PF EHL  R   5  5   5   5    5  L   5  5   5   5    5     Able to spontaneously move L/E bilaterally  Sensation intact throughout all L/E dermatomes     Incision: Clean, dry and intact; white, dry flakiness to areas.    Imaging: Per XR fusion intact and stable    A/P: Yadi Iniguez is a 68 year old female with a history of grade I spondylolisthesis at L4-5 with severe left-sided stenosis and a cystic mass compressing the L5 root.  She is s/p MIS left L4-5 TLIF with Dr. Hill on 11/5/2018.  She is here for her 6 month post op appointment.  She continues to do well and has no complaints.  She states she has achiness to the lower back with weather changes but otherwise states her pain is minimal.  She continues to use a cane which she did prior to surgery as well.  She denies falls or changes to bowel or bladder. She states her incision is sometimes  "\"itchy\" on the left.  It was noted to be overly dry, although intact and healed.  We discussed putting aloe or martha E to the are to help with the dryness.  She will contact us if any issues otherwise return in 6 months for her one year follow up appointment.       Patient Instructions   - Followup in 6 months with xray prior wither here or at [facility] with Dr. Hill  - Call the clinic at [phone] for increased pain or any other questions and concerns.            Nancy Miller [facility]  [facility]  [address] [address]    Tel [phone]  Pager [phone]      Again, thank you for allowing me to participate in the care of your patient.        Sincerely,        Nancy Miller, NP    "

## 2019-04-04 NOTE — NURSING NOTE
"Yadi Iniguez is a 68 year old female who presents for:  Chief Complaint   Patient presents with     Surgical Followup     Follow up from 11/5/18 L4-5 transforaminal lumbar interbody fusion.         Initial Vitals:  /75 (BP Location: Right arm, Patient Position: Sitting, Cuff Size: Adult Large)   Pulse 85   Temp 98.4  F (36.9  C) (Oral)   Ht 5' 9\" (1.753 m)   Wt 260 lb (117.9 kg)   SpO2 95%   BMI 38.40 kg/m   Estimated body mass index is 38.4 kg/m  as calculated from the following:    Height as of this encounter: 5' 9\" (1.753 m).    Weight as of this encounter: 260 lb (117.9 kg).. Body surface area is 2.4 meters squared. BP completed using cuff size: large  Moderate Pain (4)        Nursing Comments: Patient reports her pain level today is at a 3-4.         Dolly De La Vega    "

## 2019-05-08 DIAGNOSIS — Z79.4 TYPE 2 DIABETES MELLITUS WITH OTHER DIABETIC KIDNEY COMPLICATION, WITH LONG-TERM CURRENT USE OF INSULIN (H): ICD-10-CM

## 2019-05-08 DIAGNOSIS — E11.29 TYPE 2 DIABETES MELLITUS WITH OTHER DIABETIC KIDNEY COMPLICATION, WITH LONG-TERM CURRENT USE OF INSULIN (H): ICD-10-CM

## 2019-05-09 NOTE — TELEPHONE ENCOUNTER
Patient due for lipids.   Pending appointment with PCP in July for diabetes visit.   Rx refilled per [facility] refill protocol.    Yaa Turner RN, BSN

## 2019-05-29 ENCOUNTER — TRANSFERRED RECORDS (OUTPATIENT)
Dept: HEALTH INFORMATION MANAGEMENT | Facility: CLINIC | Age: 69
End: 2019-05-29

## 2019-06-06 ENCOUNTER — TELEPHONE (OUTPATIENT)
Dept: FAMILY MEDICINE | Facility: CLINIC | Age: 69
End: 2019-06-06

## 2019-06-06 DIAGNOSIS — Z79.4 TYPE 2 DIABETES MELLITUS WITH OTHER DIABETIC KIDNEY COMPLICATION, WITH LONG-TERM CURRENT USE OF INSULIN (H): ICD-10-CM

## 2019-06-06 DIAGNOSIS — E11.29 TYPE 2 DIABETES MELLITUS WITH OTHER DIABETIC KIDNEY COMPLICATION, WITH LONG-TERM CURRENT USE OF INSULIN (H): ICD-10-CM

## 2019-07-02 ENCOUNTER — DOCUMENTATION ONLY (OUTPATIENT)
Dept: FAMILY MEDICINE | Facility: CLINIC | Age: 69
End: 2019-07-02

## 2019-07-02 DIAGNOSIS — E11.29 TYPE 2 DIABETES MELLITUS WITH OTHER DIABETIC KIDNEY COMPLICATION, WITH LONG-TERM CURRENT USE OF INSULIN (H): Primary | ICD-10-CM

## 2019-07-02 DIAGNOSIS — E03.9 HYPOTHYROIDISM, UNSPECIFIED TYPE: ICD-10-CM

## 2019-07-02 DIAGNOSIS — Z79.4 TYPE 2 DIABETES MELLITUS WITH OTHER DIABETIC KIDNEY COMPLICATION, WITH LONG-TERM CURRENT USE OF INSULIN (H): Primary | ICD-10-CM

## 2019-07-02 DIAGNOSIS — I10 HYPERTENSION GOAL BP (BLOOD PRESSURE) < 140/90: ICD-10-CM

## 2019-07-02 NOTE — PROGRESS NOTES
.Please place or confirm pending lab orders for upcoming lab appointment on 7/9/19  Thank you,  Augustina   H&P Exam - Obstetrics   Yanna Cummings 27 y o  female MRN: [id number]  Unit/Bed#: L&D 323-01 Encounter: [id number]      History of Present Illness     Chief Complaint: I broke my water    HPI:  Yanna Cummings is a 27 y o  [id number] female with an VIKI of 2017, by LMP at 36w0d weeks gestation who is being admitted for  spontaneous rupture of membranes  Contractions: Yes every 5 minutes  Vaginal Bleeding: none  Loss of Fluid: yes rupture of membranes at 0830 for clear fluid  Fetal Movement: endorses good fetal movement    PREGNANCY COMPLICATIONS:   AMA  Hx of  delivery at 30 weeks 2/2 placental abruption  Gestational thrombocytopenia ( on admission)  Hypothyroid with thyroid nodule on synthroid  Has been taking Aspirin up to this morning in this pregnancy daily  OB History    Para Term  AB Living   4 1     2 1   SAB TAB Ectopic Multiple Live Births   2       1      # Outcome Date GA Lbr Yovany/2nd Weight Sex Delivery Anes PTL Lv   4 Current            3 SAB 16           2 SAB 16           1 Para 13 30w3d  1503 g (3 lb 5 oz) M Vag-Spont EPI Y MEMO      Complications: Abruptio Placenta          Baby complications/comments:     Review of Systems   Constitutional: Negative  Eyes: Negative for visual disturbance  Respiratory: Negative for shortness of breath and wheezing  Cardiovascular: Negative for chest pain and palpitations  Gastrointestinal: Negative for abdominal pain, nausea and vomiting  Genitourinary: Negative for dysuria  Neurological: Positive for headaches  Negative for dizziness and light-headedness          Mild headache     Historical Information   Past Medical History:   Diagnosis Date    Disease of thyroid gland     Female infertility      Past Surgical History:   Procedure Laterality Date    COLPOSCOPY      KNEE ARTHROSCOPY W/ ACL RECONSTRUCTION      right knee    OVARIAN CYST REMOVAL      WISDOM TOOTH EXTRACTION       Social History   History   Alcohol Use No     History   Drug Use No     History   Smoking Status    Never Smoker   Smokeless Tobacco    Never Used     Family History: No family history on file  Meds/Allergies    {  Prescriptions Prior to Admission   Medication    aspirin 81 MG tablet    CHOLECALCIFEROL PO    IRON PO    levothyroxine 50 mcg tablet    Omega-3 Fatty Acids (FISH OIL) 645 MG CAPS      No Known Allergies    OBJECTIVE:    Vitals:   /86   Pulse 72   Temp 99 °F (37 2 °C) (Oral)   Resp 18   Ht 5' 3" (1 6 m)   Wt 67 6 kg (149 lb)   Breastfeeding? Yes   BMI 26 39 kg/m²   Body mass index is 26 39 kg/m²  Physical Exam   Constitutional: She is oriented to person, place, and time  She appears well-developed and well-nourished  No distress  Cardiovascular: Normal rate and regular rhythm  Exam reveals no friction rub  No murmur heard  Pulmonary/Chest: Effort normal and breath sounds normal  No respiratory distress  She has no wheezes  Abdominal: Soft  There is no tenderness  There is no rebound and no guarding  Neurological: She is alert and oriented to person, place, and time  Psychiatric: She has a normal mood and affect  Vitals reviewed  SVE: Dilation: 3  Effacement (%): 80  Cervical Characteristics: Anterior  Station: -2  Presentation: Vertex  Position: OA  Method: Manual  OB Examiner: Dr Danette Castro    FHT:  Baseline Rate: 130 bpm  Variability: Moderate 6-25 bpm  Accelerations: 15 x 15 or greater, At variable times  Decelerations: None  TOCO:   Contraction Frequency (minutes): 3-4  Contraction Duration (seconds): 60-90  Contraction Quality: Moderate    Prenatal Labs:   Blood type: A+  Antigen Screen: negative  Rubella: Immune  HIV: Negative  RPR: NR  Hep B: negative  Diabetes Screen: 72  GBS: uknown    Assessment/Plan     ASSESSMENT:  [id number] at 36w0d weeks gestation admitted for  rupture of membranes and  labor      PLAN:   1) Admit to L&D   2) CBC, RPR, Blood Type   3) PCN for GBS unknown status: 5 million Units once IV followed by 2 million units Q4 hours   4) Analgesia and/or epidural at patient request   5) Anticipate      This patient will be an INPATIENT  and I certify the anticipated length of stay is >2 Midnights        Ev Ramirez MD  10/11/2017  1:39 PM

## 2019-07-09 DIAGNOSIS — E03.9 HYPOTHYROIDISM, UNSPECIFIED TYPE: ICD-10-CM

## 2019-07-09 DIAGNOSIS — E11.29 TYPE 2 DIABETES MELLITUS WITH OTHER DIABETIC KIDNEY COMPLICATION, WITH LONG-TERM CURRENT USE OF INSULIN (H): ICD-10-CM

## 2019-07-09 DIAGNOSIS — I10 HYPERTENSION GOAL BP (BLOOD PRESSURE) < 140/90: ICD-10-CM

## 2019-07-09 DIAGNOSIS — Z79.4 TYPE 2 DIABETES MELLITUS WITH OTHER DIABETIC KIDNEY COMPLICATION, WITH LONG-TERM CURRENT USE OF INSULIN (H): ICD-10-CM

## 2019-07-09 LAB
ALBUMIN SERPL-MCNC: 3.3 G/DL (ref 3.4–5)
ALP SERPL-CCNC: 44 U/L (ref 40–150)
ALT SERPL W P-5'-P-CCNC: 17 U/L (ref 0–50)
ANION GAP SERPL CALCULATED.3IONS-SCNC: 5 MMOL/L (ref 3–14)
AST SERPL W P-5'-P-CCNC: 19 U/L (ref 0–45)
BILIRUB SERPL-MCNC: 0.3 MG/DL (ref 0.2–1.3)
BUN SERPL-MCNC: 22 MG/DL (ref 7–30)
CALCIUM SERPL-MCNC: 9.1 MG/DL (ref 8.5–10.1)
CHLORIDE SERPL-SCNC: 102 MMOL/L (ref 94–109)
CHOLEST SERPL-MCNC: 162 MG/DL
CO2 SERPL-SCNC: 32 MMOL/L (ref 20–32)
CREAT SERPL-MCNC: 1.28 MG/DL (ref 0.52–1.04)
CREAT UR-MCNC: 258 MG/DL
GFR SERPL CREATININE-BSD FRML MDRD: 42 ML/MIN/{1.73_M2}
GLUCOSE SERPL-MCNC: 148 MG/DL (ref 70–99)
HBA1C MFR BLD: 6.7 % (ref 0–5.6)
HDLC SERPL-MCNC: 41 MG/DL
LDLC SERPL CALC-MCNC: 88 MG/DL
MICROALBUMIN UR-MCNC: 297 MG/L
MICROALBUMIN/CREAT UR: 115.12 MG/G CR (ref 0–25)
NONHDLC SERPL-MCNC: 121 MG/DL
POTASSIUM SERPL-SCNC: 4.3 MMOL/L (ref 3.4–5.3)
PROT SERPL-MCNC: 7 G/DL (ref 6.8–8.8)
SODIUM SERPL-SCNC: 139 MMOL/L (ref 133–144)
TRIGL SERPL-MCNC: 166 MG/DL
TSH SERPL DL<=0.005 MIU/L-ACNC: 2.33 MU/L (ref 0.4–4)

## 2019-07-09 PROCEDURE — 82043 UR ALBUMIN QUANTITATIVE: CPT | Performed by: FAMILY MEDICINE

## 2019-07-09 PROCEDURE — 80053 COMPREHEN METABOLIC PANEL: CPT | Performed by: FAMILY MEDICINE

## 2019-07-09 PROCEDURE — 83036 HEMOGLOBIN GLYCOSYLATED A1C: CPT | Performed by: FAMILY MEDICINE

## 2019-07-09 PROCEDURE — 36415 COLL VENOUS BLD VENIPUNCTURE: CPT | Performed by: FAMILY MEDICINE

## 2019-07-09 PROCEDURE — 80061 LIPID PANEL: CPT | Performed by: FAMILY MEDICINE

## 2019-07-09 PROCEDURE — 84443 ASSAY THYROID STIM HORMONE: CPT | Performed by: FAMILY MEDICINE

## 2019-07-09 NOTE — PROGRESS NOTES
Subjective     Yadi Iniguez is a 68 year old female who presents to clinic today for the following health issues:    HPI   Diabetes Follow-up      How often are you checking your blood sugar? Four or more times daily    What time of day are you checking your blood sugars (select all that apply)?  Before meals and At bedtime    Have you had any blood sugars above 200?  No    Have you had any blood sugars below 70?  No    What symptoms do you notice when your blood sugar is low?  Weak and Other: dizzy and sweaty    What concerns do you have today about your diabetes? None     Do you have any of these symptoms? (Select all that apply)  Numbness in feet     Have you had a diabetic eye exam in the last 12 months? Yes- Date of last eye exam: may  [facility]     Diabetes Management Resources    Hyperlipidemia Follow-Up      Are you having any of the following symptoms? (Select all that apply)  No complaints of shortness of breath, chest pain or pressure.  No increased sweating or nausea with activity.  No left-sided neck or arm pain.  No complaints of pain in calves when walking 1-2 blocks.    Are you regularly taking any medication or supplement to lower your cholesterol?   Yes- atorvastatin    Are you having muscle aches or other side effects that you think could be caused by your cholesterol lowering medication?  Yes- leg cramping     Hypertension Follow-up      Do you check your blood pressure regularly outside of the clinic? No     Are you following a low salt diet? No, does not add    Are your blood pressures ever more than 140 on the top number (systolic) OR more   than 90 on the bottom number (diastolic), for example 140/90?     BP Readings from Last 2 Encounters:   07/16/19 99/59   04/04/19 131/75     Hemoglobin A1C (%)   Date Value   07/09/2019 6.7 (H)   01/31/2019 7.1 (H)     LDL Cholesterol Calculated (mg/dL)   Date Value   07/09/2019 88   04/20/2018 88       Amount of exercise or physical activity: 2-3 days/week  "for an average of less than 15 minutes    Problems taking medications regularly: No    Medication side effects: none    Diet: regular (no restrictions)      Pt with diabetes, well controlled on meds  Has htn and hyperlipidemia  On statin and arb and aspirin  UTD with eye exam, has some retinopathy  No concerns, needs refill of insulin    Pt with depression well controlled on paxil    Has hypothyroidism stable on meds    Pt with sob daily for a couple of years.  Notices it with activity.   Has used nebulizer in the past.   Has history of smoking and still does on weekends  Has been diagnosed with COPD in the past in the hospital and recalls having breathing test done  Does not have albuterol inhaler.     Reviewed and updated as needed this visit by Provider         Review of Systems   ROS COMP: Constitutional, HEENT, cardiovascular, pulmonary, gi and gu systems are negative, except as otherwise noted.      Objective    BP 99/59   Pulse 86   Temp 98.9  F (37.2  C) (Oral)   Resp 17   Ht 1.753 m (5' 9\")   Wt 120.2 kg (265 lb)   SpO2 95%   BMI 39.13 kg/m    Body mass index is 39.13 kg/m .  Physical Exam   GENERAL: healthy, alert and no distress  NECK: no adenopathy, no asymmetry, masses, or scars and thyroid normal to palpation  RESP: lungs clear to auscultation - no rales, rhonchi or wheezes  CV: regular rates and rhythm, normal S1 S2, no S3 or S4, grade 2/6 systolic murmur heard best over the aorta, peripheral pulses strong and no peripheral edema  ABDOMEN: soft, nontender, no hepatosplenomegaly, no masses and bowel sounds normal  MS: no gross musculoskeletal defects noted, no edema  Diabetic foot exam: normal DP and PT pulses, no trophic changes or ulcerative lesions and reduced sensation at bilateral toes    Diagnostic Test Results:  Labs reviewed in Epic    spirometry with moderate obstructive dz    Assessment & Plan     1. Type 2 diabetes mellitus with other diabetic kidney complication, with long-term " "current use of insulin (H)  At goal on meds, follow-up in 6 months, congratulated pt on good control  - PAF COMPLETED; Future  - insulin aspart (NOVOLOG FLEXPEN) 100 UNIT/ML pen; 22 units before breakfast, 26 units before lunch, 20 units before dinner  Dispense: 18 mL; Refill: 5    2. CKD (chronic kidney disease) stage 3, GFR 30-59 ml/min (H)  Stable on ARB  - PAF COMPLETED; Future    3. Chronic obstructive pulmonary disease, unspecified COPD type (H)  Will start albuterol inhaler, pt to let me know how often she is using this, may need to start spiriva or equivalent  - PAF COMPLETED; Future  - Spirometry, Breathing Capacity: Normal Order, Clinic Performed  - albuterol (PROAIR HFA/PROVENTIL HFA/VENTOLIN HFA) 108 (90 Base) MCG/ACT inhaler; Inhale 2 puffs into the lungs every 6 hours  Dispense: 1 Inhaler; Refill: 11    4. Hypertension goal BP (blood pressure) < 140/90  At goal on meds    5. Morbid obesity (H)  Encourage regular exercise and healthy diet    6. Hyperlipidemia LDL goal <100  On statin, controlled    7. Hypothyroidism, unspecified type  At goal on meds    8. Diabetic polyneuropathy associated with type 2 diabetes mellitus (H)  stable    9. Moderate major depression (H)  On paxil and stable    10. 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, with long-term current use of insulin, unspecified laterality (H)  Sees eye doctor       BMI:   Estimated body mass index is 39.13 kg/m  as calculated from the following:    Height as of this encounter: 1.753 m (5' 9\").    Weight as of this encounter: 120.2 kg (265 lb).   Weight management plan: Discussed healthy diet and exercise guidelines            No follow-ups on file.    Sandy Otoole MD  [facility]    "

## 2019-07-11 NOTE — TELEPHONE ENCOUNTER
Next 5 appointments (look out 90 days)    Jul 16, 2019  8:40 AM CDT  Ivan Ellis with [facility] Root MD  [facility] [address] [facility] [address] [phone]

## 2019-07-16 ENCOUNTER — OFFICE VISIT (OUTPATIENT)
Dept: FAMILY MEDICINE | Facility: CLINIC | Age: 69
End: 2019-07-16
Payer: COMMERCIAL

## 2019-07-16 VITALS
WEIGHT: 265 LBS | HEART RATE: 86 BPM | TEMPERATURE: 98.9 F | RESPIRATION RATE: 17 BRPM | DIASTOLIC BLOOD PRESSURE: 59 MMHG | HEIGHT: 69 IN | SYSTOLIC BLOOD PRESSURE: 99 MMHG | BODY MASS INDEX: 39.25 KG/M2 | OXYGEN SATURATION: 95 %

## 2019-07-16 DIAGNOSIS — J44.9 CHRONIC OBSTRUCTIVE PULMONARY DISEASE, UNSPECIFIED COPD TYPE (H): ICD-10-CM

## 2019-07-16 DIAGNOSIS — E03.9 HYPOTHYROIDISM, UNSPECIFIED TYPE: ICD-10-CM

## 2019-07-16 DIAGNOSIS — E11.29 TYPE 2 DIABETES MELLITUS WITH OTHER DIABETIC KIDNEY COMPLICATION, WITH LONG-TERM CURRENT USE OF INSULIN (H): Primary | ICD-10-CM

## 2019-07-16 DIAGNOSIS — E08.3419: ICD-10-CM

## 2019-07-16 DIAGNOSIS — E11.42 DIABETIC POLYNEUROPATHY ASSOCIATED WITH TYPE 2 DIABETES MELLITUS (H): ICD-10-CM

## 2019-07-16 DIAGNOSIS — F32.1 MODERATE MAJOR DEPRESSION (H): ICD-10-CM

## 2019-07-16 DIAGNOSIS — E78.5 HYPERLIPIDEMIA LDL GOAL <100: ICD-10-CM

## 2019-07-16 DIAGNOSIS — Z79.4 TYPE 2 DIABETES MELLITUS WITH OTHER DIABETIC KIDNEY COMPLICATION, WITH LONG-TERM CURRENT USE OF INSULIN (H): Primary | ICD-10-CM

## 2019-07-16 DIAGNOSIS — E66.01 MORBID OBESITY (H): ICD-10-CM

## 2019-07-16 DIAGNOSIS — Z79.4: ICD-10-CM

## 2019-07-16 DIAGNOSIS — N18.30 CKD (CHRONIC KIDNEY DISEASE) STAGE 3, GFR 30-59 ML/MIN (H): ICD-10-CM

## 2019-07-16 DIAGNOSIS — I10 HYPERTENSION GOAL BP (BLOOD PRESSURE) < 140/90: ICD-10-CM

## 2019-07-16 LAB
FEF 25/75: NORMAL
FEV-1: NORMAL
FEV1/FVC: NORMAL
FVC: NORMAL

## 2019-07-16 PROCEDURE — 99214 OFFICE O/P EST MOD 30 MIN: CPT | Mod: 25 | Performed by: FAMILY MEDICINE

## 2019-07-16 PROCEDURE — 94010 BREATHING CAPACITY TEST: CPT | Performed by: FAMILY MEDICINE

## 2019-07-16 RX ORDER — ALBUTEROL SULFATE 90 UG/1
2 AEROSOL, METERED RESPIRATORY (INHALATION) EVERY 6 HOURS
Qty: 1 INHALER | Refills: 11 | Status: SHIPPED | OUTPATIENT
Start: 2019-07-16 | End: 2020-07-08

## 2019-07-16 ASSESSMENT — PATIENT HEALTH QUESTIONNAIRE - PHQ9
SUM OF ALL RESPONSES TO PHQ QUESTIONS 1-9: 9
5. POOR APPETITE OR OVEREATING: NOT AT ALL

## 2019-07-16 ASSESSMENT — ANXIETY QUESTIONNAIRES
7. FEELING AFRAID AS IF SOMETHING AWFUL MIGHT HAPPEN: NOT AT ALL
GAD7 TOTAL SCORE: 2
2. NOT BEING ABLE TO STOP OR CONTROL WORRYING: NOT AT ALL
5. BEING SO RESTLESS THAT IT IS HARD TO SIT STILL: NOT AT ALL
6. BECOMING EASILY ANNOYED OR IRRITABLE: SEVERAL DAYS
1. FEELING NERVOUS, ANXIOUS, OR ON EDGE: SEVERAL DAYS
IF YOU CHECKED OFF ANY PROBLEMS ON THIS QUESTIONNAIRE, HOW DIFFICULT HAVE THESE PROBLEMS MADE IT FOR YOU TO DO YOUR WORK, TAKE CARE OF THINGS AT HOME, OR GET ALONG WITH OTHER PEOPLE: NOT DIFFICULT AT ALL
3. WORRYING TOO MUCH ABOUT DIFFERENT THINGS: NOT AT ALL

## 2019-07-16 ASSESSMENT — MIFFLIN-ST. JEOR: SCORE: 1791.41

## 2019-07-16 NOTE — LETTER
My COPD Action Plan     Name: Yadi Iniguez    YOB: 1950   Date: 7/16/2019    My doctor: Sandy Otoole MD   My clinic: [address] [address] [phone]  My Controller Medicine: none   Dose:      My Rescue Medicine: Albuterol (Proair/Ventolin/Proventil) inhaler   Dose: [id number]     My Flare Up Medicine: none   Dose:      My COPD Severity: Moderate = FeV1 < 79% -50%      Use of Oxygen: Oxygen Not Prescribed      Make sure you've had your pneumonia   vaccines.          GREEN ZONE       Doing well today      Usual level of activity and exercise    Usual amount of cough and mucus    No shortness of breath    Usual level of health (thinking clearly, sleeping well, feel like eating) Actions:      Take daily medicines    Use oxygen as prescribed    Follow regular exercise and diet plan    Avoid cigarette smoke and other irritants that harm the lungs           YELLOW ZONE          Having a bad day or flare up      Short of breath more than usual    A lot more sputum (mucus) than usual    Sputum looks yellow, green, tan, brown or bloody    More coughing or wheezing    Fever or chills    Less energy; trouble completing activities    Trouble thinking or focusing    Using quick relief inhaler or nebulizer more often    Poor sleep; symptoms wake me up    Do not feel like eating Actions:      Get plenty of rest    Take daily medicines    Use quick relief inhaler every 4 hours    If you use oxygen, call you doctor to see if you should adjust your oxygen    Do breathing exercises or other things to help you relax    Let a loved one, friend or neighbor know you are feeling worse    Call your care team if you have 2 or more symptoms.  Start taking steroids or antibiotics if directed by your care team           RED ZONE       Need medical care now      Severe shortness of breath (feel you can't breathe)    Fever, chills    Not enough breath to do any  activity    Trouble coughing up mucus, walking or talking    Blood in mucus    Frequent coughing   Rescue medicines are not working    Not able to sleep because of breathing    Feel confused or drowsy    Chest pain    Actions:      Call your health care team.  If you cannot reach your care team, call 911 or go to the emergency room.        Annual Reminders:  Meet with Care Team, Flu Shot every Fall  Pharmacy:    [id number] - [place], [place] - [address] AT [facility]  [address]  [facility] [id number]  [place] - [address] [facility] [address]  [facility] - [place], [place] - [address] [facility] [place] - [address] [place] [address]

## 2019-07-17 ASSESSMENT — ANXIETY QUESTIONNAIRES: GAD7 TOTAL SCORE: 2

## 2019-07-17 ASSESSMENT — ASTHMA QUESTIONNAIRES: ACT_TOTALSCORE: 15

## 2019-10-10 ENCOUNTER — MYC REFILL (OUTPATIENT)
Dept: FAMILY MEDICINE | Facility: CLINIC | Age: 69
End: 2019-10-10

## 2019-10-10 DIAGNOSIS — R21 SKIN RASH: ICD-10-CM

## 2019-10-10 RX ORDER — CLOBETASOL PROPIONATE 0.05 G/ML
1 SPRAY TOPICAL 2 TIMES DAILY PRN
Qty: 1 BOTTLE | Refills: 5 | Status: SHIPPED | OUTPATIENT
Start: 2019-10-10 | End: 2022-07-12

## 2019-10-10 NOTE — TELEPHONE ENCOUNTER
Routing refill request to provider for review/approval because:  Drug not on the [facility] refill protocol     Yaa GARCIAN, RN

## 2019-11-01 ENCOUNTER — HEALTH MAINTENANCE LETTER (OUTPATIENT)
Age: 69
End: 2019-11-01

## 2019-11-26 DIAGNOSIS — Z79.4 TYPE 2 DIABETES MELLITUS WITH OTHER DIABETIC KIDNEY COMPLICATION, WITH LONG-TERM CURRENT USE OF INSULIN (H): ICD-10-CM

## 2019-11-26 DIAGNOSIS — E11.29 TYPE 2 DIABETES MELLITUS WITH OTHER DIABETIC KIDNEY COMPLICATION, WITH LONG-TERM CURRENT USE OF INSULIN (H): ICD-10-CM

## 2019-11-26 RX ORDER — FLASH GLUCOSE SENSOR
1 KIT MISCELLANEOUS
Qty: 2 EACH | Refills: 11 | Status: SHIPPED | OUTPATIENT
Start: 2019-11-26 | End: 2020-11-09

## 2019-12-27 ENCOUNTER — DOCUMENTATION ONLY (OUTPATIENT)
Dept: FAMILY MEDICINE | Facility: CLINIC | Age: 69
End: 2019-12-27

## 2019-12-27 DIAGNOSIS — E11.9 TYPE 2 DIABETES, HBA1C GOAL < 8% (H): Primary | ICD-10-CM

## 2019-12-27 NOTE — PROGRESS NOTES
Please review and sign Pending Pre-visit Labs in [facility]. Labs 01/06/20 and Diabetic check 01/14/20  Natalie POOLE

## 2020-01-06 DIAGNOSIS — E11.9 TYPE 2 DIABETES, HBA1C GOAL < 8% (H): ICD-10-CM

## 2020-01-06 LAB
ANION GAP SERPL CALCULATED.3IONS-SCNC: 3 MMOL/L (ref 3–14)
BUN SERPL-MCNC: 22 MG/DL (ref 7–30)
CALCIUM SERPL-MCNC: 9.4 MG/DL (ref 8.5–10.1)
CHLORIDE SERPL-SCNC: 103 MMOL/L (ref 94–109)
CO2 SERPL-SCNC: 32 MMOL/L (ref 20–32)
CREAT SERPL-MCNC: 1.3 MG/DL (ref 0.52–1.04)
ERYTHROCYTE [DISTWIDTH] IN BLOOD BY AUTOMATED COUNT: 13.7 % (ref 10–15)
GFR SERPL CREATININE-BSD FRML MDRD: 41 ML/MIN/{1.73_M2}
GLUCOSE SERPL-MCNC: 148 MG/DL (ref 70–99)
HBA1C MFR BLD: 6.7 % (ref 0–5.6)
HCT VFR BLD AUTO: 40.8 % (ref 35–47)
HGB BLD-MCNC: 13.6 G/DL (ref 11.7–15.7)
MCH RBC QN AUTO: 34.2 PG (ref 26.5–33)
MCHC RBC AUTO-ENTMCNC: 33.3 G/DL (ref 31.5–36.5)
MCV RBC AUTO: 103 FL (ref 78–100)
PLATELET # BLD AUTO: 158 10E9/L (ref 150–450)
POTASSIUM SERPL-SCNC: 4.5 MMOL/L (ref 3.4–5.3)
RBC # BLD AUTO: 3.98 10E12/L (ref 3.8–5.2)
SODIUM SERPL-SCNC: 138 MMOL/L (ref 133–144)
WBC # BLD AUTO: 7.4 10E9/L (ref 4–11)

## 2020-01-06 PROCEDURE — 80048 BASIC METABOLIC PNL TOTAL CA: CPT | Performed by: FAMILY MEDICINE

## 2020-01-06 PROCEDURE — 36415 COLL VENOUS BLD VENIPUNCTURE: CPT | Performed by: FAMILY MEDICINE

## 2020-01-06 PROCEDURE — 83036 HEMOGLOBIN GLYCOSYLATED A1C: CPT | Performed by: FAMILY MEDICINE

## 2020-01-06 PROCEDURE — 85027 COMPLETE CBC AUTOMATED: CPT | Performed by: FAMILY MEDICINE

## 2020-01-13 NOTE — PROGRESS NOTES
Subjective     Yadi Iniguez is a 69 year old female who presents to clinic today for the following health issues:    HPI   Diabetes Follow-up    How often are you checking your blood sugar? Four or more times daily  Blood sugar testing frequency justification:  Uncontrolled diabetes  What time of day are you checking your blood sugars (select all that apply)?  Before meals  Have you had any blood sugars above 200?  No  Have you had any blood sugars below 70?  No    What symptoms do you notice when your blood sugar is low? light headed, tired and shakey    What concerns do you have today about your diabetes? None     Do you have any of these symptoms? (Select all that apply)  Excessive thirst     Have you had a diabetic eye exam in the last 12 months? Yes- Date of last eye exam: may2019  [facility] eye [facility]    Diabetes Management Resources    Hyperlipidemia Follow-Up      Are you regularly taking any medication or supplement to lower your cholesterol?   Yes- atorvastatin     Are you having muscle aches or other side effects that you think could be caused by your cholesterol lowering medication?  Yes- unsure if from medication    Hypertension Follow-up      Do you check your blood pressure regularly outside of the clinic? No     Are you following a low salt diet? No salt added    Are your blood pressures ever more than 140 on the top number (systolic) OR more   than 90 on the bottom number (diastolic), for example 140/90?     BP Readings from Last 2 Encounters:   01/14/20 102/58   07/16/19 99/59     Hemoglobin A1C (%)   Date Value   01/06/2020 6.7 (H)   07/09/2019 6.7 (H)     LDL Cholesterol Calculated (mg/dL)   Date Value   07/09/2019 88   04/20/2018 88       Pt with diabetes, well controlled with insulin and metformin. A1c stable at 6.7   No concerns per pt.   Is on statin and aspirin and arb  Has seen eye doctor, will get updated paperwork.     BP at goal on norvasc and losartan and carvedilol    On statin  "for elevated cholesterol     Is on fenofibrate. No longer covered by insurance. Triglycerides looks pretty good  If too expensive will have her stop the fenofibrate and have her take some fish oil instead    Pt with CKD stable on arb    Pt with depression, stable on paxil 20 mg. Has not done well when she has gone off  Per good rx, paxil is $10 for 90 tablets so she may choose to pay cash to cover this ed    Pt with copd, stable, rarely uses albuterol  Just quit smoking again 14 days ago  Does have > 30 pack years and is agreeable to CT scan of lungs for lung cancer screening    PT with thrombocytopenia in the past, most recent check is normal    Pt with hypothyroidism, stable on meds    Pt with essential tremor, it is manageable on prinedone    Pt with osteoporosis on fosamax. Needs updated dexa as has been 3 years    Due for pneumon 23 since none after age 65            Reviewed and updated as needed this visit by Provider         Review of Systems   ROS COMP: Constitutional, HEENT, cardiovascular, pulmonary, gi and gu systems are negative, except as otherwise noted.      Objective    /58   Pulse 85   Temp 98.4  F (36.9  C) (Oral)   Ht 1.753 m (5' 9\")   Wt 119.7 kg (264 lb)   BMI 38.99 kg/m    Body mass index is 38.99 kg/m .  Physical Exam   GENERAL: healthy, alert and no distress  NECK: no adenopathy, no asymmetry, masses, or scars and thyroid normal to palpation  RESP: lungs clear to auscultation - no rales, rhonchi or wheezes  CV: regular rate and rhythm, normal S1 S2, no S3 or S4, no murmur, click or rub, no peripheral edema and peripheral pulses strong  ABDOMEN: soft, nontender, no hepatosplenomegaly, no masses and bowel sounds normal  MS: no gross musculoskeletal defects noted, no edema    Diagnostic Test Results:  Labs reviewed in Epic        Assessment & Plan     1. Type 2 diabetes mellitus with other diabetic kidney complication, with long-term current use of insulin (H)  Well controlled, " follow-up in 6 months  - blood glucose (MUSA CONTOUR NEXT) test strip; Use to test blood sugar 3 times daily or as directed.  Dispense: 100 strip; Refill: 11  - blood glucose monitoring (MUSA MICROLET) lancets; Use to test blood sugar 3 times daily or as directed.  Dispense: 100 each; Refill: 3  - insulin glargine U-300 (TOUJEO) 300 UNIT/ML (1 units dial) pen; Inject 50 Units Subcutaneous At Bedtime  Dispense: 18 mL; Refill: 1  - insulin aspart (NOVOLOG FLEXPEN) 100 UNIT/ML pen; 22 units before breakfast, 26 units before lunch, 20 units before dinner  Dispense: 18 mL; Refill: 5    2. Moderate major depression (H)  Stable on meds, refill  - PARoxetine (PAXIL) 20 MG tablet; Take 1 tablet (20 mg) by mouth 2 times daily  Dispense: 180 tablet; Refill: 3    3. Morbid obesity (H)  Encourage regular exercise and healthy diet    4. CKD (chronic kidney disease) stage 3, GFR 30-59 ml/min (H)  Stable on arb    5. Thrombocytopenia (H)  Resolved today    6. Chronic obstructive pulmonary disease, unspecified COPD type (H)  Stable on meds, rarely uses albuterol    7. Diabetic polyneuropathy associated with type 2 diabetes mellitus (H)  stable    8. Benign essential hypertension  At goal on meds  - amLODIPine (NORVASC) 10 MG tablet; TAKE 1 TABLET (10 MG) BY MOUTH DAILY  Dispense: 90 tablet; Refill: 3  - carvedilol (COREG) 25 MG tablet; TAKE 1 TABLET (25 MG) BY MOUTH 2 TIMES DAILY WITH MEALS  Dispense: 180 tablet; Refill: 3  - losartan (COZAAR) 50 MG tablet; Take 1 tablet (50 mg) by mouth daily  Dispense: 90 tablet; Refill: 3    9. Hyperlipidemia, unspecified hyperlipidemia type  On statin  - atorvastatin (LIPITOR) 40 MG tablet; Take 1 tablet (40 mg) by mouth daily  Dispense: 90 tablet; Refill: 3  - fenofibrate (LOFIBRA) 54 MG tablet; Take 1 tablet (54 mg) by mouth daily  Dispense: 90 tablet; Refill: 3    10. Hypothyroidism, unspecified type  At goal on meds  - levothyroxine (TIROSINT) 150 MCG capsule; Take 150 mcg by mouth daily  "(with breakfast)  Dispense: 90 capsule; Refill: 3    11. Essential tremor  Good control on medication  - primidone (MYSOLINE) 50 MG tablet; TAKE ONE TABLET(S) BY MOUTH TWICE DAILY  Dispense: 180 tablet; Refill: 3    12. Osteoporosis, unspecified osteoporosis type, unspecified pathological fracture presence  Overdue for dexa  - alendronate (FOSAMAX) 70 MG tablet; TAKE 1 TABLET (70 MG) BY MOUTH EVERY 7 DAYS. TAKE 60 MINUTES BEFORE MORNING MEAL WITH 8 OZ. WATER. REMAIN UPRIGHT FOR 30 MINUTES.  Dispense: 12 tablet; Refill: 3    13. Estrogen deficiency    - DX Hip/Pelvis/Spine; Future    14. Personal history of tobacco use  Pt agreeable to lo dose CT scan  - Prof Fee: Shared Decision Making Visit for Lung Cancer Screening  - CT Chest Lung Cancer Scrn Low Dose wo; Future    15. Need for pneumococcal vaccine  given  - Pneumococcal vaccine 23 valent PPSV23  (Pneumovax) [36183]     BMI:   Estimated body mass index is 38.99 kg/m  as calculated from the following:    Height as of this encounter: 1.753 m (5' 9\").    Weight as of this encounter: 119.7 kg (264 lb).   Weight management plan: Discussed healthy diet and exercise guidelines            No follow-ups on file.    Sandy Otoole MD  [facility]    "

## 2020-01-14 ENCOUNTER — OFFICE VISIT (OUTPATIENT)
Dept: FAMILY MEDICINE | Facility: CLINIC | Age: 70
End: 2020-01-14
Payer: COMMERCIAL

## 2020-01-14 VITALS
SYSTOLIC BLOOD PRESSURE: 102 MMHG | TEMPERATURE: 98.4 F | HEART RATE: 85 BPM | DIASTOLIC BLOOD PRESSURE: 58 MMHG | HEIGHT: 69 IN | BODY MASS INDEX: 39.1 KG/M2 | WEIGHT: 264 LBS

## 2020-01-14 DIAGNOSIS — Z23 NEED FOR PNEUMOCOCCAL VACCINE: ICD-10-CM

## 2020-01-14 DIAGNOSIS — E03.9 HYPOTHYROIDISM, UNSPECIFIED TYPE: ICD-10-CM

## 2020-01-14 DIAGNOSIS — D69.6 THROMBOCYTOPENIA (H): ICD-10-CM

## 2020-01-14 DIAGNOSIS — I10 BENIGN ESSENTIAL HYPERTENSION: ICD-10-CM

## 2020-01-14 DIAGNOSIS — E11.29 TYPE 2 DIABETES MELLITUS WITH OTHER DIABETIC KIDNEY COMPLICATION, WITH LONG-TERM CURRENT USE OF INSULIN (H): ICD-10-CM

## 2020-01-14 DIAGNOSIS — E11.42 DIABETIC POLYNEUROPATHY ASSOCIATED WITH TYPE 2 DIABETES MELLITUS (H): ICD-10-CM

## 2020-01-14 DIAGNOSIS — N18.30 CKD (CHRONIC KIDNEY DISEASE) STAGE 3, GFR 30-59 ML/MIN (H): ICD-10-CM

## 2020-01-14 DIAGNOSIS — Z87.891 PERSONAL HISTORY OF TOBACCO USE: ICD-10-CM

## 2020-01-14 DIAGNOSIS — E78.5 HYPERLIPIDEMIA, UNSPECIFIED HYPERLIPIDEMIA TYPE: ICD-10-CM

## 2020-01-14 DIAGNOSIS — M81.0 OSTEOPOROSIS, UNSPECIFIED OSTEOPOROSIS TYPE, UNSPECIFIED PATHOLOGICAL FRACTURE PRESENCE: ICD-10-CM

## 2020-01-14 DIAGNOSIS — J44.9 CHRONIC OBSTRUCTIVE PULMONARY DISEASE, UNSPECIFIED COPD TYPE (H): ICD-10-CM

## 2020-01-14 DIAGNOSIS — E28.39 ESTROGEN DEFICIENCY: ICD-10-CM

## 2020-01-14 DIAGNOSIS — F32.1 MODERATE MAJOR DEPRESSION (H): ICD-10-CM

## 2020-01-14 DIAGNOSIS — E66.01 MORBID OBESITY (H): ICD-10-CM

## 2020-01-14 DIAGNOSIS — Z79.4 TYPE 2 DIABETES MELLITUS WITH OTHER DIABETIC KIDNEY COMPLICATION, WITH LONG-TERM CURRENT USE OF INSULIN (H): ICD-10-CM

## 2020-01-14 DIAGNOSIS — Z00.00 ENCOUNTER FOR MEDICARE ANNUAL WELLNESS EXAM: Primary | ICD-10-CM

## 2020-01-14 DIAGNOSIS — G25.0 ESSENTIAL TREMOR: ICD-10-CM

## 2020-01-14 PROCEDURE — G0296 VISIT TO DETERM LDCT ELIG: HCPCS | Performed by: FAMILY MEDICINE

## 2020-01-14 PROCEDURE — 99214 OFFICE O/P EST MOD 30 MIN: CPT | Mod: 25 | Performed by: FAMILY MEDICINE

## 2020-01-14 PROCEDURE — G0009 ADMIN PNEUMOCOCCAL VACCINE: HCPCS | Performed by: FAMILY MEDICINE

## 2020-01-14 PROCEDURE — 90732 PPSV23 VACC 2 YRS+ SUBQ/IM: CPT | Performed by: FAMILY MEDICINE

## 2020-01-14 PROCEDURE — G0439 PPPS, SUBSEQ VISIT: HCPCS | Performed by: FAMILY MEDICINE

## 2020-01-14 RX ORDER — ATORVASTATIN CALCIUM 40 MG/1
40 TABLET, FILM COATED ORAL DAILY
Qty: 90 TABLET | Refills: 3 | Status: SHIPPED | OUTPATIENT
Start: 2020-01-14 | End: 2021-01-21

## 2020-01-14 RX ORDER — ALENDRONATE SODIUM 70 MG/1
TABLET ORAL
Qty: 12 TABLET | Refills: 3 | Status: SHIPPED | OUTPATIENT
Start: 2020-01-14 | End: 2021-01-21

## 2020-01-14 RX ORDER — PRIMIDONE 50 MG/1
TABLET ORAL
Qty: 180 TABLET | Refills: 3 | Status: SHIPPED | OUTPATIENT
Start: 2020-01-14 | End: 2020-10-26

## 2020-01-14 RX ORDER — PAROXETINE 20 MG/1
20 TABLET, FILM COATED ORAL 2 TIMES DAILY
Qty: 180 TABLET | Refills: 3 | Status: SHIPPED | OUTPATIENT
Start: 2020-01-14 | End: 2021-01-21

## 2020-01-14 RX ORDER — FENOFIBRATE 54 MG/1
54 TABLET ORAL DAILY
Qty: 90 TABLET | Refills: 3 | Status: SHIPPED | OUTPATIENT
Start: 2020-01-14 | End: 2021-01-21

## 2020-01-14 RX ORDER — LOSARTAN POTASSIUM 50 MG/1
50 TABLET ORAL DAILY
Qty: 90 TABLET | Refills: 3 | Status: SHIPPED | OUTPATIENT
Start: 2020-01-14 | End: 2021-01-21

## 2020-01-14 RX ORDER — AMLODIPINE BESYLATE 10 MG/1
TABLET ORAL
Qty: 90 TABLET | Refills: 3 | Status: SHIPPED | OUTPATIENT
Start: 2020-01-14 | End: 2021-01-21

## 2020-01-14 RX ORDER — LEVOTHYROXINE SODIUM 13 UG/1
150 CAPSULE ORAL
Qty: 90 CAPSULE | Refills: 3 | Status: SHIPPED | OUTPATIENT
Start: 2020-01-14 | End: 2021-01-21

## 2020-01-14 RX ORDER — CARVEDILOL 25 MG/1
TABLET ORAL
Qty: 180 TABLET | Refills: 3 | Status: SHIPPED | OUTPATIENT
Start: 2020-01-14 | End: 2021-01-21

## 2020-01-14 ASSESSMENT — ANXIETY QUESTIONNAIRES
6. BECOMING EASILY ANNOYED OR IRRITABLE: SEVERAL DAYS
7. FEELING AFRAID AS IF SOMETHING AWFUL MIGHT HAPPEN: MORE THAN HALF THE DAYS
5. BEING SO RESTLESS THAT IT IS HARD TO SIT STILL: NOT AT ALL
GAD7 TOTAL SCORE: 8
3. WORRYING TOO MUCH ABOUT DIFFERENT THINGS: MORE THAN HALF THE DAYS
2. NOT BEING ABLE TO STOP OR CONTROL WORRYING: SEVERAL DAYS
IF YOU CHECKED OFF ANY PROBLEMS ON THIS QUESTIONNAIRE, HOW DIFFICULT HAVE THESE PROBLEMS MADE IT FOR YOU TO DO YOUR WORK, TAKE CARE OF THINGS AT HOME, OR GET ALONG WITH OTHER PEOPLE: NOT DIFFICULT AT ALL
1. FEELING NERVOUS, ANXIOUS, OR ON EDGE: SEVERAL DAYS

## 2020-01-14 ASSESSMENT — PATIENT HEALTH QUESTIONNAIRE - PHQ9
5. POOR APPETITE OR OVEREATING: SEVERAL DAYS
SUM OF ALL RESPONSES TO PHQ QUESTIONS 1-9: 8

## 2020-01-14 ASSESSMENT — MIFFLIN-ST. JEOR: SCORE: 1781.88

## 2020-01-14 NOTE — PROGRESS NOTES
"  SUBJECTIVE:   Yadi Iniguez is a 69 year old female who presents for Preventive Visit.      Are you in the first 12 months of your Medicare Part B coverage?  No    Physical Health:    In general, how would you rate your overall physical health? fair    Outside of work, how many days during the week do you exercise? none    Outside of work, approximately how many minutes a day do you exercise?not applicable    If you drink alcohol do you typically have >3 drinks per day or >7 drinks per week? No    Do you usually eat at least 4 servings of fruit and vegetables a day, include whole grains & fiber and avoid regularly eating high fat or \"junk\" foods? Yes    Do you have any problems taking medications regularly?  No    Do you have any side effects from medications? not applicable    Needs assistance for the following daily activities: no assistance needed    Which of the following safety concerns are present in your home?  none identified     Hearing impairment: No    In the past 6 months, have you been bothered by leaking of urine? yes    Mental Health:    In general, how would you rate your overall mental or emotional health? good  PHQ-2 Score:      Do you feel safe in your environment? Yes    Have you ever done Advance Care Planning? (For example, a Health Directive, POLST, or a discussion with a medical provider or your loved ones about your wishes): Yes, advance care planning is on file.    Additional concerns to address?  No    Fall risk:       Cognitive Screenin) Repeat 3 items (Leader, Season, Table)      2) Clock draw:   NORMAL  3) 3 item recall:   Recalls 3 objects  Results: 3 items recalled: COGNITIVE IMPAIRMENT LESS LIKELY    Mini-CogTM Copyright AMBIKA Perry. Licensed by the author for use in [facility]; reprinted with permission (aydin@.[facility]). All rights reserved.      Do you have sleep apnea, excessive snoring or daytime drowsiness?: no            Reviewed and updated as needed this " visit by clinical staff  Tobacco  Allergies  Meds  Med Hx  Surg Hx  Fam Hx  Soc Hx        Reviewed and updated as needed this visit by Provider        Social History     Tobacco Use     Smoking status: Former Smoker     Packs/day: 0.50     Years: 50.00     Pack years: 25.00     Types: Cigarettes     Start date: 1967     Last attempt to quit: 2017     Years since quittin.4     Smokeless tobacco: Never Used     Tobacco comment: former smoker   Substance Use Topics     Alcohol use: Yes     Comment: Maybe a drink every few months                           Current providers sharing in care for this patient include:   Patient Care Team:  Sandy Root MD as PCP - General (Family Practice)  Maranda Mustafa, RN as Registered Nurse  Sandy Root MD as Assigned PCP    The following health maintenance items are reviewed in Epic and correct as of today:  Health Maintenance   Topic Date Due     MEDICARE ANNUAL WELLNESS VISIT  2014     ZOSTER IMMUNIZATION (2 of 3) 09/10/2014     PNEUMOCOCCAL IMMUNIZATION 65+ LOW/MEDIUM RISK (2 of 2 - PPSV23) 2018     EYE EXAM  05/15/2019     ASTHMA ACTION PLAN  2019     PHQ-9  10/16/2019     ASTHMA CONTROL TEST  2020     A1C  2020     MAMMO SCREENING  2020     LIPID  2020     MICROALBUMIN  2020     DIABETIC FOOT EXAM  2020     FALL RISK ASSESSMENT  2020     BMP  2021     CBC  2021     COLONOSCOPY  2021     TSH W/FREE T4 REFLEX  2021     ADVANCE CARE PLANNING  2023     DTAP/TDAP/TD IMMUNIZATION (3 - Td) 2027     DEXA  Completed     SPIROMETRY  Completed     HEPATITIS C SCREENING  Completed     COPD ACTION PLAN  Completed     DEPRESSION ACTION PLAN  Completed     INFLUENZA VACCINE  Completed     IPV IMMUNIZATION  Aged Out     MENINGITIS IMMUNIZATION  Aged Out     Labs reviewed in [facility]  Mammogram Screening: Mammogram Screening: Patient over age 50, mutual decision to screen reflected  "in health maintenance.    ROS:  Constitutional, HEENT, cardiovascular, pulmonary, gi and gu systems are negative, except as otherwise noted.    OBJECTIVE:   /58   Pulse 85   Temp 98.4  F (36.9  C) (Oral)   Ht 1.753 m (5' 9\")   Wt 119.7 kg (264 lb)   BMI 38.99 kg/m   Estimated body mass index is 38.99 kg/m  as calculated from the following:    Height as of this encounter: 1.753 m (5' 9\").    Weight as of this encounter: 119.7 kg (264 lb).  EXAM:   See other exam    Diagnostic Test Results:  Labs reviewed in Epic    ASSESSMENT / PLAN:       16. Encounter for Medicare annual wellness exam  completed      COUNSELING:  Reviewed preventive health counseling, as reflected in patient instructions       Regular exercise       Healthy diet/nutrition       Vision screening       Dental care       Osteoporosis Prevention/Bone Health       Colon cancer screening    Estimated body mass index is 38.99 kg/m  as calculated from the following:    Height as of this encounter: 1.753 m (5' 9\").    Weight as of this encounter: 119.7 kg (264 lb).    Weight management plan: Discussed healthy diet and exercise guidelines     reports that she quit smoking about 2 years ago. Her smoking use included cigarettes. She started smoking about 53 years ago. She has a 25.00 pack-year smoking history. She has never used smokeless tobacco.      Appropriate preventive services were discussed with this patient, including applicable screening as appropriate for cardiovascular disease, diabetes, osteopenia/osteoporosis, and glaucoma.  As appropriate for age/gender, discussed screening for colorectal cancer, prostate cancer, breast cancer, and cervical cancer. Checklist reviewing preventive services available has been given to the patient.    Reviewed patients plan of care and provided an AVS. The Intermediate Care Plan ( asthma action plan, low back pain action plan, and migraine action plan) for Yadi meets the Care Plan requirement. This Care " Plan has been established and reviewed with the Patient.    Counseling Resources:  ATP IV Guidelines  Pooled Cohorts Equation Calculator  Breast Cancer Risk Calculator  FRAX Risk Assessment  ICSI Preventive Guidelines  Dietary Guidelines for Americans, 2010  USDA's MyPlate  ASA Prophylaxis  Lung CA Screening    Sandy Otoole MD  [facility]

## 2020-01-14 NOTE — PATIENT INSTRUCTIONS
Lung Cancer Screening   Frequently Asked Questions  If you are at high-risk for lung cancer, getting screened with low-dose computed tomography (LDCT) every year can help save your life. This handout offers answers to some of the most common questions about lung cancer screening. If you have other questions, please call [phone] ([phone]).     What is it?  Lung cancer screening uses special X-ray technology to create an image of your lung tissue. The exam is quick and easy and takes less than 10 seconds. We don t give you any medicine or use any needles. You can eat before and after the exam. You don t need to change your clothes as long as the clothing on your chest doesn t contain metal. But, you do need to be able to hold your breath for at least 6 seconds during the exam.    What is the goal of lung cancer screening?  The goal of lung cancer screening is to save lives. Many times, lung cancer is not found until a person starts having physical symptoms. Lung cancer screening can help detect lung cancer in the earliest stages when it may be easier to treat.    Who should be screened for lung cancer?  We suggest lung cancer screening for anyone who is at high-risk for lung cancer. You are in the high-risk group if you:      are between the ages of 55 and 79, and    have smoked at least 1 pack of cigarettes a day for 30 or more years, and    still smoke or have quit within the past 15 years.    However, if you have a new cough or shortness of breath, you should talk to your doctor before being screened.    Some national lung health advocacy groups also recommend screening for people ages 50 to 79 who have smoked an average of 1 pack of cigarettes a day for 20 years. They must also have at least 1 other risk factor for lung cancer, not including exposure to secondhand smoke. Other risk factors are having had cancer in the past, emphysema, pulmonary fibrosis, COPD, a family history of lung cancer, or  exposure to certain materials such as arsenic, asbestos, beryllium, cadmium, chromium, diesel fumes, nickel, radon or silica. Your care team can help you know if you have one of these risk factors.     Why does it matter if I have symptoms?  Certain symptoms can be a sign that you have a condition in your lungs that should be checked and treated by your doctor. These symptoms include fever, chest pain, a new or changing cough, shortness of breath that you have never felt before, coughing up blood or unexplained weight loss. Having any of these symptoms can greatly affect the results of lung cancer screening.       Should all smokers get an LDCT lung cancer screening exam?  It depends. Lung cancer screening is for a very specific group of men and women who have a history of heavy smoking over a long period of time (see  Who should be screened for lung cancer  above).  I am in the high-risk group, but have been diagnosed with cancer in the past. Is LDCT lung cancer screening right for me?  In some cases, you should not have LDCT lung screening, such as when your doctor is already following your cancer with CT scan studies. Your doctor will help you decide if LDCT lung screening is right for you.  Do I need to have a screening exam every year?  Yes. If you are in the high-risk group described earlier, you should get an LDCT lung cancer screening exam every year until you are 79, or are no longer willing or able to undergo screening and possible procedures to diagnose and treat lung cancer.  How effective is LDCT at preventing death from lung cancer?  Studies have shown that LDCT lung cancer screening can lower the risk of death from lung cancer by 20 percent in people who are at high-risk.  What are the risks?  There are some risks and limitations of LDCT lung cancer screening. We want to make sure you understand the risks and benefits, so please let us know if you have any questions. Your doctor may want to talk with  you more about these risks.    Radiation exposure: As with any exam that uses radiation, there is a very small increased risk of cancer. The amount of radiation in LDCT is small--about the same amount a person would get from a mammogram. Your doctor orders the exam when he or she feels the potential benefits outweigh the risks.    False negatives: No test is perfect, including LDCT. It is possible that you may have a medical condition, including lung cancer, that is not found during your exam. This is called a false negative result.    False positives and more testing: LDCT very often finds something in the lung that could be cancer, but in fact is not. This is called a false positive result. False positive tests often cause anxiety. To make sure these findings are not cancer, you may need to have more tests. These tests will be done only if you give us permission. Sometimes patients need a treatment that can have side effects, such as a biopsy. For more information on false positives, see  What can I expect from the results?     Findings not related to lung cancer: Your LDCT exam also takes pictures of areas of your body next to your lungs. In a very small number of cases, the CT scan will show an abnormal finding in one of these areas, such as your kidneys, adrenal glands, liver or thyroid. This finding may not be serious, but you may need more tests. Your doctor can help you decide what other tests you may need, if any.  What can I expect from the results?  About 1 out of 4 LDCT exams will find something that may need more tests. Most of the time, these findings are lung nodules. Lung nodules are very small collections of tissue in the lung. These nodules are very common, and the vast majority--more than 97 percent--are not cancer (benign). Most are normal lymph nodes or small areas of scarring from past infections.  But, if a small lung nodule is found to be cancer, the cancer can be cured more than 90 percent  of the time. To know if the nodule is cancer, we may need to get more images before your next yearly screening exam. If the nodule has suspicious features (for example, it is large, has an odd shape or grows over time), we will refer you to a specialist for further testing.  Will my doctor also get the results?  Yes. Your doctor will get a copy of your results.  Is it okay to keep smoking now that there s a cancer screening exam?  No. Tobacco is one of the strongest cancer-causing agents. It causes not only lung cancer, but other cancers and cardiovascular (heart) diseases as well. The damage caused by smoking builds over time. This means that the longer you smoke, the higher your risk of disease. While it is never too late to quit, the sooner you quit, the better.  Where can I find help to quit smoking?  The best way to prevent lung cancer is to stop smoking. If you have already quit smoking, congratulations and keep it up! For help on quitting smoking, please call [employer] at 0-723-404-JWTG ([phone]) or the American Cancer Society at 1-814.275.7981 to find local resources near you.  One-on-one health coaching:  If you d prefer to work individually with a health care provider on tobacco cessation, we offer:      Medication Therapy Management:  Our specially trained pharmacists work closely with you and your doctor to help you quit smoking.  Call [phone] or [phone] (toll free).     Can Do: Health coaching offered by [facility].  [email or web address]    Patient Education   Personalized Prevention Plan  You are due for the preventive services outlined below.  Your care team is available to assist you in scheduling these services.  If you have already completed any of these items, please share that information with your care team to update in your medical record.  Health Maintenance Due   Topic Date Due     Annual Wellness Visit  08/07/2014     Zoster (Shingles) Vaccine (2 of 3) 09/10/2014      Pneumococcal Vaccine (2 of 2 - PPSV23) 03/01/2018     Eye Exam  05/15/2019     Asthma Action Plan - yearly  05/25/2019     Depression Assessment  10/16/2019     Asthma Control Test  01/16/2020

## 2020-01-14 NOTE — PROGRESS NOTES
Lung Cancer Screening Shared Decision Making Visit     Yadi Iniguez is eligible for lung cancer screening on the basis of the information provided in my signed lung cancer screening order.     I have discussed with patient the risks and benefits of screening for lung cancer with low-dose CT.     The risks include:  radiation exposure: one low dose chest CT has as much ionizing radiation as about 15 chest x-rays or 6 months of background radiation living in [place]    false positives: 96% of positive findings/nodules are NOT cancer, but some might still require additional diagnostic evaluation, including biopsy  over-diagnosis: some slow growing cancers that might never have been clinically significant will be detected and treated unnecessarily     The benefit of early detection of lung cancer is contingent upon adherence to annual screening or more frequent follow up if indicated.     Furthermore, reaping the benefits of screening requires Yadi Iniguez to be willing and physically able to undergo diagnostic procedures, if indicated. Although no specific guide is available for determining severity of comorbidities, it is reasonable to withhold screening in patients who have greater mortality risk from other diseases.     We did discuss that the only way to prevent lung cancer is to not smoke. Smoking cessation assistance was offered.    I did not offer risk estimation using a calculator such as this one:    ShouldIScreen

## 2020-01-14 NOTE — NURSING NOTE
Prior to immunization administration, verified patients identity using patient s name and date of birth. Please see Immunization Activity for additional information.     Screening Questionnaire for Adult Immunization    Are you sick today?   No   Do you have allergies to medications, food, a vaccine component or latex?   No   Have you ever had a serious reaction after receiving a vaccination?   No   Do you have a long-term health problem with heart, lung, kidney, or metabolic disease (e.g., diabetes), asthma, a blood disorder, no spleen, complement component deficiency, a cochlear implant, or a spinal fluid leak?  Are you on long-term aspirin therapy?   Yes     Do you have cancer, leukemia, HIV/AIDS, or any other immune system problem?   No   Do you have a parent, brother, or sister with an immune system problem?   No   In the past 3 months, have you taken medications that affect  your immune system, such as prednisone, other steroids, or anticancer drugs; drugs for the treatment of rheumatoid arthritis, Crohn s disease, or psoriasis; or have you had radiation treatments?   No   Have you had a seizure, or a brain or other nervous system problem?   No   During the past year, have you received a transfusion of blood or blood    products, or been given immune (gamma) globulin or antiviral drug?   No   For women: Are you pregnant or is there a chance you could become       pregnant during the next month?   No   Have you received any vaccinations in the past 4 weeks?   No     Immunization questionnaire was positive for at least one answer.  Notified Dr. Sandy Root  .             Screening performed by Afshan Valentine MA on 1/14/2020 at 2:16 PM.

## 2020-01-15 ASSESSMENT — ANXIETY QUESTIONNAIRES: GAD7 TOTAL SCORE: 8

## 2020-01-19 ENCOUNTER — TRANSFERRED RECORDS (OUTPATIENT)
Dept: HEALTH INFORMATION MANAGEMENT | Facility: CLINIC | Age: 70
End: 2020-01-19

## 2020-01-21 ENCOUNTER — ANCILLARY PROCEDURE (OUTPATIENT)
Dept: CT IMAGING | Facility: CLINIC | Age: 70
End: 2020-01-21
Attending: FAMILY MEDICINE
Payer: COMMERCIAL

## 2020-01-21 DIAGNOSIS — Z87.891 PERSONAL HISTORY OF TOBACCO USE: ICD-10-CM

## 2020-01-21 PROCEDURE — G0297 LDCT FOR LUNG CA SCREEN: HCPCS | Mod: TC

## 2020-01-22 ENCOUNTER — TELEPHONE (OUTPATIENT)
Dept: FAMILY MEDICINE | Facility: CLINIC | Age: 70
End: 2020-01-22

## 2020-01-22 NOTE — TELEPHONE ENCOUNTER
Brayan from [facility] called to give verbal report of abnormal CT done yesterday.     She said that the CT showed artery calcium of moderate to severe.    Dr Root already released patient results on this.   Routing to provider to advise if anything should be added to result note.     Routing to provider to advise.  Yaa Turner BSN, RN

## 2020-01-22 NOTE — TELEPHONE ENCOUNTER
No immediate action needed.  Pt already on statin, daily baby asa and htn meds.    Can hold until provider returns to determine if high dose statin is appropriate.

## 2020-02-03 ENCOUNTER — ANCILLARY PROCEDURE (OUTPATIENT)
Dept: BONE DENSITY | Facility: CLINIC | Age: 70
End: 2020-02-03
Payer: COMMERCIAL

## 2020-02-03 ENCOUNTER — ANCILLARY PROCEDURE (OUTPATIENT)
Dept: BONE DENSITY | Facility: CLINIC | Age: 70
End: 2020-02-03
Attending: FAMILY MEDICINE
Payer: COMMERCIAL

## 2020-02-03 DIAGNOSIS — E28.39 ESTROGEN DEFICIENCY: ICD-10-CM

## 2020-02-03 DIAGNOSIS — Z78.0 ASYMPTOMATIC POSTMENOPAUSAL STATUS: ICD-10-CM

## 2020-02-03 PROCEDURE — 77080 DXA BONE DENSITY AXIAL: CPT | Mod: 59 | Performed by: INTERNAL MEDICINE

## 2020-02-03 PROCEDURE — 77081 DXA BONE DENSITY APPENDICULR: CPT | Performed by: INTERNAL MEDICINE

## 2020-02-03 NOTE — TELEPHONE ENCOUNTER
lipitor 40 mg daily is already considered high dose statin treatment.  No need to increase   Could increase if she desires to 80 mg but  Probably have side effects of muscle aches with increasing dose.     Sandy Otoole MD

## 2020-02-05 ENCOUNTER — MYC MEDICAL ADVICE (OUTPATIENT)
Dept: FAMILY MEDICINE | Facility: CLINIC | Age: 70
End: 2020-02-05

## 2020-03-30 ENCOUNTER — TELEPHONE (OUTPATIENT)
Dept: FAMILY MEDICINE | Facility: CLINIC | Age: 70
End: 2020-03-30

## 2020-03-30 DIAGNOSIS — R92.8 OTHER ABNORMAL AND INCONCLUSIVE FINDINGS ON DIAGNOSTIC IMAGING OF BREAST: ICD-10-CM

## 2020-03-30 DIAGNOSIS — N63.0 BREAST MASS: ICD-10-CM

## 2020-03-30 DIAGNOSIS — N63.10 LUMP OF RIGHT BREAST: ICD-10-CM

## 2020-03-30 DIAGNOSIS — N64.9 DISORDER OF BREAST, UNSPECIFIED: Primary | ICD-10-CM

## 2020-03-30 NOTE — TELEPHONE ENCOUNTER
Reason for Call:  Other call back    Detailed comments: patient is calling to request change in diagnostic code in order for patient to go into [facility] for mammogram to be covered by insurance. Please call to discuss. Thank you.    Phone Number Patient can be reached at: Home number on file [phone] (home)    Best Time:     Can we leave a detailed message on this number? YES    Call taken on 3/30/2020 at 11:13 AM by Azul Knox

## 2020-03-30 NOTE — TELEPHONE ENCOUNTER
This RN re-ordered diagnostic mammogram and right breast diagnostic US (if needed) in [facility] for Marsha Bates.     Yaa Turner BSN, RN

## 2020-03-30 NOTE — TELEPHONE ENCOUNTER
Patient states she spoke with schedulers at [facility], was told the way referral was coded would not be covered by insurance. She did not have more information. I am thinking the referral needs to specifically be for [place], I am not sure though. Paige Nunes TC/Pt Rep

## 2020-03-31 ENCOUNTER — ANCILLARY PROCEDURE (OUTPATIENT)
Dept: ULTRASOUND IMAGING | Facility: CLINIC | Age: 70
End: 2020-03-31
Attending: FAMILY MEDICINE
Payer: COMMERCIAL

## 2020-03-31 ENCOUNTER — ANCILLARY PROCEDURE (OUTPATIENT)
Dept: MAMMOGRAPHY | Facility: CLINIC | Age: 70
End: 2020-03-31
Attending: FAMILY MEDICINE
Payer: COMMERCIAL

## 2020-03-31 PROCEDURE — G0279 TOMOSYNTHESIS, MAMMO: HCPCS | Performed by: RADIOLOGY

## 2020-03-31 PROCEDURE — 77066 DX MAMMO INCL CAD BI: CPT | Performed by: RADIOLOGY

## 2020-03-31 PROCEDURE — 76642 ULTRASOUND BREAST LIMITED: CPT | Mod: RT | Performed by: RADIOLOGY

## 2020-04-28 ENCOUNTER — TELEPHONE (OUTPATIENT)
Dept: FAMILY MEDICINE | Facility: CLINIC | Age: 70
End: 2020-04-28

## 2020-04-28 NOTE — TELEPHONE ENCOUNTER
We have no CECILY for this pharmacy to release lab results.   I called the pharmacist back and informed them of this.   They will inform patient to get CECILY.    Yaa Turner BSN, RN

## 2020-04-28 NOTE — TELEPHONE ENCOUNTER
Pharmacy is calling to get the cholersterol/triglyceride levels faxed to them at [phone].  They are questioning if she still needs the rx for fenofibrate (LOFIBRA) 54 MG tablet.  Please call to advise.  Thank you

## 2020-06-23 NOTE — PROGRESS NOTES
"[facility]  Neurosurgery followup:    HPI: Yadi Iniguez is a 68 year old female with a history of grade I spondylolisthesis at L4-5 with severe left-sided stenosis and a cystic mass compressing the L5 root.  She is s/p MIS left L4-5 TLIF with Dr. Hill on 11/5/2018. She is here today for her 3 month post op appointment.  She states she has intermittent low back pain, a dull ache.  Otherwise, she states she has left knee pain \"sometimes\".  She denies falls since surgery. She denies weakness to BLEI. She denies changes to bowel or bladder.     Exam:  Constitutional:  Alert, well nourished, NAD.  HEENT: Normocephalic, atraumatic.   Pulm:  Without shortness of breath   CV:  No pitting edema of BLE.      Neurological:  Awake  Alert  Oriented x 3  Motor exam:        IP Q DF PF EHL  R   5  5   5   5    5  L   5  5   5   5    5     Able to spontaneously move L/E bilaterally  Sensation intact throughout all L/E dermatomes    Ambulates independently with a cane (used cane prior to surgery)     Incision: Clean, dry and intact.  Imaging: Per XR fusion intact and stable    A/P:  Yadi Iniguez is a 68 year old female with a history of grade I spondylolisthesis at L4-5 with severe left-sided stenosis and a cystic mass compressing the L5 root.  She is s/p MIS left L4-5 TLIF with Dr. Hill on 11/5/2018. She is here today for her 3 month post op appointment.  She states she has intermittent low back pain, a dull ache.  Otherwise, she states she has left knee pain \"sometimes\".  She denies falls since surgery. She denies weakness to BLE. She denies changes to bowel or bladder. We reviewed expectations following surgery and easing into normal activities.  Patient will return to clinic in 3 months for follow up.     Patient Instructions   -Return in 3 months with Xray prior for follow up  -Ease into normal activities  -Please contact the clinic if pain persists at [phone].        Nancy Miller CNP  [facility] " [address]  [address]  [place] [address]    Tel [phone]  Pager [phone]     137

## 2020-06-24 ENCOUNTER — DOCUMENTATION ONLY (OUTPATIENT)
Dept: LAB | Facility: CLINIC | Age: 70
End: 2020-06-24

## 2020-06-24 DIAGNOSIS — E03.9 HYPOTHYROIDISM, UNSPECIFIED TYPE: ICD-10-CM

## 2020-06-24 DIAGNOSIS — E78.5 HYPERLIPIDEMIA LDL GOAL <100: ICD-10-CM

## 2020-06-24 DIAGNOSIS — I10 HYPERTENSION GOAL BP (BLOOD PRESSURE) < 140/90: ICD-10-CM

## 2020-06-24 DIAGNOSIS — E11.9 TYPE 2 DIABETES, HBA1C GOAL < 8% (H): Primary | ICD-10-CM

## 2020-06-24 NOTE — PROGRESS NOTES
Your patient has a lab appointment on 6/30/20 for pre-visit labs. At this time there are no orders placed for there lab appointment. Please review and sign orders prior to there appointment time. Thank you.    [facility]    Kenia Bergman CMA (Lab)

## 2020-06-30 DIAGNOSIS — E78.5 HYPERLIPIDEMIA LDL GOAL <100: ICD-10-CM

## 2020-06-30 DIAGNOSIS — I10 HYPERTENSION GOAL BP (BLOOD PRESSURE) < 140/90: ICD-10-CM

## 2020-06-30 DIAGNOSIS — E03.9 HYPOTHYROIDISM, UNSPECIFIED TYPE: ICD-10-CM

## 2020-06-30 DIAGNOSIS — E11.9 TYPE 2 DIABETES, HBA1C GOAL < 8% (H): ICD-10-CM

## 2020-06-30 LAB
ANION GAP SERPL CALCULATED.3IONS-SCNC: 3 MMOL/L (ref 3–14)
BUN SERPL-MCNC: 25 MG/DL (ref 7–30)
CALCIUM SERPL-MCNC: 9.6 MG/DL (ref 8.5–10.1)
CHLORIDE SERPL-SCNC: 104 MMOL/L (ref 94–109)
CHOLEST SERPL-MCNC: 172 MG/DL
CO2 SERPL-SCNC: 33 MMOL/L (ref 20–32)
CREAT SERPL-MCNC: 1.26 MG/DL (ref 0.52–1.04)
CREAT UR-MCNC: 109 MG/DL
GFR SERPL CREATININE-BSD FRML MDRD: 43 ML/MIN/{1.73_M2}
GLUCOSE SERPL-MCNC: 137 MG/DL (ref 70–99)
HBA1C MFR BLD: 6.3 % (ref 0–5.6)
HDLC SERPL-MCNC: 39 MG/DL
LDLC SERPL CALC-MCNC: 102 MG/DL
MICROALBUMIN UR-MCNC: 165 MG/L
MICROALBUMIN/CREAT UR: 151.38 MG/G CR (ref 0–25)
NONHDLC SERPL-MCNC: 133 MG/DL
POTASSIUM SERPL-SCNC: 4.6 MMOL/L (ref 3.4–5.3)
SODIUM SERPL-SCNC: 140 MMOL/L (ref 133–144)
T4 FREE SERPL-MCNC: 1.08 NG/DL (ref 0.76–1.46)
TRIGL SERPL-MCNC: 157 MG/DL
TSH SERPL DL<=0.005 MIU/L-ACNC: 4.65 MU/L (ref 0.4–4)

## 2020-06-30 PROCEDURE — 80048 BASIC METABOLIC PNL TOTAL CA: CPT | Performed by: FAMILY MEDICINE

## 2020-06-30 PROCEDURE — 82043 UR ALBUMIN QUANTITATIVE: CPT | Performed by: FAMILY MEDICINE

## 2020-06-30 PROCEDURE — 80061 LIPID PANEL: CPT | Performed by: FAMILY MEDICINE

## 2020-06-30 PROCEDURE — 84443 ASSAY THYROID STIM HORMONE: CPT | Performed by: FAMILY MEDICINE

## 2020-06-30 PROCEDURE — 83036 HEMOGLOBIN GLYCOSYLATED A1C: CPT | Performed by: FAMILY MEDICINE

## 2020-06-30 PROCEDURE — 84439 ASSAY OF FREE THYROXINE: CPT | Performed by: FAMILY MEDICINE

## 2020-06-30 PROCEDURE — 36415 COLL VENOUS BLD VENIPUNCTURE: CPT | Performed by: FAMILY MEDICINE

## 2020-07-07 NOTE — PROGRESS NOTES
Subjective     Yadi Iniguez is a 69 year old female who presents to clinic today for the following health issues:    HPI       Back Pain       Duration: on going         Specific cause: lifting    Description:   Location of pain: low back both  Character of pain: sharp, dull ache, stabbing and cramping  Pain radiation:to right hip  New numbness or weakness in legs, not attributed to pain:  no     Intensity: moderate, severe    History:   Pain interferes with job: YES  History of back problems: spinal fusion x 2 years  Any previous MRI or X-rays: Yes--at [facility] [place]  Date 2 years ago  Sees a specialist for back pain:  No  Therapies tried without relief: acetaminophen (Tylenol)    Alleviating factors:   Improved by: acetaminophen (Tylenol)      Precipitating factors:  Worsened by: Bending and walking        Would like to lose weight             Accompanying Signs & Symptoms:  Risk of Fracture:  None  Risk of Cauda Equina:  None  Risk of Infection:  None  Risk of Cancer:  None  Risk of Ankylosing Spondylitis:  Onset at age <35, male, AND morning back stiffness. no       Pt with L4-5 spinal fusion in November 2018. Feels like it really did not help  It is worse with standing and walking.  Bending over is hard  It was okay for a while but now seems like it is getting worse  Pain is on both sides but gets shooting pain in right side into hip.     Has taken tylenol in the past. Takes 1000 mg every 4 hours  Will decrease dose to tid.  Will do trial of gabapentin to see if can help with pain.    Follow-up in one month    Pt with diabetes, controlled on insulin and needs refills  Is on aapirin/losartan and statin  Is overdue for eye exam    Pt with COPD, stable on albuterol. She needs refills    Pt with CKD, stable on losartan    Pt with HTN, elevated today,usually it is at goal    Pt with hypothyroidism, stable on meds    Pt on statin for elevated cholesterol         Reviewed and updated as needed this visit by  "Provider         Review of Systems   Constitutional, HEENT, cardiovascular, pulmonary, gi and gu systems are negative, except as otherwise noted.      Objective    BP (!) 156/75   Pulse 96   Temp 98.8  F (37.1  C) (Oral)   Ht 1.753 m (5' 9\")   Wt 120.7 kg (266 lb)   BMI 39.28 kg/m    Body mass index is 39.28 kg/m .  Physical Exam   GENERAL: healthy, alert and no distress  NECK: no adenopathy, no asymmetry, masses, or scars and thyroid normal to palpation  RESP: lungs clear to auscultation - no rales, rhonchi or wheezes  CV: regular rate and rhythm, normal S1 S2, no S3 or S4, no murmur, click or rub, no peripheral edema and peripheral pulses strong  ABDOMEN: soft, nontender, no hepatosplenomegaly, no masses and bowel sounds normal  MS: no gross musculoskeletal defects noted, no edema    Diagnostic Test Results:  Labs reviewed in Epic        Assessment & Plan     1. Chronic bilateral low back pain without sciatica  Trial of gabapentin  - gabapentin (NEURONTIN) 300 MG capsule; Take one tablet QPM for 3 days, then increase to one tablet bid  Dispense: 60 capsule; Refill: 1    2. Type 2 diabetes mellitus with other diabetic kidney complication, with long-term current use of insulin (H)  Controlled on meds  - insulin aspart (NOVOLOG FLEXPEN) 100 UNIT/ML pen; 22 units before breakfast, 26 units before lunch, 20 units before dinner  Dispense: 18 mL; Refill: 5    3. Chronic obstructive pulmonary disease, unspecified COPD type (H)  Stable, on albuterol only  - albuterol (PROAIR HFA/PROVENTIL HFA/VENTOLIN HFA) 108 (90 Base) MCG/ACT inhaler; Inhale 2 puffs into the lungs every 6 hours Pt will call when needs refill  Dispense: 1 Inhaler; Refill: 11    4. Morbid obesity (H)  Encouraged regular exercise and healthy diet    5. CKD (chronic kidney disease) stage 3, GFR 30-59 ml/min (H)  On losartan    6. Hypertension goal BP (blood pressure) < 140/90  Usually at goal on meds    7. Hypothyroidism, unspecified type  Stable on " "meds    8. Hyperlipidemia LDL goal <100  On statin       BMI:   Estimated body mass index is 39.28 kg/m  as calculated from the following:    Height as of this encounter: 1.753 m (5' 9\").    Weight as of this encounter: 120.7 kg (266 lb).   Weight management plan: Discussed healthy diet and exercise guidelines            No follow-ups on file.    Sandy Ricci MD  [facility]    "

## 2020-07-08 ENCOUNTER — OFFICE VISIT (OUTPATIENT)
Dept: FAMILY MEDICINE | Facility: CLINIC | Age: 70
End: 2020-07-08
Payer: COMMERCIAL

## 2020-07-08 VITALS
TEMPERATURE: 98.8 F | SYSTOLIC BLOOD PRESSURE: 156 MMHG | DIASTOLIC BLOOD PRESSURE: 75 MMHG | BODY MASS INDEX: 39.4 KG/M2 | WEIGHT: 266 LBS | HEIGHT: 69 IN | HEART RATE: 96 BPM

## 2020-07-08 DIAGNOSIS — E78.5 HYPERLIPIDEMIA LDL GOAL <100: ICD-10-CM

## 2020-07-08 DIAGNOSIS — I10 HYPERTENSION GOAL BP (BLOOD PRESSURE) < 140/90: ICD-10-CM

## 2020-07-08 DIAGNOSIS — Z79.4 TYPE 2 DIABETES MELLITUS WITH OTHER DIABETIC KIDNEY COMPLICATION, WITH LONG-TERM CURRENT USE OF INSULIN (H): ICD-10-CM

## 2020-07-08 DIAGNOSIS — N18.30 CKD (CHRONIC KIDNEY DISEASE) STAGE 3, GFR 30-59 ML/MIN (H): ICD-10-CM

## 2020-07-08 DIAGNOSIS — E03.9 HYPOTHYROIDISM, UNSPECIFIED TYPE: ICD-10-CM

## 2020-07-08 DIAGNOSIS — E66.01 MORBID OBESITY (H): ICD-10-CM

## 2020-07-08 DIAGNOSIS — G89.29 CHRONIC BILATERAL LOW BACK PAIN WITHOUT SCIATICA: Primary | ICD-10-CM

## 2020-07-08 DIAGNOSIS — J44.9 CHRONIC OBSTRUCTIVE PULMONARY DISEASE, UNSPECIFIED COPD TYPE (H): ICD-10-CM

## 2020-07-08 DIAGNOSIS — E11.29 TYPE 2 DIABETES MELLITUS WITH OTHER DIABETIC KIDNEY COMPLICATION, WITH LONG-TERM CURRENT USE OF INSULIN (H): ICD-10-CM

## 2020-07-08 DIAGNOSIS — M54.50 CHRONIC BILATERAL LOW BACK PAIN WITHOUT SCIATICA: Primary | ICD-10-CM

## 2020-07-08 PROCEDURE — 99214 OFFICE O/P EST MOD 30 MIN: CPT | Performed by: FAMILY MEDICINE

## 2020-07-08 RX ORDER — INSULIN ASPART 100 [IU]/ML
INJECTION, SOLUTION INTRAVENOUS; SUBCUTANEOUS
Qty: 18 ML | Refills: 5 | Status: SHIPPED | OUTPATIENT
Start: 2020-07-08 | End: 2021-01-21

## 2020-07-08 RX ORDER — GABAPENTIN 300 MG/1
CAPSULE ORAL
Qty: 60 CAPSULE | Refills: 1 | Status: SHIPPED | OUTPATIENT
Start: 2020-07-08 | End: 2021-05-19

## 2020-07-08 RX ORDER — ALBUTEROL SULFATE 90 UG/1
2 AEROSOL, METERED RESPIRATORY (INHALATION) EVERY 6 HOURS
Qty: 1 INHALER | Refills: 11 | Status: SHIPPED | OUTPATIENT
Start: 2020-07-08 | End: 2022-01-11

## 2020-07-08 ASSESSMENT — MIFFLIN-ST. JEOR: SCORE: 1790.95

## 2020-07-20 ENCOUNTER — OFFICE VISIT (OUTPATIENT)
Dept: FAMILY MEDICINE | Facility: CLINIC | Age: 70
End: 2020-07-20
Payer: COMMERCIAL

## 2020-07-20 ENCOUNTER — ANCILLARY PROCEDURE (OUTPATIENT)
Dept: GENERAL RADIOLOGY | Facility: CLINIC | Age: 70
End: 2020-07-20
Attending: FAMILY MEDICINE
Payer: COMMERCIAL

## 2020-07-20 VITALS
TEMPERATURE: 98.4 F | HEIGHT: 69 IN | HEART RATE: 91 BPM | WEIGHT: 266 LBS | DIASTOLIC BLOOD PRESSURE: 68 MMHG | BODY MASS INDEX: 39.4 KG/M2 | SYSTOLIC BLOOD PRESSURE: 144 MMHG

## 2020-07-20 DIAGNOSIS — M54.16 LEFT LUMBAR RADICULOPATHY: ICD-10-CM

## 2020-07-20 DIAGNOSIS — M25.562 ACUTE PAIN OF LEFT KNEE: ICD-10-CM

## 2020-07-20 DIAGNOSIS — M25.562 ACUTE PAIN OF LEFT KNEE: Primary | ICD-10-CM

## 2020-07-20 PROCEDURE — 99213 OFFICE O/P EST LOW 20 MIN: CPT | Performed by: FAMILY MEDICINE

## 2020-07-20 PROCEDURE — 73560 X-RAY EXAM OF KNEE 1 OR 2: CPT | Mod: LT

## 2020-07-20 ASSESSMENT — MIFFLIN-ST. JEOR: SCORE: 1790.95

## 2020-07-20 NOTE — PROGRESS NOTES
"Subjective     Yadi Iniguez is a 69 year old female who presents to clinic today for the following health issues:    HPI       Left knee swollen, achy, ellie and very painful at that time. Has fallen a few times in the past couple days.       Pt with 2 episodes of stabbing lateral left knee pain  The first time when she was getting in her car and had to sort of twist her leg when she was trying to get in the car  That was late last week  The next episode was yesterday when she was home and was using her walker. She tried to turn and developed the pain and she fell to the ground  She needed help to get up    She also notes left knee is slightly swollen  Denies any recent trauma to the knee    Pt recently seen for chronic low back pain. Pt started on gabapentin 300 mg bid  She thinks it may be helping. Her daughter is present with her today  Discussed increasing the dose to 1 in am and 2 in pm to see if we can get further pain control and could continue to titrate up from there as tolerated  Will just do 3 tablets daily for now and titrate up slowly given age    Reviewed and updated as needed this visit by Provider         Review of Systems   Constitutional, HEENT, cardiovascular, pulmonary, gi and gu systems are negative, except as otherwise noted.      Objective    BP (!) 144/68   Pulse 91   Temp 98.4  F (36.9  C) (Oral)   Ht 1.753 m (5' 9\")   Wt 120.7 kg (266 lb)   BMI 39.28 kg/m    Body mass index is 39.28 kg/m .  Physical Exam   GENERAL: healthy, alert and no distress  MS: pt with mild swelling of left knee bursa, pain to palpation over lateral left knee. Pain to movement of patella and lateral joint line. Exam was limited due to pt was unable to get on exam table to be examined    Diagnostic Test Results:  Labs reviewed in Epic  Xray - left knee: mild arthritic changes, mainly of the patella        Assessment & Plan       ICD-10-CM    1. Acute pain of left knee  M25.562 XR Knee Standing Left 2 Views     " [facility]   2. Left lumbar radiculopathy  M54.16           Consider PT, knee sleeve given. If not improving,may need MRI  For chronic back pain, increase gabapentin by one tablet daily    No follow-ups on file.    Sandy Ricci MD  [facility]

## 2020-09-03 NOTE — TELEPHONE ENCOUNTER
I've received a letter from the patient's pharmacist, Kodi Velez, Pharm D, with a suggestion to change the patient from Paxil (which is not advised for older patient's per Beers criteria d/t the risk of anticholingeric s/e) and to try Zoloft or Citalopram instead.  He also reported that he spoke with the patient and she was willing to try this-I think it is a reasonable request.  Please, therefore advise the patient to:  1) wean off of Paxil by decreasing from 20mg bid to 20mg daily for the next 2 weeks and stopping.  2) starting Zoloft at 100mg daily.    Thank you,  Hannah Cee MD        Referral received for patient to be seen for ear infection. Please advise. Patient seen by Dr. David Jorge previously.

## 2020-09-17 ENCOUNTER — TRANSFERRED RECORDS (OUTPATIENT)
Dept: HEALTH INFORMATION MANAGEMENT | Facility: CLINIC | Age: 70
End: 2020-09-17

## 2020-09-17 LAB — RETINOPATHY: NORMAL

## 2020-10-10 ENCOUNTER — MYC MEDICAL ADVICE (OUTPATIENT)
Dept: FAMILY MEDICINE | Facility: CLINIC | Age: 70
End: 2020-10-10

## 2020-10-25 ENCOUNTER — MYC MEDICAL ADVICE (OUTPATIENT)
Dept: FAMILY MEDICINE | Facility: CLINIC | Age: 70
End: 2020-10-25

## 2020-10-25 DIAGNOSIS — G25.0 ESSENTIAL TREMOR: ICD-10-CM

## 2020-10-26 RX ORDER — PRIMIDONE 50 MG/1
75 TABLET ORAL 2 TIMES DAILY
Qty: 270 TABLET | Refills: 1 | Status: SHIPPED | OUTPATIENT
Start: 2020-10-26 | End: 2021-01-21

## 2020-11-08 ENCOUNTER — HEALTH MAINTENANCE LETTER (OUTPATIENT)
Age: 70
End: 2020-11-08

## 2020-11-09 ENCOUNTER — MYC REFILL (OUTPATIENT)
Dept: EDUCATION SERVICES | Facility: CLINIC | Age: 70
End: 2020-11-09

## 2020-11-09 DIAGNOSIS — Z79.4 TYPE 2 DIABETES MELLITUS WITH OTHER DIABETIC KIDNEY COMPLICATION, WITH LONG-TERM CURRENT USE OF INSULIN (H): ICD-10-CM

## 2020-11-09 DIAGNOSIS — E11.29 TYPE 2 DIABETES MELLITUS WITH OTHER DIABETIC KIDNEY COMPLICATION, WITH LONG-TERM CURRENT USE OF INSULIN (H): ICD-10-CM

## 2020-11-09 RX ORDER — FLASH GLUCOSE SENSOR
1 KIT MISCELLANEOUS
Qty: 2 EACH | Refills: 11 | Status: SHIPPED | OUTPATIENT
Start: 2020-11-09 | End: 2021-11-16

## 2020-11-09 NOTE — TELEPHONE ENCOUNTER
Routing refill request to provider for review/approval because:  Not on the [facility] refill protocol     Yaa GARCIAN, RN

## 2020-11-10 ENCOUNTER — MYC REFILL (OUTPATIENT)
Dept: FAMILY MEDICINE | Facility: CLINIC | Age: 70
End: 2020-11-10

## 2020-11-10 DIAGNOSIS — E11.29 TYPE 2 DIABETES MELLITUS WITH OTHER DIABETIC KIDNEY COMPLICATION, WITH LONG-TERM CURRENT USE OF INSULIN (H): ICD-10-CM

## 2020-11-10 DIAGNOSIS — Z79.4 TYPE 2 DIABETES MELLITUS WITH OTHER DIABETIC KIDNEY COMPLICATION, WITH LONG-TERM CURRENT USE OF INSULIN (H): ICD-10-CM

## 2020-11-10 RX ORDER — INSULIN GLARGINE 300 U/ML
50 INJECTION, SOLUTION SUBCUTANEOUS AT BEDTIME
Qty: 15 ML | Refills: 0 | Status: SHIPPED | OUTPATIENT
Start: 2020-11-10 | End: 2021-01-21

## 2020-11-10 NOTE — TELEPHONE ENCOUNTER
Routing refill request to provider for review/approval because:  A break in medication. Last refilled Jan 2019.    Next 5 appointments (look out 90 days)    Jan 21, 2021  7:40 AM  Office Visit with Sandy Ricci MD  [facility] ([facility]) [address] [facility] [id number]  [phone]        Yaa Turner BSN, RN

## 2021-01-12 ENCOUNTER — DOCUMENTATION ONLY (OUTPATIENT)
Dept: LAB | Facility: CLINIC | Age: 71
End: 2021-01-12

## 2021-01-12 DIAGNOSIS — E78.5 HYPERLIPIDEMIA LDL GOAL <100: ICD-10-CM

## 2021-01-12 DIAGNOSIS — D69.6 THROMBOCYTOPENIA (H): ICD-10-CM

## 2021-01-12 DIAGNOSIS — I10 HYPERTENSION GOAL BP (BLOOD PRESSURE) < 140/90: Primary | ICD-10-CM

## 2021-01-12 DIAGNOSIS — E11.9 TYPE 2 DIABETES, HBA1C GOAL < 8% (H): ICD-10-CM

## 2021-01-12 NOTE — PROGRESS NOTES
Patient has an up coming lab appointment on 1.14.2021. Per Health Maintenance patient is due for A1C, CBC and I have pended the order. Please review, sign, and place any additional future orders that may be needed.     Thank you,   Susan Landry MLT ([facility])

## 2021-01-12 NOTE — PROGRESS NOTES
Please review lab orders sign and close encounter. Caroline Carlos MA/ZULEMA      Diabetes appt 1/21/21

## 2021-01-14 DIAGNOSIS — D69.6 THROMBOCYTOPENIA (H): ICD-10-CM

## 2021-01-14 DIAGNOSIS — I10 HYPERTENSION GOAL BP (BLOOD PRESSURE) < 140/90: ICD-10-CM

## 2021-01-14 DIAGNOSIS — E78.5 HYPERLIPIDEMIA LDL GOAL <100: ICD-10-CM

## 2021-01-14 DIAGNOSIS — E11.9 TYPE 2 DIABETES, HBA1C GOAL < 8% (H): ICD-10-CM

## 2021-01-14 LAB
ANION GAP SERPL CALCULATED.3IONS-SCNC: 1 MMOL/L (ref 3–14)
BUN SERPL-MCNC: 17 MG/DL (ref 7–30)
CALCIUM SERPL-MCNC: 9.4 MG/DL (ref 8.5–10.1)
CHLORIDE SERPL-SCNC: 102 MMOL/L (ref 94–109)
CHOLEST SERPL-MCNC: 170 MG/DL
CO2 SERPL-SCNC: 34 MMOL/L (ref 20–32)
CREAT SERPL-MCNC: 1.2 MG/DL (ref 0.52–1.04)
ERYTHROCYTE [DISTWIDTH] IN BLOOD BY AUTOMATED COUNT: 13.8 % (ref 10–15)
GFR SERPL CREATININE-BSD FRML MDRD: 45 ML/MIN/{1.73_M2}
GLUCOSE SERPL-MCNC: 97 MG/DL (ref 70–99)
HBA1C MFR BLD: 6.1 % (ref 0–5.6)
HCT VFR BLD AUTO: 49.2 % (ref 35–47)
HDLC SERPL-MCNC: 49 MG/DL
HGB BLD-MCNC: 15.8 G/DL (ref 11.7–15.7)
LDLC SERPL CALC-MCNC: 92 MG/DL
MCH RBC QN AUTO: 33.8 PG (ref 26.5–33)
MCHC RBC AUTO-ENTMCNC: 32.1 G/DL (ref 31.5–36.5)
MCV RBC AUTO: 105 FL (ref 78–100)
NONHDLC SERPL-MCNC: 121 MG/DL
PLATELET # BLD AUTO: 151 10E9/L (ref 150–450)
POTASSIUM SERPL-SCNC: 5 MMOL/L (ref 3.4–5.3)
RBC # BLD AUTO: 4.68 10E12/L (ref 3.8–5.2)
SODIUM SERPL-SCNC: 137 MMOL/L (ref 133–144)
TRIGL SERPL-MCNC: 143 MG/DL
WBC # BLD AUTO: 7.8 10E9/L (ref 4–11)

## 2021-01-14 PROCEDURE — 80048 BASIC METABOLIC PNL TOTAL CA: CPT | Performed by: FAMILY MEDICINE

## 2021-01-14 PROCEDURE — 36415 COLL VENOUS BLD VENIPUNCTURE: CPT | Performed by: FAMILY MEDICINE

## 2021-01-14 PROCEDURE — 83036 HEMOGLOBIN GLYCOSYLATED A1C: CPT | Performed by: FAMILY MEDICINE

## 2021-01-14 PROCEDURE — 80061 LIPID PANEL: CPT | Performed by: FAMILY MEDICINE

## 2021-01-14 PROCEDURE — 85027 COMPLETE CBC AUTOMATED: CPT | Performed by: FAMILY MEDICINE

## 2021-01-14 NOTE — PROGRESS NOTES
Assessment & Plan     Encounter for Medicare annual wellness exam  completed    Type 2 diabetes mellitus with other diabetic kidney complication, with long-term current use of insulin (H)  At goal on meds, follow-up in 6 months  - insulin aspart (NOVOLOG FLEXPEN) 100 UNIT/ML pen; 22 units before breakfast, 26 units before lunch, 20 units before dinner  - insulin glargine U-300 (TOUJEO SOLOSTAR) 300 UNIT/ML (1 units dial) pen; Inject 50 Units Subcutaneous At Bedtime    Encounter for long-term (current) insulin use (H)  refilled    Chronic obstructive pulmonary disease, unspecified COPD type (H)  Stable on albuterol    Moderate major depression (H)  Stable on paxil  - PARoxetine (PAXIL) 20 MG tablet; Take 1 tablet (20 mg) by mouth 2 times daily    Morbid obesity (H)  Encourage regular exercise when able    Thrombocytopenia (H)  Rechecked and okay    Stage 3b chronic kidney disease  Stable on arb      Osteoporosis, unspecified osteoporosis type, unspecified pathological fracture presence  On fosamax, last dexa one year ago  - alendronate (FOSAMAX) 70 MG tablet; TAKE 1 TABLET (70 MG) BY MOUTH EVERY 7 DAYS. TAKE 60 MINUTES BEFORE MORNING MEAL WITH 8 OZ. WATER. REMAIN UPRIGHT FOR 30 MINUTES.    Benign essential hypertension  At goal on meds  - amLODIPine (NORVASC) 10 MG tablet; TAKE 1 TABLET (10 MG) BY MOUTH DAILY  - carvedilol (COREG) 25 MG tablet; TAKE 1 TABLET (25 MG) BY MOUTH 2 TIMES DAILY WITH MEALS  - losartan (COZAAR) 50 MG tablet; Take 1 tablet (50 mg) by mouth daily    Hyperlipidemia, unspecified hyperlipidemia type  At goal on meds  - atorvastatin (LIPITOR) 40 MG tablet; Take 1 tablet (40 mg) by mouth daily  - fenofibrate (LOFIBRA) 54 MG tablet; Take 1 tablet (54 mg) by mouth daily    Hypothyroidism, unspecified type  At goal on emds  - levothyroxine (TIROSINT) 150 MCG capsule; Take 150 mcg by mouth daily (with breakfast)    Essential tremor  meds working fine  - primidone (MYSOLINE) 50 MG tablet; Take 1.5  "tablets (75 mg) by mouth 2 times daily    Skin lesion  Refer to derm  - DERMATOLOGY ADULT REFERRAL; Future    Lump or mass in breast  Needs diab mammogram  - MA Diagnostic Digital Left; Future    Other specified personal risk factors, not elsewhere classified     - MA Diagnostic Digital Left; Future    Personal history of tobacco use    - Prof fee: Shared Decisionmaking for Lung Cancer Screening  - CT Chest Lung Cancer Scrn Low Dose wo; Future    Review of the result(s) of each unique test - these were previst labs which were reviewed                   BMI:   Estimated body mass index is 38.99 kg/m  as calculated from the following:    Height as of this encounter: 1.753 m (5' 9\").    Weight as of this encounter: 119.7 kg (264 lb).   Weight management plan: Discussed healthy diet and exercise guidelines          Return in about 53 weeks (around 1/27/2022) for Annual Wellness Visit.    Sandy Ricci MD  [facility] GLENNA Grullon is a 70 year old who presents to clinic today for the following health issues     HPI       Diabetes Follow-up    How often are you checking your blood sugar? Four or more times daily  Blood sugar testing frequency justification:  Patient modifying lifestyle changes (diet, exercise) with blood sugars  What time of day are you checking your blood sugars (select all that apply)?  Before and after meals  Have you had any blood sugars above 200?  No  Have you had any blood sugars below 70?  Yes     What symptoms do you notice when your blood sugar is low?  None    What concerns do you have today about your diabetes? None     Do you have any of these symptoms? (Select all that apply)  Numbness in feet        Hyperlipidemia Follow-Up      Are you regularly taking any medication or supplement to lower your cholesterol?   Yes- atorvastatin    Are you having muscle aches or other side effects that you think could be caused by your cholesterol lowering medication? " " Yes- couple times a week     Hypertension Follow-up      Do you check your blood pressure regularly outside of the clinic? No     Are you following a low salt diet? Yes, no salt added    Are your blood pressures ever more than 140 on the top number (systolic) OR more   than 90 on the bottom number (diastolic), for example 140/90?     BP Readings from Last 2 Encounters:   01/21/21 96/53   07/20/20 (!) 144/68     Hemoglobin A1C (%)   Date Value   01/14/2021 6.1 (H)   06/30/2020 6.3 (H)     LDL Cholesterol Calculated (mg/dL)   Date Value   01/14/2021 92   06/30/2020 102 (H)     Pt with diabetes on insulin. Most recnet A1c at goal.   She is on statin, aspirin and arb  She is utd with eye doctor    She continues to smoke.   She has had lung cancer screening and we are following 2 lung nodules  She is due for CT scan again    Pt with copd. This is stable. Only needs albuterol inhaler at this point    Pt with depression, well controlled on paxil. Please see phq-9 and dipak-7 for symptoms    Pt with h/o thrombocytopenia. Most recent cbc is normal    Pt with ckd 3, on arb    Pt with htn on amlodipine and losartan and coreg.  It is at goal    Pt with elevated cholesterol on statin and fenofibrate due to elevated triglycerides  Both are at goal    Pt with osteoporosis on meds. Most recent dexa is one year ago    Pt with essential tremor on primidone. It is working fine    Pt with hypothyroidism, on meds.  It is stable    Breast lump left breast for a couple weeks  No pain but it itchy  Mat aunt with breast cancer at age 57  Will get diag mammogram    Pt with lesion on chest since summer  It is chronically scaly but otherwise not bothersome  Has h/o bcc.             Review of Systems   Constitutional, HEENT, cardiovascular, pulmonary, gi and gu systems are negative, except as otherwise noted.      Objective    BP 96/53   Pulse 87   Temp 98.2  F (36.8  C) (Oral)   Ht 1.753 m (5' 9\")   Wt 119.7 kg (264 lb)   BMI 38.99 kg/m  "   Body mass index is 38.99 kg/m .  Physical Exam   GENERAL: healthy, alert and no distress  EYES: Eyes grossly normal to inspection, PERRL and conjunctivae and sclerae normal  HENT: ear canals and TM's normal, nose and mouth without ulcers or lesions  NECK: no adenopathy, no asymmetry, masses, or scars and thyroid normal to palpation  RESP: lungs clear to auscultation - no rales, rhonchi or wheezes  BREAST: normal without masses, tenderness or nipple discharge, no palpable axillary masses or adenopathy and mass left breast 2:00 pea sized mass  CV: regular rate and rhythm, normal S1 S2, no S3 or S4, no murmur, click or rub, no peripheral edema and peripheral pulses strong  ABDOMEN: soft, nontender, no hepatosplenomegaly, no masses and bowel sounds normal  MS: no gross musculoskeletal defects noted, no edema  SKIN: scaly erythematous patch about 3 mm in diameter on mid upper chest  NEURO: Normal strength and tone, mentation intact and speech normal  PSYCH: mentation appears normal, affect normal/bright    No results found for this or any previous visit (from the past 24 hour(s)).            Lung Cancer Screening Shared Decision Making Visit     Yadi Iniguez is eligible for lung cancer screening on the basis of the information provided in my signed lung cancer screening order.     I have discussed with patient the risks and benefits of screening for lung cancer with low-dose CT.     The risks include:  radiation exposure: one low dose chest CT has as much ionizing radiation as about 15 chest x-rays or 6 months of background radiation living in [place]    false positives: 96% of positive findings/nodules are NOT cancer, but some might still require additional diagnostic evaluation, including biopsy  over-diagnosis: some slow growing cancers that might never have been clinically significant will be detected and treated unnecessarily     The benefit of early detection of lung cancer is contingent upon adherence to  annual screening or more frequent follow up if indicated.     Furthermore, reaping the benefits of screening requires Yadi Iniguez to be willing and physically able to undergo diagnostic procedures, if indicated. Although no specific guide is available for determining severity of comorbidities, it is reasonable to withhold screening in patients who have greater mortality risk from other diseases.     We did discuss that the only way to prevent lung cancer is to not smoke. Smoking cessation counseling was given, duration < 3 minutes.      I did offer risk estimation using a calculator such as this one:    ShouldIScreen

## 2021-01-21 ENCOUNTER — OFFICE VISIT (OUTPATIENT)
Dept: FAMILY MEDICINE | Facility: CLINIC | Age: 71
End: 2021-01-21
Payer: COMMERCIAL

## 2021-01-21 VITALS
TEMPERATURE: 98.2 F | SYSTOLIC BLOOD PRESSURE: 96 MMHG | BODY MASS INDEX: 39.1 KG/M2 | HEIGHT: 69 IN | WEIGHT: 264 LBS | DIASTOLIC BLOOD PRESSURE: 53 MMHG | HEART RATE: 87 BPM

## 2021-01-21 DIAGNOSIS — Z87.891 PERSONAL HISTORY OF TOBACCO USE: ICD-10-CM

## 2021-01-21 DIAGNOSIS — Z00.00 ENCOUNTER FOR MEDICARE ANNUAL WELLNESS EXAM: Primary | ICD-10-CM

## 2021-01-21 DIAGNOSIS — E11.29 TYPE 2 DIABETES MELLITUS WITH OTHER DIABETIC KIDNEY COMPLICATION, WITH LONG-TERM CURRENT USE OF INSULIN (H): ICD-10-CM

## 2021-01-21 DIAGNOSIS — F32.1 MODERATE MAJOR DEPRESSION (H): ICD-10-CM

## 2021-01-21 DIAGNOSIS — Z91.89 OTHER SPECIFIED PERSONAL RISK FACTORS, NOT ELSEWHERE CLASSIFIED: ICD-10-CM

## 2021-01-21 DIAGNOSIS — E66.01 MORBID OBESITY (H): ICD-10-CM

## 2021-01-21 DIAGNOSIS — J44.9 CHRONIC OBSTRUCTIVE PULMONARY DISEASE, UNSPECIFIED COPD TYPE (H): ICD-10-CM

## 2021-01-21 DIAGNOSIS — N18.32 STAGE 3B CHRONIC KIDNEY DISEASE (H): ICD-10-CM

## 2021-01-21 DIAGNOSIS — E03.9 HYPOTHYROIDISM, UNSPECIFIED TYPE: ICD-10-CM

## 2021-01-21 DIAGNOSIS — E78.5 HYPERLIPIDEMIA, UNSPECIFIED HYPERLIPIDEMIA TYPE: ICD-10-CM

## 2021-01-21 DIAGNOSIS — Z79.4 TYPE 2 DIABETES MELLITUS WITH OTHER DIABETIC KIDNEY COMPLICATION, WITH LONG-TERM CURRENT USE OF INSULIN (H): ICD-10-CM

## 2021-01-21 DIAGNOSIS — Z79.4 ENCOUNTER FOR LONG-TERM (CURRENT) INSULIN USE (H): ICD-10-CM

## 2021-01-21 DIAGNOSIS — M81.0 OSTEOPOROSIS, UNSPECIFIED OSTEOPOROSIS TYPE, UNSPECIFIED PATHOLOGICAL FRACTURE PRESENCE: ICD-10-CM

## 2021-01-21 DIAGNOSIS — D69.6 THROMBOCYTOPENIA (H): ICD-10-CM

## 2021-01-21 DIAGNOSIS — N63.0 LUMP OR MASS IN BREAST: ICD-10-CM

## 2021-01-21 DIAGNOSIS — L98.9 SKIN LESION: ICD-10-CM

## 2021-01-21 DIAGNOSIS — I10 BENIGN ESSENTIAL HYPERTENSION: ICD-10-CM

## 2021-01-21 DIAGNOSIS — G25.0 ESSENTIAL TREMOR: ICD-10-CM

## 2021-01-21 PROCEDURE — G0296 VISIT TO DETERM LDCT ELIG: HCPCS | Performed by: FAMILY MEDICINE

## 2021-01-21 PROCEDURE — 99214 OFFICE O/P EST MOD 30 MIN: CPT | Mod: 25 | Performed by: FAMILY MEDICINE

## 2021-01-21 PROCEDURE — 99397 PER PM REEVAL EST PAT 65+ YR: CPT | Performed by: FAMILY MEDICINE

## 2021-01-21 RX ORDER — LOSARTAN POTASSIUM 50 MG/1
50 TABLET ORAL DAILY
Qty: 90 TABLET | Refills: 3 | Status: SHIPPED | OUTPATIENT
Start: 2021-01-21 | End: 2022-01-11

## 2021-01-21 RX ORDER — FENOFIBRATE 54 MG/1
54 TABLET ORAL DAILY
Qty: 90 TABLET | Refills: 3 | Status: SHIPPED | OUTPATIENT
Start: 2021-01-21 | End: 2022-01-18

## 2021-01-21 RX ORDER — ATORVASTATIN CALCIUM 40 MG/1
40 TABLET, FILM COATED ORAL DAILY
Qty: 90 TABLET | Refills: 3 | Status: SHIPPED | OUTPATIENT
Start: 2021-01-21 | End: 2022-01-18

## 2021-01-21 RX ORDER — CARVEDILOL 25 MG/1
TABLET ORAL
Qty: 180 TABLET | Refills: 3 | Status: SHIPPED | OUTPATIENT
Start: 2021-01-21 | End: 2022-02-11

## 2021-01-21 RX ORDER — AMLODIPINE BESYLATE 10 MG/1
TABLET ORAL
Qty: 90 TABLET | Refills: 3 | Status: SHIPPED | OUTPATIENT
Start: 2021-01-21 | End: 2022-02-11

## 2021-01-21 RX ORDER — LEVOTHYROXINE SODIUM 13 UG/1
150 CAPSULE ORAL
Qty: 90 CAPSULE | Refills: 3 | Status: SHIPPED | OUTPATIENT
Start: 2021-01-21 | End: 2022-01-18

## 2021-01-21 RX ORDER — ALENDRONATE SODIUM 70 MG/1
TABLET ORAL
Qty: 12 TABLET | Refills: 3 | Status: SHIPPED | OUTPATIENT
Start: 2021-01-21 | End: 2022-02-11

## 2021-01-21 RX ORDER — PAROXETINE 20 MG/1
20 TABLET, FILM COATED ORAL 2 TIMES DAILY
Qty: 180 TABLET | Refills: 3 | Status: SHIPPED | OUTPATIENT
Start: 2021-01-21 | End: 2021-06-09

## 2021-01-21 RX ORDER — INSULIN GLARGINE 300 U/ML
50 INJECTION, SOLUTION SUBCUTANEOUS AT BEDTIME
Qty: 15 ML | Refills: 1 | Status: SHIPPED | OUTPATIENT
Start: 2021-01-21 | End: 2021-06-09

## 2021-01-21 RX ORDER — PRIMIDONE 50 MG/1
75 TABLET ORAL 2 TIMES DAILY
Qty: 270 TABLET | Refills: 1 | Status: SHIPPED | OUTPATIENT
Start: 2021-01-21 | End: 2021-09-07

## 2021-01-21 RX ORDER — INSULIN ASPART 100 [IU]/ML
INJECTION, SOLUTION INTRAVENOUS; SUBCUTANEOUS
Qty: 75 ML | Refills: 1 | Status: SHIPPED | OUTPATIENT
Start: 2021-01-21 | End: 2021-07-07

## 2021-01-21 ASSESSMENT — ANXIETY QUESTIONNAIRES
1. FEELING NERVOUS, ANXIOUS, OR ON EDGE: SEVERAL DAYS
6. BECOMING EASILY ANNOYED OR IRRITABLE: MORE THAN HALF THE DAYS
5. BEING SO RESTLESS THAT IT IS HARD TO SIT STILL: NOT AT ALL
IF YOU CHECKED OFF ANY PROBLEMS ON THIS QUESTIONNAIRE, HOW DIFFICULT HAVE THESE PROBLEMS MADE IT FOR YOU TO DO YOUR WORK, TAKE CARE OF THINGS AT HOME, OR GET ALONG WITH OTHER PEOPLE: NOT DIFFICULT AT ALL
3. WORRYING TOO MUCH ABOUT DIFFERENT THINGS: SEVERAL DAYS
2. NOT BEING ABLE TO STOP OR CONTROL WORRYING: NOT AT ALL
GAD7 TOTAL SCORE: 5
7. FEELING AFRAID AS IF SOMETHING AWFUL MIGHT HAPPEN: SEVERAL DAYS

## 2021-01-21 ASSESSMENT — PATIENT HEALTH QUESTIONNAIRE - PHQ9
SUM OF ALL RESPONSES TO PHQ QUESTIONS 1-9: 8
5. POOR APPETITE OR OVEREATING: NOT AT ALL

## 2021-01-21 ASSESSMENT — MIFFLIN-ST. JEOR: SCORE: 1776.88

## 2021-01-21 NOTE — PROGRESS NOTES
"  SUBJECTIVE:   Yadi Iniguez is a 70 year old female who presents for Preventive Visit.      Patient has been advised of split billing requirements and indicates understanding: Yes  Are you in the first 12 months of your Medicare Part B coverage?  No    Physical Health:    In general, how would you rate your overall physical health? fair    Outside of work, how many days during the week do you exercise? none    Outside of work, approximately how many minutes a day do you exercise?not applicable    If you drink alcohol do you typically have >3 drinks per day or >7 drinks per week? No    Do you usually eat at least 4 servings of fruit and vegetables a day, include whole grains & fiber and avoid regularly eating high fat or \"junk\" foods? Yes most days     Do you have any problems taking medications regularly?  No    Do you have any side effects from medications? none    Needs assistance for the following daily activities: shopping and housework daughter helps    Which of the following safety concerns are present in your home?  none identified     Hearing impairment: No    In the past 6 months, have you been bothered by leaking of urine? yes    Mental Health:    In general, how would you rate your overall mental or emotional health? good  PHQ-2 Score:      Do you feel safe in your environment? yes    Have you ever done Advance Care Planning? (For example, a Health Directive, POLST, or a discussion with a medical provider or your loved ones about your wishes): Yes, advance care planning is on file.    Additional concerns to address?      Fall risk:  Fallen 2 or more times in the past year?: No  Any fall with injury in the past year?: No    Cognitive Screenin) Repeat 3 items (Leader, Season, Table)    2) Clock draw:   NORMAL  3) 3 item recall:   Recalls 3 objects  Results: 3 items recalled: COGNITIVE IMPAIRMENT LESS LIKELY    Mini-CogTM Copyright S [place]. Licensed by the author for use in [facility] " Services; reprinted with permission (soob@[facility]). All rights reserved.      Do you have sleep apnea, excessive snoring or daytime drowsiness?: no            Reviewed and updated as needed this visit by clinical staff  Tobacco  Allergies  Meds   Med Hx  Surg Hx  Fam Hx  Soc Hx        Reviewed and updated as needed this visit by Provider                Social History     Tobacco Use     Smoking status: Former Smoker     Packs/day: 0.50     Years: 50.00     Pack years: 25.00     Types: Cigarettes     Start date: 1/1/1967     Quit date: 8/8/2017     Years since quitting: 3.4     Smokeless tobacco: Never Used     Tobacco comment: former smoker   Substance Use Topics     Alcohol use: Yes     Comment: Maybe a drink every few months                           Current providers sharing in care for this patient include:   Patient Care Team:  Sandy Ricci MD as PCP - General (Family Practice)  Maranda Mustafa RN as Registered Nurse  Sandy Ricci MD as Assigned PCP    The following health maintenance items are reviewed in Epic and correct as of today:  Health Maintenance   Topic Date Due     ZOSTER IMMUNIZATION (2 of 3) 09/10/2014     PHQ-9  04/14/2020     FALL RISK ASSESSMENT  07/16/2020     LUNG CANCER SCREENING ANNUAL  01/21/2021     MEDICARE ANNUAL WELLNESS VISIT  01/14/2021     A1C  04/14/2021     COLORECTAL CANCER SCREENING  06/22/2021     MICROALBUMIN  06/30/2021     DIABETIC FOOT EXAM  07/08/2021     EYE EXAM  09/17/2021     BMP  01/14/2022     LIPID  01/14/2022     CBC  01/14/2022     MAMMO SCREENING  03/31/2022     ADVANCE CARE PLANNING  01/14/2025     DTAP/TDAP/TD IMMUNIZATION (3 - Td) 03/01/2027     DEXA  02/03/2035     SPIROMETRY  Completed     HEPATITIS C SCREENING  Completed     COPD ACTION PLAN  Completed     DEPRESSION ACTION PLAN  Completed     INFLUENZA VACCINE  Completed     Pneumococcal Vaccine: 65+ Years  Completed     Pneumococcal Vaccine: Pediatrics (0 to 5 Years) and At-Risk  "Patients (6 to 64 Years)  Aged Out     IPV IMMUNIZATION  Aged Out     MENINGITIS IMMUNIZATION  Aged Out     Labs reviewed in [facility]  Mammogram Screening: Mammogram Screening: Patient over age 50, mutual decision to screen reflected in health maintenance.    ROS:  Constitutional, HEENT, cardiovascular, pulmonary, gi and gu systems are negative, except as otherwise noted.    OBJECTIVE:   BP 96/53   Pulse 87   Temp 98.2  F (36.8  C) (Oral)   Ht 1.753 m (5' 9\")   Wt 119.7 kg (264 lb)   BMI 38.99 kg/m   Estimated body mass index is 38.99 kg/m  as calculated from the following:    Height as of this encounter: 1.753 m (5' 9\").    Weight as of this encounter: 119.7 kg (264 lb).  EXAM:   GENERAL: healthy, alert and no distress  EYES: Eyes grossly normal to inspection, PERRL and conjunctivae and sclerae normal  HENT: ear canals and TM's normal, nose and mouth without ulcers or lesions  NECK: no adenopathy, no asymmetry, masses, or scars and thyroid normal to palpation  RESP: lungs clear to auscultation - no rales, rhonchi or wheezes  CV: regular rate and rhythm, normal S1 S2, no S3 or S4, no murmur, click or rub, no peripheral edema and peripheral pulses strong  ABDOMEN: soft, nontender, no hepatosplenomegaly, no masses and bowel sounds normal  MS: no gross musculoskeletal defects noted, no edema  NEURO: Normal strength and tone, mentation intact and speech normal  PSYCH: mentation appears normal, affect normal/bright    Diagnostic Test Results:  Labs reviewed in Epic    ASSESSMENT / PLAN:   1. Encounter for Medicare annual wellness exam  completed    2. Patient has been advised of split billing requirements and indicates understanding: Yes    COUNSELING:  Reviewed preventive health counseling, as reflected in patient instructions       Regular exercise       Healthy diet/nutrition       Vision screening       Dental care       Osteoporosis Prevention/Bone Health    Estimated body mass index is 38.99 kg/m  as calculated " "from the following:    Height as of this encounter: 1.753 m (5' 9\").    Weight as of this encounter: 119.7 kg (264 lb).    Weight management plan: Discussed healthy diet and exercise guidelines    She reports that she quit smoking about 3 years ago. Her smoking use included cigarettes. She started smoking about 54 years ago. She has a 25.00 pack-year smoking history. She has never used smokeless tobacco.    Appropriate preventive services were discussed with this patient, including applicable screening as appropriate for cardiovascular disease, diabetes, osteopenia/osteoporosis, and glaucoma.  As appropriate for age/gender, discussed screening for colorectal cancer, prostate cancer, breast cancer, and cervical cancer. Checklist reviewing preventive services available has been given to the patient.    Reviewed patients plan of care and provided an AVS. The Complex Care Plan (for patients with higher acuity and needing more deliberate coordination of services) for Yadi meets the Care Plan requirement. This Care Plan has been established and reviewed with the Patient.    Counseling Resources:  ATP IV Guidelines  Pooled Cohorts Equation Calculator  Breast Cancer Risk Calculator  BRCA-Related Cancer Risk Assessment: FHS-7 Tool  FRAX Risk Assessment  ICSI Preventive Guidelines  Dietary Guidelines for Americans, 2010  USDA's MyPlate  ASA Prophylaxis  Lung CA Screening    Sandy Ricci MD  [facility]  "

## 2021-01-21 NOTE — PATIENT INSTRUCTIONS
Lung Cancer Screening   Frequently Asked Questions  If you are at high-risk for lung cancer, getting screened with low-dose computed tomography (LDCT) every year can help save your life. This handout offers answers to some of the most common questions about lung cancer screening. If you have other questions, please call [phone] ([phone]).     What is it?  Lung cancer screening uses special X-ray technology to create an image of your lung tissue. The exam is quick and easy and takes less than 10 seconds. We don t give you any medicine or use any needles. You can eat before and after the exam. You don t need to change your clothes as long as the clothing on your chest doesn t contain metal. But, you do need to be able to hold your breath for at least 6 seconds during the exam.    What is the goal of lung cancer screening?  The goal of lung cancer screening is to save lives. Many times, lung cancer is not found until a person starts having physical symptoms. Lung cancer screening can help detect lung cancer in the earliest stages when it may be easier to treat.    Who should be screened for lung cancer?  We suggest lung cancer screening for anyone who is at high-risk for lung cancer. You are in the high-risk group if you:      are between the ages of 55 and 79, and    have smoked at least 1 pack of cigarettes a day for 30 or more years, and    still smoke or have quit within the past 15 years.    However, if you have a new cough or shortness of breath, you should talk to your doctor before being screened.    Some national lung health advocacy groups also recommend screening for people ages 50 to 79 who have smoked an average of 1 pack of cigarettes a day for 20 years. They must also have at least 1 other risk factor for lung cancer, not including exposure to secondhand smoke. Other risk factors are having had cancer in the past, emphysema, pulmonary fibrosis, COPD, a family history of lung cancer, or  exposure to certain materials such as arsenic, asbestos, beryllium, cadmium, chromium, diesel fumes, nickel, radon or silica. Your care team can help you know if you have one of these risk factors.     Why does it matter if I have symptoms?  Certain symptoms can be a sign that you have a condition in your lungs that should be checked and treated by your doctor. These symptoms include fever, chest pain, a new or changing cough, shortness of breath that you have never felt before, coughing up blood or unexplained weight loss. Having any of these symptoms can greatly affect the results of lung cancer screening.       Should all smokers get an LDCT lung cancer screening exam?  It depends. Lung cancer screening is for a very specific group of men and women who have a history of heavy smoking over a long period of time (see  Who should be screened for lung cancer  above).  I am in the high-risk group, but have been diagnosed with cancer in the past. Is LDCT lung cancer screening right for me?  In some cases, you should not have LDCT lung screening, such as when your doctor is already following your cancer with CT scan studies. Your doctor will help you decide if LDCT lung screening is right for you.  Do I need to have a screening exam every year?  Yes. If you are in the high-risk group described earlier, you should get an LDCT lung cancer screening exam every year until you are 79, or are no longer willing or able to undergo screening and possible procedures to diagnose and treat lung cancer.  How effective is LDCT at preventing death from lung cancer?  Studies have shown that LDCT lung cancer screening can lower the risk of death from lung cancer by 20 percent in people who are at high-risk.  What are the risks?  There are some risks and limitations of LDCT lung cancer screening. We want to make sure you understand the risks and benefits, so please let us know if you have any questions. Your doctor may want to talk with  you more about these risks.    Radiation exposure: As with any exam that uses radiation, there is a very small increased risk of cancer. The amount of radiation in LDCT is small--about the same amount a person would get from a mammogram. Your doctor orders the exam when he or she feels the potential benefits outweigh the risks.    False negatives: No test is perfect, including LDCT. It is possible that you may have a medical condition, including lung cancer, that is not found during your exam. This is called a false negative result.    False positives and more testing: LDCT very often finds something in the lung that could be cancer, but in fact is not. This is called a false positive result. False positive tests often cause anxiety. To make sure these findings are not cancer, you may need to have more tests. These tests will be done only if you give us permission. Sometimes patients need a treatment that can have side effects, such as a biopsy. For more information on false positives, see  What can I expect from the results?     Findings not related to lung cancer: Your LDCT exam also takes pictures of areas of your body next to your lungs. In a very small number of cases, the CT scan will show an abnormal finding in one of these areas, such as your kidneys, adrenal glands, liver or thyroid. This finding may not be serious, but you may need more tests. Your doctor can help you decide what other tests you may need, if any.  What can I expect from the results?  About 1 out of 4 LDCT exams will find something that may need more tests. Most of the time, these findings are lung nodules. Lung nodules are very small collections of tissue in the lung. These nodules are very common, and the vast majority--more than 97 percent--are not cancer (benign). Most are normal lymph nodes or small areas of scarring from past infections.  But, if a small lung nodule is found to be cancer, the cancer can be cured more than 90 percent  of the time. To know if the nodule is cancer, we may need to get more images before your next yearly screening exam. If the nodule has suspicious features (for example, it is large, has an odd shape or grows over time), we will refer you to a specialist for further testing.  Will my doctor also get the results?  Yes. Your doctor will get a copy of your results.  Is it okay to keep smoking now that there s a cancer screening exam?  No. Tobacco is one of the strongest cancer-causing agents. It causes not only lung cancer, but other cancers and cardiovascular (heart) diseases as well. The damage caused by smoking builds over time. This means that the longer you smoke, the higher your risk of disease. While it is never too late to quit, the sooner you quit, the better.  Where can I find help to quit smoking?  The best way to prevent lung cancer is to stop smoking. If you have already quit smoking, congratulations and keep it up! For help on quitting smoking, please call [employer] at 6-438-520-RLOI ([phone]) or the American Cancer Society at 1-206.723.4541 to find local resources near you.  One-on-one health coaching:  If you d prefer to work individually with a health care provider on tobacco cessation, we offer:      Medication Therapy Management:  Our specially trained pharmacists work closely with you and your doctor to help you quit smoking.  Call [phone] or [phone] (toll free).     Can Do: Health coaching offered by [facility].  [email or web address]    Patient Education   Personalized Prevention Plan  You are due for the preventive services outlined below.  Your care team is available to assist you in scheduling these services.  If you have already completed any of these items, please share that information with your care team to update in your medical record.  Health Maintenance Due   Topic Date Due     Zoster (Shingles) Vaccine (2 of 3) 09/10/2014     Depression Assessment  04/14/2020      FALL RISK ASSESSMENT  07/16/2020     Lung Cancer Screening (CT Scan)  01/21/2021

## 2021-01-22 ASSESSMENT — ANXIETY QUESTIONNAIRES: GAD7 TOTAL SCORE: 5

## 2021-01-26 ENCOUNTER — ANCILLARY PROCEDURE (OUTPATIENT)
Dept: CT IMAGING | Facility: CLINIC | Age: 71
End: 2021-01-26
Attending: FAMILY MEDICINE
Payer: COMMERCIAL

## 2021-01-26 DIAGNOSIS — Z87.891 PERSONAL HISTORY OF TOBACCO USE: ICD-10-CM

## 2021-01-26 PROCEDURE — 71271 CT THORAX LUNG CANCER SCR C-: CPT | Mod: TC | Performed by: RADIOLOGY

## 2021-01-28 ENCOUNTER — ANCILLARY PROCEDURE (OUTPATIENT)
Dept: MAMMOGRAPHY | Facility: CLINIC | Age: 71
End: 2021-01-28
Attending: FAMILY MEDICINE
Payer: COMMERCIAL

## 2021-01-28 ENCOUNTER — ANCILLARY PROCEDURE (OUTPATIENT)
Dept: ULTRASOUND IMAGING | Facility: CLINIC | Age: 71
End: 2021-01-28
Attending: FAMILY MEDICINE
Payer: COMMERCIAL

## 2021-01-28 DIAGNOSIS — N63.0 LUMP OR MASS IN BREAST: ICD-10-CM

## 2021-01-28 DIAGNOSIS — N64.59 OTHER SIGNS AND SYMPTOMS IN BREAST: ICD-10-CM

## 2021-01-28 DIAGNOSIS — N63.21 LUMP IN UPPER OUTER QUADRANT OF LEFT BREAST: ICD-10-CM

## 2021-01-28 DIAGNOSIS — Z91.89 OTHER SPECIFIED PERSONAL RISK FACTORS, NOT ELSEWHERE CLASSIFIED: ICD-10-CM

## 2021-01-28 PROCEDURE — 77066 DX MAMMO INCL CAD BI: CPT | Performed by: RADIOLOGY

## 2021-01-28 PROCEDURE — G0279 TOMOSYNTHESIS, MAMMO: HCPCS | Performed by: RADIOLOGY

## 2021-01-28 PROCEDURE — 76642 ULTRASOUND BREAST LIMITED: CPT | Mod: LT | Performed by: RADIOLOGY

## 2021-01-29 ENCOUNTER — TELEPHONE (OUTPATIENT)
Dept: FAMILY MEDICINE | Facility: CLINIC | Age: 71
End: 2021-01-29

## 2021-01-29 DIAGNOSIS — N63.0 LUMP OR MASS IN BREAST: ICD-10-CM

## 2021-01-29 LAB
SARS-COV-2 RNA RESP QL NAA+PROBE: NORMAL
SPECIMEN SOURCE: NORMAL

## 2021-01-29 PROCEDURE — U0003 INFECTIOUS AGENT DETECTION BY NUCLEIC ACID (DNA OR RNA); SEVERE ACUTE RESPIRATORY SYNDROME CORONAVIRUS 2 (SARS-COV-2) (CORONAVIRUS DISEASE [COVID-19]), AMPLIFIED PROBE TECHNIQUE, MAKING USE OF HIGH THROUGHPUT TECHNOLOGIES AS DESCRIBED BY CMS-2020-01-R: HCPCS | Performed by: FAMILY MEDICINE

## 2021-01-29 PROCEDURE — U0005 INFEC AGEN DETEC AMPLI PROBE: HCPCS | Performed by: FAMILY MEDICINE

## 2021-01-29 PROCEDURE — 99207 PR NO CHARGE LOS: CPT

## 2021-01-29 NOTE — CONFIDENTIAL NOTE
Shaheen from [place] imaging dept called to report incidental finding on patient's CT chest:      2. Significant Incidental Finding(s):  Category S: Yes. coronary  artery calcium moderate or severe        Routing to provider to advise.  Yaa Turner BSN, RN

## 2021-01-30 LAB
LABORATORY COMMENT REPORT: NORMAL
SARS-COV-2 RNA RESP QL NAA+PROBE: NEGATIVE
SPECIMEN SOURCE: NORMAL

## 2021-02-01 ENCOUNTER — ANCILLARY PROCEDURE (OUTPATIENT)
Dept: MAMMOGRAPHY | Facility: CLINIC | Age: 71
End: 2021-02-01
Attending: FAMILY MEDICINE
Payer: COMMERCIAL

## 2021-02-01 ENCOUNTER — ANCILLARY PROCEDURE (OUTPATIENT)
Dept: ULTRASOUND IMAGING | Facility: CLINIC | Age: 71
End: 2021-02-01
Attending: FAMILY MEDICINE
Payer: COMMERCIAL

## 2021-02-01 DIAGNOSIS — N63.21 LUMP IN UPPER OUTER QUADRANT OF LEFT BREAST: ICD-10-CM

## 2021-02-01 DIAGNOSIS — N64.59 OTHER SIGNS AND SYMPTOMS IN BREAST: ICD-10-CM

## 2021-02-01 PROCEDURE — 88342 IMHCHEM/IMCYTCHM 1ST ANTB: CPT | Performed by: RADIOLOGY

## 2021-02-01 PROCEDURE — 88305 TISSUE EXAM BY PATHOLOGIST: CPT | Performed by: RADIOLOGY

## 2021-02-01 PROCEDURE — 19083 BX BREAST 1ST LESION US IMAG: CPT | Mod: LT | Performed by: RADIOLOGY

## 2021-02-01 PROCEDURE — 88341 IMHCHEM/IMCYTCHM EA ADD ANTB: CPT | Mod: 59 | Performed by: RADIOLOGY

## 2021-02-01 RX ORDER — LIDOCAINE HYDROCHLORIDE 10 MG/ML
9 INJECTION, SOLUTION EPIDURAL; INFILTRATION; INTRACAUDAL; PERINEURAL ONCE
Status: COMPLETED | OUTPATIENT
Start: 2021-02-01 | End: 2021-02-01

## 2021-02-01 RX ADMIN — LIDOCAINE HYDROCHLORIDE 9 ML: 10 INJECTION, SOLUTION EPIDURAL; INFILTRATION; INTRACAUDAL; PERINEURAL at 11:13

## 2021-02-03 ENCOUNTER — TELEPHONE (OUTPATIENT)
Dept: GENERAL RADIOLOGY | Facility: CLINIC | Age: 71
End: 2021-02-03

## 2021-02-03 LAB — COPATH REPORT: NORMAL

## 2021-02-03 NOTE — TELEPHONE ENCOUNTER
Spoke to patient regarding left breast biopsy done earlier this week with finding of benign fragments of fibroadipose tissue with fat necrosis and focal granuloma with acute and chronic inflammation.  Negative for malignancy.  Spoke to patient  regarding Radiologist recommendation for 6 month follow up. Patient states understanding and all question and concerns answered.

## 2021-02-04 ENCOUNTER — TELEPHONE (OUTPATIENT)
Dept: FAMILY MEDICINE | Facility: CLINIC | Age: 71
End: 2021-02-04

## 2021-02-04 ENCOUNTER — MYC MEDICAL ADVICE (OUTPATIENT)
Dept: FAMILY MEDICINE | Facility: CLINIC | Age: 71
End: 2021-02-04

## 2021-02-04 NOTE — TELEPHONE ENCOUNTER
Prior Authorization Retail Medication Request    Medication/Dose: Continuous Blood Gluc Sensor (FREESTYLE MARI 14 DAY SENSOR) MISC  ICD code (if different than what is on RX):  Type 2 diabetes mellitus with other diabetic kidney complication, with long-term current use of insulin (H) [E11.29, Z79.4]   Previously Tried and Failed:    Rationale:      Insurance Name:  [facility]  Insurance ID:  [id number]      Pharmacy Information (if different than what is on RX)  Name:  [facility]  Phone:  [phone]

## 2021-02-04 NOTE — TELEPHONE ENCOUNTER
TC, I believe this patient is saying she needs a PA done for her Freestyle Gallito sensors.  Yaa Turner BSN, RN

## 2021-02-05 NOTE — TELEPHONE ENCOUNTER
Central Prior Authorization Team   Phone: [phone]      Prior Authorization Not Needed per Insurance    02/05/2021  Medication: Continuous Blood Gluc Sensor (FREESTYLE MARI 14 DAY SENSOR) MISC - NOT NEEDED  Insurance Company: [employer] - Phone [phone] Fax [phone]  Expected CoPay:      Pharmacy Filling the Rx: [facility] [place] - [address] [address]  Pharmacy Notified: Yes  Patient Notified: Yes    CGM's are excluded from coverage under Medicare Part D but may be covered under Medicare Part B. Notified pharmacy whom stated they received a paid claim through Part B and patient picked up.

## 2021-02-08 NOTE — PROGRESS NOTES
Assessment & Plan     Skin lesion  Concern for early infection?  No redness but is tender. Will have her hot pack it for now or soak  If gets more red or tender, then may need abx.   If does not resolve, may need to see derm    Type 2 diabetes mellitus with other diabetic kidney complication, with long-term current use of insulin (H)  Concern for risk with associated diabetes and ? Infection on leg, monitor closely                   No follow-ups on file.    Sandy Ricci MD  [facility]     Yadi is a 70 year old who presents to clinic today for the following health issues     HPI       Skin Lesion on back of left leg    Noticed this week    Pt with tender lesion on back of left calf  Pt noted it on Sunday  No surrounding redness and has not changed in regards to pain  No discharge from this lesion  Pt with diabetes but no known PVD            Review of Systems   Constitutional, HEENT, cardiovascular, pulmonary, gi and gu systems are negative, except as otherwise noted.      Objective    /64   Pulse 85   Temp 97.9  F (36.6  C) (Tympanic)   Wt 120.7 kg (266 lb)   BMI 39.28 kg/m    Body mass index is 39.28 kg/m .  Physical Exam   GENERAL: healthy, alert and no distress  SKIN: 4-5 mm tender but not erythematous papular and slightly scaly mass on posterior left calf    No results found for this or any previous visit (from the past 24 hour(s)).

## 2021-02-09 ENCOUNTER — OFFICE VISIT (OUTPATIENT)
Dept: FAMILY MEDICINE | Facility: CLINIC | Age: 71
End: 2021-02-09
Payer: COMMERCIAL

## 2021-02-09 VITALS
WEIGHT: 266 LBS | SYSTOLIC BLOOD PRESSURE: 114 MMHG | TEMPERATURE: 97.9 F | BODY MASS INDEX: 39.28 KG/M2 | DIASTOLIC BLOOD PRESSURE: 64 MMHG | HEART RATE: 85 BPM

## 2021-02-09 DIAGNOSIS — L98.9 SKIN LESION: Primary | ICD-10-CM

## 2021-02-09 DIAGNOSIS — E11.29 TYPE 2 DIABETES MELLITUS WITH OTHER DIABETIC KIDNEY COMPLICATION, WITH LONG-TERM CURRENT USE OF INSULIN (H): ICD-10-CM

## 2021-02-09 DIAGNOSIS — Z79.4 TYPE 2 DIABETES MELLITUS WITH OTHER DIABETIC KIDNEY COMPLICATION, WITH LONG-TERM CURRENT USE OF INSULIN (H): ICD-10-CM

## 2021-02-09 PROCEDURE — 99213 OFFICE O/P EST LOW 20 MIN: CPT | Performed by: FAMILY MEDICINE

## 2021-02-22 ENCOUNTER — MYC MEDICAL ADVICE (OUTPATIENT)
Dept: FAMILY MEDICINE | Facility: CLINIC | Age: 71
End: 2021-02-22

## 2021-03-01 ENCOUNTER — TRANSFERRED RECORDS (OUTPATIENT)
Dept: MULTI SPECIALTY CLINIC | Facility: CLINIC | Age: 71
End: 2021-03-01
Payer: COMMERCIAL

## 2021-03-01 LAB — RETINOPATHY: NORMAL

## 2021-03-16 ENCOUNTER — IMMUNIZATION (OUTPATIENT)
Dept: PEDIATRICS | Facility: CLINIC | Age: 71
End: 2021-03-16
Payer: COMMERCIAL

## 2021-03-16 PROCEDURE — 0001A PR COVID VAC PFIZER DIL RECON 30 MCG/0.3 ML IM: CPT

## 2021-03-16 PROCEDURE — 91300 PR COVID VAC PFIZER DIL RECON 30 MCG/0.3 ML IM: CPT

## 2021-03-25 ENCOUNTER — TELEPHONE (OUTPATIENT)
Dept: FAMILY MEDICINE | Facility: CLINIC | Age: 71
End: 2021-03-25

## 2021-03-25 NOTE — TELEPHONE ENCOUNTER
Daughter, Nikki calling about patient's Gallito 14 day sensors.  CECILY 12/12/17.  Nikki is at pharmacy and being told they need a PA for her sensors.   This RN informed her that a PA was done in Feb with the following response:          Yaa Turner BSN, RN

## 2021-03-26 ENCOUNTER — TELEPHONE (OUTPATIENT)
Dept: FAMILY MEDICINE | Facility: CLINIC | Age: 71
End: 2021-03-26

## 2021-03-26 NOTE — TELEPHONE ENCOUNTER
I called [employer] at [phone] and spoke with the Medicare B Prior Authorization dept.     Laure from the dept entered in a PA request with key #:  [id number].  This key can be entered in at Covermymeds.com to get the PA done for the following:    Continuous Blood Gluc Sensor (FREESTYLE MARI 14 DAY SENSOR) MISC 2 each 11 refills 11/9/2020  No   Sig - Route: 1 Device every 14 days - Does not apply   Sent to pharmacy as: FreeStyle Mari 14 Day Sensor     TC, I placed the fax from [facility] [employer] with the above information in the TC basket.   Please complete this PA. The [place] PA dept said they do not do this PA.    Yaa Turner BSN, RN

## 2021-03-26 NOTE — TELEPHONE ENCOUNTER
"Daughter, Nikki calling. CECILY 12/12/17.    She said that patients Gallito 14 day sensors need a PA.  Informed her that one was done and in the 2/4/21 encounter, the PA department said that \"CGM's are excluded from coverage under Medicare Part D but may be covered under Medicare Part B\".      Nikki, the daughter gave the pharmacy patients insurance information on both Medicare part B and Medicare part D and the pharmacy is still being told that this needs a PA.   Daughter requesting that this be done again.     Routing to PA dept to run PA again for the Gallito 14 day sensors.     Yaa GARCIAN, RN    "

## 2021-03-26 NOTE — TELEPHONE ENCOUNTER
Medicare Part D doesn't cover this, needs to be billed under Medicare Part B.  Per pharmacy rejecting for Medicare Part B.  PA team doesn't handle Part B requests.

## 2021-03-29 ENCOUNTER — MYC MEDICAL ADVICE (OUTPATIENT)
Dept: FAMILY MEDICINE | Facility: CLINIC | Age: 71
End: 2021-03-29

## 2021-03-29 DIAGNOSIS — L98.9 SKIN LESION: Primary | ICD-10-CM

## 2021-04-01 NOTE — TELEPHONE ENCOUNTER
Per [facility]:    CaseId:[id number];Status:Approved;Review Type:Prior Auth;Coverage Start Date:03/02/2021;Coverage End Date:03/31/2024;    Daughter informed.    Paige Nunes -

## 2021-04-06 ENCOUNTER — IMMUNIZATION (OUTPATIENT)
Dept: PEDIATRICS | Facility: CLINIC | Age: 71
End: 2021-04-06
Attending: INTERNAL MEDICINE
Payer: COMMERCIAL

## 2021-04-06 PROCEDURE — 91300 PR COVID VAC PFIZER DIL RECON 30 MCG/0.3 ML IM: CPT

## 2021-04-06 PROCEDURE — 0002A PR COVID VAC PFIZER DIL RECON 30 MCG/0.3 ML IM: CPT

## 2021-04-11 ENCOUNTER — E-VISIT (OUTPATIENT)
Dept: FAMILY MEDICINE | Facility: CLINIC | Age: 71
End: 2021-04-11
Payer: COMMERCIAL

## 2021-04-11 DIAGNOSIS — T50.Z95A ADVERSE EFFECT OF VACCINE, INITIAL ENCOUNTER: Primary | ICD-10-CM

## 2021-04-11 PROCEDURE — 99421 OL DIG E/M SVC 5-10 MIN: CPT | Performed by: FAMILY MEDICINE

## 2021-04-25 ENCOUNTER — MYC MEDICAL ADVICE (OUTPATIENT)
Dept: FAMILY MEDICINE | Facility: CLINIC | Age: 71
End: 2021-04-25

## 2021-04-25 DIAGNOSIS — M54.16 LEFT LUMBAR RADICULOPATHY: Primary | ICD-10-CM

## 2021-05-11 ENCOUNTER — OFFICE VISIT (OUTPATIENT)
Dept: DERMATOLOGY | Facility: CLINIC | Age: 71
End: 2021-05-11
Attending: FAMILY MEDICINE
Payer: COMMERCIAL

## 2021-05-11 DIAGNOSIS — D48.5 NEOPLASM OF UNCERTAIN BEHAVIOR OF SKIN: Primary | ICD-10-CM

## 2021-05-11 DIAGNOSIS — Z85.828 HISTORY OF NONMELANOMA SKIN CANCER: ICD-10-CM

## 2021-05-11 PROCEDURE — 88305 TISSUE EXAM BY PATHOLOGIST: CPT | Performed by: DERMATOLOGY

## 2021-05-11 PROCEDURE — 11102 TANGNTL BX SKIN SINGLE LES: CPT | Performed by: DERMATOLOGY

## 2021-05-11 ASSESSMENT — PAIN SCALES - GENERAL: PAINLEVEL: MODERATE PAIN (5)

## 2021-05-11 NOTE — LETTER
5/11/2021         RE: Yadi Iniguez  [address]  [place] [address]        Dear Colleague,    Thank you for referring your patient, Yadi Iniguez, to the [facility]. Please see a copy of my visit note below.    [facility] Dermatology Note  Encounter Date: May 11, 2021  Office Visit     Dermatology Problem List:  0. NUB - left posterior calf bx: 5/11/21  1. 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  Family history: Negative for skin cancers.  ____________________________________________    Assessment & Plan:     # History of NMSC. No evidence of recurrence.   - Recommend sunscreens SPF #30 or greater, protective clothing and avoidance of tanning beds.   - Recommended yearly skin exams.    # Neoplasm of uncertain behavior on the left posterior calf. The differential diagnosis includes SCC vs other.     Procedures Performed:   - Shave biopsy procedure note, location(s): left posterior calf. After discussion of benefits and risks including but not limited to bleeding, infection, scar, incomplete removal, recurrence, and non-diagnostic biopsy, written consent and photographs were obtained. The area was cleaned with isopropyl alcohol. 0.5mL of 1% lidocaine with epinephrine was injected to obtain adequate anesthesia of lesion(s). Shave biopsy at site(s) performed. Hemostasis was achieved with aluminium chloride. Petrolatum ointment and a sterile dressing were applied. The patient tolerated the procedure and no complications were noted. The patient was provided with verbal and written post care instructions.     Follow-up: pending path results    Staff and Scribe:     Scribe Disclosure:   IStephanie, am serving as a scribe to document services personally performed by this physician, Dr. Shavon Barrios, based on data collection and the  provider's statements to me.     Provider Disclosure:   The documentation recorded by the scribe accurately reflects the services I personally performed and the decisions made by me.    Shavon Barrios MD    Department of Dermatology  [facility]: Phone: [phone], Fax:[phone]  [facility]: Phone: [phone], Fax: [phone]    ____________________________________________    CC: Derm Problem (Spot on back of leg since December, very tender. Hx BCC on upper lip tx at unknown location about 10 years ago. No family hx SC. )    HPI:  Ms. Yadi Iniguez is a(n) 70 year old female who presents today as a new patient for spot check.    She is new to our department. She has seen a dermatologist prior. 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 She has no other history of skin cancer, or history of atypical moles or precancerous lesions to her knowledge.    Referred by PCP for skin lesion on 3/29/21. Mychart encounter reviewed. Patient noted sore lesion on the leg.     Today, patient is accompanied by daughter. She notes concerns about concern on the back of the left leg. Has been present since December. Very tender.    Patient is otherwise feeling well, without additional skin concerns.     Labs Reviewed:  N/A    Physical Exam:  Vitals: There were no vitals taken for this visit.  SKIN: Focused examination of the left leg was performed.  - 2+ pitting edema and xerosis on left shin  - At the left posterior calf there is a hyperkeratotic papule that is tender to palpation.  - No other lesions of concern on areas examined.       Medications:  Current Outpatient Medications   Medication     ACETAMINOPHEN PO     albuterol (PROAIR HFA/PROVENTIL HFA/VENTOLIN HFA) 108 (90 Base) MCG/ACT inhaler     alendronate (FOSAMAX) 70 MG tablet     amLODIPine  (NORVASC) 10 MG tablet     aspirin 81 MG tablet     atorvastatin (LIPITOR) 40 MG tablet     calcium carbonate (OS-NATACHA) 1500 (600 Ca) MG tablet     carvedilol (COREG) 25 MG tablet     cholecalciferol (VITAMIN D3) 5000 units TABS tablet     clobetasol propionate 0.05 % LIQD     Continuous Blood Gluc Sensor (FREESTYLE MARI 14 DAY SENSOR) MISC     fenofibrate (LOFIBRA) 54 MG tablet     gabapentin (NEURONTIN) 300 MG capsule     insulin aspart (NOVOLOG FLEXPEN) 100 UNIT/ML pen     insulin glargine U-300 (TOUJEO SOLOSTAR) 300 UNIT/ML (1 units dial) pen     insulin pen needle (32G X 4 MM) 32G X 4 MM miscellaneous     levothyroxine (TIROSINT) 150 MCG capsule     losartan (COZAAR) 50 MG tablet     PARoxetine (PAXIL) 20 MG tablet     primidone (MYSOLINE) 50 MG tablet     No current facility-administered medications for this visit.       Past Medical History:   Patient Active Problem List   Diagnosis     Hypothyroidism     Hypertension goal BP (blood pressure) < 140/90     Type 2 diabetes, HbA1C goal < 8% (H)     Hyperlipidemia LDL goal <100     Moderate major depression (H)     Incontinence of urine     Neuropathy in diabetes (H)     Eczema     Left lumbar radiculopathy     Health Care Home     CKD (chronic kidney disease) stage 3, GFR 30-59 ml/min     Type 2 diabetes mellitus with other diabetic kidney complication, with long-term current use of insulin (H)     Osteoporosis     Morbid obesity (H)     Thrombocytopenia (H)     Chronic obstructive pulmonary disease, unspecified COPD type (H)     S/P lumbar fusion     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, with long-term current use of insulin, unspecified laterality (H)     Past Medical History:   Diagnosis Date     Arthritis 1975     Broken ribs 8/28/2016    3,4,5,6,7, left side     Cancer (H) 2013    Skin cancer     COPD (chronic obstructive pulmonary disease) (H) 08/2017     Depression      Depressive  disorder 1988     Dysuria      Glycosuria      Hypertension 1980     Mixed incontinence urge and stress      Other and unspecified hyperlipidemia      Right shoulder pain      Type II or unspecified type diabetes mellitus without mention of complication, uncontrolled      Uncomplicated asthma 2017     Unspecified hypothyroidism        CC Sandy Ricci MD  [address] [place] [address] on close of this encounter.      Yadi Iniguez's goals for this visit include:   Chief Complaint   Patient presents with     Derm Problem     Spot on back of leg since December, very tender. Hx BCC on upper lip tx at unknown location about 10 years ago. No family hx [place].        She requests these members of her care team be copied on today's visit information: yes    PCP: Sandy Ricci    Referring Provider:  Sandy Ricci MD  [address] [place] [address]    There were no vitals taken for this visit.    Do you need any medication refills at today's visit? No  Marion Beach LPN          Again, thank you for allowing me to participate in the care of your patient.        Sincerely,        Shavon Barrios MD

## 2021-05-11 NOTE — PROGRESS NOTES
[facility] Dermatology Note  Encounter Date: May 11, 2021  Office Visit     Dermatology Problem List:  0. NUB - left posterior calf bx: 5/11/21  1. History of verrucous carcinoma, left angle of the mouth, s/p [facility] 2013  - S/p ILK for hypertrophic scar in 2013    Social history: Daughter is an RN.  Family history: Negative for skin cancers.  ____________________________________________    Assessment & Plan:     # History of NMSC. No evidence of recurrence.   - Recommend sunscreens SPF #30 or greater, protective clothing and avoidance of tanning beds.   - Recommended yearly skin exams.    # Neoplasm of uncertain behavior on the left posterior calf. The differential diagnosis includes SCC vs other.     Procedures Performed:   - Shave biopsy procedure note, location(s): left posterior calf. After discussion of benefits and risks including but not limited to bleeding, infection, scar, incomplete removal, recurrence, and non-diagnostic biopsy, written consent and photographs were obtained. The area was cleaned with isopropyl alcohol. 0.5mL of 1% lidocaine with epinephrine was injected to obtain adequate anesthesia of lesion(s). Shave biopsy at site(s) performed. Hemostasis was achieved with aluminium chloride. Petrolatum ointment and a sterile dressing were applied. The patient tolerated the procedure and no complications were noted. The patient was provided with verbal and written post care instructions.     Follow-up: pending path results    Staff and Scribe:     Scribe Disclosure:   I, Stephanie Calderon, am serving as a scribe to document services personally performed by this physician, Dr. Shavon Barrios, based on data collection and the provider's statements to me.     Provider Disclosure:   The documentation recorded by the scribe accurately reflects the services I personally performed and the decisions made by me.    Shavon Barrios MD    Department of Dermatology  [facility]: Phone: [phone], Fax:[phone]  [facility]: Phone: [phone], Fax: [phone]    ____________________________________________    CC: Derm Problem (Spot on back of leg since December, very tender. Hx BCC on upper lip tx at unknown location about 10 years ago. No family hx [place]. )    HPI:  Ms. Yadi Iniguez is a(n) 70 year old female who presents today as a new patient for spot check.    She is new to our department. She has seen a dermatologist prior. 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 She has no other history of skin cancer, or history of atypical moles or precancerous lesions to her knowledge.    Referred by PCP for skin lesion on 3/29/21. Mychart encounter reviewed. Patient noted sore lesion on the leg.     Today, patient is accompanied by daughter. She notes concerns about concern on the back of the left leg. Has been present since December. Very tender.    Patient is otherwise feeling well, without additional skin concerns.     Labs Reviewed:  N/A    Physical Exam:  Vitals: There were no vitals taken for this visit.  SKIN: Focused examination of the left leg was performed.  - 2+ pitting edema and xerosis on left shin  - At the left posterior calf there is a hyperkeratotic papule that is tender to palpation.  - No other lesions of concern on areas examined.       Medications:  Current Outpatient Medications   Medication     ACETAMINOPHEN PO     albuterol (PROAIR HFA/PROVENTIL HFA/VENTOLIN HFA) 108 (90 Base) MCG/ACT inhaler     alendronate (FOSAMAX) 70 MG tablet     amLODIPine (NORVASC) 10 MG tablet     aspirin 81 MG tablet     atorvastatin (LIPITOR) 40 MG tablet     calcium carbonate (OS-NATACHA) 1500 (600 Ca) MG tablet     carvedilol (COREG) 25 MG tablet     cholecalciferol (VITAMIN D3) 5000 units TABS tablet     clobetasol propionate 0.05 %  LIQD     Continuous Blood Gluc Sensor (FREESTYLE MARI 14 DAY SENSOR) MISC     fenofibrate (LOFIBRA) 54 MG tablet     gabapentin (NEURONTIN) 300 MG capsule     insulin aspart (NOVOLOG FLEXPEN) 100 UNIT/ML pen     insulin glargine U-300 (TOUJEO SOLOSTAR) 300 UNIT/ML (1 units dial) pen     insulin pen needle (32G X 4 MM) 32G X 4 MM miscellaneous     levothyroxine (TIROSINT) 150 MCG capsule     losartan (COZAAR) 50 MG tablet     PARoxetine (PAXIL) 20 MG tablet     primidone (MYSOLINE) 50 MG tablet     No current facility-administered medications for this visit.       Past Medical History:   Patient Active Problem List   Diagnosis     Hypothyroidism     Hypertension goal BP (blood pressure) < 140/90     Type 2 diabetes, HbA1C goal < 8% (H)     Hyperlipidemia LDL goal <100     Moderate major depression (H)     Incontinence of urine     Neuropathy in diabetes (H)     Eczema     Left lumbar radiculopathy     Health Care Home     CKD (chronic kidney disease) stage 3, GFR 30-59 ml/min     Type 2 diabetes mellitus with other diabetic kidney complication, with long-term current use of insulin (H)     Osteoporosis     Morbid obesity (H)     Thrombocytopenia (H)     Chronic obstructive pulmonary disease, unspecified COPD type (H)     S/P lumbar fusion     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, with long-term current use of insulin, unspecified laterality (H)     Past Medical History:   Diagnosis Date     Arthritis 1975     Broken ribs 8/28/2016    3,4,5,6,7, left side     Cancer (H) 2013    Skin cancer     COPD (chronic obstructive pulmonary disease) (H) 08/2017     Depression      Depressive disorder 1988     Dysuria      Glycosuria      Hypertension 1980     Mixed incontinence urge and stress      Other and unspecified hyperlipidemia      Right shoulder pain      Type II or unspecified type diabetes mellitus without mention of complication, uncontrolled       Uncomplicated asthma 2017     Unspecified hypothyroidism        CC Sandy Ricci MD  [address] [place] [address] on close of this encounter.

## 2021-05-11 NOTE — PROGRESS NOTES
The following medication was given:     MEDICATION:  Lidocaine with epinephrine 1% 1:[id number]  ROUTE: SQ  SITE: see procedure note  DOSE: 1cc  LOT #: -[id number]  : Sujata  EXPIRATION DATE: 2/1/22  NDC#: 9675-3217-80  Was there drug waste? 1cc  Multi-dose vial: Yes    Marion Beach LPN  May 11, 2021

## 2021-05-11 NOTE — PROGRESS NOTES
Yadi Iniguez's goals for this visit include:   Chief Complaint   Patient presents with     Derm Problem     Spot on back of leg since December, very tender. Hx BCC on upper lip tx at unknown location about 10 years ago. No family hx [place].        She requests these members of her care team be copied on today's visit information: yes    PCP: Sandy Ricci    Referring Provider:  Sandy Ricci MD  [address] [place] [address]    There were no vitals taken for this visit.    Do you need any medication refills at today's visit? No  Marion Beach LPN

## 2021-05-14 ENCOUNTER — OFFICE VISIT (OUTPATIENT)
Dept: NEUROSURGERY | Facility: CLINIC | Age: 71
End: 2021-05-14
Attending: FAMILY MEDICINE
Payer: COMMERCIAL

## 2021-05-14 VITALS
HEART RATE: 74 BPM | BODY MASS INDEX: 39.58 KG/M2 | WEIGHT: 268 LBS | DIASTOLIC BLOOD PRESSURE: 72 MMHG | SYSTOLIC BLOOD PRESSURE: 153 MMHG

## 2021-05-14 DIAGNOSIS — M54.16 LEFT LUMBAR RADICULOPATHY: ICD-10-CM

## 2021-05-14 DIAGNOSIS — M47.816 LUMBAR SPONDYLOSIS: Primary | ICD-10-CM

## 2021-05-14 DIAGNOSIS — Z98.1 S/P SPINAL FUSION: ICD-10-CM

## 2021-05-14 DIAGNOSIS — R53.81 PHYSICAL DECONDITIONING: ICD-10-CM

## 2021-05-14 DIAGNOSIS — R60.0 LOWER EXTREMITY EDEMA: ICD-10-CM

## 2021-05-14 DIAGNOSIS — M47.816 LUMBAR FACET ARTHROPATHY: ICD-10-CM

## 2021-05-14 DIAGNOSIS — M51.369 DDD (DEGENERATIVE DISC DISEASE), LUMBAR: ICD-10-CM

## 2021-05-14 LAB — COPATH REPORT: NORMAL

## 2021-05-14 PROCEDURE — 99205 OFFICE O/P NEW HI 60 MIN: CPT | Performed by: PHYSICAL MEDICINE & REHABILITATION

## 2021-05-14 RX ORDER — TRAMADOL HYDROCHLORIDE 50 MG/1
50 TABLET ORAL EVERY 6 HOURS PRN
Qty: 10 TABLET | Refills: 0 | Status: SHIPPED | OUTPATIENT
Start: 2021-05-14 | End: 2021-05-17

## 2021-05-14 ASSESSMENT — PAIN SCALES - GENERAL: PAINLEVEL: EXTREME PAIN (8)

## 2021-05-14 NOTE — PROGRESS NOTES
Patient seen at the request of Dr. Sandy Ricci for an opinion and evaluation of left lumbosacral radiculopathy.      HISTORY OF PRESENT ILLNESS:  Yadi Iniguez is a 70 year old female who presents with a chief complaint of chronic low back pain. Accompanied by her daughter, Joanna today.     She has a long-standing history of chronic low back pain with progressive, insidious worsening over the years. She underwent L4-5 spinal fusion surgery in November 2018 by Dr. Hill.  She states that following the surgery she had improved posture and was able to stand upright better for a period of time, however, her pain and symptoms never significantly improved.  She continues to be quite functionally limited in mobility and ADLs with decreased quality of life.  She has poor standing and walking tolerance.  She ambulates with a wheeled walker for short distances and uses a manual wheelchair or scooter for longer distances or in stores, respectively.  Her daughter helps with many home needs.  She also has a number of other comorbidities which she does believe contributes to her overall deconditioning.    The pain is localized to the low lumbosacral region - relatively equal on both sides with radiating pain the lateral sides (flanks) on occasion but without radicular pain down the legs; she reports numbness/tingling in her feet attributed to diabetic neuropathy; described as constant when standing and dull, intense in nature. Pain is reported as 8-9/10 today when standing. Symptoms are worse when upright whether standing or walking (she can stand or walk for one minute before needing to sit or rest due to pain); and improved with sitting or resting. she does not report pain-related sleep disturbance.     Functional limitations: Quite functionally limited with mobility and ADLs.     PRIOR INJURIES/TREATMENT:   Ice/Heat: none  Physical Therapy:   No recent PT in the last x2-3 years      - Current Pain Medications -    Tylenol 1000mg TID scheduled    - Prior/Trialed Pain Medications -   Gabapentin 300 BID - caused fatigue     Prior Procedures: None         Prior Related Surgery:   11/5/2018 -  L4-5 fusion (Dr. Hill)  Other (acupuncture, OMT, CMM, TENS, DME, etc.):   DME - Wheeled walker at home     Specialists Seen - (with most recent, available notes and clinic visits reviewed)   1. Neurosurgery - Dr. Hill      IMAGING - reviewed   09/12/2018 - [facility]  FINDINGS: The report is dictated assuming five lumbar-type vertebral  bodies, and radiographic correlation may be necessary.  The distal  spinal cord and cauda equina appear normal. [The bone marrow appears  normal.]  [The paraspinal soft tissues appear normal.]     T12-L1: Normal disc, facet joints, spinal canal and neural foramina.     L1-L2: Normal disc, facet joints, spinal canal and neural foramina.     L2-L3: Normal disc, facet joints, spinal canal and neural foramina.     L3-L4: There is degeneration of the disc. There is a mild symmetric  annular disc bulge. Mild degenerative change in this facet joints.  Central canal and neural foramen are patent.     L4-L5: Degeneration of the disc. Loss of disc space height. Severe  degenerative changes in the facet joints. This is allowing grade 1  anterior spondylolisthesis of L4 on L5. There is thickening of the  ligamentum flavum. The degenerative change in the facet joints  combined with the anterior spondylolisthesis is leading to severe  central spinal stenosis at this level. There is also severe left  lateral recess stenosis due to degenerative change off the left facet  joint. There is also moderate right and moderate-severe left foraminal  stenosis due to the degenerative changes.     L5-S1: Degeneration of the disc. Mild annular disc bulge. Moderate  degenerative change in the facet joints. Central canal is normal.  There is mild bilateral foraminal stenosis due to the bulging disc.                                                                       IMPRESSION:    1. Multilevel degenerative change.  2. The most significant degenerative changes are at the L4-L5 level  where there is grade 1 anterior spondylolisthesis of L5 on S1. This is  due to severe degenerative change in the facet joints. This is leading  to severe central spinal stenosis and severe left lateral recess  stenosis. There is also severe left L4-L5 foraminal stenosis due to  the bulging disc and the degenerative change off the facet joints.    Review Of Systems:  I am responding to those symptoms which are directly relevant to the specific indication for my consultation. I recommend that the patient follow up with their primary or referring provider to pursue any other symptoms which may be of concern.       Medical History:  She  has a past medical history of Arthritis (1975), Broken ribs (8/28/2016), Cancer (H) (2013), COPD (chronic obstructive pulmonary disease) (H) (08/2017), Depression, Depressive disorder (1988), Dysuria, Glycosuria, Hypertension (1980), Mixed incontinence urge and stress, Other and unspecified hyperlipidemia, Right shoulder pain, Type II or unspecified type diabetes mellitus without mention of complication, uncontrolled, Uncomplicated asthma (2017), and Unspecified hypothyroidism.     She  has a past surgical history that includes cholecystectomy, open; VAGINAL HYSTERECTOMY; SURGICAL PATHOLOGY; Prophylactic repair retinal break, pneumatic retinopexy, combined (Left, 9/2/2016); Abdomen surgery (1978); biopsy (2013 1993); colonoscopy (2007); Head and neck surgery (2006); Soft tissue surgery (1996); Colonoscopy with CO2 insufflation (N/A, 6/22/2018); Colonoscopy (N/A, 6/22/2018); Eye surgery (2014); cataract iol, rt/lt (Bilateral); Eye surgery (Right, 2018); and Ots fusion spine posterior lumbar minimally invasive one level (N/A, 11/5/2018).      Family History  Her family history includes Cancer in her father; Cataracts in her mother;  Cerebrovascular Disease in her nephew; Depression in her mother and paternal grandmother; Diabetes in her brother and brother; Genetic Disorder in her brother, brother, brother, and mother; Heart Disease in her brother; Hyperlipidemia in her brother and brother; Hypertension in her brother, brother, and brother; Muscular Disorder in her mother; Muscular Dystrophy in her nephew; Musculoskeletal Disorder in her mother; Neurologic Disorder in her brother; Obesity in her brother, father, mother, and paternal grandmother; Osteoporosis in her mother.     Social History:  Current living situation: Home. 4-5 MADI. No stairs to B/B.   She  reports that she quit smoking about 3 years ago. Her smoking use included cigarettes. She started smoking about 54 years ago. She has a 25.00 pack-year smoking history. She has never used smokeless tobacco. She reports current alcohol use. She reports that she does not use drugs.        Current Medications:   She has a current medication list which includes the following prescription(s): acetaminophen, albuterol, alendronate, amlodipine, aspirin, atorvastatin, calcium carbonate, carvedilol, vitamin d3, clobetasol propionate, fenofibrate, novolog flexpen, toujeo solostar, levothyroxine, losartan, paroxetine, primidone, freestyle lucila 14 day sensor, gabapentin, and insulin pen needle.       Allergies:   No Known Allergies    PHYSICAL EXAMINATION:  BP (!) 153/72 (BP Location: Right arm, Patient Position: Sitting, Cuff Size: Adult Regular)   Pulse 74   Wt 121.6 kg (268 lb)   BMI 39.58 kg/m     General: Pleasant, straightforward, WDWN individual.  Mental Status: Pleasant, direct, appropriate mood and affect  Resp: breathing is unlabored without audible wheeze  Vascular: BLE edema   Heme: no visible ecchymosis or erythema on extremities  Skin: No notable rash    Neurologic:  Strength: All major muscle groups of the bilateral lower extremities have equacl and symmetric muscle strength though  globally weak 4+/5    Sensation: SILT in lower extremities bilaterally L3-S1 except distal feet/toes     DTRs: bilateral lower extremity stretch reflexes are equal and symmetric NE 1+ AR Tr  Clonus: none    Musculoskeletal:  Gait is unsteady with forward flexed/stooped posture  Lumbar range of motion severely reduced.  She has healed surgical scars at approximately the L4-5 level.  She has mild tenderness to the lumbar paraspinal muscles.  She does have some gross pelvic obliquity and pelvic tilt is difficult to assess with her with forward lumbar flexion posture.  Hip range of motion mild to moderately reduced with internal range of motion and mildly reduced with ER.  She does not have significant tenderness over SI joint (sacral sulcus/PSIS)       ASSESSMENT:  Yadi Iniguez is a pleasant 70 year old female who presents with:    #.  Chronic axial low back pain status post lumbar spinal fusion L4-5 (11/2018).  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    Complicating co-morbidities include:   #. Physical deconditioning   #. Type II, DM. Well controlled.  Complicated by diabetic peripheral polyneuropathy  #. CKD-III   #. Obesity Body mass index is 39.58 kg/m .. Recommend healthy lifestyle modifications through diet and exercise.      PLAN:  -Refer to physical therapy for lumbopelvic program, however, depending on her tolerance she may benefit from some reconditioning and low impact aerobic program  -Recommend compression garment for BLE edema (30/15mmHg)  -Rx: Tramadol 50 mg daily as needed for severe pain only.  #10, R-0.  Discussed that did not recommend chronic opioid management for chronic pain, however, this may be helpful and tolerating physical therapy in the short-term.  -Acetaminophen 500 - 1,000mg (1-2 tablets) every 8 hours as needed for pain. Max acetaminophen 3,000mg per day (or 6 of the  500mg tablets).   -The pathway from diagnostic medial branch blocks to radiofrequency denervation was discussed in detail with the patient today.  Risks and benefits were discussed and all questions were answered.  The patient states understanding and wishes to proceed.  There are no contraindications.  The patient understands they will have 2 diagnostic medial branch blocks; and after each block they will be required to keep a pain diary recording their pain scores approximately every 1/2 hour to hour until the pain has returned to baseline.  During the time after the block, the patient was instructed to perform activities which typically aggravates her pain.  The patient will contact the medical spine staff after each diagnostic block to assess the amount of benefit the patient received.  If the patient has a significantly positive response to each of these diagnostic blocks, they may move forward with the radiofrequency ablation procedure. We will plan to update imaging prior with MRI lumbar spine prior to procedure.   -Ultimately, she fails the above conservative options, I may refer her to my colleague in orthopedic spine surgery, Dr. Navi Bolton, for further evaluation management of global sagittal balance.  - Follow-up will be determined after review of MRI.     Ready to learn, no apparent learning barriers.  Education provided on treatment plan according to patient's preferred learning style.  Patient verbalizes understanding.   __________________________________  Dwayne Sierra MD  Physical Medicine & Rehabilitation        65 minutes spent on the date of the encounter doing chart review, review of test results, patient visit, documentation and discussion with family

## 2021-05-14 NOTE — PATIENT INSTRUCTIONS
Patient Education     Medial Branch Neurotomy  Back or neck pain may be due to problems with certain nerves near your spine. If so, a medial branch neurotomy can help relieve your pain. Neurotomy destroys a nerve to relieve pain or stop involuntary movements. In some cases, your doctor may give you a nerve block to see how your body will respond to neurotomy. The treatment uses heat, cold, radiofrequency, or chemicals to destroy the nerves near a problem joint. This keeps some pain messages from traveling to your brain, and helps relieve your symptoms.   Medial branch nerves  Each vertebra in your spine has facets (flat surfaces). They touch where the vertebrae fit together. This forms a facet joint. Each facet joint has at least 2 medial branch nerves. They are part of the nerve pathway to and from each facet joint. A facet joint in your back or neck can become inflamed (swollen and irritated). Pain messages may then travel along the nerve pathway from the facet joint to your brain.     Blocking pain messages  Medial branch nerves in each facet joint send and carry messages about back or neck pain. Destroying a few of these nerves can keep certain pain messages from reaching your brain. This can help bring you relief. The relief typically lasts for months to years.   Risks and complications  Risks and complications are rare, but can include:    Infection    Increased pain, numbness, or weakness    Nerve damage    Bleeding    Failure to relieve pain  [employer] last reviewed this educational content on 4/1/2018 2000-2021 [employer]. All rights reserved. This information is not intended as a substitute for professional medical care. Always follow your healthcare professional's instructions.         Patient Education     Medial Branch Neurotomy: Your Experience  Back or neck pain may be due to problems with certain nerves near your spine. A medial branch neurotomy may help relieve your pain. Neurotomy  is a procedure to destroy a nerve. This is done to relieve pain or stop movements you can't control. The treatment uses heat, cold, radiofrequency, or chemicals. This destroys the nerves near a problem joint. This keeps some pain messages from traveling to your brain. It can help relieve your symptoms.   Before you agree to this procedure, make sure to ask the healthcare provider:     Why do I need this procedure?    Are there any alternatives?    How many times have you done this procedure?    What are the risks?    When will I see the results?    Will the medicines in the injection interact with other medicines I'm taking?  If you don't feel OK asking these questions, ask a family member or friend to ask them. The answers are vital to your health and safety.   The treatment is done in a hospital or outpatient clinic. Your healthcare provider will ask you to sign a consent form. He or she will examine you and may give you an IV (intravenous) line for fluids and medicines.      Relax at home for the rest of the day after your treatment, even if you feel good.   Getting ready for your treatment  Ask your healthcare provider if you should stop taking any medicines before treatment. Follow directions for not eating or drinking before treatment.   Tell your healthcare provider:    If you are pregnant or could be    If you are allergic to any medicines    If you have had any recent illness    If you have other recent changes in your health  During the procedure  You will lie on an exam table. You will most likely on your stomach. This depends on where the nerve is that needs to be treated.     Your healthcare provider will clean the skin over the treatment site. He or she will then numb it with medicine.    Your provider uses X-ray imaging (fluoroscopy) to help see your spine. The X-rays help guide the treatment. Your provider may inject a contrast dye into the affected area. This is to help get a better image. If you  are allergic to iodine or had a reaction to a dye, tell your healthcare provider.    Your healthcare provider uses heat, cold, or chemicals to destroy part of the nerve near the inflamed facet joint. Nearby nerves may also be treated.  After the procedure  You can likely go home about 1 hour after the procedure. Have a family member or friend drive you. The treated spot may be swollen and may feel more sore than usual. This is normal. This may last for a day or so. It will be a few days before you feel relief from your symptoms. Your provider may prescribe pain medicines for you. Ask him or her when it s OK for you to go back to work.   When to call your healthcare provider  Call your healthcare provider if you have:    Fever of 100.4 F ( 38 C) or higher, or as advised by your healthcare provider    Chills    Redness or fluid leaking from the treatment area  [employer] last reviewed this educational content on 4/1/2018 2000-2021 [employer]. All rights reserved. This information is not intended as a substitute for professional medical care. Always follow your healthcare professional's instructions.

## 2021-05-14 NOTE — LETTER
5/14/2021         RE: Yadi Iniguez  [address]  [place] [address]        Dear Colleague,    Thank you for referring your patient, Yadi Iniguez, to the [facility] [place]. Please see a copy of my visit note below.    Patient seen at the request of Dr. Sandy Ricci for an opinion and evaluation of left lumbosacral radiculopathy.      HISTORY OF PRESENT ILLNESS:  Yadi Iniguez is a 70 year old female who presents with a chief complaint of chronic low back pain. Accompanied by her daughter, Joanna riddle.     She has a long-standing history of chronic low back pain with progressive, insidious worsening over the years. She underwent L4-5 spinal fusion surgery in November 2018 by Dr. Hill.  She states that following the surgery she had improved posture and was able to stand upright better for a period of time, however, her pain and symptoms never significantly improved.  She continues to be quite functionally limited in mobility and ADLs with decreased quality of life.  She has poor standing and walking tolerance.  She ambulates with a wheeled walker for short distances and uses a manual wheelchair or scooter for longer distances or in stores, respectively.  Her daughter helps with many home needs.  She also has a number of other comorbidities which she does believe contributes to her overall deconditioning.    The pain is localized to the low lumbosacral region - relatively equal on both sides with radiating pain the lateral sides (flanks) on occasion but without radicular pain down the legs; she reports numbness/tingling in her feet attributed to diabetic neuropathy; described as constant when standing and dull, intense in nature. Pain is reported as 8-9/10 today when standing. Symptoms are worse when upright whether standing or walking (she can stand or walk for one minute before needing to sit or rest due to pain); and improved with sitting or resting.  she does not report pain-related sleep disturbance.     Functional limitations: Quite functionally limited with mobility and ADLs.     PRIOR INJURIES/TREATMENT:   Ice/Heat: none  Physical Therapy:   No recent PT in the last x2-3 years      - Current Pain Medications -   Tylenol 1000mg TID scheduled    - Prior/Trialed Pain Medications -   Gabapentin 300 BID - caused fatigue     Prior Procedures: None         Prior Related Surgery:   11/5/2018 -  L4-5 fusion (Dr. Hill)  Other (acupuncture, OMT, CMM, TENS, DME, etc.):   DME - Wheeled walker at home     Specialists Seen - (with most recent, available notes and clinic visits reviewed)   1. Neurosurgery - Dr. Hill      IMAGING - reviewed   09/12/2018 - MR L Spine  FINDINGS: The report is dictated assuming five lumbar-type vertebral  bodies, and radiographic correlation may be necessary.  The distal  spinal cord and cauda equina appear normal. [The bone marrow appears  normal.]  [The paraspinal soft tissues appear normal.]     T12-L1: Normal disc, facet joints, spinal canal and neural foramina.     L1-L2: Normal disc, facet joints, spinal canal and neural foramina.     L2-L3: Normal disc, facet joints, spinal canal and neural foramina.     L3-L4: There is degeneration of the disc. There is a mild symmetric  annular disc bulge. Mild degenerative change in this facet joints.  Central canal and neural foramen are patent.     L4-L5: Degeneration of the disc. Loss of disc space height. Severe  degenerative changes in the facet joints. This is allowing grade 1  anterior spondylolisthesis of L4 on L5. There is thickening of the  ligamentum flavum. The degenerative change in the facet joints  combined with the anterior spondylolisthesis is leading to severe  central spinal stenosis at this level. There is also severe left  lateral recess stenosis due to degenerative change off the left facet  joint. There is also moderate right and moderate-severe left foraminal  stenosis due  to the degenerative changes.     L5-S1: Degeneration of the disc. Mild annular disc bulge. Moderate  degenerative change in the facet joints. Central canal is normal.  There is mild bilateral foraminal stenosis due to the bulging disc.                                                                      IMPRESSION:    1. Multilevel degenerative change.  2. The most significant degenerative changes are at the L4-L5 level  where there is grade 1 anterior spondylolisthesis of L5 on S1. This is  due to severe degenerative change in the facet joints. This is leading  to severe central spinal stenosis and severe left lateral recess  stenosis. There is also severe left L4-L5 foraminal stenosis due to  the bulging disc and the degenerative change off the facet joints.    Review Of Systems:  I am responding to those symptoms which are directly relevant to the specific indication for my consultation. I recommend that the patient follow up with their primary or referring provider to pursue any other symptoms which may be of concern.       Medical History:  She  has a past medical history of Arthritis (1975), Broken ribs (8/28/2016), Cancer (H) (2013), COPD (chronic obstructive pulmonary disease) (H) (08/2017), Depression, Depressive disorder (1988), Dysuria, Glycosuria, Hypertension (1980), Mixed incontinence urge and stress, Other and unspecified hyperlipidemia, Right shoulder pain, Type II or unspecified type diabetes mellitus without mention of complication, uncontrolled, Uncomplicated asthma (2017), and Unspecified hypothyroidism.     She  has a past surgical history that includes cholecystectomy, open; VAGINAL HYSTERECTOMY; SURGICAL PATHOLOGY; Prophylactic repair retinal break, pneumatic retinopexy, combined (Left, 9/2/2016); Abdomen surgery (1978); biopsy (2013 1993); colonoscopy (2007); Head and neck surgery (2006); Soft tissue surgery (1996); Colonoscopy with CO2 insufflation (N/A, 6/22/2018); Colonoscopy (N/A,  6/22/2018); Eye surgery (2014); cataract iol, rt/lt (Bilateral); Eye surgery (Right, 2018); and Ots fusion spine posterior lumbar minimally invasive one level (N/A, 11/5/2018).      Family History  Her family history includes Cancer in her father; Cataracts in her mother; Cerebrovascular Disease in her nephew; Depression in her mother and paternal grandmother; Diabetes in her brother and brother; Genetic Disorder in her brother, brother, brother, and mother; Heart Disease in her brother; Hyperlipidemia in her brother and brother; Hypertension in her brother, brother, and brother; Muscular Disorder in her mother; Muscular Dystrophy in her nephew; Musculoskeletal Disorder in her mother; Neurologic Disorder in her brother; Obesity in her brother, father, mother, and paternal grandmother; Osteoporosis in her mother.     Social History:  Current living situation: Home. 4-5 MADI. No stairs to B/B.   She  reports that she quit smoking about 3 years ago. Her smoking use included cigarettes. She started smoking about 54 years ago. She has a 25.00 pack-year smoking history. She has never used smokeless tobacco. She reports current alcohol use. She reports that she does not use drugs.        Current Medications:   She has a current medication list which includes the following prescription(s): acetaminophen, albuterol, alendronate, amlodipine, aspirin, atorvastatin, calcium carbonate, carvedilol, vitamin d3, clobetasol propionate, fenofibrate, novolog flexpen, toujeo solostar, levothyroxine, losartan, paroxetine, primidone, freestyle lucila 14 day sensor, gabapentin, and insulin pen needle.       Allergies:   No Known Allergies    PHYSICAL EXAMINATION:  BP (!) 153/72 (BP Location: Right arm, Patient Position: Sitting, Cuff Size: Adult Regular)   Pulse 74   Wt 121.6 kg (268 lb)   BMI 39.58 kg/m     General: Pleasant, straightforward, WDWN individual.  Mental Status: Pleasant, direct, appropriate mood and affect  Resp: breathing  is unlabored without audible wheeze  Vascular: BLE edema   Heme: no visible ecchymosis or erythema on extremities  Skin: No notable rash    Neurologic:  Strength: All major muscle groups of the bilateral lower extremities have equacl and symmetric muscle strength though globally weak 4+/5    Sensation: SILT in lower extremities bilaterally L3-S1 except distal feet/toes     DTRs: bilateral lower extremity stretch reflexes are equal and symmetric IN 1+ AR Tr  Clonus: none    Musculoskeletal:  Gait is unsteady with forward flexed/stooped posture  Lumbar range of motion severely reduced.  She has healed surgical scars at approximately the L4-5 level.  She has mild tenderness to the lumbar paraspinal muscles.  She does have some gross pelvic obliquity and pelvic tilt is difficult to assess with her with forward lumbar flexion posture.  Hip range of motion mild to moderately reduced with internal range of motion and mildly reduced with ER.  She does not have significant tenderness over SI joint (sacral sulcus/PSIS)       ASSESSMENT:  Yadi Iniguez is a pleasant 70 year old female who presents with:    #.  Chronic axial low back pain status post lumbar spinal fusion L4-5 (11/2018).  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    Complicating co-morbidities include:   #. Physical deconditioning   #. Type II, DM. Well controlled.  Complicated by diabetic peripheral polyneuropathy  #. CKD-III   #. Obesity Body mass index is 39.58 kg/m .. Recommend healthy lifestyle modifications through diet and exercise.      PLAN:  -Refer to physical therapy for lumbopelvic program, however, depending on her tolerance she may benefit from some reconditioning and low impact aerobic program  -Recommend compression garment for BLE edema (30/15mmHg)  -Rx: Tramadol 50 mg daily as needed for severe pain only.  #10, R-0.  Discussed that  did not recommend chronic opioid management for chronic pain, however, this may be helpful and tolerating physical therapy in the short-term.  -Acetaminophen 500 - 1,000mg (1-2 tablets) every 8 hours as needed for pain. Max acetaminophen 3,000mg per day (or 6 of the 500mg tablets).   -The pathway from diagnostic medial branch blocks to radiofrequency denervation was discussed in detail with the patient today.  Risks and benefits were discussed and all questions were answered.  The patient states understanding and wishes to proceed.  There are no contraindications.  The patient understands they will have 2 diagnostic medial branch blocks; and after each block they will be required to keep a pain diary recording their pain scores approximately every 1/2 hour to hour until the pain has returned to baseline.  During the time after the block, the patient was instructed to perform activities which typically aggravates her pain.  The patient will contact the medical spine staff after each diagnostic block to assess the amount of benefit the patient received.  If the patient has a significantly positive response to each of these diagnostic blocks, they may move forward with the radiofrequency ablation procedure. We will plan to update imaging prior with MRI lumbar spine prior to procedure.   -Ultimately, she fails the above conservative options, I may refer her to my colleague in orthopedic spine surgery, Dr. Navi Bolton, for further evaluation management of global sagittal balance.  - Follow-up will be determined after review of MRI.     Ready to learn, no apparent learning barriers.  Education provided on treatment plan according to patient's preferred learning style.  Patient verbalizes understanding.   __________________________________  Dwayne Sierra MD  Physical Medicine & Rehabilitation        65 minutes spent on the date of the encounter doing chart review, review of test results, patient visit, documentation and  discussion with family           Again, thank you for allowing me to participate in the care of your patient.        Sincerely,        Dwayne Sierra MD

## 2021-05-19 ENCOUNTER — TELEPHONE (OUTPATIENT)
Dept: DERMATOLOGY | Facility: CLINIC | Age: 71
End: 2021-05-19

## 2021-05-19 DIAGNOSIS — Z79.2 PROPHYLACTIC ANTIBIOTIC: Primary | ICD-10-CM

## 2021-05-19 RX ORDER — CEPHALEXIN 500 MG/1
CAPSULE ORAL
Qty: 4 CAPSULE | Refills: 0 | Status: SHIPPED | OUTPATIENT
Start: 2021-05-19 | End: 2022-01-13

## 2021-05-19 NOTE — LETTER
"May 19, 2021        Yadi Iniguez  [address]  [place] [address]        Dear Yadi,    You have an upcoming appointment with Dr. Hope for Mohs surgery. It is scheduled for 6/7/21 at 8:00 AM. Please check-in 10 minutes prior to your appointment at check-in desk \"D\" on the 2nd floor. Our address is [address] [place], [place] [address]th [facility].  Please review these important reminders:    1) Expect to be here the majority of the morning.  The Mohs surgical procedure can be an extensive surgery requiring multiple stages. Each stage may take 1-2 hours.     2) You may eat breakfast. You may bring snacks or beverages with you.  3) Take all normal medications morning of surgery.  Please stop the aspirin 2 weeks prior to the surgery date.  You may resume it 48 hrs following surgery.    4) You will need a  to be with you if your surgical site is close to your eyes. You can get swelling/bruising immediately after surgery and will go home with a big bulky bandage that could obstruct your view of driving safely.     5) Wear comfortable clothing -- preferably a button down shirt or a loose fitted shirt (to avoid pulling over pressure bandage off when you get home). If your surgery site is on your leg, try wearing loose fitted pants. If your surgery site is on your arm, try wearing a short-sleeve shirt.     6) Bring reading material or other items to help pass time. We do have internet access available if you own a laptop, iPad, etc.    7) Women - do NOT wear make-up. We will be washing it off anyone needing surgery on the face     8) Take the Keflex (cephalexin) 1 hour prior to surgery.    9) Tylenol is a good alternative to take for headaches and pain, and can be taken throughout your surgery and postoperative recovery.    If you need to reschedule or have any questions or concerns, please call me at [phone].    Sincerely,    Bhavana Galarza RN  "

## 2021-05-19 NOTE — TELEPHONE ENCOUNTER
[facility] [place] Pre-Surgery Screening Note     Pre-screening Information:  Hx of Skin Cancer: Yes  Hx of Mohs Surgery: Yes  Transplant: No  Immunocompromised: No  Current Anticoagulant(s): Aspirin  Bleeding Disorder(s): No  Stent: No  Pacemaker: No  Defibrillator: No  Brain/Nerve Stimulator: No  Endocarditis/Rheumatic Fever Hx: No  Vascular graft: No  Prophylactic Antibiotic Needed: Yes  Prophylactic Antibotic: Keflex  Prophylactic Antibiotic Indication: Other (Comments)  Congenital heart defect: No  Prosthetic Heart Valve: No  Lesion on Leg/Groin: Yes  Prosthetic Joint : No  Diabetic: Yes  HIV/AIDS: No  Hepatitis: No  Prior Problem with Local Anesthesia: No  Current Tobacco Use: Yes  Current Alcohol Use: No  Extended Care Facility: No  Occupation: retired  Referring MD: Dr. Barrios   needed?: No  Do you have mobility issues?: No  Do you use any assistive devices/DME?: No  Do you have any issues with lying for long periods of time?: No      Medications/Allergies  Medications and allergies review with patient: Yes     Current Outpatient Medications   Medication Sig Dispense Refill     ACETAMINOPHEN PO Take 500 mg by mouth 2 times daily 2 tablets twice daily       albuterol (PROAIR HFA/PROVENTIL HFA/VENTOLIN HFA) 108 (90 Base) MCG/ACT inhaler Inhale 2 puffs into the lungs every 6 hours Pt will call when needs refill 1 Inhaler 11     alendronate (FOSAMAX) 70 MG tablet TAKE 1 TABLET (70 MG) BY MOUTH EVERY 7 DAYS. TAKE 60 MINUTES BEFORE MORNING MEAL WITH 8 OZ. WATER. REMAIN UPRIGHT FOR 30 MINUTES. 12 tablet 3     amLODIPine (NORVASC) 10 MG tablet TAKE 1 TABLET (10 MG) BY MOUTH DAILY 90 tablet 3     aspirin 81 MG tablet Take 1 tablet (81 mg) by mouth daily 30 tablet 3     atorvastatin (LIPITOR) 40 MG tablet Take 1 tablet (40 mg) by mouth daily 90 tablet 3     calcium carbonate (OS-NATACHA) 1500 (600 Ca) MG tablet Take by mouth 2 times daily (with meals)       carvedilol  (COREG) 25 MG tablet TAKE 1 TABLET (25 MG) BY MOUTH 2 TIMES DAILY WITH MEALS 180 tablet 3     cholecalciferol (VITAMIN D3) 5000 units TABS tablet Take by mouth daily       clobetasol propionate 0.05 % LIQD Externally apply 1 dose. topically 2 times daily as needed 1 Bottle 5     Continuous Blood Gluc Sensor (FREESTYLE MARI 14 DAY SENSOR) [facility] 1 Device every 14 days 2 each 11     fenofibrate (LOFIBRA) 54 MG tablet Take 1 tablet (54 mg) by mouth daily 90 tablet 3     insulin aspart (NOVOLOG FLEXPEN) 100 UNIT/ML pen 22 units before breakfast, 26 units before lunch, 20 units before dinner 75 mL 1     insulin glargine U-300 (TOUJEO SOLOSTAR) 300 UNIT/ML (1 units dial) pen Inject 50 Units Subcutaneous At Bedtime 15 mL 1     insulin pen needle (32G X 4 MM) 32G X 4 MM miscellaneous Use 5 pen needles daily or as directed.  Per insurance and patient preference 450 each 3     levothyroxine (TIROSINT) 150 MCG capsule Take 150 mcg by mouth daily (with breakfast) 90 capsule 3     losartan (COZAAR) 50 MG tablet Take 1 tablet (50 mg) by mouth daily 90 tablet 3     PARoxetine (PAXIL) 20 MG tablet Take 1 tablet (20 mg) by mouth 2 times daily 180 tablet 3     primidone (MYSOLINE) 50 MG tablet Take 1.5 tablets (75 mg) by mouth 2 times daily 270 tablet 1     No Known Allergies    Pertinent Labs:  Last Creatine:   Creatinine   Date Value Ref Range Status   01/14/2021 1.20 (H) 0.52 - 1.04 mg/dL Final     Last INR:   INR   Date Value Ref Range Status   05/27/2008 1.07 0.86 - 1.14 Final       Other Questions:    Reminded patient to take any order prophylactic antibiotics 1 hour prior to appointment: Yes, Keflex prescribed per protocol due to location of SCC.    If on a prescribed anticoagulant, advised patient to continue or check with prescribing provider: No    If on an OTC anticoagulant/supplement, advised patient to hold these medications 10-14 days prior to surgery and resume 48 hrs after surgery: Yes - pt notified hold ASA 14 days  prior to surgery.  May resume 48 hrs following surgery.    Please be aware that this can be an all day procedure. Please bring your daily medications and food/cash. Encourage patient to eat prior to procedure(s). After care instructions were reviewed with patient.    Bhavana Galarza RN

## 2021-05-19 NOTE — TELEPHONE ENCOUNTER
Result and treatment options reviewed with Elina.  She opted for Mohs.  She declined a phone consult.  Mohs scheduled for 6/7/21.  EVELYN Lehman Adam R, MD sent to [facility] [place]             If she would prefer a shorter procedure, an excision in this area is appropriate too. If she would prefer Mohs, ok to schedule after telephone screening. If she isn't sure, I recommend a virtual consult. Thank you.    Associated Results    Dermatological path order and indications  Order: [id number]  Status:  Final result   Visible to patient:  Yes (MyChart) Dx:  Neoplasm of uncertain behavior of skin  Component 8d ago   Copath Report Patient Name: ELINA ALRA   MR#: [id number]   Specimen #: [id number]   Collected: 5/11/2021   Received: 5/12/2021   Reported: 5/14/2021 16:52   Ordering Phy(s): MAURICE HINTON     For improved result formatting, select 'View Enhanced Report Format' under    Linked Documents section.     SPECIMEN(S):   Skin, left posterior calf, shave     FINAL DIAGNOSIS:   Skin, left posterior calf, shave:   - Squamous cell carcinoma, well differentiated

## 2021-05-22 ENCOUNTER — HEALTH MAINTENANCE LETTER (OUTPATIENT)
Age: 71
End: 2021-05-22

## 2021-05-24 ENCOUNTER — MYC MEDICAL ADVICE (OUTPATIENT)
Dept: NEUROSURGERY | Facility: CLINIC | Age: 71
End: 2021-05-24

## 2021-05-24 DIAGNOSIS — M47.816 LUMBAR SPONDYLOSIS: Primary | ICD-10-CM

## 2021-05-24 DIAGNOSIS — Z98.1 S/P SPINAL FUSION: ICD-10-CM

## 2021-05-24 DIAGNOSIS — M51.369 DDD (DEGENERATIVE DISC DISEASE), LUMBAR: ICD-10-CM

## 2021-05-24 DIAGNOSIS — M47.816 LUMBAR FACET ARTHROPATHY: ICD-10-CM

## 2021-05-24 NOTE — TELEPHONE ENCOUNTER
External referral pended for now. Writer attempted to contact facility and get fax number but number does not exist and location is not one listed on [employer] website. Will contact pt to clarify.       Carin Bearden, RNCC  Neurology

## 2021-05-25 NOTE — TELEPHONE ENCOUNTER
VM left for pt to discuss possible options for PT referral as current PT preference is likely not an active location. Pt to call back or send My Chart message.       Carin Bearden, RNCC  Neurology

## 2021-05-25 NOTE — TELEPHONE ENCOUNTER
Patient would like to go to the following place for PT:  [facility]  [address] [facility] (at least I got that right)   [place] [address]  PHONE [phone]  [phone]    I placed this information in the PT order, unsure if the Hand order from 5/14/21 needs this too. Please advise and I will fax order. Thank you!      Jazmín Ramirez RN, BSN, PHN

## 2021-05-25 NOTE — TELEPHONE ENCOUNTER
Hi there,     I went ahead and placed an external referral for PT for this patient on behalf of Dr. Sierra. Jazmín, could you fax the new external referral to the patient's preferred physical therapy site and have them or the pt contact to schedule? I work at the [facility] but it has been closed for today d/t a cooling system issue with the building so I do not have access to a fax machine. Let me know if this works, thank you!     [facility]   PHONE [phone]   [phone]      Writer faxed PT orders to fax number above.       Jazmín Ramirez RN, BSN, PHN

## 2021-06-07 ENCOUNTER — OFFICE VISIT (OUTPATIENT)
Dept: DERMATOLOGY | Facility: CLINIC | Age: 71
End: 2021-06-07
Payer: COMMERCIAL

## 2021-06-07 VITALS — HEART RATE: 72 BPM | DIASTOLIC BLOOD PRESSURE: 59 MMHG | SYSTOLIC BLOOD PRESSURE: 122 MMHG

## 2021-06-07 DIAGNOSIS — C44.729 SQUAMOUS CELL CARCINOMA OF LEFT LOWER LEG: Primary | ICD-10-CM

## 2021-06-07 PROCEDURE — 12031 INTMD RPR S/A/T/EXT 2.5 CM/<: CPT | Performed by: DERMATOLOGY

## 2021-06-07 PROCEDURE — 17313 MOHS 1 STAGE T/A/L: CPT | Performed by: DERMATOLOGY

## 2021-06-07 NOTE — NURSING NOTE
The following medication was given:     MEDICATION:  Lidocaine with epinephrine 1% [id number]  ROUTE: SQ  SITE: see procedure note  DOSE: 5.5cc  LOT #: [id number]  : [employer]  EXPIRATION DATE: 2/2022  NDC#: 8882-3307-53  Was there drug waste? 0.5cc  Multi-dose vial: Yes    Regla Silverman LPN  June 7, 2021    Vaseline and pressure dressing applied to Mohs site on left posterior calf.  Wound care instructions reviewed with patient and AVS provided.  Patient verbalized understanding.  Patient will follow up for suture removal in two weeks.  No further questions or concerns at this time.      Regla Silverman LPN

## 2021-06-07 NOTE — PROGRESS NOTES
[facility] Dermatologic Surgery Clinic [place] Procedure Note      Date of Service:  Jun 7, 2021  Surgery: Mohs micrographic surgery    Case 1  Repair Type: intermediate(purse string)  Repair Size: 1.4 cm  Suture Material: prolene 4-0  Tumor Type: SCC - Squamous cell carcinoma  Location: left posterior calf  Derm-Path Accession #: [id number]  PreOp Size: 1.2X1.1 cm  PostOp Size: 2.0X1.8 cm  Mohs Accession #: [id number]  Level of Defect: fat      Procedure:  We discussed the principles of treatment and most likely complications including scarring, bleeding, infection, swelling, pain, crusting, nerve damage, large wound,  incomplete excision, wound dehiscence,  nerve damage, recurrence, and a second procedure may be recommended to obtain the best cosmetic or functional result.    Informed consent was obtained and the patient underwent the procedure as follows:  The patient was placed right lateral decubitus on the operating table.  The cancer was identified, outlined with a marker, and verified by the patient.  The entire surgical field was prepped with Hibiclens.  The surgical site was anesthetized using Lidocaine 1% with epi 1:100,000.      The area of clinically apparent tumor was debulked. The layer of tissue was then surgically excised using a #15 blade and was then transferred onto a specimen sheet maintaining the orientation of the specimen. Hemostasis was obtained using bipolar electrocoagulation. The wound site was then covered with a dressing while the tissue samples were processed for examination.    The excised tissue was transported to the Mohs histology laboratory maintaining the tissue orientation.  The tissue specimen was relaxed so that the entire surgical margin was in a a single horizontal plane for sectioning and inked for precise mapping.  A precise reference map was drawn to reflect the sectioning of the specimen, colored inking of the margins, and orientation on the patient. The tissue  was processed using horizontal sectioning of the base and continuous peripheral margins.  The histopathologic sections were reviewed in conjunction with the reference map.    Total blocks: 1    Total slides:  2    There were no cancer cells visualized on examination, therefore Mohs surgery was complete.    PURSESTRING REPAIR:   After Betasept prep and local anesthesia, using 4-0 Monocryl deep sutures and 4-0 Prolene superficial sutures, sutures were placed intradermally around the entire circumference of the wound and pulled tightly to minimize the wound diameter. The repair measured 1.4 cm. Estimated blood loss, minimal; complications, none; wound care, routine. Patient was discharged in good condition and will return for assessment as needed.    Scribe Disclosure:   I, Terrell Loving, am serving as a scribe to document services personally performed by this physician, Dr. Terry Hope, based on data collection and the provider's statements to me.     Provider Disclosure:   The documentation recorded by the scribe accurately reflects the services I personally performed and the decisions made by me.    Terry Hope DO    Department of Dermatology  [facility]: Phone: [phone], Fax:[phone]  [facility]: Phone: [phone], Fax: [phone]    Care and Laboratory Testing Performed at:  [facility]   Dermatology Clinic  [address] [place] [address]

## 2021-06-07 NOTE — PROGRESS NOTES
Yadi Iniguez's goals for this visit include:   Chief Complaint   Patient presents with     Procedure     Mohs - SCC left posterior calf       She requests these members of her care team be copied on today's visit information: Sandy Ricci    PCP: Sandy Ricci    Referring Provider:  Shavon Barrios MD  [address] [place] [id number]    There were no vitals taken for this visit.    Do you need any medication refills at today's visit? No.  Bhavana Galarza RN

## 2021-06-07 NOTE — LETTER
6/7/2021         RE: Yadi Iniguez  [address]  [place] [address]        Dear Colleague,    Thank you for referring your patient, Yadi Iniguez, to the [facility]. Please see a copy of my visit note below.    Yadi Iniguez's goals for this visit include:   Chief Complaint   Patient presents with     Procedure     Mohs - SCC left posterior calf       She requests these members of her care team be copied on today's visit information: Sandy Ricci    PCP: Sandy Ricci    Referring Provider:  Shavon Barrios MD  [address] [place] [id number]    There were no vitals taken for this visit.    Do you need any medication refills at today's visit? No.  Bhavana Galarza RN        [facility] Dermatologic Surgery Clinic [place] Procedure Note      Date of Service:  Jun 7, 2021  Surgery: Mohs micrographic surgery    Case 1  Repair Type: intermediate(purse string)  Repair Size: 1.4 cm  Suture Material: prolene 4-0  Tumor Type: SCC - Squamous cell carcinoma  Location: left posterior calf  Derm-Path Accession #: [id number]  PreOp Size: 1.2X1.1 cm  PostOp Size: 2.0X1.8 cm  Mohs Accession #: [id number]  Level of Defect: fat      Procedure:  We discussed the principles of treatment and most likely complications including scarring, bleeding, infection, swelling, pain, crusting, nerve damage, large wound,  incomplete excision, wound dehiscence,  nerve damage, recurrence, and a second procedure may be recommended to obtain the best cosmetic or functional result.    Informed consent was obtained and the patient underwent the procedure as follows:  The patient was placed right lateral decubitus on the operating table.  The cancer was identified, outlined with a marker, and verified by the patient.  The entire surgical field was prepped with Hibiclens.  The surgical site was anesthetized using Lidocaine 1% with epi 1:100,000.      The area of clinically  apparent tumor was debulked. The layer of tissue was then surgically excised using a #15 blade and was then transferred onto a specimen sheet maintaining the orientation of the specimen. Hemostasis was obtained using bipolar electrocoagulation. The wound site was then covered with a dressing while the tissue samples were processed for examination.    The excised tissue was transported to the Mohs histology laboratory maintaining the tissue orientation.  The tissue specimen was relaxed so that the entire surgical margin was in a a single horizontal plane for sectioning and inked for precise mapping.  A precise reference map was drawn to reflect the sectioning of the specimen, colored inking of the margins, and orientation on the patient. The tissue was processed using horizontal sectioning of the base and continuous peripheral margins.  The histopathologic sections were reviewed in conjunction with the reference map.    Total blocks: 1    Total slides:  2    There were no cancer cells visualized on examination, therefore Mohs surgery was complete.    PURSESTRING REPAIR:   After Betasept prep and local anesthesia, using 4-0 Monocryl deep sutures and 4-0 Prolene superficial sutures, sutures were placed intradermally around the entire circumference of the wound and pulled tightly to minimize the wound diameter. The repair measured 1.4 cm. Estimated blood loss, minimal; complications, none; wound care, routine. Patient was discharged in good condition and will return for assessment as needed.    Scribe Disclosure:   I, Terrell Loving, am serving as a scribe to document services personally performed by this physician, Dr. Terry Hope, based on data collection and the provider's statements to me.     Provider Disclosure:   The documentation recorded by the scribe accurately reflects the services I personally performed and the decisions made by me.    Terry Hope DO    Department of  Dermatology  [facility]: Phone: [phone], Fax:[phone]  [facility]: Phone: [phone], Fax: [phone]    Care and Laboratory Testing Performed at:  [facility]   Dermatology Clinic  [address] [place] [address]        Again, thank you for allowing me to participate in the care of your patient.        Sincerely,        Terry Hope MD

## 2021-06-07 NOTE — PATIENT INSTRUCTIONS
Mohs Wound Care Instructions  I will experience scar, altered skin color, bleeding, swelling, pain, crusting and redness. I may experience altered sensation. Risks are excessive bleeding, infection, muscle weakness, thick (hypertrophic or keloidal) scar, and recurrence,. A second procedure may be recommended to obtain the best cosmetic or functional result.  Possible complications of any surgical procedure are bleeding, infection, scarring, alteration in skin color and sensation, muscle weakness in the area, wound dehiscence or seperation, or recurrence of the lesion or disease. On occasion, after healing, a secondary procedure or revision may be recommended in order to obtain the best cosmetic or functional result.   After your surgery, a pressure bandage will be placed over the area that has sutures. This will help prevent bleeding. Please follow these instructions until you come back to clinic for suture removal on 6/21/21, as they will help you to prevent complications as your wound heals.  For the First 48 hours After Surgery:  1. Leave the pressure bandage on and keep it dry. If it should come loose, you may retape it, but do not take it off.  2. Relax and take it easy. Do not do any vigorous exercise, heavy lifting, or bending forward. This could cause the wound to bleed.  3. Post-operative pain is usually mild. You may take plain or extra strength Tylenol every 4 hours as needed (do not take more than 4,000mg in one day). Do not take any medicine that contains aspirin, ibuprofen or motrin unless you have been recommended these by a doctor.  Avoid alcohol and vitamin E as these may increase your tendency to bleed.  4. You may put an ice pack around the bandaged area for 20 minutes every 2-3 hours. This may help reduce swelling, bruising, and pain. Make sure the ice pack is waterproof so that the pressure bandage does not get wet.   5. You may see a small amount of drainage or blood on your pressure bandage.  This is normal. However, if drainage or bleeding continues or saturates the bandage, you will need to apply firm pressure over the bandage with a washcloth for 15 minutes. If bleeding continues after applying pressure for 15 minutes then go to the nearest emergency room.  48 Hours After Surgery  Carefully remove the bandage and start daily wound care and dressing changes. You may also now shower and get the wound wet. Wash wound with a mild soap and water.  Use caution when washing the wound. Be gentle and do not let the forceful shower stream hit the wound directly.  PAT dry.  Daily Wound Care:  1. Wash wound with a mild soap and water.  Use caution when washing the wound, be gentle and do not let the forceful shower stream hit the wound directly.  2. PAT DRY.  3. Apply Vaseline (from a new container or tube) over the suture line with a Q-tip. It is very important to keep the wound continuously moist, as wounds heal best in a moist environment.  4.  Keep the site covered until sutures are removed, you can cover it with a Telfa (non-stick) dressing and tape or a band-aid.    5. If you are unable to keep wound covered, you must apply Vaseline every 2 - 3 hours (while awake) to ensure it is being kept moist for optimal healing. A dressing overnight is recommended to keep the area moist.   Call Us If:  1. You have pain that is not controlled with Tylenol.  2. You have signs or symptoms of an infection, such as: fever over 100 degrees F, redness, warmth, or foul-smelling or yellow/creamy drainage from the wound.  Who should I call with questions?    [facility]: [phone]     [facility]: [phone]    For urgent needs outside of business hours call the [facility] at [phone] and ask for the dermatology resident on call

## 2021-06-08 ENCOUNTER — ANCILLARY PROCEDURE (OUTPATIENT)
Dept: MRI IMAGING | Facility: CLINIC | Age: 71
End: 2021-06-08
Attending: PHYSICAL MEDICINE & REHABILITATION
Payer: COMMERCIAL

## 2021-06-08 DIAGNOSIS — Z98.1 S/P SPINAL FUSION: ICD-10-CM

## 2021-06-08 DIAGNOSIS — Z98.1 S/P LUMBAR FUSION: Primary | ICD-10-CM

## 2021-06-08 DIAGNOSIS — M47.816 LUMBAR SPONDYLOSIS: ICD-10-CM

## 2021-06-08 DIAGNOSIS — M47.816 LUMBAR FACET ARTHROPATHY: ICD-10-CM

## 2021-06-08 DIAGNOSIS — M51.369 DDD (DEGENERATIVE DISC DISEASE), LUMBAR: ICD-10-CM

## 2021-06-08 PROCEDURE — 72148 MRI LUMBAR SPINE W/O DYE: CPT | Performed by: RADIOLOGY

## 2021-06-09 ENCOUNTER — VIRTUAL VISIT (OUTPATIENT)
Dept: PHARMACY | Facility: CLINIC | Age: 71
End: 2021-06-09
Payer: COMMERCIAL

## 2021-06-09 DIAGNOSIS — E78.5 HYPERLIPIDEMIA LDL GOAL <100: ICD-10-CM

## 2021-06-09 DIAGNOSIS — F32.1 MODERATE MAJOR DEPRESSION (H): ICD-10-CM

## 2021-06-09 DIAGNOSIS — E03.9 HYPOTHYROIDISM, UNSPECIFIED TYPE: ICD-10-CM

## 2021-06-09 DIAGNOSIS — Z79.4 TYPE 2 DIABETES MELLITUS WITH OTHER DIABETIC KIDNEY COMPLICATION, WITH LONG-TERM CURRENT USE OF INSULIN (H): Primary | ICD-10-CM

## 2021-06-09 DIAGNOSIS — L40.9 PSORIASIS: ICD-10-CM

## 2021-06-09 DIAGNOSIS — G25.0 ESSENTIAL TREMOR: ICD-10-CM

## 2021-06-09 DIAGNOSIS — J44.9 CHRONIC OBSTRUCTIVE PULMONARY DISEASE, UNSPECIFIED COPD TYPE (H): ICD-10-CM

## 2021-06-09 DIAGNOSIS — I10 HYPERTENSION GOAL BP (BLOOD PRESSURE) < 140/90: ICD-10-CM

## 2021-06-09 DIAGNOSIS — E11.29 TYPE 2 DIABETES MELLITUS WITH OTHER DIABETIC KIDNEY COMPLICATION, WITH LONG-TERM CURRENT USE OF INSULIN (H): Primary | ICD-10-CM

## 2021-06-09 DIAGNOSIS — M81.0 OSTEOPOROSIS, UNSPECIFIED OSTEOPOROSIS TYPE, UNSPECIFIED PATHOLOGICAL FRACTURE PRESENCE: ICD-10-CM

## 2021-06-09 PROCEDURE — 99607 MTMS BY PHARM ADDL 15 MIN: CPT | Performed by: PHARMACIST

## 2021-06-09 PROCEDURE — 99605 MTMS BY PHARM NP 15 MIN: CPT | Performed by: PHARMACIST

## 2021-06-09 RX ORDER — PAROXETINE 20 MG/1
40 TABLET, FILM COATED ORAL AT BEDTIME
Qty: 180 TABLET | Refills: 3
Start: 2021-06-09 | End: 2022-02-11

## 2021-06-09 RX ORDER — INSULIN GLARGINE 300 U/ML
45 INJECTION, SOLUTION SUBCUTANEOUS AT BEDTIME
Qty: 15 ML | Refills: 1
Start: 2021-06-09 | End: 2021-06-14

## 2021-06-09 NOTE — LETTER
"        Date: 2021    Yadi Iniguez  [address]  [place] [address]    Dear Ms. Iniguez,    Thank you for talking with me on 2021 about your health and medications. Medicare s MTM (Medication Therapy Management) program helps you understand your medications and use them safely.      This letter includes an action plan (Medication Action Plan) and medication list (Personal Medication List). The action plan has steps you should take to help you get the best results from your medications. The medication list will help you keep track of your medications and how to use them the right way.       Have your action plan and medication list with you when you talk with your doctors, pharmacists, and other healthcare providers in your care team.     Ask your doctors, pharmacists, and other healthcare providers to update the action plan and medication list at every visit.     Take your medication list with you if you go to the hospital or emergency room.     Give a copy of the action plan and medication list to your family or caregivers.     If you want to talk about this letter or any of the papers with it, please call   [phone].We look forward to working with you, your doctors, and other healthcare providers to help you stay healthy through the [facility] MTM program.    Sincerely,  Geovanny Bass, [facility]    Enclosed: Medication Action Plan and Personal Medication List    MEDICATION ACTION PLAN FOR Yadi Iniguez,  1950     This action plan will help you get the best results from your medications if you:   1. Read \"What we talked about.\"   2. Take the steps listed in the \"What I need to do\" boxes.   3. Fill in \"What I did and when I did it.\"   4. Fill in \"My follow-up plan\" and \"Questions I want to ask.\"     Have this action plan with you when you talk with your doctors, pharmacists, and other healthcare providers in your care team. Share this with your family or caregivers too.  DATE PREPARED: " 2021  What we talked about: You have been experiencing low blood sugars overnight and into the morning.                                                   What I need to do: Decrease TOUJEO to 45 units under the skin every evening. This change is to reduce the risk of overnight/early morning low blood sugars.   What I did and when I did it:                                              What we talked about: You stated that you consistently felt drowsy or tired during the day. Your paroxetine (antidepressant) can contribute to this side effect.                                                  What I need to do: Move PAROXETINE morning dose to evening to combine for a total dose of 40 mg (two 20 mg tablets) by mouth every evening.        What I did and when I did it:                                               My follow-up plan:                 Questions I want to ask:              If you have any questions about your action plan, call Geovanny Bass [facility], Phone: [phone] , Monday-Friday 8-4:30pm.           PERSONAL MEDICATION LIST FOR Yadi Iniguez  1950     This medication list was made for you after we talked. We also used information from your doctor's chart.      Use blank rows to add new medications. Then fill in the dates you started using them.    Cross out medications when you no longer use them. Then write the date and why you stopped using them.    Ask your doctors, pharmacists, and other healthcare providers to update this list at every visit. Keep this list up-to-date with:       Prescription medications    Over the counter drugs     Herbals    Vitamins    Minerals      If you go to the hospital or emergency room, take this list with you. Share this with your family or caregivers too.     DATE PREPARED: 2021  Allergies or side effects: Patient has no known allergies.     Medication:  ACETAMINOPHEN PO      How I use it:  Take 500 mg by mouth 2 times daily 2 tablets twice daily       Why I use it:  Pain    Prescriber:  Patient Reported      Date I started using it:       Date I stopped using it:         Why I stopped using it:            Medication:  ALBUTEROL SULFATE  (90 BASE) MCG/ACT IN AERS      How I use it:  Inhale 2 puffs into the lungs every 6 hours Pt will call when needs refill      Why I use it: COPD    Prescriber:  Sandy Ricci MD      Date I started using it:       Date I stopped using it:         Why I stopped using it:            Medication:  ALENDRONATE SODIUM 70 MG PO TABS      How I use it:  TAKE 1 TABLET (70 MG) BY MOUTH EVERY 7 DAYS. TAKE 60 MINUTES BEFORE MORNING MEAL WITH 8 OZ. WATER. REMAIN UPRIGHT FOR 30 MINUTES.      Why I use it: Osteoporosis    Prescriber:  Sandy Ricci MD      Date I started using it:       Date I stopped using it:         Why I stopped using it:            Medication:  AMLODIPINE BESYLATE 10 MG PO TABS      How I use it:  TAKE 1 TABLET (10 MG) BY MOUTH DAILY      Why I use it: Blood pressure    Prescriber:  Sandy Ricci MD      Date I started using it:       Date I stopped using it:         Why I stopped using it:            Medication:  ASPIRIN 81 MG PO TABS      How I use it:  Take 1 tablet (81 mg) by mouth daily      Why I use it: Stroke prevention    Prescriber:  KATIANA Bowens CNP      Date I started using it:       Date I stopped using it:         Why I stopped using it:            Medication:  ATORVASTATIN CALCIUM 40 MG PO TABS      How I use it:  Take 1 tablet (40 mg) by mouth daily      Why I use it: Heart/cholesterol    Prescriber:  Sandy Ricci MD      Date I started using it:       Date I stopped using it:         Why I stopped using it:            Medication:  CALCIUM CARBONATE 1500 (600 CA) MG PO TABS      How I use it:  Take 600 mg by mouth daily       Why I use it:  Bone Health    Prescriber:  Patient Reported      Date I started using it:       Date I stopped using it:          Why I stopped using it:            Medication:  CARVEDILOL 25 MG PO TABS      How I use it:  TAKE 1 TABLET (25 MG) BY MOUTH 2 TIMES DAILY WITH MEALS      Why I use it: Blood pressure    Prescriber:  Sandy Ricci MD      Date I started using it:       Date I stopped using it:         Why I stopped using it:            Medication:  CEPHALEXIN 500 MG PO CAPS      How I use it:  Take 4 capsules (2000 mg) 1 hour prior to procedure      Why I use it: Prophylactic antibiotic    Prescriber:  Terry Hope MD      Date I started using it:       Date I stopped using it:         Why I stopped using it:            Medication:  CLOBETASOL PROPIONATE 0.05 % EX LIQD      How I use it:  Externally apply 1 dose. topically 2 times daily as needed      Why I use it: Skin rash    Prescriber:  Sandy Ricci MD      Date I started using it:       Date I stopped using it:         Why I stopped using it:            Medication:  FENOFIBRATE 54 MG PO TABS      How I use it:  Take 1 tablet (54 mg) by mouth daily      Why I use it: Cholesterol    Prescriber:  Sandy Ricci MD      Date I started using it:       Date I stopped using it:         Why I stopped using it:            Medication:  LEVOTHYROXINE SODIUM 150 MCG PO CAPS      How I use it:  Take 150 mcg by mouth daily (with breakfast)      Why I use it: Hypothyroidism    Prescriber:  Sandy Ricci MD      Date I started using it:       Date I stopped using it:         Why I stopped using it:            Medication:  LOSARTAN POTASSIUM 50 MG PO TABS      How I use it:  Take 1 tablet (50 mg) by mouth daily      Why I use it: Blood pressure    Prescriber:  Sandy Ricci MD      Date I started using it:       Date I stopped using it:         Why I stopped using it:            Medication:  NOVOLOG FLEXPEN 100 UNIT/ML SC SOPN      How I use it:  Inject 22 units under the skin before breakfast, 26 units before lunch, and 20 units before dinner       Why I use it: Type 2 diabetes     Prescriber:  Sandy Ricci MD      Date I started using it:       Date I stopped using it:         Why I stopped using it:            Medication:  PAROXETINE HCL 20 MG PO TABS      How I use it:  Take 2 tablets (40 mg) by mouth At Bedtime      Why I use it: Depression    Prescriber:  Sandy iRcci MD      Date I started using it:       Date I stopped using it:         Why I stopped using it:            Medication:  PRIMIDONE 50 MG PO TABS      How I use it:  Take 1.5 tablets (75 mg) by mouth 2 times daily      Why I use it: Essential tremor    Prescriber:  Sandy Ricci MD      Date I started using it:       Date I stopped using it:         Why I stopped using it:            Medication:  TOUJEO SOLOSTAR 300 UNIT/ML SC SOPN      How I use it:  Inject 45 Units Subcutaneous At Bedtime      Why I use it: Type 2 diabetes     Prescriber:  Sandy Ricci MD      Date I started using it:       Date I stopped using it:         Why I stopped using it:            Medication:  VITAMIN D3 5000 UNITS PO TABS      How I use it:  Take 2 tablets by mouth daily       Why I use it:  Bone Health    Prescriber:  Patient Reported      Date I started using it:       Date I stopped using it:         Why I stopped using it:            Medication:         How I use it:         Why I use it:      Prescriber:         Date I started using it:       Date I stopped using it:         Why I stopped using it:            Medication:         How I use it:         Why I use it:      Prescriber:         Date I started using it:       Date I stopped using it:         Why I stopped using it:            Medication:         How I use it:         Why I use it:      Prescriber:         Date I started using it:       Date I stopped using it:         Why I stopped using it:              Other Information:     If you have any questions about your medication list, call Geovanny Bass [facility],  Phone: [phone] , Monday-Friday 8-4:30pm.    According to the Paperwork Reduction Act of 1995, no persons are required to respond to a collection of information unless it displays a valid OMB control number. The valid OMB number for this information collection is [id number]. The time required to complete this information collection is estimated to average 40 minutes per response, including the time to review instructions, searching existing data resources, gather the data needed, and complete and review the information collection. If you have any comments concerning the accuracy of the time estimate(s) or suggestions for improving this form, please write to: CMS, Attn: TONNY Reports Clearance Officer, [address] [place] [phone].

## 2021-06-09 NOTE — PROGRESS NOTES
"Medication Therapy Management (MTM) Encounter    ASSESSMENT:                            Medication Adherence/Access: {adherencechoices:529907}    ***: ***  ***: ***  ***: ***  ***: ***    PLAN:                            ***    Follow-up: No follow-ups on file.      SUBJECTIVE/OBJECTIVE:                          Yadi Iniguez is a 70 year old female called for an initial visit. She was referred to me from her [facility] insurance plan.      Reason for visit: ***.    Allergies/ADRs: None  Tobacco: She reports that she quit smoking about 3 years ago. Her smoking use included cigarettes. She started smoking about 54 years ago. She has a 25.00 pack-year smoking history. She has never used smokeless tobacco. - started back up again about 2.5 years ago  Alcohol: Less than 1 beverage / month  Caffeine: 3 cups/day of coffee  Activity: zero activity  Past Medical History: Reviewed in chart      Medication Adherence/Access: Patient uses pill box(es).  Patient takes medications 2 time(s) per day.   Per patient, misses medication {NUMBERS 0-10:847798} times per week.   Medication barriers: {medbarriers:270321}.   The patient fills medications at [place]: {YESNOFV:748326}.    Type 2 Diabetes:  Currently taking Toujeo 50 units at bedtime and Novolog 22 units before breakfast/26 units before lunch/20 units before dinner. Patient is not experiencing side effects.  Blood sugar monitoring: {MTM self monitorin}. Ranges {Pt report:011081}: {bgranges:354801}  Symptoms of low blood sugar? {HYPOGLYCEMIA SYMPTOMS:680355::\"none\"}  Symptoms of high blood sugar? {diabetessymptoms:001719}  Eye exam: {up to date:401314}  Foot exam: {up to date:394463}  Diet/Exercise: ***  Aspirin: {ASAyesno:528429}  Statin: {YES (EXPLAIN)/NO:874052}   ACEi/ARB: {YES (EXPLAIN)/NO:277752}.   Urine Albumin:   Lab Results   Component Value Date    UMALCR 151.38 (H) 2020      Lab Results   Component Value Date    A1C 6.1 2021    A1C 6.3 2020 " "   A1C 6.7 01/06/2020    A1C 6.7 07/09/2019    A1C 7.1 01/31/2019     {IMMUNIZATION REMINDER LOOK IN NAVIGATOR:064544}    Hypertension: Current medications include amlodipine 10 mg daily, carvedilol 25 mg twice daily, losartan 50 mg daily, and aspiring 81 mg daily.  Patient {HTNDoes/doesnot:414661}.  Patient reports { :221209}.  BP Readings from Last 3 Encounters:   06/07/21 122/59   05/14/21 (!) 153/72   02/09/21 114/64     Hyperlipidemia: Current therapy includes atorvastatin 40 mg daily and Fenofibrate 54 mg once daily.  Patient reports {mtmlipidsideeffect:765256}  {lipidascvd:813543}  Recent Labs   Lab Test 01/14/21  0723 06/30/20  0711   CHOL 170 172   HDL 49* 39*   LDL 92 102*   TRIG 143 157*       Hypothyroidism: Patient is taking levothyroxine 150 mcg daily. Patient is having the following symptoms: {hypo/hyperthyroid:675713}.   TSH   Date Value Ref Range Status   06/30/2020 4.65 (H) 0.40 - 4.00 mU/L Final       {osteoporosis?:692864} Current therapy includes: alendronate 70 mg weekly, calcium carbonate 600 mg twice daily, and vitamin D3 07845 units daily. Pt {IS NOT/IS:096175::\"is not\"} experiencing side effects.  DEXA History: ***  Patient is getting the following sources of dietary calcium: {ca sources:198780}  Risk factors: {osteo high risk:439156}  Last vitamin D level:  No results found for: VITDT    Depression:  Current medications include: Paroxetine 20 mg twice daily. {mtmdepassess:234349}  PHQ-9 SCORE 7/16/2019 1/14/2020 1/21/2021   PHQ-9 Total Score - - -   PHQ-9 Total Score 9 8 8       Essential tremor: Patient is taking primidone 75 mg twice daily. ***    ***: ***  ***: ***  ***: ***  ***: ***    Today's Vitals: There were no vitals taken for this visit.  ----------------  {ANUP?:422113}    I spent {mtm total time 3:993310} with this patient today{MTMpartdbillingquestion:252613}. { :174960}. A copy of the visit note was provided to the patient's {ccd chart:947250} provider.    The patient " "{GIVEN/NOT GIVEN:138922::\"was given\"} a summary of these recommendations. {covisit:852180}    ***    Telemedicine Visit Details  Type of service:  {telemedvisitmtm:077250::\"Telephone visit\"}  Start Time: {video/phone visit start time:152948}  End Time: {video/phone visit end time:152948}  Originating Location (patient location): Home  Distant Location (provider location):  [facility]    {Click at end of visit to add-in MTP:612894}      "

## 2021-06-09 NOTE — PROGRESS NOTES
Medication Therapy Management (MTM) Encounter    ASSESSMENT:                            Medication Adherence/Access: No issues identified    Type 2 Diabetes:  A1c is at goal of <7%. However given overnight/morning lows would benefit from dose reduction of basal insulin. Will send patient packet on smoking cessation to aid her in quitting at future visit.    Hypertension: Stable.     Hyperlipidemia: Stable.    Hypothyroidism: Stable. Last T4 was within normal limits.     Osteoporosis: Stable.    Depression: Stable mood per patient. Possible anticholinergic effects - may benefit from taking dose just in evening to avoid drowsiness.     Essential tremor: Somewhat stable.     COPD: Stable.    Psoriasis: Stable.     PLAN:                            1. Decrease TOUJEO to 45 units under the skin every evening. This change is to reduce the risk of overnight/early morning low blood sugars.    2. Move PAROXETINE morning dose to evening to combine for a total dose of 40 mg (two 20 mg tablets) every evening. One potential side effect of paroxetine can be drowsiness, taking at night can help to reduce the impact of this.  You may notice a few withdrawal effects after missing your first morning dose - if this persists after the 1st week it is okay to go back to this previous dosing.     3. Review the paperwork for smoking cessation to help prepare for our next conversation.     Follow-up: I will call you on Wednesday, June 23rd at 2:30 PM    SUBJECTIVE/OBJECTIVE:                          Yadi Iniguez is a 70 year old female called for an initial visit. She was referred to me from her [facility] insurance plan.      Reason for visit: Comprehensive medication review.    Allergies/ADRs: None  Tobacco: She reports that she has been smoking cigarettes. She started smoking about 54 years ago. She has a 50.00 pack-year smoking history. She has never used smokeless tobacco. - we discussed talking more at upcoming follow-up  Alcohol: Less  than 1 beverage / month  Caffeine: 3 cups/day of coffee  Activity: zero activity  Past Medical History: Reviewed in chart      Medication Adherence/Access: Patient uses pill box(es).  Patient takes medications 2 time(s) per day.   Per patient, misses medication 0 times per week.   Medication barriers: none.   The patient fills medications at [place]: NO, fills medications at [facility].    Type 2 Diabetes:  Currently taking Toujeo 50 units at bedtime and Novolog 22 units before [id number] units before dinner (has been skipping 26 units at lunch due to low sugars). Patient is not experiencing side effects.  Blood sugar monitoring: Continuous Glucose Monitor. Ranges (patient reported): 4-5 times per day =  mg/dL  Time in Target (set at 125-175 mg/dL)  Above Target: 0%  Time in Target: 10%  Below target: 90%  Average Glucose 97 mg/dL (89 mg/dL, 72 mg/dL, 104 mg/dL, 118 mg/dL)  Symptoms of low blood sugar? shaky, dizzy, sweaty, Frequency of lows- daily  Symptoms of high blood sugar? none  Eye exam: up to date  Foot exam: due  Diet/Exercise: Unchanged  Aspirin: Taking 81mg daily and denies side effects  Statin: Yes: atorvastatin   ACEi/ARB: Yes: losartan.   Urine Albumin:   Lab Results   Component Value Date    UMALCR 151.38 (H) 06/30/2020      Lab Results   Component Value Date    A1C 6.1 01/14/2021    A1C 6.3 06/30/2020    A1C 6.7 01/06/2020    A1C 6.7 07/09/2019    A1C 7.1 01/31/2019       Hypertension: Current medications include amlodipine 10 mg daily, carvedilol 25 mg twice daily, losartan 50 mg daily, and aspiring 81 mg daily.  Patient does not self-monitor blood pressure.  Patient reports no current medication side effects.  BP Readings from Last 3 Encounters:   06/07/21 122/59   05/14/21 (!) 153/72   02/09/21 114/64     Hyperlipidemia: Current therapy includes atorvastatin 40 mg daily and Fenofibrate 54 mg once daily.  Patient reports no significant myalgias or other side effects.  The 10-year  ASCVD risk score (Misti PAEZ Jr., et al., 2013) is: 34%    Values used to calculate the score:      Age: 70 years      Sex: Female      Is Non- : No      Diabetic: Yes      Tobacco smoker: Yes      Systolic Blood Pressure: 122 mmHg      Is BP treated: Yes      HDL Cholesterol: 49 mg/dL      Total Cholesterol: 170 mg/dL  Recent Labs   Lab Test 01/14/21  0723 06/30/20  0711   CHOL 170 172   HDL 49* 39*   LDL 92 102*   TRIG 143 157*       Hypothyroidism: Patient is taking levothyroxine 150 mcg daily. Patient is having the following symptoms: none.   TSH   Date Value Ref Range Status   06/30/2020 4.65 (H) 0.40 - 4.00 mU/L Final     T4 Free   Date Value Ref Range Status   06/30/2020 1.08 0.76 - 1.46 ng/dL Final       Osteoporosis: Current therapy includes: alendronate 70 mg weekly, calcium carbonate 600 mg twice daily, and vitamin D3 75913 units daily. Pt is experiencing side effects.  DEXA History: T-score: -2.9  Patient is getting the following sources of dietary calcium: yogurt, cheese and green leafy vegetables  Risk factors: post-menopausal  Last vitamin D level:  No results found for: VITDT    Depression:  Current medications include: Paroxetine 20 mg twice daily. Pt reports that depression symptoms are stable.  Does describe drowsiness during the day. Denies any other anticholinergic side effects. Patient states she has tried multiple other medications, but have not been effective.   PHQ-9 SCORE 7/16/2019 1/14/2020 1/21/2021   PHQ-9 Total Score - - -   PHQ-9 Total Score 9 8 8       Essential tremor: Patient is taking primidone 75 mg twice daily. Still has a tremor - not any better on medication.      COPD: Current medications: al.     Patient is not experiencing side effects.   Patient reports the following symptoms: none.  Patient does have an COPD Action Plan on file.   Has spirometry been completed: Yes     Psoriasis: Patient is taking clobetasol propionate 0.05% liquid as needed. Finds to  be effective when used. Denies any issues.      Today's Vitals: There were no vitals taken for this visit.     Wt Readings from Last 5 Encounters:   05/14/21 268 lb (121.6 kg)   02/09/21 266 lb (120.7 kg)   01/21/21 264 lb (119.7 kg)   07/20/20 266 lb (120.7 kg)   07/08/20 266 lb (120.7 kg)     ----------------      I spent 40 minutes with this patient today (an extra 15 minutes was spent creating the Medication Action Plan). All changes were made via collaborative practice agreement with Dr. Ricci. A copy of the visit note was provided to the patient's primary care provider.    The patient was sent via [employer] a summary of these recommendations.     Luis Antonio Bass, ElainaD, BCACP  Medication Therapy Management Pharmacist  Pager: [phone]    Telemedicine Visit Details  Type of service:  Telephone visit  Start Time: 11:00 AM  End Time: 11:40 AM  Originating Location (patient location): [place]  Distant Location (provider location):  [facility]        Medication Therapy Recommendations  Moderate major depression (H)    Current Medication: PARoxetine (PAXIL) 20 MG tablet (Discontinued)   Rationale: Undesirable effect - Adverse medication event - Safety   Recommendation: Change Administration Time - PARoxetine 20 MG tablet - Move paroxetine morning dose to evening for total daily dose of 40 mg by mouth at bedtime   Status: Accepted per CPA         Type 2 diabetes mellitus with other diabetic kidney complication, with long-term current use of insulin (H)    Current Medication: insulin glargine U-300 (TOUJEO SOLOSTAR) 300 UNIT/ML (1 units dial) pen   Rationale: Dose too high - Dosage too high - Safety   Recommendation: Decrease Dose - Toujeo Max SoloStar 300 UNIT/ML Sopn - Decrease Toujeo to 45 units daily   Status: Accepted per CPA

## 2021-06-09 NOTE — PATIENT INSTRUCTIONS
Recommendations from today's MTM visit:                                                    MTM (medication therapy management) is a service provided by a clinical pharmacist designed to help you get the most of out of your medicines.   Today we reviewed what your medicines are for, how to know if they are working, that your medicines are safe and how to make your medicine regimen as easy as possible.      1. Decrease TOUJEO to 45 units under the skin every evening. This change is to reduce the risk of overnight/early morning low blood sugars.    2. Move PAROXETINE morning dose to evening to combine for a total dose of 40 mg (two 20 mg tablets) every evening. One potential side effect of paroxetine can be drowsiness, taking at night can help to reduce the impact of this.  You may notice a few withdrawal effects after missing your first morning dose - if this persists after the 1st week it is okay to go back to this previous dosing.     Follow-up: I will call you on Wednesday, June 23rd at 2:30 PM    It was great to speak with you today.  I value your experience and would be very thankful for your time with providing feedback on our clinic survey. You may receive a survey via email or text message in the next few days.     To schedule another MTM appointment, please call the clinic directly or you may call the MTM scheduling line at [phone] or toll-free at [phone].     My Clinical Pharmacist's contact information:                                                      Please feel free to contact me with any questions or concerns you have.      Luis Antonio Bass, [place], [facility]  Medication Therapy Management Pharmacist  Pager: [phone]

## 2021-06-10 ENCOUNTER — TELEPHONE (OUTPATIENT)
Dept: PHYSICAL MEDICINE AND REHAB | Facility: CLINIC | Age: 71
End: 2021-06-10

## 2021-06-10 NOTE — TELEPHONE ENCOUNTER
VM left asking for a return call.     Reaching out to the patient to let her know that the MRI does show arthritis at the levels discussed at appt with Dr. Sierra. Order placed for L5 S1 MBB #1 and #2.

## 2021-06-14 ENCOUNTER — MYC MEDICAL ADVICE (OUTPATIENT)
Dept: FAMILY MEDICINE | Facility: CLINIC | Age: 71
End: 2021-06-14

## 2021-06-14 ENCOUNTER — TELEPHONE (OUTPATIENT)
Dept: EDUCATION SERVICES | Facility: CLINIC | Age: 71
End: 2021-06-14

## 2021-06-14 DIAGNOSIS — E11.9 TYPE 2 DIABETES, HBA1C GOAL < 8% (H): Primary | ICD-10-CM

## 2021-06-14 DIAGNOSIS — Z79.4 TYPE 2 DIABETES MELLITUS WITH OTHER DIABETIC KIDNEY COMPLICATION, WITH LONG-TERM CURRENT USE OF INSULIN (H): ICD-10-CM

## 2021-06-14 DIAGNOSIS — E11.29 TYPE 2 DIABETES MELLITUS WITH OTHER DIABETIC KIDNEY COMPLICATION, WITH LONG-TERM CURRENT USE OF INSULIN (H): ICD-10-CM

## 2021-06-14 RX ORDER — INSULIN GLARGINE 300 U/ML
25 INJECTION, SOLUTION SUBCUTANEOUS AT BEDTIME
Qty: 15 ML | Refills: 1
Start: 2021-06-14 | End: 2021-07-07

## 2021-06-14 NOTE — TELEPHONE ENCOUNTER
Flako Small    Pt left the message on our diabetes triage line below. Our diabetes education referral for her  in 2019 and we have not seen her since that time. It looks like you were working with her as recent as last week, so I am wondering if you can follow-up with her about her low BG concerns.      Dr. Ricci,    If patient is interested in working with diabetes education again we will need a new referral placed for her so she can make an appt with us. We will need to have an appt with her before helping with any adjustments since it has been almost 2 years since we have met with her.     Thank you both!    Susan Farris RN, [facility]

## 2021-06-14 NOTE — TELEPHONE ENCOUNTER
Referral for diabetes ed has been signed.  Please let pt know she can schedule appt    Sandy Ricci MD

## 2021-06-14 NOTE — TELEPHONE ENCOUNTER
Spoke with the patient and reviewed MRI results and procedure information Discussed an appt but then patient decided she preferred to go to the  location for procedure.     Instructed patient that a  will be needed to take the patient home.   N/A    Sedation, if offered, is conscious sedation and the patient will not be 'put out.'  Further instructions for sedation given to the patient. NPO solids 8 hour prior ot injections (including gum and mints), clear fluids 2 hours prior to injection (no dairy product). Patient should take daily medications with small sips of water.  N/A    Patient reminder given for Medial Branch Block. Pain level should be at least 5/10 or we will not be able to perform the diagnostic test. Patient should avoid or limit taking pain medications.   Yes    Patient asked if they are on any blood thinners.  N/A    Patient asked if they have had illness/infections/or been on any antibiotics in the last 7 days (GI disturbance, UTI, colds, dental abscess, anything infectious, fever, or 'feeling under the weather')? Patient knows to inform clinic if they experience any of the above prior to the procedure  yes    Patient understands that they must also have a negative COVID-19 test within four days of their procedure. (Patient will be notified on how to test for this)  yes    If able, patient should avoid scheduling steroid injections within 2-weeks (before, between, or after) COVID-19 vaccination. Patient understands:  Yes

## 2021-06-14 NOTE — TELEPHONE ENCOUNTER
Contacted patient. She has continued to have low blood sugars since we reduced her Toujeo down to 45 units daily. She has been decreasing on her own.  Took 30 units and woke up with symptomatic low blood sugar and meter just said LOW.  MTM follow-up is scheduled for 6/23/21.    Plan:  1. Decrease Toujeo to 25 units daily.  2. Change In Target range on meter to  mg/dL.    All changes were made via collaborative practice agreement with Sandy Ricci.    Luis Antonio Bass, PharmD, [facility]  Medication Therapy Management Pharmacist  Pager: [phone]

## 2021-06-14 NOTE — TELEPHONE ENCOUNTER
Patient called diabetes ed triage line requesting to speak with Melissa about low blood sugars she has been exepriencing in the morning. She has been decreasing her Toujeo dose and took 30 units (prescribed dose is 50 units) last night and this morning her BG was too low for meter to read. Requesting call back, she is wondering if she should stop taking her Toujeo.     Mervat Martinez, RD, LD, CDE

## 2021-06-20 ENCOUNTER — OFFICE VISIT (OUTPATIENT)
Dept: URGENT CARE | Facility: URGENT CARE | Age: 71
End: 2021-06-20
Payer: COMMERCIAL

## 2021-06-20 VITALS
TEMPERATURE: 98.4 F | DIASTOLIC BLOOD PRESSURE: 68 MMHG | OXYGEN SATURATION: 82 % | SYSTOLIC BLOOD PRESSURE: 149 MMHG | HEART RATE: 75 BPM

## 2021-06-20 DIAGNOSIS — R09.02 HYPOXIA: ICD-10-CM

## 2021-06-20 DIAGNOSIS — R06.02 SOB (SHORTNESS OF BREATH): Primary | ICD-10-CM

## 2021-06-20 DIAGNOSIS — S81.802A OPEN WOUND OF LEFT LOWER EXTREMITY, INITIAL ENCOUNTER: ICD-10-CM

## 2021-06-20 LAB
ALT SERPL-CCNC: 17 IU/L (ref 12–68)
AST SERPL-CCNC: 20 IU/L (ref 15–37)
HBA1C MFR BLD: 5.9 %
TSH SERPL-ACNC: 3.64 UIU/ML (ref 0.36–3.74)

## 2021-06-20 PROCEDURE — 93000 ELECTROCARDIOGRAM COMPLETE: CPT | Performed by: INTERNAL MEDICINE

## 2021-06-20 PROCEDURE — 99215 OFFICE O/P EST HI 40 MIN: CPT | Performed by: INTERNAL MEDICINE

## 2021-06-20 NOTE — PROGRESS NOTES
SUBJECTIVE:  Yadi Iniguez is an 70 year old female who presents for wound from Mohs procedure on left posterior calf.  Had procedure done and today stiches were being removed by her dtr who is a nurse, and the wound opened up.  However, on the way to urgent care, the dtr learned that pt has been having some shortness of breath over the past week and nearly called ems earlier in the week.  Pt has also have several pound weight gain over past few days.  dtr thinks her lips look a little bluish.  Pt denies chest pain or hx of MI.      PMH:   has a past medical history of Arthritis (1975), Broken ribs (8/28/2016), Cancer (H) (2013), COPD (chronic obstructive pulmonary disease) (H) (08/2017), Depression, Depressive disorder (1988), Dysuria, Glycosuria, Hypertension (1980), Mixed incontinence urge and stress, Other and unspecified hyperlipidemia, Right shoulder pain, Type II or unspecified type diabetes mellitus without mention of complication, uncontrolled, Uncomplicated asthma (2017), and Unspecified hypothyroidism.  Patient Active Problem List   Diagnosis     Hypothyroidism     Hypertension goal BP (blood pressure) < 140/90     Type 2 diabetes, HbA1C goal < 8% (H)     Hyperlipidemia LDL goal <100     Moderate major depression (H)     Incontinence of urine     Neuropathy in diabetes (H)     Eczema     Left lumbar radiculopathy     Health Care Home     CKD (chronic kidney disease) stage 3, GFR 30-59 ml/min     Type 2 diabetes mellitus with other diabetic kidney complication, with long-term current use of insulin (H)     Osteoporosis     Morbid obesity (H)     Thrombocytopenia (H)     Chronic obstructive pulmonary disease, unspecified COPD type (H)     S/P lumbar fusion     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, with long-term current use of insulin, unspecified laterality (H)     Social History     Socioeconomic History     Marital status:       Spouse name: Not on file     Number of children: Not on file     Years of education: Not on file     Highest education level: Not on file   Occupational History     Not on file   Social Needs     Financial resource strain: Not on file     Food insecurity     Worry: Not on file     Inability: Not on file     Transportation needs     Medical: Not on file     Non-medical: Not on file   Tobacco Use     Smoking status: Current Every Day Smoker     Packs/day: 1.00     Years: 50.00     Pack years: 50.00     Types: Cigarettes     Start date: 1/1/1967     Last attempt to quit: 8/8/2017     Years since quitting: 3.8     Smokeless tobacco: Never Used     Tobacco comment: quit for 6 months back in 2018   Substance and Sexual Activity     Alcohol use: Yes     Comment: Maybe a drink every few months     Drug use: No     Sexual activity: Never   Lifestyle     Physical activity     Days per week: Not on file     Minutes per session: Not on file     Stress: Not on file   Relationships     Social connections     Talks on phone: Not on file     Gets together: Not on file     Attends Spiritism service: Not on file     Active member of club or organization: Not on file     Attends meetings of clubs or organizations: Not on file     Relationship status: Not on file     Intimate partner violence     Fear of current or ex partner: Not on file     Emotionally abused: Not on file     Physically abused: Not on file     Forced sexual activity: Not on file   Other Topics Concern     Parent/sibling w/ CABG, MI or angioplasty before 65F 55M? Yes     Comment: fatherr and 2 brothers   Social History Narrative     Not on file     Family History   Problem Relation Age of Onset     Musculoskeletal Disorder Mother         MD     Muscular Disorder Mother      Depression Mother      Osteoporosis Mother      Obesity Mother      Cataracts Mother      Genetic Disorder Mother         MD     Cancer Father         mesothelioma     Obesity Father      Heart  Disease Brother         angioplasty     Neurologic Disorder Brother         ms     Diabetes Brother      Hypertension Brother      Hyperlipidemia Brother      Obesity Brother         4 brothers     Depression Paternal Grandmother      Obesity Paternal Grandmother      Genetic Disorder Brother         MS     Diabetes Brother      Hypertension Brother      Genetic Disorder Brother         MD     Hyperlipidemia Brother      Hypertension Brother      Genetic Disorder Brother         MS     Cerebrovascular Disease Nephew      Muscular Dystrophy Nephew        ALLERGIES:  Patient has no known allergies.    Current Outpatient Medications   Medication     ACETAMINOPHEN PO     alendronate (FOSAMAX) 70 MG tablet     amLODIPine (NORVASC) 10 MG tablet     aspirin 81 MG tablet     atorvastatin (LIPITOR) 40 MG tablet     calcium carbonate (OS-NATACHA) 1500 (600 Ca) MG tablet     carvedilol (COREG) 25 MG tablet     cholecalciferol (VITAMIN D3) 5000 units TABS tablet     fenofibrate (LOFIBRA) 54 MG tablet     insulin aspart (NOVOLOG FLEXPEN) 100 UNIT/ML pen     insulin glargine U-300 (TOUJEO SOLOSTAR) 300 UNIT/ML (1 units dial) pen     levothyroxine (TIROSINT) 150 MCG capsule     losartan (COZAAR) 50 MG tablet     PARoxetine (PAXIL) 20 MG tablet     primidone (MYSOLINE) 50 MG tablet     albuterol (PROAIR HFA/PROVENTIL HFA/VENTOLIN HFA) 108 (90 Base) MCG/ACT inhaler     cephALEXin (KEFLEX) 500 MG capsule     clobetasol propionate 0.05 % LIQD     Continuous Blood Gluc Sensor (FREESTYLE MARI 14 DAY SENSOR) MISC     insulin pen needle (32G X 4 MM) 32G X 4 MM miscellaneous     No current facility-administered medications for this visit.          ROS:  ROS is done and is negative for general/constitutional, eye, ENT, Respiratory, cardiovascular, GI, , Skin, musculoskeletal except as noted elsewhere.  All other review of systems negative except as noted elsewhere.      OBJECTIVE:  BP (!) 149/68   Pulse 75   Temp 98.4  F (36.9  C)  (Tympanic)   SpO2 (!) 85%   GENERAL APPEARANCE: pt appears tired and somewhat short of breath, lips are moderately dusky.  EYES: normal  CV:regular rate and rhythm  SKIN: open wound on dorsal left calf.  Initial pulse ox was 85%.  Recheck was 82%.  2LPM of O2 via NC given and pox went up to 92% quickly and pt's facial color improved and lips become pink after appearing dusky.      RESULTS  EKG:  RRR, LAE, LVH.      ASSESSMENT/PLAN:    ASSESSMENT / PLAN:  (R06.02) SOB (shortness of breath)  (primary encounter diagnosis)  Comment: pt's shortness of breath and low O2 sat is concerning and pt needs immediate evaluation at the ER  Plan: EKG 12-lead complete w/read - Clinics        Pt sent to ER via ambulance as needs ongoing oxygen and her condition could become life threatening if untreated.  Pt and dtr in agreement with plan and pt sent by ambulance.    (R09.02) Hypoxia  Comment: as above  Plan: significant hypoxia based on sxs and pulse ox, which could become life threatening so pt sent to ER via ambulance.    (S81.802A) Open wound of left lower extremity, initial encounter  Comment: after Mohs procedure stitches removed today, the wound opened up.  Pt has DM and likely slow healing  Plan: will have ER address this issue as well when she is evaluated there.        PPE worn: mask and shield.    See [facility] for orders, medications, letters, patient instructions    Tatum Santiago M.D.

## 2021-06-21 LAB — EJECTION FRACTION: >70 %

## 2021-06-25 DIAGNOSIS — Z11.59 ENCOUNTER FOR SCREENING FOR OTHER VIRAL DISEASES: ICD-10-CM

## 2021-06-25 PROBLEM — M47.816 LUMBAR SPONDYLOSIS: Status: ACTIVE | Noted: 2021-06-25

## 2021-06-25 PROBLEM — Z98.1 S/P LUMBAR FUSION: Status: ACTIVE | Noted: 2018-11-05

## 2021-06-25 PROBLEM — M47.816 LUMBAR FACET ARTHROPATHY: Status: ACTIVE | Noted: 2021-06-25

## 2021-06-28 ENCOUNTER — TELEPHONE (OUTPATIENT)
Dept: FAMILY MEDICINE | Facility: CLINIC | Age: 71
End: 2021-06-28

## 2021-06-28 DIAGNOSIS — Z11.59 ENCOUNTER FOR SCREENING FOR OTHER VIRAL DISEASES: ICD-10-CM

## 2021-06-28 NOTE — TELEPHONE ENCOUNTER
Reason for Call:  Form, our goal is to have forms completed with 72 hours, however, some forms may require a visit or additional information.    Type of letter, form or note:  FMLA    Who is the form from?: Patient    Where did the form come from: Patient or family brought in       What clinic location was the form placed at?: [facility]    Where the form was placed: Given to MA/RN    What number is listed as a contact on the form?:        Additional comments:     Call taken on 6/28/2021 at 3:21 PM by Brook Cisneros

## 2021-06-28 NOTE — TELEPHONE ENCOUNTER
Date Scheduled: 7-16-21 and 7-23-21  Facility: [facility]  Surgeon: Dr. Sierra   Post-op appointment scheduled:    scheduled?: No  Surgery packet/instructions confirmed received?  Yes  Special Considerations:     Patient would like her COVID test at [facility], but their schedule isn't open that far out as of yet.     Regi Byrd, Surgery Scheduling Coordinator

## 2021-06-29 ENCOUNTER — OFFICE VISIT (OUTPATIENT)
Dept: DERMATOLOGY | Facility: CLINIC | Age: 71
End: 2021-06-29
Payer: COMMERCIAL

## 2021-06-29 DIAGNOSIS — D23.9 DERMATOFIBROMA: ICD-10-CM

## 2021-06-29 DIAGNOSIS — L72.0 MILIA: ICD-10-CM

## 2021-06-29 DIAGNOSIS — L82.1 SEBORRHEIC KERATOSIS: ICD-10-CM

## 2021-06-29 DIAGNOSIS — L73.8 SENILE SEBACEOUS GLAND HYPERPLASIA: ICD-10-CM

## 2021-06-29 DIAGNOSIS — L85.3 XEROSIS CUTIS: ICD-10-CM

## 2021-06-29 DIAGNOSIS — L82.1 STUCCO KERATOSIS: ICD-10-CM

## 2021-06-29 DIAGNOSIS — L57.8 SUN-DAMAGED SKIN: ICD-10-CM

## 2021-06-29 DIAGNOSIS — D18.01 CHERRY ANGIOMA: ICD-10-CM

## 2021-06-29 DIAGNOSIS — D22.9 MULTIPLE BENIGN NEVI: ICD-10-CM

## 2021-06-29 DIAGNOSIS — L81.4 SOLAR LENTIGO: ICD-10-CM

## 2021-06-29 DIAGNOSIS — Z85.828 HISTORY OF NONMELANOMA SKIN CANCER: Primary | ICD-10-CM

## 2021-06-29 DIAGNOSIS — D23.5 DILATED PORE OF WINER OF BACK: ICD-10-CM

## 2021-06-29 PROCEDURE — 99213 OFFICE O/P EST LOW 20 MIN: CPT | Performed by: DERMATOLOGY

## 2021-06-29 ASSESSMENT — PAIN SCALES - GENERAL: PAINLEVEL: NO PAIN (0)

## 2021-06-29 NOTE — TELEPHONE ENCOUNTER
Form faxed to [facility] @ [phone]. Original given to Nikki RETA, daughter.Caroline Carlos MA/ZULEMA

## 2021-06-29 NOTE — PROGRESS NOTES
[facility] Dermatology Note  Encounter Date: Jun 29, 2021  Office Visit     Dermatology Problem List:  1. 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  Family history: Negative for skin cancers.  ____________________________________________    Assessment & Plan:     # History of NMSC. No evidence of recurrence.   - Recommend sunscreens SPF #30 or greater, protective clothing and avoidance of tanning beds.   - Recommended next skin check in 6 months.    # Sun damaged skin with solar lentigines: Chronic, stable.  - Recommend sunscreens SPF #30 or greater, protective clothing and avoidance of tanning beds.    # Benign skin findings including: seborrheic keratoses, stucco keratoses, cherry angioma, dilated pores of gonzalo, milia, sebaceous hyperplasia, dermatofibroma - minor, self limited problem  - No further intervention required. Patient to report changes.   - Patient reassured of the benign nature of these lesions.    # Multiple clinically benign nevi: Chronic, stable  - No further intervention required. Patient to report changes.   - Patient reassured of the benign nature of these lesions.    # Scalp psoriasis  - Recommended using Neutrogena's T-gel shampoo, continuing clobetasol solution as needed for flares.    # Xerosis cutis  - Recommended gentle moisturizers.    Procedures Performed:   None.    Follow-up: 6 months in-person for skin check, or earlier for new or changing lesions    Staff and Scribe:     Scribe Disclosure:   I, Stephanie Calderon, am serving as a scribe to document services personally performed by this physician, Dr. Shavon Barrios, based on data collection and the provider's statements to me.     Provider Disclosure:   The documentation recorded by the scribe accurately reflects the services I personally performed and the  decisions made by me.    Shavon Barrios MD    Department of Dermatology  [facility]: Phone: [phone], Fax:[phone]  [facility]: Phone: [phone], Fax: [phone]    ____________________________________________    [place] [place]. Area of concern to right and left  shoulder ,middle chest ,left inner calf)    HPI:  Ms. Yadi Iniguez is a(n) 70 year old female who presents today as a return patient for skin check.    Last seen by Dr. Hope 6/7/21 for MMS of SCC on left posterior calf.     Today, Yadi is accompanied by daughter. Patient notes an area of concern to the right and left shoulders, middle chest, and left inner calf.    Notes scalp psoriasis. She uses an OTC psoriasis shampoo for this usually, clobetasol solution as needed for any bad flares.    Patient is otherwise feeling well, without additional skin concerns.     Labs Reviewed:  N/A    Physical Exam:  Vitals: There were no vitals taken for this visit.  SKIN: Total skin excluding the undergarment areas was performed. The exam included the head/face, neck, both arms, chest, back, abdomen, buttocks, both legs, digits and/or nails.   - De Santiago Type II  - Scattered brown macules on sun exposed areas.  - There are dome shaped bright red papules on the back.   - Multiple regular brown pigmented macules and papules are identified on the trunk and extremities.   - There are waxy stuck on tan to brown papules on the back, chest, and ankles.  - Silver plaque on pink erythema on the occipital scalp.  - Few scattered dilated pores of gonzalo on the back.  - There are white 1-2 mm papules scattered on the face.   - There is a well healed scar at the lef missy commisure  - There are yellow oily papules with central umbilication located on the left medial cheek and left lower eyelid.  - There is a roldan  sized ulceration with fibrinous base on area of previously excised NMSC.   - There is a firm tan/flesh colored papule that dimples with lateral pressure on the left medial lower leg.  - Diffuse xerosis cutis  - No other lesions of concern on areas examined.       Medications:  Current Outpatient Medications   Medication     ACETAMINOPHEN PO     albuterol (PROAIR HFA/PROVENTIL HFA/VENTOLIN HFA) 108 (90 Base) MCG/ACT inhaler     alendronate (FOSAMAX) 70 MG tablet     amLODIPine (NORVASC) 10 MG tablet     aspirin 81 MG tablet     atorvastatin (LIPITOR) 40 MG tablet     calcium carbonate (OS-NATACHA) 1500 (600 Ca) MG tablet     carvedilol (COREG) 25 MG tablet     cephALEXin (KEFLEX) 500 MG capsule     cholecalciferol (VITAMIN D3) 5000 units TABS tablet     clobetasol propionate 0.05 % LIQD     Continuous Blood Gluc Sensor ([employer] DevelopmentSTYLE MARI 14 DAY SENSOR) MISC     fenofibrate (LOFIBRA) 54 MG tablet     insulin aspart (NOVOLOG FLEXPEN) 100 UNIT/ML pen     insulin glargine U-300 (TOUJEO SOLOSTAR) 300 UNIT/ML (1 units dial) pen     insulin pen needle (32G X 4 MM) 32G X 4 MM miscellaneous     levothyroxine (TIROSINT) 150 MCG capsule     losartan (COZAAR) 50 MG tablet     PARoxetine (PAXIL) 20 MG tablet     primidone (MYSOLINE) 50 MG tablet     No current facility-administered medications for this visit.       Past Medical History:   Patient Active Problem List   Diagnosis     Hypothyroidism     Hypertension goal BP (blood pressure) < 140/90     Type 2 diabetes, HbA1C goal < 8% (H)     Hyperlipidemia LDL goal <100     Moderate major depression (H)     Incontinence of urine     Neuropathy in diabetes (H)     Eczema     Left lumbar radiculopathy     Health Care Home     CKD (chronic kidney disease) stage 3, GFR 30-59 ml/min     Type 2 diabetes mellitus with other diabetic kidney complication, with long-term current use of insulin (H)     Osteoporosis     Morbid obesity (H)     Thrombocytopenia (H)     Chronic obstructive pulmonary  disease, unspecified COPD type (H)     S/P lumbar fusion     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, with long-term current use of insulin, unspecified laterality (H)     Lumbar facet arthropathy     Lumbar spondylosis     Past Medical History:   Diagnosis Date     Arthritis 1975     Broken ribs 8/28/2016    3,4,5,6,7, left side     Cancer (H) 2013    Skin cancer     COPD (chronic obstructive pulmonary disease) (H) 08/2017     Depression      Depressive disorder 1988     Dysuria      Glycosuria      Hypertension 1980     Mixed incontinence urge and stress      Other and unspecified hyperlipidemia      Right shoulder pain      Type II or unspecified type diabetes mellitus without mention of complication, uncontrolled      Uncomplicated asthma 2017     Unspecified hypothyroidism        CC No referring provider defined for this encounter. on close of this encounter.

## 2021-06-29 NOTE — LETTER
6/29/2021         RE: Yadi Iniguez  [address]  [place] [address]        Dear Colleague,    Thank you for referring your patient, Yadi Iniguez, to the [facility]. Please see a copy of my visit note below.    [facility] Dermatology Note  Encounter Date: Jun 29, 2021  Office Visit     Dermatology Problem List:  1. 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  Family history: Negative for skin cancers.  ____________________________________________    Assessment & Plan:     # History of NMSC. No evidence of recurrence.   - Recommend sunscreens SPF #30 or greater, protective clothing and avoidance of tanning beds.   - Recommended next skin check in 6 months.    # Sun damaged skin with solar lentigines: Chronic, stable.  - Recommend sunscreens SPF #30 or greater, protective clothing and avoidance of tanning beds.    # Benign skin findings including: seborrheic keratoses, stucco keratoses, cherry angioma, dilated pores of gonzalo, milia, sebaceous hyperplasia, dermatofibroma - minor, self limited problem  - No further intervention required. Patient to report changes.   - Patient reassured of the benign nature of these lesions.    # Multiple clinically benign nevi: Chronic, stable  - No further intervention required. Patient to report changes.   - Patient reassured of the benign nature of these lesions.    # Scalp psoriasis  - Recommended using Neutrogena's T-gel shampoo, continuing clobetasol solution as needed for flares.    # Xerosis cutis  - Recommended gentle moisturizers.    Procedures Performed:   None.    Follow-up: 6 months in-person for skin check, or earlier for new or changing lesions    Staff and Scribe:     Scribe Disclosure:   Stephanie CARUSO, am serving as a scribe to document services  personally performed by this physician, Dr. Shavon Barrios, based on data collection and the provider's statements to me.     Provider Disclosure:   The documentation recorded by the scribe accurately reflects the services I personally performed and the decisions made by me.    Shavon Barrios MD    Department of Dermatology  [facility]: Phone: [phone], Fax:[phone]  [facility]: Phone: [phone], Fax: [phone]    ____________________________________________    [place] [place]. Area of concern to right and left  shoulder ,middle chest ,left inner calf)    HPI:  Ms. Yadi Iniguez is a(n) 70 year old female who presents today as a return patient for skin check.    Last seen by Dr. Hope 6/7/21 for MMS of SCC on left posterior calf.     Today, Yadi is accompanied by daughter. Patient notes an area of concern to the right and left shoulders, middle chest, and left inner calf.    Notes scalp psoriasis. She uses an OTC psoriasis shampoo for this usually, clobetasol solution as needed for any bad flares.    Patient is otherwise feeling well, without additional skin concerns.     Labs Reviewed:  N/A    Physical Exam:  Vitals: There were no vitals taken for this visit.  SKIN: Total skin excluding the undergarment areas was performed. The exam included the head/face, neck, both arms, chest, back, abdomen, buttocks, both legs, digits and/or nails.   - De Santiago Type II  - Scattered brown macules on sun exposed areas.  - There are dome shaped bright red papules on the back.   - Multiple regular brown pigmented macules and papules are identified on the trunk and extremities.   - There are waxy stuck on tan to brown papules on the back, chest, and ankles.  - Silver plaque on pink erythema on the occipital scalp.  - Few scattered dilated pores of gonzalo on the  back.  - There are white 1-2 mm papules scattered on the face.   - There is a well healed scar at the lef missy commisure  - There are yellow oily papules with central umbilication located on the left medial cheek and left lower eyelid.  - There is a roldan sized ulceration with fibrinous base on area of previously excised NMSC.   - There is a firm tan/flesh colored papule that dimples with lateral pressure on the left medial lower leg.  - Diffuse xerosis cutis  - No other lesions of concern on areas examined.       Medications:  Current Outpatient Medications   Medication     ACETAMINOPHEN PO     albuterol (PROAIR HFA/PROVENTIL HFA/VENTOLIN HFA) 108 (90 Base) MCG/ACT inhaler     alendronate (FOSAMAX) 70 MG tablet     amLODIPine (NORVASC) 10 MG tablet     aspirin 81 MG tablet     atorvastatin (LIPITOR) 40 MG tablet     calcium carbonate (OS-NATACHA) 1500 (600 Ca) MG tablet     carvedilol (COREG) 25 MG tablet     cephALEXin (KEFLEX) 500 MG capsule     cholecalciferol (VITAMIN D3) 5000 units TABS tablet     clobetasol propionate 0.05 % LIQD     Continuous Blood Gluc Sensor (Live[employer]YLE MARI 14 DAY SENSOR) MISC     fenofibrate (LOFIBRA) 54 MG tablet     insulin aspart (NOVOLOG FLEXPEN) 100 UNIT/ML pen     insulin glargine U-300 (TOUJEO SOLOSTAR) 300 UNIT/ML (1 units dial) pen     insulin pen needle (32G X 4 MM) 32G X 4 MM miscellaneous     levothyroxine (TIROSINT) 150 MCG capsule     losartan (COZAAR) 50 MG tablet     PARoxetine (PAXIL) 20 MG tablet     primidone (MYSOLINE) 50 MG tablet     No current facility-administered medications for this visit.       Past Medical History:   Patient Active Problem List   Diagnosis     Hypothyroidism     Hypertension goal BP (blood pressure) < 140/90     Type 2 diabetes, HbA1C goal < 8% (H)     Hyperlipidemia LDL goal <100     Moderate major depression (H)     Incontinence of urine     Neuropathy in diabetes (H)     Eczema     Left lumbar radiculopathy     Health Care Home     CKD  (chronic kidney disease) stage 3, GFR 30-59 ml/min     Type 2 diabetes mellitus with other diabetic kidney complication, with long-term current use of insulin (H)     Osteoporosis     Morbid obesity (H)     Thrombocytopenia (H)     Chronic obstructive pulmonary disease, unspecified COPD type (H)     S/P lumbar fusion     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, with long-term current use of insulin, unspecified laterality (H)     Lumbar facet arthropathy     Lumbar spondylosis     Past Medical History:   Diagnosis Date     Arthritis 1975     Broken ribs 8/28/2016    3,4,5,6,7, left side     Cancer (H) 2013    Skin cancer     COPD (chronic obstructive pulmonary disease) (H) 08/2017     Depression      Depressive disorder 1988     Dysuria      Glycosuria      Hypertension 1980     Mixed incontinence urge and stress      Other and unspecified hyperlipidemia      Right shoulder pain      Type II or unspecified type diabetes mellitus without mention of complication, uncontrolled      Uncomplicated asthma 2017     Unspecified hypothyroidism        CC No referring provider defined for this encounter. on close of this encounter.        Again, thank you for allowing me to participate in the care of your patient.        Sincerely,        Shavon Barrios MD

## 2021-06-29 NOTE — NURSING NOTE
Yadi Iniguez's goals for this visit include:   Chief Complaint   Patient presents with     RECHECK     [facility] Hx of SCC left calf. Area of concern to right and left  shoulder ,middle chest ,left inner calf       She requests these members of her care team be copied on today's visit information: no    PCP: Sandy Ricci    Referring Provider:  No referring provider defined for this encounter.    There were no vitals taken for this visit.    Do you need any medication refills at today's visit? No..Raya Meeks RN

## 2021-06-30 ENCOUNTER — TELEPHONE (OUTPATIENT)
Dept: PHARMACY | Facility: CLINIC | Age: 71
End: 2021-06-30

## 2021-06-30 NOTE — TELEPHONE ENCOUNTER
Patient went into hospital before follow-up visit could be complete and was unavailable. Called again today a left message for patient to consider rescheduling our visit to discuss recent medication changes.    Luis Antonio Bass, ElainaD, [facility]  Medication Therapy Management Pharmacist  Pager: [phone]

## 2021-07-07 ENCOUNTER — PATIENT OUTREACH (OUTPATIENT)
Dept: EDUCATION SERVICES | Facility: CLINIC | Age: 71
End: 2021-07-07
Attending: FAMILY MEDICINE
Payer: COMMERCIAL

## 2021-07-07 DIAGNOSIS — Z79.4 TYPE 2 DIABETES MELLITUS WITH OTHER DIABETIC KIDNEY COMPLICATION, WITH LONG-TERM CURRENT USE OF INSULIN (H): ICD-10-CM

## 2021-07-07 DIAGNOSIS — E11.9 TYPE 2 DIABETES, HBA1C GOAL < 8% (H): ICD-10-CM

## 2021-07-07 DIAGNOSIS — E11.29 TYPE 2 DIABETES MELLITUS WITH OTHER DIABETIC KIDNEY COMPLICATION, WITH LONG-TERM CURRENT USE OF INSULIN (H): ICD-10-CM

## 2021-07-07 PROCEDURE — G0108 DIAB MANAGE TRN  PER INDIV: HCPCS | Mod: 95

## 2021-07-07 RX ORDER — INSULIN GLARGINE 300 U/ML
20 INJECTION, SOLUTION SUBCUTANEOUS AT BEDTIME
Qty: 15 ML | Refills: 1 | Status: SHIPPED | OUTPATIENT
Start: 2021-07-07 | End: 2022-01-04

## 2021-07-07 RX ORDER — INSULIN ASPART 100 [IU]/ML
INJECTION, SOLUTION INTRAVENOUS; SUBCUTANEOUS
Qty: 75 ML | Refills: 1 | Status: SHIPPED | OUTPATIENT
Start: 2021-07-07 | End: 2022-01-04

## 2021-07-07 NOTE — PROGRESS NOTES
"Diabetes Self-Management Education & Support    Presents for: Individual review.  Type of Service: Telephone Visit  Audio only visit done, as patient does not have access to audio-visual technology.  Individual visit provided, given no goup classes are available for 2 months.   How would patient like to obtain AVS? Ivan    SUBJECTIVE/OBJECTIVE:  Presents for: Individual review  Accompanied by: Self  Diabetes education in the past 24mo: No  Focus of Visit: Taking Medication, Reducing Risks  Diabetes type: Type 2  Disease course: Improving  Other concerns:: None  Cultural Influences/Ethnic Background:  Not  or     Diabetes Symptoms & Complications:   Not assessed at this visit        Patient Problem List and Family Medical History reviewed for relevant medical history, current medical status, and diabetes risk factors.    Vitals:  There were no vitals taken for this visit.  Estimated body mass index is 39.58 kg/m  as calculated from the following:    Height as of 1/21/21: 1.753 m (5' 9\").    Weight as of 5/14/21: 121.6 kg (268 lb).   Last 3 BP:   BP Readings from Last 3 Encounters:   06/20/21 (!) 149/68   06/07/21 122/59   05/14/21 (!) 153/72       History   Smoking Status     Current Every Day Smoker     Packs/day: 1.00     Years: 50.00     Types: Cigarettes     Start date: 1/1/1967     Last attempt to quit: 8/8/2017   Smokeless Tobacco     Never Used     Comment: quit for 6 months back in 2018       Labs:  Lab Results   Component Value Date    A1C 6.1 01/14/2021     Lab Results   Component Value Date    GLC 97 01/14/2021     Lab Results   Component Value Date    LDL 92 01/14/2021     HDL Cholesterol   Date Value Ref Range Status   01/14/2021 49 (L) >49 mg/dL Final   ]  GFR Estimate   Date Value Ref Range Status   01/14/2021 45 (L) >60 mL/min/[1.73_m2] Final     Comment:     Non  GFR Calc  Starting 12/18/2018, serum creatinine based estimated GFR (eGFR) will be   calculated using the " Chronic Kidney Disease Epidemiology Collaboration   (CKD-EPI) equation.       GFR Estimate If Black   Date Value Ref Range Status   01/14/2021 52 (L) >60 mL/min/[1.73_m2] Final     Comment:      GFR Calc  Starting 12/18/2018, serum creatinine based estimated GFR (eGFR) will be   calculated using the Chronic Kidney Disease Epidemiology Collaboration   (CKD-EPI) equation.       Lab Results   Component Value Date    CR 1.20 01/14/2021     No results found for: MICROALBUMIN    Healthy Eating:  Healthy Eating Assessed Today: No    Being Active:  Being Active Assessed Today: Yes    Monitoring:  Monitoring Assessed Today: Yes  Blood Glucose Meter: CGM  Times checking blood sugar at home (number): 5+  Times checking blood sugar at home (per): Day  Blood glucose trend: Decreasing    Set target range   Last 7 days   Below 3%   In target 89%  Above target: 8%   Last 7 days   12am to 6am has the lowest drop   Low glucose events:   2 events 12-6am   1 event 6pm and 12am     Blood sugars upon waking are    Breakfast and lunch        Taking Medications:  Diabetes Medication(s)     Insulin       insulin aspart (NOVOLOG FLEXPEN) 100 UNIT/ML pen    22 units before breakfast, 26 units before lunch, 20 units before dinner     insulin glargine U-300 (TOUJEO SOLOSTAR) 300 UNIT/ML (1 units dial) pen    Inject 25 Units Subcutaneous At Bedtime      Toujeo 0-0-0-20  Novolog 20-0-20-0    Current Treatments: Insulin Injections  Problems taking diabetes medications regularly?: No    Problem Solving:  Problem Solving Assessed Today: Yes  Is the patient at risk for hypoglycemia?: Yes  Hypoglycemia Frequency: Weekly  Hypoglycemia Treatment: Glucose (tablets or gel)    Reducing Risks:  Reducing Risks Assessed Today: No    Healthy Coping:  Emotional response to diabetes: Ready to learn  Stage of change: ACTION (Actively working towards change)  Patient Activation Measure Survey Score:  LESLY Score (Last Two) 6/9/2011    LESLY Raw Score 41   Activation Score 63.2   LESLY Level 3       Diabetes knowledge and skills assessment:   Patient is knowledgeable in diabetes management concepts related to: Healthy Eating, Being Active, Monitoring, Taking Medication, Problem Solving, Reducing Risks and Healthy Coping  Patient needs further education on the following diabetes management concepts: general review as needed  Based on learning assessment above, most appropriate setting for further diabetes education would be: Individual setting.      INTERVENTIONS:  Education provided today on:  AADE Self-Care Behaviors:  Being Active: relationship to blood glucose and precautions to take  Monitoring: continuous glucose monitor evaluation   Taking Medication: action of prescribed medication  Problem Solving: low blood glucose - causes, signs/symptoms, treatment and prevention and carrying a carbohydrate source at all times    Opportunities for ongoing education and support in diabetes-self management were discussed.  Pt verbalized understanding of concepts discussed and recommendations provided today.         ASSESSMENT:  Well controlled and now having to decrease insulin with over control of blood sugars.  Yadi appropriately decreased Toujeo further on her to 0-0-0-20.  Has stopped taking the lunch Novolog, which is a smaller meal and is not seeing a rise on the continuous glucose monitor and blood sugars.   Reviewed history on FreeStyle Gallito today; 89% in range () and still 3% low in the last week.  Reviewed low glucose events and they are 6pm to 6pm and likely related to dinner novolog.  Recommended a 20% decrease to dinner Novolog dose from 20 down to 16 units.  Yadi will mychart asap with any further readings < 70.  Discussed watching the reader; looking at the graph on the screen to watch the trends / patterns and if the line turns red (< 70).  She will quickly scan in the middle of the night if getting up to go to the bathroom as well.   Keeps a glucose liquid at bedside to treat hypoglycemia if needed.  Commended patient on excellent control and now the main priority is minimizing hypoglycemia.  Will update medication list to reflect current dosing. Yadi's daughter may be able to bring the lucila reader to the [facility] for upload as well.       Patient's most recent  is meeting goal of <7.0   Lab Results   Component Value Date    A1C 6.1 01/14/2021    A1C 6.3 06/30/2020    A1C 6.7 01/06/2020    A1C 6.7 07/09/2019    A1C 7.1 01/31/2019         PLAN  Reduce Novolog to 16 units with dinner   Follow up with blood sugars in 1 week or sooner if low     Oriana Apple RD, LD, [facility] [place]  Diabetes Education    Time Spent: 30 minutes  Encounter Type: Individual    Any diabetes medication dose changes were made via the CDE Protocol and Collaborative Practice Agreement with the patient's referring provider. A copy of this encounter was shared with the provider.

## 2021-07-09 ENCOUNTER — MYC MEDICAL ADVICE (OUTPATIENT)
Dept: NEUROSURGERY | Facility: CLINIC | Age: 71
End: 2021-07-09

## 2021-07-13 ENCOUNTER — DOCUMENTATION ONLY (OUTPATIENT)
Dept: EDUCATION SERVICES | Facility: CLINIC | Age: 71
End: 2021-07-13

## 2021-07-13 DIAGNOSIS — Z11.59 ENCOUNTER FOR SCREENING FOR OTHER VIRAL DISEASES: ICD-10-CM

## 2021-07-13 LAB — SARS-COV-2 RNA RESP QL NAA+PROBE: NEGATIVE

## 2021-07-13 PROCEDURE — U0003 INFECTIOUS AGENT DETECTION BY NUCLEIC ACID (DNA OR RNA); SEVERE ACUTE RESPIRATORY SYNDROME CORONAVIRUS 2 (SARS-COV-2) (CORONAVIRUS DISEASE [COVID-19]), AMPLIFIED PROBE TECHNIQUE, MAKING USE OF HIGH THROUGHPUT TECHNOLOGIES AS DESCRIBED BY CMS-2020-01-R: HCPCS

## 2021-07-13 PROCEDURE — U0005 INFEC AGEN DETEC AMPLI PROBE: HCPCS

## 2021-07-14 DIAGNOSIS — Z11.59 ENCOUNTER FOR SCREENING FOR OTHER VIRAL DISEASES: ICD-10-CM

## 2021-07-15 ENCOUNTER — TRANSFERRED RECORDS (OUTPATIENT)
Dept: HEALTH INFORMATION MANAGEMENT | Facility: CLINIC | Age: 71
End: 2021-07-15

## 2021-07-15 ENCOUNTER — MYC MEDICAL ADVICE (OUTPATIENT)
Dept: FAMILY MEDICINE | Facility: CLINIC | Age: 71
End: 2021-07-15

## 2021-07-15 NOTE — TELEPHONE ENCOUNTER
To surgeon, are you able to order this for patient? We should be able to do it here at her appointment, we just need the surgeon to order this so results will go to correct provider. Paige MADRIGAL -

## 2021-07-17 ENCOUNTER — DOCUMENTATION ONLY (OUTPATIENT)
Dept: LAB | Facility: CLINIC | Age: 71
End: 2021-07-17

## 2021-07-17 DIAGNOSIS — E11.29 TYPE 2 DIABETES MELLITUS WITH OTHER DIABETIC KIDNEY COMPLICATION, WITH LONG-TERM CURRENT USE OF INSULIN (H): Primary | ICD-10-CM

## 2021-07-17 DIAGNOSIS — Z79.4 TYPE 2 DIABETES MELLITUS WITH OTHER DIABETIC KIDNEY COMPLICATION, WITH LONG-TERM CURRENT USE OF INSULIN (H): Primary | ICD-10-CM

## 2021-07-17 NOTE — PROGRESS NOTES
Yadi MCDUFFIE Brittneymonica has an upcoming lab appointment:    Future Appointments   Date Time Provider Department Center   7/20/2021  8:15 AM AN [facility]   7/20/2021 10:30 AM AN COVID LAB [facility]   7/23/2021 12:00 PM [facility] [place]   7/27/2021  8:20 AM Sandy Ricci MD [facility]   7/30/2021 12:30 PM [facility] [place]   1/11/2022  9:00 AM [facility] [place]     Patient is scheduled for the following lab(s): no schedule notes    Patient either has no future order, or has Health Maintenance labs due. Please review and place either future orders or HMPO (Review of Health Maintenance Protocol Orders), as appropriate.    Health Maintenance Due   Topic     ANNUAL REVIEW OF HM ORDERS      A1C      MICROALBUMIN      Kenia Bergman

## 2021-07-20 ENCOUNTER — TRANSFERRED RECORDS (OUTPATIENT)
Dept: HEALTH INFORMATION MANAGEMENT | Facility: CLINIC | Age: 71
End: 2021-07-20

## 2021-07-20 ENCOUNTER — LAB (OUTPATIENT)
Dept: LAB | Facility: CLINIC | Age: 71
End: 2021-07-20
Payer: COMMERCIAL

## 2021-07-20 DIAGNOSIS — Z79.4 TYPE 2 DIABETES MELLITUS WITH OTHER DIABETIC KIDNEY COMPLICATION, WITH LONG-TERM CURRENT USE OF INSULIN (H): ICD-10-CM

## 2021-07-20 DIAGNOSIS — Z11.59 ENCOUNTER FOR SCREENING FOR OTHER VIRAL DISEASES: ICD-10-CM

## 2021-07-20 DIAGNOSIS — E11.29 TYPE 2 DIABETES MELLITUS WITH OTHER DIABETIC KIDNEY COMPLICATION, WITH LONG-TERM CURRENT USE OF INSULIN (H): ICD-10-CM

## 2021-07-20 LAB
CREAT UR-MCNC: 89 MG/DL
HBA1C MFR BLD: 6.5 % (ref 0–5.6)
MICROALBUMIN UR-MCNC: 78 MG/DL
MICROALBUMIN/CREAT UR: 87.64 MG/G CR (ref 0–25)
SARS-COV-2 RNA RESP QL NAA+PROBE: NEGATIVE

## 2021-07-20 PROCEDURE — U0005 INFEC AGEN DETEC AMPLI PROBE: HCPCS

## 2021-07-20 PROCEDURE — 82043 UR ALBUMIN QUANTITATIVE: CPT

## 2021-07-20 PROCEDURE — 83036 HEMOGLOBIN GLYCOSYLATED A1C: CPT

## 2021-07-20 PROCEDURE — 36415 COLL VENOUS BLD VENIPUNCTURE: CPT

## 2021-07-20 PROCEDURE — U0003 INFECTIOUS AGENT DETECTION BY NUCLEIC ACID (DNA OR RNA); SEVERE ACUTE RESPIRATORY SYNDROME CORONAVIRUS 2 (SARS-COV-2) (CORONAVIRUS DISEASE [COVID-19]), AMPLIFIED PROBE TECHNIQUE, MAKING USE OF HIGH THROUGHPUT TECHNOLOGIES AS DESCRIBED BY CMS-2020-01-R: HCPCS

## 2021-07-21 LAB
CREATININE (EXTERNAL): 1.37 MG/DL (ref 0.55–1.02)
GFR ESTIMATED (EXTERNAL): 39 ML/MIN
GFR ESTIMATED (IF AFRICAN AMERICAN) (EXTERNAL): 45 ML/MIN
GLUCOSE (EXTERNAL): 145 MG/DL (ref 70–110)
POTASSIUM (EXTERNAL): 4.5 MMOL/L (ref 3.5–5.1)

## 2021-07-23 ENCOUNTER — MYC MEDICAL ADVICE (OUTPATIENT)
Dept: FAMILY MEDICINE | Facility: CLINIC | Age: 71
End: 2021-07-23

## 2021-07-23 ENCOUNTER — HOSPITAL ENCOUNTER (OUTPATIENT)
Facility: AMBULATORY SURGERY CENTER | Age: 71
End: 2021-07-23
Attending: PHYSICAL MEDICINE & REHABILITATION
Payer: COMMERCIAL

## 2021-07-25 PROBLEM — I27.20 PULMONARY HYPERTENSION (H): Status: ACTIVE | Noted: 2021-07-25

## 2021-07-25 PROBLEM — I35.0 NONRHEUMATIC AORTIC VALVE STENOSIS: Status: ACTIVE | Noted: 2021-07-25

## 2021-07-27 ENCOUNTER — VIRTUAL VISIT (OUTPATIENT)
Dept: FAMILY MEDICINE | Facility: CLINIC | Age: 71
End: 2021-07-27
Payer: COMMERCIAL

## 2021-07-27 DIAGNOSIS — J44.9 CHRONIC OBSTRUCTIVE PULMONARY DISEASE, UNSPECIFIED COPD TYPE (H): ICD-10-CM

## 2021-07-27 DIAGNOSIS — Z79.4 TYPE 2 DIABETES MELLITUS WITH OTHER DIABETIC KIDNEY COMPLICATION, WITH LONG-TERM CURRENT USE OF INSULIN (H): Primary | ICD-10-CM

## 2021-07-27 DIAGNOSIS — N18.32 STAGE 3B CHRONIC KIDNEY DISEASE (H): ICD-10-CM

## 2021-07-27 DIAGNOSIS — E11.29 TYPE 2 DIABETES MELLITUS WITH OTHER DIABETIC KIDNEY COMPLICATION, WITH LONG-TERM CURRENT USE OF INSULIN (H): Primary | ICD-10-CM

## 2021-07-27 DIAGNOSIS — G47.33 OSA (OBSTRUCTIVE SLEEP APNEA): ICD-10-CM

## 2021-07-27 DIAGNOSIS — I27.20 PULMONARY HYPERTENSION (H): ICD-10-CM

## 2021-07-27 PROCEDURE — 99214 OFFICE O/P EST MOD 30 MIN: CPT | Mod: 95 | Performed by: FAMILY MEDICINE

## 2021-07-27 ASSESSMENT — ANXIETY QUESTIONNAIRES
7. FEELING AFRAID AS IF SOMETHING AWFUL MIGHT HAPPEN: NOT AT ALL
2. NOT BEING ABLE TO STOP OR CONTROL WORRYING: NOT AT ALL
5. BEING SO RESTLESS THAT IT IS HARD TO SIT STILL: NOT AT ALL
6. BECOMING EASILY ANNOYED OR IRRITABLE: NOT AT ALL
IF YOU CHECKED OFF ANY PROBLEMS ON THIS QUESTIONNAIRE, HOW DIFFICULT HAVE THESE PROBLEMS MADE IT FOR YOU TO DO YOUR WORK, TAKE CARE OF THINGS AT HOME, OR GET ALONG WITH OTHER PEOPLE: NOT DIFFICULT AT ALL
GAD7 TOTAL SCORE: 4
3. WORRYING TOO MUCH ABOUT DIFFERENT THINGS: NOT AT ALL
1. FEELING NERVOUS, ANXIOUS, OR ON EDGE: MORE THAN HALF THE DAYS

## 2021-07-27 ASSESSMENT — PATIENT HEALTH QUESTIONNAIRE - PHQ9
5. POOR APPETITE OR OVEREATING: MORE THAN HALF THE DAYS
SUM OF ALL RESPONSES TO PHQ QUESTIONS 1-9: 4

## 2021-07-27 NOTE — PROGRESS NOTES
"Yadi is a 70 year old who is being evaluated via a billable video visit.      How would you like to obtain your AVS? Declined   If the video visit is dropped, the invitation should be resent by: Text to cell phone: [phone]  Will anyone else be joining your video visit? No    Video Start Time: switched to phone call as pt unable to connect via [employer]    Assessment & Plan     Type 2 diabetes mellitus with other diabetic kidney complication, with long-term current use of insulin (H)  Controlled on insulin. Now managed at the TCU    Chronic obstructive pulmonary disease, unspecified COPD type (H)  On albuterol and oxygen, now seeing pulm    Pulmonary hypertension (H)  Per cards, improved on diuretics    Stage 3b chronic kidney disease  stable    KIN (obstructive sleep apnea)  Has upcoming evaluation               Tobacco Cessation:   reports that she has been smoking cigarettes. She started smoking about 54 years ago. She has a 50.00 pack-year smoking history. She has never used smokeless tobacco.      BMI:   Estimated body mass index is 39.58 kg/m  as calculated from the following:    Height as of 1/21/21: 1.753 m (5' 9\").    Weight as of 5/14/21: 121.6 kg (268 lb).   Weight management plan: Discussed healthy diet and exercise guidelines        No follow-ups on file.    Sandy Ricci MD  [facility]   Yadi is a 70 year old who presents for the following health issues     HPI     Diabetes Follow-up    How often are you checking your blood sugar? Four or more times daily  3-5 times   Blood sugar testing frequency justification:  Adjustment of medication(s)  What time of day are you checking your blood sugars (select all that apply)?  Before and after meals  Have you had any blood sugars above 200?  No  Have you had any blood sugars below 70?  No    What symptoms do you notice when your blood sugar is low?  Tired, dizzy, light headed and sick to stomach    What concerns " do you have today about your diabetes? None     Do you have any of these symptoms? (Select all that apply)  Numbness in feet        Hyperlipidemia Follow-Up      Are you regularly taking any medication or supplement to lower your cholesterol?   Yes- atorvastatin and fenofibrate    Are you having muscle aches or other side effects that you think could be caused by your cholesterol lowering medication?  No    Hypertension Follow-up      Do you check your blood pressure regularly outside of the clinic? No     Are you following a low salt diet? Yes    Are your blood pressures ever more than 140 on the top number (systolic) OR more   than 90 on the bottom number (diastolic), for example 140/90?     BP Readings from Last 2 Encounters:   06/20/21 (!) 149/68   06/07/21 122/59     Hemoglobin A1C (%)   Date Value   07/20/2021 6.5 (H)   01/14/2021 6.1 (H)   06/30/2020 6.3 (H)     LDL Cholesterol Calculated (mg/dL)   Date Value   01/14/2021 92   06/30/2020 102 (H)       Hypothyroidism Follow-up      Since last visit, patient describes the following symptoms: constipation    Pt with diabetes well controlled on medications  She is on insulin  Pt also on arb, statin and aspirin  She is utd with eye md      Pt with recent hospitalization for sob, hypoxia and weight gain  Felt to be pulm edema she was diuresed  Echo showed severe pulm HTN  She was discharged on oxygen 2 LPM day and night and diuretics  She was seen by both cardiology and pulmonology    Her respiratory failure was felt to be due to fluid overload, cor pulmonale from KIN,/COPD/obesity  She is going to be re-evalulated for KIN as this has been untreated due to finances  She is seeing pulm for copd and using albuterol inhaler and O2 at this point  Her pulm htn was exacerbated but above factors but will continue on diuretics per cardiology        Review of Systems   Constitutional, HEENT, cardiovascular, pulmonary, gi and gu systems are negative, except as otherwise  noted.      Objective           Vitals:  No vitals were obtained today due to virtual visit.    Physical Exam   GENERAL: Healthy, alert and no distress  EYES: Eyes grossly normal to inspection.  No discharge or erythema, or obvious scleral/conjunctival abnormalities.  RESP: No audible wheeze, cough, or visible cyanosis.  No visible retractions or increased work of breathing.    SKIN: Visible skin clear. No significant rash, abnormal pigmentation or lesions.  NEURO: Cranial nerves grossly intact.  Mentation and speech appropriate for age.  PSYCH: Mentation appears normal, affect normal/bright, judgement and insight intact, normal speech and appearance well-groomed.    No results found for this or any previous visit (from the past 24 hour(s)).            Video-Visit Details    Type of service:  phone for 12 minutes from U

## 2021-07-28 ASSESSMENT — ANXIETY QUESTIONNAIRES: GAD7 TOTAL SCORE: 4

## 2021-07-30 ENCOUNTER — HOSPITAL ENCOUNTER (OUTPATIENT)
Facility: AMBULATORY SURGERY CENTER | Age: 71
End: 2021-07-30
Attending: PHYSICAL MEDICINE & REHABILITATION
Payer: COMMERCIAL

## 2021-07-30 DIAGNOSIS — M47.816 LUMBAR SPONDYLOSIS: ICD-10-CM

## 2021-07-30 DIAGNOSIS — M47.816 LUMBAR FACET ARTHROPATHY: ICD-10-CM

## 2021-08-04 DIAGNOSIS — E08.3419: Primary | ICD-10-CM

## 2021-08-04 DIAGNOSIS — Z79.4: Primary | ICD-10-CM

## 2021-08-04 NOTE — TELEPHONE ENCOUNTER
Patient's daughter, Nikki calling asking if PCP will order a glucometer with strips for back-up to her Gallito.  This RN pended orders.     Routing to PCP to sign if ok.     Yaa GARCIAN, RN

## 2021-08-24 LAB
CREATININE (EXTERNAL): 1.55 MG/DL (ref 0.55–1.02)
GFR ESTIMATED (EXTERNAL): 33 ML/MIN
GFR ESTIMATED (IF AFRICAN AMERICAN) (EXTERNAL): 38 ML/MIN
GLUCOSE (EXTERNAL): 198 MG/DL (ref 74–106)
POTASSIUM (EXTERNAL): 4.9 MMOL/L (ref 3.5–5.1)

## 2021-09-04 ENCOUNTER — LAB (OUTPATIENT)
Dept: LAB | Facility: CLINIC | Age: 71
End: 2021-09-04
Payer: COMMERCIAL

## 2021-09-04 DIAGNOSIS — I27.20 PULMONARY HYPERTENSION (H): ICD-10-CM

## 2021-09-04 DIAGNOSIS — G47.33 OSA (OBSTRUCTIVE SLEEP APNEA): Primary | ICD-10-CM

## 2021-09-04 LAB
ANION GAP SERPL CALCULATED.3IONS-SCNC: 6 MMOL/L (ref 3–14)
BUN SERPL-MCNC: 33 MG/DL (ref 7–30)
CALCIUM SERPL-MCNC: 8.9 MG/DL (ref 8.5–10.1)
CHLORIDE BLD-SCNC: 100 MMOL/L (ref 94–109)
CO2 SERPL-SCNC: 29 MMOL/L (ref 20–32)
CREAT SERPL-MCNC: 1.81 MG/DL (ref 0.52–1.04)
GFR SERPL CREATININE-BSD FRML MDRD: 28 ML/MIN/1.73M2
GLUCOSE BLD-MCNC: 189 MG/DL (ref 70–99)
POTASSIUM BLD-SCNC: 4.4 MMOL/L (ref 3.4–5.3)
SODIUM SERPL-SCNC: 135 MMOL/L (ref 133–144)

## 2021-09-04 PROCEDURE — 36415 COLL VENOUS BLD VENIPUNCTURE: CPT

## 2021-09-04 PROCEDURE — 80048 BASIC METABOLIC PNL TOTAL CA: CPT

## 2021-09-07 ENCOUNTER — MYC MEDICAL ADVICE (OUTPATIENT)
Dept: FAMILY MEDICINE | Facility: CLINIC | Age: 71
End: 2021-09-07

## 2021-09-07 DIAGNOSIS — G25.0 ESSENTIAL TREMOR: ICD-10-CM

## 2021-09-07 RX ORDER — PRIMIDONE 50 MG/1
75 TABLET ORAL 2 TIMES DAILY
Qty: 270 TABLET | Refills: 1 | Status: SHIPPED | OUTPATIENT
Start: 2021-09-07 | End: 2021-10-16

## 2021-09-07 NOTE — TELEPHONE ENCOUNTER
Routing refill request to provider for review/approval because:  Drug not on the [facility] refill protocol     Last prescriptions were for # 270 with 1 refill.  Loan Delcid RN

## 2021-09-11 ENCOUNTER — HEALTH MAINTENANCE LETTER (OUTPATIENT)
Age: 71
End: 2021-09-11

## 2021-09-11 NOTE — TELEPHONE ENCOUNTER
Pt gave phone to daughter so she could hear message from provider.  Pt daughter notified of provider message as written.  Pt daughter verbalized good understanding.  Margaret GARCIAN, RN       radiology results pending

## 2021-10-13 ENCOUNTER — MYC MEDICAL ADVICE (OUTPATIENT)
Dept: FAMILY MEDICINE | Facility: CLINIC | Age: 71
End: 2021-10-13

## 2021-10-13 DIAGNOSIS — G25.0 ESSENTIAL TREMOR: ICD-10-CM

## 2021-10-16 RX ORDER — PRIMIDONE 50 MG/1
100 TABLET ORAL 2 TIMES DAILY
Qty: 360 TABLET | Refills: 1 | Status: SHIPPED | OUTPATIENT
Start: 2021-10-16 | End: 2022-01-18

## 2021-11-06 ENCOUNTER — HEALTH MAINTENANCE LETTER (OUTPATIENT)
Age: 71
End: 2021-11-06

## 2021-11-15 DIAGNOSIS — E11.29 TYPE 2 DIABETES MELLITUS WITH OTHER DIABETIC KIDNEY COMPLICATION, WITH LONG-TERM CURRENT USE OF INSULIN (H): ICD-10-CM

## 2021-11-15 DIAGNOSIS — Z79.4 TYPE 2 DIABETES MELLITUS WITH OTHER DIABETIC KIDNEY COMPLICATION, WITH LONG-TERM CURRENT USE OF INSULIN (H): ICD-10-CM

## 2021-11-16 ENCOUNTER — TRANSFERRED RECORDS (OUTPATIENT)
Dept: HEALTH INFORMATION MANAGEMENT | Facility: CLINIC | Age: 71
End: 2021-11-16
Payer: COMMERCIAL

## 2021-11-16 RX ORDER — FLASH GLUCOSE SENSOR
KIT MISCELLANEOUS
Qty: 2 EACH | Refills: 11 | Status: SHIPPED | OUTPATIENT
Start: 2021-11-16 | End: 2022-01-18

## 2022-01-04 ENCOUNTER — ALLIED HEALTH/NURSE VISIT (OUTPATIENT)
Dept: EDUCATION SERVICES | Facility: CLINIC | Age: 72
End: 2022-01-04
Payer: COMMERCIAL

## 2022-01-04 DIAGNOSIS — E11.9 TYPE 2 DIABETES, HBA1C GOAL < 8% (H): ICD-10-CM

## 2022-01-04 DIAGNOSIS — Z79.4 TYPE 2 DIABETES MELLITUS WITH OTHER DIABETIC KIDNEY COMPLICATION, WITH LONG-TERM CURRENT USE OF INSULIN (H): ICD-10-CM

## 2022-01-04 DIAGNOSIS — E11.9 DIABETES MELLITUS WITHOUT COMPLICATION (H): Primary | ICD-10-CM

## 2022-01-04 DIAGNOSIS — E11.29 TYPE 2 DIABETES MELLITUS WITH OTHER DIABETIC KIDNEY COMPLICATION, WITH LONG-TERM CURRENT USE OF INSULIN (H): ICD-10-CM

## 2022-01-04 PROCEDURE — G0108 DIAB MANAGE TRN  PER INDIV: HCPCS

## 2022-01-04 PROCEDURE — 99207 PR DROP WITH A PROCEDURE: CPT

## 2022-01-04 RX ORDER — INSULIN GLARGINE 300 U/ML
33 INJECTION, SOLUTION SUBCUTANEOUS AT BEDTIME
Qty: 15 ML | Refills: 1 | Status: SHIPPED | OUTPATIENT
Start: 2022-01-04 | End: 2022-02-08

## 2022-01-04 RX ORDER — INSULIN ASPART 100 [IU]/ML
INJECTION, SOLUTION INTRAVENOUS; SUBCUTANEOUS
Qty: 75 ML | Refills: 1 | Status: SHIPPED | OUTPATIENT
Start: 2022-01-04 | End: 2022-03-22

## 2022-01-04 NOTE — PATIENT INSTRUCTIONS
Diabetes Support Resources:  1. Bedtime snack to add to your day.    2. Protein shakes every am if able to .    3. More fruits, frozen berries.  With cottage cheese.  White cheeses better    4. Toujeo start with 33 units at bedtime, X 4 days then if your first BG in the morning is not at goal of 150, then increase by 10% ( 36 units ),  If after 4 more days if BG not to goal at 150 then increase by 10%( 39 units).      5. Novolog 25/5/25  Units with meals, no insulin before bed.  Try to take the insulin 15 minutes before the meal.  Try to eat dinner earlier, before 9:00 pm    6. Send me an update next week.       Bring blood glucose meter and logbook with you to all doctor and follow-up appointments.    Diabetes Education Telephone Visit Follow-up:    We realize your time is valuable and your health is important! We offer a convenient Telephone Visit follow up! It s a quick way to check in for a medication dose adjustment without having to come back to clinic as soon.    Telephone Visits are often covered by insurance. Please check with your insurance plan to see if this type of visit is covered. If not, the cost is less expensive than an office visit:      Up to 10 minutes (Code 49407): $30    11-20 minutes (Code 68422): $59    More than 20 minutes (Code 99021): $85    Talk with your Diabetes Educator if you want to learn more.      [place] [facility]:  For Your Diabetes Education and Nutrition Appointments Call:  [phone]   For Diabetes Education or Nutrition Related Questions:   Phone: [phone]  Send [employer] Message   If you need a medication refill please contact your pharmacy. Please allow 3 business days for your refills to be completed.

## 2022-01-04 NOTE — PROGRESS NOTES
"Diabetes Self-Management Education & Support    Presents for: Individual review    SUBJECTIVE/OBJECTIVE:  Presents for: Individual review  Accompanied by: Self  Diabetes education in the past 24mo: No  Diabetes type: Type 2  Disease course: Getting harder to manage  Transportation concerns: Yes  Difficulty affording diabetes medication?: No  Difficulty affording diabetes testing supplies?: No  Other concerns:: None  Cultural Influences/Ethnic Background:  Not  or       Diabetes Symptoms & Complications:  Fatigue: Yes  Neuropathy: Yes  Polydipsia: Sometimes  Polyphagia: Yes  Polyuria: Yes  Visual change: Yes  Slow healing wounds: No  Symptom course: Worsening  Weight trend: Fluctuating  Autonomic neuropathy: No  CVA: No  Heart disease: Other  Nephropathy: Yes  Peripheral neuropathy: Yes  Peripheral Vascular Disease: No  Retinopathy: Yes  Sexual dysfunction: No    Patient Problem List and Family Medical History reviewed for relevant medical history, current medical status, and diabetes risk factors.    Vitals:  There were no vitals taken for this visit.  Estimated body mass index is 39.58 kg/m  as calculated from the following:    Height as of 1/21/21: 1.753 m (5' 9\").    Weight as of 5/14/21: 121.6 kg (268 lb).   Last 3 BP:   BP Readings from Last 3 Encounters:   06/20/21 (!) 149/68   06/07/21 122/59   05/14/21 (!) 153/72       History   Smoking Status     Current Every Day Smoker     Packs/day: 1.00     Years: 50.00     Types: Cigarettes     Start date: 1/1/1967     Last attempt to quit: 8/8/2017   Smokeless Tobacco     Never Used     Comment: quit for 6 months back in 2018       Labs:  Lab Results   Component Value Date    A1C 6.5 07/20/2021    A1C 6.1 01/14/2021     Lab Results   Component Value Date     09/04/2021    GLC 97 01/14/2021     Lab Results   Component Value Date    LDL 92 01/14/2021     HDL Cholesterol   Date Value Ref Range Status   01/14/2021 49 (L) >49 mg/dL Final   ]  GFR " Estimate   Date Value Ref Range Status   09/04/2021 28 (L) >60 mL/min/1.73m2 Final     Comment:     As of July 11, 2021, eGFR is calculated by the CKD-EPI creatinine equation, without race adjustment. eGFR can be influenced by muscle mass, exercise, and diet. The reported eGFR is an estimation only and is only applicable if the renal function is stable.   01/14/2021 45 (L) >60 mL/min/[1.73_m2] Final     Comment:     Non  GFR Calc  Starting 12/18/2018, serum creatinine based estimated GFR (eGFR) will be   calculated using the Chronic Kidney Disease Epidemiology Collaboration   (CKD-EPI) equation.       GFR Estimate If Black   Date Value Ref Range Status   01/14/2021 52 (L) >60 mL/min/[1.73_m2] Final     Comment:      GFR Calc  Starting 12/18/2018, serum creatinine based estimated GFR (eGFR) will be   calculated using the Chronic Kidney Disease Epidemiology Collaboration   (CKD-EPI) equation.       Lab Results   Component Value Date    CR 1.81 09/04/2021    CR 1.20 01/14/2021     No results found for: MICROALBUMIN    Healthy Eating:  Healthy Eating Assessed Today: Yes  Cultural/Zoroastrianism diet restrictions?: No  Meal planning/habits: Avoiding sweets  How many times a week on average do you eat food made away from home (restaurant/take-out)?: 0  Meals include: Breakfast,Lunch,Dinner  Breakfast: Muffin, yogurt and coffee.  Lunch: toast and PB (sandwich thins) or soda crackers and PB or just cottage cheese  Dinner: meat and potato.  scrambled egg and sandwich thins,  or noodles and shrimp hortensia sometimes a baked potato  Beverages: Water,Coffee  Has patient met with a dietitian in the past?: No    Being Active:  Being Active Assessed Today: No  Exercise:: Unable to exercise  Barrier to exercise: Physical limitation    Monitoring:  Monitoring Assessed Today: Yes  Did patient bring glucose meter to appointment? : Yes  Blood Glucose Meter: [employer] choe  Times checking blood sugar at home  (number): 4  Times checking blood sugar at home (per): Day  Blood glucose trend: Fluctuating                            Taking Medications:  Diabetes Medication(s)     Insulin       insulin aspart (NOVOLOG FLEXPEN) 100 UNIT/ML pen    20 units before breakfast, 0 units before lunch, 16 units before dinner.  (max dose 50 units/day for adjustment)     insulin glargine U-300 (TOUJEO SOLOSTAR) 300 UNIT/ML (1 units dial) pen    Inject 20 Units Subcutaneous At Bedtime          Current Treatments: Diet,Insulin Injections  Dose schedule: Pre-breakfast,Pre-dinner,At bedtime  Given by: Patient  Injection/Infusion sites: Abdomen  Problems taking diabetes medications regularly?: No  Diabetes medication side effects?: No    Problem Solving:  Problem Solving Assessed Today: Yes  Is the patient at risk for hypoglycemia?: Yes  Hypoglycemia Frequency: Rarely  Patient carries a carbohydrate source: Yes  Is the patient at risk for DKA?: No              Reducing Risks:  Diabetes Risks: Age over 45 years,Family History,Sedentary Lifestyle  CAD Risks: Diabetes Mellitus,Dyslipidemia,Family history,Obesity,Post-menopausal,Sedentary lifestyle  Has dilated eye exam at least once a year?: Yes  Sees dentist every 6 months?: No  Feet checked by healthcare provider in the last year?: Yes    Healthy Coping:  Informal Support system:: Children,Family,Friends  Stage of change: ACTION (Actively working towards change)  Patient Activation Measure Survey Score:  LESLY Score (Last Two) 6/9/2011   LESLY Raw Score 41   Activation Score 63.2   LESLY Level 3       Diabetes knowledge and skills assessment:   Patient is knowledgeable in diabetes management concepts related to: Monitoring and Healthy Coping    Patient needs further education on the following diabetes management concepts: Healthy Eating, Monitoring, Taking Medication and Problem Solving    Based on learning assessment above, most appropriate setting for further diabetes education would be: Individual  setting.      INTERVENTIONS:    Education provided today on:  AADE Self-Care Behaviors:  Diabetes Pathophysiology  Healthy Eating: carbohydrate counting, consistency in amount, composition, and timing of food intake, heart healthy diet, portion control, plate planning method and label reading  Monitoring: purpose, proper technique, log and interpret results, individual blood glucose targets and frequency of monitoring  Taking Medication: action of prescribed medication, drawing up, administering and storing injectable diabetes medications, proper site selection and rotation for injections, side effects of prescribed medications and when to take medications  Problem Solving: high blood glucose - causes, signs/symptoms, treatment and prevention, low blood glucose - causes, signs/symptoms, treatment and prevention, carrying a carbohydrate source at all times, safe travel and when to call health care provider    Opportunities for ongoing education and support in diabetes-self management were discussed.    Pt verbalized understanding of concepts discussed and recommendations provided today.       Education Materials Provided:  No new materials provided today      ASSESSMENT:  Yadi is here with here daughter and we are working on her diabetes medications so she understands what she needs to do.  Now she is adjusting her own insulin , Toujeo taking 30-50 units depending on how high she is.  She had been ordered 20 units.  Novolog was 20/0/16.  She has changed it to 25/0/25 on her own.  We set up specific doses and I asked her not to change the doses unless she talks to me or her daughter.  Her diet is poor and would like to see her eat more at her meals.  Her daughter shops for her, so we made a list of foods.  She is to add a bedtime snack and mid afternoon snack as well.  Will follow up with her numbers next week.        Patient's most recent   Lab Results   Component Value Date    A1C 6.5 07/20/2021    A1C 6.1  01/14/2021    is meeting goal of <7.0    PLAN  See Patient Instructions for co-developed, patient-stated behavior change goals.  AVS printed and provided to patient today. See Follow-Up section for recommended follow-up.    Melissa Mustafa RN/[facility] Diabetes Educator    Time Spent: 60 minutes  Encounter Type: Individual    Any diabetes medication dose changes were made via the CDE Protocol and Collaborative Practice Agreement with the patient's primary care provider. A copy of this encounter was shared with the provider.

## 2022-01-10 ENCOUNTER — TELEPHONE (OUTPATIENT)
Dept: DERMATOLOGY | Facility: CLINIC | Age: 72
End: 2022-01-10
Payer: COMMERCIAL

## 2022-01-10 NOTE — TELEPHONE ENCOUNTER
**LOOK AT HEALTH MAINTENANCE**        Dermatology Pre-visit Call:    Reason for visit : 6 MONTH FBSE         Patient Reminders Given:  --Please, make sure you bring an updated list of your medications, allergies and insurance information.  --Plan on being in our facility for approximately one hour, this includes the registration process, office visit, education and check-out process.  If you are having a procedure, more time may be required.     --We are located on the second floor of the building, check in desk D.   --If you need to cancel or reschedule, call [phone].  --We look forward to seeing you in Dermatology Clinic.

## 2022-01-11 ENCOUNTER — OFFICE VISIT (OUTPATIENT)
Dept: DERMATOLOGY | Facility: CLINIC | Age: 72
End: 2022-01-11
Payer: COMMERCIAL

## 2022-01-11 DIAGNOSIS — D18.01 CHERRY ANGIOMA: ICD-10-CM

## 2022-01-11 DIAGNOSIS — L57.8 SUN-DAMAGED SKIN: ICD-10-CM

## 2022-01-11 DIAGNOSIS — L85.3 XEROSIS CUTIS: ICD-10-CM

## 2022-01-11 DIAGNOSIS — L72.0 MILIA: ICD-10-CM

## 2022-01-11 DIAGNOSIS — D23.9 DERMATOFIBROMA: ICD-10-CM

## 2022-01-11 DIAGNOSIS — L82.1 SEBORRHEIC KERATOSIS: ICD-10-CM

## 2022-01-11 DIAGNOSIS — L73.8 SENILE SEBACEOUS GLAND HYPERPLASIA: ICD-10-CM

## 2022-01-11 DIAGNOSIS — L81.4 SOLAR LENTIGO: ICD-10-CM

## 2022-01-11 DIAGNOSIS — D23.9 DILATED PORE OF WINER: ICD-10-CM

## 2022-01-11 DIAGNOSIS — D22.9 MULTIPLE BENIGN NEVI: ICD-10-CM

## 2022-01-11 DIAGNOSIS — L40.9 SCALP PSORIASIS: ICD-10-CM

## 2022-01-11 DIAGNOSIS — Z85.828 HISTORY OF NONMELANOMA SKIN CANCER: Primary | ICD-10-CM

## 2022-01-11 PROCEDURE — 99213 OFFICE O/P EST LOW 20 MIN: CPT | Performed by: DERMATOLOGY

## 2022-01-11 RX ORDER — TORSEMIDE 20 MG/1
20 TABLET ORAL 2 TIMES DAILY
COMMUNITY
Start: 2021-07-27 | End: 2022-01-18

## 2022-01-11 NOTE — NURSING NOTE
Yadi Iniguez's goals for this visit include:   Chief Complaint   Patient presents with     Skin Check     Full body skin check. No spots of concern. Personal history of SCC. No known family history of skin cancer.       She requests these members of her care team be copied on today's visit information:     PCP: Sandy Ricci    Referring Provider:  No referring provider defined for this encounter.    There were no vitals taken for this visit.    Do you need any medication refills at today's visit? Denise Dos Santos CMA

## 2022-01-11 NOTE — PROGRESS NOTES
[facility] Dermatology Note  Encounter Date: Jan 11, 2022  Office Visit     Dermatology Problem List:  Last skin check 1/2022, recommended yearly  1. 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 S/p ILK for hypertrophic scar in 2013  2. Scalp psoriasis:  - Clobetasol 0.05% solution, T gel     Social history: Daughter Nikki is an RN.  Family history: Negative for skin cancers.  ____________________________________________    Assessment & Plan:     # History of NMSC. No evidence of recurrence.   - Recommend sunscreens SPF #30 or greater, protective clothing and avoidance of tanning beds.   - Recommended next skin check in 12 months.     # Sun damaged skin with solar lentigines: Chronic, stable.  - Recommend sunscreens SPF #30 or greater, protective clothing and avoidance of tanning beds.     # Benign skin findings including: seborrheic keratoses, stucco keratoses, cherry angioma, dilated pores of gonzalo, milia, sebaceous hyperplasia, dermatofibroma - minor, self limited problem  - No further intervention required. Patient to report changes.   - Patient reassured of the benign nature of these lesions.     # Multiple clinically benign nevi: Chronic, stable  - No further intervention required. Patient to report changes.   - Patient reassured of the benign nature of these lesions.     # Scalp psoriasis: Chronic, stable.  - Recommended using [employer]rogena's T-gel shampoo, continuing clobetasol solution as needed for flares.     # Xerosis cutis   - Recommended gentle moisturizers.    Procedures Performed:   N/A    Follow-up: 1 year(s) in-person, or earlier for new or changing lesions    Staff:     Scribe Disclosure:   Rachel CARUSO LPN, am serving as a scribe to document services personally performed by Dr. Barrios, based on data collection and the provider's statements to me.      Provider Disclosure:   The documentation  recorded by the scribe accurately reflects the services I personally performed and the decisions made by me.    Shavon Barrios MD    Department of Dermatology  [facility] [place][note 2]: Phone: [phone], Fax:[phone]  [facility]: Phone: [phone], Fax: [phone]    ____________________________________________    CC: Skin Check (Full body skin check. No spots of concern. Personal history of SCC. No known family history of skin cancer.)    HPI:  Ms. Yadi Iniguez is a(n) 71 year old female who presents today as a return patient for skin check.    Last seen 6/29/21. No concerning lesions were identified then.    Today, Yadi notes no new concerns. Daughter Nikki with today.    Patient is otherwise feeling well, without additional skin concerns.     Labs Reviewed:  N/A    Physical Exam:  Vitals: There were no vitals taken for this visit.  SKIN: Total skin excluding the undergarment areas was performed. The exam included the head/face, neck, both arms, chest, back, abdomen, both legs, digits and/or nails.   - De Santiago Type II  - Scattered brown macules on sun exposed areas.  - There are dome shaped bright red papules on the back.   - Multiple regular brown pigmented macules and papules are identified on the trunk and extremities.   - There are waxy stuck on tan to brown papules on the back, chest, and ankles.  - Silver plaque on pink erythema on the occipital scalp.  - Few scattered dilated pores of gonzalo on the back.  - There are white 1-2 mm papules scattered on the face.   - There is a well healed scar at the left missy commisure  - There are yellow oily papules with central umbilication located on the left medial cheek and left lower eyelid.  - There is hyperkeratosis overlying the scar of previously excised NMSC on left posterior calf  but completely closed over.   - There is a firm  tan/flesh colored papule that dimples with lateral pressure on the left medial lower leg.  - Diffuse xerosis cutis.  - No other lesions of concern on areas examined.       Medications:  Current Outpatient Medications   Medication     ACETAMINOPHEN PO     alendronate (FOSAMAX) 70 MG tablet     amLODIPine (NORVASC) 10 MG tablet     aspirin 81 MG tablet     atorvastatin (LIPITOR) 40 MG tablet     blood glucose (NO BRAND SPECIFIED) test strip     blood glucose monitoring (NO BRAND SPECIFIED) meter device kit     calcium carbonate (OS-NATACHA) 1500 (600 Ca) MG tablet     carvedilol (COREG) 25 MG tablet     cholecalciferol (VITAMIN D3) 5000 units TABS tablet     clobetasol propionate 0.05 % LIQD     Continuous Blood Gluc Sensor ("[employer]STYLE MARI 14 DAY SENSOR) MISC     fenofibrate (LOFIBRA) 54 MG tablet     insulin aspart (NOVOLOG FLEXPEN) 100 UNIT/ML pen     insulin glargine U-300 (TOUJEO SOLOSTAR) 300 UNIT/ML (1 units dial) pen     insulin pen needle (32G X 4 MM) 32G X 4 MM miscellaneous     levothyroxine (TIROSINT) 150 MCG capsule     PARoxetine (PAXIL) 20 MG tablet     primidone (MYSOLINE) 50 MG tablet     torsemide (DEMADEX) 20 MG tablet     albuterol (PROAIR HFA/PROVENTIL HFA/VENTOLIN HFA) 108 (90 Base) MCG/ACT inhaler     cephALEXin (KEFLEX) 500 MG capsule     losartan (COZAAR) 50 MG tablet     No current facility-administered medications for this visit.      Past Medical History:   Patient Active Problem List   Diagnosis     Hypothyroidism     Hypertension goal BP (blood pressure) < 140/90     Type 2 diabetes, HbA1C goal < 8% (H)     Hyperlipidemia LDL goal <100     Moderate major depression (H)     Incontinence of urine     Neuropathy in diabetes (H)     Eczema     Left lumbar radiculopathy     Health Care Home     CKD (chronic kidney disease) stage 3, GFR 30-59 ml/min (H)     Type 2 diabetes mellitus with other diabetic kidney complication, with long-term current use of insulin (H)     Osteoporosis     Morbid obesity  (H)     Thrombocytopenia (H)     Chronic obstructive pulmonary disease, unspecified COPD type (H)     S/P lumbar fusion     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, with long-term current use of insulin, unspecified laterality (H)     Lumbar facet arthropathy     Lumbar spondylosis     Pulmonary hypertension (H)     Nonrheumatic aortic valve stenosis     Past Medical History:   Diagnosis Date     Arthritis 1975     Broken ribs 8/28/2016    3,4,5,6,7, left side     Cancer (H) 2013    Skin cancer     COPD (chronic obstructive pulmonary disease) (H) 08/2017     Depression      Depressive disorder 1988     Dysuria      Glycosuria      Hypertension 1980     Mixed incontinence urge and stress      Nonrheumatic aortic valve stenosis 7/25/2021     Other and unspecified hyperlipidemia      Right shoulder pain      Type II or unspecified type diabetes mellitus without mention of complication, uncontrolled      Uncomplicated asthma 2017     Unspecified hypothyroidism        CC No referring provider defined for this encounter. on close of this encounter.

## 2022-01-13 ENCOUNTER — DOCUMENTATION ONLY (OUTPATIENT)
Dept: LAB | Facility: CLINIC | Age: 72
End: 2022-01-13
Payer: COMMERCIAL

## 2022-01-13 ENCOUNTER — MYC MEDICAL ADVICE (OUTPATIENT)
Dept: FAMILY MEDICINE | Facility: CLINIC | Age: 72
End: 2022-01-13
Payer: COMMERCIAL

## 2022-01-13 DIAGNOSIS — E11.9 TYPE 2 DIABETES, HBA1C GOAL < 8% (H): Primary | ICD-10-CM

## 2022-01-13 NOTE — TELEPHONE ENCOUNTER
Provider to advise-    Patient would like future lab order places for her lab appointment on Sat. Patient has Diabetes appointment with provider on Tuesday.     Thank-you    Greta Kinney MA

## 2022-01-13 NOTE — PROGRESS NOTES
Yadi RETA Iniguez has an upcoming lab appointment:    Future Appointments   Date Time Provider Department Center   1/15/2022  9:00 AM [facility]   1/18/2022  8:30 AM [facility]     Patient is scheduled for the following lab(s): pre visit labs    There is no order available. Please review and place either future orders or HMPO (Review of Health Maintenance Protocol Orders), as appropriate.    Health Maintenance Due   Topic     ANNUAL REVIEW OF HM ORDERS      A1C      LIPID      CBC      HEMOGLOBIN      Susan Landry

## 2022-01-15 ENCOUNTER — LAB (OUTPATIENT)
Dept: LAB | Facility: CLINIC | Age: 72
End: 2022-01-15
Payer: COMMERCIAL

## 2022-01-15 DIAGNOSIS — D64.9 ANEMIA, UNSPECIFIED TYPE: ICD-10-CM

## 2022-01-15 DIAGNOSIS — E11.9 TYPE 2 DIABETES, HBA1C GOAL < 8% (H): ICD-10-CM

## 2022-01-15 LAB
ERYTHROCYTE [DISTWIDTH] IN BLOOD BY AUTOMATED COUNT: 12.5 % (ref 10–15)
HBA1C MFR BLD: 8.2 % (ref 0–5.6)
HCT VFR BLD AUTO: 34.7 % (ref 35–47)
HGB BLD-MCNC: 11.5 G/DL (ref 11.7–15.7)
MCH RBC QN AUTO: 34.7 PG (ref 26.5–33)
MCHC RBC AUTO-ENTMCNC: 33.1 G/DL (ref 31.5–36.5)
MCV RBC AUTO: 105 FL (ref 78–100)
PLATELET # BLD AUTO: 122 10E3/UL (ref 150–450)
RBC # BLD AUTO: 3.31 10E6/UL (ref 3.8–5.2)
WBC # BLD AUTO: 5.4 10E3/UL (ref 4–11)

## 2022-01-15 PROCEDURE — 83036 HEMOGLOBIN GLYCOSYLATED A1C: CPT

## 2022-01-15 PROCEDURE — 85027 COMPLETE CBC AUTOMATED: CPT

## 2022-01-15 PROCEDURE — 80061 LIPID PANEL: CPT

## 2022-01-15 PROCEDURE — 80048 BASIC METABOLIC PNL TOTAL CA: CPT

## 2022-01-15 PROCEDURE — 82728 ASSAY OF FERRITIN: CPT

## 2022-01-15 PROCEDURE — 83550 IRON BINDING TEST: CPT

## 2022-01-15 PROCEDURE — 36415 COLL VENOUS BLD VENIPUNCTURE: CPT

## 2022-01-17 LAB
ANION GAP SERPL CALCULATED.3IONS-SCNC: 4 MMOL/L (ref 3–14)
BUN SERPL-MCNC: 36 MG/DL (ref 7–30)
CALCIUM SERPL-MCNC: 9.4 MG/DL (ref 8.5–10.1)
CHLORIDE BLD-SCNC: 102 MMOL/L (ref 94–109)
CHOLEST SERPL-MCNC: 182 MG/DL
CO2 SERPL-SCNC: 32 MMOL/L (ref 20–32)
CREAT SERPL-MCNC: 1.58 MG/DL (ref 0.52–1.04)
FASTING STATUS PATIENT QL REPORTED: YES
GFR SERPL CREATININE-BSD FRML MDRD: 34 ML/MIN/1.73M2
GLUCOSE BLD-MCNC: 255 MG/DL (ref 70–99)
HDLC SERPL-MCNC: 42 MG/DL
LDLC SERPL CALC-MCNC: 100 MG/DL
NONHDLC SERPL-MCNC: 140 MG/DL
POTASSIUM BLD-SCNC: 4.7 MMOL/L (ref 3.4–5.3)
SODIUM SERPL-SCNC: 138 MMOL/L (ref 133–144)
TRIGL SERPL-MCNC: 201 MG/DL

## 2022-01-17 NOTE — PROGRESS NOTES
Assessment & Plan     (E11.29,  Z79.4) Type 2 diabetes mellitus with other diabetic kidney complication, with long-term current use of insulin (H)  (primary encounter diagnosis)  Comment: Ongoing, seeing diabetic educator and making adjustments to diet and insulin doses  (E08.3419,  Z79.4) 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, with long-term current use of insulin, unspecified laterality (H)  Plan: FOOT EXAM, Adult Eye Referral, Continuous Blood        Gluc Sensor ([employer]STYLE MARI 14 DAY SENSOR)         MISC        Follow-up for recheck in 6 months. Can schedule a lab-only appointment for A1C in 3 months if patient wants a recheck.    (E78.5) Hyperlipidemia, unspecified hyperlipidemia type  (E78.5) Hyperlipidemia LDL goal <100  Comment: Stable, at goal  Plan: atorvastatin (LIPITOR) 40 MG tablet,         fenofibrate (LOFIBRA) 54 MG tablet        Refilled x 12 months    (I10) Benign essential hypertension  Comment: Stable, managed by cardiology, recent increase in torsemide by cardiology and now pt tired with her low BPs. Daughter will reach out to cardiology and let them know. She has diuresed well with the torsemide  Plan: torsemide (DEMADEX) 20 MG tablet          (E03.9) Hypothyroidism, unspecified type  Comment: Stable   Plan: levothyroxine (TIROSINT) 150 MCG capsule        Refilled x 12 months    (G25.0) Essential tremor  Comment: Stable on med  Plan: primidone (MYSOLINE) 50 MG tablet        Refilled x 6 months    (N18.32) Stage 3b chronic kidney disease (H)  Comment: Kidney function trending down. Not currently on ACEI, however, unable to start today with low blood pressures in 90's/50's. Cardiology recently diuresed patient due to 6lb weight gain. Will have patient to follow-up with cards for BP management and start seeing nephrology for CKD.  Plan: Adult Nephrology Referral          (D64.9) Anemia, unspecified type  Comment: New, iron levels  "normal. Awaiting B12 and folate. ? ACD  Plan: **Iron and iron binding capacity FUTURE 2mo,         **Ferritin FUTURE 2mo, **Vitamin B12 FUTURE         2mo, **Folate FUTURE 2mo        Patient due for colonoscopy. If labs show FLETCHER, will also order upper GI scope at the same time.    (J44.9) Chronic obstructive pulmonary disease, unspecified COPD type (H)  Comment: Stable, sees pulmonology for this  Plan: Continue to monitor, no changes made today    (I11.0) Hypertensive heart disease with heart failure (H)  Comment: Stable  Plan: Patient to follow-up with cardiology for potential torsemide readjustment and BP managment    (F33.41) Depression, major, recurrent, in partial remission (H)  Comment: Stable on medication  Plan: Continue to monitor, no change needed today    (E66.01) Morbid obesity (H)  Comment: Ongoing  Plan: Continue to work on increasing daily activity as tolerated and working towards a healthy diet. Seeing diabetic educator.     (D69.6) Thrombocytopenia (H)  Comment: Stable  Plan: Continue to monitor, no changes needed today    (Z86.010) History of colonic polyps  Comment: Due  Plan: Adult Gastro Ref - Procedure Only             BMI:   Estimated body mass index is 39.43 kg/m  as calculated from the following:    Height as of this encounter: 1.753 m (5' 9\").    Weight as of this encounter: 121.1 kg (267 lb).   Weight management plan: Discussed healthy diet and exercise guidelines      No follow-ups on file.    Sandy Ricci MD  [facility]    Adelina Grullon is a 71 year old who presents for the following health issues  accompanied by her daughter.    HPI     Diabetes Follow-up    How often are you checking your blood sugar? Continuous glucose monitor  What time of day are you checking your blood sugars (select all that apply)?  Before and after meals  Have you had any blood sugars above 200?  Yes   Have you had any blood sugars below 70?  No    What symptoms do you notice " when your blood sugar is low?  Lethargy and light headed, shakey    What concerns do you have today about your diabetes? None     Do you have any of these symptoms? (Select all that apply)  Numbness in feet and Excessive thirst    Have you had a diabetic eye exam in the last 12 months? Yes- Date of last eye exam: March 2021,  Location: [facility] eye          Hyperlipidemia Follow-Up      Are you regularly taking any medication or supplement to lower your cholesterol?   Yes- lipator    Are you having muscle aches or other side effects that you think could be caused by your cholesterol lowering medication?  No    Hypertension Follow-up      Do you check your blood pressure regularly outside of the clinic? No     Are you following a low salt diet? Yes    Are your blood pressures ever more than 140 on the top number (systolic) OR more   than 90 on the bottom number (diastolic), for example 140/90?     BP Readings from Last 2 Encounters:   01/18/22 93/54   06/20/21 (!) 149/68     Hemoglobin A1C POCT (%)   Date Value   01/14/2021 6.1 (H)   06/30/2020 6.3 (H)     Hemoglobin A1C (%)   Date Value   01/15/2022 8.2 (H)   07/20/2021 6.5 (H)     LDL Cholesterol Calculated (mg/dL)   Date Value   01/15/2022 100   01/14/2021 92   06/30/2020 102 (H)       BPs low and increased fatigue with increase in torsemide per cardiology    Pt complicated with lots of medical issues    Diabetes not well controlled but is getting help from diab ed who is adjusting her insulin dosing  Pt is on statin and aspirin. Not on acei yet but would not tolerate now  Due to see eye MD. No concerns with feet      Pt with CHF, being managed by cardiology. Recently had dose increase of torsemide by cardiology with 6 lb weight loss but now BPs are low and she is feeling tired. Daughter will reach out to cards    Pt with CKD, with recent drop in gfr. Will refer to nephrology for further evaluation and management  Pt with recent drop in hgb. Does not appear  "to be due to iron deficiency. Still waiting for labs. ? Anemia of chronic disease?  She will be seeing nephrology    Pt with h/o colon polyps and due for colonoscopy anyway especially now that she is anemic, however not due to iron deficiency    Pt with HTN a bit on the low side, managed by cards  Pt with COPD and is stable and managed by pulm  Pt with depression stable on paxil  Pt with hypothyroidism stable on meds.   Pt with LDL at goal of 100      Review of Systems   CONSTITUTIONAL: NEGATIVE for fever, chills, change in weight  INTEGUMENTARY/SKIN: NEGATIVE for worrisome rashes, moles or lesions  EYES: NEGATIVE for vision changes or irritation  RESP: NEGATIVE for significant cough or SOB  CV: NEGATIVE for chest pain, palpitations or peripheral edema  NEURO: NEGATIVE for weakness, dizziness or paresthesias  ENDOCRINE: NEGATIVE for temperature intolerance, skin/hair changes      Objective    BP 93/54   Pulse 75   Temp 98.2  F (36.8  C) (Oral)   Resp 17   Ht 1.753 m (5' 9\")   Wt 121.1 kg (267 lb)   SpO2 94%   BMI 39.43 kg/m    Body mass index is 39.43 kg/m .  Physical Exam   GENERAL: healthy, alert and no distress  EYES: Eyes grossly normal to inspection, PERRL and conjunctivae and sclerae normal  RESP: lungs clear to auscultation - no rales, rhonchi or wheezes  CV: regular rate and rhythm, systolic murmur present (not new). No peripheral edema and peripheral pulses strong  NEURO: Normal strength and tone, mentation intact and speech normal  PSYCH: mentation appears normal, affect normal/bright          "

## 2022-01-18 ENCOUNTER — OFFICE VISIT (OUTPATIENT)
Dept: FAMILY MEDICINE | Facility: CLINIC | Age: 72
End: 2022-01-18
Payer: COMMERCIAL

## 2022-01-18 VITALS
HEIGHT: 69 IN | RESPIRATION RATE: 17 BRPM | BODY MASS INDEX: 39.55 KG/M2 | DIASTOLIC BLOOD PRESSURE: 54 MMHG | SYSTOLIC BLOOD PRESSURE: 93 MMHG | HEART RATE: 75 BPM | TEMPERATURE: 98.2 F | OXYGEN SATURATION: 94 % | WEIGHT: 267 LBS

## 2022-01-18 DIAGNOSIS — E03.9 HYPOTHYROIDISM, UNSPECIFIED TYPE: ICD-10-CM

## 2022-01-18 DIAGNOSIS — I11.0 HYPERTENSIVE HEART DISEASE WITH HEART FAILURE (H): ICD-10-CM

## 2022-01-18 DIAGNOSIS — E78.5 HYPERLIPIDEMIA, UNSPECIFIED HYPERLIPIDEMIA TYPE: ICD-10-CM

## 2022-01-18 DIAGNOSIS — E11.29 TYPE 2 DIABETES MELLITUS WITH OTHER DIABETIC KIDNEY COMPLICATION, WITH LONG-TERM CURRENT USE OF INSULIN (H): Primary | ICD-10-CM

## 2022-01-18 DIAGNOSIS — N18.32 STAGE 3B CHRONIC KIDNEY DISEASE (H): ICD-10-CM

## 2022-01-18 DIAGNOSIS — G25.0 ESSENTIAL TREMOR: ICD-10-CM

## 2022-01-18 DIAGNOSIS — D64.9 ANEMIA, UNSPECIFIED TYPE: ICD-10-CM

## 2022-01-18 DIAGNOSIS — I10 BENIGN ESSENTIAL HYPERTENSION: ICD-10-CM

## 2022-01-18 DIAGNOSIS — F33.41 DEPRESSION, MAJOR, RECURRENT, IN PARTIAL REMISSION (H): ICD-10-CM

## 2022-01-18 DIAGNOSIS — E08.3419: ICD-10-CM

## 2022-01-18 DIAGNOSIS — Z79.4: ICD-10-CM

## 2022-01-18 DIAGNOSIS — Z86.0100 HISTORY OF COLONIC POLYPS: ICD-10-CM

## 2022-01-18 DIAGNOSIS — J44.9 CHRONIC OBSTRUCTIVE PULMONARY DISEASE, UNSPECIFIED COPD TYPE (H): ICD-10-CM

## 2022-01-18 DIAGNOSIS — Z79.4 TYPE 2 DIABETES MELLITUS WITH OTHER DIABETIC KIDNEY COMPLICATION, WITH LONG-TERM CURRENT USE OF INSULIN (H): Primary | ICD-10-CM

## 2022-01-18 DIAGNOSIS — E78.5 HYPERLIPIDEMIA LDL GOAL <100: ICD-10-CM

## 2022-01-18 DIAGNOSIS — E66.01 MORBID OBESITY (H): ICD-10-CM

## 2022-01-18 DIAGNOSIS — D69.6 THROMBOCYTOPENIA (H): ICD-10-CM

## 2022-01-18 LAB
FERRITIN SERPL-MCNC: 294 NG/ML (ref 8–252)
FOLATE SERPL-MCNC: 5.9 NG/ML
IRON SATN MFR SERPL: 52 % (ref 15–46)
IRON SERPL-MCNC: 132 UG/DL (ref 35–180)
TIBC SERPL-MCNC: 254 UG/DL (ref 240–430)
VIT B12 SERPL-MCNC: 342 PG/ML (ref 193–986)

## 2022-01-18 PROCEDURE — 99214 OFFICE O/P EST MOD 30 MIN: CPT | Performed by: FAMILY MEDICINE

## 2022-01-18 PROCEDURE — 82746 ASSAY OF FOLIC ACID SERUM: CPT | Performed by: FAMILY MEDICINE

## 2022-01-18 PROCEDURE — 36415 COLL VENOUS BLD VENIPUNCTURE: CPT | Performed by: FAMILY MEDICINE

## 2022-01-18 PROCEDURE — 99207 PR FOOT EXAM NO CHARGE: CPT | Performed by: FAMILY MEDICINE

## 2022-01-18 PROCEDURE — 82607 VITAMIN B-12: CPT | Performed by: FAMILY MEDICINE

## 2022-01-18 RX ORDER — TORSEMIDE 20 MG/1
TABLET ORAL
Qty: 1 TABLET
Start: 2022-01-18 | End: 2023-01-10

## 2022-01-18 RX ORDER — FLASH GLUCOSE SENSOR
KIT MISCELLANEOUS
Qty: 2 EACH | Refills: 11 | Status: SHIPPED | OUTPATIENT
Start: 2022-01-18 | End: 2022-02-23

## 2022-01-18 RX ORDER — FENOFIBRATE 54 MG/1
54 TABLET ORAL DAILY
Qty: 90 TABLET | Refills: 3 | Status: SHIPPED | OUTPATIENT
Start: 2022-01-18 | End: 2022-02-11

## 2022-01-18 RX ORDER — ATORVASTATIN CALCIUM 40 MG/1
40 TABLET, FILM COATED ORAL DAILY
Qty: 90 TABLET | Refills: 3 | Status: SHIPPED | OUTPATIENT
Start: 2022-01-18 | End: 2022-04-15

## 2022-01-18 RX ORDER — CARVEDILOL 25 MG/1
TABLET ORAL
Qty: 180 TABLET | Refills: 3 | Status: CANCELLED | OUTPATIENT
Start: 2022-01-18

## 2022-01-18 RX ORDER — PRIMIDONE 50 MG/1
100 TABLET ORAL 2 TIMES DAILY
Qty: 360 TABLET | Refills: 1 | Status: SHIPPED | OUTPATIENT
Start: 2022-01-18 | End: 2022-04-15

## 2022-01-18 RX ORDER — LEVOTHYROXINE SODIUM 13 UG/1
150 CAPSULE ORAL
Qty: 90 CAPSULE | Refills: 3 | Status: SHIPPED | OUTPATIENT
Start: 2022-01-18 | End: 2022-04-15

## 2022-01-18 ASSESSMENT — ANXIETY QUESTIONNAIRES
IF YOU CHECKED OFF ANY PROBLEMS ON THIS QUESTIONNAIRE, HOW DIFFICULT HAVE THESE PROBLEMS MADE IT FOR YOU TO DO YOUR WORK, TAKE CARE OF THINGS AT HOME, OR GET ALONG WITH OTHER PEOPLE: NOT DIFFICULT AT ALL
2. NOT BEING ABLE TO STOP OR CONTROL WORRYING: NOT AT ALL
7. FEELING AFRAID AS IF SOMETHING AWFUL MIGHT HAPPEN: NOT AT ALL
5. BEING SO RESTLESS THAT IT IS HARD TO SIT STILL: NOT AT ALL
GAD7 TOTAL SCORE: 2
6. BECOMING EASILY ANNOYED OR IRRITABLE: SEVERAL DAYS
3. WORRYING TOO MUCH ABOUT DIFFERENT THINGS: NOT AT ALL
1. FEELING NERVOUS, ANXIOUS, OR ON EDGE: SEVERAL DAYS

## 2022-01-18 ASSESSMENT — PAIN SCALES - GENERAL: PAINLEVEL: SEVERE PAIN (6)

## 2022-01-18 ASSESSMENT — PATIENT HEALTH QUESTIONNAIRE - PHQ9
5. POOR APPETITE OR OVEREATING: NOT AT ALL
SUM OF ALL RESPONSES TO PHQ QUESTIONS 1-9: 9

## 2022-01-18 ASSESSMENT — MIFFLIN-ST. JEOR: SCORE: 1785.48

## 2022-01-19 ENCOUNTER — MEDICAL CORRESPONDENCE (OUTPATIENT)
Dept: HEALTH INFORMATION MANAGEMENT | Facility: CLINIC | Age: 72
End: 2022-01-19
Payer: COMMERCIAL

## 2022-01-19 ASSESSMENT — ANXIETY QUESTIONNAIRES: GAD7 TOTAL SCORE: 2

## 2022-01-27 DIAGNOSIS — I50.9 HEART FAILURE, UNSPECIFIED (H): Primary | ICD-10-CM

## 2022-01-29 ENCOUNTER — LAB (OUTPATIENT)
Dept: LAB | Facility: CLINIC | Age: 72
End: 2022-01-29
Payer: COMMERCIAL

## 2022-01-29 DIAGNOSIS — I50.9 HEART FAILURE, UNSPECIFIED (H): ICD-10-CM

## 2022-01-29 PROCEDURE — 36415 COLL VENOUS BLD VENIPUNCTURE: CPT

## 2022-01-29 PROCEDURE — 80048 BASIC METABOLIC PNL TOTAL CA: CPT

## 2022-01-31 LAB
ANION GAP SERPL CALCULATED.3IONS-SCNC: 6 MMOL/L (ref 3–14)
BUN SERPL-MCNC: 34 MG/DL (ref 7–30)
CALCIUM SERPL-MCNC: 9.4 MG/DL (ref 8.5–10.1)
CHLORIDE BLD-SCNC: 102 MMOL/L (ref 94–109)
CO2 SERPL-SCNC: 29 MMOL/L (ref 20–32)
CREAT SERPL-MCNC: 1.47 MG/DL (ref 0.52–1.04)
GFR SERPL CREATININE-BSD FRML MDRD: 38 ML/MIN/1.73M2
GLUCOSE BLD-MCNC: 195 MG/DL (ref 70–99)
POTASSIUM BLD-SCNC: 4.1 MMOL/L (ref 3.4–5.3)
SODIUM SERPL-SCNC: 137 MMOL/L (ref 133–144)

## 2022-02-01 ENCOUNTER — TELEPHONE (OUTPATIENT)
Dept: FAMILY MEDICINE | Facility: CLINIC | Age: 72
End: 2022-02-01
Payer: COMMERCIAL

## 2022-02-01 ENCOUNTER — TELEPHONE (OUTPATIENT)
Dept: GASTROENTEROLOGY | Facility: CLINIC | Age: 72
End: 2022-02-01
Payer: COMMERCIAL

## 2022-02-01 ENCOUNTER — TELEPHONE (OUTPATIENT)
Dept: EDUCATION SERVICES | Facility: CLINIC | Age: 72
End: 2022-02-01
Payer: COMMERCIAL

## 2022-02-01 DIAGNOSIS — E11.9 TYPE 2 DIABETES, HBA1C GOAL < 8% (H): Primary | ICD-10-CM

## 2022-02-01 NOTE — TELEPHONE ENCOUNTER
Family called to say that no matter how much she increases her Toujeo (up to 42 units now) her fasting BG are still in the lower 200's she is trying very hard to increase her proteins.  She also has CHF and is retaining fluid and gaining some weight, will follow up with her cardiologist and nephrologist.      I met with daughter and we decided to trial GLP-1 , Trulicity 0.75 mg weekly to see how she tolerates and if this will help bring her BG down.  She is using her [employer] Freestyle sensor and this really helps for her to see her BG.  Will have her come in next week to review her BG and assess her on Trulicity and see where changes need to be made.    Melissa Mustafa RN/RONALDO  [place] Diabetes Educator

## 2022-02-01 NOTE — TELEPHONE ENCOUNTER
Prior Authorization Retail Medication Request    Medication/Dose: Trulicity  ICD code (if different than what is on RX):    Previously Tried and Failed:    Rationale:      Insurance Name:  [facility] Part D  Insurance ID:  [id number]      Pharmacy Information (if different than what is on RX)  Name:  [facility]  Phone:  [phone]      Rhea Simmons    Pharmacy Technician  [facility]

## 2022-02-01 NOTE — TELEPHONE ENCOUNTER
Screening Questions  Blue=prep questions Red=location Green=sedation   1. Are you active on mychart? y    2. What insurance is in the chart? [employer]    3.  Ordering/Referring Provider: Dk    4. BMI 39.7, If greater than 40 review exclusion criteria also will need EXTENDED PREP    5.  Respiratory Screening (If yes to any of the following HOSPITAL setting only):     Do you use daily home oxygen? n  Do you have mod to severe Obstructive Sleep Apnea? y (can be seen at [facility] or hospital setting)    Do you have Pulmonary Hypertension? y   Do you have UNCONTROLLED asthma? n    6. Have you had a heart or lung transplant? n  (If yes, please review exclusion criteria)    7. Are you currently on dialysis?n  (If yes, schedule in HOSPITAL setting only)(If yes, please send Golytely prep)    8. Do you have chronic kidney disease? y (If yes, please send Golytely prep)    9. Have you had a stroke or Transient ischemic attack (TIA) within 6 months? n (If yes, do not schedule at [facility])    10. In the past 6 months, have you had any heart related issues including cardiomyopathy or heart attack? n (If yes, please review exclusion criteria)           If yes, did it require cardiac stenting or other implantable device?n  (If yes, please review exclusion criteria)      11. Do you have any implantable devices in your body (pacemaker, defib, LVAD)? n (If yes, schedule at [facility])    12. Do you take nitroglycerin? If yes, how often? n (if yes, schedule at HOSPITAL setting)    13. Are you currently taking any blood thinners?n (If yes- inform patient to follow up with PCP or provider for follow up instructions)     14. Are you a diabetic? y (If yes, please send Golytely prep)    15. (Females) Are you currently pregnant?   If yes, how many weeks?      16. Are you taking any prescription pain medications on a routine schedule? n If yes, MAC sedation and patient will need EXTENDED PREP.    17. Do you have any chemical dependencies such as alcohol, street  drugs, or methadone? n If yes, MAC sedation     18. Do you have any history of post-traumatic stress syndrome, severe anxiety or history of psychosis? n  If yes, MAC sedation.     19. Do you transfer independently? y    20.  Do you have any issues with constipation? y   If yes, pt will need EXTENDED PREP     21. Preferred Pharmacy for Pre Prescription [facility] [place] - [address]    Scheduling Details    Which Colonoscopy Prep was Sent?: golytely  Type of Procedure Scheduled: Colonoscopy  Surgeon: levar  Date of Procedure: 2/22/22  Location:   Caller (Please ask for phone number if not scheduled by patient): Yadi Iniguez        Sedation Type: CS  Conscious Sedation- Needs  for 6 hours after the procedure  MAC/General-Needs  for 24 hours after procedure    Pre-op Required at [facility], [place], [place] and OR for MAC sedation: n  (if yes advise patient they will need a pre-op prior to procedure)      Informed patient they will need an adult  y  Cannot take any type of public or medical transportation alone    Pre-Procedure Covid test to be completed at [facility] or Externally: y    Confirmed Nurse will call to complete assessment y    Additional comments:  ([facility] PATIENTS NEED EXTENDED PREP)

## 2022-02-04 DIAGNOSIS — Z11.59 ENCOUNTER FOR SCREENING FOR OTHER VIRAL DISEASES: Primary | ICD-10-CM

## 2022-02-07 NOTE — TELEPHONE ENCOUNTER
Central Prior Authorization Team   Phone: [phone]    PA Initiation    Medication: Trulicity  Insurance Company: [employer] - Phone [phone] Fax [phone]  Pharmacy Filling the Rx: [place], [place] - [address], [address]  Filling Pharmacy Phone: [phone]  Filling Pharmacy Fax:    Start Date: 2/7/2022

## 2022-02-07 NOTE — TELEPHONE ENCOUNTER
Prior Authorization Approval    Authorization Effective Date: 1/8/2022  Authorization Expiration Date: 2/7/2023  Medication: Trulicity  Approved Dose/Quantity:    Reference #:     Insurance Company: [employer] - Phone [phone] Fax [phone]  Expected CoPay:       CoPay Card Available:      Foundation Assistance Needed:    Which Pharmacy is filling the prescription (Not needed for infusion/clinic administered): [address], [address]  Pharmacy Notified: Yes  Patient Notified: Yes **Instructed pharmacy to notify patient when script is ready to /ship.**

## 2022-02-08 ENCOUNTER — ALLIED HEALTH/NURSE VISIT (OUTPATIENT)
Dept: EDUCATION SERVICES | Facility: CLINIC | Age: 72
End: 2022-02-08
Payer: COMMERCIAL

## 2022-02-08 ENCOUNTER — TELEPHONE (OUTPATIENT)
Dept: FAMILY MEDICINE | Facility: CLINIC | Age: 72
End: 2022-02-08

## 2022-02-08 DIAGNOSIS — E11.29 TYPE 2 DIABETES MELLITUS WITH OTHER DIABETIC KIDNEY COMPLICATION, WITH LONG-TERM CURRENT USE OF INSULIN (H): ICD-10-CM

## 2022-02-08 DIAGNOSIS — E11.9 DIABETES MELLITUS WITHOUT COMPLICATION (H): ICD-10-CM

## 2022-02-08 DIAGNOSIS — E11.9 TYPE 2 DIABETES, HBA1C GOAL < 8% (H): Primary | ICD-10-CM

## 2022-02-08 DIAGNOSIS — Z79.4 TYPE 2 DIABETES MELLITUS WITH OTHER DIABETIC KIDNEY COMPLICATION, WITH LONG-TERM CURRENT USE OF INSULIN (H): ICD-10-CM

## 2022-02-08 PROCEDURE — G0108 DIAB MANAGE TRN  PER INDIV: HCPCS

## 2022-02-08 PROCEDURE — 99207 PR DROP WITH A PROCEDURE: CPT

## 2022-02-08 RX ORDER — INSULIN GLARGINE 300 U/ML
42 INJECTION, SOLUTION SUBCUTANEOUS AT BEDTIME
Qty: 15 ML | Refills: 1
Start: 2022-02-08 | End: 2022-02-23

## 2022-02-08 NOTE — PATIENT INSTRUCTIONS
Diabetes Support Resources:  1. American diabetes association   My .     2. Send me my chart, if having lows when you wake up, or having those highs in the afternoons.  >200.      3. Novolog 25/5/25    4. toujeo 42 units daily    5.  Trulicity 0.75 mg weekly, in the 3rd week after using Trulicity,      Bring blood glucose meter and logbook with you to all doctor and follow-up appointments.    Diabetes Education Telephone Visit Follow-up:    We realize your time is valuable and your health is important! We offer a convenient Telephone Visit follow up! It s a quick way to check in for a medication dose adjustment without having to come back to clinic as soon.    Telephone Visits are often covered by insurance. Please check with your insurance plan to see if this type of visit is covered. If not, the cost is less expensive than an office visit:      Up to 10 minutes (Code 96371): $30    11-20 minutes (Code 53716): $59    More than 20 minutes (Code 99666): $85    Talk with your Diabetes Educator if you want to learn more.      [facility]:  For Your Diabetes Education and Nutrition Appointments Call:  [phone]   For Diabetes Education or Nutrition Related Questions:   Phone: [phone]  Send [employer] Message   If you need a medication refill please contact your pharmacy. Please allow 3 business days for your refills to be completed.

## 2022-02-08 NOTE — PROGRESS NOTES
"Diabetes Self-Management Education & Support    Presents for: Follow-up    SUBJECTIVE/OBJECTIVE:  Presents for: Follow-up  Accompanied by: Self  Diabetes education in the past 24mo: No  Diabetes type: Type 2  Disease course: Improving  Transportation concerns: Yes  Difficulty affording diabetes medication?: No  Difficulty affording diabetes testing supplies?: No  Other concerns:: None  Cultural Influences/Ethnic Background:  Not  or       Diabetes Symptoms & Complications:  Fatigue: Yes  Neuropathy: Yes  Polydipsia: Sometimes  Polyphagia: Yes  Polyuria: Yes  Visual change: Yes  Slow healing wounds: No  Symptom course: Improving  Weight trend: Fluctuating  Autonomic neuropathy: No  CVA: No  Heart disease: Other  Nephropathy: Yes  Peripheral neuropathy: Yes  Peripheral Vascular Disease: No  Retinopathy: Yes  Sexual dysfunction: No    Patient Problem List and Family Medical History reviewed for relevant medical history, current medical status, and diabetes risk factors.    Vitals:  There were no vitals taken for this visit.  Estimated body mass index is 39.43 kg/m  as calculated from the following:    Height as of 1/18/22: 1.753 m (5' 9\").    Weight as of 1/18/22: 121.1 kg (267 lb).   Last 3 BP:   BP Readings from Last 3 Encounters:   01/18/22 93/54   06/20/21 (!) 149/68   06/07/21 122/59       History   Smoking Status     Former Smoker     Packs/day: 1.00     Years: 50.00     Types: Cigarettes     Start date: 6/1/1966     Quit date: 6/30/2021   Smokeless Tobacco     Never Used     Comment: quit for 6 months back in 2018       Labs:  Lab Results   Component Value Date    A1C 8.2 01/15/2022    A1C 6.1 01/14/2021     Lab Results   Component Value Date     01/29/2022    GLC 97 01/14/2021     Lab Results   Component Value Date     01/15/2022    LDL 92 01/14/2021     HDL Cholesterol   Date Value Ref Range Status   01/14/2021 49 (L) >49 mg/dL Final     Direct Measure HDL   Date Value Ref Range " Status   01/15/2022 42 (L) >=50 mg/dL Final   ]  GFR Estimate   Date Value Ref Range Status   01/29/2022 38 (L) >60 mL/min/1.73m2 Final     Comment:     Effective December 21, 2021 eGFRcr in adults is calculated using the 2021 CKD-EPI creatinine equation which includes age and gender (Mak garrett al., [place], DOI: 10.1056/PDUDel2567842)   01/14/2021 45 (L) >60 mL/min/[1.73_m2] Final     Comment:     Non  GFR Calc  Starting 12/18/2018, serum creatinine based estimated GFR (eGFR) will be   calculated using the Chronic Kidney Disease Epidemiology Collaboration   (CKD-EPI) equation.       GFR Estimate If Black   Date Value Ref Range Status   01/14/2021 52 (L) >60 mL/min/[1.73_m2] Final     Comment:      GFR Calc  Starting 12/18/2018, serum creatinine based estimated GFR (eGFR) will be   calculated using the Chronic Kidney Disease Epidemiology Collaboration   (CKD-EPI) equation.       Lab Results   Component Value Date    CR 1.47 01/29/2022    CR 1.20 01/14/2021     No results found for: MICROALBUMIN    Healthy Eating:  Healthy Eating Assessed Today: Yes  Meal planning/habits: Avoiding sweets  How many times a week on average do you eat food made away from home (restaurant/take-out)?: 0  Meals include: Breakfast,Lunch,Dinner  Breakfast: Greek yogurt and muffin and coffee  Lunch: normally has 2 cheese sticks and apple sauce.  Dinner: mccray salad, 2 toasts and butter.  Snacks: ritz crackers.  bedtime snack, halo bars  Beverages: Water,Coffee  Has patient met with a dietitian in the past?: No    Being Active:  Being Active Assessed Today: No  Exercise:: Unable to exercise  Barrier to exercise: Physical limitation    Monitoring:  Monitoring Assessed Today: Yes  Did patient bring glucose meter to appointment? : Yes  Blood Glucose Meter: FreeStyleCGM  Times checking blood sugar at home (number): 5+  Times checking blood sugar at home (per): Day  Blood glucose trend: Decreasing    PRE GLP-1 USAGE SAME  INSULIN DOSES          POST GLP-1 USAGE SAME INSULIN DOSES      Taking Medications:  Diabetes Medication(s)     Insulin       insulin aspart (NOVOLOG FLEXPEN) 100 UNIT/ML pen    25 units before breakfast, 5 units before lunch, 25 units before dinner.  (max dose 70 units/day for adjustment)     insulin glargine U-300 (TOUJEO SOLOSTAR) 300 UNIT/ML (1 units dial) pen    Inject 33 Units Subcutaneous At Bedtime    Incretin Mimetic Agents (GLP-1 Receptor Agonists)       dulaglutide (TRULICITY) 1.5 MG/0.5ML pen    Inject 1.5 mg Subcutaneous every 7 days     dulaglutide (TRULICITY) 0.75 MG/0.5ML pen    Inject 0.75 mg Subcutaneous every 7 days          Taking Medication Assessed Today: Yes  Current Treatments: Diet,Insulin Injections,Non-insulin Injectables  Dose schedule: Pre-breakfast,Pre-dinner,At bedtime,Pre-lunch  Given by: Patient  Injection/Infusion sites: Abdomen  Problems taking diabetes medications regularly?: No  Diabetes medication side effects?: No    Problem Solving:  Problem Solving Assessed Today: Yes  Is the patient at risk for hypoglycemia?: Yes  Hypoglycemia Frequency: Rarely  Patient carries a carbohydrate source: Yes  Is the patient at risk for DKA?: No              Reducing Risks:  Diabetes Risks: Age over 45 years,Family History,Sedentary Lifestyle  CAD Risks: Diabetes Mellitus,Dyslipidemia,Family history,Obesity,Post-menopausal,Sedentary lifestyle  Has dilated eye exam at least once a year?: Yes  Sees dentist every 6 months?: No  Feet checked by healthcare provider in the last year?: Yes    Healthy Coping:  Healthy Coping Assessed Today: Yes  Emotional response to diabetes: Ready to learn,Acceptance,Confidence diabetes can be controlled,Concern for health and well-being  Informal Support system:: Children,Family,Friends  Stage of change: ACTION (Actively working towards change)  Patient Activation Measure Survey Score:  LESLY Score (Last Two) 6/9/2011   LESLY Raw Score 41   Activation Score 63.2   LESLY  Level 3       Diabetes knowledge and skills assessment:   Patient is knowledgeable in diabetes management concepts related to: Healthy Eating, Being Active, Monitoring, Taking Medication, Problem Solving, Reducing Risks and Healthy Coping    Patient needs further education on the following diabetes management concepts: Healthy Eating and Taking Medication    Based on learning assessment above, most appropriate setting for further diabetes education would be: Individual setting.      INTERVENTIONS:    Education provided today on:  AADE Self-Care Behaviors:  Healthy Eating: carbohydrate counting, consistency in amount, composition, and timing of food intake, heart healthy diet, eating out, portion control, plate planning method and label reading  Taking Medication: action of prescribed medication, drawing up, administering and storing injectable diabetes medications, proper site selection and rotation for injections, side effects of prescribed medications and when to take medications  GLP-1 administration technique taught today. Patient verbalized understanding and was able to perform an accurate return demonstration of administration technique. Side effects were discussed, if patient has any abdominal pain, with or without nausea and/or vomiting, stop medication, call provider, clinic or go to the emergency room.    Opportunities for ongoing education and support in diabetes-self management were discussed.    Pt verbalized understanding of concepts discussed and recommendations provided today.       Education Materials Provided:  No new materials provided today      ASSESSMENT:  Yadi is making great changes with the help of her daughter, Nikki.  For some reason her BG were all over the place, she takes the same insulin every day and eats the same foods.  For some reason we have a difficult time getting her BG in control.  Started Trulicity at 0.75 mg weekly and in just one week she has so much better BG and in great  control.  You can see the difference from the 2 sections downloaded and placed in chart.  Will continue to follow up         Patient's most recent   Lab Results   Component Value Date    A1C 8.2 01/15/2022    A1C 6.1 01/14/2021    is not meeting goal of <8.0    PLAN  See Patient Instructions for co-developed, patient-stated behavior change goals.  AVS printed and provided to patient today. See Follow-Up section for recommended follow-up.    Melissa Mustafa RN/RONALDO [facility] Diabetes Educator    Time Spent: 30 minutes  Encounter Type: Individual    Any diabetes medication dose changes were made via the CDE Protocol and Collaborative Practice Agreement with the patient's primary care provider. A copy of this encounter was shared with the provider.

## 2022-02-08 NOTE — TELEPHONE ENCOUNTER
Prior Authorization Retail Medication Request    Medication/Dose: Trulicity  ICD code (if different than what is on RX):    Previously Tried and Failed:    Rationale:      Insurance Name:  [facility] Part D  Insurance ID:  [id number]      Pharmacy Information (if different than what is on RX)  Name:  [facility]  Phone:  [phone]    Rhea Simmons    Pharmacy Technician  [facility]

## 2022-02-10 DIAGNOSIS — I10 BENIGN ESSENTIAL HYPERTENSION: ICD-10-CM

## 2022-02-10 DIAGNOSIS — F32.1 MODERATE MAJOR DEPRESSION (H): ICD-10-CM

## 2022-02-10 DIAGNOSIS — M81.0 OSTEOPOROSIS, UNSPECIFIED OSTEOPOROSIS TYPE, UNSPECIFIED PATHOLOGICAL FRACTURE PRESENCE: ICD-10-CM

## 2022-02-10 DIAGNOSIS — E78.5 HYPERLIPIDEMIA, UNSPECIFIED HYPERLIPIDEMIA TYPE: ICD-10-CM

## 2022-02-11 RX ORDER — CARVEDILOL 25 MG/1
TABLET ORAL
Qty: 180 TABLET | Refills: 3 | Status: SHIPPED | OUTPATIENT
Start: 2022-02-11 | End: 2024-03-07 | Stop reason: DRUGHIGH

## 2022-02-11 RX ORDER — ALENDRONATE SODIUM 70 MG/1
TABLET ORAL
Qty: 12 TABLET | Refills: 3 | Status: SHIPPED | OUTPATIENT
Start: 2022-02-11 | End: 2022-04-15

## 2022-02-11 RX ORDER — FENOFIBRATE 54 MG/1
54 TABLET ORAL DAILY
Qty: 90 TABLET | Refills: 3 | Status: SHIPPED | OUTPATIENT
Start: 2022-02-11 | End: 2022-03-22 | Stop reason: ALTCHOICE

## 2022-02-11 RX ORDER — AMLODIPINE BESYLATE 10 MG/1
TABLET ORAL
Qty: 90 TABLET | Refills: 3 | Status: SHIPPED | OUTPATIENT
Start: 2022-02-11 | End: 2023-10-25

## 2022-02-11 RX ORDER — PAROXETINE 20 MG/1
TABLET, FILM COATED ORAL
Qty: 180 TABLET | Refills: 3 | Status: SHIPPED | OUTPATIENT
Start: 2022-02-11 | End: 2022-04-15

## 2022-02-11 NOTE — TELEPHONE ENCOUNTER
Routing refill request to provider for review/approval because:  Labs out of range:    Creatinine   Date Value Ref Range Status   01/29/2022 1.47 (H) 0.52 - 1.04 mg/dL Final   01/14/2021 1.20 (H) 0.52 - 1.04 mg/dL Final       Dexa Scan  PHQ 1/21/2021 7/27/2021 1/18/2022   PHQ-9 Total Score 8 4 9   Q9: Thoughts of better off dead/self-harm past 2 weeks Not at all Not at all Not at all

## 2022-02-15 DIAGNOSIS — I50.9 HEART FAILURE, UNSPECIFIED (H): Primary | ICD-10-CM

## 2022-02-15 NOTE — TELEPHONE ENCOUNTER
Prior Authorization Not Needed per Insurance    Medication: Trulicity  Insurance Company: [employer] - Phone [phone] Fax [phone]  Expected CoPay:      Pharmacy Filling the Rx: [place] [address], [address]  Pharmacy Notified: Yes  Patient Notified: Yes

## 2022-02-15 NOTE — TELEPHONE ENCOUNTER
Central Prior Authorization Team   Phone: [phone]    PA Initiation    Medication: Trulicity  Insurance Company: [employer] - Phone [phone] Fax [phone]  Pharmacy Filling the Rx: [place], [place] - [address], [address]  Filling Pharmacy Phone: [phone]  Filling Pharmacy Fax:    Start Date: 2/15/2022

## 2022-02-18 ENCOUNTER — DOCUMENTATION ONLY (OUTPATIENT)
Dept: EDUCATION SERVICES | Facility: CLINIC | Age: 72
End: 2022-02-18
Payer: COMMERCIAL

## 2022-02-18 DIAGNOSIS — E11.9 TYPE 2 DIABETES, HBA1C GOAL < 8% (H): ICD-10-CM

## 2022-02-18 NOTE — PROGRESS NOTES
Download of personal CGM                              Will keep her doses of medications the same, Truhlicity has been the best thing ever for her BG control.    Reassess in 1 month    Melissa Mustafa RN/RONALDO  [place] Diabetes Educator

## 2022-02-19 ENCOUNTER — LAB (OUTPATIENT)
Dept: LAB | Facility: CLINIC | Age: 72
End: 2022-02-19
Payer: COMMERCIAL

## 2022-02-19 DIAGNOSIS — I50.9 HEART FAILURE, UNSPECIFIED (H): ICD-10-CM

## 2022-02-19 DIAGNOSIS — Z11.59 ENCOUNTER FOR SCREENING FOR OTHER VIRAL DISEASES: ICD-10-CM

## 2022-02-19 PROCEDURE — U0005 INFEC AGEN DETEC AMPLI PROBE: HCPCS

## 2022-02-19 PROCEDURE — 36415 COLL VENOUS BLD VENIPUNCTURE: CPT

## 2022-02-19 PROCEDURE — 80048 BASIC METABOLIC PNL TOTAL CA: CPT

## 2022-02-19 PROCEDURE — U0003 INFECTIOUS AGENT DETECTION BY NUCLEIC ACID (DNA OR RNA); SEVERE ACUTE RESPIRATORY SYNDROME CORONAVIRUS 2 (SARS-COV-2) (CORONAVIRUS DISEASE [COVID-19]), AMPLIFIED PROBE TECHNIQUE, MAKING USE OF HIGH THROUGHPUT TECHNOLOGIES AS DESCRIBED BY CMS-2020-01-R: HCPCS

## 2022-02-20 LAB — SARS-COV-2 RNA RESP QL NAA+PROBE: NEGATIVE

## 2022-02-21 ENCOUNTER — TELEPHONE (OUTPATIENT)
Dept: GASTROENTEROLOGY | Facility: CLINIC | Age: 72
End: 2022-02-21
Payer: COMMERCIAL

## 2022-02-21 DIAGNOSIS — Z11.59 ENCOUNTER FOR SCREENING FOR OTHER VIRAL DISEASES: Primary | ICD-10-CM

## 2022-02-21 LAB
ANION GAP SERPL CALCULATED.3IONS-SCNC: 3 MMOL/L (ref 3–14)
BUN SERPL-MCNC: 31 MG/DL (ref 7–30)
CALCIUM SERPL-MCNC: 8.9 MG/DL (ref 8.5–10.1)
CHLORIDE BLD-SCNC: 104 MMOL/L (ref 94–109)
CO2 SERPL-SCNC: 31 MMOL/L (ref 20–32)
CREAT SERPL-MCNC: 1.63 MG/DL (ref 0.52–1.04)
GFR SERPL CREATININE-BSD FRML MDRD: 33 ML/MIN/1.73M2
GLUCOSE BLD-MCNC: 196 MG/DL (ref 70–99)
POTASSIUM BLD-SCNC: 4.5 MMOL/L (ref 3.4–5.3)
SODIUM SERPL-SCNC: 138 MMOL/L (ref 133–144)

## 2022-02-21 NOTE — TELEPHONE ENCOUNTER
Caller: ELINA    Procedure: COLONOSCOPY     Date, Location, and Surgeon of Procedure Cancelled: 2/22/2022    Ordering Provider:Sandy Ricci MD    Reason for cancel (please be detailed, any staff messages or encounters to note?): WEATHER         Rescheduled: YES     If rescheduled:    Date: 3/29   Location:    Note any change or update to original order/sedation: NO

## 2022-02-26 ENCOUNTER — HEALTH MAINTENANCE LETTER (OUTPATIENT)
Age: 72
End: 2022-02-26

## 2022-03-22 ENCOUNTER — VIRTUAL VISIT (OUTPATIENT)
Dept: NEPHROLOGY | Facility: CLINIC | Age: 72
End: 2022-03-22
Attending: FAMILY MEDICINE
Payer: COMMERCIAL

## 2022-03-22 ENCOUNTER — ALLIED HEALTH/NURSE VISIT (OUTPATIENT)
Dept: EDUCATION SERVICES | Facility: CLINIC | Age: 72
End: 2022-03-22
Payer: COMMERCIAL

## 2022-03-22 VITALS — BODY MASS INDEX: 39.55 KG/M2 | HEIGHT: 69 IN | WEIGHT: 267 LBS

## 2022-03-22 DIAGNOSIS — E66.812 CLASS 2 SEVERE OBESITY WITH SERIOUS COMORBIDITY AND BODY MASS INDEX (BMI) OF 39.0 TO 39.9 IN ADULT, UNSPECIFIED OBESITY TYPE (H): ICD-10-CM

## 2022-03-22 DIAGNOSIS — Z79.4 TYPE 2 DIABETES MELLITUS WITH OTHER DIABETIC KIDNEY COMPLICATION, WITH LONG-TERM CURRENT USE OF INSULIN (H): ICD-10-CM

## 2022-03-22 DIAGNOSIS — E11.29 TYPE 2 DIABETES MELLITUS WITH OTHER DIABETIC KIDNEY COMPLICATION, WITH LONG-TERM CURRENT USE OF INSULIN (H): Primary | ICD-10-CM

## 2022-03-22 DIAGNOSIS — E66.01 CLASS 2 SEVERE OBESITY WITH SERIOUS COMORBIDITY AND BODY MASS INDEX (BMI) OF 39.0 TO 39.9 IN ADULT, UNSPECIFIED OBESITY TYPE (H): ICD-10-CM

## 2022-03-22 DIAGNOSIS — Z79.4 TYPE 2 DIABETES MELLITUS WITH OTHER DIABETIC KIDNEY COMPLICATION, WITH LONG-TERM CURRENT USE OF INSULIN (H): Primary | ICD-10-CM

## 2022-03-22 DIAGNOSIS — E11.9 DIABETES MELLITUS WITHOUT COMPLICATION (H): Primary | ICD-10-CM

## 2022-03-22 DIAGNOSIS — E11.29 TYPE 2 DIABETES MELLITUS WITH OTHER DIABETIC KIDNEY COMPLICATION, WITH LONG-TERM CURRENT USE OF INSULIN (H): ICD-10-CM

## 2022-03-22 DIAGNOSIS — N18.32 STAGE 3B CHRONIC KIDNEY DISEASE (H): ICD-10-CM

## 2022-03-22 DIAGNOSIS — E11.9 TYPE 2 DIABETES, HBA1C GOAL < 8% (H): ICD-10-CM

## 2022-03-22 DIAGNOSIS — Z87.891 HISTORY OF TOBACCO USE: ICD-10-CM

## 2022-03-22 DIAGNOSIS — I10 BENIGN ESSENTIAL HYPERTENSION: ICD-10-CM

## 2022-03-22 PROCEDURE — 99204 OFFICE O/P NEW MOD 45 MIN: CPT | Mod: 95 | Performed by: INTERNAL MEDICINE

## 2022-03-22 PROCEDURE — G0108 DIAB MANAGE TRN  PER INDIV: HCPCS

## 2022-03-22 PROCEDURE — 99207 PR DROP WITH A PROCEDURE: CPT

## 2022-03-22 RX ORDER — INSULIN GLARGINE 300 U/ML
38 INJECTION, SOLUTION SUBCUTANEOUS AT BEDTIME
Qty: 15 ML | Refills: 1
Start: 2022-03-22 | End: 2022-07-12

## 2022-03-22 RX ORDER — INSULIN ASPART 100 [IU]/ML
INJECTION, SOLUTION INTRAVENOUS; SUBCUTANEOUS
Qty: 75 ML | Refills: 1
Start: 2022-03-22 | End: 2022-03-31

## 2022-03-22 ASSESSMENT — PAIN SCALES - GENERAL: PAINLEVEL: EXTREME PAIN (8)

## 2022-03-22 NOTE — NURSING NOTE
Writer spoke to [facility]. Triage nurse received message back from Dr. Giraldo. Okay to discontinue Fenofibrate. They will reach out to patient to start fish oil and then plan to repeat labs in 3 months.     Tatum Siu RN, BSN  Nephrology Care Coordinator  [facility]

## 2022-03-22 NOTE — NURSING NOTE
Received message from Dr. Preston. She would like for patient to discontinue fenofibrate though would defer any further recommendations to replace fenofibrate to  [facility] team.     Writer contacted [facility] triage line. A message will be sent to Dr. Giraldo and the clinic will call or fax nephrology clinic back with any new updates.     Tatum Siu RN, BSN  Nephrology Care Coordinator  [facility]

## 2022-03-22 NOTE — PATIENT INSTRUCTIONS
1. Lab and urine studies needed  2. Kidney ultrasound  3. Tentative plan to follow-up In 4 months  4. We will be in touch with cardiology team about working away from the fenofibrate.

## 2022-03-22 NOTE — NURSING NOTE
Message sent to Procedure  to contact pt and assist with scheduling Future appts recommended by Dr. Preston:    Instructions    1. Lab and urine studies needed  2. Kidney ultrasound  3. Tentative plan to follow-up In 4 months  4. We will be in touch with cardiology team about working away from the fenofibrate.      Syeda Guadalupe LPN

## 2022-03-22 NOTE — PROGRESS NOTES
Yadi is a 71 year old who is being evaluated via a billable video visit.      How would you like to obtain your AVS? MyChart  If the video visit is dropped, the invitation should be resent by: Text to cell phone:  [phone]   Will anyone else be joining your video visit? Yes, daughter Nikki who is there with her.    No BP taken

## 2022-03-22 NOTE — PROGRESS NOTES
"Diabetes Self-Management Education & Support    Presents for: Follow-up    SUBJECTIVE/OBJECTIVE:  Presents for: Follow-up  Accompanied by: Self, Daughter  Diabetes education in the past 24mo: Yes  Focus of Visit: Taking Medication, Healthy Eating, Monitoring  Diabetes type: Type 2  Disease course: Improving  How confident are you filling out medical forms by yourself:: Extremely  Transportation concerns: Yes  Difficulty affording diabetes medication?: Sometimes  Difficulty affording diabetes testing supplies?: Sometimes  Other concerns:: None  Cultural Influences/Ethnic Background:  Not  or       Diabetes Symptoms & Complications:  Fatigue: Yes  Neuropathy: Yes  Polydipsia: Sometimes  Polyphagia: Sometimes  Polyuria: Sometimes  Visual change: Yes  Slow healing wounds: Yes  Symptom course: Improving  Weight trend: Stable  Autonomic neuropathy: No  CVA: No  Heart disease: Yes  Nephropathy: Yes  Peripheral neuropathy: Yes  Peripheral Vascular Disease: Other  Retinopathy: Yes  Sexual dysfunction: Other    Patient Problem List and Family Medical History reviewed for relevant medical history, current medical status, and diabetes risk factors.    Vitals:  There were no vitals taken for this visit.  Estimated body mass index is 39.43 kg/m  as calculated from the following:    Height as of an earlier encounter on 3/22/22: 1.753 m (5' 9\").    Weight as of an earlier encounter on 3/22/22: 121.1 kg (267 lb).   Last 3 BP:   BP Readings from Last 3 Encounters:   01/18/22 93/54   06/20/21 (!) 149/68   06/07/21 122/59       History   Smoking Status     Former Smoker     Packs/day: 1.00     Years: 50.00     Types: Cigarettes     Start date: 6/1/1966     Quit date: 6/30/2021   Smokeless Tobacco     Never Used     Comment: quit for 6 months back in 2018       Labs:  Lab Results   Component Value Date    A1C 8.2 01/15/2022    A1C 6.1 01/14/2021     Lab Results   Component Value Date     02/19/2022    GLC 97 " 01/14/2021     Lab Results   Component Value Date     01/15/2022    LDL 92 01/14/2021     HDL Cholesterol   Date Value Ref Range Status   01/14/2021 49 (L) >49 mg/dL Final     Direct Measure HDL   Date Value Ref Range Status   01/15/2022 42 (L) >=50 mg/dL Final   ]  GFR Estimate   Date Value Ref Range Status   02/19/2022 33 (L) >60 mL/min/1.73m2 Final     Comment:     Effective December 21, 2021 eGFRcr in adults is calculated using the 2021 CKD-EPI creatinine equation which includes age and gender (Mak et al., NEJ, DOI: 10.1056/RJQNwu4295164)   01/14/2021 45 (L) >60 mL/min/[1.73_m2] Final     Comment:     Non  GFR Calc  Starting 12/18/2018, serum creatinine based estimated GFR (eGFR) will be   calculated using the Chronic Kidney Disease Epidemiology Collaboration   (CKD-EPI) equation.       GFR Estimate If Black   Date Value Ref Range Status   01/14/2021 52 (L) >60 mL/min/[1.73_m2] Final     Comment:      GFR Calc  Starting 12/18/2018, serum creatinine based estimated GFR (eGFR) will be   calculated using the Chronic Kidney Disease Epidemiology Collaboration   (CKD-EPI) equation.       Lab Results   Component Value Date    CR 1.63 02/19/2022    CR 1.20 01/14/2021     No results found for: MICROALBUMIN    Healthy Eating:  Healthy Eating Assessed Today: Yes  Cultural/Hoahaoism diet restrictions?: No  Meal planning/habits: Low carb  How many times a week on average do you eat food made away from home (restaurant/take-out)?: 1  Meals include: Breakfast, Lunch, Dinner, Afternoon Snack  Beverages: Water, Coffee  Has patient met with a dietitian in the past?: No    Being Active:  Being Active Assessed Today: Yes  Exercise:: Currently not exercising  Barrier to exercise: Physical limitation    Monitoring:  Monitoring Assessed Today: Yes  Did patient bring glucose meter to appointment? : Yes  Blood Glucose Meter: FreeStyle, CGM  Times checking blood sugar at home (number): 5+  Times  checking blood sugar at home (per): Day  Blood glucose trend: Decreasing            Taking Medications:  Diabetes Medication(s)     Insulin       insulin aspart (NOVOLOG FLEXPEN) 100 UNIT/ML pen    25 units before breakfast, 5 units before lunch, 25 units before dinner.  (max dose 70 units/day for adjustment)     insulin glargine U-300 (TOUJEO SOLOSTAR) 300 UNIT/ML (1 units dial) pen    Inject 42 Units Subcutaneous At Bedtime    Incretin Mimetic Agents (GLP-1 Receptor Agonists)       dulaglutide (TRULICITY) 0.75 MG/0.5ML pen    Inject 0.75 mg Subcutaneous every 7 days     dulaglutide (TRULICITY) 1.5 MG/0.5ML pen    Inject 1.5 mg Subcutaneous every 7 days          Current Treatments: Insulin Injections, Non-insulin Injectables  Dose schedule: Pre-breakfast, Pre-lunch, Pre-dinner, At bedtime  Given by: Patient  Injection/Infusion sites: Abdomen  Problems taking diabetes medications regularly?: No  Diabetes medication side effects?: No    Problem Solving:  Problem Solving Assessed Today: Yes  Is the patient at risk for hypoglycemia?: Yes  Hypoglycemia Frequency: Rarely  Hypoglycemia Treatment: Juice  Patient carries a carbohydrate source: Yes              Reducing Risks:  CAD Risks: Diabetes Mellitus, Family history, Hypertension, Obesity, Sedentary lifestyle  Has dilated eye exam at least once a year?: Yes  Sees dentist every 6 months?: No  Feet checked by healthcare provider in the last year?: Yes    Healthy Coping:  Emotional response to diabetes: Ready to learn  Informal Support system:: Children, Other  Stage of change: ACTION (Actively working towards change)  Patient Activation Measure Survey Score:  LESLY Score (Last Two) 6/9/2011   LESLY Raw Score 41   Activation Score 63.2   LESLY Level 3       Diabetes knowledge and skills assessment:   Patient is knowledgeable in diabetes management concepts related to: Healthy Eating, Being Active, Monitoring, Taking Medication and Problem Solving    Patient needs further  education on the following diabetes management concepts: Healthy Eating, Taking Medication and Problem Solving    Based on learning assessment above, most appropriate setting for further diabetes education would be: Individual setting.      INTERVENTIONS:    Education provided today on:  AADE Self-Care Behaviors:  Healthy Eating: carbohydrate counting, consistency in amount, composition, and timing of food intake, heart healthy diet, portion control, plate planning method and label reading  Taking Medication: action of prescribed medication, drawing up, administering and storing injectable diabetes medications, proper site selection and rotation for injections, side effects of prescribed medications and when to take medications  Problem Solving: high blood glucose - causes, signs/symptoms, treatment and prevention, low blood glucose - causes, signs/symptoms, treatment and prevention, carrying a carbohydrate source at all times, safe travel and when to call health care provider    Opportunities for ongoing education and support in diabetes-self management were discussed.    Pt verbalized understanding of concepts discussed and recommendations provided today.       Education Materials Provided:  No new materials provided today      ASSESSMENT:  Due to low BG at night, lower her Toujeo insulin, Trulicity has made a big difference with her BG and she is so excited for her great numbers.  Will continue to watch the results.  May increase GLP-1 then could lower her insulin, will evaluate in a month        Patient's most recent   Lab Results   Component Value Date    A1C 8.2 01/15/2022    A1C 6.1 01/14/2021    is not meeting goal of <8.0    PLAN  See Patient Instructions for co-developed, patient-stated behavior change goals.  AVS printed and provided to patient today. See Follow-Up section for recommended follow-up.    Melissa Mustafa RN/RONALDO  [place] Diabetes Educator    Time Spent: 30 minutes  Encounter Type:  Individual    Any diabetes medication dose changes were made via the CDE Protocol and Collaborative Practice Agreement with the patient's primary care provider. A copy of this encounter was shared with the provider.

## 2022-03-22 NOTE — PATIENT INSTRUCTIONS
Diabetes Support Resources:  1. Decrease Toujeo from 42 units to 38 units daily    2. Novolog insulin take 26/5/25  If having extra sugar like cake take 1-2 more units.     Trulicity 0.75 mg weekly     3.  Try to get in some sort of bedtime snack, even if it is a piece of string cheese.      4. Will see about if we need to increase the Trulicity to 1.5 mg and lower insulin if we need to for some cost savings.       Bring blood glucose meter and logbook with you to all doctor and follow-up appointments.    Diabetes Education Telephone Visit Follow-up:    We realize your time is valuable and your health is important! We offer a convenient Telephone Visit follow up! It s a quick way to check in for a medication dose adjustment without having to come back to clinic as soon.    Telephone Visits are often covered by insurance. Please check with your insurance plan to see if this type of visit is covered. If not, the cost is less expensive than an office visit:      Up to 10 minutes (Code 49988): $30    11-20 minutes (Code 74846): $59    More than 20 minutes (Code 48823): $85    Talk with your Diabetes Educator if you want to learn more.      [facility]:  For Your Diabetes Education and Nutrition Appointments Call:  [phone]   For Diabetes Education or Nutrition Related Questions:   Phone: [phone]  Send [employer] Message   If you need a medication refill please contact your pharmacy. Please allow 3 business days for your refills to be completed.

## 2022-03-22 NOTE — PROGRESS NOTES
03/22/22    I was asked to see this patient by Dr. Ricci for evaluation of CKD.     CC: CKD    HPI: Yadi Iniguez is a 71 year old female who presents for evaluation of CKD. Ms. Yao's hx is significant for COPD, hypertension, AV stenosis, depression, DM, hypothyroidism, hyperlipidemia, tobacco hx, CHF/pulmonary hypertension. On review of her creatinine levels, there has been much variability:  - at 0.6 in 2008  - SUZE to 1.4 in Jan 2010 but improved to 0.76 in April 2011  - SUZE to 1.82 in Jan 2017 but improved to ~1 in Sep 2017  - SUZE to 1.45 in April 2018 but improved to 1.2 in Jan 2021  - SUZE to 1.81 in Sept 2021 but more recently 1.5-1.6 since.   She has had some albuminuria dating back to 2010. Fenofibrate therapy since 2018. Currently takes torsemide 20 mg AM and 20 mg PM - followed by cardiology clinic at [facility] for CHF. Dx with diabetes in her 40s.     - History of Hematuria: No  - Swelling: hx but none at this time. Breathing is comfortable. Weight recently 266-271 lbs. Torsemide 20 mg BID at this time. No lighhteadedness/dizziness at this time.   - Hx of UTIs: no  - Hx of stones: no  - Rashes/Joint pain: no rash; joint pain - generalized  - Family hx of kidney disease: no  - NSAID use: only tylenol    ACETAMINOPHEN PO, Take 500 mg by mouth 2 times daily 2 tablets twice daily  alendronate (FOSAMAX) 70 MG tablet, TAKE 1 TABLET (70 MG) BY MOUTH EVERY 7 DAYS. TAKE 60 MINUTES BEFORE MORNING MEAL WITH 8 OZ. OF WATER. REMAIN UPRIGHT FOR 30 MINUTES.  amLODIPine (NORVASC) 10 MG tablet, TAKE 1 TABLET (10 MG) BY MOUTH DAILY  aspirin 81 MG tablet, Take 1 tablet (81 mg) by mouth daily  atorvastatin (LIPITOR) 40 MG tablet, Take 1 tablet (40 mg) by mouth daily  blood glucose (NO BRAND SPECIFIED) test strip, Use to test blood sugar 3 times daily or as directed./ Brand per insurance  blood glucose monitoring (NO BRAND SPECIFIED) meter device kit, Use to test blood sugar 3 times daily or as directed./  "Brand per insurance  calcium carbonate (OS-NATACHA) 1500 (600 Ca) MG tablet, Take 600 mg by mouth daily   carvedilol (COREG) 25 MG tablet, TAKE 1 TABLET (25 MG) BY MOUTH 2 TIMES DAILY WITH MEALS  cholecalciferol (VITAMIN D3) 5000 units TABS tablet, Take 2 tablets by mouth daily   clobetasol propionate 0.05 % LIQD, Externally apply 1 dose. topically 2 times daily as needed  Continuous Blood Gluc Sensor (FREESTYLE MARI 14 DAY SENSOR) MISC, USE 1 DEVICE EVERY 14 DAYS AS DIRECTED  dulaglutide (TRULICITY) 0.75 MG/0.5ML pen, Inject 0.75 mg Subcutaneous every 7 days  dulaglutide (TRULICITY) 1.5 MG/0.5ML pen, Inject 1.5 mg Subcutaneous every 7 days  fenofibrate (LOFIBRA) 54 MG tablet, TAKE 1 TABLET (54 MG) BY MOUTH DAILY  insulin aspart (NOVOLOG FLEXPEN) 100 UNIT/ML pen, 25 units before breakfast, 5 units before lunch, 25 units before dinner.  (max dose 70 units/day for adjustment)  insulin glargine U-300 (TOUJEO SOLOSTAR) 300 UNIT/ML (1 units dial) pen, Inject 42 Units Subcutaneous At Bedtime  insulin pen needle (32G X 4 MM) 32G X 4 MM miscellaneous, Use 5 pen needles daily or as directed.  Per insurance and patient preference  levothyroxine (TIROSINT) 150 MCG capsule, Take 150 mcg by mouth daily (with breakfast)  PARoxetine (PAXIL) 20 MG tablet, TAKE 1 TABLET (20 MG) BY MOUTH 2 TIMES DAILY  primidone (MYSOLINE) 50 MG tablet, Take 2 tablets (100 mg) by mouth 2 times daily  torsemide (DEMADEX) 20 MG tablet, Take 40 mg QAM and 20 mg QPM    No current facility-administered medications on file prior to visit.      Exam:  Ht 1.753 m (5' 9\")   Wt 121.1 kg (267 lb)   BMI 39.43 kg/m    GENERAL APPEARANCE: alert and no distress  Breathing appears nonlabored.     Results:   Last Comprehensive Metabolic Panel:  Sodium   Date Value Ref Range Status   02/19/2022 138 133 - 144 mmol/L Final   01/14/2021 137 133 - 144 mmol/L Final     Potassium   Date Value Ref Range Status   02/19/2022 4.5 3.4 - 5.3 mmol/L Final   01/14/2021 5.0 3.4 - 5.3 " mmol/L Final     Chloride   Date Value Ref Range Status   02/19/2022 104 94 - 109 mmol/L Final   01/14/2021 102 94 - 109 mmol/L Final     Carbon Dioxide   Date Value Ref Range Status   01/14/2021 34 (H) 20 - 32 mmol/L Final     Carbon Dioxide (CO2)   Date Value Ref Range Status   02/19/2022 31 20 - 32 mmol/L Final     Anion Gap   Date Value Ref Range Status   02/19/2022 3 3 - 14 mmol/L Final   01/14/2021 1 (L) 3 - 14 mmol/L Final     Glucose   Date Value Ref Range Status   02/19/2022 196 (H) 70 - 99 mg/dL Final   01/14/2021 97 70 - 99 mg/dL Final     Comment:     Fasting specimen     Urea Nitrogen   Date Value Ref Range Status   02/19/2022 31 (H) 7 - 30 mg/dL Final   01/14/2021 17 7 - 30 mg/dL Final     Creatinine   Date Value Ref Range Status   02/19/2022 1.63 (H) 0.52 - 1.04 mg/dL Final   01/14/2021 1.20 (H) 0.52 - 1.04 mg/dL Final     GFR Estimate   Date Value Ref Range Status   02/19/2022 33 (L) >60 mL/min/1.73m2 Final     Comment:     Effective December 21, 2021 eGFRcr in adults is calculated using the 2021 CKD-EPI creatinine equation which includes age and gender (Mak et al., NEJM, DOI: 10.1056/DGGTsl7268884)   01/14/2021 45 (L) >60 mL/min/[1.73_m2] Final     Comment:     Non  GFR Calc  Starting 12/18/2018, serum creatinine based estimated GFR (eGFR) will be   calculated using the Chronic Kidney Disease Epidemiology Collaboration   (CKD-EPI) equation.       Calcium   Date Value Ref Range Status   02/19/2022 8.9 8.5 - 10.1 mg/dL Final   01/14/2021 9.4 8.5 - 10.1 mg/dL Final       Lab results were reviewed and interpreted.       Assessment/Plan:   Stage 3b chronic kidney disease (H)  AT risk for CKD in the setting of diabetes, hypertension, CHF on diuretics, tobacco hx, obesity as a potential risk for secondary FSGS, and also fenofibrate use. Will get lab and urine studies to assure no hematuria/reevalutae proteinuria/rule out myeloma. Ideally we would stop fenofibrate given potential of  nephrotoxicity associated witht his medication. We will reach out to cardiology to assure they are in agreement with this and ask for their help with a substitute - ideally would avoid fibrate medications. Educated to avoid NSAIDs. Educated on risk of cardiorenal physiology - want to avoid volume overload for her CHF but also avoid dehydration to avoid SUZE and the potential need to adjust diuretics to keep this balanced. On max lisinopril. Ideally would be on SGLT2 inhibitor but with her GFR so close to 30 and the likelihood of it dropping below 30 once on SGLT2 inhibitor, will hold on starting this for the time being.   - Adult Nephrology Referral    CHF: appears stable at this time on torsemide 20 mg BID. No changes today. On lisinpril 40 mg daily.    DM: last A1C 8.2% in Jan. Educated on benefits of good diabetes control.     Hypertension: goal blood pressure is less than 130/80    Hx of tobacco: at risk for secondary FSGS - now away from tobacco which is great to hear    Obesity: educated on benefits of weight loss/healthy lifestyle     Patient Instructions   1. Lab and urine studies needed  2. Kidney ultrasound  3. Tentative plan to follow-up In 4 months  4. We will be in touch with cardiology team about working away from the fenofibrate.        57 minutes spent on the date of the encounter doing chart review, review of outside records, review of test results, interpretation of tests, patient visit and documentation   [id number] AM video visit via YESSICA Preston DO

## 2022-03-23 ENCOUNTER — TELEPHONE (OUTPATIENT)
Dept: NEPHROLOGY | Facility: CLINIC | Age: 72
End: 2022-03-23
Payer: COMMERCIAL

## 2022-03-23 ENCOUNTER — TELEPHONE (OUTPATIENT)
Dept: FAMILY MEDICINE | Facility: CLINIC | Age: 72
End: 2022-03-23
Payer: COMMERCIAL

## 2022-03-23 DIAGNOSIS — N18.32 STAGE 3B CHRONIC KIDNEY DISEASE (H): Primary | ICD-10-CM

## 2022-03-23 NOTE — TELEPHONE ENCOUNTER
"Called and spoke with Pts Daughter \"Nikki\" (Auth to Discuss in chart).    Assisted with scheduling a Future appt with Dr. Preston. Video Visit 7/12/22 at 11:30am Lab Appt at [facility] [place] 07/09/22.    Encouraged to MyChart or call if any further questions or concerns.    Syeda Guadalupe LPN    "

## 2022-03-23 NOTE — TELEPHONE ENCOUNTER
Daughter, Nikki calling to see if future orders are in Epic for upcoming lab appointment.  Informed her that they are from Dr Preston.    Yaa Turner BSN, RN

## 2022-03-23 NOTE — TELEPHONE ENCOUNTER
Health Call Center    Phone Message    May a detailed message be left on voicemail: yes     Reason for Call: Other: NikkiYadi's daughter called to schedule her 4 month follow up per Dr. Preston. The soonest available was 9/20 that writer had. Nikki wanted to just confirm that Yadi is okay to go that long before following up or if she needs her labs done at least in July around that 4 month mic. Please call Nikki to discuss, thank you!     Action Taken: Message routed to:  Adult Clinics: Nephrology p [id number]    Travel Screening: Not Applicable

## 2022-03-26 ENCOUNTER — LAB (OUTPATIENT)
Dept: LAB | Facility: CLINIC | Age: 72
End: 2022-03-26
Attending: INTERNAL MEDICINE
Payer: COMMERCIAL

## 2022-03-26 ENCOUNTER — LAB (OUTPATIENT)
Dept: LAB | Facility: CLINIC | Age: 72
End: 2022-03-26
Payer: COMMERCIAL

## 2022-03-26 DIAGNOSIS — Z11.59 ENCOUNTER FOR SCREENING FOR OTHER VIRAL DISEASES: ICD-10-CM

## 2022-03-26 DIAGNOSIS — N18.32 STAGE 3B CHRONIC KIDNEY DISEASE (H): ICD-10-CM

## 2022-03-26 LAB
ALBUMIN UR-MCNC: NEGATIVE MG/DL
APPEARANCE UR: CLEAR
BACTERIA #/AREA URNS HPF: ABNORMAL /HPF
BILIRUB UR QL STRIP: NEGATIVE
COLOR UR AUTO: YELLOW
CREAT UR-MCNC: 25 MG/DL
GLUCOSE UR STRIP-MCNC: NEGATIVE MG/DL
HGB BLD-MCNC: 11.7 G/DL (ref 11.7–15.7)
HGB UR QL STRIP: NEGATIVE
KETONES UR STRIP-MCNC: NEGATIVE MG/DL
LEUKOCYTE ESTERASE UR QL STRIP: ABNORMAL
NITRATE UR QL: NEGATIVE
PH UR STRIP: 6.5 [PH] (ref 5–7)
PROT UR-MCNC: 0.07 G/L
PROT/CREAT 24H UR: 0.28 G/G CR (ref 0–0.2)
PTH-INTACT SERPL-MCNC: 26 PG/ML (ref 18–80)
RBC #/AREA URNS AUTO: ABNORMAL /HPF
SP GR UR STRIP: 1.01 (ref 1–1.03)
SQUAMOUS #/AREA URNS AUTO: ABNORMAL /LPF
TOTAL PROTEIN SERUM FOR ELP: 6.7 G/DL (ref 6.8–8.8)
UROBILINOGEN UR STRIP-ACNC: 0.2 E.U./DL
WBC #/AREA URNS AUTO: ABNORMAL /HPF

## 2022-03-26 PROCEDURE — 81001 URINALYSIS AUTO W/SCOPE: CPT

## 2022-03-26 PROCEDURE — 84165 PROTEIN E-PHORESIS SERUM: CPT | Performed by: PATHOLOGY

## 2022-03-26 PROCEDURE — U0005 INFEC AGEN DETEC AMPLI PROBE: HCPCS

## 2022-03-26 PROCEDURE — 86334 IMMUNOFIX E-PHORESIS SERUM: CPT | Performed by: PATHOLOGY

## 2022-03-26 PROCEDURE — 83521 IG LIGHT CHAINS FREE EACH: CPT

## 2022-03-26 PROCEDURE — 36415 COLL VENOUS BLD VENIPUNCTURE: CPT

## 2022-03-26 PROCEDURE — 85018 HEMOGLOBIN: CPT

## 2022-03-26 PROCEDURE — 84156 ASSAY OF PROTEIN URINE: CPT

## 2022-03-26 PROCEDURE — 80069 RENAL FUNCTION PANEL: CPT

## 2022-03-26 PROCEDURE — 84155 ASSAY OF PROTEIN SERUM: CPT

## 2022-03-26 PROCEDURE — U0003 INFECTIOUS AGENT DETECTION BY NUCLEIC ACID (DNA OR RNA); SEVERE ACUTE RESPIRATORY SYNDROME CORONAVIRUS 2 (SARS-COV-2) (CORONAVIRUS DISEASE [COVID-19]), AMPLIFIED PROBE TECHNIQUE, MAKING USE OF HIGH THROUGHPUT TECHNOLOGIES AS DESCRIBED BY CMS-2020-01-R: HCPCS

## 2022-03-26 PROCEDURE — 83970 ASSAY OF PARATHORMONE: CPT

## 2022-03-27 LAB — SARS-COV-2 RNA RESP QL NAA+PROBE: NEGATIVE

## 2022-03-28 LAB
ALBUMIN SERPL ELPH-MCNC: 4 G/DL (ref 3.7–5.1)
ALBUMIN SERPL-MCNC: 3.5 G/DL (ref 3.4–5)
ALPHA1 GLOB SERPL ELPH-MCNC: 0.3 G/DL (ref 0.2–0.4)
ALPHA2 GLOB SERPL ELPH-MCNC: 0.7 G/DL (ref 0.5–0.9)
ANION GAP SERPL CALCULATED.3IONS-SCNC: 4 MMOL/L (ref 3–14)
B-GLOBULIN SERPL ELPH-MCNC: 0.7 G/DL (ref 0.6–1)
BUN SERPL-MCNC: 33 MG/DL (ref 7–30)
CALCIUM SERPL-MCNC: 9.5 MG/DL (ref 8.5–10.1)
CHLORIDE BLD-SCNC: 100 MMOL/L (ref 94–109)
CO2 SERPL-SCNC: 33 MMOL/L (ref 20–32)
CREAT SERPL-MCNC: 1.63 MG/DL (ref 0.52–1.04)
GAMMA GLOB SERPL ELPH-MCNC: 1 G/DL (ref 0.7–1.6)
GFR SERPL CREATININE-BSD FRML MDRD: 33 ML/MIN/1.73M2
GLUCOSE BLD-MCNC: 125 MG/DL (ref 70–99)
KAPPA LC FREE SER-MCNC: 5.7 MG/DL (ref 0.33–1.94)
KAPPA LC FREE/LAMBDA FREE SER NEPH: 1.87 {RATIO} (ref 0.26–1.65)
LAMBDA LC FREE SERPL-MCNC: 3.05 MG/DL (ref 0.57–2.63)
M PROTEIN SERPL ELPH-MCNC: 0.2 G/DL
PHOSPHATE SERPL-MCNC: 4.2 MG/DL (ref 2.5–4.5)
POTASSIUM BLD-SCNC: 4.6 MMOL/L (ref 3.4–5.3)
PROT PATTERN SERPL ELPH-IMP: ABNORMAL
PROT PATTERN SERPL IFE-IMP: NORMAL
SODIUM SERPL-SCNC: 137 MMOL/L (ref 133–144)

## 2022-03-29 ENCOUNTER — HOSPITAL ENCOUNTER (OUTPATIENT)
Facility: CLINIC | Age: 72
Discharge: HOME | End: 2022-03-29
Attending: INTERNAL MEDICINE | Admitting: INTERNAL MEDICINE
Payer: COMMERCIAL

## 2022-03-29 VITALS
DIASTOLIC BLOOD PRESSURE: 103 MMHG | HEART RATE: 89 BPM | OXYGEN SATURATION: 95 % | RESPIRATION RATE: 20 BRPM | SYSTOLIC BLOOD PRESSURE: 129 MMHG

## 2022-03-29 DIAGNOSIS — Z12.11 ENCOUNTER FOR SCREENING COLONOSCOPY: Primary | ICD-10-CM

## 2022-03-29 LAB
COLONOSCOPY: NORMAL
GLUCOSE BLDC GLUCOMTR-MCNC: 142 MG/DL (ref 70–99)

## 2022-03-29 PROCEDURE — 45385 COLONOSCOPY W/LESION REMOVAL: CPT | Performed by: INTERNAL MEDICINE

## 2022-03-29 PROCEDURE — 99153 MOD SED SAME PHYS/QHP EA: CPT | Performed by: INTERNAL MEDICINE

## 2022-03-29 PROCEDURE — 88305 TISSUE EXAM BY PATHOLOGIST: CPT | Mod: TC | Performed by: INTERNAL MEDICINE

## 2022-03-29 PROCEDURE — 82962 GLUCOSE BLOOD TEST: CPT

## 2022-03-29 PROCEDURE — 88305 TISSUE EXAM BY PATHOLOGIST: CPT | Mod: 26 | Performed by: PATHOLOGY

## 2022-03-29 PROCEDURE — G0500 MOD SEDAT ENDO SERVICE >5YRS: HCPCS | Performed by: INTERNAL MEDICINE

## 2022-03-29 PROCEDURE — 250N000011 HC RX IP 250 OP 636: Performed by: INTERNAL MEDICINE

## 2022-03-29 RX ORDER — LIDOCAINE 40 MG/G
CREAM TOPICAL
Status: DISCONTINUED | OUTPATIENT
Start: 2022-03-29 | End: 2022-03-29 | Stop reason: HOSPADM

## 2022-03-29 RX ORDER — SODIUM CHLORIDE, SODIUM LACTATE, POTASSIUM CHLORIDE, CALCIUM CHLORIDE 600; 310; 30; 20 MG/100ML; MG/100ML; MG/100ML; MG/100ML
INJECTION, SOLUTION INTRAVENOUS CONTINUOUS
Status: DISCONTINUED | OUTPATIENT
Start: 2022-03-29 | End: 2022-03-29 | Stop reason: HOSPADM

## 2022-03-29 RX ORDER — FENTANYL CITRATE 50 UG/ML
INJECTION, SOLUTION INTRAMUSCULAR; INTRAVENOUS PRN
Status: COMPLETED | OUTPATIENT
Start: 2022-03-29 | End: 2022-03-29

## 2022-03-29 RX ORDER — ONDANSETRON 2 MG/ML
4 INJECTION INTRAMUSCULAR; INTRAVENOUS
Status: DISCONTINUED | OUTPATIENT
Start: 2022-03-29 | End: 2022-03-29 | Stop reason: HOSPADM

## 2022-03-29 RX ADMIN — FENTANYL CITRATE 50 MCG: 50 INJECTION, SOLUTION INTRAMUSCULAR; INTRAVENOUS at 09:03

## 2022-03-29 RX ADMIN — MIDAZOLAM 2 MG: 1 INJECTION INTRAMUSCULAR; INTRAVENOUS at 09:05

## 2022-03-29 RX ADMIN — MIDAZOLAM 1 MG: 1 INJECTION INTRAMUSCULAR; INTRAVENOUS at 09:13

## 2022-03-29 NOTE — H&P
Yadi MCDUFFIE Hira  [id number]  female  71 year old      Reason for procedure/surgery: HO colon polyps    Patient Active Problem List   Diagnosis     Hypothyroidism     Hypertension goal BP (blood pressure) < 140/90     Type 2 diabetes, HbA1C goal < 8% (H)     Hyperlipidemia LDL goal <100     Moderate major depression (H)     Incontinence of urine     Neuropathy in diabetes (H)     Eczema     Left lumbar radiculopathy     Health Care Home     CKD (chronic kidney disease) stage 3, GFR 30-59 ml/min (H)     Type 2 diabetes mellitus with other diabetic kidney complication, with long-term current use of insulin (H)     Osteoporosis     Morbid obesity (H)     Thrombocytopenia (H)     Chronic obstructive pulmonary disease, unspecified COPD type (H)     S/P lumbar fusion     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, with long-term current use of insulin, unspecified laterality (H)     Lumbar facet arthropathy     Lumbar spondylosis     Pulmonary hypertension (H)     Nonrheumatic aortic valve stenosis     Depression, major, recurrent, in partial remission (H)       Past Surgical History:    Past Surgical History:   Procedure Laterality Date     ABDOMEN SURGERY  1978    Gall Bladder  January       hysterectomy  Sept     BACK SURGERY  11/2018     BIOPSY  2013 1993    from left corner of mouth       muscule biopsy     CATARACT IOL, RT/LT Bilateral     2006     CHOLECYSTECTOMY, OPEN       CL AFF SURGICAL PATHOLOGY       COLONOSCOPY  2007     COLONOSCOPY N/A 6/22/2018    Procedure: COMBINED COLONOSCOPY, SINGLE OR MULTIPLE BIOPSY/POLYPECTOMY BY BIOPSY;;  Surgeon: David Parson MD;  Location:  OR     COLONOSCOPY WITH CO2 INSUFFLATION N/A 6/22/2018    Procedure: COLONOSCOPY WITH CO2 INSUFFLATION;  Colonscopy, ;  Surgeon: David Parson MD;  Location:  OR     EYE SURGERY  2014    ongoing     EYE SURGERY Right 2018     HEAD & NECK SURGERY  2006     Removal of saliva gland left side     OTS FUSION SPINE POSTERIOR LUMBAR MINIMALLY INVASIVE ONE LEVEL N/A 2018    Procedure: Minimally invasive Left L4-5 transforaminal lumbar interbody fusion  Resection of synovial cystic mass adjacent to the L5 root Placement of Medtronic Voyager percutaneous pedicle screws from L4-5 bilaterally Open reduction of L4-5 grade 2 spondylolisthesis;  Surgeon: Hernando Hill MD;  Location: [facility] OR     PROPHYLACTIC REPAIR RETINAL BREAK, PNEUMATIC RETINOPEXY, COMBINED Left 2016     SOFT TISSUE SURGERY  1996    Carpal Tunnel left wrist     ZZC VAGINAL HYSTERECTOMY         Past Medical History:   Past Medical History:   Diagnosis Date     Arthritis 1975     Broken ribs 2016    3,4,5,6,7, left side     Cancer (H)     Skin cancer     COPD (chronic obstructive pulmonary disease) (H) 2017     Depression      Depressive disorder      Dysuria      Glycosuria      Hypertension      Mixed incontinence urge and stress      Nonrheumatic aortic valve stenosis 2021     Other and unspecified hyperlipidemia      Right shoulder pain      Sleep apnea     cpap     Type II or unspecified type diabetes mellitus without mention of complication, uncontrolled      Uncomplicated asthma      Unspecified hypothyroidism        Social History:   Social History     Tobacco Use     Smoking status: Former Smoker     Packs/day: 1.00     Years: 50.00     Pack years: 50.00     Types: Cigarettes     Start date: 1966     Quit date: 2021     Years since quittin.7     Smokeless tobacco: Never Used     Tobacco comment: quit for 6 months back in 2018   Substance Use Topics     Alcohol use: Not Currently     Comment: Maybe a drink every few months       Family History:   Family History   Problem Relation Age of Onset     Musculoskeletal Disorder Mother         MD     Muscular Disorder Mother      Depression Mother      Osteoporosis Mother      Obesity Mother       Cataracts Mother      Genetic Disorder Mother         MD     Cancer Father         mesothelioma     Obesity Father      Coronary Artery Disease Father      Hypertension Father      Other Cancer Father      Thyroid Disease Father      Heart Disease Brother         angioplasty     Neurologic Disorder Brother         ms     Diabetes Brother      Hypertension Brother      Hyperlipidemia Brother      Obesity Brother         4 brothers     Depression Paternal Grandmother      Obesity Paternal Grandmother      Genetic Disorder Brother         MS     Other Cancer Brother      Depression Brother      Substance Abuse Brother      Diabetes Brother      Coronary Artery Disease Brother      Hypertension Brother      Obesity Brother      Hypertension Brother      Genetic Disorder Brother         MD     Coronary Artery Disease Brother      Hyperlipidemia Brother      Hypertension Brother      Thyroid Disease Brother      Genetic Disorder Brother         MS     Obesity Brother      Cerebrovascular Disease Nephew      Muscular Dystrophy Nephew      Coronary Artery Disease Nephew      Genetic Disorder Nephew      Coronary Artery Disease Nephew      Breast Cancer Other        Allergies: No Known Allergies    Active Medications:   No current outpatient medications on file.       Systemic Review:   CONSTITUTIONAL: NEGATIVE for fever, chills, change in weight  ENT/MOUTH: NEGATIVE for ear, mouth and throat problems  RESP: NEGATIVE for significant cough or SOB  CV: NEGATIVE for chest pain, palpitations or peripheral edema    Physical Examination:   Vital Signs: There were no vitals taken for this visit.  GENERAL: healthy, alert and no distress  NECK: no adenopathy, no asymmetry, masses, or scars  RESP: lungs clear to auscultation - no rales, rhonchi or wheezes  CV: regular rate and rhythm, normal S1 S2, no S3 or S4,+ 2/6 ALESHIA, No click or rub, no peripheral edema and peripheral pulses strong  ABDOMEN: obese soft, nontender, no  hepatosplenomegaly, no masses and bowel sounds normal  MS: no gross musculoskeletal defects noted, no edema    ASA: 2    Mallampati Score: 2    Plan: Appropriate to proceed as scheduled.      Ajith Guo MD  3/29/2022    PCP:  Sandy Ricci

## 2022-03-30 LAB
PATH REPORT.COMMENTS IMP SPEC: NORMAL
PATH REPORT.COMMENTS IMP SPEC: NORMAL
PATH REPORT.FINAL DX SPEC: NORMAL
PATH REPORT.GROSS SPEC: NORMAL
PATH REPORT.MICROSCOPIC SPEC OTHER STN: NORMAL
PATH REPORT.RELEVANT HX SPEC: NORMAL
PHOTO IMAGE: NORMAL

## 2022-03-31 ENCOUNTER — MYC MEDICAL ADVICE (OUTPATIENT)
Dept: FAMILY MEDICINE | Facility: CLINIC | Age: 72
End: 2022-03-31
Payer: COMMERCIAL

## 2022-03-31 DIAGNOSIS — E11.29 TYPE 2 DIABETES MELLITUS WITH OTHER DIABETIC KIDNEY COMPLICATION, WITH LONG-TERM CURRENT USE OF INSULIN (H): ICD-10-CM

## 2022-03-31 DIAGNOSIS — Z79.4 TYPE 2 DIABETES MELLITUS WITH OTHER DIABETIC KIDNEY COMPLICATION, WITH LONG-TERM CURRENT USE OF INSULIN (H): ICD-10-CM

## 2022-03-31 DIAGNOSIS — E11.9 TYPE 2 DIABETES, HBA1C GOAL < 8% (H): ICD-10-CM

## 2022-03-31 RX ORDER — INSULIN ASPART 100 [IU]/ML
INJECTION, SOLUTION INTRAVENOUS; SUBCUTANEOUS
Qty: 75 ML | Refills: 1 | Status: SHIPPED | OUTPATIENT
Start: 2022-03-31 | End: 2022-04-19

## 2022-04-05 ENCOUNTER — TRANSFERRED RECORDS (OUTPATIENT)
Dept: HEALTH INFORMATION MANAGEMENT | Facility: CLINIC | Age: 72
End: 2022-04-05

## 2022-04-05 ENCOUNTER — ANCILLARY PROCEDURE (OUTPATIENT)
Dept: ULTRASOUND IMAGING | Facility: CLINIC | Age: 72
End: 2022-04-05
Attending: INTERNAL MEDICINE
Payer: COMMERCIAL

## 2022-04-05 DIAGNOSIS — N18.32 STAGE 3B CHRONIC KIDNEY DISEASE (H): ICD-10-CM

## 2022-04-05 LAB — RETINOPATHY: POSITIVE

## 2022-04-05 PROCEDURE — 76770 US EXAM ABDO BACK WALL COMP: CPT | Performed by: RADIOLOGY

## 2022-04-06 ENCOUNTER — TELEPHONE (OUTPATIENT)
Dept: FAMILY MEDICINE | Facility: CLINIC | Age: 72
End: 2022-04-06
Payer: COMMERCIAL

## 2022-04-06 NOTE — TELEPHONE ENCOUNTER
FYI~ April 6, 2022 I spoke with Yadi, she is in need of financial assistance for medication.    We reviewed the Prescription Assistance Program for manfacturer assistance programs, gross income, insurance and Rx list.    Yadi was approved to the Social Security Extra Help Program. Because Yadi receives this Extra Help,[employer] and [employer] is not open to her.    I will complete the Toujeo u300 Solostar  application as [employer] is not specific about the Social Security Extra Help program.    If/when Yadi approved, she will receive this medication at no cost for the remainder of 2022.    Dina Finch  Prescription Assistance Supervisor  Pharmacy Assistance  [phone]

## 2022-04-15 ENCOUNTER — MYC MEDICAL ADVICE (OUTPATIENT)
Dept: FAMILY MEDICINE | Facility: CLINIC | Age: 72
End: 2022-04-15
Payer: COMMERCIAL

## 2022-04-15 DIAGNOSIS — F32.1 MODERATE MAJOR DEPRESSION (H): ICD-10-CM

## 2022-04-15 DIAGNOSIS — M81.0 OSTEOPOROSIS, UNSPECIFIED OSTEOPOROSIS TYPE, UNSPECIFIED PATHOLOGICAL FRACTURE PRESENCE: ICD-10-CM

## 2022-04-15 DIAGNOSIS — G25.0 ESSENTIAL TREMOR: ICD-10-CM

## 2022-04-15 DIAGNOSIS — E03.9 HYPOTHYROIDISM, UNSPECIFIED TYPE: ICD-10-CM

## 2022-04-15 DIAGNOSIS — E78.5 HYPERLIPIDEMIA, UNSPECIFIED HYPERLIPIDEMIA TYPE: ICD-10-CM

## 2022-04-15 RX ORDER — LEVOTHYROXINE SODIUM 13 UG/1
150 CAPSULE ORAL
Qty: 90 CAPSULE | Refills: 2 | Status: SHIPPED | OUTPATIENT
Start: 2022-04-15 | End: 2023-01-10

## 2022-04-15 RX ORDER — ALENDRONATE SODIUM 70 MG/1
TABLET ORAL
Qty: 12 TABLET | Refills: 3 | Status: SHIPPED | OUTPATIENT
Start: 2022-04-15 | End: 2023-04-16

## 2022-04-15 RX ORDER — ATORVASTATIN CALCIUM 40 MG/1
40 TABLET, FILM COATED ORAL DAILY
Qty: 90 TABLET | Refills: 2 | Status: SHIPPED | OUTPATIENT
Start: 2022-04-15 | End: 2023-06-27

## 2022-04-15 RX ORDER — PRIMIDONE 50 MG/1
100 TABLET ORAL 2 TIMES DAILY
Qty: 360 TABLET | Refills: 0 | Status: SHIPPED | OUTPATIENT
Start: 2022-04-15 | End: 2022-10-02

## 2022-04-15 RX ORDER — PAROXETINE 20 MG/1
TABLET, FILM COATED ORAL
Qty: 180 TABLET | Refills: 3 | Status: SHIPPED | OUTPATIENT
Start: 2022-04-15 | End: 2023-01-07

## 2022-04-19 ENCOUNTER — MEDICAL CORRESPONDENCE (OUTPATIENT)
Dept: HEALTH INFORMATION MANAGEMENT | Facility: CLINIC | Age: 72
End: 2022-04-19

## 2022-04-19 ENCOUNTER — DOCUMENTATION ONLY (OUTPATIENT)
Dept: EDUCATION SERVICES | Facility: CLINIC | Age: 72
End: 2022-04-19

## 2022-04-19 ENCOUNTER — ALLIED HEALTH/NURSE VISIT (OUTPATIENT)
Dept: EDUCATION SERVICES | Facility: CLINIC | Age: 72
End: 2022-04-19
Payer: COMMERCIAL

## 2022-04-19 DIAGNOSIS — E11.9 TYPE 2 DIABETES, HBA1C GOAL < 8% (H): ICD-10-CM

## 2022-04-19 DIAGNOSIS — Z79.4 TYPE 2 DIABETES MELLITUS WITH OTHER DIABETIC KIDNEY COMPLICATION, WITH LONG-TERM CURRENT USE OF INSULIN (H): ICD-10-CM

## 2022-04-19 DIAGNOSIS — E11.29 TYPE 2 DIABETES MELLITUS WITH OTHER DIABETIC KIDNEY COMPLICATION, WITH LONG-TERM CURRENT USE OF INSULIN (H): ICD-10-CM

## 2022-04-19 DIAGNOSIS — E11.9 DIABETES MELLITUS WITHOUT COMPLICATION (H): Primary | ICD-10-CM

## 2022-04-19 PROCEDURE — 99207 PR DROP WITH A PROCEDURE: CPT

## 2022-04-19 RX ORDER — INSULIN ASPART 100 [IU]/ML
INJECTION, SOLUTION INTRAVENOUS; SUBCUTANEOUS
Qty: 75 ML | Refills: 1
Start: 2022-04-19 | End: 2022-07-12

## 2022-04-19 NOTE — PROGRESS NOTES
Diabetes Self Management Training: Follow-up and Personal Continuous Glucose Monitor Download    Yadi Iniguez presents today for download and report review of patient-owned continuous glucose monitor device related to Type 2 diabetes.    She is accompanied by daughter    Patient's diabetes management related comments/concerns: download her CGM and make med changes over the phone      Current Diabetes Management per Patient:  Taking diabetes medications?   yes:     Diabetes Medication(s)     Insulin       insulin aspart (NOVOLOG FLEXPEN) 100 UNIT/ML pen    26 units before breakfast, 5 units before lunch, 25 units before dinner.  (max dose 70 units/day for adjustment)     insulin glargine U-300 (TOUJEO SOLOSTAR) 300 UNIT/ML (1 units dial) pen    Inject 38 Units Subcutaneous At Bedtime    Incretin Mimetic Agents (GLP-1 Receptor Agonists)       dulaglutide (TRULICITY) 0.75 MG/0.5ML pen    Inject 0.75 mg Subcutaneous every 7 days          *Abbreviated insulin dose documentation key: Insulin Trade Name ([id number]-[id number]) - i.e. Humalog 5-5-5-0 (Humalog 5 units at breakfast, 5 units at lunch, and 5 units at dinner).    Most Recent A1c Result:    Lab Results   Component Value Date    A1C 8.2 01/15/2022    A1C 6.1 2021       Personal Continuous Glucose Monitor Interpretation                             Statistics:   1. Sensor data covers 14 days.  2. Average scans and/or glucose check/calibrations per day:   3. Glucose excursions:   Percent in target is: 81%  Percent above target is: 19%  Percent below target is: 0%    Data evaluation:    Sensor modal day evaluation shows the followin. Consistent day-to-day patterns noted: pattern of daytime hyperglycemia.  2. Average glucose: 146 mg/dL.  3. Low glucose events: 1 events with and average duration of 195 minutes    Assessment:  Trulicity has made a huge change for Yadi and her diabetes management.  Will continue the regime as long as she can afford  this.  Will increase her Novolog by 1 unit for breakfast  26---->27/5/25  Medication and/or insulin dosing is: inaccurate             PLAN:  See Patient Instructions for co-developed, patient-stated behavior change goals.  Recommend increase to insulin - Novolog 26--->27/5/25  AVS printed and provided to patient today.    FOLLOW-UP:  Follow-up as needed.   Chart routed to referring provider.    Melissa Mustafa RN/RONALDO  [facility] Educator    Time Spent: 15 minutes  Encounter Type: Individual    Any diabetes medication dose changes were made via the CDE Protocol and Collaborative Practice Agreement with the patient's primary care provider. A copy of this encounter was shared with the provider.

## 2022-04-19 NOTE — PROGRESS NOTES
See note from 4/19/2022 of download of CGM    Melissa Mustafa RN/RONALDO [facility] Diabetes Educator

## 2022-04-20 ENCOUNTER — PATIENT OUTREACH (OUTPATIENT)
Dept: ONCOLOGY | Facility: CLINIC | Age: 72
End: 2022-04-20
Payer: COMMERCIAL

## 2022-04-20 DIAGNOSIS — D47.2 MONOCLONAL GAMMOPATHY: Primary | ICD-10-CM

## 2022-04-20 NOTE — PROGRESS NOTES
Referred By  Referred To   Jessika Preston, DO   [address]   [facility] [address]   Phone: [phone]   Fax: [phone]    Diagnoses: Monoclonal gammopathy   Order: Oncology/Hematology Adult Referral    Hematology  Preferred Location:  Any ealth  Location                       Referral to hematology reviewed, pertinent labs and referring office visit BOOKMARKED    April 20, 2022 OUTGOING CALL to pt, no answer. Left voicemail introducing my role as nurse navigator with [facility] hematology/  clinics and that we have recd the referral to hematology. Requested callback to number below (Mon - Fri 8am - 4:30pm) to speak to a  to set the consult date/time/location. Explained to pt that he will also receive a call from our scheduling intake team in the next 1-2 business days  Kirti Trimble, RN, BSN, OCN  Hematology/Oncology Nurse Navigator  [facility]  [phone]

## 2022-04-21 ENCOUNTER — TELEPHONE (OUTPATIENT)
Dept: NEPHROLOGY | Facility: CLINIC | Age: 72
End: 2022-04-21
Payer: COMMERCIAL

## 2022-04-21 DIAGNOSIS — R93.422 ABNORMAL FINDING ON DIAGNOSTIC IMAGING OF LEFT KIDNEY: ICD-10-CM

## 2022-04-21 DIAGNOSIS — N18.30 CKD (CHRONIC KIDNEY DISEASE) STAGE 3, GFR 30-59 ML/MIN (H): Primary | ICD-10-CM

## 2022-04-21 NOTE — TELEPHONE ENCOUNTER
Contacted patient with recent results. Dr. Preston advised hematology referral as well as MRI. [facility] She has no questions at this time and will expect a call to schedule these.  Note per Dr. Preston:  Tatum: recommend MRI renal protocol with and without contrast to evaluate the left kidney finding. Appreciate your help with this so we can get the questions answered on the MRI order. Thank you, Kw       Tatum Siu RN

## 2022-04-22 NOTE — PROGRESS NOTES
RECORDS STATUS - ALL OTHER DIAGNOSIS      RECORDS RECEIVED FROM: [facility]   DATE RECEIVED: 4/26/2022   NOTES STATUS DETAILS   OFFICE NOTE from referring provider Complete Epic   Refer by Jessika Preston DO   DISCHARGE SUMMARY from hospital     DISCHARGE REPORT from the ER     OPERATIVE REPORT Complete See Biopsy Report Below    MEDICATION LIST Complete [facility]   CLINICAL TRIAL TREATMENTS TO DATE     LABS     PATHOLOGY REPORTS Complete 3/29/2022   Large intestine, transverse, polypectomy x2:  -Tubular adenoma without evidence of high-grade dysplasia or invasive carcinoma  - Hyperplastic polyp   ANYTHING RELATED TO DIAGNOSIS     GENONOMIC TESTING     TYPE:     IMAGING (NEED IMAGES & REPORT)     CT SCANS     MRI Scheduled MRI Renal (Scheduled)    MAMMO     ULTRASOUND Complete US Renal Complete 4/5/2022   PET

## 2022-04-23 ENCOUNTER — HEALTH MAINTENANCE LETTER (OUTPATIENT)
Age: 72
End: 2022-04-23

## 2022-04-26 ENCOUNTER — VIRTUAL VISIT (OUTPATIENT)
Dept: ONCOLOGY | Facility: CLINIC | Age: 72
End: 2022-04-26
Attending: INTERNAL MEDICINE
Payer: COMMERCIAL

## 2022-04-26 ENCOUNTER — PRE VISIT (OUTPATIENT)
Dept: ONCOLOGY | Facility: CLINIC | Age: 72
End: 2022-04-26

## 2022-04-26 DIAGNOSIS — D47.2 MONOCLONAL GAMMOPATHY: ICD-10-CM

## 2022-04-26 PROCEDURE — G0463 HOSPITAL OUTPT CLINIC VISIT: HCPCS | Mod: PN,RTG | Performed by: INTERNAL MEDICINE

## 2022-04-26 PROCEDURE — 99204 OFFICE O/P NEW MOD 45 MIN: CPT | Mod: 95 | Performed by: INTERNAL MEDICINE

## 2022-04-26 NOTE — PROGRESS NOTES
Yadi is a 71 year old who is being evaluated via a billable video visit.      How would you like to obtain your AVS? MyChart  If the video visit is dropped, the invitation should be resent by: Text to cell phone: [phone]  Will anyone else be joining your video visit? Denise Soler, ANABEL on 4/26/2022 at 1:28 PM

## 2022-04-26 NOTE — LETTER
4/26/2022         RE: Yadi Iniguez  [address]  [place] [address]        Dear Colleague,    Thank you for referring your patient, Yadi Iniguez, to the [facility]. Please see a copy of my visit note below.    Yadi is a 71 year old who is being evaluated via a billable video visit.      How would you like to obtain your AVS? MyChart  If the video visit is dropped, the invitation should be resent by: Text to cell phone: [phone]  Will anyone else be joining your video visit? No     Michelle Soler CMA on 4/26/2022 at 1:28 PM            Video-Visit Details    Type of service:  Video Visit    Video Start Time: 1:34 PM  Video End Time: 1:56 PM    Originating Location (pt. Location): Home    Distant Location (provider location):  [facility]     Platform used for Video Visit: [employer]    [facility] Hematology and Oncology Consult Note    Patient: Yadi Iniguez  MRN: [id number]  Date of Service: 04/26/2022      Reason for Visit    Chief Complaint   Patient presents with     Oncology Clinic Visit     New - Monoclonal gammopathy         Assessment/Plan    Problem List Items Addressed This Visit    None     Visit Diagnoses     Monoclonal gammopathy        Relevant Orders    XR Bone Survey Complete (Completed)    Protein immunofixation urine (Completed)    Protein electrophoresis timed urine (Completed)        MGUS  SPEP showed 2 distinct monoclonal bands.  What is an IgG kappa abdomen is IgA kappa.  Monoclonal peak is of 0.2 g/dL.  Kappa chains are elevated at 5 and lambda chains are at 3.05.  Ratio is 1.87.  This is mildly elevated.  I reviewed the diagnosis of MGUS with her in detail today.  Does not have any clinical signs or symptoms of full-blown myeloma.  Her hemoglobin is only mildly low 11.5 and has mild macrocytosis which could be potentially secondary to hypothyroidism.  The lower level of monoclonal protein my concern  for underlying multiple myeloma is low but due to the fact that she has got a non-IgG monoclonal protein I recommended further evaluation with urine electrophoresis and a skeletal survey.  If skeletal survey is normal without any evidence of lytic lesions then we can deem this as MGUS and monitor for any worsening.  I explained to her that MGUS has a potential to progress to myeloma over a period of time and usually the rate is around 1 to 2 %/year.  We also reviewed the signs and symptoms of active myeloma and what to look for in the future.  I do not think her worsening kidney function can be attributed to monoclonal myopathy at this point in time.  Unless she has a large amount of urine monoclonal protein excretion    Macrocytosis with mild anemia and mild thrombocytopenia  Etiology is not clear.  I suspect this could be hypothyroidism related.  Her TSH has not been checked since 2020 in our system.  I do not know if she gets this evaluated elsewhere.  Macrocytosis can be seen in plasma proliferative disorder especially myeloma but other clinical signs and symptoms are pointing against it.  Continue to monitor.  As far as clozapine is concerned this is new and platelet count is only mildly low at 122.  This needs to be rechecked in the future.  No bleeding complications.    She and her daughter are in agreement with the plan.    I will see her back in 2 weeks or so to review the results and make further follow-up plans.    Patient Instructions   Labs and bone survey ordered. RTC in 2 weeks (video visit) to discuss results.        ECOG Performance    2 - Ambulatory and independent in all ADLs; cannot work; up > 50% of the time    Problem List    Patient Active Problem List   Diagnosis     Hypothyroidism     Hypertension goal BP (blood pressure) < 140/90     Type 2 diabetes, HbA1C goal < 8% (H)     Hyperlipidemia LDL goal <100     Moderate major depression (H)     Incontinence of urine     Neuropathy in diabetes  (H)     Eczema     Left lumbar radiculopathy     Health Care Home     CKD (chronic kidney disease) stage 3, GFR 30-59 ml/min (H)     Type 2 diabetes mellitus with other diabetic kidney complication, with long-term current use of insulin (H)     Osteoporosis     Morbid obesity (H)     Thrombocytopenia (H)     Chronic obstructive pulmonary disease, unspecified COPD type (H)     S/P lumbar fusion     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, with long-term current use of insulin, unspecified laterality (H)     Lumbar facet arthropathy     Lumbar spondylosis     Pulmonary hypertension (H)     Nonrheumatic aortic valve stenosis     Depression, major, recurrent, in partial remission (H)     ______________________________________________________________________________    Staging History    Cancer Staging  No matching staging information was found for the patient.      History of presenting illness:  Yadi Iniguez is a 71-year-old female with history of type 2 diabetes, chronic renal disease stage III, CHF, COPD and multiple medical problems who has been referred by nephrology for further evaluation management of monoclonal gammopathy.    As mentioned earlier patient has a history of chronic kidney disease thought to be secondary to diabetes.  Over the last year or so her creatinine has been slightly getting worse.  It was around 1.6 recently.  This prompted evaluation by nephrology for secondary causes.  An SPEP was ordered which came back showing monoclonal gammopathy with a monoclonal peak of 0.2 g/dL.  Immunofixation showed 2 distinct clonal bands 1 was IgG kappa along with IgA kappa.  Kappa light chain was elevated at 5.70.  Lambda chain was 3.05.  Kappa lambda ratio was 1.87.  CBC showed a mildly low hemoglobin at 11.5.  She also had mild macrocytosis with an MCV of 105.  Again this has been getting slightly worse over the last couple of years.  Other  counts are within normal limits.  Calcium was normal at 9.  He denies any bone pain.  No weight loss.  No compression fractures.  She has osteoporosis and takes Fosamax.  She also takes calcium and vitamin D.  She also has hypothyroidism and is on levothyroxine supplementation.  Her TSH has not been checked since 2020.  It was at 4.65 at the time.  She denies any fevers chills or night sweats.  No infections.    She is a former smoker but quit many years ago.  Does not drink alcohol.  No personal or family history of any hematological malignancies.    Past History    Past Medical History:   Diagnosis Date     Arthritis 1975     Broken ribs 8/28/2016    3,4,5,6,7, left side     Cancer (H) 2013    Skin cancer     COPD (chronic obstructive pulmonary disease) (H) 08/2017     Depression      Depressive disorder 1988     Dysuria      Glycosuria      Hypertension 1980     Mixed incontinence urge and stress      Nonrheumatic aortic valve stenosis 7/25/2021     Other and unspecified hyperlipidemia      Right shoulder pain      Sleep apnea     cpap     Type II or unspecified type diabetes mellitus without mention of complication, uncontrolled      Uncomplicated asthma 2017     Unspecified hypothyroidism     Family History   Problem Relation Age of Onset     Musculoskeletal Disorder Mother         MD     Muscular Disorder Mother      Depression Mother      Osteoporosis Mother      Obesity Mother      Cataracts Mother      Genetic Disorder Mother         MD     Cancer Father         mesothelioma     Obesity Father      Coronary Artery Disease Father      Hypertension Father      Other Cancer Father      Thyroid Disease Father      Heart Disease Brother         angioplasty     Neurologic Disorder Brother         ms     Diabetes Brother      Hypertension Brother      Hyperlipidemia Brother      Obesity Brother         4 brothers     Depression Paternal Grandmother      Obesity Paternal Grandmother      Genetic Disorder Brother          MS     Other Cancer Brother      Depression Brother      Substance Abuse Brother      Diabetes Brother      Coronary Artery Disease Brother      Hypertension Brother      Obesity Brother      Hypertension Brother      Genetic Disorder Brother         MD     Coronary Artery Disease Brother      Hyperlipidemia Brother      Hypertension Brother      Thyroid Disease Brother      Genetic Disorder Brother         MS     Obesity Brother      Cerebrovascular Disease Nephew      Muscular Dystrophy Nephew      Coronary Artery Disease Nephew      Genetic Disorder Nephew      Coronary Artery Disease Nephew      Breast Cancer Other       Past Surgical History:   Procedure Laterality Date     ABDOMEN SURGERY  1978    Gall Bladder  January       hysterectomy  Sept     BACK SURGERY  11/2018     BIOPSY  2013 1993    from left corner of mouth       muscule biopsy     CATARACT IOL, RT/LT Bilateral     2006     CHOLECYSTECTOMY, OPEN       [facility]       COLONOSCOPY  2007     COLONOSCOPY N/A 6/22/2018    Procedure: COMBINED COLONOSCOPY, SINGLE OR MULTIPLE BIOPSY/POLYPECTOMY BY BIOPSY;;  Surgeon: David Parson MD;  Location:  OR     COLONOSCOPY N/A 3/29/2022    Procedure: COLONOSCOPY, FLEXIBLE, WITH LESION REMOVAL USING SNARE;  Surgeon: Ajith Guo MD;  Location:  GI     COLONOSCOPY WITH CO2 INSUFFLATION N/A 6/22/2018    Procedure: COLONOSCOPY WITH CO2 INSUFFLATION;  Colonscopy, ;  Surgeon: David Parson MD;  Location:  OR     EYE SURGERY  2014    ongoing     EYE SURGERY Right 2018     HEAD & NECK SURGERY  2006    Removal of saliva gland left side     OTS FUSION SPINE POSTERIOR LUMBAR MINIMALLY INVASIVE ONE LEVEL N/A 11/5/2018    Procedure: Minimally invasive Left L4-5 transforaminal lumbar interbody fusion  Resection of synovial cystic mass adjacent to the L5 root Placement of Med[employer]onic Voyager percutaneous pedicle screws from L4-5 bilaterally Open reduction of L4-5 grade 2  spondylolisthesis;  Surgeon: Hernando Hill MD;  Location: [facility] OR     PROPHYLACTIC REPAIR RETINAL BREAK, PNEUMATIC RETINOPEXY, COMBINED Left 2016     SOFT TISSUE SURGERY      Carpal Tunnel left wrist     ZZC VAGINAL HYSTERECTOMY      Social History     Socioeconomic History     Marital status:      Spouse name: Not on file     Number of children: Not on file     Years of education: Not on file     Highest education level: Not on file   Occupational History     Not on file   Tobacco Use     Smoking status: Former Smoker     Packs/day: 1.00     Years: 50.00     Pack years: 50.00     Types: Cigarettes     Start date: 1966     Quit date: 2021     Years since quittin.8     Smokeless tobacco: Never Used     Tobacco comment: quit for 6 months back in 2018   Vaping Use     Vaping Use: Never used   Substance and Sexual Activity     Alcohol use: Not Currently     Comment: Maybe a drink every few months     Drug use: Never     Sexual activity: Not Currently     Partners: Male     Birth control/protection: None   Other Topics Concern     Parent/sibling w/ CABG, MI or angioplasty before 65F 55M? Yes   Social History Narrative     Not on file     Social Determinants of Health     Financial Resource Strain: Not on file   Food Insecurity: Not on file   Transportation Needs: Not on file   Physical Activity: Not on file   Stress: Not on file   Social Connections: Not on file   Intimate Partner Violence: Not on file   Housing Stability: Not on file        Allergies    No Known Allergies    Review of Systems    Pertinent items are noted in HPI.      Physical Exam    Oncology Vitals 3/29/2022   BP -   Pulse -   SpO2 -   Pain Score 0   Some recent data might be hidden       General: Alert, cooperative, in no distress  HEENT: Normocephalic and atraumatic  Neuro: Alert and oriented x3    Lab Results    No results found for this or any previous visit (from the past 168 hour(s)).    Imaging  Results    XR Bone Survey Complete    Result Date: 5/2/2022  Bone Survey 5/2/2022 9:00 AM History: Monoclonal gammopathy Comparison: None Findings: Chest: No suspicious lytic lesion. Lungs are clear. Cardiomediastinal silhouette is within normal limits. Chronic left-sided rib fractures. Vascular calcifications of the aorta. Perhaps prior right clavicular fracture. Mild degenerative changes of the acromioclavicular joints. Calcifications of the mitral valve. Thoracic and lumbar spine: No suspicious lytic lesion. 12 rib bearing vertebral bodies and 5 lumbar type vertebral bodies are identified. Postsurgical changes of the lower lumbosacral spine with fusion hardware which appears intact. Lower lumbar predominant facet arthropathy. Left breast biopsy clip. Possibly some calcified left axillary lymph nodes. Pelvis: No suspicious lytic lesion. Degenerative changes of the hips. Bilateral lower extremities: Subtle 1.0 cm possible lytic focus in the distal left femoral diaphysis which may be projectional, otherwise no suspicious lytic lesions. Degenerative changes of the knees. Bilateral upper extremities: No suspicious lytic lesion. Degenerative changes of the first carpometacarpal joints. Skull: No suspicious lytic lesion. Poor dentition. Possible thyroid calcifications. Vascular calcifications of the aorta and branches.     Impression: 1. Questionable tiny lytic focus in the distal left femoral diaphysis may simply represent projectional soft tissue changes. No other suspicious lytic changes are seen. If this would change clinical management, a CT could be considered. 2. Scattered degenerative changes as above. 3. Vascular calcifications as above. I have personally reviewed the examination and initial interpretation and I agree with the findings. JUTTA ELLERMANN, MD   SYSTEM ID:  [id number]      A total of 30 minutes was spent today on this visit including face to face conversation with the patient, EMR review (labs,  imaging studies, pathology reports and outside records), counseling and care co-ordination and documentation.    Signed by: Shawna Wang MD        Again, thank you for allowing me to participate in the care of your patient.        Sincerely,        Shawna Wang MD

## 2022-04-26 NOTE — LETTER
4/26/2022         RE: Yadi Iniguez  [address]  [place] [address]        Dear Colleague,    Thank you for referring your patient, Yadi Iniguez, to the [facility]. Please see a copy of my visit note below.    Yadi is a 71 year old who is being evaluated via a billable video visit.      How would you like to obtain your AVS? MyChart  If the video visit is dropped, the invitation should be resent by: Text to cell phone: [phone]  Will anyone else be joining your video visit? No     Michelle Soler CMA on 4/26/2022 at 1:28 PM            Video-Visit Details    Type of service:  Video Visit    Video Start Time: 1:34 PM  Video End Time: 1:56 PM    Originating Location (pt. Location): Home    Distant Location (provider location):  [facility]     Platform used for Video Visit: [employer]    [facility] Hematology and Oncology Consult Note    Patient: Yadi Iniguez  MRN: [id number]  Date of Service: 04/26/2022      Reason for Visit    Chief Complaint   Patient presents with     Oncology Clinic Visit     New - Monoclonal gammopathy         Assessment/Plan    Problem List Items Addressed This Visit    None     Visit Diagnoses     Monoclonal gammopathy        Relevant Orders    XR Bone Survey Complete (Completed)    Protein immunofixation urine (Completed)    Protein electrophoresis timed urine (Completed)        MGUS  SPEP showed 2 distinct monoclonal bands.  What is an IgG kappa abdomen is IgA kappa.  Monoclonal peak is of 0.2 g/dL.  Kappa chains are elevated at 5 and lambda chains are at 3.05.  Ratio is 1.87.  This is mildly elevated.  I reviewed the diagnosis of MGUS with her in detail today.  Does not have any clinical signs or symptoms of full-blown myeloma.  Her hemoglobin is only mildly low 11.5 and has mild macrocytosis which could be potentially secondary to hypothyroidism.  The lower level of monoclonal protein my concern  for underlying multiple myeloma is low but due to the fact that she has got a non-IgG monoclonal protein I recommended further evaluation with urine electrophoresis and a skeletal survey.  If skeletal survey is normal without any evidence of lytic lesions then we can deem this as MGUS and monitor for any worsening.  I explained to her that MGUS has a potential to progress to myeloma over a period of time and usually the rate is around 1 to 2 %/year.  We also reviewed the signs and symptoms of active myeloma and what to look for in the future.  I do not think her worsening kidney function can be attributed to monoclonal myopathy at this point in time.  Unless she has a large amount of urine monoclonal protein excretion    Macrocytosis with mild anemia and mild thrombocytopenia  Etiology is not clear.  I suspect this could be hypothyroidism related.  Her TSH has not been checked since 2020 in our system.  I do not know if she gets this evaluated elsewhere.  Macrocytosis can be seen in plasma proliferative disorder especially myeloma but other clinical signs and symptoms are pointing against it.  Continue to monitor.  As far as clozapine is concerned this is new and platelet count is only mildly low at 122.  This needs to be rechecked in the future.  No bleeding complications.    She and her daughter are in agreement with the plan.    I will see her back in 2 weeks or so to review the results and make further follow-up plans.    Patient Instructions   Labs and bone survey ordered. RTC in 2 weeks (video visit) to discuss results.        ECOG Performance    2 - Ambulatory and independent in all ADLs; cannot work; up > 50% of the time    Problem List    Patient Active Problem List   Diagnosis     Hypothyroidism     Hypertension goal BP (blood pressure) < 140/90     Type 2 diabetes, HbA1C goal < 8% (H)     Hyperlipidemia LDL goal <100     Moderate major depression (H)     Incontinence of urine     Neuropathy in diabetes  (H)     Eczema     Left lumbar radiculopathy     Health Care Home     CKD (chronic kidney disease) stage 3, GFR 30-59 ml/min (H)     Type 2 diabetes mellitus with other diabetic kidney complication, with long-term current use of insulin (H)     Osteoporosis     Morbid obesity (H)     Thrombocytopenia (H)     Chronic obstructive pulmonary disease, unspecified COPD type (H)     S/P lumbar fusion     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, with long-term current use of insulin, unspecified laterality (H)     Lumbar facet arthropathy     Lumbar spondylosis     Pulmonary hypertension (H)     Nonrheumatic aortic valve stenosis     Depression, major, recurrent, in partial remission (H)     ______________________________________________________________________________    Staging History    Cancer Staging  No matching staging information was found for the patient.      History of presenting illness:  Yadi Iniguez is a 71-year-old female with history of type 2 diabetes, chronic renal disease stage III, CHF, COPD and multiple medical problems who has been referred by nephrology for further evaluation management of monoclonal gammopathy.    As mentioned earlier patient has a history of chronic kidney disease thought to be secondary to diabetes.  Over the last year or so her creatinine has been slightly getting worse.  It was around 1.6 recently.  This prompted evaluation by nephrology for secondary causes.  An SPEP was ordered which came back showing monoclonal gammopathy with a monoclonal peak of 0.2 g/dL.  Immunofixation showed 2 distinct clonal bands 1 was IgG kappa along with IgA kappa.  Kappa light chain was elevated at 5.70.  Lambda chain was 3.05.  Kappa lambda ratio was 1.87.  CBC showed a mildly low hemoglobin at 11.5.  She also had mild macrocytosis with an MCV of 105.  Again this has been getting slightly worse over the last couple of years.  Other  counts are within normal limits.  Calcium was normal at 9.  He denies any bone pain.  No weight loss.  No compression fractures.  She has osteoporosis and takes Fosamax.  She also takes calcium and vitamin D.  She also has hypothyroidism and is on levothyroxine supplementation.  Her TSH has not been checked since 2020.  It was at 4.65 at the time.  She denies any fevers chills or night sweats.  No infections.    She is a former smoker but quit many years ago.  Does not drink alcohol.  No personal or family history of any hematological malignancies.    Past History    Past Medical History:   Diagnosis Date     Arthritis 1975     Broken ribs 8/28/2016    3,4,5,6,7, left side     Cancer (H) 2013    Skin cancer     COPD (chronic obstructive pulmonary disease) (H) 08/2017     Depression      Depressive disorder 1988     Dysuria      Glycosuria      Hypertension 1980     Mixed incontinence urge and stress      Nonrheumatic aortic valve stenosis 7/25/2021     Other and unspecified hyperlipidemia      Right shoulder pain      Sleep apnea     cpap     Type II or unspecified type diabetes mellitus without mention of complication, uncontrolled      Uncomplicated asthma 2017     Unspecified hypothyroidism     Family History   Problem Relation Age of Onset     Musculoskeletal Disorder Mother         MD     Muscular Disorder Mother      Depression Mother      Osteoporosis Mother      Obesity Mother      Cataracts Mother      Genetic Disorder Mother         MD     Cancer Father         mesothelioma     Obesity Father      Coronary Artery Disease Father      Hypertension Father      Other Cancer Father      Thyroid Disease Father      Heart Disease Brother         angioplasty     Neurologic Disorder Brother         ms     Diabetes Brother      Hypertension Brother      Hyperlipidemia Brother      Obesity Brother         4 brothers     Depression Paternal Grandmother      Obesity Paternal Grandmother      Genetic Disorder Brother          MS     Other Cancer Brother      Depression Brother      Substance Abuse Brother      Diabetes Brother      Coronary Artery Disease Brother      Hypertension Brother      Obesity Brother      Hypertension Brother      Genetic Disorder Brother         MD     Coronary Artery Disease Brother      Hyperlipidemia Brother      Hypertension Brother      Thyroid Disease Brother      Genetic Disorder Brother         MS     Obesity Brother      Cerebrovascular Disease Nephew      Muscular Dystrophy Nephew      Coronary Artery Disease Nephew      Genetic Disorder Nephew      Coronary Artery Disease Nephew      Breast Cancer Other       Past Surgical History:   Procedure Laterality Date     ABDOMEN SURGERY  1978    Gall Bladder  January       hysterectomy  Sept     BACK SURGERY  11/2018     BIOPSY  2013 1993    from left corner of mouth       muscule biopsy     CATARACT IOL, RT/LT Bilateral     2006     CHOLECYSTECTOMY, OPEN       [facility]       COLONOSCOPY  2007     COLONOSCOPY N/A 6/22/2018    Procedure: COMBINED COLONOSCOPY, SINGLE OR MULTIPLE BIOPSY/POLYPECTOMY BY BIOPSY;;  Surgeon: David Parson MD;  Location:  OR     COLONOSCOPY N/A 3/29/2022    Procedure: COLONOSCOPY, FLEXIBLE, WITH LESION REMOVAL USING SNARE;  Surgeon: Ajith Guo MD;  Location:  GI     COLONOSCOPY WITH CO2 INSUFFLATION N/A 6/22/2018    Procedure: COLONOSCOPY WITH CO2 INSUFFLATION;  Colonscopy, ;  Surgeon: David Parson MD;  Location:  OR     EYE SURGERY  2014    ongoing     EYE SURGERY Right 2018     HEAD & NECK SURGERY  2006    Removal of saliva gland left side     OTS FUSION SPINE POSTERIOR LUMBAR MINIMALLY INVASIVE ONE LEVEL N/A 11/5/2018    Procedure: Minimally invasive Left L4-5 transforaminal lumbar interbody fusion  Resection of synovial cystic mass adjacent to the L5 root Placement of [employer]onic Voyager percutaneous pedicle screws from L4-5 bilaterally Open reduction of L4-5 grade 2  spondylolisthesis;  Surgeon: Hernando Hill MD;  Location: [facility] OR     PROPHYLACTIC REPAIR RETINAL BREAK, PNEUMATIC RETINOPEXY, COMBINED Left 2016     SOFT TISSUE SURGERY      Carpal Tunnel left wrist     ZZC VAGINAL HYSTERECTOMY      Social History     Socioeconomic History     Marital status:      Spouse name: Not on file     Number of children: Not on file     Years of education: Not on file     Highest education level: Not on file   Occupational History     Not on file   Tobacco Use     Smoking status: Former Smoker     Packs/day: 1.00     Years: 50.00     Pack years: 50.00     Types: Cigarettes     Start date: 1966     Quit date: 2021     Years since quittin.8     Smokeless tobacco: Never Used     Tobacco comment: quit for 6 months back in 2018   Vaping Use     Vaping Use: Never used   Substance and Sexual Activity     Alcohol use: Not Currently     Comment: Maybe a drink every few months     Drug use: Never     Sexual activity: Not Currently     Partners: Male     Birth control/protection: None   Other Topics Concern     Parent/sibling w/ CABG, MI or angioplasty before 65F 55M? Yes   Social History Narrative     Not on file     Social Determinants of Health     Financial Resource Strain: Not on file   Food Insecurity: Not on file   Transportation Needs: Not on file   Physical Activity: Not on file   Stress: Not on file   Social Connections: Not on file   Intimate Partner Violence: Not on file   Housing Stability: Not on file        Allergies    No Known Allergies    Review of Systems    Pertinent items are noted in HPI.      Physical Exam    Oncology Vitals 3/29/2022   BP -   Pulse -   SpO2 -   Pain Score 0   Some recent data might be hidden       General: Alert, cooperative, in no distress  HEENT: Normocephalic and atraumatic  Neuro: Alert and oriented x3    Lab Results    No results found for this or any previous visit (from the past 168 hour(s)).    Imaging  Results    XR Bone Survey Complete    Result Date: 5/2/2022  Bone Survey 5/2/2022 9:00 AM History: Monoclonal gammopathy Comparison: None Findings: Chest: No suspicious lytic lesion. Lungs are clear. Cardiomediastinal silhouette is within normal limits. Chronic left-sided rib fractures. Vascular calcifications of the aorta. Perhaps prior right clavicular fracture. Mild degenerative changes of the acromioclavicular joints. Calcifications of the mitral valve. Thoracic and lumbar spine: No suspicious lytic lesion. 12 rib bearing vertebral bodies and 5 lumbar type vertebral bodies are identified. Postsurgical changes of the lower lumbosacral spine with fusion hardware which appears intact. Lower lumbar predominant facet arthropathy. Left breast biopsy clip. Possibly some calcified left axillary lymph nodes. Pelvis: No suspicious lytic lesion. Degenerative changes of the hips. Bilateral lower extremities: Subtle 1.0 cm possible lytic focus in the distal left femoral diaphysis which may be projectional, otherwise no suspicious lytic lesions. Degenerative changes of the knees. Bilateral upper extremities: No suspicious lytic lesion. Degenerative changes of the first carpometacarpal joints. Skull: No suspicious lytic lesion. Poor dentition. Possible thyroid calcifications. Vascular calcifications of the aorta and branches.     Impression: 1. Questionable tiny lytic focus in the distal left femoral diaphysis may simply represent projectional soft tissue changes. No other suspicious lytic changes are seen. If this would change clinical management, a CT could be considered. 2. Scattered degenerative changes as above. 3. Vascular calcifications as above. I have personally reviewed the examination and initial interpretation and I agree with the findings. JUTTA ELLERMANN, MD   SYSTEM ID:  [id number]      A total of 30 minutes was spent today on this visit including face to face conversation with the patient, EMR review (labs,  imaging studies, pathology reports and outside records), counseling and care co-ordination and documentation.    Signed by: Shawna Wang MD        Again, thank you for allowing me to participate in the care of your patient.        Sincerely,        Shawna Wang MD

## 2022-04-29 ENCOUNTER — PATIENT OUTREACH (OUTPATIENT)
Dept: ONCOLOGY | Facility: CLINIC | Age: 72
End: 2022-04-29
Payer: COMMERCIAL

## 2022-04-29 DIAGNOSIS — D47.2 MONOCLONAL GAMMOPATHY: Primary | ICD-10-CM

## 2022-04-30 ENCOUNTER — LAB (OUTPATIENT)
Dept: LAB | Facility: CLINIC | Age: 72
End: 2022-04-30
Payer: COMMERCIAL

## 2022-04-30 DIAGNOSIS — I50.9 HEART FAILURE, UNSPECIFIED (H): ICD-10-CM

## 2022-04-30 DIAGNOSIS — D47.2 MONOCLONAL GAMMOPATHY: ICD-10-CM

## 2022-04-30 PROCEDURE — 86335 IMMUNFIX E-PHORSIS/URINE/CSF: CPT

## 2022-04-30 PROCEDURE — 84166 PROTEIN E-PHORESIS/URINE/CSF: CPT

## 2022-04-30 PROCEDURE — 81050 URINALYSIS VOLUME MEASURE: CPT

## 2022-04-30 PROCEDURE — 36415 COLL VENOUS BLD VENIPUNCTURE: CPT

## 2022-04-30 PROCEDURE — 80048 BASIC METABOLIC PNL TOTAL CA: CPT

## 2022-05-02 ENCOUNTER — ANCILLARY PROCEDURE (OUTPATIENT)
Dept: GENERAL RADIOLOGY | Facility: CLINIC | Age: 72
End: 2022-05-02
Attending: INTERNAL MEDICINE
Payer: COMMERCIAL

## 2022-05-02 DIAGNOSIS — D47.2 MONOCLONAL GAMMOPATHY: ICD-10-CM

## 2022-05-02 LAB
ANION GAP SERPL CALCULATED.3IONS-SCNC: 6 MMOL/L (ref 3–14)
BUN SERPL-MCNC: 32 MG/DL (ref 7–30)
CALCIUM SERPL-MCNC: 9 MG/DL (ref 8.5–10.1)
CHLORIDE BLD-SCNC: 103 MMOL/L (ref 94–109)
CO2 SERPL-SCNC: 32 MMOL/L (ref 20–32)
CREAT SERPL-MCNC: 1.58 MG/DL (ref 0.52–1.04)
GFR SERPL CREATININE-BSD FRML MDRD: 34 ML/MIN/1.73M2
GLUCOSE BLD-MCNC: 180 MG/DL (ref 70–99)
POTASSIUM BLD-SCNC: 4.4 MMOL/L (ref 3.4–5.3)
PROT ELPH PNL UR ELPH: NORMAL
PROT PATTERN UR ELPH-IMP: NORMAL
SODIUM SERPL-SCNC: 141 MMOL/L (ref 133–144)

## 2022-05-02 PROCEDURE — 77075 RADEX OSSEOUS SURVEY COMPL: CPT | Mod: GC | Performed by: RADIOLOGY

## 2022-05-09 ENCOUNTER — MYC MEDICAL ADVICE (OUTPATIENT)
Dept: ONCOLOGY | Facility: CLINIC | Age: 72
End: 2022-05-09
Payer: COMMERCIAL

## 2022-05-09 ENCOUNTER — MYC MEDICAL ADVICE (OUTPATIENT)
Dept: FAMILY MEDICINE | Facility: CLINIC | Age: 72
End: 2022-05-09
Payer: COMMERCIAL

## 2022-05-09 DIAGNOSIS — J44.9 CHRONIC OBSTRUCTIVE PULMONARY DISEASE, UNSPECIFIED COPD TYPE (H): ICD-10-CM

## 2022-05-09 DIAGNOSIS — M54.50 CHRONIC LOW BACK PAIN WITHOUT SCIATICA, UNSPECIFIED BACK PAIN LATERALITY: ICD-10-CM

## 2022-05-09 DIAGNOSIS — G89.29 CHRONIC LOW BACK PAIN WITHOUT SCIATICA, UNSPECIFIED BACK PAIN LATERALITY: ICD-10-CM

## 2022-05-09 DIAGNOSIS — E11.42 DIABETIC POLYNEUROPATHY ASSOCIATED WITH TYPE 2 DIABETES MELLITUS (H): Primary | ICD-10-CM

## 2022-05-17 NOTE — PROGRESS NOTES
Video-Visit Details    Type of service:  Video Visit    Video Start Time: 1:34 PM  Video End Time: 1:56 PM    Originating Location (pt. Location): Home    Distant Location (provider location):  [facility]     Platform used for Video Visit: [employer]    [facility] Hematology and Oncology Consult Note    Patient: Yadi Iniguez  MRN: [id number]  Date of Service: 04/26/2022      Reason for Visit    Chief Complaint   Patient presents with     Oncology Clinic Visit     New - Monoclonal gammopathy         Assessment/Plan    Problem List Items Addressed This Visit    None     Visit Diagnoses     Monoclonal gammopathy        Relevant Orders    XR Bone Survey Complete (Completed)    Protein immunofixation urine (Completed)    Protein electrophoresis timed urine (Completed)        MGUS  SPEP showed 2 distinct monoclonal bands.  What is an IgG kappa abdomen is IgA kappa.  Monoclonal peak is of 0.2 g/dL.  Kappa chains are elevated at 5 and lambda chains are at 3.05.  Ratio is 1.87.  This is mildly elevated.  I reviewed the diagnosis of MGUS with her in detail today.  Does not have any clinical signs or symptoms of full-blown myeloma.  Her hemoglobin is only mildly low 11.5 and has mild macrocytosis which could be potentially secondary to hypothyroidism.  The lower level of monoclonal protein my concern for underlying multiple myeloma is low but due to the fact that she has got a non-IgG monoclonal protein I recommended further evaluation with urine electrophoresis and a skeletal survey.  If skeletal survey is normal without any evidence of lytic lesions then we can deem this as MGUS and monitor for any worsening.  I explained to her that MGUS has a potential to progress to myeloma over a period of time and usually the rate is around 1 to 2 %/year.  We also reviewed the signs and symptoms of active myeloma and what to look for in the future.  I do not think her worsening kidney function can be  attributed to monoclonal myopathy at this point in time.  Unless she has a large amount of urine monoclonal protein excretion    Macrocytosis with mild anemia and mild thrombocytopenia  Etiology is not clear.  I suspect this could be hypothyroidism related.  Her TSH has not been checked since 2020 in our system.  I do not know if she gets this evaluated elsewhere.  Macrocytosis can be seen in plasma proliferative disorder especially myeloma but other clinical signs and symptoms are pointing against it.  Continue to monitor.  As far as clozapine is concerned this is new and platelet count is only mildly low at 122.  This needs to be rechecked in the future.  No bleeding complications.    She and her daughter are in agreement with the plan.    I will see her back in 2 weeks or so to review the results and make further follow-up plans.    Patient Instructions   Labs and bone survey ordered. RTC in 2 weeks (video visit) to discuss results.        ECOG Performance    2 - Ambulatory and independent in all ADLs; cannot work; up > 50% of the time    Problem List    Patient Active Problem List   Diagnosis     Hypothyroidism     Hypertension goal BP (blood pressure) < 140/90     Type 2 diabetes, HbA1C goal < 8% (H)     Hyperlipidemia LDL goal <100     Moderate major depression (H)     Incontinence of urine     Neuropathy in diabetes (H)     Eczema     Left lumbar radiculopathy     Health Care Home     CKD (chronic kidney disease) stage 3, GFR 30-59 ml/min (H)     Type 2 diabetes mellitus with other diabetic kidney complication, with long-term current use of insulin (H)     Osteoporosis     Morbid obesity (H)     Thrombocytopenia (H)     Chronic obstructive pulmonary disease, unspecified COPD type (H)     S/P lumbar fusion     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, with long-term current use of insulin, unspecified laterality (H)     Lumbar facet arthropathy      Lumbar spondylosis     Pulmonary hypertension (H)     Nonrheumatic aortic valve stenosis     Depression, major, recurrent, in partial remission (H)     ______________________________________________________________________________    Staging History    Cancer Staging  No matching staging information was found for the patient.      History of presenting illness:  Yadi Iniguez is a 71-year-old female with history of type 2 diabetes, chronic renal disease stage III, CHF, COPD and multiple medical problems who has been referred by nephrology for further evaluation management of monoclonal gammopathy.    As mentioned earlier patient has a history of chronic kidney disease thought to be secondary to diabetes.  Over the last year or so her creatinine has been slightly getting worse.  It was around 1.6 recently.  This prompted evaluation by nephrology for secondary causes.  An SPEP was ordered which came back showing monoclonal gammopathy with a monoclonal peak of 0.2 g/dL.  Immunofixation showed 2 distinct clonal bands 1 was IgG kappa along with IgA kappa.  Kappa light chain was elevated at 5.70.  Lambda chain was 3.05.  Kappa lambda ratio was 1.87.  CBC showed a mildly low hemoglobin at 11.5.  She also had mild macrocytosis with an MCV of 105.  Again this has been getting slightly worse over the last couple of years.  Other counts are within normal limits.  Calcium was normal at 9.  He denies any bone pain.  No weight loss.  No compression fractures.  She has osteoporosis and takes Fosamax.  She also takes calcium and vitamin D.  She also has hypothyroidism and is on levothyroxine supplementation.  Her TSH has not been checked since 2020.  It was at 4.65 at the time.  She denies any fevers chills or night sweats.  No infections.    She is a former smoker but quit many years ago.  Does not drink alcohol.  No personal or family history of any hematological malignancies.    Past History    Past Medical History:    Diagnosis Date     Arthritis 1975     Broken ribs 8/28/2016    3,4,5,6,7, left side     Cancer (H) 2013    Skin cancer     COPD (chronic obstructive pulmonary disease) (H) 08/2017     Depression      Depressive disorder 1988     Dysuria      Glycosuria      Hypertension 1980     Mixed incontinence urge and stress      Nonrheumatic aortic valve stenosis 7/25/2021     Other and unspecified hyperlipidemia      Right shoulder pain      Sleep apnea     cpap     Type II or unspecified type diabetes mellitus without mention of complication, uncontrolled      Uncomplicated asthma 2017     Unspecified hypothyroidism     Family History   Problem Relation Age of Onset     Musculoskeletal Disorder Mother         MD     Muscular Disorder Mother      Depression Mother      Osteoporosis Mother      Obesity Mother      Cataracts Mother      Genetic Disorder Mother         MD     Cancer Father         mesothelioma     Obesity Father      Coronary Artery Disease Father      Hypertension Father      Other Cancer Father      Thyroid Disease Father      Heart Disease Brother         angioplasty     Neurologic Disorder Brother         ms     Diabetes Brother      Hypertension Brother      Hyperlipidemia Brother      Obesity Brother         4 brothers     Depression Paternal Grandmother      Obesity Paternal Grandmother      Genetic Disorder Brother         MS     Other Cancer Brother      Depression Brother      Substance Abuse Brother      Diabetes Brother      Coronary Artery Disease Brother      Hypertension Brother      Obesity Brother      Hypertension Brother      Genetic Disorder Brother         MD     Coronary Artery Disease Brother      Hyperlipidemia Brother      Hypertension Brother      Thyroid Disease Brother      Genetic Disorder Brother         MS     Obesity Brother      Cerebrovascular Disease Nephew      Muscular Dystrophy Nephew      Coronary Artery Disease Nephew      Genetic Disorder Nephew      Coronary Artery  Disease Nephew      Breast Cancer Other       Past Surgical History:   Procedure Laterality Date     ABDOMEN SURGERY  1978    Gall Bladder  January       hysterectomy  Sept     BACK SURGERY  11/2018     BIOPSY  2013 1993    from left corner of mouth       muscule biopsy     CATARACT IOL, RT/LT Bilateral     2006     CHOLECYSTECTOMY, OPEN       CL AFF SURGICAL PATHOLOGY       COLONOSCOPY  2007     COLONOSCOPY N/A 6/22/2018    Procedure: COMBINED COLONOSCOPY, SINGLE OR MULTIPLE BIOPSY/POLYPECTOMY BY BIOPSY;;  Surgeon: David Parson MD;  Location: [facility] OR     COLONOSCOPY N/A 3/29/2022    Procedure: COLONOSCOPY, FLEXIBLE, WITH LESION REMOVAL USING SNARE;  Surgeon: Ajith Guo MD;  Location:  GI     COLONOSCOPY WITH CO2 INSUFFLATION N/A 6/22/2018    Procedure: COLONOSCOPY WITH CO2 INSUFFLATION;  Colonscopy, ;  Surgeon: David Parson MD;  Location:  OR     EYE SURGERY  2014    ongoing     EYE SURGERY Right 2018     HEAD & NECK SURGERY  2006    Removal of saliva gland left side     OTS FUSION SPINE POSTERIOR LUMBAR MINIMALLY INVASIVE ONE LEVEL N/A 11/5/2018    Procedure: Minimally invasive Left L4-5 transforaminal lumbar interbody fusion  Resection of synovial cystic mass adjacent to the L5 root Placement of Medtronic Voyager percutaneous pedicle screws from L4-5 bilaterally Open reduction of L4-5 grade 2 spondylolisthesis;  Surgeon: Hernando Hill MD;  Location: [facility] OR     PROPHYLACTIC REPAIR RETINAL BREAK, PNEUMATIC RETINOPEXY, COMBINED Left 9/2/2016     SOFT TISSUE SURGERY  1996    Carpal Tunnel left wrist     ZZC VAGINAL HYSTERECTOMY      Social History     Socioeconomic History     Marital status:      Spouse name: Not on file     Number of children: Not on file     Years of education: Not on file     Highest education level: Not on file   Occupational History     Not on file   Tobacco Use     Smoking status: Former Smoker     Packs/day: 1.00     Years: 50.00     Pack  years: 50.00     Types: Cigarettes     Start date: 1966     Quit date: 2021     Years since quittin.8     Smokeless tobacco: Never Used     Tobacco comment: quit for 6 months back in 2018   Vaping Use     Vaping Use: Never used   Substance and Sexual Activity     Alcohol use: Not Currently     Comment: Maybe a drink every few months     Drug use: Never     Sexual activity: Not Currently     Partners: Male     Birth control/protection: None   Other Topics Concern     Parent/sibling w/ CABG, MI or angioplasty before 65F 55M? Yes   Social History Narrative     Not on file     Social Determinants of Health     Financial Resource Strain: Not on file   Food Insecurity: Not on file   Transportation Needs: Not on file   Physical Activity: Not on file   Stress: Not on file   Social Connections: Not on file   Intimate Partner Violence: Not on file   Housing Stability: Not on file        Allergies    No Known Allergies    Review of Systems    Pertinent items are noted in HPI.      Physical Exam    Oncology Vitals 3/29/2022   BP -   Pulse -   SpO2 -   Pain Score 0   Some recent data might be hidden       General: Alert, cooperative, in no distress  HEENT: Normocephalic and atraumatic  Neuro: Alert and oriented x3    Lab Results    No results found for this or any previous visit (from the past 168 hour(s)).    Imaging Results    XR Bone Survey Complete    Result Date: 2022  Bone Survey 2022 9:00 AM History: Monoclonal gammopathy Comparison: None Findings: Chest: No suspicious lytic lesion. Lungs are clear. Cardiomediastinal silhouette is within normal limits. Chronic left-sided rib fractures. Vascular calcifications of the aorta. Perhaps prior right clavicular fracture. Mild degenerative changes of the acromioclavicular joints. Calcifications of the mitral valve. Thoracic and lumbar spine: No suspicious lytic lesion. 12 rib bearing vertebral bodies and 5 lumbar type vertebral bodies are identified.  Postsurgical changes of the lower lumbosacral spine with fusion hardware which appears intact. Lower lumbar predominant facet arthropathy. Left breast biopsy clip. Possibly some calcified left axillary lymph nodes. Pelvis: No suspicious lytic lesion. Degenerative changes of the hips. Bilateral lower extremities: Subtle 1.0 cm possible lytic focus in the distal left femoral diaphysis which may be projectional, otherwise no suspicious lytic lesions. Degenerative changes of the knees. Bilateral upper extremities: No suspicious lytic lesion. Degenerative changes of the first carpometacarpal joints. Skull: No suspicious lytic lesion. Poor dentition. Possible thyroid calcifications. Vascular calcifications of the aorta and branches.     Impression: 1. Questionable tiny lytic focus in the distal left femoral diaphysis may simply represent projectional soft tissue changes. No other suspicious lytic changes are seen. If this would change clinical management, a CT could be considered. 2. Scattered degenerative changes as above. 3. Vascular calcifications as above. I have personally reviewed the examination and initial interpretation and I agree with the findings. JUTTA ELLERMANN, MD   SYSTEM ID:  [id number]      A total of 30 minutes was spent today on this visit including face to face conversation with the patient, EMR review (labs, imaging studies, pathology reports and outside records), counseling and care co-ordination and documentation.    Signed by: Shawna Wang MD

## 2022-05-18 ENCOUNTER — MEDICAL CORRESPONDENCE (OUTPATIENT)
Dept: HEALTH INFORMATION MANAGEMENT | Facility: CLINIC | Age: 72
End: 2022-05-18
Payer: COMMERCIAL

## 2022-05-19 ENCOUNTER — TELEPHONE (OUTPATIENT)
Dept: FAMILY MEDICINE | Facility: CLINIC | Age: 72
End: 2022-05-19
Payer: COMMERCIAL

## 2022-05-19 NOTE — TELEPHONE ENCOUNTER
Patient Quality Outreach    Patient is due for the following:   Diabetes -  A1C    NEXT STEPS:   Schedule a office visit for diabetic check    Type of outreach:    Sent VeriTran message.      Questions for provider review:    none     Afshan Valentine MA

## 2022-05-25 ENCOUNTER — TELEPHONE (OUTPATIENT)
Dept: PHARMACY | Facility: CLINIC | Age: 72
End: 2022-05-25
Payer: COMMERCIAL

## 2022-05-25 NOTE — TELEPHONE ENCOUNTER
This patient is due for MTM follow-up. I called the patient to schedule an appointment and left a message with the clinic phone number for the patient to call to schedule.    Luis Antonio Bass, ElainaD, [facility]  Medication Therapy Management Pharmacist  Pager: [phone]

## 2022-05-31 ENCOUNTER — HOSPITAL ENCOUNTER (OUTPATIENT)
Dept: MRI IMAGING | Facility: CLINIC | Age: 72
Discharge: HOME | End: 2022-05-31
Attending: INTERNAL MEDICINE | Admitting: INTERNAL MEDICINE
Payer: COMMERCIAL

## 2022-05-31 ENCOUNTER — VIRTUAL VISIT (OUTPATIENT)
Dept: ONCOLOGY | Facility: CLINIC | Age: 72
End: 2022-05-31
Attending: INTERNAL MEDICINE
Payer: COMMERCIAL

## 2022-05-31 DIAGNOSIS — D47.2 MONOCLONAL GAMMOPATHY: Primary | ICD-10-CM

## 2022-05-31 DIAGNOSIS — R93.422 ABNORMAL FINDING ON DIAGNOSTIC IMAGING OF LEFT KIDNEY: ICD-10-CM

## 2022-05-31 DIAGNOSIS — N18.30 CKD (CHRONIC KIDNEY DISEASE) STAGE 3, GFR 30-59 ML/MIN (H): ICD-10-CM

## 2022-05-31 PROCEDURE — 74183 MRI ABD W/O CNTR FLWD CNTR: CPT

## 2022-05-31 PROCEDURE — G0463 HOSPITAL OUTPT CLINIC VISIT: HCPCS | Mod: PN,RTG | Performed by: INTERNAL MEDICINE

## 2022-05-31 PROCEDURE — A9585 GADOBUTROL INJECTION: HCPCS | Performed by: INTERNAL MEDICINE

## 2022-05-31 PROCEDURE — 99213 OFFICE O/P EST LOW 20 MIN: CPT | Mod: 95 | Performed by: INTERNAL MEDICINE

## 2022-05-31 PROCEDURE — 255N000002 HC RX 255 OP 636: Performed by: INTERNAL MEDICINE

## 2022-05-31 RX ORDER — GADOBUTROL 604.72 MG/ML
10 INJECTION INTRAVENOUS ONCE
Status: COMPLETED | OUTPATIENT
Start: 2022-05-31 | End: 2022-05-31

## 2022-05-31 RX ADMIN — GADOBUTROL 10 ML: 604.72 INJECTION INTRAVENOUS at 08:52

## 2022-05-31 NOTE — PROGRESS NOTES
Yadi is a 71 year old who is being evaluated via a billable video visit.      How would you like to obtain your AVS? MyChart  If the video visit is dropped, the invitation should be resent by: Text to cell phone: [phone]  Will anyone else be joining your video visit? Denise Soler, ANABEL on 5/31/2022 at 12:09 PM

## 2022-05-31 NOTE — LETTER
5/31/2022         RE: Yadi Iniguez  [address]  [place] [address]        Dear Colleague,    Thank you for referring your patient, Yadi Iniguez, to the [facility]. Please see a copy of my visit note below.    Yadi is a 71 year old who is being evaluated via a billable video visit.      How would you like to obtain your AVS? MyChart  If the video visit is dropped, the invitation should be resent by: Text to cell phone: [phone]  Will anyone else be joining your video visit? No      Michelle Soler CMA on 5/31/2022 at 12:09 PM          Video-Visit Details    Type of service:  Video Visit    Video Start Time: 12:02 PM  Video End Time: 12:15 PM    Originating Location (pt. Location): Home in [place]    Distant Location (provider location):  [facility]     Platform used for Video Visit: [employer]oon    [facility] Hematology and Oncology Progress Note    Patient: Yadi Iniguez  MRN: [id number]  Date of Service: 05/31/2022        Reason for Visit    Chief Complaint   Patient presents with     Oncology Clinic Visit     Monoclonal gammopathy - telephone visit         Problem List Items Addressed This Visit    None     Visit Diagnoses     Monoclonal gammopathy    -  Primary    Relevant Orders    Protein electrophoresis    Kappa and lambda light chain    Protein Immunofixation Serum    Calcium    Creatinine    Electrolyte panel (Na, K, Cl, CO2, Anion gap)    Albumin level            Assessment and Plan  MGUS  SPEP came back showing small IgG kappa and a very small IgA kappa monoclonal protein.  Monoclonal peak was 0.2 g/dL.  Kappa light chains were mildly up at 5.7 and lambda was also up at 3.  Kappa lambda ratio was also mildly elevated.  I think her light chains are a bit exaggerated due to underlying renal dysfunction.  CBC showing mild macrocytosis and anemia along with mild neutropenia.  Etiology of this is not clear but I intend to  follow this.  Skeletal survey showing a very tiny focus of lucency in the left distal femur which could be just a soft tissue projection.  No lytic lesions elsewhere.  I think it is highly unlikely that the small focus in the left femur is to depletion from myeloma.  Clinically she is not behaving like myeloma.  For all practical purposes I think she has monoclonal gammopathy of unknown significance.  My plan is to continue to observe her with periodic labs.  I will see her back in 4 months with repeat SPEP, light chains, CBC and CMP.    In the meantime she will watch for any new bone pain or significant weight loss or significant fatigue or dizziness.  If there is any progression of the monoclonal proteins then we can think about obtaining a more definitive imaging like a CT scan or a PET scan to look for active bone lesions.    She and her daughter are in agreement with the plan.    Cancer Staging  No matching staging information was found for the patient.    ECOG Performance    2 - Ambulatory and independent in all ADLs; cannot work; up > 50% of the time         Problem List    Patient Active Problem List   Diagnosis     Hypothyroidism     Hypertension goal BP (blood pressure) < 140/90     Type 2 diabetes, HbA1C goal < 8% (H)     Hyperlipidemia LDL goal <100     Moderate major depression (H)     Incontinence of urine     Neuropathy in diabetes (H)     Eczema     Left lumbar radiculopathy     Health Care Home     CKD (chronic kidney disease) stage 3, GFR 30-59 ml/min (H)     Type 2 diabetes mellitus with other diabetic kidney complication, with long-term current use of insulin (H)     Osteoporosis     Morbid obesity (H)     Thrombocytopenia (H)     Chronic obstructive pulmonary disease, unspecified COPD type (H)     S/P lumbar fusion     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    Previously has for newly diagnosed medical gammopathy of unknown significance.  SPEP showed a monoclonal peak of 1.2 g/dL.  Monofixation was positive for an IgG kappa and a very small IgA kappa monoclonal proteins.  Free light chains came back showing mild elevated kappa light chain at 5.70.  Lambda was also mildly up at 3.05 and kappa lambda ratio was 1.87.  I had ordered a skeletal survey which came back showing no evidence of any definitive lytic lesions.  There was a tiny focus of suspected lytic lesion in the distal femur on the left side which was thought to be secondary to soft tissue projection rather than a true lytic lesion.  Proteins.I also ordered urine protein electrophoresis which showed no evidence of monoclonal     She has done well since last visit.  Denies any new issues today.  No new bone pain.    Review of Systems  A comprehensive review of systems was negative except for what is noted in the interval history    Current Outpatient Medications   Medication     ACETAMINOPHEN PO     alendronate (FOSAMAX) 70 MG tablet     amLODIPine (NORVASC) 10 MG tablet     aspirin 81 MG tablet     atorvastatin (LIPITOR) 40 MG tablet     blood glucose (NO BRAND SPECIFIED) test strip     blood glucose monitoring (NO BRAND SPECIFIED) meter device kit     calcium carbonate (OS-NATACHA) 1500 (600 Ca) MG tablet     carvedilol (COREG) 25 MG tablet     cholecalciferol (VITAMIN D3) 5000 units TABS tablet     clobetasol propionate 0.05 % LIQD     Continuous Blood Gluc Sensor (FREESTYLE MARI 14 DAY SENSOR) MISC     dulaglutide (TRULICITY) 0.75 MG/0.5ML pen      insulin aspart (NOVOLOG FLEXPEN) 100 UNIT/ML pen     insulin glargine U-300 (TOUJEO SOLOSTAR) 300 UNIT/ML (1 units dial) pen     insulin pen needle (32G X 4 MM) 32G X 4 MM miscellaneous     levothyroxine (TIROSINT) 150 MCG capsule     PARoxetine (PAXIL) 20 MG tablet     primidone (MYSOLINE) 50 MG tablet     torsemide (DEMADEX) 20 MG tablet     No current facility-administered medications for this visit.        Physical Exam    No flowsheet data found.    General: alert and cooperative  HEENT: Head: Normal, normocephalic, atraumatic.  Eye: Normal external eye, conjunctiva, lids cornea, ZION.  Chest: Clear to auscultation bilaterally  Cardiac: S1, S2 normal, regular rate and rhythm  Abdomen: abdomen is soft without significant tenderness, masses, organomegaly or guarding  Extremities: atraumatic, no peripheral edema  Skin:   CNS: Alert and oriented x3, neurologic exam grossly normal.  Lymphatics: No bilateral cervical, axillary, supraclavicular or inguinal adenopathy noted    Lab Results    No results found for this or any previous visit (from the past 168 hour(s)).    Imaging    MR Renal wo & w Contrast    Result Date: 5/31/2022  MR RENAL WITH AND WITHOUT CONTRAST May 31, 2022 8:53 AM HISTORY: Evaluate left kidney finding from ultrasound. CKD (chronic kidney disease) stage 3, GFR 30-59 ml/min (H). Abnormal finding on diagnostic imaging of left kidney. TECHNIQUE: Multiplanar, multiecho MRI was performed using T1, T2, and in- and out-of-phase imaging. Pre- and postcontrast T1 images were acquired after the administration of 10mL Gadavist IV. COMPARISON: Renal ultrasound on 4/5/2022. FINDINGS: The exam is limited by motion/respiratory artifact, within the limitation, there is; Hepatobiliary: No suspicious focal hepatic lesion in the visualized portion of the liver. Pancreas: No main pancreatic ductal dilatation or definite solid pancreatic mass. Spleen: The spleen is enlarged measuring 14.5 cm in cranial caudal  dimension. Adrenal glands: No adrenal nodules. Kidneys: No hydronephrosis in either kidney. No suspicious renal mass, although the presence of motion artifact limits detailed assessment for the kidneys. Remainder of the abdomen: No abnormally dilated bowel loops. No significant upper abdominal lymphadenopathy. No significant free fluid in the visualized abdomen. Bones and soft tissue: No suspicious osseous lesion. Postsurgical changes of the lower lumbar spine.     IMPRESSION: The exam is limited by motion/respiratory artifact within this limitation, there is; 1. No suspicious renal mass visualized, although the presence of motion artifact decreases sensitivity for detection of small renal lesion. 2. Splenomegaly. TARA NAILS MD   SYSTEM ID:  [id number]    A total of 25 min were spent today on this visit which included face to face conversation with the patient, EMR review, counseling and co-ordination of care and medical documentation.      Signed by: Shawna Wang MD        Again, thank you for allowing me to participate in the care of your patient.        Sincerely,        Shawna Wang MD

## 2022-05-31 NOTE — LETTER
5/31/2022         RE: Yadi Iniguez  [address]  [place] [address]        Dear Colleague,    Thank you for referring your patient, Yadi Iniguez, to the [facility]. Please see a copy of my visit note below.    Yadi is a 71 year old who is being evaluated via a billable video visit.      How would you like to obtain your AVS? MyChart  If the video visit is dropped, the invitation should be resent by: Text to cell phone: [phone]  Will anyone else be joining your video visit? No      Michelle Soler CMA on 5/31/2022 at 12:09 PM          Video-Visit Details    Type of service:  Video Visit    Video Start Time: 12:02 PM  Video End Time: 12:15 PM    Originating Location (pt. Location): Home in [place]    Distant Location (provider location):  [facility]     Platform used for Video Visit: [employer]    [facility] Hematology and Oncology Progress Note    Patient: Yadi Iniguez  MRN: [id number]  Date of Service: 05/31/2022        Reason for Visit    Chief Complaint   Patient presents with     Oncology Clinic Visit     Monoclonal gammopathy - telephone visit         Problem List Items Addressed This Visit    None     Visit Diagnoses     Monoclonal gammopathy    -  Primary    Relevant Orders    Protein electrophoresis    Kappa and lambda light chain    Protein Immunofixation Serum    Calcium    Creatinine    Electrolyte panel (Na, K, Cl, CO2, Anion gap)    Albumin level            Assessment and Plan  MGUS  SPEP came back showing small IgG kappa and a very small IgA kappa monoclonal protein.  Monoclonal peak was 0.2 g/dL.  Kappa light chains were mildly up at 5.7 and lambda was also up at 3.  Kappa lambda ratio was also mildly elevated.  I think her light chains are a bit exaggerated due to underlying renal dysfunction.  CBC showing mild macrocytosis and anemia along with mild neutropenia.  Etiology of this is not clear but I intend to  follow this.  Skeletal survey showing a very tiny focus of lucency in the left distal femur which could be just a soft tissue projection.  No lytic lesions elsewhere.  I think it is highly unlikely that the small focus in the left femur is to depletion from myeloma.  Clinically she is not behaving like myeloma.  For all practical purposes I think she has monoclonal gammopathy of unknown significance.  My plan is to continue to observe her with periodic labs.  I will see her back in 4 months with repeat SPEP, light chains, CBC and CMP.    In the meantime she will watch for any new bone pain or significant weight loss or significant fatigue or dizziness.  If there is any progression of the monoclonal proteins then we can think about obtaining a more definitive imaging like a CT scan or a PET scan to look for active bone lesions.    She and her daughter are in agreement with the plan.    Cancer Staging  No matching staging information was found for the patient.    ECOG Performance    2 - Ambulatory and independent in all ADLs; cannot work; up > 50% of the time         Problem List    Patient Active Problem List   Diagnosis     Hypothyroidism     Hypertension goal BP (blood pressure) < 140/90     Type 2 diabetes, HbA1C goal < 8% (H)     Hyperlipidemia LDL goal <100     Moderate major depression (H)     Incontinence of urine     Neuropathy in diabetes (H)     Eczema     Left lumbar radiculopathy     Health Care Home     CKD (chronic kidney disease) stage 3, GFR 30-59 ml/min (H)     Type 2 diabetes mellitus with other diabetic kidney complication, with long-term current use of insulin (H)     Osteoporosis     Morbid obesity (H)     Thrombocytopenia (H)     Chronic obstructive pulmonary disease, unspecified COPD type (H)     S/P lumbar fusion     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    Previously has for newly diagnosed medical gammopathy of unknown significance.  SPEP showed a monoclonal peak of 1.2 g/dL.  Monofixation was positive for an IgG kappa and a very small IgA kappa monoclonal proteins.  Free light chains came back showing mild elevated kappa light chain at 5.70.  Lambda was also mildly up at 3.05 and kappa lambda ratio was 1.87.  I had ordered a skeletal survey which came back showing no evidence of any definitive lytic lesions.  There was a tiny focus of suspected lytic lesion in the distal femur on the left side which was thought to be secondary to soft tissue projection rather than a true lytic lesion.  Proteins.I also ordered urine protein electrophoresis which showed no evidence of monoclonal     She has done well since last visit.  Denies any new issues today.  No new bone pain.    Review of Systems  A comprehensive review of systems was negative except for what is noted in the interval history    Current Outpatient Medications   Medication     ACETAMINOPHEN PO     alendronate (FOSAMAX) 70 MG tablet     amLODIPine (NORVASC) 10 MG tablet     aspirin 81 MG tablet     atorvastatin (LIPITOR) 40 MG tablet     blood glucose (NO BRAND SPECIFIED) test strip     blood glucose monitoring (NO BRAND SPECIFIED) meter device kit     calcium carbonate (OS-NATACHA) 1500 (600 Ca) MG tablet     carvedilol (COREG) 25 MG tablet     cholecalciferol (VITAMIN D3) 5000 units TABS tablet     clobetasol propionate 0.05 % LIQD     Continuous Blood Gluc Sensor (FREESTYLE MARI 14 DAY SENSOR) MISC     dulaglutide (TRULICITY) 0.75 MG/0.5ML pen      insulin aspart (NOVOLOG FLEXPEN) 100 UNIT/ML pen     insulin glargine U-300 (TOUJEO SOLOSTAR) 300 UNIT/ML (1 units dial) pen     insulin pen needle (32G X 4 MM) 32G X 4 MM miscellaneous     levothyroxine (TIROSINT) 150 MCG capsule     PARoxetine (PAXIL) 20 MG tablet     primidone (MYSOLINE) 50 MG tablet     torsemide (DEMADEX) 20 MG tablet     No current facility-administered medications for this visit.        Physical Exam    No flowsheet data found.    General: alert and cooperative  HEENT: Head: Normal, normocephalic, atraumatic.  Eye: Normal external eye, conjunctiva, lids cornea, ZION.  Chest: Clear to auscultation bilaterally  Cardiac: S1, S2 normal, regular rate and rhythm  Abdomen: abdomen is soft without significant tenderness, masses, organomegaly or guarding  Extremities: atraumatic, no peripheral edema  Skin:   CNS: Alert and oriented x3, neurologic exam grossly normal.  Lymphatics: No bilateral cervical, axillary, supraclavicular or inguinal adenopathy noted    Lab Results    No results found for this or any previous visit (from the past 168 hour(s)).    Imaging    MR Renal wo & w Contrast    Result Date: 5/31/2022  MR RENAL WITH AND WITHOUT CONTRAST May 31, 2022 8:53 AM HISTORY: Evaluate left kidney finding from ultrasound. CKD (chronic kidney disease) stage 3, GFR 30-59 ml/min (H). Abnormal finding on diagnostic imaging of left kidney. TECHNIQUE: Multiplanar, multiecho MRI was performed using T1, T2, and in- and out-of-phase imaging. Pre- and postcontrast T1 images were acquired after the administration of 10mL Gadavist IV. COMPARISON: Renal ultrasound on 4/5/2022. FINDINGS: The exam is limited by motion/respiratory artifact, within the limitation, there is; Hepatobiliary: No suspicious focal hepatic lesion in the visualized portion of the liver. Pancreas: No main pancreatic ductal dilatation or definite solid pancreatic mass. Spleen: The spleen is enlarged measuring 14.5 cm in cranial caudal  dimension. Adrenal glands: No adrenal nodules. Kidneys: No hydronephrosis in either kidney. No suspicious renal mass, although the presence of motion artifact limits detailed assessment for the kidneys. Remainder of the abdomen: No abnormally dilated bowel loops. No significant upper abdominal lymphadenopathy. No significant free fluid in the visualized abdomen. Bones and soft tissue: No suspicious osseous lesion. Postsurgical changes of the lower lumbar spine.     IMPRESSION: The exam is limited by motion/respiratory artifact within this limitation, there is; 1. No suspicious renal mass visualized, although the presence of motion artifact decreases sensitivity for detection of small renal lesion. 2. Splenomegaly. TARA NAILS MD   SYSTEM ID:  [id number]    A total of 25 min were spent today on this visit which included face to face conversation with the patient, EMR review, counseling and co-ordination of care and medical documentation.      Signed by: Shawna Wang MD        Again, thank you for allowing me to participate in the care of your patient.        Sincerely,        Shawna Wang MD

## 2022-06-02 ENCOUNTER — TELEPHONE (OUTPATIENT)
Dept: DERMATOLOGY | Facility: CLINIC | Age: 72
End: 2022-06-02
Payer: COMMERCIAL

## 2022-06-02 NOTE — TELEPHONE ENCOUNTER
Patient will check with daughter's schedule and call back to schedule skin exam in January 2023    Tammie uribe Procedure   Dermatology, General and Plastic Surgery, Urology Specialties   [facility] [address] [address]

## 2022-06-16 NOTE — PROGRESS NOTES
Video-Visit Details    Type of service:  Video Visit    Video Start Time: 12:02 PM  Video End Time: 12:15 PM    Originating Location (pt. Location): Home in [place]    Distant Location (provider location):  [facility]     Platform used for Video Visit: [place] [employer]    [facility] Hematology and Oncology Progress Note    Patient: Yadi Iniguez  MRN: [id number]  Date of Service: 05/31/2022        Reason for Visit    Chief Complaint   Patient presents with     Oncology Clinic Visit     Monoclonal gammopathy - telephone visit         Problem List Items Addressed This Visit    None     Visit Diagnoses     Monoclonal gammopathy    -  Primary    Relevant Orders    Protein electrophoresis    Kappa and lambda light chain    Protein Immunofixation Serum    Calcium    Creatinine    Electrolyte panel (Na, K, Cl, CO2, Anion gap)    Albumin level            Assessment and Plan  MGUS  SPEP came back showing small IgG kappa and a very small IgA kappa monoclonal protein.  Monoclonal peak was 0.2 g/dL.  Kappa light chains were mildly up at 5.7 and lambda was also up at 3.  Kappa lambda ratio was also mildly elevated.  I think her light chains are a bit exaggerated due to underlying renal dysfunction.  CBC showing mild macrocytosis and anemia along with mild neutropenia.  Etiology of this is not clear but I intend to follow this.  Skeletal survey showing a very tiny focus of lucency in the left distal femur which could be just a soft tissue projection.  No lytic lesions elsewhere.  I think it is highly unlikely that the small focus in the left femur is to depletion from myeloma.  Clinically she is not behaving like myeloma.  For all practical purposes I think she has monoclonal gammopathy of unknown significance.  My plan is to continue to observe her with periodic labs.  I will see her back in 4 months with repeat SPEP, light chains, CBC and CMP.    In the meantime she will watch for any new bone pain or  significant weight loss or significant fatigue or dizziness.  If there is any progression of the monoclonal proteins then we can think about obtaining a more definitive imaging like a CT scan or a PET scan to look for active bone lesions.    She and her daughter are in agreement with the plan.    Cancer Staging  No matching staging information was found for the patient.    ECOG Performance    2 - Ambulatory and independent in all ADLs; cannot work; up > 50% of the time         Problem List    Patient Active Problem List   Diagnosis     Hypothyroidism     Hypertension goal BP (blood pressure) < 140/90     Type 2 diabetes, HbA1C goal < 8% (H)     Hyperlipidemia LDL goal <100     Moderate major depression (H)     Incontinence of urine     Neuropathy in diabetes (H)     Eczema     Left lumbar radiculopathy     Health Care Home     CKD (chronic kidney disease) stage 3, GFR 30-59 ml/min (H)     Type 2 diabetes mellitus with other diabetic kidney complication, with long-term current use of insulin (H)     Osteoporosis     Morbid obesity (H)     Thrombocytopenia (H)     Chronic obstructive pulmonary disease, unspecified COPD type (H)     S/P lumbar fusion     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    Previously has for newly diagnosed medical gammopathy of unknown significance.  SPEP showed a monoclonal peak of 1.2 g/dL.  Monofixation was positive for an IgG kappa and a very small IgA kappa monoclonal proteins.  Free light  chains came back showing mild elevated kappa light chain at 5.70.  Lambda was also mildly up at 3.05 and kappa lambda ratio was 1.87.  I had ordered a skeletal survey which came back showing no evidence of any definitive lytic lesions.  There was a tiny focus of suspected lytic lesion in the distal femur on the left side which was thought to be secondary to soft tissue projection rather than a true lytic lesion.  Proteins.I also ordered urine protein electrophoresis which showed no evidence of monoclonal     She has done well since last visit.  Denies any new issues today.  No new bone pain.    Review of Systems  A comprehensive review of systems was negative except for what is noted in the interval history    Current Outpatient Medications   Medication     ACETAMINOPHEN PO     alendronate (FOSAMAX) 70 MG tablet     amLODIPine (NORVASC) 10 MG tablet     aspirin 81 MG tablet     atorvastatin (LIPITOR) 40 MG tablet     blood glucose (NO BRAND SPECIFIED) test strip     blood glucose monitoring (NO BRAND SPECIFIED) meter device kit     calcium carbonate (OS-NATACHA) 1500 (600 Ca) MG tablet     carvedilol (COREG) 25 MG tablet     cholecalciferol (VITAMIN D3) 5000 units TABS tablet     clobetasol propionate 0.05 % LIQD     Continuous Blood Gluc Sensor (WritePathSTYLE MARI 14 DAY SENSOR) MISC     dulaglutide (TRULICITY) 0.75 MG/0.5ML pen     insulin aspart (NOVOLOG FLEXPEN) 100 UNIT/ML pen     insulin glargine U-300 (TOUJEO SOLOSTAR) 300 UNIT/ML (1 units dial) pen     insulin pen needle (32G X 4 MM) 32G X 4 MM miscellaneous     levothyroxine (TIROSINT) 150 MCG capsule     PARoxetine (PAXIL) 20 MG tablet     primidone (MYSOLINE) 50 MG tablet     torsemide (DEMADEX) 20 MG tablet     No current facility-administered medications for this visit.        Physical Exam    No flowsheet data found.    General: alert and cooperative  HEENT: Head: Normal, normocephalic, atraumatic.  Eye: Normal external eye, conjunctiva, lids cornea,  ZION.  Chest: Clear to auscultation bilaterally  Cardiac: S1, S2 normal, regular rate and rhythm  Abdomen: abdomen is soft without significant tenderness, masses, organomegaly or guarding  Extremities: atraumatic, no peripheral edema  Skin:   CNS: Alert and oriented x3, neurologic exam grossly normal.  Lymphatics: No bilateral cervical, axillary, supraclavicular or inguinal adenopathy noted    Lab Results    No results found for this or any previous visit (from the past 168 hour(s)).    Imaging    MR Renal wo & w Contrast    Result Date: 5/31/2022  MR RENAL WITH AND WITHOUT CONTRAST May 31, 2022 8:53 AM HISTORY: Evaluate left kidney finding from ultrasound. CKD (chronic kidney disease) stage 3, GFR 30-59 ml/min (H). Abnormal finding on diagnostic imaging of left kidney. TECHNIQUE: Multiplanar, multiecho MRI was performed using T1, T2, and in- and out-of-phase imaging. Pre- and postcontrast T1 images were acquired after the administration of 10mL Gadavist IV. COMPARISON: Renal ultrasound on 4/5/2022. FINDINGS: The exam is limited by motion/respiratory artifact, within the limitation, there is; Hepatobiliary: No suspicious focal hepatic lesion in the visualized portion of the liver. Pancreas: No main pancreatic ductal dilatation or definite solid pancreatic mass. Spleen: The spleen is enlarged measuring 14.5 cm in cranial caudal dimension. Adrenal glands: No adrenal nodules. Kidneys: No hydronephrosis in either kidney. No suspicious renal mass, although the presence of motion artifact limits detailed assessment for the kidneys. Remainder of the abdomen: No abnormally dilated bowel loops. No significant upper abdominal lymphadenopathy. No significant free fluid in the visualized abdomen. Bones and soft tissue: No suspicious osseous lesion. Postsurgical changes of the lower lumbar spine.     IMPRESSION: The exam is limited by motion/respiratory artifact within this limitation, there is; 1. No suspicious renal mass  visualized, although the presence of motion artifact decreases sensitivity for detection of small renal lesion. 2. Splenomegaly. TARA NAILS MD   SYSTEM ID:  [id number]    A total of 25 min were spent today on this visit which included face to face conversation with the patient, EMR review, counseling and co-ordination of care and medical documentation.      Signed by: Shawna Wang MD

## 2022-07-06 ENCOUNTER — MYC MEDICAL ADVICE (OUTPATIENT)
Dept: FAMILY MEDICINE | Facility: CLINIC | Age: 72
End: 2022-07-06

## 2022-07-06 DIAGNOSIS — E03.9 HYPOTHYROIDISM, UNSPECIFIED TYPE: Primary | ICD-10-CM

## 2022-07-06 DIAGNOSIS — E11.9 TYPE 2 DIABETES, HBA1C GOAL < 8% (H): ICD-10-CM

## 2022-07-06 DIAGNOSIS — N18.30 CKD (CHRONIC KIDNEY DISEASE) STAGE 3, GFR 30-59 ML/MIN (H): Primary | ICD-10-CM

## 2022-07-06 ASSESSMENT — PATIENT HEALTH QUESTIONNAIRE - PHQ9
SUM OF ALL RESPONSES TO PHQ QUESTIONS 1-9: 10
SUM OF ALL RESPONSES TO PHQ QUESTIONS 1-9: 10
10. IF YOU CHECKED OFF ANY PROBLEMS, HOW DIFFICULT HAVE THESE PROBLEMS MADE IT FOR YOU TO DO YOUR WORK, TAKE CARE OF THINGS AT HOME, OR GET ALONG WITH OTHER PEOPLE: NOT DIFFICULT AT ALL

## 2022-07-06 NOTE — TELEPHONE ENCOUNTER
Dr Wang, has placed many orders.   Dr Root, did you want to order labs as well.    Health Maintenance Due   Topic Date Due     ANNUAL REVIEW OF  ORDERS  Never done     ZOSTER IMMUNIZATION (2 of 3) 09/10/2014     MEDICARE ANNUAL WELLNESS VISIT  01/21/2022     FALL RISK ASSESSMENT  01/21/2022     LUNG CANCER SCREENING  01/26/2022     COVID-19 Vaccine (4 - Booster for Pfizer series) 04/07/2022     A1C  04/15/2022     PHQ-9  04/18/2022     A1C pended.    Loan Delcid, RN

## 2022-07-09 ENCOUNTER — LAB (OUTPATIENT)
Dept: LAB | Facility: CLINIC | Age: 72
End: 2022-07-09
Payer: COMMERCIAL

## 2022-07-09 DIAGNOSIS — E11.9 TYPE 2 DIABETES, HBA1C GOAL < 8% (H): ICD-10-CM

## 2022-07-09 DIAGNOSIS — N18.30 CKD (CHRONIC KIDNEY DISEASE) STAGE 3, GFR 30-59 ML/MIN (H): ICD-10-CM

## 2022-07-09 DIAGNOSIS — E03.9 HYPOTHYROIDISM, UNSPECIFIED TYPE: ICD-10-CM

## 2022-07-09 DIAGNOSIS — I35.0 AORTIC VALVE STENOSIS, ETIOLOGY OF CARDIAC VALVE DISEASE UNSPECIFIED: Primary | ICD-10-CM

## 2022-07-09 DIAGNOSIS — I50.32 CHRONIC DIASTOLIC HEART FAILURE WITH PRESERVED EJECTION FRACTION (H): ICD-10-CM

## 2022-07-09 LAB
ALBUMIN MFR UR ELPH: 18.6 MG/DL
CREAT UR-MCNC: 76.6 MG/DL
HBA1C MFR BLD: 6.9 % (ref 0–5.6)
HGB BLD-MCNC: 12 G/DL (ref 11.7–15.7)
PROT/CREAT 24H UR: 0.24 MG/MG CR (ref 0–0.2)
PTH-INTACT SERPL-MCNC: 22 PG/ML (ref 15–65)

## 2022-07-09 PROCEDURE — 84443 ASSAY THYROID STIM HORMONE: CPT

## 2022-07-09 PROCEDURE — 83970 ASSAY OF PARATHORMONE: CPT

## 2022-07-09 PROCEDURE — 85018 HEMOGLOBIN: CPT

## 2022-07-09 PROCEDURE — 84156 ASSAY OF PROTEIN URINE: CPT

## 2022-07-09 PROCEDURE — 80061 LIPID PANEL: CPT

## 2022-07-09 PROCEDURE — 36415 COLL VENOUS BLD VENIPUNCTURE: CPT

## 2022-07-09 PROCEDURE — 83036 HEMOGLOBIN GLYCOSYLATED A1C: CPT

## 2022-07-09 PROCEDURE — 80069 RENAL FUNCTION PANEL: CPT

## 2022-07-11 LAB
ALBUMIN SERPL-MCNC: 3.5 G/DL (ref 3.4–5)
ANION GAP SERPL CALCULATED.3IONS-SCNC: 6 MMOL/L (ref 3–14)
BUN SERPL-MCNC: 46 MG/DL (ref 7–30)
CALCIUM SERPL-MCNC: 9.7 MG/DL (ref 8.5–10.1)
CHLORIDE BLD-SCNC: 102 MMOL/L (ref 94–109)
CHOLEST SERPL-MCNC: 214 MG/DL
CO2 SERPL-SCNC: 31 MMOL/L (ref 20–32)
CREAT SERPL-MCNC: 1.6 MG/DL (ref 0.52–1.04)
FASTING STATUS PATIENT QL REPORTED: YES
GFR SERPL CREATININE-BSD FRML MDRD: 34 ML/MIN/1.73M2
GLUCOSE BLD-MCNC: 158 MG/DL (ref 70–99)
HDLC SERPL-MCNC: 32 MG/DL
LDLC SERPL CALC-MCNC: 104 MG/DL
NONHDLC SERPL-MCNC: 182 MG/DL
PHOSPHATE SERPL-MCNC: 4.9 MG/DL (ref 2.5–4.5)
POTASSIUM BLD-SCNC: 4.2 MMOL/L (ref 3.4–5.3)
SODIUM SERPL-SCNC: 139 MMOL/L (ref 133–144)
TRIGL SERPL-MCNC: 391 MG/DL
TSH SERPL DL<=0.005 MIU/L-ACNC: 0.6 MU/L (ref 0.4–4)

## 2022-07-11 ASSESSMENT — PATIENT HEALTH QUESTIONNAIRE - PHQ9
SUM OF ALL RESPONSES TO PHQ QUESTIONS 1-9: 10
10. IF YOU CHECKED OFF ANY PROBLEMS, HOW DIFFICULT HAVE THESE PROBLEMS MADE IT FOR YOU TO DO YOUR WORK, TAKE CARE OF THINGS AT HOME, OR GET ALONG WITH OTHER PEOPLE: NOT DIFFICULT AT ALL

## 2022-07-11 NOTE — PROGRESS NOTES
"  Assessment & Plan      Medicare annual Wellness visit, subsequent  completed    Type 2 diabetes mellitus with other diabetic kidney complication, with long-term current use of insulin (H)  Improved thanks to help of diab ed. They will reach out to her again to see if should increase the trulicity  - insulin aspart (NOVOLOG FLEXPEN) 100 UNIT/ML pen; 27 units before breakfast, 5 units before lunch, 25 units before dinner.  (max dose 70 units/day for adjustment)  - insulin glargine U-300 (TOUJEO SOLOSTAR) 300 UNIT/ML (1 units dial) pen; Inject 38 Units Subcutaneous At Bedtime    Skin rash  Daughter asking for refill to use as needed  - clobetasol propionate 0.05 % LIQD; Externally apply 1 dose. topically 2 times daily as needed    Chronic obstructive pulmonary disease, unspecified COPD type (H)  Sees pulmonology, stable    Morbid obesity (H)  Contributes to diabetes, htn, elevated cholesterol    Hyperlipidemia LDL goal <100  On statin, recently stopped fenofibrate due to ckd  Managed by cards, LDL nearly at goal    Hypothyroidism, unspecified type  Most recent TSH at goal    Personal history of tobacco use    - Prof fee: Shared Decision Making for Lung Cancer Screening  - CT Chest Lung Cancer Scrn Low Dose wo; Future             BMI:   Estimated body mass index is 40.17 kg/m  as calculated from the following:    Height as of this encounter: 1.753 m (5' 9\").    Weight as of this encounter: 123.4 kg (272 lb).   Weight management plan: Discussed healthy diet and exercise guidelines  Blood sugar testing frequency justification:  Adjustment of medication(s)      No follow-ups on file.    Sandy Ricci MD  [facility] is a 71 year old, presenting for the following health issues:  Diabetes      History of Present Illness       Diabetes:   She presents for follow up of diabetes.  She is checking home blood glucose three times daily. She checks blood glucose before and after " "meals and at bedtime.  Blood glucose is sometimes over 200 and never under 70. She is aware of hypoglycemia symptoms including shakiness, weakness and lethargy. She is concerned about blood sugar frequently over 200. She is having numbness in feet.         She eats 0-1 servings of fruits and vegetables daily.She consumes 0 sweetened beverage(s) daily.She exercises with enough effort to increase her heart rate 9 or less minutes per day.  She exercises with enough effort to increase her heart rate 3 or less days per week.   She is taking medications regularly.    Today's PHQ-9         PHQ-9 Total Score: 10    PHQ-9 Q9 Thoughts of better off dead/self-harm past 2 weeks :   Not at all    How difficult have these problems made it for you to do your work, take care of things at home, or get along with other people: Not difficult at all     Pt with diabetes, now much better on trulciity. She is following with the diabetic educator    Pt with COPD, sees pulm yearly    Pt with elevated cholesterol on statin. Stopped fenofibrate due to ckd, triglycerides up again, LDL not quite at goal. This is managed by cards    Pt with hypothyroidism at goal    Annual Wellness Visit    Patient has been advised of split billing requirements and indicates understanding: yes       Are you in the first 12 months of your Medicare Part B coverage?  No    Physical Health:    In general, how would you rate your overall physical health? fair    Outside of work, how many days during the week do you exercise?none    Outside of work, approximately how many minutes a day do you exercise?not applicable    If you drink alcohol do you typically have >3 drinks per day or >7 drinks per week? No    Do you usually eat at least 4 servings of fruit and vegetables a day, include whole grains & fiber and avoid regularly eating high fat or \"junk\" foods? Yes    Do you have any problems taking medications regularly? No    Do you have any side effects from " medications? none    Needs assistance for the following daily activities: transportation, shopping and housework    Which of the following safety concerns are present in your home?  none identified     Hearing impairment: No    In the past 6 months, have you been bothered by leaking of urine? yes    Mental Health:    In general, how would you rate your overall mental or emotional health? excellent  PHQ-2 Score:      Do you feel safe in your environment? Yes    Have you ever done Advance Care Planning? (For example, a Health Directive, POLST, or a discussion with a medical provider or your loved ones about your wishes)? Yes, advance care planning is on file.    Fall risk:  Fallen 2 or more times in the past year?: No  Any fall with injury in the past year?: No    Cognitive Screenin) Repeat 3 items (Leader, Season, Table)      2) Clock draw:   NORMAL  3) 3 item recall:   Recalls 3 objects  Results: 3 items recalled: COGNITIVE IMPAIRMENT LESS LIKELY    Mini-CogTM Copyright S [place]. Licensed by the author for use in [facility]; reprinted with permission (soob@[facility]). All rights reserved.      Do you have sleep apnea, excessive snoring or daytime drowsiness?: yes    Current providers sharing in care for this patient include:   Patient Care Team:  Sandy Ricci MD as PCP - General (Family Practice)  Maranda Mustafa, RN as Registered Nurse  Sandy Ricci MD as Assigned PCP  Dwayne Sierra MD as Assigned Neuroscience Provider  Geovanny Bass [facility] as Pharmacist (Pharmacist Clinician- Clinical Pharmacy Specialist)  Shavon Barrios MD as Assigned Surgical Provider  Oriana Apple RD as Diabetes Educator (Dietitian, Registered)  Jessika Preston DO as MD (Nephrology)  Maranda Mustafa, RN as Diabetes Educator (Diabetes Education)  Jessika Preston DO as Assigned Nephrology Provider  Shawna Wang MD as MD (Medical Oncology)  Shawna Wang MD as  "Assigned Cancer Care Provider  Geovanny Bass [facility] as Assigned MTM Pharmacist    Patient has been advised of split billing requirements and indicates understanding: Yes      Review of Systems   Constitutional, HEENT, cardiovascular, pulmonary, gi and gu systems are negative, except as otherwise noted.      Objective    BP 90/58   Pulse 97   Temp 98.5  F (36.9  C) (Oral)   Resp 18   Ht 1.753 m (5' 9\")   Wt 123.4 kg (272 lb)   SpO2 92%   BMI 40.17 kg/m    Body mass index is 40.17 kg/m .  Physical Exam   GENERAL: healthy, alert and no distress  EYES: Eyes grossly normal to inspection, PERRL and conjunctivae and sclerae normal  HENT: ear canals and TM's normal, nose and mouth without ulcers or lesions  NECK: no adenopathy, no asymmetry, masses, or scars and thyroid normal to palpation  RESP: lungs clear to auscultation - no rales, rhonchi or wheezes  CV: regular rates and rhythm, normal S1 S2, no S3 or S4, no murmur, click or rub, grade 2/6 systolic murmur heard best over the aorta, peripheral pulses strong and no peripheral edema  ABDOMEN: soft, nontender, no hepatosplenomegaly, no masses and bowel sounds normal  MS: no gross musculoskeletal defects noted, no edema  SKIN: no suspicious lesions or rashes  NEURO: Normal strength and tone, mentation intact and speech normal  PSYCH: mentation appears normal, affect normal/bright    No results found for this or any previous visit (from the past 24 hour(s)).                .  ..  Lung Cancer Screening Shared Decision Making Visit     Yadi Iniguez, a 71 year old female, is eligible for lung cancer screening    History   Smoking Status     Former Smoker     Packs/day: 1.00     Years: 50.00     Types: Cigarettes     Start date: 6/1/1966     Quit date: 6/30/2021   Smokeless Tobacco     Never Used     Comment: quit for 6 months back in 2018       I have discussed with patient the risks and benefits of screening for lung cancer with low-dose CT.     The risks " include:    radiation exposure: one low dose chest CT has as much ionizing radiation as about 15 chest x-rays, or 6 months of background radiation living in [place]      false positives: most findings/nodules are NOT cancer, but some might still require additional diagnostic evaluation, including biopsy    over-diagnosis: some slow growing cancers that might never have been clinically significant will be detected and treated unnecessarily     The benefit of early detection of lung cancer is contingent upon adherence to annual screening or more frequent follow up if indicated.     Furthermore, to benefit from screening, Yadi must be willing and able to undergo diagnostic procedures, if indicated. Although no specific guide is available for determining severity of comorbidities, it is reasonable to withhold screening in patients who have greater mortality risk from other diseases.     We did discuss that the best way to prevent lung cancer is to not smoke.    Some patients may value a numeric estimation of lung cancer risk when evaluating if lung cancer screening is right for them, here is one calculator:    ShouldIScreen

## 2022-07-12 ENCOUNTER — OFFICE VISIT (OUTPATIENT)
Dept: FAMILY MEDICINE | Facility: CLINIC | Age: 72
End: 2022-07-12
Payer: COMMERCIAL

## 2022-07-12 ENCOUNTER — VIRTUAL VISIT (OUTPATIENT)
Dept: NEPHROLOGY | Facility: CLINIC | Age: 72
End: 2022-07-12

## 2022-07-12 VITALS
HEART RATE: 97 BPM | TEMPERATURE: 98.5 F | RESPIRATION RATE: 18 BRPM | DIASTOLIC BLOOD PRESSURE: 58 MMHG | HEIGHT: 69 IN | WEIGHT: 272 LBS | SYSTOLIC BLOOD PRESSURE: 90 MMHG | OXYGEN SATURATION: 92 % | BODY MASS INDEX: 40.29 KG/M2

## 2022-07-12 VITALS
SYSTOLIC BLOOD PRESSURE: 90 MMHG | HEIGHT: 69 IN | WEIGHT: 272 LBS | BODY MASS INDEX: 40.29 KG/M2 | DIASTOLIC BLOOD PRESSURE: 58 MMHG

## 2022-07-12 DIAGNOSIS — Z79.4 TYPE 2 DIABETES MELLITUS WITH OTHER DIABETIC KIDNEY COMPLICATION, WITH LONG-TERM CURRENT USE OF INSULIN (H): ICD-10-CM

## 2022-07-12 DIAGNOSIS — E11.29 TYPE 2 DIABETES MELLITUS WITH OTHER DIABETIC KIDNEY COMPLICATION, WITH LONG-TERM CURRENT USE OF INSULIN (H): ICD-10-CM

## 2022-07-12 DIAGNOSIS — Z00.00 MEDICARE ANNUAL WELLNESS VISIT, SUBSEQUENT: Primary | ICD-10-CM

## 2022-07-12 DIAGNOSIS — E78.5 HYPERLIPIDEMIA LDL GOAL <100: ICD-10-CM

## 2022-07-12 DIAGNOSIS — R21 SKIN RASH: ICD-10-CM

## 2022-07-12 DIAGNOSIS — Z87.891 PERSONAL HISTORY OF TOBACCO USE: ICD-10-CM

## 2022-07-12 DIAGNOSIS — E03.9 HYPOTHYROIDISM, UNSPECIFIED TYPE: ICD-10-CM

## 2022-07-12 DIAGNOSIS — I10 BENIGN ESSENTIAL HYPERTENSION: ICD-10-CM

## 2022-07-12 DIAGNOSIS — E66.812 CLASS 2 SEVERE OBESITY WITH SERIOUS COMORBIDITY AND BODY MASS INDEX (BMI) OF 39.0 TO 39.9 IN ADULT, UNSPECIFIED OBESITY TYPE (H): ICD-10-CM

## 2022-07-12 DIAGNOSIS — E66.01 MORBID OBESITY (H): ICD-10-CM

## 2022-07-12 DIAGNOSIS — N18.32 STAGE 3B CHRONIC KIDNEY DISEASE (H): Primary | ICD-10-CM

## 2022-07-12 DIAGNOSIS — J44.9 CHRONIC OBSTRUCTIVE PULMONARY DISEASE, UNSPECIFIED COPD TYPE (H): ICD-10-CM

## 2022-07-12 DIAGNOSIS — E66.01 CLASS 2 SEVERE OBESITY WITH SERIOUS COMORBIDITY AND BODY MASS INDEX (BMI) OF 39.0 TO 39.9 IN ADULT, UNSPECIFIED OBESITY TYPE (H): ICD-10-CM

## 2022-07-12 PROCEDURE — G0296 VISIT TO DETERM LDCT ELIG: HCPCS | Performed by: FAMILY MEDICINE

## 2022-07-12 PROCEDURE — 91305 COVID-19,PF,PFIZER (12+ YRS): CPT | Performed by: FAMILY MEDICINE

## 2022-07-12 PROCEDURE — 0054A COVID-19,PF,PFIZER (12+ YRS): CPT | Performed by: FAMILY MEDICINE

## 2022-07-12 PROCEDURE — 99214 OFFICE O/P EST MOD 30 MIN: CPT | Mod: 95 | Performed by: INTERNAL MEDICINE

## 2022-07-12 PROCEDURE — 99214 OFFICE O/P EST MOD 30 MIN: CPT | Mod: 25 | Performed by: FAMILY MEDICINE

## 2022-07-12 PROCEDURE — G0439 PPPS, SUBSEQ VISIT: HCPCS | Performed by: FAMILY MEDICINE

## 2022-07-12 RX ORDER — CLOBETASOL PROPIONATE 0.05 G/ML
1 SPRAY TOPICAL 2 TIMES DAILY PRN
Qty: 125 ML | Refills: 3 | Status: SHIPPED | OUTPATIENT
Start: 2022-07-12 | End: 2023-03-25

## 2022-07-12 RX ORDER — INSULIN GLARGINE 300 U/ML
38 INJECTION, SOLUTION SUBCUTANEOUS AT BEDTIME
Qty: 15 ML | Refills: 1 | Status: SHIPPED | OUTPATIENT
Start: 2022-07-12 | End: 2022-07-22

## 2022-07-12 RX ORDER — INSULIN ASPART 100 [IU]/ML
INJECTION, SOLUTION INTRAVENOUS; SUBCUTANEOUS
Qty: 75 ML | Refills: 1 | Status: SHIPPED | OUTPATIENT
Start: 2022-07-12 | End: 2022-12-30

## 2022-07-12 ASSESSMENT — PAIN SCALES - GENERAL
PAINLEVEL: NO PAIN (0)
PAINLEVEL: NO PAIN (0)

## 2022-07-12 NOTE — PROGRESS NOTES
Yadi is a 71 year old who is being evaluated via a billable video visit.      How would you like to obtain your AVS? [employer]  If the video visit is dropped, the invitation should be resent by: Text to cell phone: [phone]  Will anyone else be joining your video visit?   Yes daughter Nikki

## 2022-07-12 NOTE — PROGRESS NOTES
07/12/22     I was asked to see this patient by Dr. Ricci for evaluation of CKD.     CC: CKD    HPI: Yadi Iniguez is a 71 year old female who presents for evaluation of CKD.  Initial visit March 2022:  Ms. Yao's hx is significant for COPD, hypertension, AV stenosis, depression, DM, hypothyroidism, hyperlipidemia, tobacco hx, CHF/pulmonary hypertension. On review of her creatinine levels, there has been much variability:  - at 0.6 in 2008  - SUZE to 1.4 in Jan 2010 but improved to 0.76 in April 2011  - SUZE to 1.82 in Jan 2017 but improved to ~1 in Sep 2017  - SUZE to 1.45 in April 2018 but improved to 1.2 in Jan 2021  - SUZE to 1.81 in Sept 2021 but more recently 1.5-1.6 since.   She has had some albuminuria dating back to 2010. Fenofibrate therapy since 2018. Currently takes torsemide 20 mg AM and 20 mg PM - followed by cardiology clinic at [facility] for CHF. Dx with diabetes in her 40s.     - History of Hematuria: No  - Swelling: hx but none at this time. Breathing is comfortable. Weight recently 266-271 lbs. Torsemide 20 mg BID at this time. No lighhteadedness/dizziness at this time.   - Hx of UTIs: no  - Hx of stones: no  - Rashes/Joint pain: no rash; joint pain - generalized  - Family hx of kidney disease: no  - NSAID use: only tylenol    07/12/22: video visit. At the time of her initial visit I did a renal ultrasound which showed a 16 mm kidney lesion. MRI was then done which did not show any kidney lesion. We stopped the fenofibrate at that visit as well. She was also noted to have a monoclonal gammopathy - was seen by hematology in May - they have plans to monitor with follow-up 4 months later. Torsemide was increased a month ago when weight was increasing and becoming more symptomatic. Currently on torsemide 40 mg twice a day. Weight is stable - 269-272 lbs. Last month she had gotten up to 278 lbs. No swelling currently. Breathing is comfortable. No lightheadedness/dizziness. Blood pressure  has been 135/64, 128/56, 141/66, 125/72. 90/58 today in clinic. She felt a little tired. NO lighhteadedness on standing. No orthopnea.     ACETAMINOPHEN PO, Take 500 mg by mouth 2 times daily 2 tablets twice daily  alendronate (FOSAMAX) 70 MG tablet, TAKE 1 TABLET (70 MG) BY MOUTH EVERY 7 DAYS. TAKE 60 MINUTES BEFORE MORNING MEAL WITH 8 OZ. OF WATER. REMAIN UPRIGHT FOR 30 MINUTES.  amLODIPine (NORVASC) 10 MG tablet, TAKE 1 TABLET (10 MG) BY MOUTH DAILY  aspirin 81 MG tablet, Take 1 tablet (81 mg) by mouth daily  atorvastatin (LIPITOR) 40 MG tablet, Take 1 tablet (40 mg) by mouth daily  blood glucose (NO BRAND SPECIFIED) test strip, Use to test blood sugar 3 times daily or as directed./ Brand per insurance  blood glucose monitoring (NO BRAND SPECIFIED) meter device kit, Use to test blood sugar 3 times daily or as directed./ Brand per insurance  calcium carbonate (OS-NATACHA) 1500 (600 Ca) MG tablet, Take 600 mg by mouth daily   carvedilol (COREG) 25 MG tablet, TAKE 1 TABLET (25 MG) BY MOUTH 2 TIMES DAILY WITH MEALS  cholecalciferol (VITAMIN D3) 5000 units TABS tablet, Take 2 tablets by mouth daily   clobetasol propionate 0.05 % LIQD, Externally apply 1 dose. topically 2 times daily as needed  Continuous Blood Gluc Sensor (FREESTYLE MARI 14 DAY SENSOR) MISC, USE 1 DEVICE EVERY 14 DAYS AS DIRECTED  dulaglutide (TRULICITY) 0.75 MG/0.5ML pen, Inject 0.75 mg Subcutaneous every 7 days  insulin aspart (NOVOLOG FLEXPEN) 100 UNIT/ML pen, 27 units before breakfast, 5 units before lunch, 25 units before dinner.  (max dose 70 units/day for adjustment)  insulin glargine U-300 (TOUJEO SOLOSTAR) 300 UNIT/ML (1 units dial) pen, Inject 38 Units Subcutaneous At Bedtime  insulin pen needle (32G X 4 MM) 32G X 4 MM miscellaneous, Use 5 pen needles daily or as directed.  Per insurance and patient preference  levothyroxine (TIROSINT) 150 MCG capsule, Take 150 mcg by mouth daily (with breakfast)  PARoxetine (PAXIL) 20 MG tablet, TAKE 1 TABLET  "(20 MG) BY MOUTH 2 TIMES DAILY  primidone (MYSOLINE) 50 MG tablet, Take 2 tablets (100 mg) by mouth 2 times daily  torsemide (DEMADEX) 20 MG tablet, Take 40 mg QAM and 20 mg QPM    No current facility-administered medications on file prior to visit.      Exam:  BP 90/58   Ht 1.753 m (5' 9\")   Wt 123.4 kg (272 lb)   BMI 40.17 kg/m    GENERAL APPEARANCE: alert and no distress  Breathing appears nonlabored.     Results:  No visits with results within 1 Day(s) from this visit.   Latest known visit with results is:   Lab on 07/09/2022   Component Date Value Ref Range Status     Hemoglobin A1C 07/09/2022 6.9 (A) 0.0 - 5.6 % Final    Normal <5.7%   Prediabetes 5.7-6.4%    Diabetes 6.5% or higher     Note: Adopted from ADA consensus guidelines.     TSH 07/09/2022 0.60  0.40 - 4.00 mU/L Final     Sodium 07/09/2022 139  133 - 144 mmol/L Final     Potassium 07/09/2022 4.2  3.4 - 5.3 mmol/L Final     Chloride 07/09/2022 102  94 - 109 mmol/L Final     Carbon Dioxide (CO2) 07/09/2022 31  20 - 32 mmol/L Final     Anion Gap 07/09/2022 6  3 - 14 mmol/L Final     Urea Nitrogen 07/09/2022 46 (A) 7 - 30 mg/dL Final     Creatinine 07/09/2022 1.60 (A) 0.52 - 1.04 mg/dL Final     Calcium 07/09/2022 9.7  8.5 - 10.1 mg/dL Final     Glucose 07/09/2022 158 (A) 70 - 99 mg/dL Final     Albumin 07/09/2022 3.5  3.4 - 5.0 g/dL Final     Phosphorus 07/09/2022 4.9 (A) 2.5 - 4.5 mg/dL Final     GFR Estimate 07/09/2022 34 (A) >60 mL/min/1.73m2 Final    Effective December 21, 2021 eGFRcr in adults is calculated using the 2021 CKD-EPI creatinine equation which includes age and gender (Mak garrett al., NEJM, DOI: 10.1056/EWTKeo3963747)     Total Protein Urine mg/dL 07/09/2022 18.6  mg/dL Final    The reference ranges have not been established in urine protein. The results should be integrated into the clinical context for interpretation.     Total Protein UR MG/MG CR 07/09/2022 0.24 (A) 0.00 - 0.20 mg/mg Cr Final     Creatinine Urine mg/dL 07/09/2022 " 76.6  mg/dL Final    The reference ranges have not been established in urine creatinine. The results should be integrated into the clinical context for interpretation.     Parathyroid Hormone Intact 07/09/2022 22  15 - 65 pg/mL Final     Hemoglobin 07/09/2022 12.0  11.7 - 15.7 g/dL Final     Cholesterol 07/09/2022 214 (A) <200 mg/dL Final     Triglycerides 07/09/2022 391 (A) <150 mg/dL Final     Direct Measure HDL 07/09/2022 32 (A) >=50 mg/dL Final     LDL Cholesterol Calculated 07/09/2022 104 (A) <=100 mg/dL Final     Non HDL Cholesterol 07/09/2022 182 (A) <130 mg/dL Final     Patient Fasting > 8hrs? 07/09/2022 Yes   Final       Lab results were reviewed and interpreted.       Assessment/Plan:   Stage 3b chronic kidney disease (H)  AT risk for CKD in the setting of diabetes, hypertension, CHF on diuretics, tobacco hx, obesity as a potential risk for secondary FSGS, and also fenofibrate use. She has no hematuria which is reassuring, UPCR is 0.24 g/g which is reassuring as well - likely mild proteinuria secondary to obesity, diabetes, arterionephrosclerosis. We stopped fenofibrate at her previous visit - no improvement in creatinine seen but also had an increase in her torsemide since last visit which may have caused the lack of improvement. Would like to avoid fenofibrate going forward. At this time she is euvolemic (no edema, no orthopnea, no SOB with exertion, weight is improved from last month) - will trial decreasing the torsemide to 40 mg AM and 20 mg PM - was on 20 mg BID until last month when increased to 40 mg BID. Educated to avoid NSAIDs. Educated on risk of cardiorenal physiology - want to avoid volume overload for her CHF but also avoid dehydration to avoid SUZE and the potential need to adjust diuretics to keep this balanced. On max lisinopril. Ideally would be on SGLT2 inhibitor but with her GFR so close to 30 and the likelihood of it dropping below 30 once on SGLT2 inhibitor, will hold on starting this  for the time being.   - labs again in 2 weeks for reevaluation with lower dose of torsemide.     CHF: previously on torsemide 20 mg BID but after 6 lb weight gain last month the dose was doubled to 40 mg BID. Weight has stabilized and she has no signs of volume overload (no edema, no orthopnea, no SOB) and weight is improved back to her baseline. Will trial lowering the torsemide to 40 mg AM and 20 mg PM given some signs of hypovolemia (elevated BUN/Cr, elevated bicarbonate level, hemoglobin higher).     DM: last A1C 8.2% in Jan. Educated on benefits of good diabetes control.     Hypertension: goal blood pressure is less than 130/80 - very close to this goal at this time.     Hx of tobacco: at risk for secondary FSGS - now away from tobacco which is great to hear    Obesity: educated on benefits of weight loss/healthy lifestyle     Abnormal kidney ultrasound: ultrasound showed a concerning lesion but then the MRI was normal. I will review with radiology to assure no follow-up needed.     Monoclonal gammopathy: seen by hematology - 4 month follow-up planned. With her small amount of proteinuria, I do not suspect a myeloma or amyloid process occurring in the kidney at this time.     Patient Instructions   1. Trial decreasing the torsemide to 40 mg AM and 20 mg PM  2. Repeat labs in 2 weeks.   3. Follow-up in 4-6 months        [id number] AM video visit via YESSICA Preston DO

## 2022-07-12 NOTE — PATIENT INSTRUCTIONS
Lung Cancer Screening   Frequently Asked Questions  If you are at high-risk for lung cancer, getting screened with low-dose computed tomography (LDCT) every year can help save your life. This handout offers answers to some of the most common questions about lung cancer screening. If you have other questions, please call [phone] ([phone]).     What is it?  Lung cancer screening uses special X-ray technology to create an image of your lung tissue. The exam is quick and easy and takes less than 10 seconds. We don t give you any medicine or use any needles. You can eat before and after the exam. You don t need to change your clothes as long as the clothing on your chest doesn t contain metal. But, you do need to be able to hold your breath for at least 6 seconds during the exam.    What is the goal of lung cancer screening?  The goal of lung cancer screening is to save lives. Many times, lung cancer is not found until a person starts having physical symptoms. Lung cancer screening can help detect lung cancer in the earliest stages when it may be easier to treat.    Who should be screened for lung cancer?  We suggest lung cancer screening for anyone who is at high-risk for lung cancer. You are in the high-risk group if you:      are between the ages of 55 and 79, and    have smoked at least 1 pack of cigarettes a day for 20 or more years, and    still smoke or have quit within the past 15 years.    However, if you have a new cough or shortness of breath, you should talk to your doctor before being screened.    Why does it matter if I have symptoms?  Certain symptoms can be a sign that you have a condition in your lungs that should be checked and treated by your doctor. These symptoms include fever, chest pain, a new or changing cough, shortness of breath that you have never felt before, coughing up blood or unexplained weight loss. Having any of these symptoms can greatly affect the results of lung  cancer screening.       Should all smokers get an LDCT lung cancer screening exam?  It depends. Lung cancer screening is for a very specific group of men and women who have a history of heavy smoking over a long period of time (see  Who should be screened for lung cancer  above).  I am in the high-risk group, but have been diagnosed with cancer in the past. Is LDCT lung cancer screening right for me?  In some cases, you should not have LDCT lung screening, such as when your doctor is already following your cancer with CT scan studies. Your doctor will help you decide if LDCT lung screening is right for you.  Do I need to have a screening exam every year?  Yes. If you are in the high-risk group described earlier, you should get an LDCT lung cancer screening exam every year until you are 79, or are no longer willing or able to undergo screening and possible procedures to diagnose and treat lung cancer.  How effective is LDCT at preventing death from lung cancer?  Studies have shown that LDCT lung cancer screening can lower the risk of death from lung cancer by 20 percent in people who are at high-risk.  What are the risks?  There are some risks and limitations of LDCT lung cancer screening. We want to make sure you understand the risks and benefits, so please let us know if you have any questions. Your doctor may want to talk with you more about these risks.    Radiation exposure: As with any exam that uses radiation, there is a very small increased risk of cancer. The amount of radiation in LDCT is small--about the same amount a person would get from a mammogram. Your doctor orders the exam when he or she feels the potential benefits outweigh the risks.    False negatives: No test is perfect, including LDCT. It is possible that you may have a medical condition, including lung cancer, that is not found during your exam. This is called a false negative result.    False positives and more testing: LDCT very often finds  something in the lung that could be cancer, but in fact is not. This is called a false positive result. False positive tests often cause anxiety. To make sure these findings are not cancer, you may need to have more tests. These tests will be done only if you give us permission. Sometimes patients need a treatment that can have side effects, such as a biopsy. For more information on false positives, see  What can I expect from the results?     Findings not related to lung cancer: Your LDCT exam also takes pictures of areas of your body next to your lungs. In a very small number of cases, the CT scan will show an abnormal finding in one of these areas, such as your kidneys, adrenal glands, liver or thyroid. This finding may not be serious, but you may need more tests. Your doctor can help you decide what other tests you may need, if any.  What can I expect from the results?  About 1 out of 4 LDCT exams will find something that may need more tests. Most of the time, these findings are lung nodules. Lung nodules are very small collections of tissue in the lung. These nodules are very common, and the vast majority--more than 97 percent--are not cancer (benign). Most are normal lymph nodes or small areas of scarring from past infections.  But, if a small lung nodule is found to be cancer, the cancer can be cured more than 90 percent of the time. To know if the nodule is cancer, we may need to get more images before your next yearly screening exam. If the nodule has suspicious features (for example, it is large, has an odd shape or grows over time), we will refer you to a specialist for further testing.  Will my doctor also get the results?  Yes. Your doctor will get a copy of your results.  Is it okay to keep smoking now that there s a cancer screening exam?  No. Tobacco is one of the strongest cancer-causing agents. It causes not only lung cancer, but other cancers and cardiovascular (heart) diseases as well. The damage  caused by smoking builds over time. This means that the longer you smoke, the higher your risk of disease. While it is never too late to quit, the sooner you quit, the better.  Where can I find help to quit smoking?  The best way to prevent lung cancer is to stop smoking. If you have already quit smoking, congratulations and keep it up! For help on quitting smoking, please call [employer] at 5-753-QUITNOW (1-200.573.4189) or the American [employer] at 1-379.520.4625 to find local resources near you.  One-on-one health coaching:  If you d prefer to work individually with a health care provider on tobacco cessation, we offer:      Medication Therapy Management:  Our specially trained pharmacists work closely with you and your doctor to help you quit smoking.  Call [phone] or [phone] (toll free).

## 2022-07-12 NOTE — PATIENT INSTRUCTIONS
Trial decreasing the torsemide to 40 mg AM and 20 mg PM  Repeat labs in 2 weeks.   Follow-up in 4-6 months

## 2022-07-13 NOTE — NURSING NOTE
Called and spoke with pt.  Assisted with scheduling Future appts recommended by Dr. Preston:    Dispositions Check-out Note   Return in about 4 months (around 11/12/2022). Patient Instructions   Trial decreasing the torsemide to 40 mg AM and 20 mg PM   Repeat labs in 2 weeks.   Follow-up in 4-6 months       Encouraged pt to call or MyChart if any further questions or concerns.    Syeda Guadalupe LPN

## 2022-07-22 ENCOUNTER — MYC MEDICAL ADVICE (OUTPATIENT)
Dept: EDUCATION SERVICES | Facility: CLINIC | Age: 72
End: 2022-07-22

## 2022-07-22 DIAGNOSIS — E11.9 TYPE 2 DIABETES, HBA1C GOAL < 8% (H): Primary | ICD-10-CM

## 2022-07-22 DIAGNOSIS — E11.29 TYPE 2 DIABETES MELLITUS WITH OTHER DIABETIC KIDNEY COMPLICATION, WITH LONG-TERM CURRENT USE OF INSULIN (H): ICD-10-CM

## 2022-07-22 DIAGNOSIS — Z79.4 TYPE 2 DIABETES MELLITUS WITH OTHER DIABETIC KIDNEY COMPLICATION, WITH LONG-TERM CURRENT USE OF INSULIN (H): ICD-10-CM

## 2022-07-22 RX ORDER — INSULIN GLARGINE 300 U/ML
40 INJECTION, SOLUTION SUBCUTANEOUS AT BEDTIME
Qty: 15 ML | Refills: 1 | Status: SHIPPED | OUTPATIENT
Start: 2022-07-22 | End: 2023-01-10

## 2022-07-22 NOTE — PROGRESS NOTES
Daughter today brought in her reader for Gallito sensor, see below              She is running higher BG these days,      Plan:  Increase Toujeo  From 38------->40 units  Keep Novolog 27/5/25 with meals  Increase Trulicity 0.75 mg ------->1.5 mg dose weekly, send a my chart message to patient.    Melissa Mustafa RN/ANGELIQUEE  [facility] Educator

## 2022-07-30 ENCOUNTER — LAB (OUTPATIENT)
Dept: LAB | Facility: CLINIC | Age: 72
End: 2022-07-30
Payer: COMMERCIAL

## 2022-07-30 DIAGNOSIS — N18.32 STAGE 3B CHRONIC KIDNEY DISEASE (H): ICD-10-CM

## 2022-07-30 LAB
HGB BLD-MCNC: 11.5 G/DL (ref 11.7–15.7)
PTH-INTACT SERPL-MCNC: 26 PG/ML (ref 15–65)

## 2022-07-30 PROCEDURE — 85018 HEMOGLOBIN: CPT

## 2022-07-30 PROCEDURE — 36415 COLL VENOUS BLD VENIPUNCTURE: CPT

## 2022-07-30 PROCEDURE — 83970 ASSAY OF PARATHORMONE: CPT

## 2022-07-30 PROCEDURE — 80069 RENAL FUNCTION PANEL: CPT

## 2022-08-01 LAB
ALBUMIN SERPL-MCNC: 3.2 G/DL (ref 3.4–5)
ANION GAP SERPL CALCULATED.3IONS-SCNC: 4 MMOL/L (ref 3–14)
BUN SERPL-MCNC: 33 MG/DL (ref 7–30)
CALCIUM SERPL-MCNC: 8.9 MG/DL (ref 8.5–10.1)
CHLORIDE BLD-SCNC: 104 MMOL/L (ref 94–109)
CO2 SERPL-SCNC: 32 MMOL/L (ref 20–32)
CREAT SERPL-MCNC: 1.91 MG/DL (ref 0.52–1.04)
GFR SERPL CREATININE-BSD FRML MDRD: 27 ML/MIN/1.73M2
GLUCOSE BLD-MCNC: 206 MG/DL (ref 70–99)
PHOSPHATE SERPL-MCNC: 4 MG/DL (ref 2.5–4.5)
POTASSIUM BLD-SCNC: 4.3 MMOL/L (ref 3.4–5.3)
SODIUM SERPL-SCNC: 140 MMOL/L (ref 133–144)

## 2022-08-03 ENCOUNTER — TELEPHONE (OUTPATIENT)
Dept: PHARMACY | Facility: CLINIC | Age: 72
End: 2022-08-03

## 2022-08-03 NOTE — TELEPHONE ENCOUNTER
This patient is due for MTM follow-up. I called the patient to schedule an appointment. Appointment scheduled for 8/11/2022.    Luis Antonio Bass, ElainaD, BCACP  Medication Therapy Management Pharmacist  Pager: [phone]

## 2022-08-04 ENCOUNTER — TELEPHONE (OUTPATIENT)
Dept: FAMILY MEDICINE | Facility: CLINIC | Age: 72
End: 2022-08-04

## 2022-08-09 ENCOUNTER — ANCILLARY PROCEDURE (OUTPATIENT)
Dept: CT IMAGING | Facility: CLINIC | Age: 72
End: 2022-08-09
Attending: FAMILY MEDICINE
Payer: COMMERCIAL

## 2022-08-09 DIAGNOSIS — Z87.891 PERSONAL HISTORY OF TOBACCO USE: ICD-10-CM

## 2022-08-09 PROCEDURE — 71271 CT THORAX LUNG CANCER SCR C-: CPT | Performed by: RADIOLOGY

## 2022-08-11 ENCOUNTER — VIRTUAL VISIT (OUTPATIENT)
Dept: PHARMACY | Facility: CLINIC | Age: 72
End: 2022-08-11
Payer: COMMERCIAL

## 2022-08-11 DIAGNOSIS — E03.9 HYPOTHYROIDISM, UNSPECIFIED TYPE: ICD-10-CM

## 2022-08-11 DIAGNOSIS — R53.83 FATIGUE, UNSPECIFIED TYPE: ICD-10-CM

## 2022-08-11 DIAGNOSIS — N18.32 STAGE 3B CHRONIC KIDNEY DISEASE (H): ICD-10-CM

## 2022-08-11 DIAGNOSIS — M81.0 OSTEOPOROSIS, UNSPECIFIED OSTEOPOROSIS TYPE, UNSPECIFIED PATHOLOGICAL FRACTURE PRESENCE: ICD-10-CM

## 2022-08-11 DIAGNOSIS — F32.1 MODERATE MAJOR DEPRESSION (H): ICD-10-CM

## 2022-08-11 DIAGNOSIS — I27.20 PULMONARY HYPERTENSION (H): ICD-10-CM

## 2022-08-11 DIAGNOSIS — G25.0 ESSENTIAL TREMOR: ICD-10-CM

## 2022-08-11 DIAGNOSIS — E78.5 HYPERLIPIDEMIA LDL GOAL <100: ICD-10-CM

## 2022-08-11 DIAGNOSIS — Z79.4 TYPE 2 DIABETES MELLITUS WITH OTHER DIABETIC KIDNEY COMPLICATION, WITH LONG-TERM CURRENT USE OF INSULIN (H): Primary | ICD-10-CM

## 2022-08-11 DIAGNOSIS — I10 HYPERTENSION GOAL BP (BLOOD PRESSURE) < 140/90: ICD-10-CM

## 2022-08-11 DIAGNOSIS — E11.29 TYPE 2 DIABETES MELLITUS WITH OTHER DIABETIC KIDNEY COMPLICATION, WITH LONG-TERM CURRENT USE OF INSULIN (H): Primary | ICD-10-CM

## 2022-08-11 PROCEDURE — 99605 MTMS BY PHARM NP 15 MIN: CPT | Performed by: PHARMACIST

## 2022-08-11 RX ORDER — POTASSIUM CHLORIDE 1500 MG/1
1 TABLET, EXTENDED RELEASE ORAL 2 TIMES DAILY
COMMUNITY
Start: 2022-06-20 | End: 2023-01-10

## 2022-08-11 RX ORDER — LOSARTAN POTASSIUM 25 MG/1
0.5 TABLET ORAL DAILY
COMMUNITY
Start: 2022-05-17 | End: 2024-09-13

## 2022-08-11 RX ORDER — OMEGA-3S/DHA/EPA/FISH OIL 300-1000MG
1000 CAPSULE ORAL DAILY
COMMUNITY
Start: 2022-03-22 | End: 2023-02-15

## 2022-08-11 NOTE — PROGRESS NOTES
Medication Therapy Management (MTM) Encounter    ASSESSMENT:                            Medication Adherence/Access: No issues identified    Type 2 Diabetes:  Stable. Last A1c is at goal of <7%.  Would likely benefit from pursuing Trulicity application (see 8/4/2022 telephone encounter) to help with costs.     Depression/Fatigue: Mood is stable. Paroxetine timing does not seem to influence fatigue and patient is not as tired in the first few hours of waking up so less suspicious of this. Could potentially consider primidone side effects, but recently low blood pressure readings are most concerning and may benefit from more investigation.    Chronic kidney disease/Pulmonary hypertension/Hypertension: Blood pressure is well below goal of <140/90 mmHg. See 'Fatigue' above.     Hyperlipidemia: Stable. Patient is on high intensity statin which is indicated based on 2019 ACC/AHA guidelines for lipid management.      Hypothyroidism: Stable. Last TSH is within normal limits.    Osteoporosis: Stable - would benefit from close monitoring of CrCl as patient is close to 35 mL/min cutoff for bisphosphonate.     Essential tremor: Not well controlled - if fatigue is not correlated with low blood pressure would consider trial reduction of primidone to see if it is actually helping and to evaluate for potential side effects like fatigue.     PLAN:                            1. Start monitoring your blood pressure when you start to get drowsiness/fatigued to see if it is lower around this time.  We may need to change the timing of your losartan OR lower some doses of medication.    2. If we do not see a correlation between low blood pressure and your symptoms we will discuss whether to trial a reduction in your primidone dose to evaluate it's efficacy and contribution to your symptoms.    Follow-up: I will call you on Wednesday, August 31st at 10:30 AM    SUBJECTIVE/OBJECTIVE:                          Yadi Iniguez is a 72 year old  female called for an initial visit. She was referred to me from her [facility] insurance plan.      Reason for visit: Comprehensive medication review.    Allergies/ADRs: Reviewed in chart  Past Medical History: Reviewed in chart  Tobacco: She reports that she quit smoking about 13 months ago. Her smoking use included cigarettes. She started smoking about 56 years ago. She has a 50.00 pack-year smoking history. She has never used smokeless tobacco.  Alcohol: Less than 1 beverage / month  Caffeine: drinks 2 cups of coffee/day    Medication Adherence/Access: Patient uses pill box(es).  Patient takes medications 2 time(s) per day.   Per patient, misses medication 0 times per week.   Medication barriers: none.   The patient fills medications at [facility]: YES.    Type 2 Diabetes:  Currently taking Trulicity 1.5 mg weekly, Toujeo 40 units at bedtime, and Novolog 27 units before breakfast, 5 units before lunch, and 25 units before suppertime. Patient is not experiencing side effects.  Blood sugar monitorin-3 time(s) daily. Ranges (patient reported): 150-180 mg/dL (states significantly improved since going up on Trulicity)  Symptoms of low blood sugar? none  Symptoms of high blood sugar? none  Eye exam: up to date  Foot exam: up to date  Diet/Exercise: no real change in appetite, but watching her sugar intake. Lunch is usually small meal/snack.   Aspirin: Taking 81mg daily and denies side effects   Statin: Yes: atorvatatin   ACEi/ARB: Yes: losartan.   Urine Albumin:   Lab Results   Component Value Date    UMALCR 87.64 (H) 2021      Lab Results   Component Value Date    A1C 6.9 2022    A1C 8.2 01/15/2022    A1C 6.5 2021    A1C 6.1 2021    A1C 6.3 2020    A1C 6.7 2020    A1C 6.7 2019    A1C 7.1 2019     Depression/Fatigue: Currently taking paroxetine 40 mg at bedtime (change from twice daily last year - no improvement in fatigue). Mood is stable per patient. Patient is taking 2-6  hour nap every day. Wakes up alert, but states that a few hours later gets really tired and has to take a nap. Wonders if medications are contributing.    Chronic kidney disease/Pulmonary hypertension/Hypertension: Current medications include amlodipine 10 mg daily, carvedilol 25 mg twice daily, losartan 12.5 mg daily (states cardiology still has her on this), potassium chloride ER 20 mEq twice daily, and torsemide 40 mg every morning/20 mg every evening.  Patient does self-monitor blood pressure. Home BP monitoring in range of 130's systolic over 70's diastolic.  Swelling is well controlled. Follows with cardiology out of [facility] and nephrology. Patient reports no current medication side effects.  BP Readings from Last 3 Encounters:   07/12/22 90/58   07/12/22 90/58   03/29/22 (!) 129/103     Creatinine   Date Value Ref Range Status   07/30/2022 1.91 (H) 0.52 - 1.04 mg/dL Final   01/14/2021 1.20 (H) 0.52 - 1.04 mg/dL Final     Potassium   Date Value Ref Range Status   07/30/2022 4.3 3.4 - 5.3 mmol/L Final   01/14/2021 5.0 3.4 - 5.3 mmol/L Final     Hyperlipidemia: Current therapy includes atorvastatin 40 mg daily and fish oil 1,000 mg daily.  Patient reports no significant myalgias or other side effects.  The 10-year ASCVD risk score (Misti PAEZ Jr., et al., 2013) is: 17.2%    Values used to calculate the score:      Age: 72 years      Sex: Female      Is Non- : No      Diabetic: Yes      Tobacco smoker: No      Systolic Blood Pressure: 90 mmHg      Is BP treated: Yes      HDL Cholesterol: 32 mg/dL      Total Cholesterol: 214 mg/dL  Recent Labs   Lab Test 07/09/22  1005 01/15/22  0914   CHOL 214* 182   HDL 32* 42*   * 100   TRIG 391* 201*     Hypothyroidism: Patient is taking levothyroxine 150 mcg daily. Patient is having the following symptoms: none.   TSH   Date Value Ref Range Status   07/09/2022 0.60 0.40 - 4.00 mU/L Final   06/30/2020 4.65 (H) 0.40 - 4.00 mU/L Final      Osteoporosis: Current therapy includes: alendronate 70 mg every 7 days, calcium carbonate daily, and vitamin D3 250 mcg daily. Patient is not experiencing side effects.  DEXA History: 2020  Risk factors: post-menopausal    Essential tremor: Patient is taking primidone 100 mg twice daily. Does not feel that dose increase from 75 mg twice daily has made noticeable difference. Besides fatigue denies potential side effects since dose increase.     Today's Vitals: There were no vitals taken for this visit.  Wt Readings from Last 5 Encounters:   07/12/22 272 lb (123.4 kg)   07/12/22 272 lb (123.4 kg)   03/22/22 267 lb (121.1 kg)   01/18/22 267 lb (121.1 kg)   05/14/21 268 lb (121.6 kg)     ----------------      I spent 42 minutes with this patient today (an extra 15 minutes was spent creating the Medication Action Plan). A copy of the visit note was provided to the patient's provider(s).    The patient was sent via [employer] a summary of these recommendations.     Luis Antonio Bass, ElainaD, BCACP  Medication Therapy Management Pharmacist  Pager: [phone]    Telemedicine Visit Details  Type of service:  Telephone visit  Start Time: 10:30 AM  End Time: 11:12 AM  Originating Location (patient location): [place]  Distant Location (provider location):  [facility]     Medication Therapy Recommendations  No medication therapy recommendations to display

## 2022-08-11 NOTE — LETTER
August 12, 2022  Yadi Iniguez  [address]  [place] [address]    Dear MALDONADO Asher [facility]     Thank you for talking with me on Aug 11, 2022 about your health and medications. As a follow-up to our conversation, I have included two documents:      1. Your Recommended To-Do List has steps you should take to get the best results from your medications.  2. Your Medication List will help you keep track of your medications and how to take them.    If you want to talk about these documents, please call Geovanny Bass RPH at phone: [phone], Monday-Friday 8-4:30pm.    I look forward to working with you and your doctors to make sure your medications work well for you.    Sincerely,  Geovanny Bass RPH  [facility] Pharmacist, [facility]

## 2022-08-11 NOTE — PATIENT INSTRUCTIONS
"Recommendations from today's MTM visit:                                                    MTM (medication therapy management) is a service provided by a clinical pharmacist designed to help you get the most of out of your medicines.   Today we reviewed what your medicines are for, how to know if they are working, that your medicines are safe and how to make your medicine regimen as easy as possible.      1. Start monitoring your blood pressure when you start to get drowsiness/fatigued to see if it is lower around this time.  We may need to change the timing of your losartan OR lower some doses of medication.    2. If we do not see a correlation between low blood pressure and your symptoms we will discuss whether to trial a reduction in your primidone dose to evaluate it's efficacy and contribution to your symptoms.    Follow-up: I will call you on Wednesday, August 31st at 10:30 AM    It was great speaking with you today.  I value your experience and would be very thankful for your time in providing feedback in our clinic survey. In the next few days, you may receive an email or text message from [employer] with a link to a survey related to your  clinical pharmacist.\"     To schedule another MTM appointment, please call the clinic directly or you may call the MTM scheduling line at [phone] or toll-free at [phone].     My Clinical Pharmacist's contact information:                                                      Please feel free to contact me with any questions or concerns you have.      Luis Antonio Bass, PharmD, [facility]  Medication Therapy Management Pharmacist  Pager: [phone]    "

## 2022-08-11 NOTE — LETTER
"Recommended To-Do List      Prepared on: Aug 11, 2022       You can get the best results from your medications by completing the items on this \"To-Do List.\"      Bring your To-Do List when you go to your doctor. And, share it with your family or caregivers.    My To-Do List:  What we talked about: What I should do:    You stated that you were having fatigue and were concerned that it could be related to your medications.   Start monitoring your blood pressure when you start to get drowsiness/fatigued to see if it is lower around this time.  We may need to change the timing of your losartan OR lower some doses of medication.  If we do not see a correlation between low blood pressure and your symptoms we will discuss whether to trial a reduction in your primidone dose to evaluate it's efficacy and contribution to your symptoms.                "

## 2022-08-11 NOTE — LETTER
_  Medication List        Prepared on: Aug 11, 2022     Bring your Medication List when you go to the doctor, hospital, or   emergency room. And, share it with your family or caregivers.     Note any changes to how you take your medications.  Cross out medications when you no longer use them.    Medication How I take it Why I use it Prescriber   ACETAMINOPHEN PO Take 500 mg by mouth 2 times daily 2 tablets twice daily Pain Patient Reported   alendronate (FOSAMAX) 70 MG tablet TAKE 1 TABLET (70 MG) BY MOUTH EVERY 7 DAYS. TAKE 60 MINUTES BEFORE MORNING MEAL WITH 8 OZ. OF WATER. REMAIN UPRIGHT FOR 30 MINUTES. Osteoporosis Sandy Ricci MD   amLODIPine (NORVASC) 10 MG tablet TAKE 1 TABLET (10 MG) BY MOUTH DAILY Blood Pressure Sandy Ricci MD   aspirin 81 MG tablet Take 1 tablet (81 mg) by mouth daily Stroke prevention Valerie Chamberlain APRN CNP   atorvastatin (LIPITOR) 40 MG tablet Take 1 tablet (40 mg) by mouth daily Heart; Cholesterol Sandy Ricci MD   blood glucose (NO BRAND SPECIFIED) test strip Use to test blood sugar 3 times daily Diabetes Sandy Ricci MD   calcium carbonate (OS-NATACHA) 1500 (600 Ca) MG tablet Take 600 mg by mouth daily  Bone Health Patient Reported   carvedilol (COREG) 25 MG tablet TAKE 1 TABLET (25 MG) BY MOUTH 2 TIMES DAILY WITH MEALS Blood Pressure Sandy Ricci MD   cholecalciferol (VITAMIN D3) 5000 units TABS tablet Take 2 tablets by mouth daily  Bone Health Patient Reported   clobetasol propionate 0.05 % LIQD Externally apply 1 dose. topically 2 times daily as needed Skin Rash Sandy Ricci MD   Continuous Blood Gluc Sensor (FREESTYLE MARI 14 DAY SENSOR) MISC USE 1 DEVICE EVERY 14 DAYS AS DIRECTED Type 2 diabetes Sandy Ricci MD   dulaglutide (TRULICITY) 1.5 MG/0.5ML pen Inject 1.5 mg Subcutaneous every 7 days Type 2 Diabetes Sandy Ricci MD   insulin aspart (NOVOLOG FLEXPEN) 100 UNIT/ML pen Inject 27 units under  the skin before breakfast, 5 units before lunch, 25 units before dinner.  (max dose 70 units/day for adjustment) Type 2 diabetes Sandy Ricci MD   insulin glargine U-300 (TOUJEO SOLOSTAR) 300 UNIT/ML (1 units dial) pen Inject 40 Units Subcutaneous At Bedtime Type 2 diabetes  Sandy Ricci MD   insulin pen needle (32G X 4 MM) 32G X 4 MM miscellaneous Use 5 pen needles daily or as directed.  Type 2 diabetes Sandy Ricci MD   levothyroxine (TIROSINT) 150 MCG capsule Take 150 mcg by mouth daily (with breakfast) Hypothyroidism Sandy Ricci MD   losartan (COZAAR) 25 MG tablet Take 0.5 tablets by mouth daily Blood Pressure Anna Manriquez PA-C   Omega-3 Fatty Acids (OMEGA-3 FISH OIL) 300 MG CAPS Take 1,000 mg by mouth daily General Health Patient Reported   PARoxetine (PAXIL) 20 MG tablet TAKE 1 TABLET (20 MG) BY MOUTH 2 TIMES DAILY Depression Sandy Ricci MD   potassium chloride ER (KLOR-CON M) 20 MEQ CR tablet Take 1 tablet by mouth 2 times daily Low potassium Anna Manriquez PA-C   primidone (MYSOLINE) 50 MG tablet Take 2 tablets (100 mg) by mouth 2 times daily Essential Tremor Sandy Ricci MD   torsemide (DEMADEX) 20 MG tablet Take 40 mg by mouth every morning and 20 mg by mouth every afternoon Fluid retention Sandy Ricci MD         Add new medications, over-the-counter drugs, herbals, vitamins, or  minerals in the blank rows below.    Medication How I take it Why I use it Prescriber                          Allergies:      No Known Allergies        Side effects I have had:               Other Information:              My notes and questions:

## 2022-08-16 NOTE — TELEPHONE ENCOUNTER
Spoke to patient she has a very cheap copay alexander so she is going to hold off on signing up and just follow up next year.

## 2022-08-31 ENCOUNTER — VIRTUAL VISIT (OUTPATIENT)
Dept: PHARMACY | Facility: CLINIC | Age: 72
End: 2022-08-31
Payer: COMMERCIAL

## 2022-08-31 DIAGNOSIS — E11.29 TYPE 2 DIABETES MELLITUS WITH OTHER DIABETIC KIDNEY COMPLICATION, WITH LONG-TERM CURRENT USE OF INSULIN (H): Primary | ICD-10-CM

## 2022-08-31 DIAGNOSIS — N18.32 STAGE 3B CHRONIC KIDNEY DISEASE (H): ICD-10-CM

## 2022-08-31 DIAGNOSIS — I10 HYPERTENSION GOAL BP (BLOOD PRESSURE) < 140/90: ICD-10-CM

## 2022-08-31 DIAGNOSIS — Z79.4 TYPE 2 DIABETES MELLITUS WITH OTHER DIABETIC KIDNEY COMPLICATION, WITH LONG-TERM CURRENT USE OF INSULIN (H): Primary | ICD-10-CM

## 2022-08-31 DIAGNOSIS — I27.20 PULMONARY HYPERTENSION (H): ICD-10-CM

## 2022-08-31 PROCEDURE — 99606 MTMS BY PHARM EST 15 MIN: CPT | Performed by: PHARMACIST

## 2022-08-31 PROCEDURE — 99607 MTMS BY PHARM ADDL 15 MIN: CPT | Performed by: PHARMACIST

## 2022-08-31 NOTE — PROGRESS NOTES
Medication Therapy Management (MTM) Encounter    ASSESSMENT:                            Medication Adherence/Access: No issues identified    Type 2 Diabetes:  Stable per patient.     Chronic kidney disease/Pulmonary hypertension/Hypertension: Low blood pressure readings could be contributing to fatigue. May benefit from moving losartan to evening dosing to see if this helps with mid-day dip in blood pressure. If not improvement may benefit     PLAN:                            1. Move LOSARTAN 12.5 mg by mouth to every evening. Skip tomorrow morning's dose and start taking at night. Continue to monitor for changes in your daytime fatigue and your blood pressure.     Follow-up: I will call you on Thursday,  at 11 AM    SUBJECTIVE/OBJECTIVE:                          Yadi Iniguez is a 72 year old female called for a follow-up visit.  Today's visit is a follow-up MTM visit from 2022.     Reason for visit: Blood pressure/fatigue.    Allergies/ADRs: Reviewed in chart  Past Medical History: Reviewed in chart  Tobacco: She reports that she quit smoking about 14 months ago. Her smoking use included cigarettes. She started smoking about 56 years ago. She has a 50.00 pack-year smoking history. She has never used smokeless tobacco.  Alcohol: Less than 1 beverage / month  Caffeine: drinks 2 cups of coffee/day    Medication Adherence/Access: Patient uses pill box(es).  Patient takes medications 2 time(s) per day.   Per patient, misses medication 0 times per week.   Medication barriers: none.   The patient fills medications at [place]: YES.    Type 2 Diabetes:  Currently taking Trulicity 1.5 mg weekly, Toujeo 40 units at bedtime, and Novolog 27 units before breakfast, 5 units before lunch, and 25 units before suppertime. Patient is not experiencing side effects.  Blood sugar monitorin-3 time(s) daily. Ranges (patient reported): 150-180 mg/dL (states significantly improved since going up on  Trulicity)  Symptoms of low blood sugar? none  Symptoms of high blood sugar? none  Eye exam: up to date  Foot exam: up to date  Diet/Exercise: unchanged  Aspirin: Taking 81mg daily and denies side effects   Statin: Yes: atorvatatin   ACEi/ARB: Yes: losartan.   Urine Albumin:   Lab Results   Component Value Date    UMALCR 87.64 (H) 07/20/2021      Lab Results   Component Value Date    A1C 6.9 07/09/2022    A1C 8.2 01/15/2022    A1C 6.5 07/20/2021    A1C 6.1 01/14/2021    A1C 6.3 06/30/2020    A1C 6.7 01/06/2020    A1C 6.7 07/09/2019    A1C 7.1 01/31/2019     Chronic kidney disease/Pulmonary hypertension/Hypertension: Current medications include amlodipine 10 mg every evening, carvedilol 25 mg twice daily, losartan 12.5 mg every morning, potassium chloride ER 20 mEq twice daily, and torsemide 40 mg every morning/20 mg every evening.  Patient does self-monitor blood pressure. Home BP monitoring in range of 80s-130's (most often 90-110s) systolic over 40s-70's diastolic (most often 50s).  Swelling is well controlled. Follows with cardiology out of [facility] and nephrology. Patient reports no current medication side effects. Ongoing issues with fatigue for patient - does seem to correlate somewhat with blood pressure.   BP Readings from Last 3 Encounters:   07/12/22 90/58   07/12/22 90/58   03/29/22 (!) 129/103       Today's Vitals: There were no vitals taken for this visit.     Wt Readings from Last 5 Encounters:   07/12/22 272 lb (123.4 kg)   07/12/22 272 lb (123.4 kg)   03/22/22 267 lb (121.1 kg)   01/18/22 267 lb (121.1 kg)   05/14/21 268 lb (121.6 kg)       ----------------      I spent 12 minutes with this patient today. All changes were made via collaborative practice agreement with Sandy Ricci MD. A copy of the visit note was provided to the patient's provider(s).    The patient was sent via [employer] a summary of these recommendations.     Luis Antonio Bass, PharmD, BCACP  Medication Therapy Management  Pharmacist  Pager: [phone]      Telemedicine Visit Details  Type of service:  Telephone visit  Start Time: 10:40 AM  End Time: 10:52 AM  Originating Location (patient location): Home  Distant Location (provider location):  [facility]     Medication Therapy Recommendations  Hypertension goal BP (blood pressure) < 140/90    Current Medication: losartan (COZAAR) 25 MG tablet   Rationale: Undesirable effect - Adverse medication event - Safety   Recommendation: Change Administration Time - losartan 25 MG tablet - Move losartan 12.5 mg by mouth to every evening   Status: Accepted per CPA

## 2022-08-31 NOTE — PATIENT INSTRUCTIONS
"Recommendations from today's MTM visit:                                                    MTM (medication therapy management) is a service provided by a clinical pharmacist designed to help you get the most of out of your medicines.      1. Move LOSARTAN 12.5 mg by mouth to every evening. Skip tomorrow morning's dose and start taking at night. Continue to monitor for changes in your daytime fatigue and your blood pressure.     Follow-up: I will call you on Thursday, September 15th at 11 AM    It was great speaking with you today.  I value your experience and would be very thankful for your time in providing feedback in our clinic survey. In the next few days, you may receive an email or text message from [email or web address] with a link to a survey related to your  clinical pharmacist.\"     To schedule another MTM appointment, please call the clinic directly or you may call the MTM scheduling line at [phone] or toll-free at [phone].     My Clinical Pharmacist's contact information:                                                      Please feel free to contact me with any questions or concerns you have.      Luis Antonio Bass, Cash, BCACP  Medication Therapy Management Pharmacist  Pager: [phone]    "

## 2022-09-01 ENCOUNTER — TELEPHONE (OUTPATIENT)
Dept: PALLIATIVE MEDICINE | Facility: CLINIC | Age: 72
End: 2022-09-01

## 2022-09-01 DIAGNOSIS — M47.816 LUMBAR FACET ARTHROPATHY: ICD-10-CM

## 2022-09-01 DIAGNOSIS — M51.369 DDD (DEGENERATIVE DISC DISEASE), LUMBAR: ICD-10-CM

## 2022-09-01 DIAGNOSIS — M47.816 LUMBAR SPONDYLOSIS: Primary | ICD-10-CM

## 2022-09-01 NOTE — PROGRESS NOTES
Patient had a number of health issues which delayed her evaluation for diagnostic medial branch blocks.  Case request placed.

## 2022-09-01 NOTE — TELEPHONE ENCOUNTER
Called patient and scheduled injection with Dr. Sierra for 9/23 in .    COVID test will be done 1-2 days before the injection and will bring a picture of the results the morning of.    No further questions/concerns at this time.

## 2022-09-08 ENCOUNTER — TELEPHONE (OUTPATIENT)
Dept: FAMILY MEDICINE | Facility: CLINIC | Age: 72
End: 2022-09-08

## 2022-09-08 NOTE — TELEPHONE ENCOUNTER
Reason for Call:  Form, our goal is to have forms completed with 72 hours, however, some forms may require a visit or additional information.    Type of letter, form or note:  medical    Who is the form from?: Patient    Where did the form come from: Patient or family brought in       What clinic location was the form placed at?: [place]    Where the form was placed: [facility] Box/Folder    What number is listed as a contact on the form?: [phone]       Additional comments: NONE    Call taken on 9/8/2022 at 6:56 AM by Snow Sarmiento

## 2022-09-08 NOTE — TELEPHONE ENCOUNTER
Dr Root signed the FMLA forms. They were faxed to [facility] @ [phone]. Original was given to patient's daughter. Sent a copy to be scanned into the chart.Caroline Carlos MA/ZULEMA

## 2022-09-15 ENCOUNTER — VIRTUAL VISIT (OUTPATIENT)
Dept: PHARMACY | Facility: CLINIC | Age: 72
End: 2022-09-15
Payer: COMMERCIAL

## 2022-09-15 DIAGNOSIS — I10 HYPERTENSION GOAL BP (BLOOD PRESSURE) < 140/90: ICD-10-CM

## 2022-09-15 DIAGNOSIS — I27.20 PULMONARY HYPERTENSION (H): ICD-10-CM

## 2022-09-15 DIAGNOSIS — N18.32 STAGE 3B CHRONIC KIDNEY DISEASE (H): ICD-10-CM

## 2022-09-15 DIAGNOSIS — E11.29 TYPE 2 DIABETES MELLITUS WITH OTHER DIABETIC KIDNEY COMPLICATION, WITH LONG-TERM CURRENT USE OF INSULIN (H): Primary | ICD-10-CM

## 2022-09-15 DIAGNOSIS — Z79.4 TYPE 2 DIABETES MELLITUS WITH OTHER DIABETIC KIDNEY COMPLICATION, WITH LONG-TERM CURRENT USE OF INSULIN (H): Primary | ICD-10-CM

## 2022-09-15 PROCEDURE — 99606 MTMS BY PHARM EST 15 MIN: CPT | Performed by: PHARMACIST

## 2022-09-15 NOTE — PROGRESS NOTES
Medication Therapy Management (MTM) Encounter    ASSESSMENT:                            Medication Adherence/Access: No issues identified    Type 2 Diabetes:  Stable per patient.     Chronic kidney disease/Pulmonary hypertension/Hypertension:  Improved. Fatigue has decreased with about 10 mmHg increase in daytime systolic and diastolic blood pressures with out going above goal.     PLAN:                            1. Continue your current medications. If the fatigue returns we could discuss dose adjustments of your blood pressure medications with your doctors.     Follow-up: 3-6 months or sooner if needed    SUBJECTIVE/OBJECTIVE:                          Yadi Iniguez is a 72 year old female called for a follow-up visit.  Today's visit is a follow-up MTM visit from 2022     Reason for visit: Fatigue follow-up.    Allergies/ADRs: Reviewed in chart  Past Medical History: Reviewed in chart  Tobacco: She reports that she quit smoking about 14 months ago. Her smoking use included cigarettes. She started smoking about 56 years ago. She has a 50.00 pack-year smoking history. She has never used smokeless tobacco.  Alcohol: Less than 1 beverage / month  Caffeine: drinks 2 cups of coffee/day    Medication Adherence/Access: Patient uses pill box(es).  Patient takes medications 2 time(s) per day.   Per patient, misses medication 0 times per week.   Medication barriers: none.   The patient fills medications at [place]: YES.    Type 2 Diabetes:  Currently taking Trulicity 1.5 mg weekly, Toujeo 40 units at bedtime, and Novolog 27 units before breakfast, 5 units before lunch, and 25 units before suppertime. Patient is not experiencing side effects.  Blood sugar monitorin-3 time(s) daily. Ranges (patient reported): 140-170s mg/dL  Symptoms of low blood sugar? none  Symptoms of high blood sugar? none  Eye exam: up to date  Foot exam: up to date  Diet/Exercise: unchanged  Aspirin: Taking 81mg daily and denies side effects    Statin: Yes: atorvatatin   ACEi/ARB: Yes: losartan.   Urine Albumin:   Lab Results   Component Value Date    UMALCR 87.64 (H) 07/20/2021      Lab Results   Component Value Date    A1C 6.9 07/09/2022    A1C 8.2 01/15/2022    A1C 6.5 07/20/2021    A1C 6.1 01/14/2021    A1C 6.3 06/30/2020    A1C 6.7 01/06/2020    A1C 6.7 07/09/2019    A1C 7.1 01/31/2019     Chronic kidney disease/Pulmonary hypertension/Hypertension: Current medications include amlodipine 10 mg every evening, carvedilol 25 mg twice daily, losartan 12.5 mg every evening (moved), potassium chloride ER 20 mEq twice daily, and torsemide 40 mg every morning/20 mg every evening.  Patient does self-monitor blood pressure. Home BP monitoring in range of 100-130s over 70-80s.  Swelling is well controlled. Follows with cardiology out of [facility] and nephrology. Patient reports no current medication side effects. Fatigue is much improved - more alert during the day.   BP Readings from Last 3 Encounters:   07/12/22 90/58   07/12/22 90/58   03/29/22 (!) 129/103       Today's Vitals: There were no vitals taken for this visit.   Wt Readings from Last 5 Encounters:   07/12/22 272 lb (123.4 kg)   07/12/22 272 lb (123.4 kg)   03/22/22 267 lb (121.1 kg)   01/18/22 267 lb (121.1 kg)   05/14/21 268 lb (121.6 kg)     ----------------      I spent 10 minutes with this patient today. A copy of the visit note was provided to the patient's provider(s).    The patient was sent via [employer] a summary of these recommendations.     Luis Antonio Bass, ElainaD, BCACP  Medication Therapy Management Pharmacist  Pager: [phone]    Telemedicine Visit Details  Type of service:  Telephone visit  Start Time: 11:00 AM  End Time: 11:10 AM  Originating Location (patient location): [place]  Distant Location (provider location):  [facility]     Medication Therapy Recommendations  No medication therapy recommendations to display

## 2022-09-15 NOTE — PATIENT INSTRUCTIONS
"Recommendations from today's MTM visit:                                                    MTM (medication therapy management) is a service provided by a clinical pharmacist designed to help you get the most of out of your medicines.       1. Continue your current medications. If the fatigue returns we could discuss dose adjustments of your blood pressure medications with your doctors.     Follow-up: 3-6 months or sooner if needed    It was great speaking with you today.  I value your experience and would be very thankful for your time in providing feedback in our clinic survey. In the next few days, you may receive an email or text message from [employer] with a link to a survey related to your  clinical pharmacist.\"     To schedule another MTM appointment, please call the clinic directly or you may call the MTM scheduling line at [phone] or toll-free at [phone].     My Clinical Pharmacist's contact information:                                                      Please feel free to contact me with any questions or concerns you have.      Luis Antonio Bass, Cash, BCACP  Medication Therapy Management Pharmacist  Pager: [phone]    "

## 2022-09-21 ENCOUNTER — MYC MEDICAL ADVICE (OUTPATIENT)
Dept: FAMILY MEDICINE | Facility: CLINIC | Age: 72
End: 2022-09-21

## 2022-09-23 ENCOUNTER — HOSPITAL ENCOUNTER (OUTPATIENT)
Facility: AMBULATORY SURGERY CENTER | Age: 72
Discharge: HOME | End: 2022-09-23
Attending: PHYSICAL MEDICINE & REHABILITATION | Admitting: PHYSICAL MEDICINE & REHABILITATION
Payer: COMMERCIAL

## 2022-09-23 VITALS
RESPIRATION RATE: 22 BRPM | DIASTOLIC BLOOD PRESSURE: 63 MMHG | OXYGEN SATURATION: 94 % | TEMPERATURE: 97.4 F | SYSTOLIC BLOOD PRESSURE: 138 MMHG

## 2022-09-23 DIAGNOSIS — M51.369 DDD (DEGENERATIVE DISC DISEASE), LUMBAR: ICD-10-CM

## 2022-09-23 PROCEDURE — 64493 INJ PARAVERT F JNT L/S 1 LEV: CPT | Mod: RT,KX

## 2022-09-23 PROCEDURE — G8918 PT W/O PREOP ORDER IV AB PRO: HCPCS

## 2022-09-23 PROCEDURE — G8907 PT DOC NO EVENTS ON DISCHARG: HCPCS

## 2022-09-23 RX ORDER — IOPAMIDOL 408 MG/ML
INJECTION, SOLUTION INTRATHECAL PRN
Status: DISCONTINUED | OUTPATIENT
Start: 2022-09-23 | End: 2022-09-23 | Stop reason: HOSPADM

## 2022-09-23 NOTE — DISCHARGE INSTRUCTIONS
PAIN INJECTION HOME CARE INSTRUCTIONS  Activity  -You may resume most normal activity levels with the exception of strenuous activity. It may help to move in ways that hurt before the injection, to see if the pain is still there, but do not overdo it.     -DO NOT remove bandaid for 24 hours  -DO NOT shower for 24 hours      Pain  -You may feel immediate pain relief and numbness for a period of time after the injection. This may indicate the medication has reached the right spot.  -Your pain may return after this short pain-free period, or may even be a little worse for a day or two. It may be caused by needle irritation or by the medication itself. The medications usually take two or three days to start working, but can take as long as a week.    -You may use an ice pack for 20 minutes every 2 hours for the first 24 hours  -You may use a heating pad after the first 24 hours  -You may use Tylenol (acetaminophen) every 4 hours or other pain medicines as directed by your physician      PLEASE KEEP TRACK OF YOUR SYMPTOMS AND NOTE ANY CHANGES FOR YOUR DOCTOR.       Please contact us if you have:  -Severe pain  -Fever more than 101.5 degrees Fahrenheit  -Signs of infection at the injection site (redness, swelling, or drainage)          FOR PM&R PATIENTS of Dr Sierra:  For patients seen by the PM and R service, please call [phone].(Monday through Friday 8a-5p.  After business hours and weekends call [phone] and ask for the PM and R doctor on call). If you need to fax a pain diary to PM&R the fax number is [phone]. If you are unable to fax, uploading to [employer] is encouraged, then send to provider. If you have procedure scheduling questions please call [phone] Option #2

## 2022-09-24 ENCOUNTER — LAB (OUTPATIENT)
Dept: LAB | Facility: CLINIC | Age: 72
End: 2022-09-24
Payer: COMMERCIAL

## 2022-09-24 DIAGNOSIS — D47.2 MONOCLONAL GAMMOPATHY: ICD-10-CM

## 2022-09-24 LAB — TOTAL PROTEIN SERUM FOR ELP: 6 G/DL (ref 6.4–8.3)

## 2022-09-24 PROCEDURE — 83521 IG LIGHT CHAINS FREE EACH: CPT

## 2022-09-24 PROCEDURE — 86335 IMMUNFIX E-PHORSIS/URINE/CSF: CPT | Performed by: PATHOLOGY

## 2022-09-24 PROCEDURE — 86334 IMMUNOFIX E-PHORESIS SERUM: CPT | Performed by: PATHOLOGY

## 2022-09-24 PROCEDURE — 84165 PROTEIN E-PHORESIS SERUM: CPT | Performed by: PATHOLOGY

## 2022-09-24 PROCEDURE — 82565 ASSAY OF CREATININE: CPT

## 2022-09-24 PROCEDURE — 82310 ASSAY OF CALCIUM: CPT

## 2022-09-24 PROCEDURE — 80051 ELECTROLYTE PANEL: CPT

## 2022-09-24 PROCEDURE — 82040 ASSAY OF SERUM ALBUMIN: CPT

## 2022-09-24 PROCEDURE — 36415 COLL VENOUS BLD VENIPUNCTURE: CPT

## 2022-09-24 PROCEDURE — 84155 ASSAY OF PROTEIN SERUM: CPT

## 2022-09-25 NOTE — PROCEDURES
PROCEDURE NOTE: 1ST Diagnostic Lumbar Medial Branch Block    PROCEDURE DATE: 9/23/2022    PATIENT NAME: Yadi Iniguez  YOB: 1950    ATTENDING PHYSICIAN: Dwayne Sierra MD   RESIDENT/FELLOW PHYSICIAN: None    PREOPERATIVE DIAGNOSIS:   Lumbar spondylosis  Lumbar facet arthropathy   DDD (degenerative disc disease), lumbar   POSTOPERATIVE DIAGNOSIS: same    PROCEDURE PLANNED: 1st diagnostic medial branch block at the Bilateral [pick levels: L3, L4, L5, Sacral ala  PROCEDURE PERFORMED: 1st diagnostic medial branch block at the Bilateral L5, Sacral ala     FLUOROSCOPY WAS USED.     INDICATIONS FOR PROCEDURE:   Yadi Iniguez is a 72 year old female with a clinical picture consistent with bilateral axial low back pain and lumbar facet arthropathy who presents for diagnostic medial branch block at the levels noted above to assess if there is a facetogenic component to her low back pain.     PROCEDURE AND FINDINGS:    Yadi was greeted in the pre-procedure holding area. The risk, benefits and alternatives to the procedure were again reviewed with the patient and written informed consent was placed in the chart. An IV line was not placed.  A 500 mL bag of NS was not connected to the patient. She was taken to the procedure room and positioned prone on the fluoroscopy table.  Routine monitors were applied including blood pressure cuff, and pulse oximetry. Prior to the procedure a time out was completed, verifying correct patient, procedure, site, positioning, and implants and/or special equipment.     The skin was prepped and draped in the usual sterile fashion. An AP fluoroscopic view was obtained to identify the vertebral bodies, the transverse processes and the superior articulating processes at the L5, Sacral ala aforementioned levels on the bilateral sides. The skin overlying the junction of the TP-SAP at the aforementioned levels was anesthetized using a 27-gauge 1-1/4 inch needle with 1% preservative-free  lidocaine for a total volume of 1 ml per level. Next, a 25-gauge 3 1/2 inch Quincke spinal needle was advanced under an oblique fluoroscopic view to the junction of the superior articular process and transverse process(es). Correct needle position was then confirmed in the AP and lateral views.      After negative aspiration, 0.3 mls of Isovue-M 200 contrast was injected at each site under live fluoroscopy; no vascular run off was identified and the contrast spread was adequate. At this point, after negative aspiration, 0.5mL of 0.5% Bupivacaine, was injected at each site.  The needle was then re-styletted removed. The needle insertion site was dressed appropriately.     Yadi was taken to the recovery room where she was monitored for a brief period of time. She tolerated the procedure well and was discharged home in stable condition with post procedural instructions.    Before the procedure, she reported a pain score of 7/10.   After the procedure, she reported a pain score of 4/10.      Follow-up will be Determined after block sheet reviewed.    COMPLICATIONS: None    COMMENTS: Given prior spinal fusion hardware, access limited to L4-5 facet joint and corresponding medial branch nerves. Procedure was limited to treating the L5-S1 facet joints

## 2022-09-26 LAB
ALBUMIN SERPL ELPH-MCNC: 3.5 G/DL (ref 3.7–5.1)
ALBUMIN SERPL-MCNC: 3.4 G/DL (ref 3.4–5)
ALPHA1 GLOB SERPL ELPH-MCNC: 0.3 G/DL (ref 0.2–0.4)
ALPHA2 GLOB SERPL ELPH-MCNC: 0.7 G/DL (ref 0.5–0.9)
ANION GAP SERPL CALCULATED.3IONS-SCNC: 7 MMOL/L (ref 3–14)
B-GLOBULIN SERPL ELPH-MCNC: 0.7 G/DL (ref 0.6–1)
CALCIUM SERPL-MCNC: 9.3 MG/DL (ref 8.5–10.1)
CHLORIDE BLD-SCNC: 103 MMOL/L (ref 94–109)
CO2 SERPL-SCNC: 30 MMOL/L (ref 20–32)
CREAT SERPL-MCNC: 1.54 MG/DL (ref 0.52–1.04)
GAMMA GLOB SERPL ELPH-MCNC: 0.9 G/DL (ref 0.7–1.6)
GFR SERPL CREATININE-BSD FRML MDRD: 35 ML/MIN/1.73M2
KAPPA LC FREE SER-MCNC: 5.76 MG/DL (ref 0.33–1.94)
KAPPA LC FREE/LAMBDA FREE SER NEPH: 1.91 {RATIO} (ref 0.26–1.65)
LAMBDA LC FREE SERPL-MCNC: 3.02 MG/DL (ref 0.57–2.63)
M PROTEIN SERPL ELPH-MCNC: 0 G/DL
POTASSIUM BLD-SCNC: 4.2 MMOL/L (ref 3.4–5.3)
PROT ELPH PNL UR ELPH: NORMAL
PROT PATTERN SERPL ELPH-IMP: ABNORMAL
PROT PATTERN SERPL IFE-IMP: NORMAL
SODIUM SERPL-SCNC: 140 MMOL/L (ref 133–144)

## 2022-09-26 PROCEDURE — 84166 PROTEIN E-PHORESIS/URINE/CSF: CPT

## 2022-09-26 PROCEDURE — 81050 URINALYSIS VOLUME MEASURE: CPT

## 2022-09-28 LAB — PROT PATTERN UR ELPH-IMP: NORMAL

## 2022-09-29 ENCOUNTER — TELEPHONE (OUTPATIENT)
Dept: PALLIATIVE MEDICINE | Facility: CLINIC | Age: 72
End: 2022-09-29

## 2022-09-29 DIAGNOSIS — M47.816 LUMBAR FACET ARTHROPATHY: ICD-10-CM

## 2022-09-29 DIAGNOSIS — Z98.1 S/P SPINAL FUSION: ICD-10-CM

## 2022-09-29 DIAGNOSIS — M47.816 LUMBAR SPONDYLOSIS: Primary | ICD-10-CM

## 2022-09-29 NOTE — PROGRESS NOTES
Patient reports 95% improvement from bilateral L5-S1 medial branch block #1.  Case request placed for MBB #2

## 2022-09-29 NOTE — TELEPHONE ENCOUNTER
Called and scheduled injection with Dr. Sierra in [place] on 11/18. Patient chose this date.     COVID test will be done 1-2 days beforehand and will bring a picture of the results.     No further questions/concerns.

## 2022-10-01 DIAGNOSIS — G25.0 ESSENTIAL TREMOR: ICD-10-CM

## 2022-10-02 RX ORDER — PRIMIDONE 50 MG/1
100 TABLET ORAL 2 TIMES DAILY
Qty: 360 TABLET | Refills: 0 | Status: SHIPPED | OUTPATIENT
Start: 2022-10-02 | End: 2022-12-30

## 2022-10-04 ENCOUNTER — VIRTUAL VISIT (OUTPATIENT)
Dept: ONCOLOGY | Facility: CLINIC | Age: 72
End: 2022-10-04
Attending: INTERNAL MEDICINE
Payer: COMMERCIAL

## 2022-10-04 DIAGNOSIS — D47.2 MONOCLONAL GAMMOPATHY: Primary | ICD-10-CM

## 2022-10-04 PROCEDURE — 99212 OFFICE O/P EST SF 10 MIN: CPT | Mod: 95 | Performed by: INTERNAL MEDICINE

## 2022-10-04 PROCEDURE — G0463 HOSPITAL OUTPT CLINIC VISIT: HCPCS | Mod: PN,RTG | Performed by: INTERNAL MEDICINE

## 2022-10-04 NOTE — PROGRESS NOTES
Yadi is a 72 year old who is being evaluated via a billable video visit.      How would you like to obtain your AVS? MyChart  If the video visit is dropped, the invitation should be resent by: Text to cell phone: [phone]  Will anyone else be joining your video visit? Denise MOSS

## 2022-10-04 NOTE — LETTER
10/4/2022         RE: Yadi Iniguez  [address]  [place] [address]        Dear Colleague,    Thank you for referring your patient, Yadi Iniguez, to the [facility]. Please see a copy of my visit note below.    Yadi is a 72 year old who is being evaluated via a billable video visit.      How would you like to obtain your AVS? MyChart  If the video visit is dropped, the invitation should be resent by: Text to cell phone: [phone]  Will anyone else be joining your video visit? Denise MOSS              Video-Visit Details    Type of service:  Video Visit    Video Start Time: 8 AM  Video End Time: 8:08 AM    Originating Location (pt. Location): Home in [place]    Distant Location (provider location):  [facility]     Platform used for Video Visit: [employer]    [facility] Hematology and Oncology Progress Note    Patient: Yadi Iniguez  MRN: [id number]  Date of Service: 05/31/2022        Reason for Visit    Chief Complaint   Patient presents with     Video Visit     Follow up         Problem List Items Addressed This Visit    None           Assessment and Plan  MGUS  She has a history of IgG kappa monoclonal protein.  Her monoclonal peak was 0.2 g in March.  Kappa lambda light chains were mildly elevated.  No evidence of excessive monoclonal protein in the urine.  No evidence of clinical myeloma.  She has chronic kidney disease which is stable.  Also has chronic mild anemia which is probably secondary to chronic kidney disease.  Labs from 9/24/2022 reviewed.  SPEP shows no evidence of monoclonal protein.  Kappa light chains are mildly up at 5.76 and lambda at 3.02.  Ratio is 1.91.  This could be secondary to chronic kidney disease also.  Creatinine is pretty stable.  No evidence of hypercalcemia.  Recently she had a bone survey which showed a small focus of lucency in the distal left femur.  This was thought to be secondary to  soft tissue projection.  No evidence of widespread lytic lesions.  For all practical purposes she is considered to have no evidence of bone disease.    Overall no concern for disease progression.  I think going forward she will need labs once every 6 to 12 months.  Her risk of progression to myeloma is extremely low.  My plan is to see her back in 6 months with labs a week before.  He is agreeable with the plan.    Cancer Staging  No matching staging information was found for the patient.    ECOG Performance    2 - Ambulatory and independent in all ADLs; cannot work; up > 50% of the time         Problem List    Patient Active Problem List   Diagnosis     Hypothyroidism     Hypertension goal BP (blood pressure) < 140/90     Type 2 diabetes, HbA1C goal < 8% (H)     Hyperlipidemia LDL goal <100     Moderate major depression (H)     Incontinence of urine     Neuropathy in diabetes (H)     Eczema     Left lumbar radiculopathy     Health Care Home     CKD (chronic kidney disease) stage 3, GFR 30-59 ml/min (H)     Type 2 diabetes mellitus with other diabetic kidney complication, with long-term current use of insulin (H)     Osteoporosis     Morbid obesity (H)     Thrombocytopenia (H)     Chronic obstructive pulmonary disease, unspecified COPD type (H)     S/P lumbar fusion     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, with long-term current use of insulin, unspecified laterality (H)     Lumbar facet arthropathy     Lumbar spondylosis     Pulmonary hypertension (H)     Nonrheumatic aortic valve stenosis     Depression, major, recurrent, in partial remission (H)     DDD (degenerative disc disease), lumbar          Interval History   Yadi Iniguez is a 72 year old with 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    Doing well since last visit.  Denies any new issues today.  No new bone pain or  fractures reported.  Does have some fatigue and shortness of breath on exertion but no issues at rest.  Denies any fevers or chills.  Labs from last week reviewed today.    Review of Systems  A comprehensive review of systems was negative except for what is noted in the interval history    Current Outpatient Medications   Medication     ACETAMINOPHEN PO     alendronate (FOSAMAX) 70 MG tablet     amLODIPine (NORVASC) 10 MG tablet     atorvastatin (LIPITOR) 40 MG tablet     calcium carbonate (OS-NATACHA) 1500 (600 Ca) MG tablet     carvedilol (COREG) 25 MG tablet     cholecalciferol (VITAMIN D3) 5000 units TABS tablet     clobetasol propionate 0.05 % LIQD     dulaglutide (TRULICITY) 1.5 MG/0.5ML pen     insulin aspart (NOVOLOG FLEXPEN) 100 UNIT/ML pen     insulin glargine U-300 (TOUJEO SOLOSTAR) 300 UNIT/ML (1 units dial) pen     levothyroxine (TIROSINT) 150 MCG capsule     losartan (COZAAR) 25 MG tablet     Omega-3 Fatty Acids (OMEGA-3 FISH OIL) 300 MG CAPS     PARoxetine (PAXIL) 20 MG tablet     potassium chloride ER (KLOR-CON M) 20 MEQ CR tablet     primidone (MYSOLINE) 50 MG tablet     torsemide (DEMADEX) 20 MG tablet     aspirin 81 MG tablet     blood glucose (NO BRAND SPECIFIED) test strip     blood glucose monitoring (NO BRAND SPECIFIED) meter device kit     Continuous Blood Gluc Sensor (FREESTYLE MARI 14 DAY SENSOR) [facility]     insulin pen needle (32G X 4 MM) 32G X 4 MM miscellaneous     No current facility-administered medications for this visit.        Physical Exam    No flowsheet data found.    General: alert and cooperative    Lab Results    No results found for this or any previous visit (from the past 168 hour(s)).    Imaging    XR Surgery LINDA G/T 5 Min Fluoro    Result Date: 9/23/2022  This exam was marked as non-reportable because it will not be read by a radiologist or a [place] non-radiologist provider.     A total of 15 min were spent today on this visit which included face to face conversation with  the patient, EMR review, counseling and co-ordination of care and medical documentation.      Signed by: Shawna Wang MD      Again, thank you for allowing me to participate in the care of your patient.        Sincerely,        Shawna Wang MD

## 2022-10-04 NOTE — LETTER
10/4/2022         RE: Yadi Iniguez  [address]  [place] [address]        Dear Colleague,    Thank you for referring your patient, Yadi Iniguez, to the [facility]. Please see a copy of my visit note below.    Yadi is a 72 year old who is being evaluated via a billable video visit.      How would you like to obtain your AVS? MyChart  If the video visit is dropped, the invitation should be resent by: Text to cell phone: [phone]  Will anyone else be joining your video visit? Denise MOSS              Video-Visit Details    Type of service:  Video Visit    Video Start Time: 8 AM  Video End Time: 8:08 AM    Originating Location (pt. Location): Home in [place]    Distant Location (provider location):  [facility]     Platform used for Video Visit: [employer]    [facility] Hematology and Oncology Progress Note    Patient: Yadi Iniguez  MRN: [id number]  Date of Service: 05/31/2022        Reason for Visit    Chief Complaint   Patient presents with     Video Visit     Follow up         Problem List Items Addressed This Visit    None           Assessment and Plan  MGUS  She has a history of IgG kappa monoclonal protein.  Her monoclonal peak was 0.2 g in March.  Kappa lambda light chains were mildly elevated.  No evidence of excessive monoclonal protein in the urine.  No evidence of clinical myeloma.  She has chronic kidney disease which is stable.  Also has chronic mild anemia which is probably secondary to chronic kidney disease.  Labs from 9/24/2022 reviewed.  SPEP shows no evidence of monoclonal protein.  Kappa light chains are mildly up at 5.76 and lambda at 3.02.  Ratio is 1.91.  This could be secondary to chronic kidney disease also.  Creatinine is pretty stable.  No evidence of hypercalcemia.  Recently she had a bone survey which showed a small focus of lucency in the distal left femur.  This was thought to be secondary to  soft tissue projection.  No evidence of widespread lytic lesions.  For all practical purposes she is considered to have no evidence of bone disease.    Overall no concern for disease progression.  I think going forward she will need labs once every 6 to 12 months.  Her risk of progression to myeloma is extremely low.  My plan is to see her back in 6 months with labs a week before.  He is agreeable with the plan.    Cancer Staging  No matching staging information was found for the patient.    ECOG Performance    2 - Ambulatory and independent in all ADLs; cannot work; up > 50% of the time         Problem List    Patient Active Problem List   Diagnosis     Hypothyroidism     Hypertension goal BP (blood pressure) < 140/90     Type 2 diabetes, HbA1C goal < 8% (H)     Hyperlipidemia LDL goal <100     Moderate major depression (H)     Incontinence of urine     Neuropathy in diabetes (H)     Eczema     Left lumbar radiculopathy     Health Care Home     CKD (chronic kidney disease) stage 3, GFR 30-59 ml/min (H)     Type 2 diabetes mellitus with other diabetic kidney complication, with long-term current use of insulin (H)     Osteoporosis     Morbid obesity (H)     Thrombocytopenia (H)     Chronic obstructive pulmonary disease, unspecified COPD type (H)     S/P lumbar fusion     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, with long-term current use of insulin, unspecified laterality (H)     Lumbar facet arthropathy     Lumbar spondylosis     Pulmonary hypertension (H)     Nonrheumatic aortic valve stenosis     Depression, major, recurrent, in partial remission (H)     DDD (degenerative disc disease), lumbar          Interval History   Yadi Iniguez is a 72 year old with 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    Doing well since last visit.  Denies any new issues today.  No new bone pain or  fractures reported.  Does have some fatigue and shortness of breath on exertion but no issues at rest.  Denies any fevers or chills.  Labs from last week reviewed today.    Review of Systems  A comprehensive review of systems was negative except for what is noted in the interval history    Current Outpatient Medications   Medication     ACETAMINOPHEN PO     alendronate (FOSAMAX) 70 MG tablet     amLODIPine (NORVASC) 10 MG tablet     atorvastatin (LIPITOR) 40 MG tablet     calcium carbonate (OS-NATACHA) 1500 (600 Ca) MG tablet     carvedilol (COREG) 25 MG tablet     cholecalciferol (VITAMIN D3) 5000 units TABS tablet     clobetasol propionate 0.05 % LIQD     dulaglutide (TRULICITY) 1.5 MG/0.5ML pen     insulin aspart (NOVOLOG FLEXPEN) 100 UNIT/ML pen     insulin glargine U-300 (TOUJEO SOLOSTAR) 300 UNIT/ML (1 units dial) pen     levothyroxine (TIROSINT) 150 MCG capsule     losartan (COZAAR) 25 MG tablet     Omega-3 Fatty Acids (OMEGA-3 FISH OIL) 300 MG CAPS     PARoxetine (PAXIL) 20 MG tablet     potassium chloride ER (KLOR-CON M) 20 MEQ CR tablet     primidone (MYSOLINE) 50 MG tablet     torsemide (DEMADEX) 20 MG tablet     aspirin 81 MG tablet     blood glucose (NO BRAND SPECIFIED) test strip     blood glucose monitoring (NO BRAND SPECIFIED) meter device kit     Continuous Blood Gluc Sensor (FREESTYLE MARI 14 DAY SENSOR) [facility]     insulin pen needle (32G X 4 MM) 32G X 4 MM miscellaneous     No current facility-administered medications for this visit.        Physical Exam    No flowsheet data found.    General: alert and cooperative    Lab Results    No results found for this or any previous visit (from the past 168 hour(s)).    Imaging    XR Surgery LINDA G/T 5 Min Fluoro    Result Date: 9/23/2022  This exam was marked as non-reportable because it will not be read by a radiologist or a [place] non-radiologist provider.     A total of 15 min were spent today on this visit which included face to face conversation with  the patient, EMR review, counseling and co-ordination of care and medical documentation.      Signed by: Shawna Wang MD      Again, thank you for allowing me to participate in the care of your patient.        Sincerely,        Shawna Wang MD

## 2022-10-04 NOTE — PROGRESS NOTES
Video-Visit Details    Type of service:  Video Visit    Video Start Time: 8 AM  Video End Time: 8:08 AM    Originating Location (pt. Location): Home in [place]    Distant Location (provider location):  [facility]     Platform used for Video Visit: [facility] Hematology and Oncology Progress Note    Patient: Yadi Iniguez  MRN: [id number]  Date of Service: 05/31/2022        Reason for Visit    Chief Complaint   Patient presents with     Video Visit     Follow up         Problem List Items Addressed This Visit    None           Assessment and Plan  MGUS  She has a history of IgG kappa monoclonal protein.  Her monoclonal peak was 0.2 g in March.  Kappa lambda light chains were mildly elevated.  No evidence of excessive monoclonal protein in the urine.  No evidence of clinical myeloma.  She has chronic kidney disease which is stable.  Also has chronic mild anemia which is probably secondary to chronic kidney disease.  Labs from 9/24/2022 reviewed.  SPEP shows no evidence of monoclonal protein.  Kappa light chains are mildly up at 5.76 and lambda at 3.02.  Ratio is 1.91.  This could be secondary to chronic kidney disease also.  Creatinine is pretty stable.  No evidence of hypercalcemia.  Recently she had a bone survey which showed a small focus of lucency in the distal left femur.  This was thought to be secondary to soft tissue projection.  No evidence of widespread lytic lesions.  For all practical purposes she is considered to have no evidence of bone disease.    Overall no concern for disease progression.  I think going forward she will need labs once every 6 to 12 months.  Her risk of progression to myeloma is extremely low.  My plan is to see her back in 6 months with labs a week before.  He is agreeable with the plan.    Cancer Staging  No matching staging information was found for the patient.    ECOG Performance    2 - Ambulatory and independent in all ADLs; cannot work;  up > 50% of the time         Problem List    Patient Active Problem List   Diagnosis     Hypothyroidism     Hypertension goal BP (blood pressure) < 140/90     Type 2 diabetes, HbA1C goal < 8% (H)     Hyperlipidemia LDL goal <100     Moderate major depression (H)     Incontinence of urine     Neuropathy in diabetes (H)     Eczema     Left lumbar radiculopathy     Health Care Home     CKD (chronic kidney disease) stage 3, GFR 30-59 ml/min (H)     Type 2 diabetes mellitus with other diabetic kidney complication, with long-term current use of insulin (H)     Osteoporosis     Morbid obesity (H)     Thrombocytopenia (H)     Chronic obstructive pulmonary disease, unspecified COPD type (H)     S/P lumbar fusion     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, with long-term current use of insulin, unspecified laterality (H)     Lumbar facet arthropathy     Lumbar spondylosis     Pulmonary hypertension (H)     Nonrheumatic aortic valve stenosis     Depression, major, recurrent, in partial remission (H)     DDD (degenerative disc disease), lumbar          Interval History   Yadi Iniguez is a 72 year old with 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    Doing well since last visit.  Denies any new issues today.  No new bone pain or fractures reported.  Does have some fatigue and shortness of breath on exertion but no issues at rest.  Denies any fevers or chills.  Labs from last week reviewed today.    Review of Systems  A comprehensive review of systems was negative except for what is noted in the interval history    Current Outpatient Medications   Medication     ACETAMINOPHEN PO     alendronate (FOSAMAX) 70 MG tablet     amLODIPine (NORVASC) 10 MG tablet     atorvastatin (LIPITOR) 40 MG tablet     calcium carbonate (OS-NATACHA) 1500 (600 Ca) MG tablet     carvedilol (COREG) 25 MG tablet      cholecalciferol (VITAMIN D3) 5000 units TABS tablet     clobetasol propionate 0.05 % LIQD     dulaglutide (TRULICITY) 1.5 MG/0.5ML pen     insulin aspart (NOVOLOG FLEXPEN) 100 UNIT/ML pen     insulin glargine U-300 (TOUJEO SOLOSTAR) 300 UNIT/ML (1 units dial) pen     levothyroxine (TIROSINT) 150 MCG capsule     losartan (COZAAR) 25 MG tablet     Omega-3 Fatty Acids (OMEGA-3 FISH OIL) 300 MG CAPS     PARoxetine (PAXIL) 20 MG tablet     potassium chloride ER (KLOR-CON M) 20 MEQ CR tablet     primidone (MYSOLINE) 50 MG tablet     torsemide (DEMADEX) 20 MG tablet     aspirin 81 MG tablet     blood glucose (NO BRAND SPECIFIED) test strip     blood glucose monitoring (NO BRAND SPECIFIED) meter device kit     Continuous Blood Gluc Sensor (FREESTYLE MARI 14 DAY SENSOR) [facility]     insulin pen needle (32G X 4 MM) 32G X 4 MM miscellaneous     No current facility-administered medications for this visit.        Physical Exam    No flowsheet data found.    General: alert and cooperative    Lab Results    No results found for this or any previous visit (from the past 168 hour(s)).    Imaging    XR Surgery LINDA G/T 5 Min Fluoro    Result Date: 9/23/2022  This exam was marked as non-reportable because it will not be read by a radiologist or a [place] non-radiologist provider.     A total of 15 min were spent today on this visit which included face to face conversation with the patient, EMR review, counseling and co-ordination of care and medical documentation.      Signed by: Shawna Wang MD

## 2022-10-30 ENCOUNTER — HEALTH MAINTENANCE LETTER (OUTPATIENT)
Age: 72
End: 2022-10-30

## 2022-11-11 ENCOUNTER — TELEPHONE (OUTPATIENT)
Dept: PHYSICAL MEDICINE AND REHAB | Facility: CLINIC | Age: 72
End: 2022-11-11

## 2022-11-11 NOTE — TELEPHONE ENCOUNTER
Pt was informed [facility] pre-admin nurse had called her today. She was given their call-back number but was informed they will call her next week again to discuss.     Carin Bearden, RNCC  Neurology/Neurosurgery/PM&R

## 2022-11-11 NOTE — TELEPHONE ENCOUNTER
[facility] Call Center    Phone Message    May a detailed message be left on voicemail: yes     Reason for Call: Other: Returning call     Patient states she missed a call from Dr. Sierra's team. She wasn't exactly sure what the call is regarding, but believes it might be related to her upcoming procedure. No encounter documenting the outbound call was present at time of patient calling back. Patient would like a call back  Action Taken: Message routed to:  [facility] ([facility]): PM&R    Travel Screening: Not Applicable

## 2022-11-18 ENCOUNTER — HOSPITAL ENCOUNTER (OUTPATIENT)
Facility: AMBULATORY SURGERY CENTER | Age: 72
Discharge: HOME | End: 2022-11-18
Attending: PHYSICAL MEDICINE & REHABILITATION | Admitting: PHYSICAL MEDICINE & REHABILITATION
Payer: COMMERCIAL

## 2022-11-18 ENCOUNTER — ANCILLARY PROCEDURE (OUTPATIENT)
Dept: GENERAL RADIOLOGY | Facility: CLINIC | Age: 72
End: 2022-11-18
Attending: PHYSICAL MEDICINE & REHABILITATION
Payer: COMMERCIAL

## 2022-11-18 VITALS
OXYGEN SATURATION: 95 % | SYSTOLIC BLOOD PRESSURE: 144 MMHG | TEMPERATURE: 99.2 F | RESPIRATION RATE: 16 BRPM | DIASTOLIC BLOOD PRESSURE: 71 MMHG | HEART RATE: 91 BPM

## 2022-11-18 DIAGNOSIS — R52 PAIN: ICD-10-CM

## 2022-11-18 PROCEDURE — G8918 PT W/O PREOP ORDER IV AB PRO: HCPCS

## 2022-11-18 PROCEDURE — 64493 INJ PARAVERT F JNT L/S 1 LEV: CPT | Mod: LT,KX

## 2022-11-18 PROCEDURE — G8907 PT DOC NO EVENTS ON DISCHARG: HCPCS

## 2022-11-18 RX ORDER — LIDOCAINE HYDROCHLORIDE 20 MG/ML
INJECTION, SOLUTION INFILTRATION; PERINEURAL PRN
Status: DISCONTINUED | OUTPATIENT
Start: 2022-11-18 | End: 2022-11-18 | Stop reason: HOSPADM

## 2022-11-18 RX ORDER — IOPAMIDOL 408 MG/ML
INJECTION, SOLUTION INTRATHECAL PRN
Status: DISCONTINUED | OUTPATIENT
Start: 2022-11-18 | End: 2022-11-18 | Stop reason: HOSPADM

## 2022-11-18 RX ORDER — POLYETHYLENE GLYCOL 3350 17 G/17G
1 POWDER, FOR SOLUTION ORAL DAILY PRN
COMMUNITY
Start: 2021-08-02

## 2022-11-18 NOTE — DISCHARGE INSTRUCTIONS
PAIN INJECTION HOME CARE INSTRUCTIONS  Activity  -You may resume most normal activity levels with the exception of strenuous activity. It may help to move in ways that hurt before the injection, to see if the pain is still there, but do not overdo it.     -DO NOT remove bandaid for 24 hours  -DO NOT shower for 24 hours      Pain  -You may feel immediate pain relief and numbness for a period of time after the injection. This may indicate the medication has reached the right spot.  -Your pain may return after this short pain-free period, or may even be a little worse for a day or two. It may be caused by needle irritation or by the medication itself. The medications usually take two or three days to start working, but can take as long as a week.    -You may use an ice pack for 20 minutes every 2 hours for the first 24 hours  -You may use a heating pad after the first 24 hours  -You may use Tylenol (acetaminophen) every 4 hours or other pain medicines as directed by your physician      DID YOU RECEIVE SEDATION TODAY?  No    If you received sedation please follow these additional safety measures.    Sedation medicine, if given, may remain active for many hours. It is important for the next 24 hours that you do not:  -Drive a car  -Operate machines or power tools  -Consume alcohol, including beer  -Sign any important papers or legal documents    DID YOU RECEIVE STEROIDS TODAY?  No    Common side effects of steroids:  Not everyone will experience corticosteroid side effects. If side effects are experienced, they will gradually subside in the 7-10 day period following an injection. Most common side effects include:  -Flushed face and/or chest  -Feeling of warmth, particularly in the face but could be an overall feeling of warmth  -Increased blood sugar in diabetic patients  -Menstrual irregularities my occur. If taking hormone-based birth control an alternate method of birth control is recommended  -Sleep disturbances  and/or mood swings are possible  -Leg cramps    PLEASE KEEP TRACK OF YOUR SYMPTOMS AND NOTE ANY CHANGES FOR YOUR DOCTOR.       Please contact us if you have:  -Severe pain  -Fever more than 101.5 degrees Fahrenheit  -Signs of infection at the injection site (redness, swelling, or drainage)        FOR PM&R PATIENTS:  For patients seen by the PM and R service, please call [phone]. (Monday through Friday 8a-5p.  After business hours and weekends call [phone] and ask for the PM and R doctor on call). If you need to fax a pain diary to PM&R the fax number is [phone]. If you are unable to fax, uploading to [employer] is encouraged, then send to provider. If you have procedure scheduling questions please call [phone] Option #2.

## 2022-11-18 NOTE — PROCEDURES
PROCEDURE NOTE: 2ND Diagnostic Lumbar Medial Branch Block    PROCEDURE DATE: 11/18/2022    PATIENT NAME: Yadi Iniguez  YOB: 1950    ATTENDING PHYSICIAN: Dwayne Sierra MD   RESIDENT/FELLOW PHYSICIAN: None    PREOPERATIVE DIAGNOSIS:   Lumbar spondylosis   Lumbar facet arthropathy   POSTOPERATIVE DIAGNOSIS: same    OPERATION PERFORMED: 2ND diagnostic medial branch block at the Bilateral L5, Sacral ala to treat the L5-S1 facet joint     FLUOROSCOPY WAS USED.     INDICATIONS FOR PROCEDURE:   Yadi Iniguez is a 72 year old female with a clinical picture consistent with bilateral axial low back pain and lumbar facet arthropathy who presents for diagnostic medial branch block at the levels noted above to assess if there is a facetogenic component to her low back pain.     PROCEDURE AND FINDINGS:    Yadi was greeted in the pre-procedure holding area. The risk, benefits and alternatives to the procedure were again reviewed with the patient and written informed consent was placed in the chart. An IV line was not placed.  A 500 mL bag of NS was not connected to the patient. She was taken to the procedure room and positioned prone on the fluoroscopy table.  Routine monitors were applied including blood pressure cuff, and pulse oximetry. Prior to the procedure a time out was completed, verifying correct patient, procedure, site, positioning, and implants and/or special equipment.     The skin was prepped and draped in the usual sterile fashion. An AP fluoroscopic view was obtained to identify the vertebral bodies, the transverse processes and the superior articulating processes at the L5, Sacral ala aforementioned levels on the bilateral side. The skin overlying the junction of the TP-SAP at the aforementioned levels was anesthetized using a 27-gauge 1-1/4 inch needle with 1% preservative-free lidocaine for a total volume of 1 ml per level. Next, a 25-gauge 3 1/2 inch Quincke spinal needle was advanced under  an oblique fluoroscopic view to the junction of the superior articular process and transverse process(es). Correct needle position was then confirmed in the AP and lateral views.      After negative aspiration, 0.3 mls of Isovue-M 200 contrast was injected at each site under live fluoroscopy; no vascular run off was identified and the contrast spread was adequate. At this point, after negative aspiration, 0.5mL of 2% Lidocaine, was injected at each site.  The needle was then re-styletted removed. The needle insertion site was dressed appropriately.     Yadi was taken to the recovery room where she was monitored for a brief period of time. She tolerated the procedure well and was discharged home in stable condition with post procedural instructions.    Before the procedure, she reported a pain score of 7/10.   After the procedure, she reported a pain score of 1/10.      Follow-up will be Determined after block sheet reviewed.    COMPLICATIONS: None    COMMENTS: None

## 2022-12-09 ENCOUNTER — TELEPHONE (OUTPATIENT)
Dept: PHYSICAL MEDICINE AND REHAB | Facility: CLINIC | Age: 72
End: 2022-12-09

## 2022-12-09 NOTE — TELEPHONE ENCOUNTER
RN spoke to the pt who reports 50% improvement of pain for about 4 hours after her injection on 11/18, the pain diary was uploaded into [employer].   Will forward to Dr Sierra to review and decide on the next steps.    Bessy Brice RN Care Coordinator   Neurology/Neurosurgery/PM&R/ Pain Management

## 2022-12-09 NOTE — TELEPHONE ENCOUNTER
[facility] Call Center    Phone Message    May a detailed message be left on voicemail: yes     Reason for Call: Other: Pt is looking for next steps after injections. Pt daughter said she was trying to send a message via [employer] and Dr Sierra dropped off the careteam to select from. Pt daughter works for [employer] . She said a phone call back or [facility] to her mom will be fine     Action Taken: Other: ortho     Travel Screening: Not Applicable

## 2022-12-09 NOTE — TELEPHONE ENCOUNTER
[facility] Call Center    Phone Message    May a detailed message be left on voicemail: yes     Reason for Call: Other: Pt is looking for next steps after injections. Pt daughter said she was trying to send a message via [employer] and Dr Sierra dropped off the careteam to select from. Pt daughter works for [employer] . She said a phone call back or [employer] to her mom will be fine     Action Taken: Other: ortho     Travel Screening: Not Applicable

## 2022-12-30 ENCOUNTER — MYC MEDICAL ADVICE (OUTPATIENT)
Dept: FAMILY MEDICINE | Facility: CLINIC | Age: 72
End: 2022-12-30

## 2022-12-30 DIAGNOSIS — E11.9 TYPE 2 DIABETES, HBA1C GOAL < 8% (H): ICD-10-CM

## 2022-12-30 DIAGNOSIS — Z79.4 TYPE 2 DIABETES MELLITUS WITH OTHER DIABETIC KIDNEY COMPLICATION, WITH LONG-TERM CURRENT USE OF INSULIN (H): ICD-10-CM

## 2022-12-30 DIAGNOSIS — G25.0 ESSENTIAL TREMOR: ICD-10-CM

## 2022-12-30 DIAGNOSIS — E11.29 TYPE 2 DIABETES MELLITUS WITH OTHER DIABETIC KIDNEY COMPLICATION, WITH LONG-TERM CURRENT USE OF INSULIN (H): ICD-10-CM

## 2022-12-30 RX ORDER — DULAGLUTIDE 1.5 MG/.5ML
INJECTION, SOLUTION SUBCUTANEOUS
Qty: 6 ML | Refills: 1 | Status: SHIPPED | OUTPATIENT
Start: 2022-12-30 | End: 2023-01-10

## 2022-12-30 RX ORDER — INSULIN ASPART 100 [IU]/ML
INJECTION, SOLUTION INTRAVENOUS; SUBCUTANEOUS
Qty: 75 ML | Refills: 1 | Status: SHIPPED | OUTPATIENT
Start: 2022-12-30 | End: 2023-01-10

## 2022-12-30 RX ORDER — PRIMIDONE 50 MG/1
100 TABLET ORAL 2 TIMES DAILY
Qty: 360 TABLET | Refills: 0 | Status: SHIPPED | OUTPATIENT
Start: 2022-12-30 | End: 2023-01-10

## 2023-01-07 ENCOUNTER — LAB (OUTPATIENT)
Dept: LAB | Facility: CLINIC | Age: 73
End: 2023-01-07
Payer: COMMERCIAL

## 2023-01-07 DIAGNOSIS — N18.32 STAGE 3B CHRONIC KIDNEY DISEASE (H): ICD-10-CM

## 2023-01-07 DIAGNOSIS — E11.29 TYPE 2 DIABETES MELLITUS WITH OTHER DIABETIC KIDNEY COMPLICATION, WITH LONG-TERM CURRENT USE OF INSULIN (H): ICD-10-CM

## 2023-01-07 DIAGNOSIS — Z79.4 TYPE 2 DIABETES MELLITUS WITH OTHER DIABETIC KIDNEY COMPLICATION, WITH LONG-TERM CURRENT USE OF INSULIN (H): ICD-10-CM

## 2023-01-07 LAB
ALBUMIN MFR UR ELPH: 21.4 MG/DL (ref 1–14)
CREAT UR-MCNC: 92.6 MG/DL
ERYTHROCYTE [DISTWIDTH] IN BLOOD BY AUTOMATED COUNT: 13.7 % (ref 10–15)
HBA1C MFR BLD: 7.3 % (ref 0–5.6)
HCT VFR BLD AUTO: 39.7 % (ref 35–47)
HGB BLD-MCNC: 12.9 G/DL (ref 11.7–15.7)
MCH RBC QN AUTO: 35.8 PG (ref 26.5–33)
MCHC RBC AUTO-ENTMCNC: 32.5 G/DL (ref 31.5–36.5)
MCV RBC AUTO: 110 FL (ref 78–100)
PLATELET # BLD AUTO: 135 10E3/UL (ref 150–450)
PROT/CREAT 24H UR: 0.23 MG/MG CR (ref 0–0.2)
PTH-INTACT SERPL-MCNC: 43 PG/ML (ref 15–65)
RBC # BLD AUTO: 3.6 10E6/UL (ref 3.8–5.2)
WBC # BLD AUTO: 7 10E3/UL (ref 4–11)

## 2023-01-07 PROCEDURE — 83036 HEMOGLOBIN GLYCOSYLATED A1C: CPT

## 2023-01-07 PROCEDURE — 80069 RENAL FUNCTION PANEL: CPT

## 2023-01-07 PROCEDURE — 83970 ASSAY OF PARATHORMONE: CPT

## 2023-01-07 PROCEDURE — 80061 LIPID PANEL: CPT

## 2023-01-07 PROCEDURE — 85027 COMPLETE CBC AUTOMATED: CPT

## 2023-01-07 PROCEDURE — 84156 ASSAY OF PROTEIN URINE: CPT

## 2023-01-07 PROCEDURE — 36415 COLL VENOUS BLD VENIPUNCTURE: CPT

## 2023-01-08 ENCOUNTER — MYC MEDICAL ADVICE (OUTPATIENT)
Dept: FAMILY MEDICINE | Facility: CLINIC | Age: 73
End: 2023-01-08

## 2023-01-08 DIAGNOSIS — E11.9 TYPE 2 DIABETES, HBA1C GOAL < 8% (H): Primary | ICD-10-CM

## 2023-01-09 ENCOUNTER — OFFICE VISIT (OUTPATIENT)
Dept: DERMATOLOGY | Facility: CLINIC | Age: 73
End: 2023-01-09
Payer: COMMERCIAL

## 2023-01-09 DIAGNOSIS — D22.9 MULTIPLE BENIGN NEVI: ICD-10-CM

## 2023-01-09 DIAGNOSIS — Z85.828 HISTORY OF NONMELANOMA SKIN CANCER: Primary | ICD-10-CM

## 2023-01-09 DIAGNOSIS — L82.1 SEBORRHEIC KERATOSES: ICD-10-CM

## 2023-01-09 DIAGNOSIS — L81.4 SOLAR LENTIGO: ICD-10-CM

## 2023-01-09 DIAGNOSIS — D18.01 CHERRY ANGIOMA: ICD-10-CM

## 2023-01-09 DIAGNOSIS — L82.0 SEBORRHEIC KERATOSIS, INFLAMED: ICD-10-CM

## 2023-01-09 DIAGNOSIS — L40.8 SEBOPSORIASIS: ICD-10-CM

## 2023-01-09 LAB
ALBUMIN SERPL-MCNC: 3.5 G/DL (ref 3.4–5)
ANION GAP SERPL CALCULATED.3IONS-SCNC: 4 MMOL/L (ref 3–14)
BUN SERPL-MCNC: 40 MG/DL (ref 7–30)
CALCIUM SERPL-MCNC: 8.9 MG/DL (ref 8.5–10.1)
CHLORIDE BLD-SCNC: 108 MMOL/L (ref 94–109)
CO2 SERPL-SCNC: 32 MMOL/L (ref 20–32)
CREAT SERPL-MCNC: 2.2 MG/DL (ref 0.52–1.04)
GFR SERPL CREATININE-BSD FRML MDRD: 23 ML/MIN/1.73M2
GLUCOSE BLD-MCNC: 204 MG/DL (ref 70–99)
PHOSPHATE SERPL-MCNC: 3 MG/DL (ref 2.5–4.5)
POTASSIUM BLD-SCNC: 4.4 MMOL/L (ref 3.4–5.3)
SODIUM SERPL-SCNC: 144 MMOL/L (ref 133–144)

## 2023-01-09 PROCEDURE — 17110 DESTRUCTION B9 LES UP TO 14: CPT | Performed by: DERMATOLOGY

## 2023-01-09 PROCEDURE — 99213 OFFICE O/P EST LOW 20 MIN: CPT | Mod: 25 | Performed by: DERMATOLOGY

## 2023-01-09 RX ORDER — CLOBETASOL PROPIONATE 0.5 MG/ML
SOLUTION TOPICAL
Qty: 50 ML | Refills: 11 | Status: SHIPPED | OUTPATIENT
Start: 2023-01-09 | End: 2023-03-25

## 2023-01-09 NOTE — PROGRESS NOTES
[facility] Dermatology Note  Encounter Date: Jan 9, 2023  Office Visit     Dermatology Problem List:  Last skin check 1/9/23, recommended yearly  1. 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 S/p ILK for hypertrophic scar in 2013  2. Scalp psoriasis:  - Clobetasol 0.05% solution, T gel     Social history: Daughter Nikki is an RN.  Family history: Negative for skin cancers.  ____________________________________________     Assessment & Plan:      # History of nonmelanoma skin cancer, no clinical evidence of recurrence.  - ABCDEs: Counseled ABCDEs of melanoma: Asymmetry, Border (irregularity), Color (not uniform, changes in color), Diameter (greater than 6 mm which is about the size of a pencil eraser), and Evolving (any changes in preexisting moles).  - Sun protection: Counseled SPF30+ sunscreen, UPF clothing, sun avoidance, tanning bed avoidance.   - Recommended regular skin checks.     # Seborrheic keratosis, symptomatic - back x 1. Based on the location and chronic irritation to this lesion, treatment with cryotherapy is warranted.   - See cryo procedure note.      # Sebopsoriasis: Chronic, stable. Not bothersome recently, does not need refills at this time.   - Continue Clairol Shimmer Lights shampoo.   - Continuing clobetasol solution PRN for flares. Refilled.     # Benign lesions: Multiple benign nevi, solar lentigos, seborrheic keratoses, stucco keratoses, cherry angiomas. Explained to patient benign nature of lesion. No treatment is necessary at this time unless the lesion changes or becomes symptomatic.   - ABCDEs of melanoma were discussed and self skin checks were advised.  - Sun precaution was advised including the use of sun screens of SPF 30 or higher, sun protective clothing, and avoidance of tanning beds.    Procedures Performed:   - Cryotherapy procedure note, location(s): back. After verbal consent  and discussion of risks and benefits including, but not limited to, dyspigmentation/scar, blister, and pain, 1 ISK(s) was(were) treated with 1-2 mm freeze border for 1-2 cycles with liquid nitrogen. Post cryotherapy instructions were provided.    Follow-up: 1 year(s) in-person for a skin check, or earlier for new or changing lesions    Staff and Scribe:     Scribe Disclosure:   I, Maulik Monroe, am serving as a scribe to document services personally performed by this physician, Dr. Donte Pappas, based on data collection and the provider's statements to me.     Provider Disclosure:   The documentation recorded by the scribe accurately reflects the services I personally performed and the decisions made by me.    Donte Pappas MD    Department of Dermatology  [facility]: Phone: [phone], Fax:[phone]  [facility]: Phone: [phone] Fax: [phone]  ____________________________________________    CC: Skin Check    HPI:  Ms. Yadi Iniguez is a(n) 72 year old female who presents today as a return patient for a skin check.     Last seen 1/11/22 by Dr. Barrios for a skin check. At that time, patient was recommended T/Gel shampoo for scalp psoriasis.     Today, she has no areas of concern. She does note a spot on the back that catches with clothing.    She is accompanied by her daughter.     The patient otherwise denies any new or concerning lesions. No bleeding, painful, pruritic, or changing lesions. They report a personal history of skin cancer. There is no family history of skin cancer. No history of immunosuppression. A history of indoor tanning (~50-60). They do use sunscreen and protective clothing when outdoors for sun protection. No occupational exposure to ultraviolet light or other forms of radiation. Patient is a retired facility  for [employer].  Health otherwise stable. No other skin concerns.         Labs Reviewed:  N/A    Physical Exam:  Vitals: There were no vitals taken for this visit.  SKIN: Full skin, which includes the head/face, both arms, chest, back, abdomen,both legs, genitalia and/or groin buttocks, digits and/or nails, was examined.  - Well healed scars at sites of previous NMSC, no repigmentation or nodularity noted.   - There are dome shaped bright red papules on the trunk and extremities.   - Multiple regular brown pigmented macules and papules are identified on the trunk and extremities.   - Scattered brown macules on sun exposed areas.  - There are waxy stuck on tan to brown papules on the trunk and extremities.    - Mild pink erythema and scale on the occipital scalp.   - There is a tan to brown waxy stuck on papule with surrounding erythema on the central upper back x 1.   - No other lesions of concern on areas examined.     Medications:  Current Outpatient Medications   Medication     ACETAMINOPHEN PO     alendronate (FOSAMAX) 70 MG tablet     amLODIPine (NORVASC) 10 MG tablet     aspirin 81 MG tablet     atorvastatin (LIPITOR) 40 MG tablet     blood glucose (NO BRAND SPECIFIED) test strip     blood glucose monitoring (NO BRAND SPECIFIED) meter device kit     calcium carbonate (OS-NATACHA) 1500 (600 Ca) MG tablet     carvedilol (COREG) 25 MG tablet     cholecalciferol (VITAMIN D3) 5000 units TABS tablet     clobetasol propionate 0.05 % LIQD     Continuous Blood Gluc Sensor (FREESTYLE MARI 14 DAY SENSOR) MISC     insulin aspart (NOVOLOG FLEXPEN) 100 UNIT/ML pen     insulin glargine U-300 (TOUJEO SOLOSTAR) 300 UNIT/ML (1 units dial) pen     insulin pen needle (32G X 4 MM) 32G X 4 MM miscellaneous     levothyroxine (TIROSINT) 150 MCG capsule     losartan (COZAAR) 25 MG tablet     Omega-3 Fatty Acids (OMEGA-3 FISH OIL) 300 MG CAPS     PARoxetine (PAXIL) 20 MG tablet     polyethylene glycol (MIRALAX) 17 GM/Dose powder     potassium chloride ER  (KLOR-CON M) 20 MEQ CR tablet     primidone (MYSOLINE) 50 MG tablet     torsemide (DEMADEX) 20 MG tablet     TRULICITY 1.5 MG/0.5ML pen     No current facility-administered medications for this visit.      Past Medical History:   Patient Active Problem List   Diagnosis     Hypothyroidism     Hypertension goal BP (blood pressure) < 140/90     Type 2 diabetes, HbA1C goal < 8% (H)     Hyperlipidemia LDL goal <100     Moderate major depression (H)     Incontinence of urine     Neuropathy in diabetes (H)     Eczema     Left lumbar radiculopathy     Health Care Home     CKD (chronic kidney disease) stage 3, GFR 30-59 ml/min (H)     Type 2 diabetes mellitus with other diabetic kidney complication, with long-term current use of insulin (H)     Osteoporosis     Morbid obesity (H)     Thrombocytopenia (H)     Chronic obstructive pulmonary disease, unspecified COPD type (H)     S/P lumbar fusion     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, with long-term current use of insulin, unspecified laterality (H)     Lumbar facet arthropathy     Lumbar spondylosis     Pulmonary hypertension (H)     Nonrheumatic aortic valve stenosis     Depression, major, recurrent, in partial remission (H)     DDD (degenerative disc disease), lumbar     Past Medical History:   Diagnosis Date     Arthritis 1975     Broken ribs 8/28/2016    3,4,5,6,7, left side     Cancer (H) 2013    Skin cancer     COPD (chronic obstructive pulmonary disease) (H) 08/2017     Depression      Depressive disorder 1988     Dysuria      Glycosuria      Hypertension 1980     Mixed incontinence urge and stress      Nonrheumatic aortic valve stenosis 7/25/2021     Other and unspecified hyperlipidemia      Right shoulder pain      Sleep apnea     cpap     Type II or unspecified type diabetes mellitus without mention of complication, uncontrolled      Uncomplicated asthma 2017     Unspecified hypothyroidism

## 2023-01-09 NOTE — PATIENT INSTRUCTIONS
Patient Education     Checking for Skin Cancer  You can find cancer early by checking your skin each month. There are 3 kinds of skin cancer. They are melanoma, basal cell carcinoma, and squamous cell carcinoma. Doing monthly skin checks is the best way to find new marks or skin changes. Follow the instructions below for checking your skin.   The ABCDEs of checking moles for melanoma   Check your moles or growths for signs of melanoma using ABCDE:   Asymmetry: the sides of the mole or growth don t match  Border: the edges are ragged, notched, or blurred  Color: the color within the mole or growth varies  Diameter: the mole or growth is larger than 6 mm (size of a pencil eraser)  Evolving: the size, shape, or color of the mole or growth is changing (evolving is not shown in the images below)    Checking for other types of skin cancer  Basal cell carcinoma or squamous cell carcinoma have symptoms such as:     A spot or mole that looks different from all other marks on your skin  Changes in how an area feels, such as itching, tenderness, or pain  Changes in the skin's surface, such as oozing, bleeding, or scaliness  A sore that does not heal  New swelling or redness beyond the border of a mole    Who s at risk?  Anyone can get skin cancer. But you are at greater risk if you have:   Fair skin, light-colored hair, or light-colored eyes  Many moles or abnormal moles on your skin  A history of sunburns from sunlight or tanning beds  A family history of skin cancer  A history of exposure to radiation or chemicals  A weakened immune system  If you have had skin cancer in the past, you are at risk for recurring skin cancer.   How to check your skin  Do your monthly skin checkups in front of a full-length mirror. Check all parts of your body, including your:   Head (ears, face, neck, and scalp)  Torso (front, back, and sides)  Arms (tops, undersides, upper, and lower armpits)  Hands (palms, backs, and fingers, including  under the nails)  Buttocks and genitals  Legs (front, back, and sides)  Feet (tops, soles, toes, including under the nails, and between toes)  If you have a lot of moles, take digital photos of them each month. Make sure to take photos both up close and from a distance. These can help you see if any moles change over time.   Most skin changes are not cancer. But if you see any changes in your skin, call your doctor right away. Only he or she can diagnose a problem. If you have skin cancer, seeing your doctor can be the first step toward getting the treatment that could save your life.   [employer] last reviewed this educational content on 4/1/2019 2000-2020 [employer]. [address] [place] [address], [place] [address]. All rights reserved. This information is not intended as a substitute for professional medical care. Always follow your healthcare professional's instructions.       When should I call my doctor?  If you are worsening or not improving, please, contact us or seek urgent care as noted below.     Who should I call with questions (adults)?  [facility] (adult and pediatric): [phone]  [facility] (adult): [phone]  For urgent needs outside of business hours call the [facility] at [phone] and ask for the dermatology resident on call to be paged  If this is a medical emergency and you are unable to reach an ER, Call [phone]    Who should I call with questions (pediatric)?  [facility]- Pediatric Dermatology  Dr. Yoon George, Dr. Paradise Lyons, Dr. Wanda Levin, JAZLYN Salvador, Dr. Caroline Slaughter, Dr. Rosemary Romo & Dr. Navi Hester  Non-urgent nurse triage line; [phone]- Nichelle and Aby RIVAS Care Coordinatoryobani Hope (/Complex ) [phone]    If you need a prescription refill, please contact your pharmacy. Refills are approved or denied by our  Physicians during normal business hours, Monday through Fridays  Per office policy, refills will not be granted if you have not been seen within the past year (or sooner depending on your child's condition)    Scheduling Information:  Pediatric Appointment Scheduling and Call Center [phone]  Radiology Scheduling- [phone]  Sedation Unit Scheduling- [phone]  [place] Scheduling- General [phone]; Pediatric Dermatology [phone]  Main  Services: [phone]  Azeri: [phone]  Belizean: [phone]  Hmong/Guatemalan/Wolof: [phone]  Preadmission Nursing Department Fax Number: [phone] (Fax all pre-operative paperwork to this number)    For urgent matters arising during evenings, weekends, or holidays that cannot wait for normal business hours please call [phone] and ask for the dermatology resident on call to be paged.

## 2023-01-09 NOTE — LETTER
1/9/2023         RE: Yadi Iniguez  [address]  [place] [address]        Dear Colleague,    Thank you for referring your patient, Yadi Iniguez, to the [facility]. Please see a copy of my visit note below.    [facility] Dermatology Note  Encounter Date: Jan 9, 2023  Office Visit     Dermatology Problem List:  Last skin check 1/9/23, recommended yearly  1. 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 S/p ILK for hypertrophic scar in 2013  2. Scalp psoriasis:  - Clobetasol 0.05% solution, T gel     Social history: Daughter Nikki is an RN.  Family history: Negative for skin cancers.  ____________________________________________     Assessment & Plan:      # History of nonmelanoma skin cancer, no clinical evidence of recurrence.  - ABCDEs: Counseled ABCDEs of melanoma: Asymmetry, Border (irregularity), Color (not uniform, changes in color), Diameter (greater than 6 mm which is about the size of a pencil eraser), and Evolving (any changes in preexisting moles).  - Sun protection: Counseled SPF30+ sunscreen, UPF clothing, sun avoidance, tanning bed avoidance.   - Recommended regular skin checks.     # Seborrheic keratosis, symptomatic - back x 1. Based on the location and chronic irritation to this lesion, treatment with cryotherapy is warranted.   - See cryo procedure note.      # Sebopsoriasis: Chronic, stable. Not bothersome recently, does not need refills at this time.   - Continue Clairol Shimmer Lights shampoo.   - Continuing clobetasol solution PRN for flares. Refilled.     # Benign lesions: Multiple benign nevi, solar lentigos, seborrheic keratoses, stucco keratoses, cherry angiomas. Explained to patient benign nature of lesion. No treatment is necessary at this time unless the lesion changes or becomes symptomatic.   - ABCDEs of melanoma were discussed and self skin  checks were advised.  - Sun precaution was advised including the use of sun screens of SPF 30 or higher, sun protective clothing, and avoidance of tanning beds.    Procedures Performed:   - Cryotherapy procedure note, location(s): back. After verbal consent and discussion of risks and benefits including, but not limited to, dyspigmentation/scar, blister, and pain, 1 ISK(s) was(were) treated with 1-2 mm freeze border for 1-2 cycles with liquid nitrogen. Post cryotherapy instructions were provided.    Follow-up: 1 year(s) in-person for a skin check, or earlier for new or changing lesions    Staff and Scribe:     Scribe Disclosure:   I, Maulik Monroe, am serving as a scribe to document services personally performed by this physician, Dr. Donte Pappas, based on data collection and the provider's statements to me.     Provider Disclosure:   The documentation recorded by the scribe accurately reflects the services I personally performed and the decisions made by me.    Donte Pappas MD    Department of Dermatology  [facility]: Phone: [phone], Fax:[phone]  [facility]: Phone: [phone] Fax: [phone]  ____________________________________________    CC: Skin Check    HPI:  Ms. Yadi Iniguez is a(n) 72 year old female who presents today as a return patient for a skin check.     Last seen 1/11/22 by Dr. Barrios for a skin check. At that time, patient was recommended T/Gel shampoo for scalp psoriasis.     Today, she has no areas of concern. She does note a spot on the back that catches with clothing.    She is accompanied by her daughter.     The patient otherwise denies any new or concerning lesions. No bleeding, painful, pruritic, or changing lesions. They report a personal history of skin cancer. There is no family history of skin cancer. No history of immunosuppression. A  history of indoor tanning (~50-60). They do use sunscreen and protective clothing when outdoors for sun protection. No occupational exposure to ultraviolet light or other forms of radiation. Patient is a retired facility  for [employer]. Health otherwise stable. No other skin concerns.         Labs Reviewed:  N/A    Physical Exam:  Vitals: There were no vitals taken for this visit.  SKIN: Full skin, which includes the head/face, both arms, chest, back, abdomen,both legs, genitalia and/or groin buttocks, digits and/or nails, was examined.  - Well healed scars at sites of previous NMSC, no repigmentation or nodularity noted.   - There are dome shaped bright red papules on the trunk and extremities.   - Multiple regular brown pigmented macules and papules are identified on the trunk and extremities.   - Scattered brown macules on sun exposed areas.  - There are waxy stuck on tan to brown papules on the trunk and extremities.    - Mild pink erythema and scale on the occipital scalp.   - There is a tan to brown waxy stuck on papule with surrounding erythema on the central upper back x 1.   - No other lesions of concern on areas examined.     Medications:  Current Outpatient Medications   Medication     ACETAMINOPHEN PO     alendronate (FOSAMAX) 70 MG tablet     amLODIPine (NORVASC) 10 MG tablet     aspirin 81 MG tablet     atorvastatin (LIPITOR) 40 MG tablet     blood glucose (NO BRAND SPECIFIED) test strip     blood glucose monitoring (NO BRAND SPECIFIED) meter device kit     calcium carbonate (OS-NATACHA) 1500 (600 Ca) MG tablet     carvedilol (COREG) 25 MG tablet     cholecalciferol (VITAMIN D3) 5000 units TABS tablet     clobetasol propionate 0.05 % LIQD     Continuous Blood Gluc Sensor (FREESTYLE MARI 14 DAY SENSOR) MISC     insulin aspart (NOVOLOG FLEXPEN) 100 UNIT/ML pen     insulin glargine U-300 (TOUJEO SOLOSTAR) 300 UNIT/ML (1 units dial) pen     insulin pen needle (32G X 4 MM) 32G X 4 MM  miscellaneous     levothyroxine (TIROSINT) 150 MCG capsule     losartan (COZAAR) 25 MG tablet     Omega-3 Fatty Acids (OMEGA-3 FISH OIL) 300 MG CAPS     PARoxetine (PAXIL) 20 MG tablet     polyethylene glycol (MIRALAX) 17 GM/Dose powder     potassium chloride ER (KLOR-CON M) 20 MEQ CR tablet     primidone (MYSOLINE) 50 MG tablet     torsemide (DEMADEX) 20 MG tablet     TRULICITY 1.5 MG/0.5ML pen     No current facility-administered medications for this visit.      Past Medical History:   Patient Active Problem List   Diagnosis     Hypothyroidism     Hypertension goal BP (blood pressure) < 140/90     Type 2 diabetes, HbA1C goal < 8% (H)     Hyperlipidemia LDL goal <100     Moderate major depression (H)     Incontinence of urine     Neuropathy in diabetes (H)     Eczema     Left lumbar radiculopathy     Health Care Home     CKD (chronic kidney disease) stage 3, GFR 30-59 ml/min (H)     Type 2 diabetes mellitus with other diabetic kidney complication, with long-term current use of insulin (H)     Osteoporosis     Morbid obesity (H)     Thrombocytopenia (H)     Chronic obstructive pulmonary disease, unspecified COPD type (H)     S/P lumbar fusion     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, with long-term current use of insulin, unspecified laterality (H)     Lumbar facet arthropathy     Lumbar spondylosis     Pulmonary hypertension (H)     Nonrheumatic aortic valve stenosis     Depression, major, recurrent, in partial remission (H)     DDD (degenerative disc disease), lumbar     Past Medical History:   Diagnosis Date     Arthritis 1975     Broken ribs 8/28/2016    3,4,5,6,7, left side     Cancer (H) 2013    Skin cancer     COPD (chronic obstructive pulmonary disease) (H) 08/2017     Depression      Depressive disorder 1988     Dysuria      Glycosuria      Hypertension 1980     Mixed incontinence urge and stress      Nonrheumatic aortic valve stenosis  7/25/2021     Other and unspecified hyperlipidemia      Right shoulder pain      Sleep apnea     cpap     Type II or unspecified type diabetes mellitus without mention of complication, uncontrolled      Uncomplicated asthma 2017     Unspecified hypothyroidism            Again, thank you for allowing me to participate in the care of your patient.        Sincerely,        Donte Pappas MD

## 2023-01-09 NOTE — NURSING NOTE
Yadi Iniguez's chief complaint for this visit includes:  Chief Complaint   Patient presents with     Skin Check     PCP: Sandy Ricci    Referring Provider:  No referring provider defined for this encounter.    There were no vitals taken for this visit.  Data Unavailable      No Known Allergies      Do you need any medication refills at today's visit? no    Sandy Fritz RN

## 2023-01-10 ENCOUNTER — OFFICE VISIT (OUTPATIENT)
Dept: FAMILY MEDICINE | Facility: CLINIC | Age: 73
End: 2023-01-10
Payer: COMMERCIAL

## 2023-01-10 ENCOUNTER — VIRTUAL VISIT (OUTPATIENT)
Dept: NEPHROLOGY | Facility: CLINIC | Age: 73
End: 2023-01-10
Payer: COMMERCIAL

## 2023-01-10 VITALS
HEART RATE: 88 BPM | BODY MASS INDEX: 39.28 KG/M2 | OXYGEN SATURATION: 95 % | DIASTOLIC BLOOD PRESSURE: 53 MMHG | RESPIRATION RATE: 17 BRPM | TEMPERATURE: 97.9 F | SYSTOLIC BLOOD PRESSURE: 101 MMHG | WEIGHT: 266 LBS

## 2023-01-10 VITALS
BODY MASS INDEX: 39.1 KG/M2 | SYSTOLIC BLOOD PRESSURE: 101 MMHG | HEIGHT: 69 IN | WEIGHT: 264 LBS | DIASTOLIC BLOOD PRESSURE: 58 MMHG

## 2023-01-10 DIAGNOSIS — E66.01 CLASS 2 SEVERE OBESITY WITH SERIOUS COMORBIDITY AND BODY MASS INDEX (BMI) OF 39.0 TO 39.9 IN ADULT, UNSPECIFIED OBESITY TYPE (H): ICD-10-CM

## 2023-01-10 DIAGNOSIS — N18.32 STAGE 3B CHRONIC KIDNEY DISEASE (H): ICD-10-CM

## 2023-01-10 DIAGNOSIS — F32.1 MODERATE MAJOR DEPRESSION (H): ICD-10-CM

## 2023-01-10 DIAGNOSIS — N18.32 STAGE 3B CHRONIC KIDNEY DISEASE (H): Primary | ICD-10-CM

## 2023-01-10 DIAGNOSIS — Z12.31 VISIT FOR SCREENING MAMMOGRAM: ICD-10-CM

## 2023-01-10 DIAGNOSIS — E66.01 MORBID OBESITY (H): ICD-10-CM

## 2023-01-10 DIAGNOSIS — E11.29 TYPE 2 DIABETES MELLITUS WITH OTHER DIABETIC KIDNEY COMPLICATION, WITH LONG-TERM CURRENT USE OF INSULIN (H): Primary | ICD-10-CM

## 2023-01-10 DIAGNOSIS — J44.9 CHRONIC OBSTRUCTIVE PULMONARY DISEASE, UNSPECIFIED COPD TYPE (H): ICD-10-CM

## 2023-01-10 DIAGNOSIS — D69.6 THROMBOCYTOPENIA, UNSPECIFIED (H): ICD-10-CM

## 2023-01-10 DIAGNOSIS — Z87.891 HISTORY OF TOBACCO USE: ICD-10-CM

## 2023-01-10 DIAGNOSIS — E78.5 HYPERLIPIDEMIA, UNSPECIFIED HYPERLIPIDEMIA TYPE: ICD-10-CM

## 2023-01-10 DIAGNOSIS — I10 BENIGN ESSENTIAL HYPERTENSION: ICD-10-CM

## 2023-01-10 DIAGNOSIS — E66.812 CLASS 2 SEVERE OBESITY WITH SERIOUS COMORBIDITY AND BODY MASS INDEX (BMI) OF 39.0 TO 39.9 IN ADULT, UNSPECIFIED OBESITY TYPE (H): ICD-10-CM

## 2023-01-10 DIAGNOSIS — Z79.4 TYPE 2 DIABETES MELLITUS WITH OTHER DIABETIC KIDNEY COMPLICATION, WITH LONG-TERM CURRENT USE OF INSULIN (H): ICD-10-CM

## 2023-01-10 DIAGNOSIS — Z79.4 TYPE 2 DIABETES MELLITUS WITH OTHER DIABETIC KIDNEY COMPLICATION, WITH LONG-TERM CURRENT USE OF INSULIN (H): Primary | ICD-10-CM

## 2023-01-10 DIAGNOSIS — G25.0 ESSENTIAL TREMOR: ICD-10-CM

## 2023-01-10 DIAGNOSIS — I27.20 PULMONARY HYPERTENSION (H): ICD-10-CM

## 2023-01-10 DIAGNOSIS — E03.9 HYPOTHYROIDISM, UNSPECIFIED TYPE: ICD-10-CM

## 2023-01-10 DIAGNOSIS — E11.29 TYPE 2 DIABETES MELLITUS WITH OTHER DIABETIC KIDNEY COMPLICATION, WITH LONG-TERM CURRENT USE OF INSULIN (H): ICD-10-CM

## 2023-01-10 LAB
CHOLEST SERPL-MCNC: 193 MG/DL
HDLC SERPL-MCNC: 39 MG/DL
LDLC SERPL CALC-MCNC: 74 MG/DL
NONHDLC SERPL-MCNC: 154 MG/DL
TRIGL SERPL-MCNC: 400 MG/DL

## 2023-01-10 PROCEDURE — 99214 OFFICE O/P EST MOD 30 MIN: CPT | Mod: 24 | Performed by: INTERNAL MEDICINE

## 2023-01-10 PROCEDURE — 99214 OFFICE O/P EST MOD 30 MIN: CPT | Performed by: FAMILY MEDICINE

## 2023-01-10 PROCEDURE — 96127 BRIEF EMOTIONAL/BEHAV ASSMT: CPT | Performed by: FAMILY MEDICINE

## 2023-01-10 RX ORDER — TORSEMIDE 20 MG/1
TABLET ORAL
Qty: 270 TABLET | Refills: 3
Start: 2023-01-10

## 2023-01-10 RX ORDER — DULAGLUTIDE 1.5 MG/.5ML
1.5 INJECTION, SOLUTION SUBCUTANEOUS
Qty: 6 ML | Refills: 1 | Status: SHIPPED | OUTPATIENT
Start: 2023-01-10 | End: 2023-06-27

## 2023-01-10 RX ORDER — INSULIN GLARGINE 300 U/ML
40 INJECTION, SOLUTION SUBCUTANEOUS AT BEDTIME
Qty: 15 ML | Refills: 1 | Status: SHIPPED | OUTPATIENT
Start: 2023-01-10 | End: 2023-06-26

## 2023-01-10 RX ORDER — LEVOTHYROXINE SODIUM 13 UG/1
150 CAPSULE ORAL
Qty: 90 CAPSULE | Refills: 2 | Status: SHIPPED | OUTPATIENT
Start: 2023-01-10 | End: 2023-02-15

## 2023-01-10 RX ORDER — PRIMIDONE 50 MG/1
100 TABLET ORAL 2 TIMES DAILY
Qty: 360 TABLET | Refills: 1 | Status: SHIPPED | OUTPATIENT
Start: 2023-01-10 | End: 2023-08-01

## 2023-01-10 RX ORDER — PAROXETINE 20 MG/1
TABLET, FILM COATED ORAL
Qty: 180 TABLET | Refills: 3 | Status: SHIPPED | OUTPATIENT
Start: 2023-01-10 | End: 2024-03-12

## 2023-01-10 RX ORDER — INSULIN ASPART 100 [IU]/ML
INJECTION, SOLUTION INTRAVENOUS; SUBCUTANEOUS
Qty: 75 ML | Refills: 1 | Status: SHIPPED | OUTPATIENT
Start: 2023-01-10 | End: 2023-06-27

## 2023-01-10 RX ORDER — POTASSIUM CHLORIDE 1500 MG/1
20 TABLET, EXTENDED RELEASE ORAL DAILY
Qty: 90 TABLET | Refills: 3
Start: 2023-01-10 | End: 2024-03-07

## 2023-01-10 RX ORDER — CARVEDILOL 25 MG/1
TABLET ORAL
Qty: 180 TABLET | Refills: 3 | Status: CANCELLED | OUTPATIENT
Start: 2023-01-10

## 2023-01-10 RX ORDER — AMLODIPINE BESYLATE 10 MG/1
10 TABLET ORAL DAILY
Qty: 90 TABLET | Refills: 3 | Status: CANCELLED | OUTPATIENT
Start: 2023-01-10

## 2023-01-10 RX ORDER — ATORVASTATIN CALCIUM 40 MG/1
40 TABLET, FILM COATED ORAL DAILY
Qty: 90 TABLET | Refills: 2 | Status: CANCELLED | OUTPATIENT
Start: 2023-01-10

## 2023-01-10 RX ORDER — LEVOTHYROXINE SODIUM 150 UG/1
150 TABLET ORAL DAILY
Qty: 90 TABLET | Refills: 3 | Status: SHIPPED | OUTPATIENT
Start: 2023-01-10 | End: 2024-03-12

## 2023-01-10 ASSESSMENT — PATIENT HEALTH QUESTIONNAIRE - PHQ9
SUM OF ALL RESPONSES TO PHQ QUESTIONS 1-9: 6
10. IF YOU CHECKED OFF ANY PROBLEMS, HOW DIFFICULT HAVE THESE PROBLEMS MADE IT FOR YOU TO DO YOUR WORK, TAKE CARE OF THINGS AT HOME, OR GET ALONG WITH OTHER PEOPLE: NOT DIFFICULT AT ALL
SUM OF ALL RESPONSES TO PHQ QUESTIONS 1-9: 6

## 2023-01-10 ASSESSMENT — PAIN SCALES - GENERAL: PAINLEVEL: MILD PAIN (2)

## 2023-01-10 NOTE — PROGRESS NOTES
Yadi is a 72 year old who is being evaluated via a billable video visit.      How would you like to obtain your AVS? MyChart  If the video visit is dropped, the invitation should be resent by: Text to cell phone: [phone]  Will anyone else be joining your video visit? Yes: Nikki daughter. How would they like to receive their invitation? Text to cell phone: [phone]    Sofi Salazar VF      01/10/23     I was asked to see this patient by Dr. Ricci for evaluation of CKD.     CC: CKD    HPI: Yadi Iniguez is a 72 year old female who presents for evaluation of CKD.  Initial visit March 2022:  Ms. Yao's hx is significant for COPD, hypertension, AV stenosis, depression, DM, hypothyroidism, hyperlipidemia, tobacco hx, CHF/pulmonary hypertension. On review of her creatinine levels, there has been much variability:  - at 0.6 in 2008  - SUZE to 1.4 in Jan 2010 but improved to 0.76 in April 2011  - SUZE to 1.82 in Jan 2017 but improved to ~1 in Sep 2017  - SUZE to 1.45 in April 2018 but improved to 1.2 in Jan 2021  - SUZE to 1.81 in Sept 2021 but more recently 1.5-1.6 since.   She has had some albuminuria dating back to 2010. Fenofibrate therapy since 2018. Currently takes torsemide 20 mg AM and 20 mg PM - followed by cardiology clinic at [facility] for CHF. Dx with diabetes in her 40s.     - History of Hematuria: No  - Swelling: hx but none at this time. Breathing is comfortable. Weight recently 266-271 lbs. Torsemide 20 mg BID at this time. No lighhteadedness/dizziness at this time.   - Hx of UTIs: no  - Hx of stones: no  - Rashes/Joint pain: no rash; joint pain - generalized  - Family hx of kidney disease: no  - NSAID use: only tylenol    07/12/22: video visit. At the time of her initial visit I did a renal ultrasound which showed a 16 mm kidney lesion. MRI was then done which did not show any kidney lesion. We stopped the fenofibrate at that visit as well. She was also noted to have a monoclonal  gammopathy - was seen by hematology in May - they have plans to monitor with follow-up 4 months later. Torsemide was increased a month ago when weight was increasing and becoming more symptomatic. Currently on torsemide 40 mg twice a day. Weight is stable - 269-272 lbs. Last month she had gotten up to 278 lbs. No swelling currently. Breathing is comfortable. No lightheadedness/dizziness. Blood pressure has been 135/64, 128/56, 141/66, 125/72. 90/58 today in clinic. She felt a little tired. NO lighhteadedness on standing. No orthopnea.     01/10/23: video visit. Seen by cardiology at [facility] yesterday - no changes made to her medications but the cardiologist notes that she would be in favor of trialing a lower dose of torsemide if needed. Was thirsty at the time of the lab test. No swelling. Breathing is comfortable. BP has been 101/-120/58-80. Follows with hematology for MGUS.     ACETAMINOPHEN PO, Take 500 mg by mouth 2 times daily 2 tablets twice daily  alendronate (FOSAMAX) 70 MG tablet, TAKE 1 TABLET (70 MG) BY MOUTH EVERY 7 DAYS. TAKE 60 MINUTES BEFORE MORNING MEAL WITH 8 OZ. OF WATER. REMAIN UPRIGHT FOR 30 MINUTES.  amLODIPine (NORVASC) 10 MG tablet, TAKE 1 TABLET (10 MG) BY MOUTH DAILY  aspirin 81 MG tablet, Take 1 tablet (81 mg) by mouth daily  atorvastatin (LIPITOR) 40 MG tablet, Take 1 tablet (40 mg) by mouth daily  blood glucose (NO BRAND SPECIFIED) test strip, Use to test blood sugar 3 times daily or as directed./ Brand per insurance  blood glucose monitoring (NO BRAND SPECIFIED) meter device kit, Use to test blood sugar 3 times daily or as directed./ Brand per insurance  calcium carbonate (OS-NATACHA) 1500 (600 Ca) MG tablet, Take 600 mg by mouth daily   carvedilol (COREG) 25 MG tablet, TAKE 1 TABLET (25 MG) BY MOUTH 2 TIMES DAILY WITH MEALS  cholecalciferol (VITAMIN D3) 5000 units TABS tablet, Take 2 tablets by mouth daily   clobetasol (TEMOVATE) 0.05 % external solution, Apply thin layer at  "nighttime before bed as needed for scalp pruritus or scale  clobetasol propionate 0.05 % LIQD, Externally apply 1 dose. topically 2 times daily as needed  Continuous Blood Gluc Sensor (FREESTYLE MARI 14 DAY SENSOR) MISC, USE 1 DEVICE EVERY 14 DAYS AS DIRECTED  insulin pen needle (32G X 4 MM) 32G X 4 MM miscellaneous, Use 3 pen needles daily or as directed.  Per insurance and patient preference  losartan (COZAAR) 25 MG tablet, Take 0.5 tablets by mouth daily  Omega-3 Fatty Acids (OMEGA-3 FISH OIL) 300 MG CAPS, Take 1,000 mg by mouth daily  polyethylene glycol (MIRALAX) 17 GM/Dose powder, Take 1 packet by mouth  potassium chloride ER (KLOR-CON M) 20 MEQ CR tablet, Take 1 tablet by mouth 2 times daily  torsemide (DEMADEX) 20 MG tablet, Take 40 mg QAM and 20 mg QPM    No current facility-administered medications on file prior to visit.      Exam:  /58   Ht 1.753 m (5' 9\")   Wt 119.7 kg (264 lb)   BMI 38.99 kg/m    GENERAL APPEARANCE: alert and no distress  Breathing appears nonlabored.     Results:  No visits with results within 1 Day(s) from this visit.   Latest known visit with results is:   Lab on 01/07/2023   Component Date Value Ref Range Status     Parathyroid Hormone Intact 01/07/2023 43  15 - 65 pg/mL Final     Total Protein Urine mg/dL 01/07/2023 21.4 (H)  1.0 - 14.0 mg/dL Final    The reference ranges have not been established in urine protein. The results should be integrated into the clinical context for interpretation.     Total Protein UR MG/MG CR 01/07/2023 0.23 (H)  0.00 - 0.20 mg/mg Cr Final     Creatinine Urine mg/dL 01/07/2023 92.6  mg/dL Final    The reference ranges have not been established in urine creatinine. The results should be integrated into the clinical context for interpretation.     Sodium 01/07/2023 144  133 - 144 mmol/L Final     Potassium 01/07/2023 4.4  3.4 - 5.3 mmol/L Final     Chloride 01/07/2023 108  94 - 109 mmol/L Final     Carbon Dioxide (CO2) 01/07/2023 32  20 - 32 " mmol/L Final     Anion Gap 01/07/2023 4  3 - 14 mmol/L Final     Urea Nitrogen 01/07/2023 40 (H)  7 - 30 mg/dL Final     Creatinine 01/07/2023 2.20 (H)  0.52 - 1.04 mg/dL Final     Calcium 01/07/2023 8.9  8.5 - 10.1 mg/dL Final     Glucose 01/07/2023 204 (H)  70 - 99 mg/dL Final     Albumin 01/07/2023 3.5  3.4 - 5.0 g/dL Final     Phosphorus 01/07/2023 3.0  2.5 - 4.5 mg/dL Final     GFR Estimate 01/07/2023 23 (L)  >60 mL/min/1.73m2 Final    Effective December 21, 2021 eGFRcr in adults is calculated using the 2021 CKD-EPI creatinine equation which includes age and gender (Mak et al., NE, DOI: 10.1056/CMKQnh8146817)     Hemoglobin A1C 01/07/2023 7.3 (H)  0.0 - 5.6 % Final    Normal <5.7%   Prediabetes 5.7-6.4%    Diabetes 6.5% or higher     Note: Adopted from ADA consensus guidelines.     WBC Count 01/07/2023 7.0  4.0 - 11.0 10e3/uL Final     RBC Count 01/07/2023 3.60 (L)  3.80 - 5.20 10e6/uL Final     Hemoglobin 01/07/2023 12.9  11.7 - 15.7 g/dL Final     Hematocrit 01/07/2023 39.7  35.0 - 47.0 % Final     MCV 01/07/2023 110 (H)  78 - 100 fL Final     MCH 01/07/2023 35.8 (H)  26.5 - 33.0 pg Final     MCHC 01/07/2023 32.5  31.5 - 36.5 g/dL Final     RDW 01/07/2023 13.7  10.0 - 15.0 % Final     Platelet Count 01/07/2023 135 (L)  150 - 450 10e3/uL Final     Cholesterol 01/07/2023 193  <200 mg/dL Final     Triglycerides 01/07/2023 400 (H)  <150 mg/dL Final     Direct Measure HDL 01/07/2023 39 (L)  >=50 mg/dL Final     LDL Cholesterol Calculated 01/07/2023 74  <=100 mg/dL Final     Non HDL Cholesterol 01/07/2023 154 (H)  <130 mg/dL Final        Lab results were reviewed and interpreted.       Assessment/Plan:   Stage 3b chronic kidney disease (H)  AT risk for CKD in the setting of diabetes, hypertension, CHF on diuretics, tobacco hx, obesity as a potential risk for secondary FSGS, and also fenofibrate use. She has no hematuria which is reassuring, UPCR is 0.24 g/g which is reassuring as well - likely mild proteinuria  secondary to obesity, diabetes, arterionephrosclerosis. We stopped fenofibrate at her previous visit - no improvement in creatinine seen but also had an increase in her torsemide since last visit which may have caused the lack of improvement. Would like to avoid fenofibrate going forward. At this time she is euvolemic (no edema, no orthopnea, no SOB with exertion, weight is improved from last month) - will trial decreasing the torsemide to 40 mg AM and 20 mg PM. Educated to avoid NSAIDs. Educated on risk of cardiorenal physiology - want to avoid volume overload for her CHF but also avoid dehydration to avoid SUZE and the potential need to adjust diuretics to keep this balanced. On max lisinopril. Ideally would be on SGLT2 inhibitor but with her GFR so close to 30 and the likelihood of it dropping below 30 once on SGLT2 inhibitor, will hold on starting this for the time being.   - lower torsemide to 40 AM and 20 PM  - Labs in 1 week to assure improvement.     CHF: as noted above, decreasing torsemide to 40 AM and 20 PM. Will decrease potassium supplement to once a day with this change as well. Repeat labs in 1 weeks.     DM: last A1C 7.3%     Hypertension: blood pressure goal is 110-130 systolic/<80 diastolic. She is actually less than 110 quite a bit which may be leading to diminished renal perfusion at times. Lowering torsemide today. ASked them to monitor pressures and update if they are still seeing pressures less than 110 as would consider lowering anti-hypertensives if this is the case.      Hx of tobacco: at risk for secondary FSGS - now away from tobacco which is great to hear    Obesity: educated on benefits of weight loss/healthy lifestyle     Abnormal kidney ultrasound: ultrasound showed a concerning lesion but then the MRI was normal. Reviewed with Radiology July 2022 and they do not feel follow-up needed.     Monoclonal gammopathy: seen by hematology. Has thrombocytopenia as well - asked them to discuss  with hematology. Not anemic but MCV elevated so will get B12 and folate with upcoming labs.     Patient Instructions   1. Lower the torsemide to 40 mg AM and 20 mg PM.   2. Lower the potassium to just 20 meq once a day  3. Rpeat labs in 1 week to assure improved and potassium is stable.   4. Labs and follow-up in 3 months       [id number] AM video visit via YESSICA Preston,

## 2023-01-10 NOTE — PATIENT INSTRUCTIONS
Lower the torsemide to 40 mg AM and 20 mg PM.   Lower the potassium to just 20 meq once a day  Rpeat labs in 1 week to assure improved and potassium is stable.   Labs and follow-up in 3 months

## 2023-01-10 NOTE — PROGRESS NOTES
"  Assessment & Plan     Type 2 diabetes mellitus with other diabetic kidney complication, with long-term current use of insulin (H)  Consider MTM if agreeable for further recommendations, maybe jardiance/  - insulin glargine U-300 (TOUJEO SOLOSTAR) 300 UNIT/ML (1 units dial) pen; Inject 40 Units Subcutaneous At Bedtime  - insulin aspart (NOVOLOG FLEXPEN) 100 UNIT/ML pen; INJECT SUBCUTANEOUSLY 27 UNITS BEFORE BREAKFAST, 5 UNITS BEFORE LUNCH, 25 UNITS BEFORE DINNER.  (MAX DOSE 70 UNITS/DAY FOR ADJUSTMENT)    Benign essential hypertension  At goal on meds, sees cards    Morbid obesity (H)  Contributes to diabetes/htn/elev cholesterol    Stage 3b chronic kidney disease (H)  Most recent in CKd4 range but she had limited her fluid intake,  Has follow-up with nephrology    Hyperlipidemia, unspecified hyperlipidemia type  Per cards, LDL looks good but trigs high    Hypothyroidism, unspecified type  Stable on meds  - levothyroxine (TIROSINT) 150 MCG capsule; Take 150 mcg by mouth daily (with breakfast)  - levothyroxine (SYNTHROID/LEVOTHROID) 150 MCG tablet; Take 1 tablet (150 mcg) by mouth daily    Moderate major depression (H)  Stable on meds  - PARoxetine (PAXIL) 20 MG tablet; TAKE 1 TABLET (20 MG) BY MOUTH 2 TIMES DAILY    Chronic obstructive pulmonary disease, unspecified COPD type (H)  Per pulm, it is stable    Essential tremor  Refill and follow-up with neuro since worsening  - primidone (MYSOLINE) 50 MG tablet; Take 2 tablets (100 mg) by mouth 2 times daily  - Adult Neurology  Referral; Future    Pulmonary hypertension (H)  Per cards.     Thrombocytopenia, unspecified (H)  Evaluated by mirna a year or so ago , most recent cbc stable    Visit for screening mammogram  Pt to schedule  - MA SCREENING DIGITAL BILAT - Future  (s+30); Future             BMI:   Estimated body mass index is 39.28 kg/m  as calculated from the following:    Height as of 7/12/22: 1.753 m (5' 9\").    Weight as of this encounter: 120.7 kg " (266 lb).   Weight management plan: Discussed healthy diet and exercise guidelines  Blood sugar testing frequency justification:  Adjustment of medication(s)      No follow-ups on file.    Sandy Ricci MD  [facility]    Adelina Grullon is a 72 year old, presenting for the following health issues:  Diabetes, Hypertension, and Lipids      History of Present Illness       Diabetes:   She presents for follow up of diabetes.  She is checking home blood glucose four or more times daily. She checks blood glucose before and after meals.  Blood glucose is sometimes over 200 and never under 70. She is aware of hypoglycemia symptoms including shakiness, dizziness, weakness and lethargy. She has no concerns regarding her diabetes at this time.  She is having numbness in feet and blurry vision.         She eats 2-3 servings of fruits and vegetables daily.She consumes 0 sweetened beverage(s) daily.She exercises with enough effort to increase her heart rate 9 or less minutes per day.  She exercises with enough effort to increase her heart rate 3 or less days per week.   She is taking medications regularly.       Hyperlipidemia Follow-Up      Are you regularly taking any medication or supplement to lower your cholesterol?   Yes- Atorvastatin     Are you having muscle aches or other side effects that you think could be caused by your cholesterol lowering medication?  No    Hypertension Follow-up      Do you check your blood pressure regularly outside of the clinic? Yes     Are you following a low salt diet? Yes    Are your blood pressures ever more than 140 on the top number (systolic) OR more   than 90 on the bottom number (diastolic), for example 140/90? No    Pt with diabetes on insulin.  Not quite at goal on meds. Morning blood sugars are 220.    Consider increasing insulin, consider jardiance. Consider MTM consult considering she is in a lot of medications.     Pt with HTN, at goal on meds  Pt  with CKD3B.  Now down in ckd4 range but was restricting fluids when she was fasting. She has appointment with nephrology  Pt with elevated cholesterol on statin nearly to goal at < 70. She has elevated triglycerides. Tricor was stopped due to kidney concerns.  But now triglycerides high and not controlled with fish oil. Seeing cards for this    Pt with hypothyroidism at goal on meds  Pt with depression controlled on paxil.      Pt with essential tremor. gettng worse  Would like referral to neurology as already on primidone     Copd stable, sees pulm      Review of Systems   Constitutional, HEENT, cardiovascular, pulmonary, gi and gu systems are negative, except as otherwise noted.      Objective    /53   Pulse 88   Temp 97.9  F (36.6  C) (Tympanic)   Resp 17   Wt 120.7 kg (266 lb)   SpO2 95%   BMI 39.28 kg/m    Body mass index is 39.28 kg/m .  Physical Exam   GENERAL: healthy, alert and no distress  NECK: no adenopathy, no asymmetry, masses, or scars and thyroid normal to palpation  RESP: lungs clear to auscultation - no rales, rhonchi or wheezes  CV: regular rates and rhythm, normal S1 S2, no S3 or S4, grade 2/6 systolic  murmur heard best over the aorta, peripheral pulses strong and no peripheral edema  ABDOMEN: soft, nontender, no hepatosplenomegaly, no masses and bowel sounds normal    No results found for this or any previous visit (from the past 24 hour(s)).

## 2023-01-11 ENCOUNTER — TELEPHONE (OUTPATIENT)
Dept: NEPHROLOGY | Facility: CLINIC | Age: 73
End: 2023-01-11

## 2023-01-11 NOTE — CONFIDENTIAL NOTE
Left Voicemail (1st Attempt) for the patient to call back and schedule the following:    Appointment type: return  Provider:   Return date: 04/10/2023  Specialty phone number: [phone]  Additional appointment(s) needed: Labs prior  Additonal Notes: none        Luz Elena uribe Procedure   Cardiology, Nephrology, Rheumatology, GI, Pulmonology, Infectious Disease Specialties   [facility]

## 2023-01-19 ENCOUNTER — TELEPHONE (OUTPATIENT)
Dept: ORTHOPEDICS | Facility: CLINIC | Age: 73
End: 2023-01-19
Payer: COMMERCIAL

## 2023-01-19 NOTE — TELEPHONE ENCOUNTER
[facility] Call Center    Phone Message    May a detailed message be left on voicemail: yes     Reason for Call: Other: Pt daughter has been trying to reach Dr Sierra via my chart and is not able to connect that way she said his name fell off and my chart techs told her to call and ask care team to be put on. Pt daughter is trying to schedule an oblation      Action Taken: Other: ortho     Travel Screening: Not Applicable

## 2023-01-20 NOTE — TELEPHONE ENCOUNTER
Daughter informed that Dr. Sierra would not recommend RFA procedure based on second MBB pain diary results. They will need to schedule another follow-up appt to discuss other treatment options at this time. Pt scheduled for 3/24/23 at 9am for appt.      Dwayne Sierra MD  You; Eufemia Ga RN 18 hours ago (4:32 PM)     [facility]  She only had 50% improvement in symptoms. Does not meet 80% threshold to proceed with RFA.         Carin Bearden, RNCC  Neurology/Neurosurgery

## 2023-01-27 DIAGNOSIS — I50.9 HEART FAILURE, UNSPECIFIED (H): Primary | ICD-10-CM

## 2023-01-28 ENCOUNTER — LAB (OUTPATIENT)
Dept: LAB | Facility: CLINIC | Age: 73
End: 2023-01-28
Payer: COMMERCIAL

## 2023-01-28 DIAGNOSIS — N18.32 STAGE 3B CHRONIC KIDNEY DISEASE (H): ICD-10-CM

## 2023-01-28 DIAGNOSIS — I50.9 HEART FAILURE, UNSPECIFIED (H): ICD-10-CM

## 2023-01-28 LAB
FOLATE SERPL-MCNC: 3.7 NG/ML (ref 4.6–34.8)
VIT B12 SERPL-MCNC: 412 PG/ML (ref 232–1245)

## 2023-01-28 PROCEDURE — 82746 ASSAY OF FOLIC ACID SERUM: CPT

## 2023-01-28 PROCEDURE — 80069 RENAL FUNCTION PANEL: CPT

## 2023-01-28 PROCEDURE — 82607 VITAMIN B-12: CPT

## 2023-01-28 PROCEDURE — 36415 COLL VENOUS BLD VENIPUNCTURE: CPT

## 2023-01-30 LAB
ALBUMIN SERPL-MCNC: 3.2 G/DL (ref 3.4–5)
ANION GAP SERPL CALCULATED.3IONS-SCNC: 7 MMOL/L (ref 3–14)
BUN SERPL-MCNC: 32 MG/DL (ref 7–30)
CALCIUM SERPL-MCNC: 9 MG/DL (ref 8.5–10.1)
CHLORIDE BLD-SCNC: 99 MMOL/L (ref 94–109)
CO2 SERPL-SCNC: 33 MMOL/L (ref 20–32)
CREAT SERPL-MCNC: 1.68 MG/DL (ref 0.52–1.04)
GFR SERPL CREATININE-BSD FRML MDRD: 32 ML/MIN/1.73M2
GLUCOSE BLD-MCNC: 253 MG/DL (ref 70–99)
PHOSPHATE SERPL-MCNC: 4 MG/DL (ref 2.5–4.5)
POTASSIUM BLD-SCNC: 4.1 MMOL/L (ref 3.4–5.3)
SODIUM SERPL-SCNC: 139 MMOL/L (ref 133–144)

## 2023-02-07 ENCOUNTER — ANCILLARY PROCEDURE (OUTPATIENT)
Dept: MAMMOGRAPHY | Facility: CLINIC | Age: 73
End: 2023-02-07
Attending: FAMILY MEDICINE
Payer: COMMERCIAL

## 2023-02-07 DIAGNOSIS — R92.8 ABNORMAL FINDINGS ON DIAGNOSTIC IMAGING OF BREAST: ICD-10-CM

## 2023-02-07 PROCEDURE — G0279 TOMOSYNTHESIS, MAMMO: HCPCS | Performed by: RADIOLOGY

## 2023-02-07 PROCEDURE — 77066 DX MAMMO INCL CAD BI: CPT | Performed by: RADIOLOGY

## 2023-02-15 DIAGNOSIS — Z79.4 TYPE 2 DIABETES MELLITUS WITH OTHER DIABETIC KIDNEY COMPLICATION, WITH LONG-TERM CURRENT USE OF INSULIN (H): ICD-10-CM

## 2023-02-15 DIAGNOSIS — E11.29 TYPE 2 DIABETES MELLITUS WITH OTHER DIABETIC KIDNEY COMPLICATION, WITH LONG-TERM CURRENT USE OF INSULIN (H): ICD-10-CM

## 2023-02-15 DIAGNOSIS — E53.8 FOLATE DEFICIENCY: Primary | ICD-10-CM

## 2023-02-15 RX ORDER — FOLIC ACID 1 MG/1
1 TABLET ORAL DAILY
Qty: 90 TABLET | Refills: 0 | Status: SHIPPED | OUTPATIENT
Start: 2023-02-15 | End: 2023-05-09

## 2023-02-15 RX ORDER — FLASH GLUCOSE SENSOR
KIT MISCELLANEOUS
Qty: 2 EACH | Refills: 11 | Status: SHIPPED | OUTPATIENT
Start: 2023-02-15 | End: 2024-01-25

## 2023-03-19 ENCOUNTER — MYC MEDICAL ADVICE (OUTPATIENT)
Dept: NEPHROLOGY | Facility: CLINIC | Age: 73
End: 2023-03-19
Payer: COMMERCIAL

## 2023-03-19 DIAGNOSIS — N18.32 STAGE 3B CHRONIC KIDNEY DISEASE (H): Primary | ICD-10-CM

## 2023-03-23 NOTE — PROGRESS NOTES
PM&R Follow-Up Visit -     Date of Initial Visit: 05/14/2021  LOV: 11/18/2022 (procedure visit)  TD: 3/24/2023     Recall: Yadi Iniguez is a 72 year old female with history of CKD, COPD, DM-II, s/p L4-5 PLIF who follows up for chronic low back pain    INTERVAL HISTORY:  Patient was last seen November 18, 2022 for a second diagnostic medial branch block at the L5/S1 level.  She is coming today by her daughter, Nikki.     Overall, patient states that she had excellent relief around 95% from the first diagnostic block; however, following the second she did not have significant benefit.  Patient/family unclear why there is such a disparity in the blocks.  Otherwise, her symptoms are relatively stable and unchanged.  She continues to have functionally limiting lumbosacral pain has bilateral.  Her pain is worse when standing or walking.  She tends to lean forward, needs to use her walker or other support to help alleviate some of her symptoms.  Pain score 6/10 when standing today, 0/10 when seated and at rest.    RECALL HISTORY OF PRESENT ILLNESS:  Yadi Iniguez is a 70 year old female who presents with a chief complaint of chronic low back pain. Accompanied by her daughter, Joanna today.     She has a long-standing history of chronic low back pain with progressive, insidious worsening over the years. She underwent L4-5 spinal fusion surgery in November 2018 by Dr. Hill.  She states that following the surgery she had improved posture and was able to stand upright better for a period of time, however, her pain and symptoms never significantly improved.  She continues to be quite functionally limited in mobility and ADLs with decreased quality of life.  She has poor standing and walking tolerance.  She ambulates with a wheeled walker for short distances and uses a manual wheelchair or scooter for longer distances or in stores, respectively.  Her daughter helps with many home needs.  She also has a number of other  comorbidities which she does believe contributes to her overall deconditioning.    The pain is localized to the low lumbosacral region - relatively equal on both sides with radiating pain the lateral sides (flanks) on occasion but without radicular pain down the legs; she reports numbness/tingling in her feet attributed to diabetic neuropathy; described as constant when standing and dull, intense in nature. Pain is reported as 8-9/10 today when standing. Symptoms are worse when upright whether standing or walking (she can stand or walk for one minute before needing to sit or rest due to pain); and improved with sitting or resting. she does not report pain-related sleep disturbance.     Functional limitations: Quite functionally limited with mobility and ADLs.     PRIOR INJURIES/TREATMENT:   Ice/Heat: none  Physical Therapy:   No recent PT in the last x2-3 years      - Current Pain Medications -   Tylenol 1000mg TID scheduled    - Prior/Trialed Pain Medications -   Gabapentin 300 BID - caused fatigue     Prior Procedures:   Date    Procedure   Improvement (%)  09/23/22 B L5-S1 MBB#1 ~95%  11/18/22 B L5-S1 MBB#2 <80%            Prior Related Surgery:   11/5/2018 -  L4-5 fusion (Dr. Hill)  Other (acupuncture, OMT, CMM, TENS, DME, etc.):   DME - Wheeled walker at home. Effectively does not ambulate much outside of home. Manual wheel chair for community distances for family to assist.     Specialists Seen - (with most recent, available notes and clinic visits reviewed)   1. Neurosurgery - Dr. Hill      IMAGING - reviewed   06/08/2021 MR Lumbar spine  Findings: There are 5 lumbar-type vertebrae assumed for the purposes  of this dictation.  The tip of the conus medullaris is at L1.   Susceptibility effect related to fusion instrumentation at L4-5.   Trace anterolisthesis of L4 on L5, unchanged since 4/4/2019 but  improved since the preoperative MRI on 9/12/2018. Mild degenerative  endplate marrow signal changes at L3-4  and L5-S1. Incidental  hemangioma within the right posterolateral L1 vertebral body,  unchanged.     On a level by level basis:     T12-L1: No spinal canal or neuroforaminal stenosis.   L1-2: No spinal canal or neuroforaminal stenosis.   L2-3: No spinal canal or neuroforaminal stenosis.   L3-4: Circumferential disc bulge and bilateral facet hypertrophy. Mild  spinal canal narrowing. Mild to moderate left and mild right neural  foraminal narrowing, slightly increased since 2018.     L4-5: Anterior and posterior fusion instrumentation. Improved  alignment compared to the preoperative examination but no change since  4/4/2019 with trace residual anterolisthesis. Bilateral facet  hypertrophy. Mild spinal canal narrowing. Mild to moderate neural  foraminal narrowing bilaterally, left greater than right, also  improved compared to the preoperative examination.     L5-S1: Bilateral facet hypertrophy. Mild to moderate right and mild  left neural foraminal narrowing. No spinal canal stenosis.     Diffuse atrophy of the erector spinae musculature. Moderate lumbar  subcutaneous edema.                                                                      Impression:   1. Anterior and posterior fusion instrumentation at L4-5 with improved  alignment and decreased spinal canal stenosis compared to the  preoperative MRI on 9/12/2018. Also decreased extent of left greater  than right neural foraminal narrowing at L4-5 postoperatively.  2. Slight progression of spondylosis at L3-4 with mild to moderate  left and mild right neural foraminal narrowing.        Medical History:  She  has a past medical history of Arthritis (1975), Broken ribs (8/28/2016), Cancer (H) (2013), COPD (chronic obstructive pulmonary disease) (H) (08/2017), Depression, Depressive disorder (1988), Dysuria, Glycosuria, Hypertension (1980), Mixed incontinence urge and stress, Nonrheumatic aortic valve stenosis (7/25/2021), Other and unspecified hyperlipidemia,  Right shoulder pain, Sleep apnea, Type II or unspecified type diabetes mellitus without mention of complication, uncontrolled, Uncomplicated asthma (2017), and Unspecified hypothyroidism.    She has no past medical history of Cerebral infarction (H), Chronic infection, Complication of anesthesia, Congestive heart failure (H), History of blood transfusion, or Malignant hyperthermia.     She  has a past surgical history that includes cholecystectomy, open; VAGINAL HYSTERECTOMY; SURGICAL PATHOLOGY; Prophylactic repair retinal break, pneumatic retinopexy, combined (Left, 9/2/2016); Abdomen surgery (1978); colonoscopy (2007); Head and neck surgery (2006); Soft tissue surgery (1996); Colonoscopy with CO2 insufflation (N/A, 6/22/2018); Colonoscopy (N/A, 6/22/2018); Eye surgery (2014); cataract iol, rt/lt (Bilateral); Eye surgery (Right, 2018); Ots fusion spine posterior lumbar minimally invasive one level (N/A, 11/5/2018); back surgery (11/2018); biopsy (2013 1993); Colonoscopy (N/A, 3/29/2022); Inject block medial branch cervical/thoracic/lumbar (Bilateral, 9/23/2022); and Inject block medial branch cervical/thoracic/lumbar (Bilateral, 11/18/2022).        Family History  Her family history includes Cancer in her father; Cataracts in her mother; Cerebrovascular Disease in her nephew; Depression in her mother and paternal grandmother; Diabetes in her brother and brother; Genetic Disorder in her brother, brother, brother, and mother; Heart Disease in her brother; Hyperlipidemia in her brother and brother; Hypertension in her brother, brother, and brother; Muscular Disorder in her mother; Muscular Dystrophy in her nephew; Musculoskeletal Disorder in her mother; Neurologic Disorder in her brother; Obesity in her brother, father, mother, and paternal grandmother; Osteoporosis in her mother.     Social History:  Current living situation: Lives alone. Home. 4-5 MADI. No stairs to B/B.   She  reports that she quit smoking about 3  years ago. Her smoking use included cigarettes. She started smoking about 54 years ago. She has a 25.00 pack-year smoking history. She has never used smokeless tobacco. She reports current alcohol use. She reports that she does not use drugs.        Current Medications:   She has a current medication list which includes the following prescription(s): acetaminophen, alendronate, amlodipine, aspirin, atorvastatin, calcium carbonate, carvedilol, vitamin d3, trulicity, folic acid, novolog flexpen, toujeo solostar, levothyroxine, losartan, methocarbamol, paroxetine, polyethylene glycol, potassium chloride er, primidone, torsemide, blood glucose, blood glucose monitoring, clobetasol, clobetasol propionate, freestyle lucila 14 day sensor, and insulin pen needle.         Allergies:   No Known Allergies    PHYSICAL EXAMINATION:  /68 (BP Location: Right arm, Patient Position: Sitting, Cuff Size: Adult Regular)   Pulse 83   Wt 117 kg (258 lb)   BMI 38.10 kg/m     General: Pleasant, straightforward, WDWN individual.  Mental Status: Pleasant, direct, appropriate mood and affect  Resp: breathing is unlabored without audible wheeze  Vascular: BLE edema   Heme: no visible ecchymosis or erythema on extremities  Skin: No notable rash    Neurologic:  Strength: All major muscle groups of the bilateral lower extremities have equacl and symmetric muscle strength though globally weak 4+/5     Musculoskeletal:  Gait is unsteady with forward flexed/stooped posture  Lumbar range of motion severely reduced.  She has healed surgical scars at approximately the L4-5 level. Tender lumbar paraspinal muscles, facets bilaterally. Also tender over SI joint (sacral sulcus/PSIS)           ASSESSMENT:  Yadi Iniguez is a pleasant 72 year old  female who presents with:    #.  Chronic axial low back pain status post lumbar spinal fusion L4-5 (11/2018).  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 Possible SI -mediated pain exam is difficult due to patient tolerance and unable to position for range of provacative maneuvers.     Complicating co-morbidities include:   #. Physical deconditioning   #. Type II, DM. Well controlled.  Complicated by diabetic peripheral polyneuropathy  #. CKD-III. Follows with nephrology.   #. Obesity Body mass index is 38 kg/m .. Recommend healthy lifestyle modifications through diet and exercise.      PLAN:   -Refer to Home PT. Patient does not drive, lives alone, and has limited ability /access for more routine clinical PT in the seting of multiple other health care co-morbities.   -Discussed the possibility of repeat Lumbar MBB#3 given the disparity between block #1 and #2 versus intra-articular facet steroid injection for modulation of pain. Patient defers  -Consider image-guided SI jt inj in the future as well for diagnostic and therapeutic modulation of pain  -Trial Robaxin 500mg TID prn for pain/musle spams  -OTC lidoderm patch (eg salonpas)   -Acetaminophen 500 - 1,000mg (1-2 tablets) every 8 hours as needed for pain. Max acetaminophen 3,000mg per day (or 6 of the 500mg tablets).   -Declines spine surgery referral at this time.   -Patient/family will update me on progress with PT and if they would like to pursue any of the above options noted.     Ready to learn, no apparent learning barriers.  Education provided on treatment plan according to patient's preferred learning style.  Patient verbalizes understanding.   __________________________________  Dwayne Sierra MD  Physical Medicine & Rehabilitation      36 minutes spent on the date of the encounter doing chart review, interpretation of tests, patient visit, documentation and discussion with family

## 2023-03-24 ENCOUNTER — OFFICE VISIT (OUTPATIENT)
Dept: NEUROSURGERY | Facility: CLINIC | Age: 73
End: 2023-03-24
Payer: COMMERCIAL

## 2023-03-24 VITALS
BODY MASS INDEX: 38.1 KG/M2 | WEIGHT: 258 LBS | SYSTOLIC BLOOD PRESSURE: 107 MMHG | DIASTOLIC BLOOD PRESSURE: 68 MMHG | HEART RATE: 83 BPM

## 2023-03-24 DIAGNOSIS — M62.838 MUSCLE SPASM: ICD-10-CM

## 2023-03-24 DIAGNOSIS — M47.816 LUMBAR FACET ARTHROPATHY: ICD-10-CM

## 2023-03-24 DIAGNOSIS — Z98.1 S/P SPINAL FUSION: ICD-10-CM

## 2023-03-24 DIAGNOSIS — M51.369 DDD (DEGENERATIVE DISC DISEASE), LUMBAR: ICD-10-CM

## 2023-03-24 DIAGNOSIS — M47.816 LUMBAR SPONDYLOSIS: Primary | ICD-10-CM

## 2023-03-24 PROCEDURE — 99214 OFFICE O/P EST MOD 30 MIN: CPT | Performed by: PHYSICAL MEDICINE & REHABILITATION

## 2023-03-24 RX ORDER — METHOCARBAMOL 500 MG/1
500 TABLET, FILM COATED ORAL 3 TIMES DAILY PRN
Qty: 60 TABLET | Refills: 3 | Status: SHIPPED | OUTPATIENT
Start: 2023-03-24 | End: 2023-10-25

## 2023-03-24 ASSESSMENT — PAIN SCALES - GENERAL: PAINLEVEL: NO PAIN (0)

## 2023-03-24 NOTE — LETTER
3/24/2023         RE: Yadi Iniguez  [address]  [place] [address]        Dear Colleague,    Thank you for referring your patient, Yadi Iniguez, to the [facility] [place]. Please see a copy of my visit note below.    PM&R Follow-Up Visit -     Date of Initial Visit: 05/14/2021  LOV: 11/18/2022 (procedure visit)  TD: 3/24/2023     Recall: Yadi Iniguez is a 72 year old female with history of CKD, COPD, DM-II, s/p L4-5 PLIF who follows up for chronic low back pain    INTERVAL HISTORY:  Patient was last seen November 18, 2022 for a second diagnostic medial branch block at the L5/S1 level.  She is coming today by her daughter, Nikki.     Overall, patient states that she had excellent relief around 95% from the first diagnostic block; however, following the second she did not have significant benefit.  Patient/family unclear why there is such a disparity in the blocks.  Otherwise, her symptoms are relatively stable and unchanged.  She continues to have functionally limiting lumbosacral pain has bilateral.  Her pain is worse when standing or walking.  She tends to lean forward, needs to use her walker or other support to help alleviate some of her symptoms.  Pain score 6/10 when standing today, 0/10 when seated and at rest.    RECALL HISTORY OF PRESENT ILLNESS:  Yadi Iniguez is a 70 year old female who presents with a chief complaint of chronic low back pain. Accompanied by her daughter, Joanna today.     She has a long-standing history of chronic low back pain with progressive, insidious worsening over the years. She underwent L4-5 spinal fusion surgery in November 2018 by Dr. Hill.  She states that following the surgery she had improved posture and was able to stand upright better for a period of time, however, her pain and symptoms never significantly improved.  She continues to be quite functionally limited in mobility and ADLs with decreased quality of life.   She has poor standing and walking tolerance.  She ambulates with a wheeled walker for short distances and uses a manual wheelchair or scooter for longer distances or in stores, respectively.  Her daughter helps with many home needs.  She also has a number of other comorbidities which she does believe contributes to her overall deconditioning.    The pain is localized to the low lumbosacral region - relatively equal on both sides with radiating pain the lateral sides (flanks) on occasion but without radicular pain down the legs; she reports numbness/tingling in her feet attributed to diabetic neuropathy; described as constant when standing and dull, intense in nature. Pain is reported as 8-9/10 today when standing. Symptoms are worse when upright whether standing or walking (she can stand or walk for one minute before needing to sit or rest due to pain); and improved with sitting or resting. she does not report pain-related sleep disturbance.     Functional limitations: Quite functionally limited with mobility and ADLs.     PRIOR INJURIES/TREATMENT:   Ice/Heat: none  Physical Therapy:   No recent PT in the last x2-3 years      - Current Pain Medications -   Tylenol 1000mg TID scheduled    - Prior/Trialed Pain Medications -   Gabapentin 300 BID - caused fatigue     Prior Procedures:   Date    Procedure   Improvement (%)  09/23/22 B L5-S1 MBB#1 ~95%  11/18/22 B L5-S1 MBB#2 <80%            Prior Related Surgery:   11/5/2018 -  L4-5 fusion (Dr. Hill)  Other (acupuncture, OMT, CMM, TENS, DME, etc.):   DME - Wheeled walker at home. Effectively does not ambulate much outside of home. Manual wheel chair for community distances for family to assist.     Specialists Seen - (with most recent, available notes and clinic visits reviewed)   1. Neurosurgery - Dr. Hill      IMAGING - reviewed   06/08/2021 MR Lumbar spine  Findings: There are 5 lumbar-type vertebrae assumed for the purposes  of this dictation.  The tip of the  conus medullaris is at L1.   Susceptibility effect related to fusion instrumentation at L4-5.   Trace anterolisthesis of L4 on L5, unchanged since 4/4/2019 but  improved since the preoperative MRI on 9/12/2018. Mild degenerative  endplate marrow signal changes at L3-4 and L5-S1. Incidental  hemangioma within the right posterolateral L1 vertebral body,  unchanged.     On a level by level basis:     T12-L1: No spinal canal or neuroforaminal stenosis.   L1-2: No spinal canal or neuroforaminal stenosis.   L2-3: No spinal canal or neuroforaminal stenosis.   L3-4: Circumferential disc bulge and bilateral facet hypertrophy. Mild  spinal canal narrowing. Mild to moderate left and mild right neural  foraminal narrowing, slightly increased since 2018.     L4-5: Anterior and posterior fusion instrumentation. Improved  alignment compared to the preoperative examination but no change since  4/4/2019 with trace residual anterolisthesis. Bilateral facet  hypertrophy. Mild spinal canal narrowing. Mild to moderate neural  foraminal narrowing bilaterally, left greater than right, also  improved compared to the preoperative examination.     L5-S1: Bilateral facet hypertrophy. Mild to moderate right and mild  left neural foraminal narrowing. No spinal canal stenosis.     Diffuse atrophy of the erector spinae musculature. Moderate lumbar  subcutaneous edema.                                                                      Impression:   1. Anterior and posterior fusion instrumentation at L4-5 with improved  alignment and decreased spinal canal stenosis compared to the  preoperative MRI on 9/12/2018. Also decreased extent of left greater  than right neural foraminal narrowing at L4-5 postoperatively.  2. Slight progression of spondylosis at L3-4 with mild to moderate  left and mild right neural foraminal narrowing.        Medical History:  She  has a past medical history of Arthritis (1975), Broken ribs (8/28/2016), Cancer (H) (2013),  COPD (chronic obstructive pulmonary disease) (H) (08/2017), Depression, Depressive disorder (1988), Dysuria, Glycosuria, Hypertension (1980), Mixed incontinence urge and stress, Nonrheumatic aortic valve stenosis (7/25/2021), Other and unspecified hyperlipidemia, Right shoulder pain, Sleep apnea, Type II or unspecified type diabetes mellitus without mention of complication, uncontrolled, Uncomplicated asthma (2017), and Unspecified hypothyroidism.    She has no past medical history of Cerebral infarction (H), Chronic infection, Complication of anesthesia, Congestive heart failure (H), History of blood transfusion, or Malignant hyperthermia.     She  has a past surgical history that includes cholecystectomy, open; VAGINAL HYSTERECTOMY; SURGICAL PATHOLOGY; Prophylactic repair retinal break, pneumatic retinopexy, combined (Left, 9/2/2016); Abdomen surgery (1978); colonoscopy (2007); Head and neck surgery (2006); Soft tissue surgery (1996); Colonoscopy with CO2 insufflation (N/A, 6/22/2018); Colonoscopy (N/A, 6/22/2018); Eye surgery (2014); cataract iol, rt/lt (Bilateral); Eye surgery (Right, 2018); Ots fusion spine posterior lumbar minimally invasive one level (N/A, 11/5/2018); back surgery (11/2018); biopsy (2013 1993); Colonoscopy (N/A, 3/29/2022); Inject block medial branch cervical/thoracic/lumbar (Bilateral, 9/23/2022); and Inject block medial branch cervical/thoracic/lumbar (Bilateral, 11/18/2022).        Family History  Her family history includes Cancer in her father; Cataracts in her mother; Cerebrovascular Disease in her nephew; Depression in her mother and paternal grandmother; Diabetes in her brother and brother; Genetic Disorder in her brother, brother, brother, and mother; Heart Disease in her brother; Hyperlipidemia in her brother and brother; Hypertension in her brother, brother, and brother; Muscular Disorder in her mother; Muscular Dystrophy in her nephew; Musculoskeletal Disorder in her mother;  Neurologic Disorder in her brother; Obesity in her brother, father, mother, and paternal grandmother; Osteoporosis in her mother.     Social History:  Current living situation: Lives alone. Home. 4-5 MADI. No stairs to B/B.   She  reports that she quit smoking about 3 years ago. Her smoking use included cigarettes. She started smoking about 54 years ago. She has a 25.00 pack-year smoking history. She has never used smokeless tobacco. She reports current alcohol use. She reports that she does not use drugs.        Current Medications:   She has a current medication list which includes the following prescription(s): acetaminophen, alendronate, amlodipine, aspirin, atorvastatin, calcium carbonate, carvedilol, vitamin d3, trulicity, folic acid, novolog flexpen, toujeo solostar, levothyroxine, losartan, methocarbamol, paroxetine, polyethylene glycol, potassium chloride er, primidone, torsemide, blood glucose, blood glucose monitoring, clobetasol, clobetasol propionate, freestyle lucila 14 day sensor, and insulin pen needle.         Allergies:   No Known Allergies    PHYSICAL EXAMINATION:  /68 (BP Location: Right arm, Patient Position: Sitting, Cuff Size: Adult Regular)   Pulse 83   Wt 117 kg (258 lb)   BMI 38.10 kg/m     General: Pleasant, straightforward, WDWN individual.  Mental Status: Pleasant, direct, appropriate mood and affect  Resp: breathing is unlabored without audible wheeze  Vascular: BLE edema   Heme: no visible ecchymosis or erythema on extremities  Skin: No notable rash    Neurologic:  Strength: All major muscle groups of the bilateral lower extremities have equacl and symmetric muscle strength though globally weak 4+/5     Musculoskeletal:  Gait is unsteady with forward flexed/stooped posture  Lumbar range of motion severely reduced.  She has healed surgical scars at approximately the L4-5 level. Tender lumbar paraspinal muscles, facets bilaterally. Also tender over SI joint (sacral sulcus/PSIS)            ASSESSMENT:  Yadi Iniguez is a pleasant 72 year old  female who presents with:    #.  Chronic axial low back pain status post lumbar spinal fusion L4-5 (11/2018).  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 Possible SI -mediated pain exam is difficult due to patient tolerance and unable to position for range of provacative maneuvers.     Complicating co-morbidities include:   #. Physical deconditioning   #. Type II, DM. Well controlled.  Complicated by diabetic peripheral polyneuropathy  #. CKD-III. Follows with nephrology.   #. Obesity Body mass index is 38 kg/m .. Recommend healthy lifestyle modifications through diet and exercise.      PLAN:   -Refer to Home PT. Patient does not drive, lives alone, and has limited ability /access for more routine clinical PT in the seting of multiple other health care co-morbities.   -Discussed the possibility of repeat Lumbar MBB#3 given the disparity between block #1 and #2 versus intra-articular facet steroid injection for modulation of pain. Patient defers  -Consider image-guided SI jt inj in the future as well for diagnostic and therapeutic modulation of pain  -Trial Robaxin 500mg TID prn for pain/musle spams  -OTC lidoderm patch (eg salonpas)   -Acetaminophen 500 - 1,000mg (1-2 tablets) every 8 hours as needed for pain. Max acetaminophen 3,000mg per day (or 6 of the 500mg tablets).   -Declines spine surgery referral at this time.   -Patient/family will update me on progress with PT and if they would like to pursue any of the above options noted.     Ready to learn, no apparent learning barriers.  Education provided on treatment plan according to patient's preferred learning style.  Patient verbalizes understanding.   __________________________________  Dwayne Sierra MD  Physical Medicine & Rehabilitation      36 minutes spent on the date of the  encounter doing chart review, interpretation of tests, patient visit, documentation and discussion with family             Again, thank you for allowing me to participate in the care of your patient.        Sincerely,        Dwayne Sierra MD

## 2023-03-24 NOTE — PATIENT INSTRUCTIONS
It was a pleasure seeing you in clinic today.  We discussed the following:    #.  Try Robaxin (methocarbamol) 500 mg up to 3 times daily as needed for muscle pain/spasm.    #.  You can try over-the-counter Salonpas Lidoderm patch or with NSAIDs/menthol.  Check with your nephrologist first given your kidney function to see if this would be okay for topical NSAID.    #.  We also discussed the possibility of a third diagnostic lumbar medial branch block given that you had good success with the first but not with the second to help differentiate if there is any improvement.  Alternatively, we also discussed the possibility of a facet joint steroid injection.  This is a similar injection treating into the joint rather than the nerves with some steroid.    #.  We will also place a referral for physical therapy.  Since you have limited mobility, transportation, I will request home PT.  If this is not approved or accessible, which we can place a clinic PT referral.

## 2023-03-30 NOTE — TELEPHONE ENCOUNTER
RECORDS RECEIVED FROM: Internal   REASON FOR VISIT: Essential tremor [G25.0]   Date of Appt: 06/27/2023   NOTES (FOR ALL VISITS) STATUS DETAILS   OFFICE NOTE from referring provider Internal 01/10/2023 Dr Ricci    OFFICE NOTE from other specialist N/A    DISCHARGE SUMMARY from hospital N/A    DISCHARGE REPORT from the ER N/A    OPERATIVE REPORT N/A    VIRA Virus Labs (MS ONLY) N/A    EMG N/A    EEG N/A    MEDICATION LIST N/A    IMAGING  (FOR ALL VISITS)     LUMBAR PUNCTURE N/A    ELGIN SCAN N/A    ULTRASOUND (CAROTID BILAT) *VASCULAR* N/A    MRI (HEAD, NECK, SPINE) N/A    CT (HEAD, NECK, SPINE) N/A

## 2023-04-01 ENCOUNTER — LAB (OUTPATIENT)
Dept: LAB | Facility: CLINIC | Age: 73
End: 2023-04-01
Payer: COMMERCIAL

## 2023-04-01 DIAGNOSIS — N18.32 STAGE 3B CHRONIC KIDNEY DISEASE (H): ICD-10-CM

## 2023-04-01 DIAGNOSIS — D47.2 MONOCLONAL GAMMOPATHY: ICD-10-CM

## 2023-04-01 LAB
BASOPHILS # BLD AUTO: 0 10E3/UL (ref 0–0.2)
BASOPHILS NFR BLD AUTO: 1 %
EOSINOPHIL # BLD AUTO: 0.1 10E3/UL (ref 0–0.7)
EOSINOPHIL NFR BLD AUTO: 3 %
ERYTHROCYTE [DISTWIDTH] IN BLOOD BY AUTOMATED COUNT: 12.4 % (ref 10–15)
HCT VFR BLD AUTO: 35.6 % (ref 35–47)
HGB BLD-MCNC: 11.4 G/DL (ref 11.7–15.7)
IMM GRANULOCYTES # BLD: 0 10E3/UL
IMM GRANULOCYTES NFR BLD: 0 %
LYMPHOCYTES # BLD AUTO: 1.3 10E3/UL (ref 0.8–5.3)
LYMPHOCYTES NFR BLD AUTO: 28 %
MCH RBC QN AUTO: 34.9 PG (ref 26.5–33)
MCHC RBC AUTO-ENTMCNC: 32 G/DL (ref 31.5–36.5)
MCV RBC AUTO: 109 FL (ref 78–100)
MONOCYTES # BLD AUTO: 0.3 10E3/UL (ref 0–1.3)
MONOCYTES NFR BLD AUTO: 6 %
NEUTROPHILS # BLD AUTO: 2.8 10E3/UL (ref 1.6–8.3)
NEUTROPHILS NFR BLD AUTO: 62 %
NRBC # BLD AUTO: 0 10E3/UL
NRBC BLD AUTO-RTO: 0 /100
PLATELET # BLD AUTO: 81 10E3/UL (ref 150–450)
PTH-INTACT SERPL-MCNC: 35 PG/ML (ref 15–65)
RBC # BLD AUTO: 3.27 10E6/UL (ref 3.8–5.2)
TOTAL PROTEIN SERUM FOR ELP: 6.1 G/DL (ref 6.4–8.3)
WBC # BLD AUTO: 4.6 10E3/UL (ref 4–11)

## 2023-04-01 PROCEDURE — 84165 PROTEIN E-PHORESIS SERUM: CPT

## 2023-04-01 PROCEDURE — 83970 ASSAY OF PARATHORMONE: CPT

## 2023-04-01 PROCEDURE — 84155 ASSAY OF PROTEIN SERUM: CPT

## 2023-04-01 PROCEDURE — 83521 IG LIGHT CHAINS FREE EACH: CPT

## 2023-04-01 PROCEDURE — 85025 COMPLETE CBC W/AUTO DIFF WBC: CPT

## 2023-04-01 PROCEDURE — 86334 IMMUNOFIX E-PHORESIS SERUM: CPT

## 2023-04-01 PROCEDURE — 80069 RENAL FUNCTION PANEL: CPT

## 2023-04-01 PROCEDURE — 36415 COLL VENOUS BLD VENIPUNCTURE: CPT

## 2023-04-03 LAB
ALBUMIN SERPL ELPH-MCNC: 3.5 G/DL (ref 3.7–5.1)
ALBUMIN SERPL-MCNC: 3.3 G/DL (ref 3.4–5)
ALPHA1 GLOB SERPL ELPH-MCNC: 0.3 G/DL (ref 0.2–0.4)
ALPHA2 GLOB SERPL ELPH-MCNC: 0.8 G/DL (ref 0.5–0.9)
ANION GAP SERPL CALCULATED.3IONS-SCNC: 4 MMOL/L (ref 3–14)
B-GLOBULIN SERPL ELPH-MCNC: 0.7 G/DL (ref 0.6–1)
BUN SERPL-MCNC: 22 MG/DL (ref 7–30)
CALCIUM SERPL-MCNC: 8.9 MG/DL (ref 8.5–10.1)
CHLORIDE BLD-SCNC: 104 MMOL/L (ref 94–109)
CO2 SERPL-SCNC: 33 MMOL/L (ref 20–32)
CREAT SERPL-MCNC: 1.57 MG/DL (ref 0.52–1.04)
GAMMA GLOB SERPL ELPH-MCNC: 0.9 G/DL (ref 0.7–1.6)
GFR SERPL CREATININE-BSD FRML MDRD: 34 ML/MIN/1.73M2
GLUCOSE BLD-MCNC: 198 MG/DL (ref 70–99)
KAPPA LC FREE SER-MCNC: 6.5 MG/DL (ref 0.33–1.94)
KAPPA LC FREE/LAMBDA FREE SER NEPH: 2.13 {RATIO} (ref 0.26–1.65)
LAMBDA LC FREE SERPL-MCNC: 3.05 MG/DL (ref 0.57–2.63)
M PROTEIN SERPL ELPH-MCNC: 0 G/DL
PHOSPHATE SERPL-MCNC: 3.4 MG/DL (ref 2.5–4.5)
POTASSIUM BLD-SCNC: 4.4 MMOL/L (ref 3.4–5.3)
PROT PATTERN SERPL ELPH-IMP: ABNORMAL
PROT PATTERN SERPL IFE-IMP: NORMAL
SODIUM SERPL-SCNC: 141 MMOL/L (ref 133–144)

## 2023-04-04 ENCOUNTER — VIRTUAL VISIT (OUTPATIENT)
Dept: ONCOLOGY | Facility: CLINIC | Age: 73
End: 2023-04-04
Attending: INTERNAL MEDICINE
Payer: COMMERCIAL

## 2023-04-04 ENCOUNTER — TRANSFERRED RECORDS (OUTPATIENT)
Dept: HEALTH INFORMATION MANAGEMENT | Facility: CLINIC | Age: 73
End: 2023-04-04

## 2023-04-04 DIAGNOSIS — N18.32 STAGE 3B CHRONIC KIDNEY DISEASE (H): ICD-10-CM

## 2023-04-04 DIAGNOSIS — D69.6 THROMBOCYTOPENIA (H): Primary | ICD-10-CM

## 2023-04-04 DIAGNOSIS — D47.2 MGUS (MONOCLONAL GAMMOPATHY OF UNKNOWN SIGNIFICANCE): ICD-10-CM

## 2023-04-04 LAB — RETINOPATHY: POSITIVE

## 2023-04-04 PROCEDURE — 99214 OFFICE O/P EST MOD 30 MIN: CPT | Mod: VID | Performed by: INTERNAL MEDICINE

## 2023-04-04 RX ORDER — ICOSAPENT ETHYL 1 G/1
2 CAPSULE ORAL 2 TIMES DAILY
COMMUNITY
Start: 2023-01-17

## 2023-04-04 NOTE — LETTER
4/4/2023         RE: Yadi Iniguez  [address]  [facility] [id number]        Dear Colleague,    Thank you for referring your patient, Yadi Iniguez, to the [facility]. Please see a copy of my visit note below.    Video-Visit Details    Type of service:  Video Visit    Video Start Time: 2:02 PM  Video End Time: 2:13 PM    Originating Location (pt. Location): Home in [place]    Distant Location (provider location):  [place]    Platform used for Video Visit: [facility] Hematology and Oncology Progress Note    Patient: Yadi Iniguez  MRN: [id number]  Date of Service: 05/31/2022        Reason for Visit    Chief Complaint   Patient presents with     Video Visit     Follow up          Problem List Items Addressed This Visit        Urinary    CKD (chronic kidney disease) stage 3, GFR 30-59 ml/min (H)       Hematologic    Thrombocytopenia (H) - Primary    Relevant Orders    CBC with platelets and differential    Immature PLT Fraction    Lab Blood Morphology Pathologist Review   Other Visit Diagnoses     MGUS (monoclonal gammopathy of unknown significance)                Assessment and Plan  MGUS  She has a history of IgG kappa monoclonal protein.  Also had a very small IgA kappa globulin on immunofixation.  Her monoclonal peak was 0.2 g in March 2022.  Kappa lambda light chains were mildly elevated.  No evidence of excessive monoclonal protein in the urine.  No evidence of clinical myeloma.  She has chronic kidney disease which is stable.  Also has chronic mild anemia which is probably secondary to chronic kidney disease.  Subsequent SPEP's have shown no evidence of monoclonal proteins.  She continues to have mild elevation in the kappa and lambda light chains which is probably secondary to her chronic kidney disease.  Most recent SPEP from last week reviewed.  Again it shows no evidence of any monoclonal protein.  Immunofixation is also negative.   Platelets are mildly up at 6.5 and lambda light chains are up at 3.  Ratio is mildly elevated but again these are very stable.  CBC actually shows mild thrombocytopenia.  Platelet count has dropped to 81,000.  Previously she was around 1 30,000.  Fortunately no bleeding issues.  She has started icosapent ethyl recently.  Also takes low-dose aspirin.  No NSAID use.  It is not clear as to why she has significant drop in the platelet count but fortunately she does not have any bleeding issues.  Plan is to recheck her counts in a couple of weeks time.  I will check CBC with differential, peripheral smear and also an immature platelet fraction.  In the past she has had mild intermittent thrombocytopenia so it is possible that she could have ITP.    Other labs show mild anemia with hemoglobin of 11.4.  MCV slightly up at 109.  She does have some folate deficiency with a folic acid of 3.7 recently.  Currently on supplementation.    From the monoclonal gammopathy standpoint I think rechecking her labs in 1 year is reasonable.     Cancer Staging   No matching staging information was found for the patient.    ECOG Performance    2 - Ambulatory and independent in all ADLs; cannot work; up > 50% of the time         Problem List    Patient Active Problem List   Diagnosis     Hypothyroidism     Hypertension goal BP (blood pressure) < 140/90     Type 2 diabetes, HbA1C goal < 8% (H)     Hyperlipidemia LDL goal <100     Moderate major depression (H)     Incontinence of urine     Neuropathy in diabetes (H)     Eczema     Left lumbar radiculopathy     Health Care Home     CKD (chronic kidney disease) stage 3, GFR 30-59 ml/min (H)     Type 2 diabetes mellitus with other diabetic kidney complication, with long-term current use of insulin (H)     Osteoporosis     Morbid obesity (H)     Thrombocytopenia (H)     Chronic obstructive pulmonary disease, unspecified COPD type (H)     S/P lumbar fusion     Diabetes mellitus due to underlying  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 edema, with long-term current use of insulin, unspecified laterality (H)     Lumbar facet arthropathy     Lumbar spondylosis     Pulmonary hypertension (H)     Nonrheumatic aortic valve stenosis     Depression, major, recurrent, in partial remission (H)     DDD (degenerative disc disease), lumbar          Interval History   Yadi Iniguez is a 72 year old with 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    Doing well since last visit.  Denies any new issues today.  No new bone pain or fractures reported.  Does have some fatigue and shortness of breath on exertion but no issues at rest.  Denies any fevers or chills.  Labs from last week reviewed today.  Denies any bleeding issues.  Recently she was started on icosapent Ethyl by cardiology and also was put on Robaxin by neurosurgery.  However she has not started Robaxin yet.  Denies any blood in stools or urine.    Review of Systems  A comprehensive review of systems was negative except for what is noted in the interval history    Current Outpatient Medications   Medication     ACETAMINOPHEN PO     alendronate (FOSAMAX) 70 MG tablet     amLODIPine (NORVASC) 10 MG tablet     aspirin 81 MG tablet     atorvastatin (LIPITOR) 40 MG tablet     blood glucose (NO BRAND SPECIFIED) test strip     blood glucose monitoring (NO BRAND SPECIFIED) meter device kit     calcium carbonate (OS-NATACHA) 1500 (600 Ca) MG tablet     carvedilol (COREG) 25 MG tablet     cholecalciferol (VITAMIN D3) 5000 units TABS tablet     Continuous Blood Gluc Sensor (FREESTYLE MARI 14 DAY SENSOR) MISC     dulaglutide (TRULICITY) 1.5 MG/0.5ML pen     folic acid (FOLVITE) 1 MG tablet     insulin aspart (NOVOLOG FLEXPEN) 100 UNIT/ML pen     insulin glargine U-300 (TOUJEO SOLOSTAR) 300 UNIT/ML (1 units dial) pen     insulin pen needle (32G X 4 MM) 32G X 4 MM miscellaneous      levothyroxine (SYNTHROID/LEVOTHROID) 150 MCG tablet     losartan (COZAAR) 25 MG tablet     methocarbamol (ROBAXIN) 500 MG tablet     PARoxetine (PAXIL) 20 MG tablet     polyethylene glycol (MIRALAX) 17 GM/Dose powder     potassium chloride ER (KLOR-CON M) 20 MEQ CR tablet     primidone (MYSOLINE) 50 MG tablet     torsemide (DEMADEX) 20 MG tablet     icosapent ethyl (VASCEPA) 1 g CAPS capsule     No current facility-administered medications for this visit.        Physical Exam    No flowsheet data found.    General: alert and cooperative    Lab Results    Recent Results (from the past 168 hour(s))   Renal panel   Result Value Ref Range    Sodium 141 133 - 144 mmol/L    Potassium 4.4 3.4 - 5.3 mmol/L    Chloride 104 94 - 109 mmol/L    Carbon Dioxide (CO2) 33 (H) 20 - 32 mmol/L    Anion Gap 4 3 - 14 mmol/L    Urea Nitrogen 22 7 - 30 mg/dL    Creatinine 1.57 (H) 0.52 - 1.04 mg/dL    Calcium 8.9 8.5 - 10.1 mg/dL    Glucose 198 (H) 70 - 99 mg/dL    Albumin 3.3 (L) 3.4 - 5.0 g/dL    Phosphorus 3.4 2.5 - 4.5 mg/dL    GFR Estimate 34 (L) >60 mL/min/1.73m2   Parathyroid Hormone Intact   Result Value Ref Range    Parathyroid Hormone Intact 35 15 - 65 pg/mL   Protein Immunofixation Serum   Result Value Ref Range    Immunofixation ELP       No monoclonal protein seen on immunofixation. Pathologic significance requires clinical correlation.  GISEL Jackson M.D., Ph.D., Pathologist ()   [place] and lambda light chain   Result Value Ref Range    Kappa Free Light Chains 6.50 (H) 0.33 - 1.94 mg/dL    Lambda Free Light Chains 3.05 (H) 0.57 - 2.63 mg/dL    Kappa /Lambda Ratio 2.13 (H) 0.26 - 1.65   CBC with platelets and differential   Result Value Ref Range    WBC Count 4.6 4.0 - 11.0 10e3/uL    RBC Count 3.27 (L) 3.80 - 5.20 10e6/uL    Hemoglobin 11.4 (L) 11.7 - 15.7 g/dL    Hematocrit 35.6 35.0 - 47.0 %     (H) 78 - 100 fL    MCH 34.9 (H) 26.5 - 33.0 pg    MCHC 32.0 31.5 - 36.5 g/dL    RDW 12.4 10.0 - 15.0 %     Platelet Count 81 (L) 150 - 450 10e3/uL    % Neutrophils 62 %    % Lymphocytes 28 %    % Monocytes 6 %    % Eosinophils 3 %    % Basophils 1 %    % Immature Granulocytes 0 %    NRBCs per 100 WBC 0 <1 /100    Absolute Neutrophils 2.8 1.6 - 8.3 10e3/uL    Absolute Lymphocytes 1.3 0.8 - 5.3 10e3/uL    Absolute Monocytes 0.3 0.0 - 1.3 10e3/uL    Absolute Eosinophils 0.1 0.0 - 0.7 10e3/uL    Absolute Basophils 0.0 0.0 - 0.2 10e3/uL    Absolute Immature Granulocytes 0.0 <=0.4 10e3/uL    Absolute NRBCs 0.0 10e3/uL   Total Protein, Serum for ELP   Result Value Ref Range    Total Protein Serum for ELP 6.1 (L) 6.4 - 8.3 g/dL   Protein Electrophoresis, Serum   Result Value Ref Range    Albumin 3.5 (L) 3.7 - 5.1 g/dL    Alpha 1 0.3 0.2 - 0.4 g/dL    Alpha 2 0.8 0.5 - 0.9 g/dL    Beta Globulin 0.7 0.6 - 1.0 g/dL    Gamma Globulin 0.9 0.7 - 1.6 g/dL    Monoclonal Peak 0.0 <=0.0 g/dL    ELP Interpretation       Slight hypoalbuminemia with an otherwise essentially normal electrophoretic pattern. No obvious monoclonal proteins seen. Pathologic significance requires clinical correlation. GISEL Jackson M.D., Ph.D., Pathologist ().       Imaging    No results found.  A total of 25 min were spent today on this visit which included face to face conversation with the patient, EMR review, counseling and co-ordination of care and medical documentation.      Signed by: Shawna Wang MD      Again, thank you for allowing me to participate in the care of your patient.        Sincerely,        Shawna Wang MD

## 2023-04-04 NOTE — NURSING NOTE
Is the patient currently in the state of [place]? YES    Visit mode:VIDEO    If the visit is dropped, the patient can be reconnected by: VIDEO VISIT: Text to cell phone: [phone]    Will anyone else be joining the visit? NO      How would you like to obtain your AVS? MyChart    Are changes needed to the allergy or medication list? NO    Reason for visit: follow up

## 2023-04-04 NOTE — PROGRESS NOTES
Video-Visit Details    Type of service:  Video Visit    Video Start Time: 2:02 PM  Video End Time: 2:13 PM    Originating Location (pt. Location): Home in [place]    Distant Location (provider location):  Home/[place]    Platform used for Video Visit: [facility] Hematology and Oncology Progress Note    Patient: Yadi Iniguez  MRN: [id number]  Date of Service: 05/31/2022        Reason for Visit    Chief Complaint   Patient presents with     Video Visit     Follow up          Problem List Items Addressed This Visit        Urinary    CKD (chronic kidney disease) stage 3, GFR 30-59 ml/min (H)       Hematologic    Thrombocytopenia (H) - Primary    Relevant Orders    CBC with platelets and differential    Immature PLT Fraction    Lab Blood Morphology Pathologist Review   Other Visit Diagnoses     MGUS (monoclonal gammopathy of unknown significance)                Assessment and Plan  MGUS  She has a history of IgG kappa monoclonal protein.  Also had a very small IgA kappa globulin on immunofixation.  Her monoclonal peak was 0.2 g in March 2022.  Kappa lambda light chains were mildly elevated.  No evidence of excessive monoclonal protein in the urine.  No evidence of clinical myeloma.  She has chronic kidney disease which is stable.  Also has chronic mild anemia which is probably secondary to chronic kidney disease.  Subsequent SPEP's have shown no evidence of monoclonal proteins.  She continues to have mild elevation in the kappa and lambda light chains which is probably secondary to her chronic kidney disease.  Most recent SPEP from last week reviewed.  Again it shows no evidence of any monoclonal protein.  Immunofixation is also negative.  Platelets are mildly up at 6.5 and lambda light chains are up at 3.  Ratio is mildly elevated but again these are very stable.  CBC actually shows mild thrombocytopenia.  Platelet count has dropped to 81,000.  Previously she was around 1 30,000.  Fortunately no  bleeding issues.  She has started icosapent ethyl recently.  Also takes low-dose aspirin.  No NSAID use.  It is not clear as to why she has significant drop in the platelet count but fortunately she does not have any bleeding issues.  Plan is to recheck her counts in a couple of weeks time.  I will check CBC with differential, peripheral smear and also an immature platelet fraction.  In the past she has had mild intermittent thrombocytopenia so it is possible that she could have ITP.    Other labs show mild anemia with hemoglobin of 11.4.  MCV slightly up at 109.  She does have some folate deficiency with a folic acid of 3.7 recently.  Currently on supplementation.    From the monoclonal gammopathy standpoint I think rechecking her labs in 1 year is reasonable.     Cancer Staging   No matching staging information was found for the patient.    ECOG Performance    2 - Ambulatory and independent in all ADLs; cannot work; up > 50% of the time         Problem List    Patient Active Problem List   Diagnosis     Hypothyroidism     Hypertension goal BP (blood pressure) < 140/90     Type 2 diabetes, HbA1C goal < 8% (H)     Hyperlipidemia LDL goal <100     Moderate major depression (H)     Incontinence of urine     Neuropathy in diabetes (H)     Eczema     Left lumbar radiculopathy     Health Care Home     CKD (chronic kidney disease) stage 3, GFR 30-59 ml/min (H)     Type 2 diabetes mellitus with other diabetic kidney complication, with long-term current use of insulin (H)     Osteoporosis     Morbid obesity (H)     Thrombocytopenia (H)     Chronic obstructive pulmonary disease, unspecified COPD type (H)     S/P lumbar fusion     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, with long-term current use of insulin, unspecified laterality (H)     Lumbar facet arthropathy     Lumbar spondylosis     Pulmonary hypertension (H)     Nonrheumatic aortic valve stenosis      Depression, major, recurrent, in partial remission (H)     DDD (degenerative disc disease), lumbar          Interval History   Yadi Iniguez is a 72 year old with 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    Doing well since last visit.  Denies any new issues today.  No new bone pain or fractures reported.  Does have some fatigue and shortness of breath on exertion but no issues at rest.  Denies any fevers or chills.  Labs from last week reviewed today.  Denies any bleeding issues.  Recently she was started on icosapent Ethyl by cardiology and also was put on Robaxin by neurosurgery.  However she has not started Robaxin yet.  Denies any blood in stools or urine.    Review of Systems  A comprehensive review of systems was negative except for what is noted in the interval history    Current Outpatient Medications   Medication     ACETAMINOPHEN PO     alendronate (FOSAMAX) 70 MG tablet     amLODIPine (NORVASC) 10 MG tablet     aspirin 81 MG tablet     atorvastatin (LIPITOR) 40 MG tablet     blood glucose (NO BRAND SPECIFIED) test strip     blood glucose monitoring (NO BRAND SPECIFIED) meter device kit     calcium carbonate (OS-NATACHA) 1500 (600 Ca) MG tablet     carvedilol (COREG) 25 MG tablet     cholecalciferol (VITAMIN D3) 5000 units TABS tablet     Continuous Blood Gluc Sensor (FREESTYLE MARI 14 DAY SENSOR) MISC     dulaglutide (TRULICITY) 1.5 MG/0.5ML pen     folic acid (FOLVITE) 1 MG tablet     insulin aspart (NOVOLOG FLEXPEN) 100 UNIT/ML pen     insulin glargine U-300 (TOUJEO SOLOSTAR) 300 UNIT/ML (1 units dial) pen     insulin pen needle (32G X 4 MM) 32G X 4 MM miscellaneous     levothyroxine (SYNTHROID/LEVOTHROID) 150 MCG tablet     losartan (COZAAR) 25 MG tablet     methocarbamol (ROBAXIN) 500 MG tablet     PARoxetine (PAXIL) 20 MG tablet     polyethylene glycol (MIRALAX) 17 GM/Dose powder     potassium chloride ER (KLOR-CON M) 20 MEQ CR  tablet     primidone (MYSOLINE) 50 MG tablet     torsemide (DEMADEX) 20 MG tablet     icosapent ethyl (VASCEPA) 1 g CAPS capsule     No current facility-administered medications for this visit.        Physical Exam    No flowsheet data found.    General: alert and cooperative    Lab Results    Recent Results (from the past 168 hour(s))   Renal panel   Result Value Ref Range    Sodium 141 133 - 144 mmol/L    Potassium 4.4 3.4 - 5.3 mmol/L    Chloride 104 94 - 109 mmol/L    Carbon Dioxide (CO2) 33 (H) 20 - 32 mmol/L    Anion Gap 4 3 - 14 mmol/L    Urea Nitrogen 22 7 - 30 mg/dL    Creatinine 1.57 (H) 0.52 - 1.04 mg/dL    Calcium 8.9 8.5 - 10.1 mg/dL    Glucose 198 (H) 70 - 99 mg/dL    Albumin 3.3 (L) 3.4 - 5.0 g/dL    Phosphorus 3.4 2.5 - 4.5 mg/dL    GFR Estimate 34 (L) >60 mL/min/1.73m2   Parathyroid Hormone Intact   Result Value Ref Range    Parathyroid Hormone Intact 35 15 - 65 pg/mL   Protein Immunofixation Serum   Result Value Ref Range    Immunofixation ELP       No monoclonal protein seen on immunofixation. Pathologic significance requires clinical correlation.  GISEL Jackson M.D., Ph.D., Pathologist ()   [place] and lambda light chain   Result Value Ref Range    Kappa Free Light Chains 6.50 (H) 0.33 - 1.94 mg/dL    Lambda Free Light Chains 3.05 (H) 0.57 - 2.63 mg/dL    Kappa /Lambda Ratio 2.13 (H) 0.26 - 1.65   CBC with platelets and differential   Result Value Ref Range    WBC Count 4.6 4.0 - 11.0 10e3/uL    RBC Count 3.27 (L) 3.80 - 5.20 10e6/uL    Hemoglobin 11.4 (L) 11.7 - 15.7 g/dL    Hematocrit 35.6 35.0 - 47.0 %     (H) 78 - 100 fL    MCH 34.9 (H) 26.5 - 33.0 pg    MCHC 32.0 31.5 - 36.5 g/dL    RDW 12.4 10.0 - 15.0 %    Platelet Count 81 (L) 150 - 450 10e3/uL    % Neutrophils 62 %    % Lymphocytes 28 %    % Monocytes 6 %    % Eosinophils 3 %    % Basophils 1 %    % Immature Granulocytes 0 %    NRBCs per 100 WBC 0 <1 /100    Absolute Neutrophils 2.8 1.6 - 8.3 10e3/uL    Absolute  Lymphocytes 1.3 0.8 - 5.3 10e3/uL    Absolute Monocytes 0.3 0.0 - 1.3 10e3/uL    Absolute Eosinophils 0.1 0.0 - 0.7 10e3/uL    Absolute Basophils 0.0 0.0 - 0.2 10e3/uL    Absolute Immature Granulocytes 0.0 <=0.4 10e3/uL    Absolute NRBCs 0.0 10e3/uL   Total Protein, Serum for ELP   Result Value Ref Range    Total Protein Serum for ELP 6.1 (L) 6.4 - 8.3 g/dL   Protein Electrophoresis, Serum   Result Value Ref Range    Albumin 3.5 (L) 3.7 - 5.1 g/dL    Alpha 1 0.3 0.2 - 0.4 g/dL    Alpha 2 0.8 0.5 - 0.9 g/dL    Beta Globulin 0.7 0.6 - 1.0 g/dL    Gamma Globulin 0.9 0.7 - 1.6 g/dL    Monoclonal Peak 0.0 <=0.0 g/dL    ELP Interpretation       Slight hypoalbuminemia with an otherwise essentially normal electrophoretic pattern. No obvious monoclonal proteins seen. Pathologic significance requires clinical correlation. GISEL Jackson M.D., Ph.D., Pathologist ().       Imaging    No results found.  A total of 25 min were spent today on this visit which included face to face conversation with the patient, EMR review, counseling and co-ordination of care and medical documentation.      Signed by: Shawna Wang MD

## 2023-04-04 NOTE — LETTER
4/4/2023         RE: Yadi Iniguez  [address]  [facility] [address]        Dear Colleague,    Thank you for referring your patient, Yadi Iniguez, to the [facility] [place]. Please see a copy of my visit note below.    Video-Visit Details    Type of service:  Video Visit    Video Start Time: 2:02 PM  Video End Time: 2:13 PM    Originating Location (pt. Location): Home in [place]    Distant Location (provider location):  [place]    Platform used for Video Visit: [facility] Hematology and Oncology Progress Note    Patient: Yadi Iniguez  MRN: [id number]  Date of Service: 05/31/2022        Reason for Visit    Chief Complaint   Patient presents with     Video Visit     Follow up          Problem List Items Addressed This Visit        Urinary    CKD (chronic kidney disease) stage 3, GFR 30-59 ml/min (H)       Hematologic    Thrombocytopenia (H) - Primary    Relevant Orders    CBC with platelets and differential    Immature PLT Fraction    Lab Blood Morphology Pathologist Review   Other Visit Diagnoses     MGUS (monoclonal gammopathy of unknown significance)                Assessment and Plan  MGUS  She has a history of IgG kappa monoclonal protein.  Also had a very small IgA kappa globulin on immunofixation.  Her monoclonal peak was 0.2 g in March 2022.  Kappa lambda light chains were mildly elevated.  No evidence of excessive monoclonal protein in the urine.  No evidence of clinical myeloma.  She has chronic kidney disease which is stable.  Also has chronic mild anemia which is probably secondary to chronic kidney disease.  Subsequent SPEP's have shown no evidence of monoclonal proteins.  She continues to have mild elevation in the kappa and lambda light chains which is probably secondary to her chronic kidney disease.  Most recent SPEP from last week reviewed.  Again it shows no evidence of any monoclonal protein.  Immunofixation is also negative.   Platelets are mildly up at 6.5 and lambda light chains are up at 3.  Ratio is mildly elevated but again these are very stable.  CBC actually shows mild thrombocytopenia.  Platelet count has dropped to 81,000.  Previously she was around 1 30,000.  Fortunately no bleeding issues.  She has started icosapent ethyl recently.  Also takes low-dose aspirin.  No NSAID use.  It is not clear as to why she has significant drop in the platelet count but fortunately she does not have any bleeding issues.  Plan is to recheck her counts in a couple of weeks time.  I will check CBC with differential, peripheral smear and also an immature platelet fraction.  In the past she has had mild intermittent thrombocytopenia so it is possible that she could have ITP.    Other labs show mild anemia with hemoglobin of 11.4.  MCV slightly up at 109.  She does have some folate deficiency with a folic acid of 3.7 recently.  Currently on supplementation.    From the monoclonal gammopathy standpoint I think rechecking her labs in 1 year is reasonable.     Cancer Staging   No matching staging information was found for the patient.    ECOG Performance    2 - Ambulatory and independent in all ADLs; cannot work; up > 50% of the time         Problem List    Patient Active Problem List   Diagnosis     Hypothyroidism     Hypertension goal BP (blood pressure) < 140/90     Type 2 diabetes, HbA1C goal < 8% (H)     Hyperlipidemia LDL goal <100     Moderate major depression (H)     Incontinence of urine     Neuropathy in diabetes (H)     Eczema     Left lumbar radiculopathy     Health Care Home     CKD (chronic kidney disease) stage 3, GFR 30-59 ml/min (H)     Type 2 diabetes mellitus with other diabetic kidney complication, with long-term current use of insulin (H)     Osteoporosis     Morbid obesity (H)     Thrombocytopenia (H)     Chronic obstructive pulmonary disease, unspecified COPD type (H)     S/P lumbar fusion     Diabetes mellitus due to underlying  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 edema, with long-term current use of insulin, unspecified laterality (H)     Lumbar facet arthropathy     Lumbar spondylosis     Pulmonary hypertension (H)     Nonrheumatic aortic valve stenosis     Depression, major, recurrent, in partial remission (H)     DDD (degenerative disc disease), lumbar          Interval History   Yadi Iniguez is a 72 year old with 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    Doing well since last visit.  Denies any new issues today.  No new bone pain or fractures reported.  Does have some fatigue and shortness of breath on exertion but no issues at rest.  Denies any fevers or chills.  Labs from last week reviewed today.  Denies any bleeding issues.  Recently she was started on icosapent Ethyl by cardiology and also was put on Robaxin by neurosurgery.  However she has not started Robaxin yet.  Denies any blood in stools or urine.    Review of Systems  A comprehensive review of systems was negative except for what is noted in the interval history    Current Outpatient Medications   Medication     ACETAMINOPHEN PO     alendronate (FOSAMAX) 70 MG tablet     amLODIPine (NORVASC) 10 MG tablet     aspirin 81 MG tablet     atorvastatin (LIPITOR) 40 MG tablet     blood glucose (NO BRAND SPECIFIED) test strip     blood glucose monitoring (NO BRAND SPECIFIED) meter device kit     calcium carbonate (OS-NATACHA) 1500 (600 Ca) MG tablet     carvedilol (COREG) 25 MG tablet     cholecalciferol (VITAMIN D3) 5000 units TABS tablet     Continuous Blood Gluc Sensor (FREESTYLE MARI 14 DAY SENSOR) MISC     dulaglutide (TRULICITY) 1.5 MG/0.5ML pen     folic acid (FOLVITE) 1 MG tablet     insulin aspart (NOVOLOG FLEXPEN) 100 UNIT/ML pen     insulin glargine U-300 (TOUJEO SOLOSTAR) 300 UNIT/ML (1 units dial) pen     insulin pen needle (32G X 4 MM) 32G X 4 MM miscellaneous      levothyroxine (SYNTHROID/LEVOTHROID) 150 MCG tablet     losartan (COZAAR) 25 MG tablet     methocarbamol (ROBAXIN) 500 MG tablet     PARoxetine (PAXIL) 20 MG tablet     polyethylene glycol (MIRALAX) 17 GM/Dose powder     potassium chloride ER (KLOR-CON M) 20 MEQ CR tablet     primidone (MYSOLINE) 50 MG tablet     torsemide (DEMADEX) 20 MG tablet     icosapent ethyl (VASCEPA) 1 g CAPS capsule     No current facility-administered medications for this visit.        Physical Exam    No flowsheet data found.    General: alert and cooperative    Lab Results    Recent Results (from the past 168 hour(s))   Renal panel   Result Value Ref Range    Sodium 141 133 - 144 mmol/L    Potassium 4.4 3.4 - 5.3 mmol/L    Chloride 104 94 - 109 mmol/L    Carbon Dioxide (CO2) 33 (H) 20 - 32 mmol/L    Anion Gap 4 3 - 14 mmol/L    Urea Nitrogen 22 7 - 30 mg/dL    Creatinine 1.57 (H) 0.52 - 1.04 mg/dL    Calcium 8.9 8.5 - 10.1 mg/dL    Glucose 198 (H) 70 - 99 mg/dL    Albumin 3.3 (L) 3.4 - 5.0 g/dL    Phosphorus 3.4 2.5 - 4.5 mg/dL    GFR Estimate 34 (L) >60 mL/min/1.73m2   Parathyroid Hormone Intact   Result Value Ref Range    Parathyroid Hormone Intact 35 15 - 65 pg/mL   Protein Immunofixation Serum   Result Value Ref Range    Immunofixation ELP       No monoclonal protein seen on immunofixation. Pathologic significance requires clinical correlation.  GISEL Jackson M.D., Ph.D., Pathologist ()   [place] and lambda light chain   Result Value Ref Range    Kappa Free Light Chains 6.50 (H) 0.33 - 1.94 mg/dL    Lambda Free Light Chains 3.05 (H) 0.57 - 2.63 mg/dL    Kappa /Lambda Ratio 2.13 (H) 0.26 - 1.65   CBC with platelets and differential   Result Value Ref Range    WBC Count 4.6 4.0 - 11.0 10e3/uL    RBC Count 3.27 (L) 3.80 - 5.20 10e6/uL    Hemoglobin 11.4 (L) 11.7 - 15.7 g/dL    Hematocrit 35.6 35.0 - 47.0 %     (H) 78 - 100 fL    MCH 34.9 (H) 26.5 - 33.0 pg    MCHC 32.0 31.5 - 36.5 g/dL    RDW 12.4 10.0 - 15.0 %     Platelet Count 81 (L) 150 - 450 10e3/uL    % Neutrophils 62 %    % Lymphocytes 28 %    % Monocytes 6 %    % Eosinophils 3 %    % Basophils 1 %    % Immature Granulocytes 0 %    NRBCs per 100 WBC 0 <1 /100    Absolute Neutrophils 2.8 1.6 - 8.3 10e3/uL    Absolute Lymphocytes 1.3 0.8 - 5.3 10e3/uL    Absolute Monocytes 0.3 0.0 - 1.3 10e3/uL    Absolute Eosinophils 0.1 0.0 - 0.7 10e3/uL    Absolute Basophils 0.0 0.0 - 0.2 10e3/uL    Absolute Immature Granulocytes 0.0 <=0.4 10e3/uL    Absolute NRBCs 0.0 10e3/uL   Total Protein, Serum for ELP   Result Value Ref Range    Total Protein Serum for ELP 6.1 (L) 6.4 - 8.3 g/dL   Protein Electrophoresis, Serum   Result Value Ref Range    Albumin 3.5 (L) 3.7 - 5.1 g/dL    Alpha 1 0.3 0.2 - 0.4 g/dL    Alpha 2 0.8 0.5 - 0.9 g/dL    Beta Globulin 0.7 0.6 - 1.0 g/dL    Gamma Globulin 0.9 0.7 - 1.6 g/dL    Monoclonal Peak 0.0 <=0.0 g/dL    ELP Interpretation       Slight hypoalbuminemia with an otherwise essentially normal electrophoretic pattern. No obvious monoclonal proteins seen. Pathologic significance requires clinical correlation. GISEL Jackson M.D., Ph.D., Pathologist ().       Imaging    No results found.  A total of 25 min were spent today on this visit which included face to face conversation with the patient, EMR review, counseling and co-ordination of care and medical documentation.      Signed by: Shawna Wang MD      Again, thank you for allowing me to participate in the care of your patient.        Sincerely,        Shawna Wang MD

## 2023-04-08 ENCOUNTER — HEALTH MAINTENANCE LETTER (OUTPATIENT)
Age: 73
End: 2023-04-08

## 2023-04-11 ENCOUNTER — VIRTUAL VISIT (OUTPATIENT)
Dept: NEPHROLOGY | Facility: CLINIC | Age: 73
End: 2023-04-11
Payer: COMMERCIAL

## 2023-04-11 VITALS — DIASTOLIC BLOOD PRESSURE: 64 MMHG | HEART RATE: 85 BPM | SYSTOLIC BLOOD PRESSURE: 108 MMHG

## 2023-04-11 DIAGNOSIS — E11.29 TYPE 2 DIABETES MELLITUS WITH OTHER DIABETIC KIDNEY COMPLICATION, WITH LONG-TERM CURRENT USE OF INSULIN (H): ICD-10-CM

## 2023-04-11 DIAGNOSIS — Z87.891 HISTORY OF TOBACCO USE: ICD-10-CM

## 2023-04-11 DIAGNOSIS — I10 BENIGN ESSENTIAL HYPERTENSION: ICD-10-CM

## 2023-04-11 DIAGNOSIS — Z79.4 TYPE 2 DIABETES MELLITUS WITH OTHER DIABETIC KIDNEY COMPLICATION, WITH LONG-TERM CURRENT USE OF INSULIN (H): ICD-10-CM

## 2023-04-11 DIAGNOSIS — N18.32 STAGE 3B CHRONIC KIDNEY DISEASE (H): Primary | ICD-10-CM

## 2023-04-11 PROCEDURE — 99215 OFFICE O/P EST HI 40 MIN: CPT | Mod: VID | Performed by: INTERNAL MEDICINE

## 2023-04-11 ASSESSMENT — PAIN SCALES - GENERAL: PAINLEVEL: MILD PAIN (2)

## 2023-04-11 NOTE — PROGRESS NOTES
04/11/23   I was asked to see this patient by Dr. Ricci for evaluation of CKD.     CC: CKD    HPI: Yadi Iniguez is a 72 year old female who presents for evaluation of CKD.  Initial visit March 2022:  Ms. Yao's hx is significant for COPD, hypertension, AV stenosis, depression, DM, hypothyroidism, hyperlipidemia, tobacco hx, CHF/pulmonary hypertension. On review of her creatinine levels, there has been much variability:  - at 0.6 in 2008  - SUZE to 1.4 in Jan 2010 but improved to 0.76 in April 2011  - SUZE to 1.82 in Jan 2017 but improved to ~1 in Sep 2017  - SUZE to 1.45 in April 2018 but improved to 1.2 in Jan 2021  - SUZE to 1.81 in Sept 2021 but more recently 1.5-1.6 since.   She has had some albuminuria dating back to 2010. Fenofibrate therapy since 2018. Currently takes torsemide 20 mg AM and 20 mg PM - followed by cardiology clinic at [facility] for CHF. Dx with diabetes in her 40s.     - History of Hematuria: No  - Swelling: hx but none at this time. Breathing is comfortable. Weight recently 266-271 lbs. Torsemide 20 mg BID at this time. No lighhteadedness/dizziness at this time.   - Hx of UTIs: no  - Hx of stones: no  - Rashes/Joint pain: no rash; joint pain - generalized  - Family hx of kidney disease: no  - NSAID use: only tylenol    07/12/22: video visit. At the time of her initial visit I did a renal ultrasound which showed a 16 mm kidney lesion. MRI was then done which did not show any kidney lesion. We stopped the fenofibrate at that visit as well. She was also noted to have a monoclonal gammopathy - was seen by hematology in May - they have plans to monitor with follow-up 4 months later. Torsemide was increased a month ago when weight was increasing and becoming more symptomatic. Currently on torsemide 40 mg twice a day. Weight is stable - 269-272 lbs. Last month she had gotten up to 278 lbs. No swelling currently. Breathing is comfortable. No lightheadedness/dizziness. Blood pressure  has been 135/64, 128/56, 141/66, 125/72. 90/58 today in clinic. She felt a little tired. NO lighhteadedness on standing. No orthopnea.     01/10/23: video visit. Seen by cardiology at [facility] yesterday - no changes made to her medications but the cardiologist notes that she would be in favor of trialing a lower dose of torsemide if needed. Was thirsty at the time of the lab test. No swelling. Breathing is comfortable. BP has been 101/-120/58-80. Follows with hematology for MGUS.     04/11/23: video visit. Creatinine has been 1.5-1.8 since 2021. Had SUZE to 2.2 in Jan- diuretic lowered. Sleeping through the night. WEight has been 258-262 lbs.  This is down from 270 last year. No swelling. No SOB. No orthopnea.     ACETAMINOPHEN PO, Take 1,000 mg by mouth 2 times daily  amLODIPine (NORVASC) 10 MG tablet, TAKE 1 TABLET (10 MG) BY MOUTH DAILY  aspirin 81 MG tablet, Take 1 tablet (81 mg) by mouth daily  atorvastatin (LIPITOR) 40 MG tablet, Take 1 tablet (40 mg) by mouth daily  blood glucose (NO BRAND SPECIFIED) test strip, Use to test blood sugar 3 times daily or as directed./ Brand per insurance  blood glucose monitoring (NO BRAND SPECIFIED) meter device kit, Use to test blood sugar 3 times daily or as directed./ Brand per insurance  calcium carbonate (OS-NATACHA) 1500 (600 Ca) MG tablet, Take 600 mg by mouth daily  carvedilol (COREG) 25 MG tablet, TAKE 1 TABLET (25 MG) BY MOUTH 2 TIMES DAILY WITH MEALS  cholecalciferol (VITAMIN D3) 5000 units TABS tablet, Take 2 tablets by mouth daily  Continuous Blood Gluc Sensor (FREESTYLE MARI 14 DAY SENSOR) MISC, USE 1 DEVICE EVERY 14 DAYS AS DIRECTED  dulaglutide (TRULICITY) 1.5 MG/0.5ML pen, Inject 1.5 mg Subcutaneous every 7 days  folic acid (FOLVITE) 1 MG tablet, Take 1 tablet (1 mg) by mouth daily  icosapent ethyl (VASCEPA) 1 g CAPS capsule, Take 1 g by mouth daily Pt reported 4/4/23  insulin aspart (NOVOLOG FLEXPEN) 100 UNIT/ML pen, INJECT SUBCUTANEOUSLY 27 UNITS  BEFORE BREAKFAST, 5 UNITS BEFORE LUNCH, 25 UNITS BEFORE DINNER.  (MAX DOSE 70 UNITS/DAY FOR ADJUSTMENT)  insulin glargine U-300 (TOUJEO SOLOSTAR) 300 UNIT/ML (1 units dial) pen, Inject 40 Units Subcutaneous At Bedtime  insulin pen needle (32G X 4 MM) 32G X 4 MM miscellaneous, Use 3 pen needles daily or as directed.  Per insurance and patient preference  levothyroxine (SYNTHROID/LEVOTHROID) 150 MCG tablet, Take 1 tablet (150 mcg) by mouth daily  losartan (COZAAR) 25 MG tablet, Take 0.5 tablets by mouth daily  methocarbamol (ROBAXIN) 500 MG tablet, Take 1 tablet (500 mg) by mouth 3 times daily as needed for muscle spasms  PARoxetine (PAXIL) 20 MG tablet, TAKE 1 TABLET (20 MG) BY MOUTH 2 TIMES DAILY  polyethylene glycol (MIRALAX) 17 GM/Dose powder, Take 1 packet by mouth daily as needed  potassium chloride ER (KLOR-CON M) 20 MEQ CR tablet, Take 1 tablet (20 mEq) by mouth daily  primidone (MYSOLINE) 50 MG tablet, Take 2 tablets (100 mg) by mouth 2 times daily  torsemide (DEMADEX) 20 MG tablet, Take 40 mg QAM and 20 mg QPM    No current facility-administered medications on file prior to visit.      Exam:  /64   Pulse 85   GENERAL APPEARANCE: alert and no distress  Breathing appears nonlabored.     Results:  No visits with results within 1 Day(s) from this visit.   Latest known visit with results is:   Lab on 04/01/2023   Component Date Value Ref Range Status     Sodium 04/01/2023 141  133 - 144 mmol/L Final     Potassium 04/01/2023 4.4  3.4 - 5.3 mmol/L Final     Chloride 04/01/2023 104  94 - 109 mmol/L Final     Carbon Dioxide (CO2) 04/01/2023 33 (H)  20 - 32 mmol/L Final     Anion Gap 04/01/2023 4  3 - 14 mmol/L Final     Urea Nitrogen 04/01/2023 22  7 - 30 mg/dL Final     Creatinine 04/01/2023 1.57 (H)  0.52 - 1.04 mg/dL Final     Calcium 04/01/2023 8.9  8.5 - 10.1 mg/dL Final     Glucose 04/01/2023 198 (H)  70 - 99 mg/dL Final     Albumin 04/01/2023 3.3 (L)  3.4 - 5.0 g/dL Final     Phosphorus 04/01/2023 3.4   2.5 - 4.5 mg/dL Final     GFR Estimate 04/01/2023 34 (L)  >60 mL/min/1.73m2 Final    eGFR calculated using 2021 CKD-EPI equation.     Parathyroid Hormone Intact 04/01/2023 35  15 - 65 pg/mL Final     Immunofixation ELP 04/01/2023 No monoclonal protein seen on immunofixation. Pathologic significance requires clinical correlation.  GISEL Jackson M.D., Ph.D., Pathologist ()   Final     Kappa Free Light Chains 04/01/2023 6.50 (H)  0.33 - 1.94 mg/dL Final     Lambda Free Light Chains 04/01/2023 3.05 (H)  0.57 - 2.63 mg/dL Final     Kappa /Lambda Ratio 04/01/2023 2.13 (H)  0.26 - 1.65 Final     WBC Count 04/01/2023 4.6  4.0 - 11.0 10e3/uL Final     RBC Count 04/01/2023 3.27 (L)  3.80 - 5.20 10e6/uL Final     Hemoglobin 04/01/2023 11.4 (L)  11.7 - 15.7 g/dL Final     Hematocrit 04/01/2023 35.6  35.0 - 47.0 % Final     MCV 04/01/2023 109 (H)  78 - 100 fL Final     MCH 04/01/2023 34.9 (H)  26.5 - 33.0 pg Final     MCHC 04/01/2023 32.0  31.5 - 36.5 g/dL Final     RDW 04/01/2023 12.4  10.0 - 15.0 % Final     Platelet Count 04/01/2023 81 (L)  150 - 450 10e3/uL Final     % Neutrophils 04/01/2023 62  % Final     % Lymphocytes 04/01/2023 28  % Final     % Monocytes 04/01/2023 6  % Final     % Eosinophils 04/01/2023 3  % Final     % Basophils 04/01/2023 1  % Final     % Immature Granulocytes 04/01/2023 0  % Final     NRBCs per 100 WBC 04/01/2023 0  <1 /100 Final     Absolute Neutrophils 04/01/2023 2.8  1.6 - 8.3 10e3/uL Final     Absolute Lymphocytes 04/01/2023 1.3  0.8 - 5.3 10e3/uL Final     Absolute Monocytes 04/01/2023 0.3  0.0 - 1.3 10e3/uL Final     Absolute Eosinophils 04/01/2023 0.1  0.0 - 0.7 10e3/uL Final     Absolute Basophils 04/01/2023 0.0  0.0 - 0.2 10e3/uL Final     Absolute Immature Granulocytes 04/01/2023 0.0  <=0.4 10e3/uL Final     Absolute NRBCs 04/01/2023 0.0  10e3/uL Final     Total Protein Serum for ELP 04/01/2023 6.1 (L)  6.4 - 8.3 g/dL Final     Albumin 04/01/2023 3.5 (L)  3.7 - 5.1 g/dL Final      Alpha 1 04/01/2023 0.3  0.2 - 0.4 g/dL Final     Alpha 2 04/01/2023 0.8  0.5 - 0.9 g/dL Final     Beta Globulin 04/01/2023 0.7  0.6 - 1.0 g/dL Final     Gamma Globulin 04/01/2023 0.9  0.7 - 1.6 g/dL Final     Monoclonal Peak 04/01/2023 0.0  <=0.0 g/dL Final     ELP Interpretation 04/01/2023 Slight hypoalbuminemia with an otherwise essentially normal electrophoretic pattern. No obvious monoclonal proteins seen. Pathologic significance requires clinical correlation. GISEL Jackson M.D., Ph.D., Pathologist ().   Final         Lab results were reviewed and interpreted.       Assessment/Plan:   Stage 3b chronic kidney disease (H)  AT risk for CKD in the setting of diabetes, hypertension, CHF on diuretics, tobacco hx, obesity as a potential risk for secondary FSGS, and also fenofibrate use. She has no hematuria which is reassuring, UPCR is 0.24 g/g which is reassuring as well - likely mild proteinuria secondary to obesity, diabetes, arterionephrosclerosis. We stopped fenofibrate at her previous visit - no improvement in creatinine seen but also had an increase in her torsemide since last visit which may have caused the lack of improvement. Would like to avoid fenofibrate going forward. Educated on risk of cardiorenal physiology - want to avoid volume overload for her CHF but also avoid dehydration to avoid SUZE and the potential need to adjust diuretics to keep this balanced. On max lisinopril. Ideally would be on SGLT2 inhibitor but with her GFR so close to 30 and the likelihood of it dropping below 30 once on SGLT2 inhibitor, will hold on starting this for the time being.      CHF:  On torsemide 40 mg AM and 20 mg PM - volume is stable. As noted above, will keep diuretic as is but if there is concern for dehydration, would have a low threshold to lower diuretic.     DM: last A1C 7.3%     Hypertension: blood pressure goal is 110-130 systolic/<80 diastolic.     Hx of tobacco: at risk for secondary FSGS -  now away from tobacco which is great to hear    Obesity: educated on benefits of weight loss/healthy lifestyle     Abnormal kidney ultrasound: ultrasound showed a concerning lesion but then the MRI was normal. Reviewed with Radiology July 2022 and they do not feel follow-up needed.     Monoclonal gammopathy: seen by hematology. Has thrombocytopenia as well - asked them to discuss with hematology.     Recommend follow-up in 3-4 months to assure ongoing stability.    Exact time of visit was not documented  Over 40 minutes spent by me on the date of the encounter doing chart review, review of test results, interpretation of tests, patient visit and documentation     Jessika Preston, DO

## 2023-04-11 NOTE — PROGRESS NOTES
Virtual Visit Details    Type of service:  Video Visit     Originating Location (pt. Location): Home    Distant Location (provider location):  Off-site  Platform used for Video Visit: Sergio

## 2023-04-16 DIAGNOSIS — M81.0 OSTEOPOROSIS, UNSPECIFIED OSTEOPOROSIS TYPE, UNSPECIFIED PATHOLOGICAL FRACTURE PRESENCE: ICD-10-CM

## 2023-04-16 RX ORDER — ALENDRONATE SODIUM 70 MG/1
TABLET ORAL
Qty: 12 TABLET | Refills: 3 | Status: SHIPPED | OUTPATIENT
Start: 2023-04-16 | End: 2023-04-17

## 2023-04-17 DIAGNOSIS — M81.0 OSTEOPOROSIS, UNSPECIFIED OSTEOPOROSIS TYPE, UNSPECIFIED PATHOLOGICAL FRACTURE PRESENCE: ICD-10-CM

## 2023-04-17 RX ORDER — ALENDRONATE SODIUM 70 MG/1
TABLET ORAL
Qty: 12 TABLET | Refills: 3 | Status: SHIPPED | OUTPATIENT
Start: 2023-04-17 | End: 2024-03-19

## 2023-04-20 ENCOUNTER — VIRTUAL VISIT (OUTPATIENT)
Dept: PHARMACY | Facility: CLINIC | Age: 73
End: 2023-04-20
Payer: COMMERCIAL

## 2023-04-20 DIAGNOSIS — M81.0 OSTEOPOROSIS, UNSPECIFIED OSTEOPOROSIS TYPE, UNSPECIFIED PATHOLOGICAL FRACTURE PRESENCE: ICD-10-CM

## 2023-04-20 DIAGNOSIS — E03.9 HYPOTHYROIDISM, UNSPECIFIED TYPE: ICD-10-CM

## 2023-04-20 DIAGNOSIS — I27.20 PULMONARY HYPERTENSION (H): ICD-10-CM

## 2023-04-20 DIAGNOSIS — I10 HYPERTENSION GOAL BP (BLOOD PRESSURE) < 140/90: ICD-10-CM

## 2023-04-20 DIAGNOSIS — Z79.4 TYPE 2 DIABETES MELLITUS WITH OTHER DIABETIC KIDNEY COMPLICATION, WITH LONG-TERM CURRENT USE OF INSULIN (H): Primary | ICD-10-CM

## 2023-04-20 DIAGNOSIS — R53.83 FATIGUE, UNSPECIFIED TYPE: ICD-10-CM

## 2023-04-20 DIAGNOSIS — G25.0 ESSENTIAL TREMOR: ICD-10-CM

## 2023-04-20 DIAGNOSIS — F32.1 MODERATE MAJOR DEPRESSION (H): ICD-10-CM

## 2023-04-20 DIAGNOSIS — N18.32 STAGE 3B CHRONIC KIDNEY DISEASE (H): ICD-10-CM

## 2023-04-20 DIAGNOSIS — E78.5 HYPERLIPIDEMIA LDL GOAL <100: ICD-10-CM

## 2023-04-20 DIAGNOSIS — E11.29 TYPE 2 DIABETES MELLITUS WITH OTHER DIABETIC KIDNEY COMPLICATION, WITH LONG-TERM CURRENT USE OF INSULIN (H): Primary | ICD-10-CM

## 2023-04-20 PROCEDURE — 99605 MTMS BY PHARM NP 15 MIN: CPT | Performed by: PHARMACIST

## 2023-04-20 NOTE — LETTER
_  Medication List        Prepared on: Apr 20, 2023     Bring your Medication List when you go to the doctor, hospital, or   emergency room. And, share it with your family or caregivers.     Note any changes to how you take your medications.  Cross out medications when you no longer use them.    Medication How I take it Why I use it Prescriber   ACETAMINOPHEN PO Take 1,000 mg by mouth 2 times daily Pain Patient Reported   alendronate (FOSAMAX) 70 MG tablet TAKE 1 TABLET (70 MG) BY MOUTH EVERY 7 DAYS. TAKE 60 MINUTES BEFORE MORNING MEAL WITH 8 OZ. OF WATER. REMAIN UPRIGHT FOR 30 MINUTES. Osteoporosis Sandy Ricci MD   amLODIPine (NORVASC) 10 MG tablet TAKE 1 TABLET (10 MG) BY MOUTH DAILY Blood pressure Sandy Ricci MD   aspirin 81 MG tablet Take 1 tablet (81 mg) by mouth daily Stroke prevention Valerie Chamberlain, APRN CNP   atorvastatin (LIPITOR) 40 MG tablet Take 1 tablet (40 mg) by mouth daily Heart; Cholesterol Sandy Ricci MD   blood glucose (NO BRAND SPECIFIED) test strip Use to test blood sugar 3 times daily or as directed. Diabetes  Sandy Ricci MD   calcium carbonate (OS-NATACHA) 1500 (600 Ca) MG tablet Take 600 mg by mouth daily Bone Health Patient Reported   carvedilol (COREG) 25 MG tablet TAKE 1 TABLET (25 MG) BY MOUTH 2 TIMES DAILY WITH MEALS Blood pressure Sandy Ricci MD   cholecalciferol (VITAMIN D3) 5000 units TABS tablet Take 2 tablets by mouth daily Bone Health Patient Reported   Continuous Blood Gluc Sensor (FREESTYLE MARI 14 DAY SENSOR) MISC USE 1 DEVICE EVERY 14 DAYS AS DIRECTED Type 2 diabetes Sandy Ricci MD   dulaglutide (TRULICITY) 1.5 MG/0.5ML pen Inject 1.5 mg Subcutaneous every 7 days  Type 2 diabetes Sandy Ricci MD   folic acid (FOLVITE) 1 MG tablet Take 1 tablet (1 mg) by mouth daily Folate Deficiency Jessika Preston,    icosapent ethyl (VASCEPA) 1 g CAPS capsule Take 1 g by mouth daily Pt reported 4/4/23 Heart;  Cholesterol Barbara Giraldo MD   insulin aspart (NOVOLOG FLEXPEN) 100 UNIT/ML pen INJECT SUBCUTANEOUSLY 27 UNITS BEFORE BREAKFAST, 5 UNITS BEFORE LUNCH, 25 UNITS BEFORE DINNER.  (MAX DOSE 70 UNITS/DAY FOR ADJUSTMENT) Type 2 diabetes Sandy Ricci MD   insulin glargine U-300 (TOUJEO SOLOSTAR) 300 UNIT/ML (1 units dial) pen Inject 40 Units Subcutaneous At Bedtime Type 2 diabetes Sandy Ricci MD   insulin pen needle (32G X 4 MM) 32G X 4 MM miscellaneous Use 3 pen needles daily or as directed.  Per insurance and patient preference Type 2 diabetes Sandy Ricci MD   levothyroxine (SYNTHROID/LEVOTHROID) 150 MCG tablet Take 1 tablet (150 mcg) by mouth daily Hypothyroidism Sandy Ricci MD   losartan (COZAAR) 25 MG tablet Take 0.5 tablets by mouth daily Blood Pressure; Kidneys Anna Manriquez PA-C   methocarbamol (ROBAXIN) 500 MG tablet Take 1 tablet (500 mg) by mouth 3 times daily as needed for muscle spasms Back Pain Dwayne Sierra MD   PARoxetine (PAXIL) 20 MG tablet TAKE 1 TABLET (20 MG) BY MOUTH 2 TIMES DAILY Moderate Major Depression Sandy Ricci MD   polyethylene glycol (MIRALAX) 17 GM/Dose powder Take 1 packet by mouth daily as needed Constipation Patient Reported   potassium chloride ER (KLOR-CON M) 20 MEQ CR tablet Take 1 tablet (20 mEq) by mouth daily Chronic Kidney Disease Jessika Preston,    primidone (MYSOLINE) 50 MG tablet Take 2 tablets (100 mg) by mouth 2 times daily Essential Tremor Sandy Ricci MD   torsemide (DEMADEX) 20 MG tablet Take 40 mg by mouth every morning and 20 mg by mouth every afternoon/evening Chronic Kidney Disease Jessika Preston DO         Add new medications, over-the-counter drugs, herbals, vitamins, or  minerals in the blank rows below.    Medication How I take it Why I use it Prescriber                                      Allergies:      No Known Allergies        Side effects I have had:               Other  Information:              My notes and questions:

## 2023-04-20 NOTE — LETTER
"Recommended To-Do List      Prepared on: Apr 20, 2023       You can get the best results from your medications by completing the items on this \"To-Do List.\"      Bring your To-Do List when you go to your doctor. And, share it with your family or caregivers.    My To-Do List:  What we talked about: What I should do:    What my medicines are for, how to know if my medicines are working, made sure my medicines are safe for me and reviewed how to take my medicines.      Take my medicines every day                "

## 2023-04-20 NOTE — PROGRESS NOTES
Medication Therapy Management (MTM) Encounter    ASSESSMENT:                            Medication Adherence/Access: No issues identified    Type 2 Diabetes:  Stable. A1c is close to goal of <7%.    Chronic kidney disease/Pulmonary hypertension/Hypertension: Stable. Plan in place with cardiology and nephrology.     Depression/Fatigue: Somewhat stable per patient.     Hyperlipidemia: Stable.     Hypothyroidism: Stable. Last TSH is within normal limits.    Osteoporosis: Stable.     Essential tremor: Unchanged - per patient plan in place to follow-up with neurology.     PLAN:                            1. Continue to take your current medications as prescribed and attend your upcoming appointments.     2. I will reach out to our endocrinology pharmacy liaison team to see if they can assist you with applying for insulin and potentially Trulicity (though there may be a limit on new applications) free medication programs.    Follow-up: 1 year or sooner if needed    SUBJECTIVE/OBJECTIVE:                          Yadi Iniguez is a 72 year old female called for a follow-up visit.  Today's visit is a follow-up MTM visit from 9/15/2022.     Reason for visit: Comprehensive medication review.    Allergies/ADRs: Reviewed in chart  Past Medical History: Reviewed in chart  Tobacco: She reports that she quit smoking about 21 months ago. Her smoking use included cigarettes. She started smoking about 56 years ago. She has a 50.00 pack-year smoking history. She has never been exposed to tobacco smoke. She has never used smokeless tobacco.  Alcohol: Less than 1 beverage / month  Caffeine: drinks 2 cups of coffee/day    Medication Adherence/Access: Patient uses pill box(es).  Patient takes medications 2 time(s) per day.   Per patient, misses medication 0 times per week.   Medication barriers: none.   The patient fills medications at [facility]: YES.    Type 2 Diabetes:  Currently taking Trulicity 1.5 mg weekly, Toujeo 40 units at bedtime,  and Novolog 27 units before breakfast, 5 units before lunch, and 25 units before suppertime. Interested in exploring medication assistance programs, but not interested in changing from [facility]. Patient is not experiencing side effects.  Blood sugar monitorin-3 time(s) daily. Ranges (patient reported): 140-170s mg/dL  Symptoms of low blood sugar? none  Symptoms of high blood sugar? none  Eye exam: up to date  Foot exam: up to date  Diet/Exercise: unchanged  Aspirin: Taking 81mg daily and denies side effects   Statin: Yes: atorvatatin   ACEi/ARB: Yes: losartan.   Urine Albumin:   Lab Results   Component Value Date    UMALCR 87.64 (H) 2021      Lab Results   Component Value Date    A1C 7.3 2023    A1C 6.9 2022    A1C 8.2 01/15/2022    A1C 6.5 2021    A1C 6.1 2021    A1C 6.3 2020    A1C 6.7 2020    A1C 6.7 2019    A1C 7.1 2019       Chronic kidney disease/Pulmonary hypertension/Hypertension: Current medications include amlodipine 10 mg daily, carvedilol 25 mg twice daily, losartan 12.5 mg daily (states cardiology still has her on this), and torsemide 40 mg every morning/20 mg every evening.  Now off potassium per patient. Patient does self-monitor blood pressure. Home BP monitoring in range of 130's systolic over 70's diastolic.  Swelling is well controlled. Follows with cardiology out of [facility] and nephrology. Patient reports no current medication side effects.  BP Readings from Last 3 Encounters:   23 108/64   23 107/68   01/10/23 101/58     Creatinine   Date Value Ref Range Status   2023 1.57 (H) 0.52 - 1.04 mg/dL Final   2021 1.20 (H) 0.52 - 1.04 mg/dL Final     Potassium   Date Value Ref Range Status   2023 4.4 3.4 - 5.3 mmol/L Final   2021 5.0 3.4 - 5.3 mmol/L Final       Depression/Fatigue: Currently taking paroxetine 20 mg twice daily. Mood is stable per patient. Patient is still needing a nap but not as long  recently. Wonders if weather may be contributing as well. Denies any side effects.    Hyperlipidemia: Current therapy includes atorvastatin 40 mg daily and Vascepa 1 gram daily.  Patient reports no significant myalgias or other side effects.  The 10-year ASCVD risk score (Marcella ROMERO, et al., 2019) is: 23.2%    Values used to calculate the score:      Age: 72 years      Sex: Female      Is Non- : No      Diabetic: Yes      Tobacco smoker: No      Systolic Blood Pressure: 108 mmHg      Is BP treated: Yes      HDL Cholesterol: 39 mg/dL      Total Cholesterol: 193 mg/dL  Recent Labs   Lab Test 07/09/22  1005 01/15/22  0914   CHOL 214* 182   HDL 32* 42*   * 100   TRIG 391* 201*     Hypothyroidism: Patient is taking levothyroxine 150 mcg daily. Patient is having the following symptoms: none.   TSH   Date Value Ref Range Status   07/09/2022 0.60 0.40 - 4.00 mU/L Final   06/30/2020 4.65 (H) 0.40 - 4.00 mU/L Final     Osteoporosis: Current therapy includes: alendronate 70 mg every 7 days, calcium carbonate daily, and vitamin D3 250 mcg daily. Patient is not experiencing side effects.  DEXA History: 2020  Risk factors: post-menopausal    Essential tremor: Patient is taking primidone 100 mg twice daily. Does not feel that dose increase from 75 mg twice daily has made noticeable difference. Besides fatigue denies potential side effects since dose increase.     Today's Vitals: There were no vitals taken for this visit.     Wt Readings from Last 5 Encounters:   03/24/23 258 lb (117 kg)   01/10/23 264 lb (119.7 kg)   01/10/23 266 lb (120.7 kg)   07/12/22 272 lb (123.4 kg)   07/12/22 272 lb (123.4 kg)       ----------------      I spent 18 minutes with this patient today. A copy of the visit note was provided to the patient's provider(s).    A summary of these recommendations was sent via [employer].    Luis Antonio Bass, PharmD, BCACP  Medication Therapy Management Pharmacist  Pager: [phone]    Telemedicine  Visit Details  Type of service:  Telephone visit  Start Time: 9:51 AM  End Time: 10:09 AM     Medication Therapy Recommendations  No medication therapy recommendations to display

## 2023-04-20 NOTE — LETTER
April 21, 2023  Yadi Iniguez  [address]  [place] [address]    Dear MALDONADO Asher [facility]     Thank you for talking with me on Apr 20, 2023 about your health and medications. As a follow-up to our conversation, I have included two documents:      1. Your Recommended To-Do List has steps you should take to get the best results from your medications.  2. Your Medication List will help you keep track of your medications and how to take them.    If you want to talk about these documents, please call Geovanny Bass RPH at phone: [phone], Monday-Friday 8-4:30pm.    I look forward to working with you and your doctors to make sure your medications work well for you.    Sincerely,  Geovanny Bass RPH  [facility] Pharmacist, [facility]

## 2023-04-20 NOTE — PATIENT INSTRUCTIONS
"Recommendations from today's MTM visit:                                                    MTM (medication therapy management) is a service provided by a clinical pharmacist designed to help you get the most of out of your medicines.   Today we reviewed what your medicines are for, how to know if they are working, that your medicines are safe and how to make your medicine regimen as easy as possible.      1. Continue to take your current medications as prescribed and attend your upcoming appointments.     2. I will reach out to our endocrinology pharmacy liaison team to see if they can assist you with applying for insulin and potentially Trulicity (though there may be a limit on new applications) free medication programs.    Follow-up: 1 year or sooner if needed    It was great speaking with you today.  I value your experience and would be very thankful for your time in providing feedback in our clinic survey. In the next few days, you may receive an email or text message from [employer] with a link to a survey related to your  clinical pharmacist.\"     To schedule another MTM appointment, please call the clinic directly or you may call the MTM scheduling line at [phone] or toll-free at [phone].     My Clinical Pharmacist's contact information:                                                      Please feel free to contact me with any questions or concerns you have.      Luis Antonio Bass, Cash, [facility]  Medication Therapy Management Pharmacist  Pager: [phone]  "

## 2023-04-22 ENCOUNTER — LAB (OUTPATIENT)
Dept: LAB | Facility: CLINIC | Age: 73
End: 2023-04-22
Payer: COMMERCIAL

## 2023-04-22 DIAGNOSIS — D69.6 THROMBOCYTOPENIA (H): ICD-10-CM

## 2023-04-22 DIAGNOSIS — I50.9 HEART FAILURE, UNSPECIFIED (H): ICD-10-CM

## 2023-04-22 DIAGNOSIS — N18.32 STAGE 3B CHRONIC KIDNEY DISEASE (H): ICD-10-CM

## 2023-04-22 LAB
ALBUMIN MFR UR ELPH: 6.6 MG/DL (ref 1–14)
BASOPHILS # BLD AUTO: 0 10E3/UL (ref 0–0.2)
BASOPHILS NFR BLD AUTO: 1 %
CREAT UR-MCNC: 37.7 MG/DL
EOSINOPHIL # BLD AUTO: 0.2 10E3/UL (ref 0–0.7)
EOSINOPHIL NFR BLD AUTO: 3 %
ERYTHROCYTE [DISTWIDTH] IN BLOOD BY AUTOMATED COUNT: 12.9 % (ref 10–15)
HCT VFR BLD AUTO: 36.3 % (ref 35–47)
HGB BLD-MCNC: 12 G/DL (ref 11.7–15.7)
IMM GRANULOCYTES # BLD: 0 10E3/UL
IMM GRANULOCYTES NFR BLD: 0 %
LYMPHOCYTES # BLD AUTO: 1.6 10E3/UL (ref 0.8–5.3)
LYMPHOCYTES NFR BLD AUTO: 34 %
MCH RBC QN AUTO: 35.2 PG (ref 26.5–33)
MCHC RBC AUTO-ENTMCNC: 33.1 G/DL (ref 31.5–36.5)
MCV RBC AUTO: 107 FL (ref 78–100)
MONOCYTES # BLD AUTO: 0.3 10E3/UL (ref 0–1.3)
MONOCYTES NFR BLD AUTO: 7 %
NEUTROPHILS # BLD AUTO: 2.5 10E3/UL (ref 1.6–8.3)
NEUTROPHILS NFR BLD AUTO: 55 %
NRBC # BLD AUTO: 0 10E3/UL
NRBC BLD AUTO-RTO: 0 /100
PLATELET # BLD AUTO: 91 10E3/UL (ref 150–450)
PLATELETS.RETICULATED NFR BLD AUTO: 7.1 % (ref 1–7)
PROT/CREAT 24H UR: 0.18 MG/MG CR (ref 0–0.2)
RBC # BLD AUTO: 3.41 10E6/UL (ref 3.8–5.2)
RETICS # AUTO: 0.07 10E6/UL (ref 0.03–0.1)
RETICS/RBC NFR AUTO: 2.1 % (ref 0.5–2)
WBC # BLD AUTO: 4.7 10E3/UL (ref 4–11)

## 2023-04-22 PROCEDURE — 99207 BLOOD MORPHOLOGY PATHOLOGIST REVIEW: CPT | Performed by: PATHOLOGY

## 2023-04-22 PROCEDURE — 36415 COLL VENOUS BLD VENIPUNCTURE: CPT

## 2023-04-22 PROCEDURE — 85045 AUTOMATED RETICULOCYTE COUNT: CPT

## 2023-04-22 PROCEDURE — 80061 LIPID PANEL: CPT

## 2023-04-22 PROCEDURE — 84156 ASSAY OF PROTEIN URINE: CPT

## 2023-04-22 PROCEDURE — 85025 COMPLETE CBC W/AUTO DIFF WBC: CPT

## 2023-04-22 PROCEDURE — 85055 RETICULATED PLATELET ASSAY: CPT

## 2023-04-24 LAB
CHOLEST SERPL-MCNC: 150 MG/DL
FASTING STATUS PATIENT QL REPORTED: YES
HDLC SERPL-MCNC: 38 MG/DL
LDLC SERPL CALC-MCNC: 67 MG/DL
NONHDLC SERPL-MCNC: 112 MG/DL
PATH REPORT.COMMENTS IMP SPEC: NORMAL
PATH REPORT.COMMENTS IMP SPEC: NORMAL
PATH REPORT.FINAL DX SPEC: NORMAL
PATH REPORT.MICROSCOPIC SPEC OTHER STN: NORMAL
PATH REPORT.MICROSCOPIC SPEC OTHER STN: NORMAL
PATH REPORT.RELEVANT HX SPEC: NORMAL
TRIGL SERPL-MCNC: 225 MG/DL

## 2023-04-25 NOTE — PROGRESS NOTES
04/25 called patient (1st attempt) left voicemail and provided [phone] to schedule 3-4 month follow up visit with .       Luz Elena uribe Procedure   Cardiology, Nephrology, Rheumatology, GI, Pulmonology, Infectious Disease Specialties   [facility]

## 2023-04-28 ENCOUNTER — TELEPHONE (OUTPATIENT)
Dept: FAMILY MEDICINE | Facility: CLINIC | Age: 73
End: 2023-04-28

## 2023-05-01 DIAGNOSIS — D69.6 THROMBOCYTOPENIA (H): ICD-10-CM

## 2023-05-01 DIAGNOSIS — D47.2 MGUS (MONOCLONAL GAMMOPATHY OF UNKNOWN SIGNIFICANCE): Primary | ICD-10-CM

## 2023-05-04 ENCOUNTER — TELEPHONE (OUTPATIENT)
Dept: FAMILY MEDICINE | Facility: CLINIC | Age: 73
End: 2023-05-04
Payer: COMMERCIAL

## 2023-05-04 NOTE — TELEPHONE ENCOUNTER
These have been printed and signed  2 different companies so 2 different fax destinations    Sandy Ricci MD

## 2023-05-04 NOTE — TELEPHONE ENCOUNTER
Yadira Nordisk faxed to [phone] verified [employer] 5/4 10:13  Sonofi faxed to [phone] verified [employer] 5/4 10:12    Placed in completed faxes ZULEMA Rosario  [facility]    Primary Care

## 2023-05-04 NOTE — TELEPHONE ENCOUNTER
----- Message from Oriana Abbasi sent at 4/28/2023  2:09 PM CDT -----  Regarding: RE: Toujeo and Basaglar Free drug  The applications have been added to the media tab. Please print off and do a wet signature. Please fax them directly to the companies. There is two different companies so please make sure to fax to the right one.  ----- Message -----  From: Sandy Ricci MD  Sent: 4/28/2023   1:46 PM CDT  To: Oriana Abbasi  Subject: RE: Toujeo and Basaglar Free drug                Millicent,    Just scan it under the media tab and let me know when you have done so.     Thanks,      Sandy Ricci MD    ----- Message -----  From: Oriana Abbasi  Sent: 4/28/2023   1:43 PM CDT  To: Sandy Ricci MD  Subject: Toujeo and Basaglar Free drug                    Flako Delgado,    I'm reaching out because I'm getting the patient assistance through the free drug programs [employer] and [employer]. What I need from you is to sign off on the providers portion. I was wondering what is the best e-mail for me to send you your portion? Otherwise I can add it to the media tab and you can print it off to sign. Either way the documents will need to be printed cause they need a wet signature.      Thank you,  Millicent Abbasi, [facility]  Diabetes Weight Management Clinic Liaison     [facility]    [email or web address]  [email or web address]    Phone: [phone]  Fax: [phone]

## 2023-05-08 NOTE — TELEPHONE ENCOUNTER
[employer] called back and stated that they were missing page 2 of the 5 page application.  Page 2 is the patient demographics and HIPPA consent form to be able to release information.   Not sure how we get the patient to sign this?? Will review when fax comes in  Pearl READ  Patient Representative  [phone]

## 2023-05-09 NOTE — TELEPHONE ENCOUNTER
Светлана from [employer] called me and stated that [employer] has received the provider portion of the application but not the patient portion. I'm not sure which medication the application was for.    Thank you,     Jose Lepe [facility]  Pharmacy Clinic [facility]  [email or web address]   Phone: [phone]  Fax: [phone]

## 2023-06-12 ENCOUNTER — PATIENT OUTREACH (OUTPATIENT)
Dept: CARE COORDINATION | Facility: CLINIC | Age: 73
End: 2023-06-12
Payer: COMMERCIAL

## 2023-06-15 ENCOUNTER — MYC MEDICAL ADVICE (OUTPATIENT)
Dept: FAMILY MEDICINE | Facility: CLINIC | Age: 73
End: 2023-06-15
Payer: COMMERCIAL

## 2023-06-15 DIAGNOSIS — E11.9 TYPE 2 DIABETES, HBA1C GOAL < 8% (H): Primary | ICD-10-CM

## 2023-06-15 DIAGNOSIS — E03.9 HYPOTHYROIDISM, UNSPECIFIED TYPE: ICD-10-CM

## 2023-06-16 DIAGNOSIS — N18.32 STAGE 3B CHRONIC KIDNEY DISEASE (H): Primary | ICD-10-CM

## 2023-06-19 ENCOUNTER — MYC MEDICAL ADVICE (OUTPATIENT)
Dept: FAMILY MEDICINE | Facility: CLINIC | Age: 73
End: 2023-06-19
Payer: COMMERCIAL

## 2023-06-19 DIAGNOSIS — R51.9 ACUTE NONINTRACTABLE HEADACHE, UNSPECIFIED HEADACHE TYPE: Primary | ICD-10-CM

## 2023-06-19 DIAGNOSIS — R26.81 UNSTEADINESS ON FEET: ICD-10-CM

## 2023-06-19 DIAGNOSIS — R11.10 VOMITING, UNSPECIFIED VOMITING TYPE, UNSPECIFIED WHETHER NAUSEA PRESENT: ICD-10-CM

## 2023-06-26 ENCOUNTER — OFFICE VISIT (OUTPATIENT)
Dept: FAMILY MEDICINE | Facility: CLINIC | Age: 73
End: 2023-06-26
Payer: COMMERCIAL

## 2023-06-26 ENCOUNTER — PATIENT OUTREACH (OUTPATIENT)
Dept: CARE COORDINATION | Facility: CLINIC | Age: 73
End: 2023-06-26

## 2023-06-26 VITALS
DIASTOLIC BLOOD PRESSURE: 57 MMHG | HEART RATE: 87 BPM | TEMPERATURE: 99.2 F | SYSTOLIC BLOOD PRESSURE: 90 MMHG | RESPIRATION RATE: 17 BRPM | HEIGHT: 69 IN | BODY MASS INDEX: 38.51 KG/M2 | OXYGEN SATURATION: 90 % | WEIGHT: 260 LBS

## 2023-06-26 DIAGNOSIS — R51.9 ACUTE NONINTRACTABLE HEADACHE, UNSPECIFIED HEADACHE TYPE: Primary | ICD-10-CM

## 2023-06-26 DIAGNOSIS — E78.5 HYPERLIPIDEMIA, UNSPECIFIED HYPERLIPIDEMIA TYPE: ICD-10-CM

## 2023-06-26 DIAGNOSIS — C80.1 VERRUCOUS CARCINOMA (H): ICD-10-CM

## 2023-06-26 DIAGNOSIS — E11.29 TYPE 2 DIABETES MELLITUS WITH OTHER DIABETIC KIDNEY COMPLICATION, WITH LONG-TERM CURRENT USE OF INSULIN (H): ICD-10-CM

## 2023-06-26 DIAGNOSIS — Z79.4 TYPE 2 DIABETES MELLITUS WITH OTHER DIABETIC KIDNEY COMPLICATION, WITH LONG-TERM CURRENT USE OF INSULIN (H): ICD-10-CM

## 2023-06-26 DIAGNOSIS — N18.32 STAGE 3B CHRONIC KIDNEY DISEASE (H): ICD-10-CM

## 2023-06-26 PROCEDURE — 99207 PR FOOT EXAM NO CHARGE: CPT | Mod: 25 | Performed by: FAMILY MEDICINE

## 2023-06-26 PROCEDURE — 99214 OFFICE O/P EST MOD 30 MIN: CPT | Performed by: FAMILY MEDICINE

## 2023-06-26 ASSESSMENT — PATIENT HEALTH QUESTIONNAIRE - PHQ9
10. IF YOU CHECKED OFF ANY PROBLEMS, HOW DIFFICULT HAVE THESE PROBLEMS MADE IT FOR YOU TO DO YOUR WORK, TAKE CARE OF THINGS AT HOME, OR GET ALONG WITH OTHER PEOPLE: NOT DIFFICULT AT ALL
SUM OF ALL RESPONSES TO PHQ QUESTIONS 1-9: 5
SUM OF ALL RESPONSES TO PHQ QUESTIONS 1-9: 5

## 2023-06-26 ASSESSMENT — PAIN SCALES - GENERAL: PAINLEVEL: EXTREME PAIN (8)

## 2023-06-26 ASSESSMENT — ENCOUNTER SYMPTOMS: HEADACHES: 1

## 2023-06-26 NOTE — PROGRESS NOTES
"  Assessment & Plan     Acute nonintractable headache, unspecified headache type  Sudden onset of headache with vomiting next day  Should have went to ER but now without headache and no new neurological symptoms  Has CT of head and carotid US scheduled for tomorrow    Type 2 diabetes mellitus with other diabetic kidney complication, with long-term current use of insulin (H)  Has been at goal on meds  - dulaglutide (TRULICITY) 1.5 MG/0.5ML pen; Inject 1.5 mg Subcutaneous every 7 days  - insulin aspart (NOVOLOG FLEXPEN) 100 UNIT/ML pen; INJECT SUBCUTANEOUSLY 27 UNITS BEFORE BREAKFAST, 5 UNITS BEFORE LUNCH, 25 UNITS BEFORE DINNER.  (MAX DOSE 70 UNITS/DAY FOR ADJUSTMENT)  - insulin glargine U-300 (TOUJEO SOLOSTAR) 300 UNIT/ML (1 units dial) pen; Inject 40 Units Subcutaneous At Bedtime  - FOOT EXAM    Stage 3b chronic kidney disease (H)  Seeing nephrology for this    Hyperlipidemia, unspecified hyperlipidemia type  On statin  - atorvastatin (LIPITOR) 40 MG tablet; Take 1 tablet (40 mg) by mouth daily    Verrucous carcinoma (H)  Sees derm yearly                   Sandy Ricci MD  [facility]    Adelina Grullon is a 72 year old, presenting for the following health issues:  Headache        6/26/2023     1:41 PM   Additional Questions   Roomed by medhat   Accompanied by daughter Nikki Gonzalez     Headache     History of Present Illness       Headaches:   Since the patient's last clinic visit, headaches are: improved  The patient is getting headaches:  Just this once but it was aaacompanied with an eye bleed and vomiting.  She is able to do normal daily activities when she has a migraine.  The patient is taking the following rescue/relief medications:  Tylenol   Patient states \"I get some relief\" from the rescue/relief medications.   The patient is taking the following medications to prevent migraines:  Other  In the past 4 weeks, the patient has gone to an Urgent Care or Emergency Room 0 times " times due to headaches.    She eats 2-3 servings of fruits and vegetables daily.She consumes 0 sweetened beverage(s) daily.She exercises with enough effort to increase her heart rate 9 or less minutes per day.  She exercises with enough effort to increase her heart rate 3 or less days per week.   She is taking medications regularly.    Today's PHQ-9         PHQ-9 Total Score: 5    PHQ-9 Q9 Thoughts of better off dead/self-harm past 2 weeks :   Not at all    How difficult have these problems made it for you to do your work, take care of things at home, or get along with other people: Not difficult at all     Been 35 years since last migraine  Pain was on top of  Head and constant and dull  Came on suddenly. Lasted 36-48 hours  Came on a Wednesday and the next day she threw up  Headache had been improving at that time  Retinal bleed, seeing eye MD , had this for 2 weeks ago  Has had bleed before and is having same symptoms  They told to keep feet elevated and no lifting.   She is asymptomatic now  She has head CT and carotid US and neurology appt tomorrow.       Diabetes Follow-up    How often are you checking your blood sugar? Continuous glucose monitor  What time of day are you checking your blood sugars (select all that apply)?  Before and after meals  Have you had any blood sugars above 200?  No  Have you had any blood sugars below 70?  No    What symptoms do you notice when your blood sugar is low?  None and light headed    What concerns do you have today about your diabetes? None     Do you have any of these symptoms? (Select all that apply)  No numbness or tingling in feet.  No redness, sores or blisters on feet.  No complaints of excessive thirst.  No reports of blurry vision.  No significant changes to weight.        Hyperlipidemia Follow-Up      Are you regularly taking any medication or supplement to lower your cholesterol?   Yes- lipitor    Are you having muscle aches or other side effects that you think  "could be caused by your cholesterol lowering medication?  No    Hypertension Follow-up      Do you check your blood pressure regularly outside of the clinic? Yes     Are you following a low salt diet? No no salt added    Are your blood pressures ever more than 140 on the top number (systolic) OR more   than 90 on the bottom number (diastolic), for example 140/90? No    BP Readings from Last 2 Encounters:   06/26/23 90/57   04/11/23 108/64     Hemoglobin A1C (%)   Date Value   01/07/2023 7.3 (H)   07/09/2022 6.9 (H)   01/14/2021 6.1 (H)   06/30/2020 6.3 (H)     LDL Cholesterol Calculated (mg/dL)   Date Value   04/22/2023 67   01/07/2023 74   01/14/2021 92   06/30/2020 102 (H)       Diabetes is doing well  On aspirin statin and arb  UTD with Eye exam  Has ckd3b and sees nephrology  LDL at goal  BP at goal        Review of Systems   Neurological: Positive for headaches.      Constitutional, HEENT, cardiovascular, pulmonary, gi and gu systems are negative, except as otherwise noted.      Objective    BP 90/57   Pulse 87   Temp 99.2  F (37.3  C) (Oral)   Resp 17   Ht 1.753 m (5' 9\")   Wt 117.9 kg (260 lb)   SpO2 90%   BMI 38.40 kg/m    Body mass index is 38.4 kg/m .  Physical Exam   GENERAL: healthy, alert and no distress  NECK: no adenopathy, no asymmetry, masses, or scars and thyroid normal to palpation  RESP: lungs clear to auscultation - no rales, rhonchi or wheezes  CV: regular rate and rhythm, normal S1 S2, no S3 or S4, 2/3 systolic  murmur, click or rub, and peripheral pulses strong  MS: no gross musculoskeletal defects noted, mild edema    No results found for this or any previous visit (from the past 24 hour(s)).                "

## 2023-06-26 NOTE — PROGRESS NOTES
[facility] Neurology Consultation    Yadi Iniguez MRN# [id number]   Age: 72 year old YOB: 1950     Requesting physician: Sandy Lechuga     Reason for Consultation: tremor      History of Presenting Symptoms:   Yadi Iniguez is a 72 year old female who presents today for evaluation of tremor.    Patient feels tremor in the hands (left > right). She has tremor in the head and jaw. She doesn't notice tremor in the voice. She denies any shaking in the legs. She takes primidone for the tremors (100 mg BID). She hasn't been on any other medications for tremor.     She has balance problems. She had low back surgery in 2018. She thinks the balance problems started around that time and have gotten worse. She previously had a cane and now uses a walker. Walker started about 4 years ago. She hasn't had any recent falls. She can't walk long distances. A few years ago she fell off the steps. She didn't fracture any bones. She hasn't had any knee or hip surgeries. She thinks she has arthritis. She has chronic bilateral low back pains into the hips, but doesn't go further. She has numbness in the balls of the feet.     Two weeks ago patient had the following symptoms. Patient had sudden loss of vision in the right eye due bleeding behind her eye on Monday (diagnosed as vitreous bleed). On Wednesday she had a bad headache. On Thursday the headache was not as bad, but she threw up twice. Symptoms have been improving since then.       Past Medical History:     Patient Active Problem List   Diagnosis     Hypothyroidism     Hypertension goal BP (blood pressure) < 140/90     Type 2 diabetes, HbA1C goal < 8% (H)     Hyperlipidemia LDL goal <100     Moderate major depression (H)     Incontinence of urine     Neuropathy in diabetes (H)     Eczema     Left lumbar radiculopathy     Health Care Home     CKD (chronic kidney disease) stage 3, GFR 30-59 ml/min (H)     Type 2 diabetes mellitus with other  diabetic kidney complication, with long-term current use of insulin (H)     Osteoporosis     Morbid obesity (H)     Thrombocytopenia (H)     Chronic obstructive pulmonary disease, unspecified COPD type (H)     S/P lumbar fusion     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, with long-term current use of insulin, unspecified laterality (H)     Lumbar facet arthropathy     Lumbar spondylosis     Pulmonary hypertension (H)     Nonrheumatic aortic valve stenosis     Depression, major, recurrent, in partial remission (H)     DDD (degenerative disc disease), lumbar     Past Medical History:   Diagnosis Date     Arthritis 1975     Broken ribs 8/28/2016    3,4,5,6,7, left side     Cancer (H) 2013    Skin cancer     COPD (chronic obstructive pulmonary disease) (H) 08/2017     Depression      Depressive disorder 1988     Dysuria      Glycosuria      Hypertension 1980     Mixed incontinence urge and stress      Nonrheumatic aortic valve stenosis 7/25/2021     Other and unspecified hyperlipidemia      Right shoulder pain      Sleep apnea     cpap     Type II or unspecified type diabetes mellitus without mention of complication, uncontrolled      Uncomplicated asthma 2017     Unspecified hypothyroidism         Past Surgical History:     Past Surgical History:   Procedure Laterality Date     ABDOMEN SURGERY  1978    Gall Bladder  January       hysterectomy  Sept     BACK SURGERY  11/2018     BIOPSY  2013 1993    from left corner of mouth       muscule biopsy     CATARACT IOL, RT/LT Bilateral     2006     CHOLECYSTECTOMY, OPEN       [facility] SURGICAL PATHOLOGY       COLONOSCOPY  2007     COLONOSCOPY N/A 6/22/2018    Procedure: COMBINED COLONOSCOPY, SINGLE OR MULTIPLE BIOPSY/POLYPECTOMY BY BIOPSY;;  Surgeon: David Parson MD;  Location: [facility] OR     COLONOSCOPY N/A 3/29/2022    Procedure: COLONOSCOPY, FLEXIBLE, WITH LESION REMOVAL USING SNARE;  Surgeon: Wise,  Ajith GODINEZ MD;  Location:  GI     COLONOSCOPY WITH CO2 INSUFFLATION N/A 2018    Procedure: COLONOSCOPY WITH CO2 INSUFFLATION;  Colonscopy, ;  Surgeon: David Parson MD;  Location: [facility] OR     EYE SURGERY  2014    ongoing     EYE SURGERY Right 2018     HEAD & NECK SURGERY  2006    Removal of saliva gland left side     INJECT BLOCK MEDIAL BRANCH CERVICAL/THORACIC/LUMBAR Bilateral 2022    Procedure: Bilateral  L5-S1 medial branch block #1;  Surgeon: Dwayne Sierra MD;  Location: [facility] OR     INJECT BLOCK MEDIAL BRANCH CERVICAL/THORACIC/LUMBAR Bilateral 2022    Procedure: bilateral L5-S1 medial branch block #2;  Surgeon: Dwayne Sierra MD;  Location: [facility] OR     OTS FUSION SPINE POSTERIOR LUMBAR MINIMALLY INVASIVE ONE LEVEL N/A 2018    Procedure: Minimally invasive Left L4-5 transforaminal lumbar interbody fusion  Resection of synovial cystic mass adjacent to the L5 root Placement of Medtronic Voyager percutaneous pedicle screws from L4-5 bilaterally Open reduction of L4-5 grade 2 spondylolisthesis;  Surgeon: Hernando Hill MD;  Location: [facility] OR     PROPHYLACTIC REPAIR RETINAL BREAK, PNEUMATIC RETINOPEXY, COMBINED Left 2016     SOFT TISSUE SURGERY  1996    Carpal Tunnel left wrist     ZZC VAGINAL HYSTERECTOMY          Social History:     Social History     Tobacco Use     Smoking status: Former     Packs/day: 1.00     Years: 50.00     Pack years: 50.00     Types: Cigarettes     Start date: 1966     Quit date: 2021     Years since quittin.9     Passive exposure: Never     Smokeless tobacco: Never     Tobacco comments:     quit for 6 months back in    Vaping Use     Vaping Use: Never used   Substance Use Topics     Alcohol use: Not Currently     Comment: Maybe a drink every few months     Drug use: Never        Family History:     Family History   Problem Relation Age of Onset     Musculoskeletal Disorder Mother         MD     Muscular Disorder Mother       Depression Mother      Osteoporosis Mother      Obesity Mother      Cataracts Mother      Genetic Disorder Mother         MD     Cancer Father         mesothelioma     Obesity Father      Coronary Artery Disease Father      Hypertension Father      Other Cancer Father      Thyroid Disease Father      Heart Disease Brother         angioplasty     Neurologic Disorder Brother         ms     Diabetes Brother      Hypertension Brother      Hyperlipidemia Brother      Obesity Brother         4 brothers     Depression Paternal Grandmother      Obesity Paternal Grandmother      Genetic Disorder Brother         MS     Other Cancer Brother      Depression Brother      Substance Abuse Brother      Diabetes Brother      Coronary Artery Disease Brother      Hypertension Brother      Obesity Brother      Hypertension Brother      Genetic Disorder Brother         MD     Coronary Artery Disease Brother      Hyperlipidemia Brother      Hypertension Brother      Thyroid Disease Brother      Genetic Disorder Brother         MS     Obesity Brother      Cerebrovascular Disease Nephew      Muscular Dystrophy Nephew      Coronary Artery Disease Nephew      Genetic Disorder Nephew      Coronary Artery Disease Nephew      Breast Cancer Other         Medications:     Current Outpatient Medications   Medication Sig     ACETAMINOPHEN PO Take 1,000 mg by mouth 2 times daily     alendronate (FOSAMAX) 70 MG tablet TAKE 1 TABLET (70 MG) BY MOUTH EVERY 7 DAYS. TAKE 60 MINUTES BEFORE MORNING MEAL WITH 8 OZ. OF WATER. REMAIN UPRIGHT FOR 30 MINUTES.     amLODIPine (NORVASC) 10 MG tablet TAKE 1 TABLET (10 MG) BY MOUTH DAILY     aspirin 81 MG tablet Take 1 tablet (81 mg) by mouth daily     atorvastatin (LIPITOR) 40 MG tablet Take 1 tablet (40 mg) by mouth daily     blood glucose (NO BRAND SPECIFIED) test strip Use to test blood sugar 3 times daily or as directed./ Brand per insurance     blood glucose monitoring (NO BRAND SPECIFIED) meter device kit Use  to test blood sugar 3 times daily or as directed./ Brand per insurance     calcium carbonate (OS-NATACHA) 1500 (600 Ca) MG tablet Take 600 mg by mouth daily     carvedilol (COREG) 25 MG tablet TAKE 1 TABLET (25 MG) BY MOUTH 2 TIMES DAILY WITH MEALS     cholecalciferol (VITAMIN D3) 5000 units TABS tablet Take 2 tablets by mouth daily     Continuous Blood Gluc Sensor (FREESTYLE MARI 14 DAY SENSOR) MISC USE 1 DEVICE EVERY 14 DAYS AS DIRECTED     dulaglutide (TRULICITY) 1.5 MG/0.5ML pen Inject 1.5 mg Subcutaneous every 7 days     folic acid (FOLVITE) 1 MG tablet TAKE 1 TABLET (1 MG) BY MOUTH DAILY     icosapent ethyl (VASCEPA) 1 g CAPS capsule Take 1 g by mouth daily Pt reported 4/4/23     insulin aspart (NOVOLOG FLEXPEN) 100 UNIT/ML pen INJECT SUBCUTANEOUSLY 27 UNITS BEFORE BREAKFAST, 5 UNITS BEFORE LUNCH, 25 UNITS BEFORE DINNER.  (MAX DOSE 70 UNITS/DAY FOR ADJUSTMENT)     insulin glargine U-300 (TOUJEO SOLOSTAR) 300 UNIT/ML (1 units dial) pen Inject 40 Units Subcutaneous At Bedtime     insulin pen needle (32G X 4 MM) 32G X 4 MM miscellaneous Use 3 pen needles daily or as directed.  Per insurance and patient preference     levothyroxine (SYNTHROID/LEVOTHROID) 150 MCG tablet Take 1 tablet (150 mcg) by mouth daily     losartan (COZAAR) 25 MG tablet Take 0.5 tablets by mouth daily     methocarbamol (ROBAXIN) 500 MG tablet Take 1 tablet (500 mg) by mouth 3 times daily as needed for muscle spasms     PARoxetine (PAXIL) 20 MG tablet TAKE 1 TABLET (20 MG) BY MOUTH 2 TIMES DAILY     polyethylene glycol (MIRALAX) 17 GM/Dose powder Take 1 packet by mouth daily as needed     potassium chloride ER (KLOR-CON M) 20 MEQ CR tablet Take 1 tablet (20 mEq) by mouth daily     primidone (MYSOLINE) 50 MG tablet Take 2 tablets (100 mg) by mouth 2 times daily     torsemide (DEMADEX) 20 MG tablet Take 40 mg QAM and 20 mg QPM     No current facility-administered medications for this visit.        Allergies:   No Known Allergies     Review of  Systems:   None     Physical Exam:   Vitals: /76 (BP Location: Right arm, Patient Position: Sitting, Cuff Size: Adult Large)   Pulse 76   Wt 117.9 kg (260 lb)   BMI 38.40 kg/m    General: Seated comfortably in no acute distress.  HEENT: Optic discs sharp on the left optic disc, not visualized on the right  Lungs: breathing comfortably  Neurologic:     Mental Status: Fully alert, attentive. Normal memory and fund of knowledge. Language normal, speech clear and fluent, no paraphasic errors.     Cranial Nerves: Visual fields intact on the left / only intact to light perception on the right. Pupils dilated. EOMI with normal smooth pursuit. Facial sensation intact/symmetric. Facial movements symmetric. Hearing not formally tested but intact to conversation. Palate elevation symmetric, uvula midline. No dysarthria. Shoulder shrug strong bilaterally. Tongue protrusion midline.     Motor: Semi-continuous head and jaw tremor. No clear resting tremors in the arm. Moderate action/intention/postural tremor in the bilateral upper extremities (left > right). Muscle tone with mild paratonia in the arms, otherwise normal. Normal/symmetric rapid finger tapping. Strength 5/5 throughout upper and lower extremities. Weakness in right FDI/ADM may be related to difficulty with following directions.       Right Left   Shoulder abduction        5 5   Elbow extension 5 5   Elbow flexion 5 5   Wrist extension         5 5   Finger extension 5 5   ADM 4+ 5   FDI 4+ 5   APB 5 5   Hip flexion 5 5   Knee flexion 5 5   Knee extension 5 5   Dorsiflexion 5 5   Plantar flexion 5 5        Deep Tendon Reflexes: 1+ in the upper extremities, trace patella and ankle jerks. No clonus. Toes downgoing bilaterally.     Sensory: Sensation to light touch is intact throughout. Temperature sensation is intact throughout. Vibration is absent in the great toes and right ankle, ~3 seconds at the left ankle and 10 seconds in the thumbs. Proprioception is  intact. Sways with Romberg.      Coordination: Finger-nose-finger and heel-shin intact without dysmetria. Rapid alternating movements intact/symmetric with normal speed and rhythm.     Gait: Antalgic and slow casual gait needing support. Mildly wide based and short steps. Not able to do heel, toe, or tandem gait.          Data: Pertinent prior to visit   Imaging:  MRI brain 6/2023  IMPRESSION:  1.  No acute intracranial process.  2.  Generalized brain atrophy and presumed microvascular ischemic changes as detailed above.    MRI lumbar 6/2021  Impression:   1. Anterior and posterior fusion instrumentation at L4-5 with improved  alignment and decreased spinal canal stenosis compared to the  preoperative MRI on 9/12/2018. Also decreased extent of left greater  than right neural foraminal narrowing at L4-5 postoperatively.  2. Slight progression of spondylosis at L3-4 with mild to moderate  left and mild right neural foraminal narrowing.    Carotid ultrasound 6/2023  IMPRESSION: Per sonographic criteria, there are 50-69% stenoses in the  internal carotid arteries bilaterally.    Procedures:  None    Laboratory:  BMP with CKD, CBC with mildly low platelets (4/2023)  Serum immunofixation normal, ELP (4/2023)  A1c 7.3, B12 412 (1/2023)  LFTs normal (6/2021)         Assessment and Plan:   Assessment:  Yadi Iniguez is a 72 year old female who presents today for evaluation of tremor. Examination and history is suggestive of essential tremor. She is currently taking primidone for tremors (100 mg BID). Hepatic panel ordered given she is taking primidone. She is currently taking carvedilol and is going to discuss with prescribing doctor if it would be possible to switch to propranolol. If this is not possible, other options to consider in the future include increasing primidone or adding medication such as Topamax.     Patient has balance problems, which I suspect are multifactorial related to peripheral neuropathy, chronic  lumbosacral radiculopathy, chronic pain, orthopedic issues, obesity.      Patient had MRI brain scan and carotid ultrasound performed for headache and right eye vision loss. Right eye vision loss is reportedly due to vitreous plan. MRI brain scan did not show any acute concerning abnormalities. Carotid ultrasound showed 50-69% stenosis. Given this is asymptomatic I would recommend medical management of vascular risk factors and continued aspirin.      Plan:  - Consider other medications for essential tremor as described above  - Hepatic panel   - Call with worsening balance symptoms    Follow up in Neurology clinic in 6 months or earlier as needed should new concerns arise.    Donnie Keith MD   of Neurology  [facility]      The total time of this encounter today amounted to 72 minutes. This time included time spent with the patient, prep work, ordering tests, and performing post visit documentation.

## 2023-06-26 NOTE — PROGRESS NOTES
"  {PROVIDER CHARTING PREFERENCE:584865}    Adelina Grullon is a 72 year old, presenting for the following health issues:  Headache        6/26/2023     1:41 PM   Additional Questions   Roomed by medhat   Accompanied by daughter Nikki Gonzalez     Headache     History of Present Illness       Headaches:   Since the patient's last clinic visit, headaches are: improved  The patient is getting headaches:  Just this once but it was aaacompanied with an eye bleed and vomiting.  She is able to do normal daily activities when she has a migraine.  The patient is taking the following rescue/relief medications:  Tylenol   Patient states \"I get some relief\" from the rescue/relief medications.   The patient is taking the following medications to prevent migraines:  Other  In the past 4 weeks, the patient has gone to an Urgent Care or Emergency Room 0 times times due to headaches.    She eats 2-3 servings of fruits and vegetables daily.She consumes 0 sweetened beverage(s) daily.She exercises with enough effort to increase her heart rate 9 or less minutes per day.  She exercises with enough effort to increase her heart rate 3 or less days per week.   She is taking medications regularly.    Today's PHQ-9         PHQ-9 Total Score: 5    PHQ-9 Q9 Thoughts of better off dead/self-harm past 2 weeks :   Not at all    How difficult have these problems made it for you to do your work, take care of things at home, or get along with other people: Not difficult at all     Been 35 years since last migraine  Pain was on top of  Head and constant and dull  Came on suddenly. Lasted 36-48 hours  Came on a Wednesday and the next day she threw up  Headache had been improving at that time  Retinal bleed, seeing eye MD , had this for 2 weeks ago  Has had bleed before and is having same symptoms  They told to keep feet elevated and no lifting.     Seeing neurology           Review of Systems   Neurological: Positive for headaches.      {ROS COMP " "(Optional):604662}      Objective    BP 90/57   Pulse 87   Temp 99.2  F (37.3  C) (Oral)   Resp 17   Ht 1.753 m (5' 9\")   Wt 117.9 kg (260 lb)   SpO2 90%   BMI 38.40 kg/m    Body mass index is 38.4 kg/m .  Physical Exam   {Exam List (Optional):274348}    {Diagnostic Test Results (Optional):106105}    {AMBULATORY ATTESTATION (Optional):425489}            "

## 2023-06-27 ENCOUNTER — HOSPITAL ENCOUNTER (OUTPATIENT)
Dept: ULTRASOUND IMAGING | Facility: CLINIC | Age: 73
Discharge: HOME | End: 2023-06-27
Attending: FAMILY MEDICINE
Payer: COMMERCIAL

## 2023-06-27 ENCOUNTER — HOSPITAL ENCOUNTER (OUTPATIENT)
Dept: MRI IMAGING | Facility: CLINIC | Age: 73
Discharge: HOME | End: 2023-06-27
Attending: FAMILY MEDICINE
Payer: COMMERCIAL

## 2023-06-27 ENCOUNTER — PRE VISIT (OUTPATIENT)
Dept: NEUROLOGY | Facility: CLINIC | Age: 73
End: 2023-06-27

## 2023-06-27 ENCOUNTER — OFFICE VISIT (OUTPATIENT)
Dept: NEUROLOGY | Facility: CLINIC | Age: 73
End: 2023-06-27
Attending: FAMILY MEDICINE
Payer: COMMERCIAL

## 2023-06-27 VITALS
SYSTOLIC BLOOD PRESSURE: 134 MMHG | DIASTOLIC BLOOD PRESSURE: 76 MMHG | BODY MASS INDEX: 38.4 KG/M2 | WEIGHT: 260 LBS | HEART RATE: 76 BPM

## 2023-06-27 DIAGNOSIS — R11.10 VOMITING, UNSPECIFIED VOMITING TYPE, UNSPECIFIED WHETHER NAUSEA PRESENT: ICD-10-CM

## 2023-06-27 DIAGNOSIS — G25.0 ESSENTIAL TREMOR: ICD-10-CM

## 2023-06-27 DIAGNOSIS — R51.9 ACUTE NONINTRACTABLE HEADACHE, UNSPECIFIED HEADACHE TYPE: ICD-10-CM

## 2023-06-27 DIAGNOSIS — R26.81 UNSTEADINESS ON FEET: ICD-10-CM

## 2023-06-27 PROBLEM — C80.1: Status: ACTIVE | Noted: 2023-06-27

## 2023-06-27 PROCEDURE — 70551 MRI BRAIN STEM W/O DYE: CPT

## 2023-06-27 PROCEDURE — 99205 OFFICE O/P NEW HI 60 MIN: CPT | Performed by: INTERNAL MEDICINE

## 2023-06-27 PROCEDURE — 93880 EXTRACRANIAL BILAT STUDY: CPT

## 2023-06-27 RX ORDER — INSULIN ASPART 100 [IU]/ML
INJECTION, SOLUTION INTRAVENOUS; SUBCUTANEOUS
Qty: 75 ML | Refills: 1 | Status: SHIPPED | OUTPATIENT
Start: 2023-06-27 | End: 2024-03-07

## 2023-06-27 RX ORDER — ATORVASTATIN CALCIUM 40 MG/1
40 TABLET, FILM COATED ORAL DAILY
Qty: 90 TABLET | Refills: 2 | Status: SHIPPED | OUTPATIENT
Start: 2023-06-27 | End: 2023-12-27

## 2023-06-27 RX ORDER — DULAGLUTIDE 1.5 MG/.5ML
1.5 INJECTION, SOLUTION SUBCUTANEOUS
Qty: 6 ML | Refills: 1 | Status: SHIPPED | OUTPATIENT
Start: 2023-06-27 | End: 2024-01-21

## 2023-06-27 RX ORDER — INSULIN GLARGINE 300 U/ML
40 INJECTION, SOLUTION SUBCUTANEOUS AT BEDTIME
Qty: 15 ML | Refills: 1 | Status: SHIPPED | OUTPATIENT
Start: 2023-06-27 | End: 2023-12-19

## 2023-06-27 NOTE — PROGRESS NOTES
Appropriate assistive devices provided during their visit. yes (Yes, No, N/A) wheechair (list device)    Exam table and/or cart  placed in the lowest position. yes (Yes, No, N/A)    Brakes on tables/carts/wheelchairs used at all times. yes (Yes, No, N/A)    Non slip footwear applied. na (Yes, No, NA)    Patient was accompanied by staff throughout visit. yes (Yes, No, N/A)    Equipment safety straps used. N/a (Yes, No, N/A)    Assist with toileting. N/a (Yes, No, N/A)

## 2023-06-27 NOTE — LETTER
6/27/2023         RE: Yadi Iniguez  [address]  [place] [address]        Dear Colleague,    Thank you for referring your patient, Yadi Iniguez, to the [facility] [place]. Please see a copy of my visit note below.    [facility] Neurology Consultation    Yadi Iniguez MRN# [id number]   Age: 72 year old YOB: 1950     Requesting physician: Sandy Lechuga     Reason for Consultation: tremor      History of Presenting Symptoms:   Yadi Iniguez is a 72 year old female who presents today for evaluation of tremor.    Patient feels tremor in the hands (left > right). She has tremor in the head and jaw. She doesn't notice tremor in the voice. She denies any shaking in the legs. She takes primidone for the tremors (100 mg BID). She hasn't been on any other medications for tremor.     She has balance problems. She had low back surgery in 2018. She thinks the balance problems started around that time and have gotten worse. She previously had a cane and now uses a walker. Walker started about 4 years ago. She hasn't had any recent falls. She can't walk long distances. A few years ago she fell off the steps. She didn't fracture any bones. She hasn't had any knee or hip surgeries. She thinks she has arthritis. She has chronic bilateral low back pains into the hips, but doesn't go further. She has numbness in the balls of the feet.     Two weeks ago patient had the following symptoms. Patient had sudden loss of vision in the right eye due bleeding behind her eye on Monday (diagnosed as vitreous bleed). On Wednesday she had a bad headache. On Thursday the headache was not as bad, but she threw up twice. Symptoms have been improving since then.       Past Medical History:     Patient Active Problem List   Diagnosis     Hypothyroidism     Hypertension goal BP (blood pressure) < 140/90     Type 2 diabetes, HbA1C goal < 8% (H)     Hyperlipidemia  LDL goal <100     Moderate major depression (H)     Incontinence of urine     Neuropathy in diabetes (H)     Eczema     Left lumbar radiculopathy     Health Care Home     CKD (chronic kidney disease) stage 3, GFR 30-59 ml/min (H)     Type 2 diabetes mellitus with other diabetic kidney complication, with long-term current use of insulin (H)     Osteoporosis     Morbid obesity (H)     Thrombocytopenia (H)     Chronic obstructive pulmonary disease, unspecified COPD type (H)     S/P lumbar fusion     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, with long-term current use of insulin, unspecified laterality (H)     Lumbar facet arthropathy     Lumbar spondylosis     Pulmonary hypertension (H)     Nonrheumatic aortic valve stenosis     Depression, major, recurrent, in partial remission (H)     DDD (degenerative disc disease), lumbar     Past Medical History:   Diagnosis Date     Arthritis 1975     Broken ribs 8/28/2016    3,4,5,6,7, left side     Cancer (H) 2013    Skin cancer     COPD (chronic obstructive pulmonary disease) (H) 08/2017     Depression      Depressive disorder 1988     Dysuria      Glycosuria      Hypertension 1980     Mixed incontinence urge and stress      Nonrheumatic aortic valve stenosis 7/25/2021     Other and unspecified hyperlipidemia      Right shoulder pain      Sleep apnea     cpap     Type II or unspecified type diabetes mellitus without mention of complication, uncontrolled      Uncomplicated asthma 2017     Unspecified hypothyroidism         Past Surgical History:     Past Surgical History:   Procedure Laterality Date     ABDOMEN SURGERY  1978    Gall Bladder  January       hysterectomy  Sept     BACK SURGERY  11/2018     BIOPSY  2013 1993    from left corner of mouth       muscule biopsy     CATARACT IOL, RT/LT Bilateral     2006     CHOLECYSTECTOMY, OPEN       [facility] SURGICAL PATHOLOGY       COLONOSCOPY  2007     COLONOSCOPY N/A  2018    Procedure: COMBINED COLONOSCOPY, SINGLE OR MULTIPLE BIOPSY/POLYPECTOMY BY BIOPSY;;  Surgeon: David Parson MD;  Location: [facility]     COLONOSCOPY N/A 3/29/2022    Procedure: COLONOSCOPY, FLEXIBLE, WITH LESION REMOVAL USING SNARE;  Surgeon: Ajith Guo MD;  Location: [facility]     COLONOSCOPY WITH CO2 INSUFFLATION N/A 2018    Procedure: COLONOSCOPY WITH CO2 INSUFFLATION;  Colonscopy, ;  Surgeon: David Parson MD;  Location: [facility] OR     EYE SURGERY  2014    ongoing     EYE SURGERY Right 2018     HEAD & NECK SURGERY  2006    Removal of saliva gland left side     INJECT BLOCK MEDIAL BRANCH CERVICAL/THORACIC/LUMBAR Bilateral 2022    Procedure: Bilateral  L5-S1 medial branch block #1;  Surgeon: Dwayne Sierra MD;  Location: [facility] OR     INJECT BLOCK MEDIAL BRANCH CERVICAL/THORACIC/LUMBAR Bilateral 2022    Procedure: bilateral L5-S1 medial branch block #2;  Surgeon: Dwayne Sierra MD;  Location: [facility] OR     OTS FUSION SPINE POSTERIOR LUMBAR MINIMALLY INVASIVE ONE LEVEL N/A 2018    Procedure: Minimally invasive Left L4-5 transforaminal lumbar interbody fusion  Resection of synovial cystic mass adjacent to the L5 root Placement of Medtronic Voyager percutaneous pedicle screws from L4-5 bilaterally Open reduction of L4-5 grade 2 spondylolisthesis;  Surgeon: Hernando Hill MD;  Location: [facility] OR     PROPHYLACTIC REPAIR RETINAL BREAK, PNEUMATIC RETINOPEXY, COMBINED Left 2016     SOFT TISSUE SURGERY      Carpal Tunnel left wrist     ZZC VAGINAL HYSTERECTOMY          Social History:     Social History     Tobacco Use     Smoking status: Former     Packs/day: 1.00     Years: 50.00     Pack years: 50.00     Types: Cigarettes     Start date: 1966     Quit date: 2021     Years since quittin.9     Passive exposure: Never     Smokeless tobacco: Never     Tobacco comments:     quit for 6 months back in 2018   Vaping Use     Vaping Use: Never used    Substance Use Topics     Alcohol use: Not Currently     Comment: Maybe a drink every few months     Drug use: Never        Family History:     Family History   Problem Relation Age of Onset     Musculoskeletal Disorder Mother         MD     Muscular Disorder Mother      Depression Mother      Osteoporosis Mother      Obesity Mother      Cataracts Mother      Genetic Disorder Mother         MD     Cancer Father         mesothelioma     Obesity Father      Coronary Artery Disease Father      Hypertension Father      Other Cancer Father      Thyroid Disease Father      Heart Disease Brother         angioplasty     Neurologic Disorder Brother         ms     Diabetes Brother      Hypertension Brother      Hyperlipidemia Brother      Obesity Brother         4 brothers     Depression Paternal Grandmother      Obesity Paternal Grandmother      Genetic Disorder Brother         MS     Other Cancer Brother      Depression Brother      Substance Abuse Brother      Diabetes Brother      Coronary Artery Disease Brother      Hypertension Brother      Obesity Brother      Hypertension Brother      Genetic Disorder Brother         MD     Coronary Artery Disease Brother      Hyperlipidemia Brother      Hypertension Brother      Thyroid Disease Brother      Genetic Disorder Brother         MS     Obesity Brother      Cerebrovascular Disease Nephew      Muscular Dystrophy Nephew      Coronary Artery Disease Nephew      Genetic Disorder Nephew      Coronary Artery Disease Nephew      Breast Cancer Other         Medications:     Current Outpatient Medications   Medication Sig     ACETAMINOPHEN PO Take 1,000 mg by mouth 2 times daily     alendronate (FOSAMAX) 70 MG tablet TAKE 1 TABLET (70 MG) BY MOUTH EVERY 7 DAYS. TAKE 60 MINUTES BEFORE MORNING MEAL WITH 8 OZ. OF WATER. REMAIN UPRIGHT FOR 30 MINUTES.     amLODIPine (NORVASC) 10 MG tablet TAKE 1 TABLET (10 MG) BY MOUTH DAILY     aspirin 81 MG tablet Take 1 tablet (81 mg) by mouth daily      atorvastatin (LIPITOR) 40 MG tablet Take 1 tablet (40 mg) by mouth daily     blood glucose (NO BRAND SPECIFIED) test strip Use to test blood sugar 3 times daily or as directed./ Brand per insurance     blood glucose monitoring (NO BRAND SPECIFIED) meter device kit Use to test blood sugar 3 times daily or as directed./ Brand per insurance     calcium carbonate (OS-NATACHA) 1500 (600 Ca) MG tablet Take 600 mg by mouth daily     carvedilol (COREG) 25 MG tablet TAKE 1 TABLET (25 MG) BY MOUTH 2 TIMES DAILY WITH MEALS     cholecalciferol (VITAMIN D3) 5000 units TABS tablet Take 2 tablets by mouth daily     Continuous Blood Gluc Sensor (FREESTYLE MARI 14 DAY SENSOR) MISC USE 1 DEVICE EVERY 14 DAYS AS DIRECTED     dulaglutide (TRULICITY) 1.5 MG/0.5ML pen Inject 1.5 mg Subcutaneous every 7 days     folic acid (FOLVITE) 1 MG tablet TAKE 1 TABLET (1 MG) BY MOUTH DAILY     icosapent ethyl (VASCEPA) 1 g CAPS capsule Take 1 g by mouth daily Pt reported 4/4/23     insulin aspart (NOVOLOG FLEXPEN) 100 UNIT/ML pen INJECT SUBCUTANEOUSLY 27 UNITS BEFORE BREAKFAST, 5 UNITS BEFORE LUNCH, 25 UNITS BEFORE DINNER.  (MAX DOSE 70 UNITS/DAY FOR ADJUSTMENT)     insulin glargine U-300 (TOUJEO SOLOSTAR) 300 UNIT/ML (1 units dial) pen Inject 40 Units Subcutaneous At Bedtime     insulin pen needle (32G X 4 MM) 32G X 4 MM miscellaneous Use 3 pen needles daily or as directed.  Per insurance and patient preference     levothyroxine (SYNTHROID/LEVOTHROID) 150 MCG tablet Take 1 tablet (150 mcg) by mouth daily     losartan (COZAAR) 25 MG tablet Take 0.5 tablets by mouth daily     methocarbamol (ROBAXIN) 500 MG tablet Take 1 tablet (500 mg) by mouth 3 times daily as needed for muscle spasms     PARoxetine (PAXIL) 20 MG tablet TAKE 1 TABLET (20 MG) BY MOUTH 2 TIMES DAILY     polyethylene glycol (MIRALAX) 17 GM/Dose powder Take 1 packet by mouth daily as needed     potassium chloride ER (KLOR-CON M) 20 MEQ CR tablet Take 1 tablet (20 mEq) by mouth daily      primidone (MYSOLINE) 50 MG tablet Take 2 tablets (100 mg) by mouth 2 times daily     torsemide (DEMADEX) 20 MG tablet Take 40 mg QAM and 20 mg QPM     No current facility-administered medications for this visit.        Allergies:   No Known Allergies     Review of Systems:   None     Physical Exam:   Vitals: /76 (BP Location: Right arm, Patient Position: Sitting, Cuff Size: Adult Large)   Pulse 76   Wt 117.9 kg (260 lb)   BMI 38.40 kg/m    General: Seated comfortably in no acute distress.  HEENT: Optic discs sharp on the left optic disc, not visualized on the right  Lungs: breathing comfortably  Neurologic:     Mental Status: Fully alert, attentive. Normal memory and fund of knowledge. Language normal, speech clear and fluent, no paraphasic errors.     Cranial Nerves: Visual fields intact on the left / only intact to light perception on the right. Pupils dilated. EOMI with normal smooth pursuit. Facial sensation intact/symmetric. Facial movements symmetric. Hearing not formally tested but intact to conversation. Palate elevation symmetric, uvula midline. No dysarthria. Shoulder shrug strong bilaterally. Tongue protrusion midline.     Motor: Semi-continuous head and jaw tremor. No clear resting tremors in the arm. Moderate action/intention/postural tremor in the bilateral upper extremities (left > right). Muscle tone with mild paratonia in the arms, otherwise normal. Normal/symmetric rapid finger tapping. Strength 5/5 throughout upper and lower extremities. Weakness in right FDI/ADM may be related to difficulty with following directions.       Right Left   Shoulder abduction        5 5   Elbow extension 5 5   Elbow flexion 5 5   Wrist extension         5 5   Finger extension 5 5   ADM 4+ 5   FDI 4+ 5   APB 5 5   Hip flexion 5 5   Knee flexion 5 5   Knee extension 5 5   Dorsiflexion 5 5   Plantar flexion 5 5        Deep Tendon Reflexes: 1+ in the upper extremities, trace patella and ankle jerks. No  clonus. Toes downgoing bilaterally.     Sensory: Sensation to light touch is intact throughout. Temperature sensation is intact throughout. Vibration is absent in the great toes and right ankle, ~3 seconds at the left ankle and 10 seconds in the thumbs. Proprioception is intact. Sways with Romberg.      Coordination: Finger-nose-finger and heel-shin intact without dysmetria. Rapid alternating movements intact/symmetric with normal speed and rhythm.     Gait: Antalgic and slow casual gait needing support. Mildly wide based and short steps. Not able to do heel, toe, or tandem gait.          Data: Pertinent prior to visit   Imaging:  MRI brain 6/2023  IMPRESSION:  1.  No acute intracranial process.  2.  Generalized brain atrophy and presumed microvascular ischemic changes as detailed above.    MRI lumbar 6/2021  Impression:   1. Anterior and posterior fusion instrumentation at L4-5 with improved  alignment and decreased spinal canal stenosis compared to the  preoperative MRI on 9/12/2018. Also decreased extent of left greater  than right neural foraminal narrowing at L4-5 postoperatively.  2. Slight progression of spondylosis at L3-4 with mild to moderate  left and mild right neural foraminal narrowing.    Carotid ultrasound 6/2023  IMPRESSION: Per sonographic criteria, there are 50-69% stenoses in the  internal carotid arteries bilaterally.    Procedures:  None    Laboratory:  BMP with CKD, CBC with mildly low platelets (4/2023)  Serum immunofixation normal, ELP (4/2023)  A1c 7.3, B12 412 (1/2023)  LFTs normal (6/2021)         Assessment and Plan:   Assessment:  Yadi Iniguez is a 72 year old female who presents today for evaluation of tremor. Examination and history is suggestive of essential tremor. She is currently taking primidone for tremors (100 mg BID). Hepatic panel ordered given she is taking primidone. She is currently taking carvedilol and is going to discuss with prescribing doctor if it would be possible  to switch to propranolol. If this is not possible, other options to consider in the future include increasing primidone or adding medication such as Topamax.     Patient has balance problems, which I suspect are multifactorial related to peripheral neuropathy, chronic lumbosacral radiculopathy, chronic pain, orthopedic issues, obesity.      Patient had MRI brain scan and carotid ultrasound performed for headache and right eye vision loss. Right eye vision loss is reportedly due to vitreous plan. MRI brain scan did not show any acute concerning abnormalities. Carotid ultrasound showed 50-69% stenosis. Given this is asymptomatic I would recommend medical management of vascular risk factors and continued aspirin.      Plan:  - Consider other medications for essential tremor as described above  - Hepatic panel   - Call with worsening balance symptoms    Follow up in Neurology clinic in 6 months or earlier as needed should new concerns arise.    Donnie Keith MD   of Neurology  [facility]      The total time of this encounter today amounted to 72 minutes. This time included time spent with the patient, prep work, ordering tests, and performing post visit documentation.      Again, thank you for allowing me to participate in the care of your patient.        Sincerely,        Donnie Keith MD

## 2023-07-08 ENCOUNTER — LAB (OUTPATIENT)
Dept: LAB | Facility: CLINIC | Age: 73
End: 2023-07-08
Payer: COMMERCIAL

## 2023-07-08 DIAGNOSIS — N18.32 ANEMIA DUE TO STAGE 3B CHRONIC KIDNEY DISEASE (H): ICD-10-CM

## 2023-07-08 DIAGNOSIS — E03.9 HYPOTHYROIDISM, UNSPECIFIED TYPE: ICD-10-CM

## 2023-07-08 DIAGNOSIS — N18.32 STAGE 3B CHRONIC KIDNEY DISEASE (H): ICD-10-CM

## 2023-07-08 DIAGNOSIS — D63.1 ANEMIA DUE TO STAGE 3B CHRONIC KIDNEY DISEASE (H): ICD-10-CM

## 2023-07-08 DIAGNOSIS — G25.0 ESSENTIAL TREMOR: ICD-10-CM

## 2023-07-08 DIAGNOSIS — E11.9 TYPE 2 DIABETES, HBA1C GOAL < 8% (H): ICD-10-CM

## 2023-07-08 LAB
ALBUMIN MFR UR ELPH: 29.9 MG/DL
ALBUMIN SERPL BCG-MCNC: 3.9 G/DL (ref 3.5–5.2)
ALP SERPL-CCNC: 46 U/L (ref 35–104)
ALT SERPL W P-5'-P-CCNC: 17 U/L (ref 0–50)
ANION GAP SERPL CALCULATED.3IONS-SCNC: 10 MMOL/L (ref 7–15)
AST SERPL W P-5'-P-CCNC: 24 U/L (ref 0–45)
BILIRUB DIRECT SERPL-MCNC: <0.2 MG/DL (ref 0–0.3)
BILIRUB SERPL-MCNC: 0.3 MG/DL
BUN SERPL-MCNC: 29.2 MG/DL (ref 8–23)
CALCIUM SERPL-MCNC: 9.5 MG/DL (ref 8.8–10.2)
CHLORIDE SERPL-SCNC: 99 MMOL/L (ref 98–107)
CREAT SERPL-MCNC: 1.63 MG/DL (ref 0.51–0.95)
CREAT UR-MCNC: 72.4 MG/DL
DEPRECATED HCO3 PLAS-SCNC: 32 MMOL/L (ref 22–29)
GFR SERPL CREATININE-BSD FRML MDRD: 33 ML/MIN/1.73M2
GLUCOSE SERPL-MCNC: 152 MG/DL (ref 70–99)
HBA1C MFR BLD: 6.6 % (ref 0–5.6)
HGB BLD-MCNC: 11.6 G/DL (ref 11.7–15.7)
PHOSPHATE SERPL-MCNC: 4 MG/DL (ref 2.5–4.5)
POTASSIUM SERPL-SCNC: 4.4 MMOL/L (ref 3.4–5.3)
PROT SERPL-MCNC: 6.5 G/DL (ref 6.4–8.3)
PROT/CREAT 24H UR: 0.41 MG/MG CR (ref 0–0.2)
PTH-INTACT SERPL-MCNC: 16 PG/ML (ref 15–65)
SODIUM SERPL-SCNC: 141 MMOL/L (ref 136–145)
TSH SERPL DL<=0.005 MIU/L-ACNC: 1.57 UIU/ML (ref 0.3–4.2)

## 2023-07-08 PROCEDURE — 83970 ASSAY OF PARATHORMONE: CPT

## 2023-07-08 PROCEDURE — 83550 IRON BINDING TEST: CPT

## 2023-07-08 PROCEDURE — 82728 ASSAY OF FERRITIN: CPT

## 2023-07-08 PROCEDURE — 85018 HEMOGLOBIN: CPT

## 2023-07-08 PROCEDURE — 83036 HEMOGLOBIN GLYCOSYLATED A1C: CPT

## 2023-07-08 PROCEDURE — 84100 ASSAY OF PHOSPHORUS: CPT

## 2023-07-08 PROCEDURE — 84156 ASSAY OF PROTEIN URINE: CPT

## 2023-07-08 PROCEDURE — 82248 BILIRUBIN DIRECT: CPT

## 2023-07-08 PROCEDURE — 84443 ASSAY THYROID STIM HORMONE: CPT

## 2023-07-08 PROCEDURE — 83540 ASSAY OF IRON: CPT

## 2023-07-08 PROCEDURE — 80053 COMPREHEN METABOLIC PANEL: CPT

## 2023-07-08 PROCEDURE — 36415 COLL VENOUS BLD VENIPUNCTURE: CPT

## 2023-07-11 ENCOUNTER — VIRTUAL VISIT (OUTPATIENT)
Dept: NEPHROLOGY | Facility: CLINIC | Age: 73
End: 2023-07-11
Payer: COMMERCIAL

## 2023-07-11 DIAGNOSIS — Z79.4 TYPE 2 DIABETES MELLITUS WITH OTHER DIABETIC KIDNEY COMPLICATION, WITH LONG-TERM CURRENT USE OF INSULIN (H): ICD-10-CM

## 2023-07-11 DIAGNOSIS — N18.32 STAGE 3B CHRONIC KIDNEY DISEASE (H): ICD-10-CM

## 2023-07-11 DIAGNOSIS — Z87.891 HISTORY OF TOBACCO USE: ICD-10-CM

## 2023-07-11 DIAGNOSIS — D63.1 ANEMIA DUE TO STAGE 3B CHRONIC KIDNEY DISEASE (H): Primary | ICD-10-CM

## 2023-07-11 DIAGNOSIS — N18.32 ANEMIA DUE TO STAGE 3B CHRONIC KIDNEY DISEASE (H): Primary | ICD-10-CM

## 2023-07-11 DIAGNOSIS — I10 BENIGN ESSENTIAL HYPERTENSION: ICD-10-CM

## 2023-07-11 DIAGNOSIS — E11.29 TYPE 2 DIABETES MELLITUS WITH OTHER DIABETIC KIDNEY COMPLICATION, WITH LONG-TERM CURRENT USE OF INSULIN (H): ICD-10-CM

## 2023-07-11 DIAGNOSIS — E66.01 CLASS 2 SEVERE OBESITY DUE TO EXCESS CALORIES WITH SERIOUS COMORBIDITY AND BODY MASS INDEX (BMI) OF 37.0 TO 37.9 IN ADULT (H): ICD-10-CM

## 2023-07-11 DIAGNOSIS — E66.812 CLASS 2 SEVERE OBESITY DUE TO EXCESS CALORIES WITH SERIOUS COMORBIDITY AND BODY MASS INDEX (BMI) OF 37.0 TO 37.9 IN ADULT (H): ICD-10-CM

## 2023-07-11 LAB
FERRITIN SERPL-MCNC: 216 NG/ML (ref 11–328)
IRON BINDING CAPACITY (ROCHE): 224 UG/DL (ref 240–430)
IRON SATN MFR SERPL: 56 % (ref 15–46)
IRON SERPL-MCNC: 126 UG/DL (ref 37–145)

## 2023-07-11 PROCEDURE — 99214 OFFICE O/P EST MOD 30 MIN: CPT | Mod: VID | Performed by: INTERNAL MEDICINE

## 2023-07-11 NOTE — NURSING NOTE
Is the patient currently in the state of [place]? YES    Visit mode:VIDEO    If the visit is dropped, the patient can be reconnected by: VIDEO VISIT: Text to cell phone: [phone]    Will anyone else be joining the visit? YES: How would they like to receive their invitation? Other e-mail: Daughter Nikki will be with PT      How would you like to obtain your AVS? MyChart    Are changes needed to the allergy or medication list? NO    Reason for visit: RECHECK

## 2023-07-11 NOTE — PROGRESS NOTES
Virtual Visit Details    Type of service:  Video Visit     Originating Location (pt. Location): Home    Distant Location (provider location):  Off-site  Platform used for Video Visit: AmWell     07/11/23   I was asked to see this patient by Dr. Ricci for evaluation of CKD.     CC: CKD    HPI: Yadi Iniguez is a 72 year old female who presents for evaluation of CKD.  Initial visit March 2022:  Ms. Yao's hx is significant for COPD, hypertension, AV stenosis, depression, DM, hypothyroidism, hyperlipidemia, tobacco hx, CHF/pulmonary hypertension. On review of her creatinine levels, there has been much variability:  - at 0.6 in 2008  - SUZE to 1.4 in Jan 2010 but improved to 0.76 in April 2011  - SUZE to 1.82 in Jan 2017 but improved to ~1 in Sep 2017  - SUZE to 1.45 in April 2018 but improved to 1.2 in Jan 2021  - SUZE to 1.81 in Sept 2021 but more recently 1.5-1.6 since.   She has had some albuminuria dating back to 2010. Fenofibrate therapy since 2018. Currently takes torsemide 20 mg AM and 20 mg PM - followed by cardiology clinic at [facility] for CHF. Dx with diabetes in her 40s.     - History of Hematuria: No  - Swelling: hx but none at this time. Breathing is comfortable. Weight recently 266-271 lbs. Torsemide 20 mg BID at this time. No lighhteadedness/dizziness at this time.   - Hx of UTIs: no  - Hx of stones: no  - Rashes/Joint pain: no rash; joint pain - generalized  - Family hx of kidney disease: no  - NSAID use: only tylenol    07/12/22: video visit. At the time of her initial visit I did a renal ultrasound which showed a 16 mm kidney lesion. MRI was then done which did not show any kidney lesion. We stopped the fenofibrate at that visit as well. She was also noted to have a monoclonal gammopathy - was seen by hematology in May - they have plans to monitor with follow-up 4 months later. Torsemide was increased a month ago when weight was increasing and becoming more symptomatic. Currently on  torsemide 40 mg twice a day. Weight is stable - 269-272 lbs. Last month she had gotten up to 278 lbs. No swelling currently. Breathing is comfortable. No lightheadedness/dizziness. Blood pressure has been 135/64, 128/56, 141/66, 125/72. 90/58 today in clinic. She felt a little tired. NO lighhteadedness on standing. No orthopnea.     01/10/23: video visit. Seen by cardiology at [facility] yesterday - no changes made to her medications but the cardiologist notes that she would be in favor of trialing a lower dose of torsemide if needed. Was thirsty at the time of the lab test. No swelling. Breathing is comfortable. BP has been 101/-120/58-80. Follows with hematology for MGUS.     04/11/23: video visit. Creatinine has been 1.5-1.8 since 2021. Had SUZE to 2.2 in Jan- diuretic lowered. Sleeping through the night. WEight has been 258-262 lbs.  This is down from 270 last year. No swelling. No SOB. No orthopnea.     07/11/23: video visit. Weight most recently 252-255 lbs most recently. She is not as hungry as she used to be. Currently on 40 mg AM and 20 mg PM. No SOB. No swelling. No lightheadedness per patient. She had some lightheadedness on standing so her amlodipine was cut back - 90-56 BP readings prior to this. Now with the amlodipine change her BP has been 123/60 P 88.  Cardiology appt coming up and they will do a volume assessment in person with them for that visit.     ACETAMINOPHEN PO, Take 1,000 mg by mouth 2 times daily  alendronate (FOSAMAX) 70 MG tablet, TAKE 1 TABLET (70 MG) BY MOUTH EVERY 7 DAYS. TAKE 60 MINUTES BEFORE MORNING MEAL WITH 8 OZ. OF WATER. REMAIN UPRIGHT FOR 30 MINUTES.  amLODIPine (NORVASC) 10 MG tablet, TAKE 1 TABLET (10 MG) BY MOUTH DAILY  aspirin 81 MG tablet, Take 1 tablet (81 mg) by mouth daily  atorvastatin (LIPITOR) 40 MG tablet, Take 1 tablet (40 mg) by mouth daily  blood glucose (NO BRAND SPECIFIED) test strip, Use to test blood sugar 3 times daily or as directed./ Brand per  insurance  blood glucose monitoring (NO BRAND SPECIFIED) meter device kit, Use to test blood sugar 3 times daily or as directed./ Brand per insurance  calcium carbonate (OS-NATACHA) 1500 (600 Ca) MG tablet, Take 600 mg by mouth daily  carvedilol (COREG) 25 MG tablet, TAKE 1 TABLET (25 MG) BY MOUTH 2 TIMES DAILY WITH MEALS  cholecalciferol (VITAMIN D3) 5000 units TABS tablet, Take 2 tablets by mouth daily  Continuous Blood Gluc Sensor (FREESTYLE MARI 14 DAY SENSOR) MISC, USE 1 DEVICE EVERY 14 DAYS AS DIRECTED  dulaglutide (TRULICITY) 1.5 MG/0.5ML pen, Inject 1.5 mg Subcutaneous every 7 days  folic acid (FOLVITE) 1 MG tablet, TAKE 1 TABLET (1 MG) BY MOUTH DAILY  icosapent ethyl (VASCEPA) 1 g CAPS capsule, Take 1 g by mouth daily Pt reported 4/4/23  insulin aspart (NOVOLOG FLEXPEN) 100 UNIT/ML pen, INJECT SUBCUTANEOUSLY 27 UNITS BEFORE BREAKFAST, 5 UNITS BEFORE LUNCH, 25 UNITS BEFORE DINNER.  (MAX DOSE 70 UNITS/DAY FOR ADJUSTMENT)  insulin glargine U-300 (TOUJEO SOLOSTAR) 300 UNIT/ML (1 units dial) pen, Inject 40 Units Subcutaneous At Bedtime  insulin pen needle (32G X 4 MM) 32G X 4 MM miscellaneous, Use 3 pen needles daily or as directed.  Per insurance and patient preference  levothyroxine (SYNTHROID/LEVOTHROID) 150 MCG tablet, Take 1 tablet (150 mcg) by mouth daily  losartan (COZAAR) 25 MG tablet, Take 0.5 tablets by mouth daily  methocarbamol (ROBAXIN) 500 MG tablet, Take 1 tablet (500 mg) by mouth 3 times daily as needed for muscle spasms  PARoxetine (PAXIL) 20 MG tablet, TAKE 1 TABLET (20 MG) BY MOUTH 2 TIMES DAILY  polyethylene glycol (MIRALAX) 17 GM/Dose powder, Take 1 packet by mouth daily as needed  potassium chloride ER (KLOR-CON M) 20 MEQ CR tablet, Take 1 tablet (20 mEq) by mouth daily  primidone (MYSOLINE) 50 MG tablet, Take 2 tablets (100 mg) by mouth 2 times daily  torsemide (DEMADEX) 20 MG tablet, Take 40 mg QAM and 20 mg QPM    No current facility-administered medications on file prior to  visit.      Exam:  There were no vitals taken for this visit.  GENERAL APPEARANCE: alert and no distress  Breathing appears nonlabored.     Results:  No visits with results within 1 Day(s) from this visit.   Latest known visit with results is:   Lab on 07/08/2023   Component Date Value Ref Range Status     Hemoglobin A1C 07/08/2023 6.6 (H)  0.0 - 5.6 % Final    Normal <5.7%   Prediabetes 5.7-6.4%    Diabetes 6.5% or higher     Note: Adopted from ADA consensus guidelines.     TSH 07/08/2023 1.57  0.30 - 4.20 uIU/mL Final     Hemoglobin 07/08/2023 11.6 (L)  11.7 - 15.7 g/dL Final     Parathyroid Hormone Intact 07/08/2023 16  15 - 65 pg/mL Final     Total Protein Urine mg/dL 07/08/2023 29.9 (H)    mg/dL Final    The reference ranges have not been established in urine protein. The results should be integrated into the clinical context for interpretation.     Total Protein UR MG/MG CR 07/08/2023 0.41 (H)  0.00 - 0.20 mg/mg Cr Final     Creatinine Urine mg/dL 07/08/2023 72.4  mg/dL Final    The reference ranges have not been established in urine creatinine. The results should be integrated into the clinical context for interpretation.     Sodium 07/08/2023 141  136 - 145 mmol/L Final     Potassium 07/08/2023 4.4  3.4 - 5.3 mmol/L Final     Chloride 07/08/2023 99  98 - 107 mmol/L Final     Carbon Dioxide (CO2) 07/08/2023 32 (H)  22 - 29 mmol/L Final     Anion Gap 07/08/2023 10  7 - 15 mmol/L Final     Glucose 07/08/2023 152 (H)  70 - 99 mg/dL Final     Urea Nitrogen 07/08/2023 29.2 (H)  8.0 - 23.0 mg/dL Final     Creatinine 07/08/2023 1.63 (H)  0.51 - 0.95 mg/dL Final     GFR Estimate 07/08/2023 33 (L)  >60 mL/min/1.73m2 Final     Calcium 07/08/2023 9.5  8.8 - 10.2 mg/dL Final     Albumin 07/08/2023 3.9  3.5 - 5.2 g/dL Final     Phosphorus 07/08/2023 4.0  2.5 - 4.5 mg/dL Final     Alkaline Phosphatase 07/08/2023 46  35 - 104 U/L Final     ALT 07/08/2023 17  0 - 50 U/L Final    Reference intervals for this test were updated  on 6/12/2023 to more accurately reflect our healthy population. There may be differences in the flagging of prior results with similar values performed with this method. Interpretation of those prior results can be made in the context of the updated reference intervals.       AST 07/08/2023 24  0 - 45 U/L Final    Reference intervals for this test were updated on 6/12/2023 to more accurately reflect our healthy population. There may be differences in the flagging of prior results with similar values performed with this method. Interpretation of those prior results can be made in the context of the updated reference intervals.     Bilirubin Total 07/08/2023 0.3  <=1.2 mg/dL Final     Bilirubin Direct 07/08/2023 <0.20  0.00 - 0.30 mg/dL Final     Protein Total 07/08/2023 6.5  6.4 - 8.3 g/dL Final          Lab results were reviewed and interpreted.       Assessment/Plan:   Stage 3b chronic kidney disease (H)  AT risk for CKD in the setting of diabetes, hypertension, CHF on diuretics, tobacco hx, obesity as a potential risk for secondary FSGS, and also fenofibrate use. She has no hematuria which is reassuring, UPCR is 0.24 g/g which is reassuring as well - likely mild proteinuria secondary to obesity, diabetes, arterionephrosclerosis. We stopped fenofibrate at her previous visit - no improvement in creatinine seen but also had adjustments to torsemide which may have caused the lack of improvement. Would like to avoid fenofibrate going forward. Educated on risk of cardiorenal physiology - want to avoid volume overload for her CHF but also avoid dehydration to avoid SUZE and the potential need to adjust diuretics to keep this balanced. On max lisinopril. Ideally would be on SGLT2 inhibitor but with her GFR so close to 30 and the likelihood of it dropping below 30 once on SGLT2 inhibitor, will hold on starting this for the time being.    Creatinine most recently is relatively stable at what appears to be her baseline for  the past year.     CHF:  On torsemide 40 mg AM and 20 mg PM - volume is stable. As noted above, will keep diuretic as is but if there is concern for dehydration, would have a low threshold to lower diuretic. She will be seeing cardiology in the near future in person - if no signs of volume overload, question whether torsemide dose could be decreased given her improved diet/lower sodium intake from when she was first started on torsemide. If torsemide is decreased, would recommend lowering the potassium supplement as well.     DM: last A1C 6.6%     Hypertension: blood pressure goal is 110-130 systolic/<80 diastolic.     Hx of tobacco: at risk for secondary FSGS - now away from tobacco which is great to hear    Obesity: educated on benefits of weight loss/healthy lifestyle     Abnormal kidney ultrasound: ultrasound showed a concerning lesion but then the MRI was normal. Reviewed with Radiology July 2022 and they do not feel follow-up needed.     Monoclonal gammopathy: seen by hematology. Has thrombocytopenia as well - defer monitoring/mgmt to hematology.     Anemia: hemoglobin 11.6 - iron studies added on    8967-1829 AM video visit via [employer] - offsite.   Jessika Preston, DO

## 2023-07-24 NOTE — TELEPHONE ENCOUNTER
Message was left in the afternoon and the patient had requested a return call but stated someone called him but hung up before he answered.  I did not see  documentation that you actually spoke to him so it was unclear if it was completely taken care of Order placed on Nikki's desk.Caroline Carlos MA/ZULEMA

## 2023-07-30 ENCOUNTER — LAB (OUTPATIENT)
Dept: LAB | Facility: CLINIC | Age: 73
End: 2023-07-30
Payer: COMMERCIAL

## 2023-07-30 DIAGNOSIS — D69.6 THROMBOCYTOPENIA (H): ICD-10-CM

## 2023-07-30 PROCEDURE — 36415 COLL VENOUS BLD VENIPUNCTURE: CPT

## 2023-07-30 PROCEDURE — 85025 COMPLETE CBC W/AUTO DIFF WBC: CPT

## 2023-07-31 LAB
BASOPHILS # BLD AUTO: 0 10E3/UL (ref 0–0.2)
BASOPHILS NFR BLD AUTO: 1 %
EOSINOPHIL # BLD AUTO: 0.1 10E3/UL (ref 0–0.7)
EOSINOPHIL NFR BLD AUTO: 3 %
ERYTHROCYTE [DISTWIDTH] IN BLOOD BY AUTOMATED COUNT: 12.8 % (ref 10–15)
HCT VFR BLD AUTO: 34.6 % (ref 35–47)
HGB BLD-MCNC: 11.2 G/DL (ref 11.7–15.7)
IMM GRANULOCYTES # BLD: 0 10E3/UL
IMM GRANULOCYTES NFR BLD: 0 %
LYMPHOCYTES # BLD AUTO: 1.3 10E3/UL (ref 0.8–5.3)
LYMPHOCYTES NFR BLD AUTO: 31 %
MCH RBC QN AUTO: 33.9 PG (ref 26.5–33)
MCHC RBC AUTO-ENTMCNC: 32.4 G/DL (ref 31.5–36.5)
MCV RBC AUTO: 105 FL (ref 78–100)
MONOCYTES # BLD AUTO: 0.2 10E3/UL (ref 0–1.3)
MONOCYTES NFR BLD AUTO: 6 %
NEUTROPHILS # BLD AUTO: 2.5 10E3/UL (ref 1.6–8.3)
NEUTROPHILS NFR BLD AUTO: 59 %
NRBC # BLD AUTO: 0 10E3/UL
NRBC BLD AUTO-RTO: 0 /100
PLATELET # BLD AUTO: 80 10E3/UL (ref 150–450)
RBC # BLD AUTO: 3.3 10E6/UL (ref 3.8–5.2)
WBC # BLD AUTO: 4.2 10E3/UL (ref 4–11)

## 2023-08-01 ENCOUNTER — MYC MEDICAL ADVICE (OUTPATIENT)
Dept: FAMILY MEDICINE | Facility: CLINIC | Age: 73
End: 2023-08-01
Payer: COMMERCIAL

## 2023-08-01 DIAGNOSIS — Z87.891 PERSONAL HISTORY OF NICOTINE DEPENDENCE: ICD-10-CM

## 2023-08-01 DIAGNOSIS — R91.8 PULMONARY NODULES: Primary | ICD-10-CM

## 2023-08-09 DIAGNOSIS — E53.8 FOLATE DEFICIENCY: ICD-10-CM

## 2023-08-09 RX ORDER — FOLIC ACID 1 MG/1
1 TABLET ORAL DAILY
Qty: 90 TABLET | Refills: 0 | Status: SHIPPED | OUTPATIENT
Start: 2023-08-09 | End: 2023-11-14

## 2023-08-09 NOTE — TELEPHONE ENCOUNTER
Refill for folic acid (FOLVITE) 1 MG tablet  sent to patient's pharmacy. Patient's last visit with Dr Preston was 4/11/23. Patient has a scheduled follow up visit for 1/9/24.  EVELYN Alvarez Care Coordinator  Nephrology

## 2023-09-03 ENCOUNTER — HEALTH MAINTENANCE LETTER (OUTPATIENT)
Age: 73
End: 2023-09-03

## 2023-09-05 ENCOUNTER — ANCILLARY PROCEDURE (OUTPATIENT)
Dept: CT IMAGING | Facility: CLINIC | Age: 73
End: 2023-09-05
Attending: FAMILY MEDICINE
Payer: COMMERCIAL

## 2023-09-05 DIAGNOSIS — Z87.891 PERSONAL HISTORY OF NICOTINE DEPENDENCE: ICD-10-CM

## 2023-09-05 DIAGNOSIS — R91.8 PULMONARY NODULES: ICD-10-CM

## 2023-09-05 PROCEDURE — 71271 CT THORAX LUNG CANCER SCR C-: CPT | Mod: GC | Performed by: RADIOLOGY

## 2023-10-18 DIAGNOSIS — I50.9 HEART FAILURE, UNSPECIFIED (H): Primary | ICD-10-CM

## 2023-10-25 ENCOUNTER — OFFICE VISIT (OUTPATIENT)
Dept: FAMILY MEDICINE | Facility: CLINIC | Age: 73
End: 2023-10-25
Payer: COMMERCIAL

## 2023-10-25 VITALS
OXYGEN SATURATION: 93 % | WEIGHT: 251 LBS | HEART RATE: 77 BPM | BODY MASS INDEX: 37.18 KG/M2 | SYSTOLIC BLOOD PRESSURE: 98 MMHG | HEIGHT: 69 IN | DIASTOLIC BLOOD PRESSURE: 52 MMHG | RESPIRATION RATE: 17 BRPM | TEMPERATURE: 98.5 F

## 2023-10-25 DIAGNOSIS — R53.83 OTHER FATIGUE: ICD-10-CM

## 2023-10-25 DIAGNOSIS — R82.90 ABNORMAL URINE: ICD-10-CM

## 2023-10-25 DIAGNOSIS — N18.32 STAGE 3B CHRONIC KIDNEY DISEASE (H): ICD-10-CM

## 2023-10-25 DIAGNOSIS — D69.6 THROMBOCYTOPENIA (H): ICD-10-CM

## 2023-10-25 DIAGNOSIS — Z79.4: ICD-10-CM

## 2023-10-25 DIAGNOSIS — I50.9 CHRONIC HEART FAILURE, UNSPECIFIED HEART FAILURE TYPE (H): ICD-10-CM

## 2023-10-25 DIAGNOSIS — E08.3419: ICD-10-CM

## 2023-10-25 DIAGNOSIS — I10 HYPERTENSION GOAL BP (BLOOD PRESSURE) < 140/90: ICD-10-CM

## 2023-10-25 DIAGNOSIS — Z00.00 ENCOUNTER FOR MEDICARE ANNUAL WELLNESS EXAM: Primary | ICD-10-CM

## 2023-10-25 PROBLEM — I50.33 ACUTE ON CHRONIC DIASTOLIC (CONGESTIVE) HEART FAILURE (H): Status: RESOLVED | Noted: 2023-10-25 | Resolved: 2023-10-25

## 2023-10-25 PROBLEM — I50.33 ACUTE ON CHRONIC DIASTOLIC (CONGESTIVE) HEART FAILURE (H): Status: ACTIVE | Noted: 2023-10-25

## 2023-10-25 LAB
ALBUMIN UR-MCNC: NEGATIVE MG/DL
APPEARANCE UR: ABNORMAL
BACTERIA #/AREA URNS HPF: ABNORMAL /HPF
BASOPHILS # BLD AUTO: 0.1 10E3/UL (ref 0–0.2)
BASOPHILS NFR BLD AUTO: 1 %
BILIRUB UR QL STRIP: NEGATIVE
COLOR UR AUTO: YELLOW
EOSINOPHIL # BLD AUTO: 0.1 10E3/UL (ref 0–0.7)
EOSINOPHIL NFR BLD AUTO: 3 %
ERYTHROCYTE [DISTWIDTH] IN BLOOD BY AUTOMATED COUNT: 12.8 % (ref 10–15)
GLUCOSE UR STRIP-MCNC: NEGATIVE MG/DL
HBA1C MFR BLD: 6.6 % (ref 0–5.6)
HCT VFR BLD AUTO: 35.9 % (ref 35–47)
HGB BLD-MCNC: 12.1 G/DL (ref 11.7–15.7)
HGB UR QL STRIP: NEGATIVE
IMM GRANULOCYTES # BLD: 0 10E3/UL
IMM GRANULOCYTES NFR BLD: 0 %
KETONES UR STRIP-MCNC: NEGATIVE MG/DL
LEUKOCYTE ESTERASE UR QL STRIP: ABNORMAL
LYMPHOCYTES # BLD AUTO: 1.4 10E3/UL (ref 0.8–5.3)
LYMPHOCYTES NFR BLD AUTO: 35 %
MCH RBC QN AUTO: 34.3 PG (ref 26.5–33)
MCHC RBC AUTO-ENTMCNC: 33.7 G/DL (ref 31.5–36.5)
MCV RBC AUTO: 102 FL (ref 78–100)
MONOCYTES # BLD AUTO: 0.2 10E3/UL (ref 0–1.3)
MONOCYTES NFR BLD AUTO: 6 %
NEUTROPHILS # BLD AUTO: 2.2 10E3/UL (ref 1.6–8.3)
NEUTROPHILS NFR BLD AUTO: 55 %
NITRATE UR QL: POSITIVE
PH UR STRIP: 5.5 [PH] (ref 5–7)
PLATELET # BLD AUTO: 119 10E3/UL (ref 150–450)
RBC # BLD AUTO: 3.53 10E6/UL (ref 3.8–5.2)
RBC #/AREA URNS AUTO: ABNORMAL /HPF
SP GR UR STRIP: 1.01 (ref 1–1.03)
SQUAMOUS #/AREA URNS AUTO: ABNORMAL /LPF
UROBILINOGEN UR STRIP-ACNC: 0.2 E.U./DL
WBC # BLD AUTO: 4 10E3/UL (ref 4–11)
WBC #/AREA URNS AUTO: ABNORMAL /HPF

## 2023-10-25 PROCEDURE — 36415 COLL VENOUS BLD VENIPUNCTURE: CPT | Performed by: FAMILY MEDICINE

## 2023-10-25 PROCEDURE — 87086 URINE CULTURE/COLONY COUNT: CPT | Performed by: FAMILY MEDICINE

## 2023-10-25 PROCEDURE — 80053 COMPREHEN METABOLIC PANEL: CPT | Performed by: FAMILY MEDICINE

## 2023-10-25 PROCEDURE — 85025 COMPLETE CBC W/AUTO DIFF WBC: CPT | Performed by: FAMILY MEDICINE

## 2023-10-25 PROCEDURE — 90480 ADMN SARSCOV2 VAC 1/ONLY CMP: CPT | Performed by: FAMILY MEDICINE

## 2023-10-25 PROCEDURE — 82043 UR ALBUMIN QUANTITATIVE: CPT | Performed by: FAMILY MEDICINE

## 2023-10-25 PROCEDURE — 99214 OFFICE O/P EST MOD 30 MIN: CPT | Mod: 25 | Performed by: FAMILY MEDICINE

## 2023-10-25 PROCEDURE — 84443 ASSAY THYROID STIM HORMONE: CPT | Performed by: FAMILY MEDICINE

## 2023-10-25 PROCEDURE — 91320 SARSCV2 VAC 30MCG TRS-SUC IM: CPT | Performed by: FAMILY MEDICINE

## 2023-10-25 PROCEDURE — 81001 URINALYSIS AUTO W/SCOPE: CPT | Performed by: FAMILY MEDICINE

## 2023-10-25 PROCEDURE — 83880 ASSAY OF NATRIURETIC PEPTIDE: CPT | Performed by: FAMILY MEDICINE

## 2023-10-25 PROCEDURE — G0008 ADMIN INFLUENZA VIRUS VAC: HCPCS | Performed by: FAMILY MEDICINE

## 2023-10-25 PROCEDURE — 82570 ASSAY OF URINE CREATININE: CPT | Performed by: FAMILY MEDICINE

## 2023-10-25 PROCEDURE — 83036 HEMOGLOBIN GLYCOSYLATED A1C: CPT | Performed by: FAMILY MEDICINE

## 2023-10-25 PROCEDURE — G0439 PPPS, SUBSEQ VISIT: HCPCS | Performed by: FAMILY MEDICINE

## 2023-10-25 PROCEDURE — 90662 IIV NO PRSV INCREASED AG IM: CPT | Performed by: FAMILY MEDICINE

## 2023-10-25 RX ORDER — AMLODIPINE BESYLATE 2.5 MG/1
2.5 TABLET ORAL DAILY
Qty: 30 TABLET | Refills: 1 | Status: SHIPPED | OUTPATIENT
Start: 2023-10-25 | End: 2023-12-27

## 2023-10-25 RX ORDER — RESPIRATORY SYNCYTIAL VIRUS VACCINE 120MCG/0.5
0.5 KIT INTRAMUSCULAR ONCE
Qty: 1 EACH | Refills: 0 | Status: CANCELLED | OUTPATIENT
Start: 2023-10-25 | End: 2023-10-25

## 2023-10-25 RX ORDER — NITROFURANTOIN 25; 75 MG/1; MG/1
100 CAPSULE ORAL 2 TIMES DAILY
Qty: 10 CAPSULE | Refills: 0 | Status: SHIPPED | OUTPATIENT
Start: 2023-10-25 | End: 2024-03-07

## 2023-10-25 ASSESSMENT — ACTIVITIES OF DAILY LIVING (ADL): CURRENT_FUNCTION: NEEDS ASSISTANCE

## 2023-10-25 ASSESSMENT — PATIENT HEALTH QUESTIONNAIRE - PHQ9
SUM OF ALL RESPONSES TO PHQ QUESTIONS 1-9: 14
10. IF YOU CHECKED OFF ANY PROBLEMS, HOW DIFFICULT HAVE THESE PROBLEMS MADE IT FOR YOU TO DO YOUR WORK, TAKE CARE OF THINGS AT HOME, OR GET ALONG WITH OTHER PEOPLE: VERY DIFFICULT
SUM OF ALL RESPONSES TO PHQ QUESTIONS 1-9: 14

## 2023-10-25 ASSESSMENT — PAIN SCALES - GENERAL: PAINLEVEL: EXTREME PAIN (8)

## 2023-10-25 ASSESSMENT — ENCOUNTER SYMPTOMS: FATIGUE: 1

## 2023-10-25 NOTE — PATIENT INSTRUCTIONS
Patient Education   Personalized Prevention Plan  You are due for the preventive services outlined below.  Your care team is available to assist you in scheduling these services.  If you have already completed any of these items, please share that information with your care team to update in your medical record.  Health Maintenance Due   Topic Date Due     Heart Failure Action Plan  Never done     ANNUAL REVIEW OF HM ORDERS  Never done     Hepatitis B Vaccine (1 of 3 - Risk 3-dose series) Never done     RSV VACCINE 60+ (1 - 1-dose 60+ series) Never done     Kidney Microalbumin Urine Test  07/20/2022     Annual Wellness Visit  07/12/2023     Flu Vaccine (1) 09/01/2023     COVID-19 Vaccine (6 - 2023-24 season) 09/01/2023     A1C Lab  10/08/2023     Learning About Depression Screening  What is depression screening?  Depression screening is a way to see if you have depression symptoms. It may be done by a doctor or counselor. It's often part of a routine checkup. That's because your mental health is just as important as your physical health.  Depression is a mental health condition that affects how you feel, think, and act. You may:  Have less energy.  Lose interest in your daily activities.  Feel sad and grouchy for a long time.  Depression is very common. It affects people of all ages.  Many things can lead to depression. Some people become depressed after they have a stroke or find out they have a major illness like cancer or heart disease. The death of a loved one or a breakup may lead to depression. It can run in families. Most experts believe that a combination of inherited genes and stressful life events can cause it.  What happens during screening?  You may be asked to fill out a form about your depression symptoms. You and the doctor will discuss your answers. The doctor may ask you more questions to learn more about how you think, act, and feel.  What happens after screening?  If you have symptoms of  "depression, your doctor will talk to you about your options.  Doctors usually treat depression with medicines or counseling. Often, combining the two works best. Many people don't get help because they think that they'll get over the depression on their own. But people with depression may not get better unless they get treatment.  The cause of depression is not well understood. There may be many factors involved. But if you have depression, it's not your fault.  A serious symptom of depression is thinking about death or suicide. If you or someone you care about talks about this or about feeling hopeless, get help right away.  It's important to know that depression can be treated. Medicine, counseling, and self-care may help.  Where can you learn more?  Go to https://www.[employer].net/patiented  Enter T185 in the search box to learn more about \"Learning About Depression Screening.\"  Current as of: October 20, 2022               Content Version: 13.7    9376-8374 [email or web address].   Care instructions adapted under license by your healthcare professional. If you have questions about a medical condition or this instruction, always ask your healthcare professional. [email or web address] disclaims any warranty or liability for your use of this information.         "

## 2023-10-25 NOTE — PROGRESS NOTES
"  Assessment & Plan     Encounter for Medicare annual wellness exam  completed    Other fatigue  Await lab results  Adjust BP meds to see if this is contributing to fatigue  - **CBC with platelets FUTURE 2mo; Future  - **Comprehensive metabolic panel FUTURE 2mo; Future  - **TSH with free T4 reflex FUTURE 2mo; Future  - BNP-N terminal pro; Future  - UA Macroscopic with reflex to Microscopic and Culture - Lab Collect; Future  - **Comprehensive metabolic panel FUTURE 2mo  - **TSH with free T4 reflex FUTURE 2mo  - BNP-N terminal pro  - UA Macroscopic with reflex to Microscopic and Culture - Lab Collect  - Urine Microscopic Exam  - Urine Culture    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, with long-term current use of insulin, unspecified laterality (H)  Has been well controlled  Is on aspirin, statin and arb  Is on insulin  \UTD with eye exam  - HEMOGLOBIN A1C; Future  - HEMOGLOBIN A1C    Stage 3b chronic kidney disease (H)  Monitoring, on arb  - Albumin Random Urine Quantitative with Creat Ratio; Future  - Albumin Random Urine Quantitative with Creat Ratio    Hypertension goal BP (blood pressure) < 140/90  At goal on meds, perhaps too low so decrease norvasc to 2.5 mg and follow-up in 1-2 weeks  - amLODIPine (NORVASC) 2.5 MG tablet; Take 1 tablet (2.5 mg) by mouth daily    Thrombocytopenia (H24)  recheck  - CBC with Platelets & Differential    Heart failure, unspecified (H)  Managed per cards               BMI:   Estimated body mass index is 37.07 kg/m  as calculated from the following:    Height as of this encounter: 1.753 m (5' 9\").    Weight as of this encounter: 113.9 kg (251 lb).   Weight management plan: Discussed healthy diet and exercise guidelines        Sandy Ricci MD  [facility]    Adelina Grullon is a 73 year old, presenting for the following health issues:  Fatigue      10/25/2023     1:22 PM   Additional " Questions   Roomed by medhat   Accompanied by lorenzo daughter       Fatigue  Associated symptoms include fatigue.   History of Present Illness       Reason for visit:  Extreme tiredness  Symptom onset:  More than a month  Symptoms include:  Sleeping past 1PM or just extremely tired  Symptom intensity:  Severe  Symptom progression:  Worsening  Had these symptoms before:  No  What makes it better:  When I don't feel so tired.    She eats 0-1 servings of fruits and vegetables daily.She consumes 1 sweetened beverage(s) daily.She exercises with enough effort to increase her heart rate 9 or less minutes per day.  She exercises with enough effort to increase her heart rate 3 or less days per week.   She is taking medications regularly.     Pt with diarrhea for a couple of weeks  Soft sometimes watery  First a hard one and then a soft one  Not black or bloody  She would notice crampy wth this, felt better after second stool  Nausea as well improved with stooling  No vomiting    Fatigue started at same time  Patient could sleep 24 hours per day if she  Often would sleep til early afternoon  This is a change in her behavior for the past 2-3 weeks  She is struggling  No chest pain or sob  No urinary symptoms  No med changes other then increase in primidone  Daughter concerned BP may be too low now as she has lost 10 lbs  Will have her decrease norvasc to 2.5 mg and if BP still running low, stop it all together  Will monitor BP, pulse and sliding scale with these changes.   Daughter will report back     Annual Wellness Visit    Patient has been advised of split billing requirements and indicates understanding: Yes     Are you in the first 12 months of your Medicare Part B coverage?  No    Physical Health:  In general, how would you rate your overall physical health? fair  Outside of work, how many days during the week do you exercise?none  Outside of work, approximately how many minutes a day do you exercise?not applicable  If  "you drink alcohol do you typically have >3 drinks per day or >7 drinks per week? No  Do you usually eat at least 4 servings of fruit and vegetables a day, include whole grains & fiber and avoid regularly eating high fat or \"junk\" foods? No  Do you have any problems taking medications regularly? No  Do you have any side effects from medications? none  Needs assistance for the following daily activities: transportation, shopping, and housework  Which of the following safety concerns are present in your home?  none identified   Hearing impairment: No  In the past 6 months, have you been bothered by leaking of urine? yes    Mental Health:  In general, how would you rate your overall mental or emotional health? poor    Today's PHQ-9 Score:       10/25/2023     1:11 PM   PHQ-9 SCORE   PHQ-9 Total Score MyChart 14 (Moderate depression)   PHQ-9 Total Score 14         Do you feel safe in your environment? Yes    Have you ever done Advance Care Planning? (For example, a Health Directive, POLST, or a discussion with a medical provider or your loved ones about your wishes)? Yes, advance care planning is on file.    Fall risk:  Fallen 2 or more times in the past year?: No  Any fall with injury in the past year?: No    Cognitive Screening: no concerns based on direct observation    Do you have sleep apnea, excessive snoring or daytime drowsiness? : yes    Social History     Tobacco Use    Smoking status: Former     Packs/day: 1.00     Years: 50.00     Additional pack years: 0.00     Total pack years: 50.00     Types: Cigarettes     Start date: 1966     Quit date: 2021     Years since quittin.3     Passive exposure: Never    Smokeless tobacco: Never    Tobacco comments:     quit for 6 months back in 2018   Substance Use Topics    Alcohol use: Not Currently     Comment: Maybe a drink every few months              No data to display              Do you have a current opioid prescription? No  Do you use any other " controlled substances or medications that are not prescribed by a provider? None    Current providers sharing in care for this patient include:   Patient Care Team:  Sandy Ricci MD as PCP - General (Family Practice)  Maranda Mustafa, RN as Registered Nurse  Sandy Ricci MD as Assigned PCP  Oriana Apple RD as Diabetes Educator (Dietitian, Registered)  Geovanny Bass RPH as Pharmacist (Pharmacist Clinician- Clinical Pharmacy Specialist)  Jessika Preston DO as MD (Nephrology)  Maranda Mustafa, RN as Diabetes Educator (Diabetes Education)  Jessika Preston DO as Assigned Nephrology Provider  Shawna Wang MD as MD (Medical Oncology)  Shawna Wang MD as Assigned Cancer Care Provider  Geovanny Bass RPH as Assigned MTM Pharmacist  Donte Pappas MD as Assigned Surgical Provider  Donnie Keith MD as Assigned Neuroscience Provider  Shelley Garcia PA-C as Physician Assistant (Dermatology)    The following health maintenance items are reviewed in Epic and correct as of today:  Health Maintenance   Topic Date Due    HF ACTION PLAN  Never done    ANNUAL REVIEW OF HM ORDERS  Never done    HEPATITIS B IMMUNIZATION (1 of 3 - Risk 3-dose series) Never done    RSV VACCINE 60+ (1 - 1-dose 60+ series) Never done    MICROALBUMIN  07/20/2022    MEDICARE ANNUAL WELLNESS VISIT  07/12/2023    INFLUENZA VACCINE (1) 09/01/2023    COVID-19 Vaccine (6 - 2023-24 season) 09/01/2023    A1C  10/08/2023    BMP  01/08/2024    PHQ-9  01/25/2024    LIPID  04/22/2024    DIABETIC FOOT EXAM  06/27/2024    ALT  07/08/2024    CBC  07/30/2024    HEMOGLOBIN  07/30/2024    EYE EXAM  08/22/2024    LUNG CANCER SCREENING  09/05/2024    FALL RISK ASSESSMENT  10/25/2024    MAMMO SCREENING  02/07/2025    DTAP/TDAP/TD IMMUNIZATION (2 - Td or Tdap) 03/01/2027    COLORECTAL CANCER SCREENING  03/29/2027    ADVANCE CARE PLANNING  07/12/2027    DEXA  02/03/2035    TSH W/FREE T4 REFLEX   "Completed    SPIROMETRY  Completed    HEPATITIS C SCREENING  Completed    COPD ACTION PLAN  Completed    DEPRESSION ACTION PLAN  Completed    Pneumococcal Vaccine: 65+ Years  Completed    URINALYSIS  Completed    ZOSTER IMMUNIZATION  Completed    IPV IMMUNIZATION  Aged Out    HPV IMMUNIZATION  Aged Out    MENINGITIS IMMUNIZATION  Aged Out       Patient has been advised of split billing requirements and indicates understanding: Yes    Appropriate preventive services were discussed with this patient, including applicable screening as appropriate for fall prevention, nutrition, physical activity, Tobacco-use cessation, weight loss and cognition.  Checklist reviewing preventive services available has been given to the patient.        Review of Systems   Constitutional:  Positive for fatigue.      Constitutional, HEENT, cardiovascular, pulmonary, gi and gu systems are negative, except as otherwise noted.      Objective    BP 98/52   Pulse 77   Temp 98.5  F (36.9  C) (Oral)   Resp 17   Ht 1.753 m (5' 9\")   Wt 113.9 kg (251 lb)   SpO2 93%   BMI 37.07 kg/m    Body mass index is 37.07 kg/m .  Physical Exam   GENERAL: healthy, alert and no distress  EYES: Eyes grossly normal to inspection, PERRL and conjunctivae and sclerae normal  HENT: ear canals and TM's normal, nose and mouth without ulcers or lesions  NECK: no adenopathy, no asymmetry, masses, or scars and thyroid normal to palpation  RESP: lungs clear to auscultation - no rales, rhonchi or wheezes  CV: regular rates and rhythm, normal S1 S2, no S3 or S4, grade 2/6 systolic murmur heard best over the aorta, peripheral pulses strong, and no peripheral edema  ABDOMEN: soft, nontender, no hepatosplenomegaly, no masses and bowel sounds normal  MS: no gross musculoskeletal defects noted, no edema  SKIN: no suspicious lesions or rashes  NEURO: Normal strength and tone, mentation intact and speech normal  PSYCH: mentation appears normal, affect normal/bright    Results " for orders placed or performed in visit on 10/25/23 (from the past 24 hour(s))   CBC with Platelets & Differential    Narrative    The following orders were created for panel order CBC with Platelets & Differential.  Procedure                               Abnormality         Status                     ---------                               -----------         ------                     CBC with platelets and d...[[id number]]  Abnormal            Final result                 Please view results for these tests on the individual orders.   HEMOGLOBIN A1C   Result Value Ref Range    Hemoglobin A1C 6.6 (H) 0.0 - 5.6 %   UA Macroscopic with reflex to Microscopic and Culture - Lab Collect    Specimen: Urine, Clean Catch   Result Value Ref Range    Color Urine Yellow Colorless, Straw, Light Yellow, Yellow    Appearance Urine Slightly Cloudy (A) Clear    Glucose Urine Negative Negative mg/dL    Bilirubin Urine Negative Negative    Ketones Urine Negative Negative mg/dL    Specific Gravity Urine 1.010 1.003 - 1.035    Blood Urine Negative Negative    pH Urine 5.5 5.0 - 7.0    Protein Albumin Urine Negative Negative mg/dL    Urobilinogen Urine 0.2 0.2, 1.0 E.U./dL    Nitrite Urine Positive (A) Negative    Leukocyte Esterase Urine Moderate (A) Negative   CBC with platelets and differential   Result Value Ref Range    WBC Count 4.0 4.0 - 11.0 10e3/uL    RBC Count 3.53 (L) 3.80 - 5.20 10e6/uL    Hemoglobin 12.1 11.7 - 15.7 g/dL    Hematocrit 35.9 35.0 - 47.0 %     (H) 78 - 100 fL    MCH 34.3 (H) 26.5 - 33.0 pg    MCHC 33.7 31.5 - 36.5 g/dL    RDW 12.8 10.0 - 15.0 %    Platelet Count 119 (L) 150 - 450 10e3/uL    % Neutrophils 55 %    % Lymphocytes 35 %    % Monocytes 6 %    % Eosinophils 3 %    % Basophils 1 %    % Immature Granulocytes 0 %    Absolute Neutrophils 2.2 1.6 - 8.3 10e3/uL    Absolute Lymphocytes 1.4 0.8 - 5.3 10e3/uL    Absolute Monocytes 0.2 0.0 - 1.3 10e3/uL    Absolute Eosinophils 0.1 0.0 - 0.7 10e3/uL     Absolute Basophils 0.1 0.0 - 0.2 10e3/uL    Absolute Immature Granulocytes 0.0 <=0.4 10e3/uL   Urine Microscopic Exam   Result Value Ref Range    Bacteria Urine Many (A) None Seen /HPF    RBC Urine 0-2 0-2 /HPF /HPF    WBC Urine 25-50 (A) 0-5 /HPF /HPF    Squamous Epithelials Urine Few (A) None Seen /LPF                   The patient s PHQ-9 score is consistent with moderate depression. She was provided with information regarding depression and was advised to schedule a follow up appointment in 4 weeks to further address this issue.

## 2023-10-26 LAB
ALBUMIN SERPL BCG-MCNC: 3.8 G/DL (ref 3.5–5.2)
ALP SERPL-CCNC: 54 U/L (ref 35–104)
ALT SERPL W P-5'-P-CCNC: 21 U/L (ref 0–50)
ANION GAP SERPL CALCULATED.3IONS-SCNC: 11 MMOL/L (ref 7–15)
AST SERPL W P-5'-P-CCNC: 24 U/L (ref 0–45)
BILIRUB SERPL-MCNC: 0.2 MG/DL
BUN SERPL-MCNC: 24.9 MG/DL (ref 8–23)
CALCIUM SERPL-MCNC: 9 MG/DL (ref 8.8–10.2)
CHLORIDE SERPL-SCNC: 98 MMOL/L (ref 98–107)
CREAT SERPL-MCNC: 1.43 MG/DL (ref 0.51–0.95)
CREAT UR-MCNC: 57.5 MG/DL
DEPRECATED HCO3 PLAS-SCNC: 30 MMOL/L (ref 22–29)
EGFRCR SERPLBLD CKD-EPI 2021: 38 ML/MIN/1.73M2
GLUCOSE SERPL-MCNC: 127 MG/DL (ref 70–99)
MICROALBUMIN UR-MCNC: 13.5 MG/L
MICROALBUMIN/CREAT UR: 23.48 MG/G CR (ref 0–25)
NT-PROBNP SERPL-MCNC: 269 PG/ML (ref 0–900)
POTASSIUM SERPL-SCNC: 3.8 MMOL/L (ref 3.4–5.3)
PROT SERPL-MCNC: 6.8 G/DL (ref 6.4–8.3)
SODIUM SERPL-SCNC: 139 MMOL/L (ref 135–145)
TSH SERPL DL<=0.005 MIU/L-ACNC: 1.87 UIU/ML (ref 0.3–4.2)

## 2023-10-27 LAB — BACTERIA UR CULT: NORMAL

## 2023-11-14 DIAGNOSIS — E53.8 FOLATE DEFICIENCY: ICD-10-CM

## 2023-11-14 RX ORDER — FOLIC ACID 1 MG/1
1 TABLET ORAL DAILY
Qty: 90 TABLET | Refills: 1 | Status: SHIPPED | OUTPATIENT
Start: 2023-11-14 | End: 2024-05-14

## 2023-11-28 ENCOUNTER — DOCUMENTATION ONLY (OUTPATIENT)
Dept: LAB | Facility: CLINIC | Age: 73
End: 2023-11-28
Payer: COMMERCIAL

## 2023-11-28 NOTE — PROGRESS NOTES
No labs needed  We did a bunch at the end of October so none needed now  Is there something in particular she wants?    Sandy Ricci MD

## 2023-11-28 NOTE — PROGRESS NOTES
Yadi Iniguez has an upcoming lab appointment:    Future Appointments   Date Time Provider Department Center   12/2/2023  9:15 AM AN [facility]   1/6/2024  9:00 AM AN [facility]   1/9/2024  8:00 AM [facility] [place]   1/9/2024  9[facility]30 [facility] [place]   3/13/2024  8:45 AM [facility] [place]   4/16/2024  9:30 AM Sandy Ricci MD [facility]   4/20/2024 10:00 AM AN LAB [facility]   4/30/2024  2:00 PM Shawna Wang MD [facility]       There is no order available. Please review and place either future orders or HMPO (Review of Health Maintenance Protocol Orders), as appropriate.    Health Maintenance Due   Topic    ANNUAL REVIEW OF HM ORDERS      Nikki Mcclendon

## 2023-11-28 NOTE — PROGRESS NOTES
This patient recently made a lab appt, but looking in her chart we can't see that she needs anything until Jan 2024. Would your team be able to reach out to her?    Thank you, Nikki Mcclendon MLT

## 2023-11-29 DIAGNOSIS — I50.9 HEART FAILURE, UNSPECIFIED (H): Primary | ICD-10-CM

## 2023-12-02 ENCOUNTER — LAB (OUTPATIENT)
Dept: LAB | Facility: CLINIC | Age: 73
End: 2023-12-02
Payer: COMMERCIAL

## 2023-12-02 DIAGNOSIS — I50.9 HEART FAILURE, UNSPECIFIED (H): ICD-10-CM

## 2023-12-02 DIAGNOSIS — D69.6 THROMBOCYTOPENIA (H): ICD-10-CM

## 2023-12-02 LAB
ANION GAP SERPL CALCULATED.3IONS-SCNC: 9 MMOL/L (ref 7–15)
BUN SERPL-MCNC: 30.6 MG/DL (ref 8–23)
CALCIUM SERPL-MCNC: 9.2 MG/DL (ref 8.8–10.2)
CHLORIDE SERPL-SCNC: 99 MMOL/L (ref 98–107)
CREAT SERPL-MCNC: 1.88 MG/DL (ref 0.51–0.95)
DEPRECATED HCO3 PLAS-SCNC: 32 MMOL/L (ref 22–29)
EGFRCR SERPLBLD CKD-EPI 2021: 28 ML/MIN/1.73M2
GLUCOSE SERPL-MCNC: 175 MG/DL (ref 70–99)
POTASSIUM SERPL-SCNC: 4.1 MMOL/L (ref 3.4–5.3)
SODIUM SERPL-SCNC: 140 MMOL/L (ref 135–145)

## 2023-12-02 PROCEDURE — 85025 COMPLETE CBC W/AUTO DIFF WBC: CPT

## 2023-12-02 PROCEDURE — 80048 BASIC METABOLIC PNL TOTAL CA: CPT

## 2023-12-02 PROCEDURE — 36415 COLL VENOUS BLD VENIPUNCTURE: CPT

## 2023-12-04 ENCOUNTER — TRANSFERRED RECORDS (OUTPATIENT)
Dept: HEALTH INFORMATION MANAGEMENT | Facility: CLINIC | Age: 73
End: 2023-12-04
Payer: COMMERCIAL

## 2023-12-04 LAB
BASOPHILS # BLD AUTO: 0.1 10E3/UL (ref 0–0.2)
BASOPHILS NFR BLD AUTO: 1 %
EOSINOPHIL # BLD AUTO: 0.1 10E3/UL (ref 0–0.7)
EOSINOPHIL NFR BLD AUTO: 4 %
ERYTHROCYTE [DISTWIDTH] IN BLOOD BY AUTOMATED COUNT: 13 % (ref 10–15)
HCT VFR BLD AUTO: 33.9 % (ref 35–47)
HGB BLD-MCNC: 11.1 G/DL (ref 11.7–15.7)
IMM GRANULOCYTES # BLD: 0 10E3/UL
IMM GRANULOCYTES NFR BLD: 1 %
LYMPHOCYTES # BLD AUTO: 1.1 10E3/UL (ref 0.8–5.3)
LYMPHOCYTES NFR BLD AUTO: 30 %
MCH RBC QN AUTO: 34.2 PG (ref 26.5–33)
MCHC RBC AUTO-ENTMCNC: 32.7 G/DL (ref 31.5–36.5)
MCV RBC AUTO: 104 FL (ref 78–100)
MONOCYTES # BLD AUTO: 0.3 10E3/UL (ref 0–1.3)
MONOCYTES NFR BLD AUTO: 8 %
NEUTROPHILS # BLD AUTO: 2 10E3/UL (ref 1.6–8.3)
NEUTROPHILS NFR BLD AUTO: 56 %
NRBC # BLD AUTO: 0 10E3/UL
NRBC BLD AUTO-RTO: 0 /100
PLATELET # BLD AUTO: 63 10E3/UL (ref 150–450)
RBC # BLD AUTO: 3.25 10E6/UL (ref 3.8–5.2)
WBC # BLD AUTO: 3.6 10E3/UL (ref 4–11)

## 2023-12-19 DIAGNOSIS — E11.29 TYPE 2 DIABETES MELLITUS WITH OTHER DIABETIC KIDNEY COMPLICATION, WITH LONG-TERM CURRENT USE OF INSULIN (H): ICD-10-CM

## 2023-12-19 DIAGNOSIS — Z79.4 TYPE 2 DIABETES MELLITUS WITH OTHER DIABETIC KIDNEY COMPLICATION, WITH LONG-TERM CURRENT USE OF INSULIN (H): ICD-10-CM

## 2023-12-19 RX ORDER — INSULIN GLARGINE 300 U/ML
INJECTION, SOLUTION SUBCUTANEOUS
Qty: 15 ML | Refills: 0 | Status: SHIPPED | OUTPATIENT
Start: 2023-12-19 | End: 2024-03-27

## 2023-12-26 ENCOUNTER — MEDICAL CORRESPONDENCE (OUTPATIENT)
Dept: HEALTH INFORMATION MANAGEMENT | Facility: CLINIC | Age: 73
End: 2023-12-26

## 2023-12-26 ENCOUNTER — TELEPHONE (OUTPATIENT)
Dept: NEPHROLOGY | Facility: CLINIC | Age: 73
End: 2023-12-26
Payer: COMMERCIAL

## 2023-12-26 DIAGNOSIS — N18.30 CKD (CHRONIC KIDNEY DISEASE) STAGE 3, GFR 30-59 ML/MIN (H): Primary | ICD-10-CM

## 2023-12-26 DIAGNOSIS — I10 HYPERTENSION GOAL BP (BLOOD PRESSURE) < 140/90: ICD-10-CM

## 2023-12-26 ASSESSMENT — PATIENT HEALTH QUESTIONNAIRE - PHQ9
10. IF YOU CHECKED OFF ANY PROBLEMS, HOW DIFFICULT HAVE THESE PROBLEMS MADE IT FOR YOU TO DO YOUR WORK, TAKE CARE OF THINGS AT HOME, OR GET ALONG WITH OTHER PEOPLE: NOT DIFFICULT AT ALL
SUM OF ALL RESPONSES TO PHQ QUESTIONS 1-9: 7
SUM OF ALL RESPONSES TO PHQ QUESTIONS 1-9: 7

## 2023-12-26 NOTE — TELEPHONE ENCOUNTER
[facility] Call Center    Phone Message    May a detailed message be left on voicemail: no     Reason for Call: Order(s): Other:   Reason for requested: Lab orders  Date needed: 01/06/24  Provider name: Dr Preston    Action Taken: Other: [facility]    Travel Screening: Not Applicable

## 2023-12-27 ENCOUNTER — MEDICAL CORRESPONDENCE (OUTPATIENT)
Dept: HEALTH INFORMATION MANAGEMENT | Facility: CLINIC | Age: 73
End: 2023-12-27

## 2023-12-27 ENCOUNTER — TELEPHONE (OUTPATIENT)
Dept: FAMILY MEDICINE | Facility: CLINIC | Age: 73
End: 2023-12-27

## 2023-12-27 ENCOUNTER — OFFICE VISIT (OUTPATIENT)
Dept: FAMILY MEDICINE | Facility: CLINIC | Age: 73
End: 2023-12-27
Payer: COMMERCIAL

## 2023-12-27 VITALS
RESPIRATION RATE: 18 BRPM | HEART RATE: 75 BPM | SYSTOLIC BLOOD PRESSURE: 109 MMHG | BODY MASS INDEX: 36.92 KG/M2 | DIASTOLIC BLOOD PRESSURE: 52 MMHG | WEIGHT: 250 LBS | OXYGEN SATURATION: 92 % | TEMPERATURE: 98.1 F

## 2023-12-27 DIAGNOSIS — E11.9 TYPE 2 DIABETES, HBA1C GOAL < 8% (H): ICD-10-CM

## 2023-12-27 DIAGNOSIS — E78.5 HYPERLIPIDEMIA LDL GOAL <70: ICD-10-CM

## 2023-12-27 DIAGNOSIS — I10 HYPERTENSION GOAL BP (BLOOD PRESSURE) < 140/90: ICD-10-CM

## 2023-12-27 DIAGNOSIS — R29.898 LEFT LEG WEAKNESS: ICD-10-CM

## 2023-12-27 DIAGNOSIS — I63.50 RIGHT PONTINE CEREBROVASCULAR ACCIDENT (H): Primary | ICD-10-CM

## 2023-12-27 PROCEDURE — 99495 TRANSJ CARE MGMT MOD F2F 14D: CPT | Performed by: FAMILY MEDICINE

## 2023-12-27 RX ORDER — CLOPIDOGREL BISULFATE 75 MG/1
75 TABLET ORAL
COMMUNITY
Start: 2023-12-25 | End: 2024-01-15

## 2023-12-27 RX ORDER — AMLODIPINE BESYLATE 2.5 MG/1
2.5 TABLET ORAL DAILY
Qty: 90 TABLET | Refills: 1 | Status: SHIPPED | OUTPATIENT
Start: 2023-12-27 | End: 2023-12-27

## 2023-12-27 RX ORDER — ROSUVASTATIN CALCIUM 40 MG/1
1 TABLET, COATED ORAL DAILY
COMMUNITY
Start: 2023-12-25

## 2023-12-27 RX ORDER — RESPIRATORY SYNCYTIAL VIRUS VACCINE 120MCG/0.5
0.5 KIT INTRAMUSCULAR ONCE
Qty: 1 EACH | Refills: 0 | Status: CANCELLED | OUTPATIENT
Start: 2023-12-27 | End: 2023-12-27

## 2023-12-27 ASSESSMENT — PAIN SCALES - GENERAL: PAINLEVEL: NO PAIN (0)

## 2023-12-27 NOTE — TELEPHONE ENCOUNTER
Home Care is calling regarding an established patient with [facility].       Requesting orders from: Sandy Ricci  Provider is following patient: Yes  Is this a 60-day recertification request?  No    Orders Requested    Skilled Nursing  Request for delay in care, service is not able to be provided within same scheduled day.   Would like to start on 12/29/23      Information was gathered and will be sent to provider for review.  RN will contact Home Care with information after provider review.  Confirmed ok to leave a detailed message with call back.  Contact information confirmed and updated as needed.    Kristina M Kjellberg, RN

## 2023-12-27 NOTE — TELEPHONE ENCOUNTER
Home Care is calling regarding an established patient with [facility].       Requesting orders from: Sandy Ricci  Provider is following patient: No       Orders Requested  RN only requested for Nursing, per staff needs further orders and to confirm PCP will follow for home care.  This will be the first visit to the patient.        Physical Therapy  Request for delay in care   Delay start to date: 12/29/23 due to Home Care Agency      Occupational Therapy  Request for delay in care   Delay start to date: 12/29/23 due to Home Care Agency      Speech Therapy  Request for delay in care   Delay start to date: 12/29/23 due to Home Care Agency      HHA (Home Health Aide)  Request for delay in care   Delay start to date: 12/29/23 due to Home Care Agency      Information was gathered and will be sent to provider for review.  RN will contact Home Care with information after provider review.  Confirmed ok to leave a detailed message with call back.  Contact information confirmed and updated as needed.    Kristina M Kjellberg, RN

## 2023-12-27 NOTE — PROGRESS NOTES
Assessment & Plan     Right pontine cerebrovascular accident (H)  Now on plavix  Has follow-up appt with neuro    Left leg weakness  Due to CVA , has home PT end of this week    Type 2 diabetes, HbA1C goal < 8% (H)  At goal on meds    Hyperlipidemia LDL goal <70  Changed from lipitor to crestor    Hypertension goal BP (blood pressure) < 140/90  Daughter has reached out to cards. Suspect they will continue to lower the coreg due to low BPs           MED REC REQUIRED  Post Medication Reconciliation Status: discharge medications reconciled and changed, per note/orders      [facility] Ricci [facility]  [facility]   Yadi is a 73 year old, presenting for the following health issues:  Hospital F/U      12/27/2023     2:42 PM   Additional Questions   Roomed by medhat   Accompanied by daughter, Nikki WILLOUGHBY         Hospital Follow-up Visit:    Hospital/Nursing Home/IP Rehab Facility:  [facility]   Date of Admission: 12/23/23  Date of Discharge: 12/25/23  Reason(s) for Admission: acute CVA     Was your hospitalization related to COVID-19? No   Problems taking medications regularly:  None  Medication changes since discharge: None at the hospital   Problems adhering to non-medication therapy:  None    Summary of hospitalization:  See outside records, reviewed and scanned  Diagnostic Tests/Treatments reviewed.  Follow up needed: neurology  Other Healthcare Providers Involved in Patient s Care:         Homecare and Physical Therapy  Update since discharge: improved.     -----------------------------------------------------  HOSPITAL DISCHARGE SUMMARY    Patient Name: Yadi Iniguez  YOB: 1950  Medical Record Number: [id number]  Attending Provider: Ramos Obrien MD  Admission Date: 12/23/2023  Discharge Date: 12/24/2023  She will be discharged on 12/24/2023 to home.    DISCHARGE DIAGNOSES:  #CVA    HOSPITAL COURSE:  Ms. Iniguez is a 74 y/o F with h/o moderate aortic  stenosis, HFpEF, T2DM, HTN, HLD, CKD III, COPD without supplemental oxygen requirement, essential tremor, MGUS, chronic thrombocytopenia who presented on 12/23 to [facility] for L>R LE and UE weakness, dysarthria, and dizziness and was found to have a small acute right paramedian pontine infarction. She was transferred to [facility] for neurology eval but was out of window for TNK. NIH of 2. She was switched from aspirin to plavix and atorvastatin to crestor. Speech eval is pending for final dietary recommendations.        Plan of care communicated with patient and family    Went to cardiology and amlodipine was stopped due to her low BP on the 18th  Sent message to cardiology and she noted she was dizzy and had trouble standing and speech impairment. No clear start time  Was told to go to ER  Found to have right parapontine infarct.   Stayed at hospital for 2 days. Was transferred to [place]    Stopped aspirin and started plavix  Changed lipitor to crestor  Has appt with neuro on jan 9, 2024.      Fell at midnight after returning home  Was on floor    BP running low. Cards stopped norvasc all together  Still low they have reached out to cards to see if they should cut down on the coreg as well  Is only on 12.5 of the losartan    Review of Systems   Constitutional, HEENT, cardiovascular, pulmonary, gi and gu systems are negative, except as otherwise noted.      Objective    /52   Pulse 75   Temp 98.1  F (36.7  C) (Oral)   Resp 18   Wt 113.4 kg (250 lb)   SpO2 92%   BMI 36.92 kg/m    Body mass index is 36.92 kg/m .  Physical Exam   GENERAL: healthy, alert and no distress  NECK: no adenopathy, no asymmetry, masses, or scars and thyroid normal to palpation  RESP: lungs clear to auscultation - no rales, rhonchi or wheezes  CV: regular rates and rhythm, normal S1 S2, no S3 or S4, grade 2/6 systoloic murmur heard best over the aorta, peripheral pulses strong, and no peripheral edema  MS: no gross musculoskeletal defects  noted, no edema  NEURO: speech clear today. Facial asymmetry with smile. Able to move left arm and leg freely. Strength not assessed    No results found for this or any previous visit (from the past 24 hour(s)).                  Answers submitted by the patient for this visit:  Patient Health Questionnaire (Submitted on 12/26/2023)  If you checked off any problems, how difficult have these problems made it for you to do your work, take care of things at home, or get along with other people?: Not difficult at all  PHQ9 TOTAL SCORE: 7

## 2023-12-27 NOTE — TELEPHONE ENCOUNTER
Contacted home health agency and gave the orders as requested.  Yessica verbalized understanding.        Kristina Kjellberg, MSN, RN

## 2024-01-05 ENCOUNTER — TELEPHONE (OUTPATIENT)
Dept: FAMILY MEDICINE | Facility: CLINIC | Age: 74
End: 2024-01-05
Payer: COMMERCIAL

## 2024-01-05 NOTE — TELEPHONE ENCOUNTER
Attempted to call back mariel and left message at confidential VM.    Sol Mcclure RN  [facility]

## 2024-01-05 NOTE — TELEPHONE ENCOUNTER
Home Care is calling regarding an established patient with [facility].       Requesting orders from: Sandy Ricci  Provider is following patient: Yes  Is this a 60-day recertification request?  No    Orders Requested    Physical Therapy  Request for delay in care, service is not able to be provided within same scheduled day.   Delay of PT evel for next week      Confirmed ok to leave a detailed message with call back.  Contact information confirmed and updated as needed.    Sol Dalal RN

## 2024-01-06 ENCOUNTER — LAB (OUTPATIENT)
Dept: LAB | Facility: CLINIC | Age: 74
End: 2024-01-06
Payer: COMMERCIAL

## 2024-01-06 DIAGNOSIS — N18.30 CKD (CHRONIC KIDNEY DISEASE) STAGE 3, GFR 30-59 ML/MIN (H): ICD-10-CM

## 2024-01-06 LAB
ALBUMIN MFR UR ELPH: 11.1 MG/DL
ALBUMIN SERPL BCG-MCNC: 3.6 G/DL (ref 3.5–5.2)
ANION GAP SERPL CALCULATED.3IONS-SCNC: 10 MMOL/L (ref 7–15)
BUN SERPL-MCNC: 30.7 MG/DL (ref 8–23)
CALCIUM SERPL-MCNC: 9.5 MG/DL (ref 8.8–10.2)
CHLORIDE SERPL-SCNC: 101 MMOL/L (ref 98–107)
CREAT SERPL-MCNC: 1.7 MG/DL (ref 0.51–0.95)
CREAT UR-MCNC: 70.4 MG/DL
DEPRECATED HCO3 PLAS-SCNC: 31 MMOL/L (ref 22–29)
EGFRCR SERPLBLD CKD-EPI 2021: 31 ML/MIN/1.73M2
GLUCOSE SERPL-MCNC: 153 MG/DL (ref 70–99)
HGB BLD-MCNC: 11.2 G/DL (ref 11.7–15.7)
PHOSPHATE SERPL-MCNC: 3.7 MG/DL (ref 2.5–4.5)
POTASSIUM SERPL-SCNC: 3.7 MMOL/L (ref 3.4–5.3)
PROT/CREAT 24H UR: 0.16 MG/MG CR (ref 0–0.2)
PTH-INTACT SERPL-MCNC: 16 PG/ML (ref 15–65)
SODIUM SERPL-SCNC: 142 MMOL/L (ref 135–145)

## 2024-01-06 PROCEDURE — 85018 HEMOGLOBIN: CPT

## 2024-01-06 PROCEDURE — 83970 ASSAY OF PARATHORMONE: CPT

## 2024-01-06 PROCEDURE — 84156 ASSAY OF PROTEIN URINE: CPT

## 2024-01-06 PROCEDURE — 80069 RENAL FUNCTION PANEL: CPT

## 2024-01-06 PROCEDURE — 36415 COLL VENOUS BLD VENIPUNCTURE: CPT

## 2024-01-08 NOTE — PROGRESS NOTES
[facility] Neurology Follow Up Visit    Yadi Iniguez MRN# [id number]   Age: 73 year old YOB: 1950     Brief history of symptoms: The patient was initially seen in neurologic consultation on 6/27/2023 for evaluation of tremor. Please see the comprehensive neurologic consultation note from that date in the Epic records for details.     Interval history:   Patient had a stroke on December 22nd. On the 21st patient was feeling lightheaded and had problems walking. On the 22nd daughter noticed she had slurred speech. She was brought to the hospital, but past window for intervention. She is still noticing a little weakness in the left leg and mildly in the left arm. She has noticed that left arm tremor is a lot better since the stroke.     She was switched from aspirin 81 mg daily to Plavix.       Past Medical History:     Patient Active Problem List   Diagnosis    Hypothyroidism    Hypertension goal BP (blood pressure) < 140/90    Type 2 diabetes, HbA1C goal < 8% (H)    Hyperlipidemia LDL goal <70    Moderate major depression (H)    Incontinence of urine    Neuropathy in diabetes (H)    Eczema    Left lumbar radiculopathy    Health Care Home    CKD (chronic kidney disease) stage 3, GFR 30-59 ml/min (H)    Type 2 diabetes mellitus with other diabetic kidney complication, with long-term current use of insulin (H)    Osteoporosis    Morbid obesity (H)    Thrombocytopenia (H24)    Chronic obstructive pulmonary disease, unspecified COPD type (H)    S/P lumbar fusion    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, with long-term current use of insulin, unspecified laterality (H)    Lumbar facet arthropathy    Lumbar spondylosis    Pulmonary hypertension (H)    Nonrheumatic aortic valve stenosis    Depression, major, recurrent, in partial remission (H24)    DDD (degenerative disc disease), lumbar    Verrucous carcinoma (H)    Right pontine cerebrovascular  accident (H)    Left leg weakness     Past Medical History:   Diagnosis Date    Acute on chronic diastolic (congestive) heart failure (H) 10/25/2023    Arthritis 1975    Broken ribs 08/28/2016    3,4,5,6,7, left side    Cancer (H) 2013    Skin cancer    Cerebral infarction (H) 2023    COPD (chronic obstructive pulmonary disease) (H) 08/2017    Depression     Depressive disorder 1988    Dysuria     Glycosuria     Hypertension 1980    Mixed incontinence urge and stress     Nonrheumatic aortic valve stenosis 07/25/2021    Other and unspecified hyperlipidemia     Right shoulder pain     Sleep apnea     cpap    Type II or unspecified type diabetes mellitus without mention of complication, uncontrolled     Uncomplicated asthma 2017    Unspecified hypothyroidism         Past Surgical History:     Past Surgical History:   Procedure Laterality Date    ABDOMEN SURGERY  1978    Gall Bladder  January       hysterectomy  Sept    BACK SURGERY  11/2018    BIOPSY  2013 1993    from left corner of mouth       muscule biopsy    CATARACT IOL, RT/LT Bilateral     2006    CHOLECYSTECTOMY, OPEN      CL AFF SURGICAL PATHOLOGY      COLONOSCOPY  2007    COLONOSCOPY N/A 6/22/2018    Procedure: COMBINED COLONOSCOPY, SINGLE OR MULTIPLE BIOPSY/POLYPECTOMY BY BIOPSY;;  Surgeon: David Parson MD;  Location: [facility] OR    COLONOSCOPY N/A 3/29/2022    Procedure: COLONOSCOPY, FLEXIBLE, WITH LESION REMOVAL USING SNARE;  Surgeon: Ajith Guo MD;  Location:  GI    COLONOSCOPY WITH CO2 INSUFFLATION N/A 6/22/2018    Procedure: COLONOSCOPY WITH CO2 INSUFFLATION;  Colonscopy, ;  Surgeon: David Parson MD;  Location: [facility] OR    EYE SURGERY  2014    ongoing    EYE SURGERY Right 2018    HEAD & NECK SURGERY  2006    Removal of saliva gland left side    INJECT BLOCK MEDIAL BRANCH CERVICAL/THORACIC/LUMBAR Bilateral 9/23/2022    Procedure: Bilateral  L5-S1 medial branch block #1;  Surgeon: Dwayne Sierra MD;  Location: [facility] OR    INJECT  BLOCK MEDIAL BRANCH CERVICAL/THORACIC/LUMBAR Bilateral 2022    Procedure: bilateral L5-S1 medial branch block #2;  Surgeon: Dwayne Sierra MD;  Location: [facility] OR    OTS FUSION SPINE POSTERIOR LUMBAR MINIMALLY INVASIVE ONE LEVEL N/A 2018    Procedure: Minimally invasive Left L4-5 transforaminal lumbar interbody fusion  Resection of synovial cystic mass adjacent to the L5 root Placement of Med[employer]onic Voyager percutaneous pedicle screws from L4-5 bilaterally Open reduction of L4-5 grade 2 spondylolisthesis;  Surgeon: Hernando Hill MD;  Location: [facility] OR    PROPHYLACTIC REPAIR RETINAL BREAK, PNEUMATIC RETINOPEXY, COMBINED Left 2016    SOFT TISSUE SURGERY      Carpal Tunnel left wrist    ZZC VAGINAL HYSTERECTOMY          Social History:     Social History     Tobacco Use    Smoking status: Former     Packs/day: 1.00     Years: 50.00     Additional pack years: 0.00     Total pack years: 50.00     Types: Cigarettes     Start date: 1966     Quit date: 2021     Years since quittin.5     Passive exposure: Never    Smokeless tobacco: Never    Tobacco comments:     quit for 6 months back in 2018   Vaping Use    Vaping Use: Never used   Substance Use Topics    Alcohol use: Not Currently     Comment: Maybe a drink every few months    Drug use: Never        Family History:     Family History   Problem Relation Age of Onset    Musculoskeletal Disorder Mother         MD    Muscular Disorder Mother     Depression Mother     Osteoporosis Mother     Obesity Mother     Cataracts Mother     Genetic Disorder Mother         MD    Muscular Dystrophy Mother     Cancer Father         mesothelioma    Obesity Father     Coronary Artery Disease Father     Hypertension Father     Other Cancer Father     Thyroid Disease Father     Depression Paternal Grandmother     Obesity Paternal Grandmother     Heart Disease Brother         angioplasty    Neurologic Disorder Brother         ms    Diabetes Brother      Hypertension Brother     Hyperlipidemia Brother     Obesity Brother         4 brothers    Genetic Disorder Brother         MS    Other Cancer Brother     Depression Brother     Substance Abuse Brother     Diabetes Brother     Coronary Artery Disease Brother     Hypertension Brother     Obesity Brother     Hypertension Brother     Genetic Disorder Brother         MD    Coronary Artery Disease Brother     Hyperlipidemia Brother     Hypertension Brother     Thyroid Disease Brother     Genetic Disorder Brother         MS    Obesity Brother     Cerebrovascular Disease Nephew     Muscular Dystrophy Nephew     Coronary Artery Disease Nephew     Genetic Disorder Nephew     Coronary Artery Disease Nephew     Breast Cancer Other         Medications:     Current Outpatient Medications   Medication Sig    ACETAMINOPHEN PO Take 1,000 mg by mouth 2 times daily    alendronate (FOSAMAX) 70 MG tablet TAKE 1 TABLET (70 MG) BY MOUTH EVERY 7 DAYS. TAKE 60 MINUTES BEFORE MORNING MEAL WITH 8 OZ. OF WATER. REMAIN UPRIGHT FOR 30 MINUTES.    blood glucose (NO BRAND SPECIFIED) test strip Use to test blood sugar 3 times daily or as directed./ Brand per insurance    blood glucose monitoring (NO BRAND SPECIFIED) meter device kit Use to test blood sugar 3 times daily or as directed./ Brand per insurance    calcium carbonate (OS-NATACHA) 1500 (600 Ca) MG tablet Take 600 mg by mouth daily    carvedilol (COREG) 25 MG tablet TAKE 1 TABLET (25 MG) BY MOUTH 2 TIMES DAILY WITH MEALS    cholecalciferol (VITAMIN D3) 5000 units TABS tablet Take 2 tablets by mouth daily    clopidogrel (PLAVIX) 75 MG tablet Take 75 mg by mouth    Continuous Blood Gluc Sensor (FREESTYLE MARI 14 DAY SENSOR) MISC USE 1 DEVICE EVERY 14 DAYS AS DIRECTED    dulaglutide (TRULICITY) 1.5 MG/0.5ML pen Inject 1.5 mg Subcutaneous every 7 days    folic acid (FOLVITE) 1 MG tablet TAKE 1 TABLET (1 MG) BY MOUTH DAILY    icosapent ethyl (VASCEPA) 1 g CAPS capsule Take 1 g by mouth daily Pt  "reported 4/4/23    insulin aspart (NOVOLOG FLEXPEN) 100 UNIT/ML pen INJECT SUBCUTANEOUSLY 27 UNITS BEFORE BREAKFAST, 5 UNITS BEFORE LUNCH, 25 UNITS BEFORE DINNER.  (MAX DOSE 70 UNITS/DAY FOR ADJUSTMENT)    insulin glargine U-300 (TOUJEO SOLOSTAR) 300 UNIT/ML (1 units dial) pen INJECT 40 UNITS UNDER THE SKIN AT BEDTIME    insulin pen needle (32G X 4 MM) 32G X 4 MM miscellaneous Use 3 pen needles daily or as directed.  Per insurance and patient preference    levothyroxine (SYNTHROID/LEVOTHROID) 150 MCG tablet Take 1 tablet (150 mcg) by mouth daily    losartan (COZAAR) 25 MG tablet Take 0.5 tablets by mouth daily    nitroFURantoin macrocrystal-monohydrate (MACROBID) 100 MG capsule Take 1 capsule (100 mg) by mouth 2 times daily    PARoxetine (PAXIL) 20 MG tablet TAKE 1 TABLET (20 MG) BY MOUTH 2 TIMES DAILY    polyethylene glycol (MIRALAX) 17 GM/Dose powder Take 1 packet by mouth daily as needed    potassium chloride ER (KLOR-CON M) 20 MEQ CR tablet Take 1 tablet (20 mEq) by mouth daily    primidone (MYSOLINE) 50 MG tablet Take 2 tablets (100 mg) in the morning and 3 tablets (150 mg) at night. If no improvement in 2 weeks, increase to 3 tablets in the morning (150 mg) and 3 tablets at night (150 mg).    rosuvastatin (CRESTOR) 40 MG tablet Take 1 tablet by mouth daily    torsemide (DEMADEX) 20 MG tablet Take 40 mg QAM and 20 mg QPM     No current facility-administered medications for this visit.        Allergies:   No Known Allergies     Review of Systems:   As above     Physical Exam:   Vitals: /66   Pulse 82   Ht 1.753 m (5' 9\")   Wt 113.4 kg (250 lb)   BMI 36.92 kg/m     General: Seated comfortably in no acute distress.  Lungs: breathing comfortably  Neurologic:     Mental Status: Fully alert, attentive. Language normal. Speech is mildly slurred and slowed. Oriented to specific date.      Cranial Nerves: Visual fields intact. PERRL. EOMI with normal smooth pursuit. Facial sensation intact/symmetric. Facial " movements slightly asymmetric in the left lower face, but this was reportedly like this previously due to parotid surgery. Hearing not formally tested but intact to conversation. Palate elevation symmetric, uvula midline. Mild slurring of speech. Shoulder shrug strong bilaterally. Tongue protrusion midline.     Motor: Mild right upper extremity action/postural tremor, very mild left side. Muscle tone normal throughout. Mild left upper extremity pronator drift. Rapid finger tapping is clumsy bilaterally. Strength as below.       Right Left   Shoulder abduction        5 5-   Elbow extension 5 5   Elbow flexion 5 5   Wrist extension         5 5   Finger extension 5 5   ADM 5- 5-   FDI 5- 5-   APB 5 5   Hip flexion 5 4+   Knee flexion 5 5   Knee extension 5 5   Dorsiflexion 5 4+   Plantar flexion 5 5      Sensory: Intact/symmetric to light touch throughout upper and lower extremities.      Coordination: Finger-nose-finger and heel-shin intact without dysmetria.      Data reviewed on previous visits    Imaging:  MRI brain 6/2023  IMPRESSION:  1.  No acute intracranial process.  2.  Generalized brain atrophy and presumed microvascular ischemic changes as detailed above.     MRI lumbar 6/2021  Impression:   1. Anterior and posterior fusion instrumentation at L4-5 with improved  alignment and decreased spinal canal stenosis compared to the  preoperative MRI on 9/12/2018. Also decreased extent of left greater  than right neural foraminal narrowing at L4-5 postoperatively.  2. Slight progression of spondylosis at L3-4 with mild to moderate  left and mild right neural foraminal narrowing.     Carotid ultrasound 6/2023  IMPRESSION: Per sonographic criteria, there are 50-69% stenoses in the  internal carotid arteries bilaterally.    Laboratory:  BMP with CKD, CBC with mildly low platelets (4/2023)  Serum immunofixation normal, ELP (4/2023)  A1c 7.3, B12 412 (1/2023)  LFTs normal (6/2021)      Pertinent Investigations since last  visit:   MRI brain 12/2023  IMPRESSION:   1. MR confirmation of small acute right paramedian pontine infarction.   2.  Mild generalized atrophy.   3.  Mild nonspecific presumed chronic microvascular disease in the cerebral white matters and mello.       MRA neck 12/2023  IMPRESSION:   1. Moderate atherosclerotic disease at the left carotid bifurcation with estimated 50% luminal diameter stenosis at the origin of the internal and external carotid arteries.   2.  Very mild atherosclerotic disease at the right carotid bifurcation without hemodynamically significant right carotid stenosis.   3.  Antegrade vertebral arteries without stenosis.   4.  Suspect mild stenosis at the right proximal subclavian artery.     TTE 12/2023  Interpretation Summary     * The left ventricular systolic function is normal, quantified ejection   fraction is 68%.     * There is moderate aortic valve sclerosis. There is moderate aortic valve   stenosis.    * Aortic valve area is 1.11 cm2 with an index of 0.46 cm2/m2, a peak   velocity of 3.24 m/s, a mean gradient of 30 mmHg, and a dimensionless index of   0.29.    * Mitral valve mean gradient is 5 mmHg with an area (PHT) of 1.72 cm2 and an   area (Cont Eq VTI) of 2.46 cm2.     * Bi-atrial enlargement.     EKG 12/2023 - sinus rhythm    A1c 6.3, LDL 75         Assessment and Plan:   Assessment:  Yadi Iniguez is a 73 year old female who presents today for evaluation of recent stroke. Patient had a right pontine stroke last month. She had work-up as above. Etiology is likely related to small vessel disease. She was previously on aspirin 81 mg and was switched to Plavix 75 mg. After discussing risks/benefits, we decided to switch to aspirin 325 mg daily. She was recently switched to Crestor for cholesterol control.     Previously patient followed for essential tremor. She continues on primidone 150 mg BID. Other options to consider in the future include switching Coreg to propranolol or adding  medication such as Topamax.       Plan:  - Stop Plavix and start aspirin 325 mg daily  - Continue primidone 150 mg BID  - LDL goal < 70  - BP goal < 140/90 and less than 130/80 if tolerated    Follow up in Neurology clinic in 6 months or sooner if new issues arise    Donnie Keith MD   of Neurology  [facility]    The total time of this encounter today amounted to 54 minutes. This time included time spent with the patient, prep work, ordering tests, and performing post visit documentation.

## 2024-01-09 ENCOUNTER — OFFICE VISIT (OUTPATIENT)
Dept: NEUROLOGY | Facility: CLINIC | Age: 74
End: 2024-01-09
Payer: COMMERCIAL

## 2024-01-09 VITALS
HEIGHT: 69 IN | HEART RATE: 82 BPM | WEIGHT: 250 LBS | BODY MASS INDEX: 37.03 KG/M2 | SYSTOLIC BLOOD PRESSURE: 111 MMHG | DIASTOLIC BLOOD PRESSURE: 66 MMHG

## 2024-01-09 DIAGNOSIS — I63.50 RIGHT PONTINE CEREBROVASCULAR ACCIDENT (H): Primary | ICD-10-CM

## 2024-01-09 DIAGNOSIS — G25.0 ESSENTIAL TREMOR: ICD-10-CM

## 2024-01-09 PROCEDURE — 99215 OFFICE O/P EST HI 40 MIN: CPT | Performed by: INTERNAL MEDICINE

## 2024-01-09 ASSESSMENT — PAIN SCALES - GENERAL: PAINLEVEL: NO PAIN (0)

## 2024-01-09 NOTE — LETTER
1/9/2024         RE: Yadi Iniguez  [address]  [place] [address]        Dear Colleague,    Thank you for referring your patient, Yadi Iniguez, to the [facility] [place]. Please see a copy of my visit note below.    [facility] Neurology Follow Up Visit    Yadi Iniguez MRN# [id number]   Age: 73 year old YOB: 1950     Brief history of symptoms: The patient was initially seen in neurologic consultation on 6/27/2023 for evaluation of tremor. Please see the comprehensive neurologic consultation note from that date in the Epic records for details.     Interval history:   Patient had a stroke on December 22nd. On the 21st patient was feeling lightheaded and had problems walking. On the 22nd daughter noticed she had slurred speech. She was brought to the hospital, but past window for intervention. She is still noticing a little weakness in the left leg and mildly in the left arm. She has noticed that left arm tremor is a lot better since the stroke.     She was switched from aspirin 81 mg daily to Plavix.       Past Medical History:     Patient Active Problem List   Diagnosis     Hypothyroidism     Hypertension goal BP (blood pressure) < 140/90     Type 2 diabetes, HbA1C goal < 8% (H)     Hyperlipidemia LDL goal <70     Moderate major depression (H)     Incontinence of urine     Neuropathy in diabetes (H)     Eczema     Left lumbar radiculopathy     Health Care Home     CKD (chronic kidney disease) stage 3, GFR 30-59 ml/min (H)     Type 2 diabetes mellitus with other diabetic kidney complication, with long-term current use of insulin (H)     Osteoporosis     Morbid obesity (H)     Thrombocytopenia (H24)     Chronic obstructive pulmonary disease, unspecified COPD type (H)     S/P lumbar fusion     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, with long-term current use of insulin, unspecified laterality (H)      Lumbar facet arthropathy     Lumbar spondylosis     Pulmonary hypertension (H)     Nonrheumatic aortic valve stenosis     Depression, major, recurrent, in partial remission (H24)     DDD (degenerative disc disease), lumbar     Verrucous carcinoma (H)     Right pontine cerebrovascular accident (H)     Left leg weakness     Past Medical History:   Diagnosis Date     Acute on chronic diastolic (congestive) heart failure (H) 10/25/2023     Arthritis 1975     Broken ribs 08/28/2016    3,4,5,6,7, left side     Cancer (H) 2013    Skin cancer     Cerebral infarction (H) 2023     COPD (chronic obstructive pulmonary disease) (H) 08/2017     Depression      Depressive disorder 1988     Dysuria      Glycosuria      Hypertension 1980     Mixed incontinence urge and stress      Nonrheumatic aortic valve stenosis 07/25/2021     Other and unspecified hyperlipidemia      Right shoulder pain      Sleep apnea     cpap     Type II or unspecified type diabetes mellitus without mention of complication, uncontrolled      Uncomplicated asthma 2017     Unspecified hypothyroidism         Past Surgical History:     Past Surgical History:   Procedure Laterality Date     ABDOMEN SURGERY  1978    Gall Bladder  January       hysterectomy  Sept     BACK SURGERY  11/2018     BIOPSY  2013 1993    from left corner of mouth       muscule biopsy     CATARACT IOL, RT/LT Bilateral     2006     CHOLECYSTECTOMY, OPEN       CL AFF SURGICAL PATHOLOGY       COLONOSCOPY  2007     COLONOSCOPY N/A 6/22/2018    Procedure: COMBINED COLONOSCOPY, SINGLE OR MULTIPLE BIOPSY/POLYPECTOMY BY BIOPSY;;  Surgeon: David Parson MD;  Location:  OR     COLONOSCOPY N/A 3/29/2022    Procedure: COLONOSCOPY, FLEXIBLE, WITH LESION REMOVAL USING SNARE;  Surgeon: jAith Guo MD;  Location:  GI     COLONOSCOPY WITH CO2 INSUFFLATION N/A 6/22/2018    Procedure: COLONOSCOPY WITH CO2 INSUFFLATION;  Colonscopy, ;  Surgeon: David Parson MD;  Location:   OR     EYE SURGERY  2014    ongoing     EYE SURGERY Right 2018     HEAD & NECK SURGERY  2006    Removal of saliva gland left side     INJECT BLOCK MEDIAL BRANCH CERVICAL/THORACIC/LUMBAR Bilateral 2022    Procedure: Bilateral  L5-S1 medial branch block #1;  Surgeon: Dwayne Sierra MD;  Location: [facility] OR     INJECT BLOCK MEDIAL BRANCH CERVICAL/THORACIC/LUMBAR Bilateral 2022    Procedure: bilateral L5-S1 medial branch block #2;  Surgeon: Dwayne Sierra MD;  Location: [facility] OR     OTS FUSION SPINE POSTERIOR LUMBAR MINIMALLY INVASIVE ONE LEVEL N/A 2018    Procedure: Minimally invasive Left L4-5 transforaminal lumbar interbody fusion  Resection of synovial cystic mass adjacent to the L5 root Placement of Medtronic Voyager percutaneous pedicle screws from L4-5 bilaterally Open reduction of L4-5 grade 2 spondylolisthesis;  Surgeon: Hernando Hill MD;  Location: [facility] OR     PROPHYLACTIC REPAIR RETINAL BREAK, PNEUMATIC RETINOPEXY, COMBINED Left 2016     SOFT TISSUE SURGERY  1996    Carpal Tunnel left wrist     ZZC VAGINAL HYSTERECTOMY          Social History:     Social History     Tobacco Use     Smoking status: Former     Packs/day: 1.00     Years: 50.00     Additional pack years: 0.00     Total pack years: 50.00     Types: Cigarettes     Start date: 1966     Quit date: 2021     Years since quittin.5     Passive exposure: Never     Smokeless tobacco: Never     Tobacco comments:     quit for 6 months back in 2018   Vaping Use     Vaping Use: Never used   Substance Use Topics     Alcohol use: Not Currently     Comment: Maybe a drink every few months     Drug use: Never        Family History:     Family History   Problem Relation Age of Onset     Musculoskeletal Disorder Mother         MD     Muscular Disorder Mother      Depression Mother      Osteoporosis Mother      Obesity Mother      Cataracts Mother      Genetic Disorder Mother         MD     Muscular Dystrophy Mother      Cancer  Father         mesothelioma     Obesity Father      Coronary Artery Disease Father      Hypertension Father      Other Cancer Father      Thyroid Disease Father      Depression Paternal Grandmother      Obesity Paternal Grandmother      Heart Disease Brother         angioplasty     Neurologic Disorder Brother         ms     Diabetes Brother      Hypertension Brother      Hyperlipidemia Brother      Obesity Brother         4 brothers     Genetic Disorder Brother         MS     Other Cancer Brother      Depression Brother      Substance Abuse Brother      Diabetes Brother      Coronary Artery Disease Brother      Hypertension Brother      Obesity Brother      Hypertension Brother      Genetic Disorder Brother         MD     Coronary Artery Disease Brother      Hyperlipidemia Brother      Hypertension Brother      Thyroid Disease Brother      Genetic Disorder Brother         MS     Obesity Brother      Cerebrovascular Disease Nephew      Muscular Dystrophy Nephew      Coronary Artery Disease Nephew      Genetic Disorder Nephew      Coronary Artery Disease Nephew      Breast Cancer Other         Medications:     Current Outpatient Medications   Medication Sig     ACETAMINOPHEN PO Take 1,000 mg by mouth 2 times daily     alendronate (FOSAMAX) 70 MG tablet TAKE 1 TABLET (70 MG) BY MOUTH EVERY 7 DAYS. TAKE 60 MINUTES BEFORE MORNING MEAL WITH 8 OZ. OF WATER. REMAIN UPRIGHT FOR 30 MINUTES.     blood glucose (NO BRAND SPECIFIED) test strip Use to test blood sugar 3 times daily or as directed./ Brand per insurance     blood glucose monitoring (NO BRAND SPECIFIED) meter device kit Use to test blood sugar 3 times daily or as directed./ Brand per insurance     calcium carbonate (OS-NATACHA) 1500 (600 Ca) MG tablet Take 600 mg by mouth daily     carvedilol (COREG) 25 MG tablet TAKE 1 TABLET (25 MG) BY MOUTH 2 TIMES DAILY WITH MEALS     cholecalciferol (VITAMIN D3) 5000 units TABS tablet Take 2 tablets by mouth daily     clopidogrel  (PLAVIX) 75 MG tablet Take 75 mg by mouth     Continuous Blood Gluc Sensor (FREESTYLE MARI 14 DAY SENSOR) MISC USE 1 DEVICE EVERY 14 DAYS AS DIRECTED     dulaglutide (TRULICITY) 1.5 MG/0.5ML pen Inject 1.5 mg Subcutaneous every 7 days     folic acid (FOLVITE) 1 MG tablet TAKE 1 TABLET (1 MG) BY MOUTH DAILY     icosapent ethyl (VASCEPA) 1 g CAPS capsule Take 1 g by mouth daily Pt reported 4/4/23     insulin aspart (NOVOLOG FLEXPEN) 100 UNIT/ML pen INJECT SUBCUTANEOUSLY 27 UNITS BEFORE BREAKFAST, 5 UNITS BEFORE LUNCH, 25 UNITS BEFORE DINNER.  (MAX DOSE 70 UNITS/DAY FOR ADJUSTMENT)     insulin glargine U-300 (TOUJEO SOLOSTAR) 300 UNIT/ML (1 units dial) pen INJECT 40 UNITS UNDER THE SKIN AT BEDTIME     insulin pen needle (32G X 4 MM) 32G X 4 MM miscellaneous Use 3 pen needles daily or as directed.  Per insurance and patient preference     levothyroxine (SYNTHROID/LEVOTHROID) 150 MCG tablet Take 1 tablet (150 mcg) by mouth daily     losartan (COZAAR) 25 MG tablet Take 0.5 tablets by mouth daily     nitroFURantoin macrocrystal-monohydrate (MACROBID) 100 MG capsule Take 1 capsule (100 mg) by mouth 2 times daily     PARoxetine (PAXIL) 20 MG tablet TAKE 1 TABLET (20 MG) BY MOUTH 2 TIMES DAILY     polyethylene glycol (MIRALAX) 17 GM/Dose powder Take 1 packet by mouth daily as needed     potassium chloride ER (KLOR-CON M) 20 MEQ CR tablet Take 1 tablet (20 mEq) by mouth daily     primidone (MYSOLINE) 50 MG tablet Take 2 tablets (100 mg) in the morning and 3 tablets (150 mg) at night. If no improvement in 2 weeks, increase to 3 tablets in the morning (150 mg) and 3 tablets at night (150 mg).     rosuvastatin (CRESTOR) 40 MG tablet Take 1 tablet by mouth daily     torsemide (DEMADEX) 20 MG tablet Take 40 mg QAM and 20 mg QPM     No current facility-administered medications for this visit.        Allergies:   No Known Allergies     Review of Systems:   As above     Physical Exam:   Vitals: /66   Pulse 82   Ht 1.753 m (5'  "9\")   Wt 113.4 kg (250 lb)   BMI 36.92 kg/m     General: Seated comfortably in no acute distress.  Lungs: breathing comfortably  Neurologic:     Mental Status: Fully alert, attentive. Language normal. Speech is mildly slurred and slowed. Oriented to specific date.      Cranial Nerves: Visual fields intact. PERRL. EOMI with normal smooth pursuit. Facial sensation intact/symmetric. Facial movements slightly asymmetric in the left lower face, but this was reportedly like this previously due to parotid surgery. Hearing not formally tested but intact to conversation. Palate elevation symmetric, uvula midline. Mild slurring of speech. Shoulder shrug strong bilaterally. Tongue protrusion midline.     Motor: Mild right upper extremity action/postural tremor, very mild left side. Muscle tone normal throughout. Mild left upper extremity pronator drift. Rapid finger tapping is clumsy bilaterally. Strength as below.       Right Left   Shoulder abduction        5 5-   Elbow extension 5 5   Elbow flexion 5 5   Wrist extension         5 5   Finger extension 5 5   ADM 5- 5-   FDI 5- 5-   APB 5 5   Hip flexion 5 4+   Knee flexion 5 5   Knee extension 5 5   Dorsiflexion 5 4+   Plantar flexion 5 5      Sensory: Intact/symmetric to light touch throughout upper and lower extremities.      Coordination: Finger-nose-finger and heel-shin intact without dysmetria.      Data reviewed on previous visits    Imaging:  MRI brain 6/2023  IMPRESSION:  1.  No acute intracranial process.  2.  Generalized brain atrophy and presumed microvascular ischemic changes as detailed above.     MRI lumbar 6/2021  Impression:   1. Anterior and posterior fusion instrumentation at L4-5 with improved  alignment and decreased spinal canal stenosis compared to the  preoperative MRI on 9/12/2018. Also decreased extent of left greater  than right neural foraminal narrowing at L4-5 postoperatively.  2. Slight progression of spondylosis at L3-4 with mild to " moderate  left and mild right neural foraminal narrowing.     Carotid ultrasound 6/2023  IMPRESSION: Per sonographic criteria, there are 50-69% stenoses in the  internal carotid arteries bilaterally.    Laboratory:  BMP with CKD, CBC with mildly low platelets (4/2023)  Serum immunofixation normal, ELP (4/2023)  A1c 7.3, B12 412 (1/2023)  LFTs normal (6/2021)      Pertinent Investigations since last visit:   MRI brain 12/2023  IMPRESSION:   1. MR confirmation of small acute right paramedian pontine infarction.   2.  Mild generalized atrophy.   3.  Mild nonspecific presumed chronic microvascular disease in the cerebral white matters and mello.       MRA neck 12/2023  IMPRESSION:   1. Moderate atherosclerotic disease at the left carotid bifurcation with estimated 50% luminal diameter stenosis at the origin of the internal and external carotid arteries.   2.  Very mild atherosclerotic disease at the right carotid bifurcation without hemodynamically significant right carotid stenosis.   3.  Antegrade vertebral arteries without stenosis.   4.  Suspect mild stenosis at the right proximal subclavian artery.     TTE 12/2023  Interpretation Summary     * The left ventricular systolic function is normal, quantified ejection   fraction is 68%.     * There is moderate aortic valve sclerosis. There is moderate aortic valve   stenosis.    * Aortic valve area is 1.11 cm2 with an index of 0.46 cm2/m2, a peak   velocity of 3.24 m/s, a mean gradient of 30 mmHg, and a dimensionless index of   0.29.    * Mitral valve mean gradient is 5 mmHg with an area (PHT) of 1.72 cm2 and an   area (Cont Eq VTI) of 2.46 cm2.     * Bi-atrial enlargement.     EKG 12/2023 - sinus rhythm    A1c 6.3, LDL 75         Assessment and Plan:   Assessment:  Yadi Iniguez is a 73 year old female who presents today for evaluation of recent stroke. Patient had a right pontine stroke last month. She had work-up as above. Etiology is likely related to small vessel  disease. She was previously on aspirin 81 mg and was switched to Plavix 75 mg. After discussing risks/benefits, we decided to switch to aspirin 325 mg daily. She was recently switched to Crestor for cholesterol control.     Previously patient followed for essential tremor. She continues on primidone 150 mg BID. Other options to consider in the future include switching Coreg to propranolol or adding medication such as Topamax.       Plan:  - Stop Plavix and start aspirin 325 mg daily  - Continue primidone 150 mg BID  - LDL goal < 70  - BP goal < 140/90 and less than 130/80 if tolerated    Follow up in Neurology clinic in 6 months or sooner if new issues arise    Donnie Keith MD   of Neurology  [facility]    The total time of this encounter today amounted to 54 minutes. This time included time spent with the patient, prep work, ordering tests, and performing post visit documentation.      Again, thank you for allowing me to participate in the care of your patient.        Sincerely,        Donnie Keith MD

## 2024-01-15 ENCOUNTER — VIRTUAL VISIT (OUTPATIENT)
Dept: NEPHROLOGY | Facility: CLINIC | Age: 74
End: 2024-01-15
Payer: COMMERCIAL

## 2024-01-15 DIAGNOSIS — E66.01 CLASS 2 SEVERE OBESITY DUE TO EXCESS CALORIES WITH SERIOUS COMORBIDITY AND BODY MASS INDEX (BMI) OF 37.0 TO 37.9 IN ADULT (H): ICD-10-CM

## 2024-01-15 DIAGNOSIS — I10 BENIGN ESSENTIAL HYPERTENSION: ICD-10-CM

## 2024-01-15 DIAGNOSIS — D63.1 ANEMIA DUE TO STAGE 3B CHRONIC KIDNEY DISEASE (H): ICD-10-CM

## 2024-01-15 DIAGNOSIS — N18.32 STAGE 3B CHRONIC KIDNEY DISEASE (H): Primary | ICD-10-CM

## 2024-01-15 DIAGNOSIS — Z79.4 TYPE 2 DIABETES MELLITUS WITH OTHER DIABETIC KIDNEY COMPLICATION, WITH LONG-TERM CURRENT USE OF INSULIN (H): ICD-10-CM

## 2024-01-15 DIAGNOSIS — E11.29 TYPE 2 DIABETES MELLITUS WITH OTHER DIABETIC KIDNEY COMPLICATION, WITH LONG-TERM CURRENT USE OF INSULIN (H): ICD-10-CM

## 2024-01-15 DIAGNOSIS — E66.812 CLASS 2 SEVERE OBESITY DUE TO EXCESS CALORIES WITH SERIOUS COMORBIDITY AND BODY MASS INDEX (BMI) OF 37.0 TO 37.9 IN ADULT (H): ICD-10-CM

## 2024-01-15 DIAGNOSIS — N18.32 ANEMIA DUE TO STAGE 3B CHRONIC KIDNEY DISEASE (H): ICD-10-CM

## 2024-01-15 PROCEDURE — 99214 OFFICE O/P EST MOD 30 MIN: CPT | Mod: 95 | Performed by: INTERNAL MEDICINE

## 2024-01-15 NOTE — NURSING NOTE
Is the patient currently in the state of [place]? YES    Visit mode:VIDEO    If the visit is dropped, the patient can be reconnected by: VIDEO VISIT: Text to cell phone:   Telephone Information:   Mobile [phone]       Will anyone else be joining the visit? NO  (If patient encounters technical issues they should call [phone]    How would you like to obtain your AVS? MyChart    Are changes needed to the allergy or medication list? Yes, a medication was added, Crestor 40mg, once daily     Reason for visit: RECHECK    Risa GREENWOODF

## 2024-01-15 NOTE — PROGRESS NOTES
Virtual Visit Details    Type of service:  Video Visit     Originating Location (pt. Location): Home    Distant Location (provider location):  Off-site  Platform used for Video Visit: Sergio     1/15/24  I was asked to see this patient by Dr. Ricci for evaluation of CKD.     CC: CKD    HPI: Yadi Iniguez is a 73 year old female who presents for evaluation of CKD.  Initial visit March 2022:  Ms. Yao's hx is significant for COPD, hypertension, AV stenosis, depression, DM, hypothyroidism, hyperlipidemia, tobacco hx, CHF/pulmonary hypertension. On review of her creatinine levels, there has been much variability:  - at 0.6 in 2008  - SUZE to 1.4 in Jan 2010 but improved to 0.76 in April 2011  - SUZE to 1.82 in Jan 2017 but improved to ~1 in Sep 2017  - SUZE to 1.45 in April 2018 but improved to 1.2 in Jan 2021  - SUZE to 1.81 in Sept 2021 but more recently 1.5-1.6 since.   She has had some albuminuria dating back to 2010. Fenofibrate therapy since 2018. Currently takes torsemide 20 mg AM and 20 mg PM - followed by cardiology clinic at [facility] for CHF. Dx with diabetes in her 40s.     - History of Hematuria: No  - Swelling: hx but none at this time. Breathing is comfortable. Weight recently 266-271 lbs. Torsemide 20 mg BID at this time. No lighhteadedness/dizziness at this time.   - Hx of UTIs: no  - Hx of stones: no  - Rashes/Joint pain: no rash; joint pain - generalized  - Family hx of kidney disease: no  - NSAID use: only tylenol    07/12/22: video visit. At the time of her initial visit I did a renal ultrasound which showed a 16 mm kidney lesion. MRI was then done which did not show any kidney lesion. We stopped the fenofibrate at that visit as well. She was also noted to have a monoclonal gammopathy - was seen by hematology in May - they have plans to monitor with follow-up 4 months later. Torsemide was increased a month ago when weight was increasing and becoming more symptomatic. Currently on  "torsemide 40 mg twice a day. Weight is stable - 269-272 lbs. Last month she had gotten up to 278 lbs. No swelling currently. Breathing is comfortable. No lightheadedness/dizziness. Blood pressure has been 135/64, 128/56, 141/66, 125/72. 90/58 today in clinic. She felt a little tired. NO lighhteadedness on standing. No orthopnea.     01/10/23: video visit. Seen by cardiology at [facility] yesterday - no changes made to her medications but the cardiologist notes that she would be in favor of trialing a lower dose of torsemide if needed. Was thirsty at the time of the lab test. No swelling. Breathing is comfortable. BP has been 101/-120/58-80. Follows with hematology for MGUS.     04/11/23: video visit. Creatinine has been 1.5-1.8 since 2021. Had SUZE to 2.2 in Jan- diuretic lowered. Sleeping through the night. WEight has been 258-262 lbs.  This is down from 270 last year. No swelling. No SOB. No orthopnea.     07/11/23: video visit. Weight most recently 252-255 lbs most recently. She is not as hungry as she used to be. Currently on 40 mg AM and 20 mg PM. No SOB. No swelling. No lightheadedness per patient. She had some lightheadedness on standing so her amlodipine was cut back - 90-56 BP readings prior to this. Now with the amlodipine change her BP has been 123/60 P 88.  Cardiology appt coming up and they will do a volume assessment in person with them for that visit.     01/15/24: video visit. 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 Sxs included left leg weakness, dizziness, and aphasia. Discharged 12/22/23. She was changed from ASA to plavix and lipitor to crestor.Had follow-up with neurology 1/9/24. There they stopped plavix and started  mg daily. Per their note, BPgoal is <140/90 and less than 130/80 if tolerated. She reports feeling great at this time. She has some left sided weakness still - she is careful walking now. Aphasia has resolved. BP has been \"normal\" - /60-80. She does not go to 90 " often. Usually more typical 110. I questioned her about the low readings which prompted her to go get the actual readings: 140/77, 111/66, 106/64, 103/46, 101/43, 130/62, 128/57.   She is taking torsemide 40 AM and 20 PM, carvedilol 25 mg BID, losartan 12.5 mg daily. No swelling.     ACETAMINOPHEN PO, Take 1,000 mg by mouth 2 times daily  alendronate (FOSAMAX) 70 MG tablet, TAKE 1 TABLET (70 MG) BY MOUTH EVERY 7 DAYS. TAKE 60 MINUTES BEFORE MORNING MEAL WITH 8 OZ. OF WATER. REMAIN UPRIGHT FOR 30 MINUTES.  blood glucose (NO BRAND SPECIFIED) test strip, Use to test blood sugar 3 times daily or as directed./ Brand per insurance  blood glucose monitoring (NO BRAND SPECIFIED) meter device kit, Use to test blood sugar 3 times daily or as directed./ Brand per insurance  calcium carbonate (OS-NATACHA) 1500 (600 Ca) MG tablet, Take 600 mg by mouth daily  carvedilol (COREG) 25 MG tablet, TAKE 1 TABLET (25 MG) BY MOUTH 2 TIMES DAILY WITH MEALS  cholecalciferol (VITAMIN D3) 5000 units TABS tablet, Take 2 tablets by mouth daily  [] clopidogrel (PLAVIX) 75 MG tablet, Take 75 mg by mouth  Continuous Blood Gluc Sensor (FREESTYLE MARI 14 DAY SENSOR) MISC, USE 1 DEVICE EVERY 14 DAYS AS DIRECTED  dulaglutide (TRULICITY) 1.5 MG/0.5ML pen, Inject 1.5 mg Subcutaneous every 7 days  folic acid (FOLVITE) 1 MG tablet, TAKE 1 TABLET (1 MG) BY MOUTH DAILY  icosapent ethyl (VASCEPA) 1 g CAPS capsule, Take 1 g by mouth daily Pt reported 23  insulin aspart (NOVOLOG FLEXPEN) 100 UNIT/ML pen, INJECT SUBCUTANEOUSLY 27 UNITS BEFORE BREAKFAST, 5 UNITS BEFORE LUNCH, 25 UNITS BEFORE DINNER.  (MAX DOSE 70 UNITS/DAY FOR ADJUSTMENT)  insulin glargine U-300 (TOUJEO SOLOSTAR) 300 UNIT/ML (1 units dial) pen, INJECT 40 UNITS UNDER THE SKIN AT BEDTIME  insulin pen needle (32G X 4 MM) 32G X 4 MM miscellaneous, Use 3 pen needles daily or as directed.  Per insurance and patient preference  levothyroxine (SYNTHROID/LEVOTHROID) 150 MCG tablet, Take 1 tablet  (150 mcg) by mouth daily  losartan (COZAAR) 25 MG tablet, Take 0.5 tablets by mouth daily  nitroFURantoin macrocrystal-monohydrate (MACROBID) 100 MG capsule, Take 1 capsule (100 mg) by mouth 2 times daily  PARoxetine (PAXIL) 20 MG tablet, TAKE 1 TABLET (20 MG) BY MOUTH 2 TIMES DAILY  polyethylene glycol (MIRALAX) 17 GM/Dose powder, Take 1 packet by mouth daily as needed  potassium chloride ER (KLOR-CON M) 20 MEQ CR tablet, Take 1 tablet (20 mEq) by mouth daily  primidone (MYSOLINE) 50 MG tablet, Take 2 tablets (100 mg) in the morning and 3 tablets (150 mg) at night. If no improvement in 2 weeks, increase to 3 tablets in the morning (150 mg) and 3 tablets at night (150 mg).  rosuvastatin (CRESTOR) 40 MG tablet, Take 1 tablet by mouth daily  torsemide (DEMADEX) 20 MG tablet, Take 40 mg QAM and 20 mg QPM    No current facility-administered medications on file prior to visit.      Exam:  There were no vitals taken for this visit.  GENERAL APPEARANCE: alert and no distress  Breathing appears nonlabored.     Results:  No visits with results within 1 Day(s) from this visit.   Latest known visit with results is:   Lab on 01/06/2024   Component Date Value Ref Range Status    Sodium 01/06/2024 142  135 - 145 mmol/L Final    Reference intervals for this test were updated on 09/26/2023 to more accurately reflect our healthy population. There may be differences in the flagging of prior results with similar values performed with this method. Interpretation of those prior results can be made in the context of the updated reference intervals.     Potassium 01/06/2024 3.7  3.4 - 5.3 mmol/L Final    Chloride 01/06/2024 101  98 - 107 mmol/L Final    Carbon Dioxide (CO2) 01/06/2024 31 (H)  22 - 29 mmol/L Final    Anion Gap 01/06/2024 10  7 - 15 mmol/L Final    Glucose 01/06/2024 153 (H)  70 - 99 mg/dL Final    Urea Nitrogen 01/06/2024 30.7 (H)  8.0 - 23.0 mg/dL Final    Creatinine 01/06/2024 1.70 (H)  0.51 - 0.95 mg/dL Final    GFR  Estimate 01/06/2024 31 (L)  >60 mL/min/1.73m2 Final    Calcium 01/06/2024 9.5  8.8 - 10.2 mg/dL Final    Albumin 01/06/2024 3.6  3.5 - 5.2 g/dL Final    Phosphorus 01/06/2024 3.7  2.5 - 4.5 mg/dL Final    Parathyroid Hormone Intact 01/06/2024 16  15 - 65 pg/mL Final    Total Protein Urine mg/dL 01/06/2024 11.1    mg/dL Final    The reference ranges have not been established in urine protein. The results should be integrated into the clinical context for interpretation.    Total Protein Urine mg/mg Creat 01/06/2024 0.16  0.00 - 0.20 mg/mg Cr Final    Creatinine Urine mg/dL 01/06/2024 70.4  mg/dL Final    The reference ranges have not been established in urine creatinine. The results should be integrated into the clinical context for interpretation.    Hemoglobin 01/06/2024 11.2 (L)  11.7 - 15.7 g/dL Final             Lab results were reviewed and interpreted.       Assessment/Plan:   Stage 3b chronic kidney disease (H)  AT risk for CKD in the setting of diabetes, hypertension, CHF on diuretics, tobacco hx, obesity as a potential risk for secondary FSGS, and also fenofibrate use. She has no hematuria which is reassuring, likely mild proteinuria secondary to obesity, diabetes, arterionephrosclerosis. We stopped fenofibrate at her previous visit - no improvement in creatinine seen but also had adjustments to torsemide which may have caused the lack of improvement. Would like to avoid fenofibrate going forward. Educated on risk of cardiorenal physiology - want to avoid volume overload for her CHF but also avoid dehydration to avoid SUZE and the potential need to adjust diuretics to keep this balanced. On max lisinopril. Ideally would be on SGLT2 inhibitor but with her GFR so close to 30 and the likelihood of it dropping below 30 once on SGLT2 inhibitor, will hold on starting this for the time being.    Stable.     CHF:  holding on diuretic addts at this time.     DM: last A1C 6.6% in October 2023.     Hypertension: blood  pressure goal is actually slightly low most recently - asked her to update cardiology to determine med adjustments given many of her meds are for cardiac reasons as well.     Hx of tobacco: at risk for secondary FSGS - now away from tobacco which is great to hear    Obesity: educated on benefits of weight loss/healthy lifestyle     Abnormal kidney ultrasound: ultrasound showed a concerning lesion but then the MRI was normal. Reviewed with Radiology July 2022 and they do not feel follow-up needed.     Monoclonal gammopathy: seen by hematology. Has thrombocytopenia as well - defer monitoring/mgmt to hematology.     Anemia: hemoglobin 11.2 - iiron sat 56% in July.     Patient Instructions   Recommend circling back with your cardiology team on your most recent BP results to assure you are not too low. We want to avoid your pressures being too low as could increase your risk of falls.  Follow-up with Kary in 6 months  Ideal BP is 110-130 systolic/<80 diastolic.      [id number] AM video visit via [employer] - offsite.   Jessika Preston, DO

## 2024-01-15 NOTE — PATIENT INSTRUCTIONS
Recommend circling back with your cardiology team on your most recent BP results to assure you are not too low. We want to avoid your pressures being too low as could increase your risk of falls.  Follow-up with Kary in 6 months  Ideal BP is 110-130 systolic/<80 diastolic.

## 2024-01-16 ENCOUNTER — TELEPHONE (OUTPATIENT)
Dept: FAMILY MEDICINE | Facility: CLINIC | Age: 74
End: 2024-01-16
Payer: COMMERCIAL

## 2024-01-16 ENCOUNTER — TELEPHONE (OUTPATIENT)
Dept: NEPHROLOGY | Facility: CLINIC | Age: 74
End: 2024-01-16
Payer: COMMERCIAL

## 2024-01-16 NOTE — CONFIDENTIAL NOTE
Left Voicemail (1st Attempt) for the patient to call back and schedule the following:    Appointment type: return nephrology  Provider:   Return date: 07/15/2024  Specialty phone number: [phone]  Additional appointment(s) needed: lab appointment prior to return visit  Additonal Notes: follow up in 6 months, on or around 07/15/2024      Luz Elena [facility]   Gastroenterology, Infectious Diseases, Nephrology, Pulmonology and Rheumatology Specialties  [facility]

## 2024-01-16 NOTE — TELEPHONE ENCOUNTER
I spoke with Yadi, she is in need of financial assistance for medication.    We reviewed the Pharmacy Assistance Fund $500, the Prescription Assistance Program for manfacturer assistance programs, gross income, insurance and Rx list.    Based on the income Yadi provided, she should qualify for the Social Security Extra Help program. She states she receives this but her insulin cost is $35.    I gave her the phone number to [facility] [phone]. They will check the status of her enrollment with the Extra Help program. They can re-apply for her if needed     assistance programs require very low income to provide denial letters from either Medical Assistance and/or the Extra Help program.    Yadi will call if she has further concerns.    Dina Finch  Prescription Assistance Supervisor  Pharmacy Assistance

## 2024-01-19 ENCOUNTER — TELEPHONE (OUTPATIENT)
Dept: FAMILY MEDICINE | Facility: CLINIC | Age: 74
End: 2024-01-19
Payer: COMMERCIAL

## 2024-01-19 DIAGNOSIS — E11.29 TYPE 2 DIABETES MELLITUS WITH OTHER DIABETIC KIDNEY COMPLICATION, WITH LONG-TERM CURRENT USE OF INSULIN (H): ICD-10-CM

## 2024-01-19 DIAGNOSIS — Z79.4 TYPE 2 DIABETES MELLITUS WITH OTHER DIABETIC KIDNEY COMPLICATION, WITH LONG-TERM CURRENT USE OF INSULIN (H): ICD-10-CM

## 2024-01-19 DIAGNOSIS — Z53.9 DIAGNOSIS NOT YET DEFINED: Primary | ICD-10-CM

## 2024-01-19 PROCEDURE — G0180 MD CERTIFICATION HHA PATIENT: HCPCS | Performed by: FAMILY MEDICINE

## 2024-01-19 RX ORDER — FLASH GLUCOSE SENSOR
KIT MISCELLANEOUS
Refills: 11 | OUTPATIENT
Start: 2024-01-19

## 2024-01-19 NOTE — TELEPHONE ENCOUNTER
Faxed to [employer] and [employer]. Placed original in brown accordion folder.  Thank you,  Meme MULTANI    [phone]

## 2024-01-19 NOTE — TELEPHONE ENCOUNTER
Reason for Call:  Form, our goal is to have forms completed with 72 hours, however, some forms may require a visit or additional information.    Type of letter, form or note:  Home Health Certification    Who is the form from?: Home care    Where did the form come from: form was faxed in    What clinic location was the form placed at?: [place]    Where the form was placed: Given to physician    What number is listed as a contact on the form?: [facility] [phone]       Additional comments: Placed in providers basket to complete, please route back to TC when finished.    Call taken on 1/19/2024 at 2:49 PM by Meme Drummond

## 2024-01-21 RX ORDER — DULAGLUTIDE 1.5 MG/.5ML
1.5 INJECTION, SOLUTION SUBCUTANEOUS
Qty: 6 ML | Refills: 0 | Status: SHIPPED | OUTPATIENT
Start: 2024-01-21 | End: 2024-04-16

## 2024-01-24 DIAGNOSIS — Z79.4 TYPE 2 DIABETES MELLITUS WITH OTHER DIABETIC KIDNEY COMPLICATION, WITH LONG-TERM CURRENT USE OF INSULIN (H): ICD-10-CM

## 2024-01-24 DIAGNOSIS — E11.29 TYPE 2 DIABETES MELLITUS WITH OTHER DIABETIC KIDNEY COMPLICATION, WITH LONG-TERM CURRENT USE OF INSULIN (H): ICD-10-CM

## 2024-01-25 RX ORDER — FLASH GLUCOSE SENSOR
KIT MISCELLANEOUS
Qty: 6 EACH | Refills: 3 | Status: SHIPPED | OUTPATIENT
Start: 2024-01-25 | End: 2024-05-30

## 2024-01-29 ENCOUNTER — TELEPHONE (OUTPATIENT)
Dept: FAMILY MEDICINE | Facility: CLINIC | Age: 74
End: 2024-01-29
Payer: COMMERCIAL

## 2024-01-29 NOTE — TELEPHONE ENCOUNTER
ZULEIMA Cruz from [facility] calling to report that she has not been able to get a hold of patient to set up appointments. Patient not returning calls.   Order was to see her in Jan but Nancy asking for order to move evaluation to Feb so she can keep reaching out.     Daughter, Nikki informed.  CECILY on file for Nikki Lisa 12/12/17.    Routing to provider to advise if ok to keep trying to reach patient in Feb.  Yaa Turner BSN, RN

## 2024-01-29 NOTE — TELEPHONE ENCOUNTER
Daughter, Nikki said that patient now has appointment with  for 2/5/24 at 1:00 pm.  Returned call to Nancy and left voicemail with this information.   Advised to call us back if any questions.     Yaa GARCIAN, RN

## 2024-01-30 ENCOUNTER — MEDICAL CORRESPONDENCE (OUTPATIENT)
Dept: HEALTH INFORMATION MANAGEMENT | Facility: CLINIC | Age: 74
End: 2024-01-30
Payer: COMMERCIAL

## 2024-01-31 ENCOUNTER — MEDICAL CORRESPONDENCE (OUTPATIENT)
Dept: HEALTH INFORMATION MANAGEMENT | Facility: CLINIC | Age: 74
End: 2024-01-31
Payer: COMMERCIAL

## 2024-02-06 ENCOUNTER — MEDICAL CORRESPONDENCE (OUTPATIENT)
Dept: HEALTH INFORMATION MANAGEMENT | Facility: CLINIC | Age: 74
End: 2024-02-06
Payer: COMMERCIAL

## 2024-02-06 ENCOUNTER — PATIENT OUTREACH (OUTPATIENT)
Dept: GASTROENTEROLOGY | Facility: CLINIC | Age: 74
End: 2024-02-06
Payer: COMMERCIAL

## 2024-02-07 ENCOUNTER — TELEPHONE (OUTPATIENT)
Dept: PHARMACY | Facility: OTHER | Age: 74
End: 2024-02-07
Payer: COMMERCIAL

## 2024-02-07 NOTE — TELEPHONE ENCOUNTER
Called patient to schedule MTM appointment - they were referred by their [facility] health insurance for a medication review.     Referral outreach attempt #1 on February 7, 2024      Outcome: left voicemail for patient to call MTM scheduling line at [phone] if interested in scheduling an MTM visit.    Ayesha Felix, PharmD, BCACP  Medication Therapy Management Provider

## 2024-02-14 NOTE — TELEPHONE ENCOUNTER
MTM Recruitment: [facility] insurance     Referral outreach attempt #2 on February 14, 2024      Outcome: left voicemail    Rylee Little, Pharm.D, BCACP  Medication Therapy Management Pharmacist

## 2024-02-28 ENCOUNTER — TELEPHONE (OUTPATIENT)
Dept: NEUROLOGY | Facility: CLINIC | Age: 74
End: 2024-02-28
Payer: COMMERCIAL

## 2024-02-28 ENCOUNTER — MYC MEDICAL ADVICE (OUTPATIENT)
Dept: FAMILY MEDICINE | Facility: CLINIC | Age: 74
End: 2024-02-28
Payer: COMMERCIAL

## 2024-02-28 DIAGNOSIS — E11.9 TYPE 2 DIABETES, HBA1C GOAL < 8% (H): Primary | ICD-10-CM

## 2024-02-28 NOTE — TELEPHONE ENCOUNTER
Sent Mychart (1st Attempt) for the patient to call back and schedule the following:    Appointment type: Return Neuro  Provider: Dr. Keith  Return date: July 2024  Specialty phone number: [phone]  Additional appointment(s) needed:   Additonal Notes:     Attempted to reschedule the appointment with  on 7/9/2024, Provider will be attending/precepting.    Also sent an appointment reschedule letter, patient does not have voicemail.    Lily OCONNOR/Complex Procedure    [facility]   Neurology, NeuroSurgery, NeuroPsychology, Pain Management and Cardiology Specialties  Medical/Surgical Adult Specialties

## 2024-03-04 ENCOUNTER — MYC MEDICAL ADVICE (OUTPATIENT)
Dept: FAMILY MEDICINE | Facility: CLINIC | Age: 74
End: 2024-03-04

## 2024-03-04 DIAGNOSIS — E11.9 TYPE 2 DIABETES, HBA1C GOAL < 8% (H): Primary | ICD-10-CM

## 2024-03-07 ENCOUNTER — VIRTUAL VISIT (OUTPATIENT)
Dept: PHARMACY | Facility: CLINIC | Age: 74
End: 2024-03-07
Payer: COMMERCIAL

## 2024-03-07 DIAGNOSIS — G25.0 ESSENTIAL TREMOR: ICD-10-CM

## 2024-03-07 DIAGNOSIS — I10 HYPERTENSION GOAL BP (BLOOD PRESSURE) < 140/90: ICD-10-CM

## 2024-03-07 DIAGNOSIS — K59.00 CONSTIPATION, UNSPECIFIED CONSTIPATION TYPE: ICD-10-CM

## 2024-03-07 DIAGNOSIS — Z79.4 TYPE 2 DIABETES MELLITUS WITH OTHER DIABETIC KIDNEY COMPLICATION, WITH LONG-TERM CURRENT USE OF INSULIN (H): ICD-10-CM

## 2024-03-07 DIAGNOSIS — I50.9 CONGESTIVE HEART FAILURE, UNSPECIFIED HF CHRONICITY, UNSPECIFIED HEART FAILURE TYPE (H): ICD-10-CM

## 2024-03-07 DIAGNOSIS — M81.0 OSTEOPOROSIS, UNSPECIFIED OSTEOPOROSIS TYPE, UNSPECIFIED PATHOLOGICAL FRACTURE PRESENCE: ICD-10-CM

## 2024-03-07 DIAGNOSIS — F32.1 MODERATE MAJOR DEPRESSION (H): ICD-10-CM

## 2024-03-07 DIAGNOSIS — E03.9 HYPOTHYROIDISM, UNSPECIFIED TYPE: ICD-10-CM

## 2024-03-07 DIAGNOSIS — E11.29 TYPE 2 DIABETES MELLITUS WITH OTHER DIABETIC KIDNEY COMPLICATION, WITH LONG-TERM CURRENT USE OF INSULIN (H): ICD-10-CM

## 2024-03-07 DIAGNOSIS — R52 GENERALIZED PAIN: ICD-10-CM

## 2024-03-07 DIAGNOSIS — N18.32 STAGE 3B CHRONIC KIDNEY DISEASE (H): Primary | ICD-10-CM

## 2024-03-07 DIAGNOSIS — I27.20 PULMONARY HYPERTENSION (H): ICD-10-CM

## 2024-03-07 DIAGNOSIS — E78.5 HYPERLIPIDEMIA LDL GOAL <100: ICD-10-CM

## 2024-03-07 DIAGNOSIS — R53.83 FATIGUE, UNSPECIFIED TYPE: ICD-10-CM

## 2024-03-07 PROCEDURE — 99605 MTMS BY PHARM NP 15 MIN: CPT | Mod: 93 | Performed by: PHARMACIST

## 2024-03-07 PROCEDURE — 99607 MTMS BY PHARM ADDL 15 MIN: CPT | Mod: 93 | Performed by: PHARMACIST

## 2024-03-07 RX ORDER — ASPIRIN 325 MG
325 TABLET, DELAYED RELEASE (ENTERIC COATED) ORAL DAILY
COMMUNITY
End: 2024-09-13

## 2024-03-07 RX ORDER — CARVEDILOL 25 MG/1
TABLET ORAL
COMMUNITY
End: 2024-06-26

## 2024-03-07 RX ORDER — DOCUSATE SODIUM 100 MG/1
100 CAPSULE, LIQUID FILLED ORAL DAILY
COMMUNITY
End: 2024-09-13

## 2024-03-07 RX ORDER — INSULIN ASPART 100 [IU]/ML
INJECTION, SOLUTION INTRAVENOUS; SUBCUTANEOUS
Qty: 75 ML | Refills: 1 | Status: SHIPPED | OUTPATIENT
Start: 2024-03-07

## 2024-03-07 NOTE — LETTER
"Recommended To-Do List      Prepared on: 03/07/2024       You can get the best results from your medications by completing the items on this \"To-Do List.\"      Bring your To-Do List when you go to your doctor. And, share it with your family or caregivers.    My To-Do List:  What we talked about: What I should do:   Your medication dosage being too low    Consider increasing your dosage of Trulicity. MTM will check with your provider and follow-up with you.          What we talked about: What I should do:   A new medication that may be of benefit to you    Start taking docusate sodium (COLACE)          What we talked about: What I should do:                     "

## 2024-03-07 NOTE — Clinical Note
Chavez Ricci-  I saw Yadi for an MTM visit today to review medications together and wanted to provide considerations to you and request your response re: #1-2.  1. Consider increasing trulicity and decreasing her insulin; MTM could help adjust this before her visit with you. 2. Consider vit D lab - let us know if you want us to order. 3. FYI I gave over-the-counter recommendations for constipation - her last BM was 1 week ago. 4. Items to consider at your upcoming visit with her: change to selective serotonin reuptake inhibitor, DEXA. Please see my note for details. Thanks for collaborating.  Phylicia Thorne, PharmD Medication Therapy Management (MTM) Pharmacist

## 2024-03-07 NOTE — LETTER
_  Medication List        Prepared on: 03/07/2024     Bring your Medication List when you go to the doctor, hospital, or   emergency room. And, share it with your family or caregivers.     Note any changes to how you take your medications.  Cross out medications when you no longer use them.    Medication How I take it Why I use it Prescriber   acetaminophen (TYLENOL) 500 MG tablet Take 1,000 mg by mouth 2 times daily Pain Patient Reported   alendronate (FOSAMAX) 70 MG tablet TAKE 1 TABLET (70 MG) BY MOUTH EVERY 7 DAYS. TAKE 60 MINUTES BEFORE MORNING MEAL WITH 8 OZ. OF WATER. REMAIN UPRIGHT FOR 30 MINUTES. Osteoporosis, unspecified osteoporosis type, unspecified pathological fracture presence Sandy Ricci MD   aspirin (ASA) 325 MG EC tablet Take 325 mg by mouth daily stroke history Barbara Giraldo MD   calcium carbonate (OS-NATACHA) 1500 (600 Ca) MG tablet Take 600 mg by mouth daily With vitamin D (unknown dose): Patient taking 1 tablet once daily Bone Health Patient Reported   carvedilol (COREG) 25 MG tablet 1 tab AM and 1/2 tab PM Cardiac Failure; High Blood Pressure Disorder Barbara Giraldo MD   docusate sodium (COLACE) 100 MG capsule Take 100 mg by mouth daily Constipation Patient Reported   dulaglutide (TRULICITY) 0.75 MG/0.5ML pen Inject 0.75 mg Subcutaneous every 7 days Type 2 Diabetes, HbA1c Goal < 8% (H) Sandy Ricci MD   dulaglutide (TRULICITY) 1.5 MG/0.5ML pen INJECT 1.5 MG SUBCUTANEOUS EVERY 7 DAYS Type 2 diabetes mellitus with other diabetic kidney complication, with long-term current use of insulin (H) Sandy Ricci MD   folic acid (FOLVITE) 1 MG tablet TAKE 1 TABLET (1 MG) BY MOUTH DAILY Folate Deficiency Jessika Preston,    icosapent ethyl (VASCEPA) 1 g CAPS capsule Take 2 g by mouth 2 times daily Heart; Cholesterol Barbara Giraldo MD   insulin aspart (NOVOLOG FLEXPEN) 100 UNIT/ML pen INJECT SUBCUTANEOUSLY 24 UNITS BEFORE BREAKFAST, 5 UNITS BEFORE LUNCH, 24 UNITS BEFORE DINNER.   (MAX DOSE 70 UNITS/DAY FOR ADJUSTMENT) Type 2 diabetes mellitus with other diabetic kidney complication, with long-term current use of insulin (H) Sandy Ricci MD   insulin glargine U-300 (TOUJEO SOLOSTAR) 300 UNIT/ML (1 units dial) pen INJECT 40 UNITS UNDER THE SKIN AT BEDTIME Type 2 diabetes mellitus with other diabetic kidney complication, with long-term current use of insulin (H) Sandy Ricci MD   levothyroxine (SYNTHROID/LEVOTHROID) 150 MCG tablet Take 1 tablet (150 mcg) by mouth daily Hypothyroidism, unspecified type Sandy Ricci MD   losartan (COZAAR) 25 MG tablet Take 0.5 tablets by mouth daily Blood Pressure; Kidneys Anna Manriquez PA-C   PARoxetine (PAXIL) 20 MG tablet TAKE 1 TABLET (20 MG) BY MOUTH 2 TIMES DAILY Moderate Major Depression (H) Sandy Ricci MD   polyethylene glycol (MIRALAX) 17 GM/Dose powder Take 1 packet by mouth daily as needed Constipation Patient Reported   primidone (MYSOLINE) 50 MG tablet Take 2 tablets (100 mg) in the morning and 3 tablets (150 mg) at night. If no improvement in 2 weeks, increase to 3 tablets in the morning (150 mg) and 3 tablets at night (150 mg). Essential Tremor Donnie Keith MD   rosuvastatin (CRESTOR) 40 MG tablet Take 1 tablet by mouth daily cholesterol Dr. Giraldo   torsemide (DEMADEX) 20 MG tablet Take 40 mg QAM and 20 mg QPM Benign Essential Hypertension Jessika Preston DO   vitamin D3 (CHOLECALCIFEROL) 250 mcg (88548 units) capsule Take 1 tablet by mouth daily Osteoporosis; Bone Health Patient Reported         Add new medications, over-the-counter drugs, herbals, vitamins, or  minerals in the blank rows below.    Medication How I take it Why I use it Prescriber                                      Allergies:      No Known Allergies        Side effects I have had:               Other Information:              My notes and questions:

## 2024-03-07 NOTE — LETTER
March 7, 2024  Yadi Iniguez  [address]  [place] [address]    Dear MALDONADO Asher [facility]     Thank you for talking with me on Mar 7, 2024 about your health and medications. As a follow-up to our conversation, I have included two documents:      Your Recommended To-Do List has steps you should take to get the best results from your medications.  Your Medication List will help you keep track of your medications and how to take them.    If you want to talk about these documents, please call Stephanie Thorne RPH at phone: [phone], Monday-Friday 8-4:30pm.    I look forward to working with you and your doctors to make sure your medications work well for you.    Sincerely,  Stephanie Thorne RPH  [facility] Pharmacist, [facility]

## 2024-03-07 NOTE — TELEPHONE ENCOUNTER
MTM Recruitment: [facility] insurance     Referral outreach attempt #3 on March 7, 2024      Outcome: visit completed    Phylicia Thorne PharmD  Medication Therapy Management (MTM) Pharmacist

## 2024-03-07 NOTE — PROGRESS NOTES
Medication Therapy Management (MTM) Encounter    ASSESSMENT:                            Medication Adherence/Access: No issues identified    Type 2 Diabetes:  Patient's A1c of 6.6% is at ADA goal of <7%.   SMBG fasting sugars are mostly at goal per Patient report.  Patient would benefit from:  -consider GLP-1 titration beyond 1.5 mg once weekly dosing allowing for less long acting insulin and short acting insulin in the long term; Patient would like MTM to review with her provider. Will caution this with her reported constipation at this time, but could consider once current bout of constipation resolves.  Could also add daily fiber in the future if needed.    Hypertension /Chronic kidney disease/Pulmonary hypertension/CHF: blood pressure stable at last visit; Patient reports plan in place to follow-up with cards this month.    Hyperlipidemia : no changes today; continue following with cardiology.    Osteoporosis:  recommend repeat DEXA scan and consider bisphosphonate holiday - Patient to review with PCP at upcoming visit.    Depression/Fatigue: future could consider dose decrease of paroxetine to see if this decreases daytime fatigue, especially since paroxetine has somnolence rates of 3-24% and known higher risk for anticholinergic side effects - not addressed directly with Patient today due to other focus of visit, but will share with PCP to address in the future or could address at future MTM Visit.    Essential tremor: continue following with neurology.    Hypothyroidism: Stable    Constipation: recommend increasing bowel regimen with addition of stool softener. Consider fiber in future if needed -see above.  Also recommend using over-the-counter laxative now to relieve current constipation.    Pain:  managed with acetaminophen    PLAN:                            1. Consider trulicity dose titration- MTM will review with primary care provider and [facility]   If increasing trulicity,  wait to do so until current constipation concerns are resolved. Consider adding daily fiber supplement in the future if increasing trulicity dose.  If increasing trulicity, will plan to decrease short acting insulin and long acting insulin doses as well.    In the meantime refill of novolog sent to pharmacy.    2. Consider vitamin D lab given patient's high supplemental dose and diagnosis of osteoporosis - recommendation sent to primary care provider.    3. Consider repeating DEXA scan for bone density measurement - Yadi to discuss with Dr. Root at follow-up visit with provider.    4. Take second dose of miralax today.  If needed, you can also use over-the-counter laxative products: bisacodyl or senna - follow the over-the-counter package instructions.  If you do not have a BM by end of day tomorrow, contact clinic again for further recommendations.    5. Recommend starting daily docusate 100 mg as a stool softener to prevent future constipation.    Follow-up: MTM will follow-up with Patient by Ivan regarding #1 above; then TBD pending response from PCP about diabetes medication changes; may benefit from ongoing MTM follow-up for diabetes med adjustments.    Addended on March 12, 2024  Response from PCP in chart route - primary care provider will discuss potential changes with Patient at appointment in April. Patient updated by Ivan.  KB    SUBJECTIVE/OBJECTIVE:                          Yadi Iniguez is a 73 year old female called for a follow-up visit from 4/20/2023 with Luis Antonio Bass, ElainaD.       Reason for visit: [facility] recommended annual med review.    Allergies/ADRs: Reviewed in chart  Past Medical History: Reviewed in chart  Tobacco: She reports that she quit smoking about 2 years ago. Her smoking use included cigarettes. She started smoking about 57 years ago. She has a 50 pack-year smoking history. She has never been exposed to tobacco smoke. She has never used smokeless tobacco.  Alcohol: Less than 1  beverage / month  Caffeine: 2 cups coffee/day.    Medication Adherence/Access: self-manages meds and fills at  pharmacy  Per Patient, she is not eligible for [employer] Patient Assistance Program in 2024.    Diabetes   Dulaglutide (Trulicity) subcut injection 0.75 mg, 1.5 mg: once weekly on Fridays. Was taking 1.5 mg previously, but due to shortage decreased to 0.75 mg temporarily; planning to increase back to 1.5 mg in near future.  Novolog (aspart) short acting insulin: 24 units with breakfast, 5 in the afternoon with snack and 24 units with evening meal. Requests refill since she is almost out of her supply from the assistance program in 2023.  Toujeo (glargine) long acting insulin 300 units/mL: 42 units daily (Patient notes this was slightly increased)  Aspirin 325mg daily (with CVA history)  Patient is experiencing the following side effects: constipation - see below.  Blood sugar monitoring: freestyle lucila - data not available during virtual visit today.  Per Patient report: 103 to 140s/150s. Sometimes afternoon has blood sugars in the 90s, she eats a snack and numbers increase. Evening/ dinner 160 mg/dL. Bedtime: 120s.  No numbers less than 70 mg/dL.  Confirms that she has glucose tabs at home and knows how to treat HYPOglycemia.  Current diabetes symptoms: none  Diet/Exercise: 2 full meals with snacking in the afternoon.     Eye exam is up to date  Foot exam is up to date  Urine Albumin:   Lab Results   Component Value Date    UMALCR 23.48 10/25/2023      Lab Results   Component Value Date    A1C 6.6 (H) 10/25/2023     Hypertension /Chronic kidney disease/Pulmonary hypertension/CHF:  Follows with Dr. Giraldo for cardiology at [facility] and nephrology Dr. Jessika Preston.  Current medications include:  carvedilol 25 mg 25 mg AM and 1/2 tab (12.5 mg) in the PM - recent change / afternoon dose decrease by Dr. Giraldo.  losartan 12.5 mg daily in the evening  torsemide 40 mg every morning/20 mg every evening.   "  Folic acid 1 m tab daily  Aspirin 325 mg: once daily - started after stroke by cards; was previously on an aspirin 81 mg.  Medication History: amlodipine, potassium (was still on our med list - per Patient she is not taking, per 2023 cards notes this was removed from med list noted as Patient not taking and K level in  was WNL)    Patient reports no current medication side effects     BP Readings from Last 3 Encounters:   24 111/66   23 109/52   10/25/23 98/52     Pulse Readings from Last 3 Encounters:   24 82   23 75   10/25/23 77     Potassium   Date Value Ref Range Status   2024 3.7 3.4 - 5.3 mmol/L Final   2023 4.4 3.4 - 5.3 mmol/L Final   2021 5.0 3.4 - 5.3 mmol/L Final     Hyperlipidemia :  Current meds:  Rosuvastatin 40 m tablet daily  Vascepa 1  gram  twice daily - ordered by cardiology Dr. Barbara Giraldo  + aspirin 325 mg, as above.    Patient reports no significant myalgias or other side effects.  The ASCVD Risk score (Marcella DK, et al., 2019) failed to calculate for the following reasons:    The patient has a prior MI or stroke diagnosis       Recent Labs   Lab Test 23  1006 23  0948   CHOL 150 193   HDL 38* 39*   LDL 67 74   TRIG 225* 400*     Osteoporosis:   Current therapy includes:  Calcium 600 mg-Vitamin D: 1 tab daily  Vitamin D 10,000 int units: 1 tab  daily  alendronate (Fosamax) 70mg weekly:  estimates she's been taking this 4-5 years  Pt is not experiencing side effects.  Vitamin D Deficiency Screening Results:  No results found for: \"VITDT\"  DEXA History: last   Risk factors: post-menopausal    Depression/Fatigue:  Per Patient - Luis Antonio Bass, PharmD suggested changing to 2 paroxetine tabs at nighttime; she is not sure that this made any difference in her fatigue, but she finds very effective for her mood.  Current medications include:   Paroxetine 20 mg 2 tabs nightly.   Pt reports that depression symptoms are unchanged. "   PHQ9:       6/26/2023    12:09 PM 10/25/2023     1:11 PM 12/26/2023     8:27 AM   PHQ-9 SCORE   PHQ-9 Total Score [facility] 5 (Mild depression) 14 (Moderate depression) 7 (Mild depression)   PHQ-9 Total Score 5 14 7     GAD7:       1/21/2021     8:51 AM 7/27/2021     8:06 AM 1/18/2022     9:24 AM   PALOMA-7 SCORE   Total Score 5 4 2     Essential tremor:  Follows with neurology, Dr. Keith   Primidone 50 mg: currently taking 3 tabs twice daily (dose had been titrated);   Patient states it is hard to tell how much the dose change helped v. Didn't help because 2 days before Elvie had a stroke, after stroke tremor in left arm went away; right arm still has the tremor.  However, she doesn't feel like the dose increase made her any more tired than she was before; notes that she's always tired.     Hypothyroidism   Levothyroxine 150 mcg daily.  Taking first thing in the AM before other meds / food. No concerns.  Patient is having the following symptoms: none.      TSH   Date Value Ref Range Status   10/25/2023 1.87 0.30 - 4.20 uIU/mL Final   07/09/2022 0.60 0.40 - 4.00 mU/L Final   06/30/2020 4.65 (H) 0.40 - 4.00 mU/L Final     Constipation: Pt's last BM was last thursday. Uncommon for her to have this constipation. No other dietary changes; states that she is hydrated.  Miralax (PEG powder): 1 capful once daily mixed in liquid.    Pain:  Current Meds:  Acetaminophen 500 mg:  takes 2 tabs in the AM and 2 tabs in PM daily scheduled; sometimes adds a dose in the afternoon. Using for back pain and finds effective    Today's Vitals: There were no vitals taken for this visit.  ----------------    I spent 40 minutes with this patient today. I offer these suggestions for consideration by Dr. Root. A copy of the visit note was provided to the patient's provider(s).    A summary of these recommendations was sent via [employer].      Phylicia Thorne, PharmD  Medication Therapy Management (MTM) Pharmacist  [facility]  [facility]  Clinic Phone: [phone]    Telemedicine Visit Details  Type of service:  Telephone visit  Start Time:  11:45 am  End Time:  12:25 pm     Medication Therapy Recommendations  Constipation, unspecified constipation type    Rationale: Synergistic therapy - Needs additional medication therapy - Indication   Recommendation: Start Medication - docusate sodium 100 MG capsule   Status: Patient Agreed - Adherence/Education   Note: otc medication recommendation         Type 2 diabetes mellitus with other diabetic kidney complication, with long-term current use of insulin (H)    Current Medication: dulaglutide (TRULICITY) 1.5 MG/0.5ML pen   Rationale: Dose too low - Dosage too low - Effectiveness   Recommendation: Increase Dose   Status: Contact Provider - Awaiting Response

## 2024-03-07 NOTE — PATIENT INSTRUCTIONS
"Recommendations from today's MTM visit:                                                    MTM (medication therapy management) is a service provided by a clinical pharmacist designed to help you get the most of out of your medicines.      1. Consider trulicity dose titration- MTM will review with primary care provider and [facility]   If increasing trulicity, wait to do so until current constipation concerns are resolved. Consider adding daily fiber supplement in the future if increasing trulicity dose.  If increasing trulicity, will plan to decrease short acting insulin and long acting insulin doses as well.    In the meantime refill of novolog sent to pharmacy.    2. Consider vitamin D lab - recommendation sent to primary care provider.    3. Consider repeating DEXA scan for bone density measurement - Yadi to discuss with Dr. Root at follow-up visit with provider.    4. Take second dose of miralax today.  If needed, you can also use over-the-counter laxative products: bisacodyl or senna - follow the over-the-counter package instructions.  If you do not have a bowel movement by end of day tomorrow, contact clinic again for further recommendations.    5. Recommend starting daily docusate 100 mg daily as a stool softener to prevent future constipation.    Follow-up: MTM [facility] #[facility] above after collaborating with Dr. Root.    It was great speaking with you today.  I value your experience and would be very thankful for your time in providing feedback in our clinic survey. In the next few days, you may receive an email or text message from [employer] with a link to a survey related to your  clinical pharmacist.\"     To schedule another MTM appointment, please call the clinic directly or you may call the MTM scheduling line at [phone] or toll-free at [phone].     My Clinical Pharmacist's contact information:                                  "                     Please feel free to contact me with any questions or concerns you have.      Phylicia Thorne, PharmD  Medication Therapy Management (MTM) Pharmacist

## 2024-03-12 DIAGNOSIS — F32.1 MODERATE MAJOR DEPRESSION (H): ICD-10-CM

## 2024-03-12 DIAGNOSIS — E03.9 HYPOTHYROIDISM, UNSPECIFIED TYPE: ICD-10-CM

## 2024-03-12 RX ORDER — PAROXETINE 20 MG/1
TABLET, FILM COATED ORAL
Qty: 180 TABLET | Refills: 3 | Status: SHIPPED | OUTPATIENT
Start: 2024-03-12 | End: 2024-09-13

## 2024-03-12 RX ORDER — LEVOTHYROXINE SODIUM 150 UG/1
150 TABLET ORAL DAILY
Qty: 90 TABLET | Refills: 1 | Status: SHIPPED | OUTPATIENT
Start: 2024-03-12 | End: 2024-09-05

## 2024-03-13 ENCOUNTER — OFFICE VISIT (OUTPATIENT)
Dept: DERMATOLOGY | Facility: CLINIC | Age: 74
End: 2024-03-13
Payer: COMMERCIAL

## 2024-03-13 DIAGNOSIS — L81.4 SOLAR LENTIGO: ICD-10-CM

## 2024-03-13 DIAGNOSIS — L82.1 SEBORRHEIC KERATOSES: ICD-10-CM

## 2024-03-13 DIAGNOSIS — Z85.828 HISTORY OF NONMELANOMA SKIN CANCER: ICD-10-CM

## 2024-03-13 DIAGNOSIS — D22.9 MULTIPLE BENIGN NEVI: Primary | ICD-10-CM

## 2024-03-13 DIAGNOSIS — D18.01 CHERRY ANGIOMA: ICD-10-CM

## 2024-03-13 DIAGNOSIS — D49.2 NEOPLASM OF SKIN: ICD-10-CM

## 2024-03-13 DIAGNOSIS — L40.8 SEBOPSORIASIS: ICD-10-CM

## 2024-03-13 PROCEDURE — 88305 TISSUE EXAM BY PATHOLOGIST: CPT | Performed by: DERMATOLOGY

## 2024-03-13 PROCEDURE — 11102 TANGNTL BX SKIN SINGLE LES: CPT | Performed by: PHYSICIAN ASSISTANT

## 2024-03-13 PROCEDURE — 99213 OFFICE O/P EST LOW 20 MIN: CPT | Mod: 25 | Performed by: PHYSICIAN ASSISTANT

## 2024-03-13 NOTE — PROGRESS NOTES
The following medication was given:     MEDICATION:  Lidocaine with epinephrine 1% [id number]  ROUTE: SQ  SITE: see procedure note  DOSE: 1mL  LOT #: [id number]  : [employer]  EXPIRATION DATE: 4-30-25  NDC#: 79730-716-84  Was there drug waste? no  Multi-dose vial: Yes    Tran Petty LPN  March 13, 2024

## 2024-03-13 NOTE — NURSING NOTE
Yadi Iniguez's goals for this visit include:   Chief Complaint   Patient presents with    Skin Check     Annual FBSC. One on right side behind ear, one on right left calf, and one under left breast.        She requests these members of her care team be copied on today's visit information:     PCP: Sandy Ricci    Referring Provider:  Referred Self, MD  No address on file    There were no vitals taken for this visit.    Do you need any medication refills at today's visit?     Tran Petty LPN on 3/13/2024 at 8:49 AM

## 2024-03-13 NOTE — PROGRESS NOTES
[facility] Dermatology Note  Encounter Date: Mar 13, 2024  Office Visit      Dermatology Problem List:  Last skin check 3/13/24  0. NUB - R anterior lower leg - bx 3/13/24  1. 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 S/p ILK for hypertrophic scar in 2013  2. Scalp psoriasis:  - Clobetasol 0.05% solution, T gel     Social history: Daughter Nikki is an RN.  PMHx: DM type 2  ____________________________________________    Assessment & Plan:  # History of nonmelanoma skin cancer, no clincial evidence of recurrence.   - Sunscreen: Apply 20 minutes prior to going outdoors and reapply every two hours, when wet or sweating. We recommend using an SPF 30 or higher, and to use one that is water resistant.     - Advised to monitor for changing, non-healing, bleeding, painful, changing, or otherwise symptomatic lesions  - Continue annual skin exams    # Sebopsoriasis - occipital scalp - resolved  - has clobetasol solution at home if recurs, has not needed to use in quite some time    # Benign findings: multiple benign nevi, lentigines, cherry angiomas, SKs  - edu on benign etiology  - Signs and Symptoms of non-melanoma skin cancer and ABCDEs of melanoma reviewed with patient. Patient encouraged to perform monthly self skin exams and educated on how to perform them. UV precautions reviewed with patient. Patient was asked about new or changing moles/lesions on body.   - Sunscreen: Apply 20 minutes prior to going outdoors and reapply every two hours, when wet or sweating. We recommend using an SPF 30 or higher, and to use one that is water resistant.     - RTC for changes    # Neoplasm of unspecified behavior of the skin (D49.2) on the R anterior lower leg. The differential diagnosis includes KA vs HAK vs SCC vs other.  - Shave biopsy performed today, see procedure note below.    Procedures Performed:   - Shave biopsy procedure  note, location(s): see above. After discussion of benefits and risks including but not limited to bleeding, infection, scar, incomplete removal, recurrence, and non-diagnostic biopsy, written consent and photographs were obtained. The area was cleaned with isopropyl alcohol. 0.5mL of 1% lidocaine with epinephrine was injected to obtain adequate anesthesia of lesion(s). Shave biopsy at site(s) performed. Hemostasis was achieved with aluminium chloride. Petrolatum ointment and a sterile dressing were applied. The patient tolerated the procedure and no complications were noted. The patient was provided with verbal and written post care instructions.      Follow-up: 1 year(s) in-person, or earlier for new or changing lesions    Staff:     All risks, benefits and alternatives were discussed with patient.  Patient is in agreement and understands the assessment and plan.  All questions were answered.    Shelley Garcia PA-C, MPAS  [facility]: Phone: [phone], Fax: [phone]  [facility]: Phone: [phone],  Fax: [phone]  [facility]: Phone: [phone], Fax: [phone]  ____________________________________________    CC: Skin Check (Annual FBSC. One on right side behind ear, one on right left calf, and one under left breast. Yadi is accompanied by her daughter today.)      Reviewed patients past medical history and pertinent chart review prior to patient's visit today.     HPI:  Ms. Yadi Iniguez is a 73 year old female who presents today as a return patient for FBSE. Hx of SCC and scalp PSO. Scalp PSO has not returned since using clobetasol.     Patient is otherwise feeling well, without additional concerns.    Labs:  none    Physical Exam:  Vitals: There were no vitals taken for this visit.  SKIN: Total skin excluding the undergarment areas was performed. The exam included the head/face, neck,  both arms, chest, back, abdomen, both legs, digits and/or nails.    - R anterior lower leg 1cm pink scaly papule  - scalp with no erythema or scale  - De Santiago's skin type II, <100 nevi  - There are dome shaped bright red papules on the trunk.   - Multiple regular brown pigmented macules and papules are identified on the trunk and extremities.   - Scattered brown macules on sun exposed areas.  - There are waxy stuck on tan to brown papules on the trunk.   - There is no erythema, telangectasias, nodularity, or pigmentation on the L posterior calf..   - No other lesions of concern on areas examined.       Medications:  Current Outpatient Medications   Medication    acetaminophen (TYLENOL) 500 MG tablet    alendronate (FOSAMAX) 70 MG tablet    aspirin (ASA) 325 MG EC tablet    blood glucose (NO BRAND SPECIFIED) test strip    blood glucose monitoring (NO BRAND SPECIFIED) meter device kit    calcium carbonate (OS-NATACHA) 1500 (600 Ca) MG tablet    carvedilol (COREG) 25 MG tablet    Continuous Blood Gluc Sensor (NanoInkYLE MARI 14 DAY SENSOR) MISC    docusate sodium (COLACE) 100 MG capsule    dulaglutide (TRULICITY) 0.75 MG/0.5ML pen    dulaglutide (TRULICITY) 1.5 MG/0.5ML pen    folic acid (FOLVITE) 1 MG tablet    icosapent ethyl (VASCEPA) 1 g CAPS capsule    insulin aspart (NOVOLOG FLEXPEN) 100 UNIT/ML pen    insulin glargine U-300 (TOUJEO SOLOSTAR) 300 UNIT/ML (1 units dial) pen    insulin pen needle (32G X 4 MM) 32G X 4 MM miscellaneous    levothyroxine (SYNTHROID/LEVOTHROID) 150 MCG tablet    losartan (COZAAR) 25 MG tablet    PARoxetine (PAXIL) 20 MG tablet    polyethylene glycol (MIRALAX) 17 GM/Dose powder    primidone (MYSOLINE) 50 MG tablet    rosuvastatin (CRESTOR) 40 MG tablet    torsemide (DEMADEX) 20 MG tablet    vitamin D3 (CHOLECALCIFEROL) 250 mcg (73944 units) capsule     No current facility-administered medications for this visit.      Past Medical/Surgical History:   Patient Active Problem List    Diagnosis    Hypothyroidism    Hypertension goal BP (blood pressure) < 140/90    Type 2 diabetes, HbA1C goal < 8% (H)    Hyperlipidemia LDL goal <70    Moderate major depression (H)    Incontinence of urine    Neuropathy in diabetes (H)    Eczema    Left lumbar radiculopathy    Health Care Home    CKD (chronic kidney disease) stage 3, GFR 30-59 ml/min (H)    Type 2 diabetes mellitus with other diabetic kidney complication, with long-term current use of insulin (H)    Osteoporosis    Morbid obesity (H)    Thrombocytopenia (H24)    Chronic obstructive pulmonary disease, unspecified COPD type (H)    S/P lumbar fusion    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, with long-term current use of insulin, unspecified laterality (H)    Lumbar facet arthropathy    Lumbar spondylosis    Pulmonary hypertension (H)    Nonrheumatic aortic valve stenosis    Depression, major, recurrent, in partial remission (H24)    DDD (degenerative disc disease), lumbar    Verrucous carcinoma (H)    Right pontine cerebrovascular accident (H)    Left leg weakness     Past Medical History:   Diagnosis Date    Acute on chronic diastolic (congestive) heart failure (H) 10/25/2023    Arthritis 1975    Broken ribs 08/28/2016    3,4,5,6,7, left side    Cancer (H) 2013    Skin cancer    Cerebral infarction (H) 2023    COPD (chronic obstructive pulmonary disease) (H) 08/2017    Depression     Depressive disorder 1988    Dysuria     Glycosuria     Hypertension 1980    Mixed incontinence urge and stress     Nonrheumatic aortic valve stenosis 07/25/2021    Other and unspecified hyperlipidemia     Right shoulder pain     Sleep apnea     cpap    Type II or unspecified type diabetes mellitus without mention of complication, uncontrolled     Uncomplicated asthma 2017    Unspecified hypothyroidism

## 2024-03-13 NOTE — LETTER
3/13/2024         RE: Yadi Iniguez  [address]  [place] [address]        Dear Colleague,    Thank you for referring your patient, Yadi Iniguez, to the [facility]. Please see a copy of my visit note below.    [facility] Dermatology Note  Encounter Date: Mar 13, 2024  Office Visit      Dermatology Problem List:  Last skin check 3/13/24  0. NUB - R anterior lower leg - bx 3/13/24  1. 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 S/p ILK for hypertrophic scar in 2013  2. Scalp psoriasis:  - Clobetasol 0.05% solution, T gel     Social history: Daughter Nikki is an RN.  PMHx: DM type 2  ____________________________________________    Assessment & Plan:  # History of nonmelanoma skin cancer, no clincial evidence of recurrence.   - Sunscreen: Apply 20 minutes prior to going outdoors and reapply every two hours, when wet or sweating. We recommend using an SPF 30 or higher, and to use one that is water resistant.     - Advised to monitor for changing, non-healing, bleeding, painful, changing, or otherwise symptomatic lesions  - Continue annual skin exams    # Sebopsoriasis - occipital scalp - resolved  - has clobetasol solution at home if recurs, has not needed to use in quite some time    # Benign findings: multiple benign nevi, lentigines, cherry angiomas, SKs  - edu on benign etiology  - Signs and Symptoms of non-melanoma skin cancer and ABCDEs of melanoma reviewed with patient. Patient encouraged to perform monthly self skin exams and educated on how to perform them. UV precautions reviewed with patient. Patient was asked about new or changing moles/lesions on body.   - Sunscreen: Apply 20 minutes prior to going outdoors and reapply every two hours, when wet or sweating. We recommend using an SPF 30 or higher, and to use one that is water resistant.     - RTC for changes    #  Neoplasm of unspecified behavior of the skin (D49.2) on the R anterior lower leg. The differential diagnosis includes KA vs HAK vs SCC vs other.  - Shave biopsy performed today, see procedure note below.    Procedures Performed:   - Shave biopsy procedure note, location(s): see above. After discussion of benefits and risks including but not limited to bleeding, infection, scar, incomplete removal, recurrence, and non-diagnostic biopsy, written consent and photographs were obtained. The area was cleaned with isopropyl alcohol. 0.5mL of 1% lidocaine with epinephrine was injected to obtain adequate anesthesia of lesion(s). Shave biopsy at site(s) performed. Hemostasis was achieved with aluminium chloride. Petrolatum ointment and a sterile dressing were applied. The patient tolerated the procedure and no complications were noted. The patient was provided with verbal and written post care instructions.      Follow-up: 1 year(s) in-person, or earlier for new or changing lesions    Staff:     All risks, benefits and alternatives were discussed with patient.  Patient is in agreement and understands the assessment and plan.  All questions were answered.    Shelley Garcia PA-C, MPAS  [facility] [place]: Phone: [phone], Fax: [phone]  [facility]: Phone: [phone],  Fax: [phone]  [facility]: Phone: [phone], Fax: [phone]  ____________________________________________    CC: Skin Check (Annual FBSC. One on right side behind ear, one on right left calf, and one under left breast. Yadi is accompanied by her daughter today.)      Reviewed patients past medical history and pertinent chart review prior to patient's visit today.     HPI:  Ms. Yadi Iniguez is a 73 year old female who presents today as a return patient for FBSE. Hx of SCC and scalp PSO. Scalp PSO has not returned since using clobetasol.      Patient is otherwise feeling well, without additional concerns.    Labs:  none    Physical Exam:  Vitals: There were no vitals taken for this visit.  SKIN: Total skin excluding the undergarment areas was performed. The exam included the head/face, neck, both arms, chest, back, abdomen, both legs, digits and/or nails.    - R anterior lower leg 1cm pink scaly papule  - scalp with no erythema or scale  - De Santiago's skin type II, <100 nevi  - There are dome shaped bright red papules on the trunk.   - Multiple regular brown pigmented macules and papules are identified on the trunk and extremities.   - Scattered brown macules on sun exposed areas.  - There are waxy stuck on tan to brown papules on the trunk.   - There is no erythema, telangectasias, nodularity, or pigmentation on the L posterior calf..   - No other lesions of concern on areas examined.       Medications:  Current Outpatient Medications   Medication     acetaminophen (TYLENOL) 500 MG tablet     alendronate (FOSAMAX) 70 MG tablet     aspirin (ASA) 325 MG EC tablet     blood glucose (NO BRAND SPECIFIED) test strip     blood glucose monitoring (NO BRAND SPECIFIED) meter device kit     calcium carbonate (OS-NATACHA) 1500 (600 Ca) MG tablet     carvedilol (COREG) 25 MG tablet     Continuous Blood Gluc Sensor (FREESTYLE MARI 14 DAY SENSOR) MISC     docusate sodium (COLACE) 100 MG capsule     dulaglutide (TRULICITY) 0.75 MG/0.5ML pen     dulaglutide (TRULICITY) 1.5 MG/0.5ML pen     folic acid (FOLVITE) 1 MG tablet     icosapent ethyl (VASCEPA) 1 g CAPS capsule     insulin aspart (NOVOLOG FLEXPEN) 100 UNIT/ML pen     insulin glargine U-300 (TOUJEO SOLOSTAR) 300 UNIT/ML (1 units dial) pen     insulin pen needle (32G X 4 MM) 32G X 4 MM miscellaneous     levothyroxine (SYNTHROID/LEVOTHROID) 150 MCG tablet     losartan (COZAAR) 25 MG tablet     PARoxetine (PAXIL) 20 MG tablet     polyethylene glycol (MIRALAX) 17 GM/Dose powder     primidone (MYSOLINE) 50 MG tablet      rosuvastatin (CRESTOR) 40 MG tablet     torsemide (DEMADEX) 20 MG tablet     vitamin D3 (CHOLECALCIFEROL) 250 mcg (48534 units) capsule     No current facility-administered medications for this visit.      Past Medical/Surgical History:   Patient Active Problem List   Diagnosis     Hypothyroidism     Hypertension goal BP (blood pressure) < 140/90     Type 2 diabetes, HbA1C goal < 8% (H)     Hyperlipidemia LDL goal <70     Moderate major depression (H)     Incontinence of urine     Neuropathy in diabetes (H)     Eczema     Left lumbar radiculopathy     Health Care Home     CKD (chronic kidney disease) stage 3, GFR 30-59 ml/min (H)     Type 2 diabetes mellitus with other diabetic kidney complication, with long-term current use of insulin (H)     Osteoporosis     Morbid obesity (H)     Thrombocytopenia (H24)     Chronic obstructive pulmonary disease, unspecified COPD type (H)     S/P lumbar fusion     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, with long-term current use of insulin, unspecified laterality (H)     Lumbar facet arthropathy     Lumbar spondylosis     Pulmonary hypertension (H)     Nonrheumatic aortic valve stenosis     Depression, major, recurrent, in partial remission (H24)     DDD (degenerative disc disease), lumbar     Verrucous carcinoma (H)     Right pontine cerebrovascular accident (H)     Left leg weakness     Past Medical History:   Diagnosis Date     Acute on chronic diastolic (congestive) heart failure (H) 10/25/2023     Arthritis 1975     Broken ribs 08/28/2016    3,4,5,6,7, left side     Cancer (H) 2013    Skin cancer     Cerebral infarction (H) 2023     COPD (chronic obstructive pulmonary disease) (H) 08/2017     Depression      Depressive disorder 1988     Dysuria      Glycosuria      Hypertension 1980     Mixed incontinence urge and stress      Nonrheumatic aortic valve stenosis 07/25/2021     Other and unspecified  hyperlipidemia      Right shoulder pain      Sleep apnea     cpap     Type II or unspecified type diabetes mellitus without mention of complication, uncontrolled      Uncomplicated asthma 2017     Unspecified hypothyroidism                         The following medication was given:     MEDICATION:  Lidocaine with epinephrine 1% 1:[id number]  ROUTE: SQ  SITE: see procedure note  DOSE: 1mL  LOT #: [id number]  : [employer]  EXPIRATION DATE: 4-30-25  NDC#: 87046-345-78  Was there drug waste? no  Multi-dose vial: Yes    Tran Petty LPN  March 13, 2024      Again, thank you for allowing me to participate in the care of your patient.        Sincerely,        Shelley Garcia PA-C

## 2024-03-14 ENCOUNTER — TELEPHONE (OUTPATIENT)
Dept: DERMATOLOGY | Facility: CLINIC | Age: 74
End: 2024-03-14
Payer: COMMERCIAL

## 2024-03-14 LAB
PATH REPORT.COMMENTS IMP SPEC: ABNORMAL
PATH REPORT.COMMENTS IMP SPEC: ABNORMAL
PATH REPORT.COMMENTS IMP SPEC: YES
PATH REPORT.FINAL DX SPEC: ABNORMAL
PATH REPORT.GROSS SPEC: ABNORMAL
PATH REPORT.MICROSCOPIC SPEC OTHER STN: ABNORMAL
PATH REPORT.RELEVANT HX SPEC: ABNORMAL

## 2024-03-14 NOTE — TELEPHONE ENCOUNTER
Please assist in scheduling patient for Mohs.     Need to complete previsit/excision questionnaire as well.

## 2024-03-14 NOTE — TELEPHONE ENCOUNTER
"Case Report   Surgical Pathology Report                         Case: [id number]                                   Authorizing Provider:  Shelley Garcia PA-C     Collected:           03/13/2024 09:07 AM           Ordering Location:     [facility]   Received:            03/13/2024 09:24 AM                                  [place]                                                                   Pathologist:           Drew Cabrera MD                                                       Specimen:    Skin, R anterior lower leg                                                                Final Diagnosis   Right anterior lower leg:  - Squamous cell carcinoma in situ - (see description)    Electronically signed by Drew Cabrera MD on 3/14/2024 at 10:51 AM   Clinical Information  [facility]   The patient is a 73 year old female.   Gross Description  [facility]   A(1). Skin, R anterior lower leg:  The specimen is received in formalin with proper patient identification, labeled \"R anterior lower leg\".  The specimen consists of a 0.9 x 0.7 cm skin shave specimen containing a 0.9 x 0.7 x 0.1 cm tan, crusted lesion.  The resection margin is inked and it is sectioned and submitted in A1.     Microscopic Description  [facility]   The specimen exhibits epidermal hyperplasia with full-thickness keratinocyte atypia and suprabasal mitoses.      MCRS  N/A Yes Abnormal  [facility]   Performing Labs  [facility]   The technical component of this testing was completed at [facility]              Specimen Collected: 03/13/24  9:07 AM Last Resulted: 03/14/24 10:51 AM         View All Conversations on this Encounter               Result Care Coordination      Result Notes     Shavon Chaves CMA  3/14/2024  4:39 PM CDT Back to Top      Spoke with patient and relayed biopsy results and recommendation to treat with Mohs procedure. Patient would like to speak with her " daughter before scheduling as she does not drive and will need daughter to bring her to appointment.  Patient will return call to clinic at [phone] to schedule Mohs appt.    Oriana Shah RN  3/14/2024  4:32 PM CDT       Left VM for patient to call the clinic to review pathology results.    Margaret Ho MD  3/14/2024  3:31 PM CDT       Qualifies for mohs given location.    Bhavana Galarza RN  3/14/2024 11:46 AM CDT       Dr. Ho - would this qualify for Mohs?  1 cm SCCIS on lower extremity.  She is diabetic.  EVELYN Lehman PA-C  3/14/2024 11:11 AM CDT       Refer to derm surg for WLE vs MMS given location - this patient has DM and daughter is RN and she is very concerned about wound healing on lower leg.

## 2024-03-15 NOTE — TELEPHONE ENCOUNTER
Results and plan reviewed with daughter, Nikki.  Procedure scheduled for 4/23/24.     I advised Nikki to plan on being in clinic for the majority of the morning.  I advised her that Yadi doesn't need to be fasting and she can take medications as scheduled.  I reviewed that she will have a pressure bandage on for 48 hrs following procedure and that we don't want this to get wet.  I reviewed that following the 48 hrs they will begin daily wound care for 2 weeks, but should not submerge the wound in standing water such as a bathtub, pool, hot tub, etc.     Nikki verbalized understanding and agreed with the plan.    Excision/Mohs previsit information                                                    Diagnosis: squamous cell carcinoma in situ  Site(s): right anterior lower leg    Over the counter Chlorhexidine surgical soap to wash all skin below the belly button twice before surgery should be recommended for the following:  - Surgical sites below the waist  - Immunosuppressed  - Previous surgical site infection  - Anticipated wound care challenges    Medication & Allergy Information                                                      Review and update allergy and medication list.    Do you take the following medications:  Coumadin, Eliquis, Pradaxa, Xarelto:  NO   -If on Coumadin, INR should be checked within 7 days of surgery.  Range should be 3.5 or less or within therapeutic range.    Past Medical History                                                    Do you currently or have you previously had any of the following conditions:    Hepatitis:  NO  HIV/AIDS:  NO  Prolonged bleeding or bleeding disorder:  NO  Pacemaker or Defibrillator:  NO.    History of artificial or heart valve replacement:  NO  Endocarditis (inflammation of the inner lining of the heart's chambers and valves):  NO  Have you ever had a prosthetic joint infection:  NO  Pregnant or Breastfeeding:  N/A  Mobility device (wheelchair, transfer  difficulty): NO    Important Reminders:                                                      Ok to take all of their medications as prescribed  Patients can eat, no need to be fasting  Patient will not be able to get the site wet for 48 hrs  No submerging wound in standing water (lake, pool, bathtub, hot tub) for 2 weeks  No physical activity for 48 hrs (further restrictions will be discussed by MD at time of visit)     Bhavana Galarza RN

## 2024-03-19 DIAGNOSIS — M81.0 OSTEOPOROSIS, UNSPECIFIED OSTEOPOROSIS TYPE, UNSPECIFIED PATHOLOGICAL FRACTURE PRESENCE: ICD-10-CM

## 2024-03-19 RX ORDER — ALENDRONATE SODIUM 70 MG/1
TABLET ORAL
Qty: 12 TABLET | Refills: 3 | Status: SHIPPED | OUTPATIENT
Start: 2024-03-19 | End: 2024-06-26

## 2024-03-27 DIAGNOSIS — E11.29 TYPE 2 DIABETES MELLITUS WITH OTHER DIABETIC KIDNEY COMPLICATION, WITH LONG-TERM CURRENT USE OF INSULIN (H): ICD-10-CM

## 2024-03-27 DIAGNOSIS — Z79.4 TYPE 2 DIABETES MELLITUS WITH OTHER DIABETIC KIDNEY COMPLICATION, WITH LONG-TERM CURRENT USE OF INSULIN (H): ICD-10-CM

## 2024-03-27 RX ORDER — INSULIN GLARGINE 300 U/ML
INJECTION, SOLUTION SUBCUTANEOUS
Qty: 15 ML | Refills: 0 | Status: SHIPPED | OUTPATIENT
Start: 2024-03-27 | End: 2024-04-16

## 2024-03-31 ENCOUNTER — HEALTH MAINTENANCE LETTER (OUTPATIENT)
Age: 74
End: 2024-03-31

## 2024-04-04 DIAGNOSIS — I50.9 HEART FAILURE, UNSPECIFIED (H): Primary | ICD-10-CM

## 2024-04-09 ENCOUNTER — MYC MEDICAL ADVICE (OUTPATIENT)
Dept: FAMILY MEDICINE | Facility: CLINIC | Age: 74
End: 2024-04-09
Payer: COMMERCIAL

## 2024-04-09 DIAGNOSIS — E11.9 TYPE 2 DIABETES, HBA1C GOAL < 8% (H): Primary | ICD-10-CM

## 2024-04-11 ENCOUNTER — MYC MEDICAL ADVICE (OUTPATIENT)
Dept: FAMILY MEDICINE | Facility: CLINIC | Age: 74
End: 2024-04-11
Payer: COMMERCIAL

## 2024-04-11 DIAGNOSIS — E55.9 VITAMIN D DEFICIENCY: Primary | ICD-10-CM

## 2024-04-11 DIAGNOSIS — G25.0 ESSENTIAL TREMOR: ICD-10-CM

## 2024-04-11 RX ORDER — PRIMIDONE 50 MG/1
TABLET ORAL
Qty: 540 TABLET | Refills: 3 | Status: SHIPPED | OUTPATIENT
Start: 2024-04-11

## 2024-04-13 ENCOUNTER — LAB (OUTPATIENT)
Dept: LAB | Facility: CLINIC | Age: 74
End: 2024-04-13
Payer: COMMERCIAL

## 2024-04-13 DIAGNOSIS — E11.9 TYPE 2 DIABETES, HBA1C GOAL < 8% (H): ICD-10-CM

## 2024-04-13 DIAGNOSIS — D69.6 THROMBOCYTOPENIA (H): ICD-10-CM

## 2024-04-13 DIAGNOSIS — E55.9 VITAMIN D DEFICIENCY: ICD-10-CM

## 2024-04-13 LAB
ALBUMIN SERPL BCG-MCNC: 3.6 G/DL (ref 3.5–5.2)
ALP SERPL-CCNC: 48 U/L (ref 40–150)
ALT SERPL W P-5'-P-CCNC: 17 U/L (ref 0–50)
ANION GAP SERPL CALCULATED.3IONS-SCNC: 10 MMOL/L (ref 7–15)
AST SERPL W P-5'-P-CCNC: 27 U/L (ref 0–45)
BASOPHILS # BLD AUTO: 0 10E3/UL (ref 0–0.2)
BASOPHILS NFR BLD AUTO: 1 %
BILIRUB SERPL-MCNC: 0.3 MG/DL
BUN SERPL-MCNC: 31.5 MG/DL (ref 8–23)
CALCIUM SERPL-MCNC: 8.9 MG/DL (ref 8.8–10.2)
CHLORIDE SERPL-SCNC: 100 MMOL/L (ref 98–107)
CHOLEST SERPL-MCNC: 147 MG/DL
CREAT SERPL-MCNC: 1.66 MG/DL (ref 0.51–0.95)
DEPRECATED HCO3 PLAS-SCNC: 28 MMOL/L (ref 22–29)
EGFRCR SERPLBLD CKD-EPI 2021: 32 ML/MIN/1.73M2
EOSINOPHIL # BLD AUTO: 0.2 10E3/UL (ref 0–0.7)
EOSINOPHIL NFR BLD AUTO: 5 %
ERYTHROCYTE [DISTWIDTH] IN BLOOD BY AUTOMATED COUNT: 12.8 % (ref 10–15)
FASTING STATUS PATIENT QL REPORTED: YES
GLUCOSE SERPL-MCNC: 135 MG/DL (ref 70–99)
HBA1C MFR BLD: 5.7 % (ref 0–5.6)
HCT VFR BLD AUTO: 31.8 % (ref 35–47)
HDLC SERPL-MCNC: 36 MG/DL
HGB BLD-MCNC: 10.5 G/DL (ref 11.7–15.7)
IMM GRANULOCYTES # BLD: 0 10E3/UL
IMM GRANULOCYTES NFR BLD: 0 %
LDLC SERPL CALC-MCNC: 78 MG/DL
LYMPHOCYTES # BLD AUTO: 1.2 10E3/UL (ref 0.8–5.3)
LYMPHOCYTES NFR BLD AUTO: 33 %
MCH RBC QN AUTO: 34 PG (ref 26.5–33)
MCHC RBC AUTO-ENTMCNC: 33 G/DL (ref 31.5–36.5)
MCV RBC AUTO: 103 FL (ref 78–100)
MONOCYTES # BLD AUTO: 0.3 10E3/UL (ref 0–1.3)
MONOCYTES NFR BLD AUTO: 7 %
NEUTROPHILS # BLD AUTO: 2 10E3/UL (ref 1.6–8.3)
NEUTROPHILS NFR BLD AUTO: 54 %
NONHDLC SERPL-MCNC: 111 MG/DL
NRBC # BLD AUTO: 0 10E3/UL
NRBC BLD AUTO-RTO: 0 /100
PLATELET # BLD AUTO: 67 10E3/UL (ref 150–450)
POTASSIUM SERPL-SCNC: 4.2 MMOL/L (ref 3.4–5.3)
PROT SERPL-MCNC: 6.2 G/DL (ref 6.4–8.3)
RBC # BLD AUTO: 3.09 10E6/UL (ref 3.8–5.2)
SODIUM SERPL-SCNC: 138 MMOL/L (ref 135–145)
TRIGL SERPL-MCNC: 164 MG/DL
TSH SERPL DL<=0.005 MIU/L-ACNC: 0.64 UIU/ML (ref 0.3–4.2)
VIT D+METAB SERPL-MCNC: 106 NG/ML (ref 20–50)
WBC # BLD AUTO: 3.7 10E3/UL (ref 4–11)

## 2024-04-13 PROCEDURE — 36415 COLL VENOUS BLD VENIPUNCTURE: CPT

## 2024-04-13 PROCEDURE — 83036 HEMOGLOBIN GLYCOSYLATED A1C: CPT

## 2024-04-13 PROCEDURE — 85025 COMPLETE CBC W/AUTO DIFF WBC: CPT

## 2024-04-13 PROCEDURE — 80053 COMPREHEN METABOLIC PANEL: CPT

## 2024-04-13 PROCEDURE — 84443 ASSAY THYROID STIM HORMONE: CPT

## 2024-04-13 PROCEDURE — 80061 LIPID PANEL: CPT

## 2024-04-13 PROCEDURE — 82306 VITAMIN D 25 HYDROXY: CPT

## 2024-04-15 ENCOUNTER — ANCILLARY PROCEDURE (OUTPATIENT)
Dept: MAMMOGRAPHY | Facility: CLINIC | Age: 74
End: 2024-04-15
Attending: FAMILY MEDICINE
Payer: COMMERCIAL

## 2024-04-15 DIAGNOSIS — Z12.31 VISIT FOR SCREENING MAMMOGRAM: ICD-10-CM

## 2024-04-15 PROCEDURE — 77063 BREAST TOMOSYNTHESIS BI: CPT | Mod: GC | Performed by: RADIOLOGY

## 2024-04-15 PROCEDURE — 77067 SCR MAMMO BI INCL CAD: CPT | Mod: GC | Performed by: RADIOLOGY

## 2024-04-15 ASSESSMENT — PATIENT HEALTH QUESTIONNAIRE - PHQ9
SUM OF ALL RESPONSES TO PHQ QUESTIONS 1-9: 7
SUM OF ALL RESPONSES TO PHQ QUESTIONS 1-9: 7
10. IF YOU CHECKED OFF ANY PROBLEMS, HOW DIFFICULT HAVE THESE PROBLEMS MADE IT FOR YOU TO DO YOUR WORK, TAKE CARE OF THINGS AT HOME, OR GET ALONG WITH OTHER PEOPLE: NOT DIFFICULT AT ALL

## 2024-04-16 ENCOUNTER — OFFICE VISIT (OUTPATIENT)
Dept: FAMILY MEDICINE | Facility: CLINIC | Age: 74
End: 2024-04-16
Payer: COMMERCIAL

## 2024-04-16 ENCOUNTER — TRANSFERRED RECORDS (OUTPATIENT)
Dept: HEALTH INFORMATION MANAGEMENT | Facility: CLINIC | Age: 74
End: 2024-04-16

## 2024-04-16 VITALS
WEIGHT: 252 LBS | HEART RATE: 84 BPM | RESPIRATION RATE: 17 BRPM | BODY MASS INDEX: 37.21 KG/M2 | SYSTOLIC BLOOD PRESSURE: 86 MMHG | DIASTOLIC BLOOD PRESSURE: 51 MMHG | TEMPERATURE: 98.5 F | OXYGEN SATURATION: 91 %

## 2024-04-16 DIAGNOSIS — H43.11 VITREOUS HEMORRHAGE, RIGHT EYE (H): ICD-10-CM

## 2024-04-16 DIAGNOSIS — J44.9 CHRONIC OBSTRUCTIVE PULMONARY DISEASE, UNSPECIFIED COPD TYPE (H): ICD-10-CM

## 2024-04-16 DIAGNOSIS — D69.6 THROMBOCYTOPENIA (H): ICD-10-CM

## 2024-04-16 DIAGNOSIS — Z79.4: ICD-10-CM

## 2024-04-16 DIAGNOSIS — F32.1 MODERATE MAJOR DEPRESSION (H): ICD-10-CM

## 2024-04-16 DIAGNOSIS — C80.1 VERRUCOUS CARCINOMA (H): ICD-10-CM

## 2024-04-16 DIAGNOSIS — L98.9 SKIN LESION: ICD-10-CM

## 2024-04-16 DIAGNOSIS — R30.0 DYSURIA: ICD-10-CM

## 2024-04-16 DIAGNOSIS — E08.3419: ICD-10-CM

## 2024-04-16 DIAGNOSIS — N18.5 TYPE 2 DIABETES MELLITUS WITH STAGE 5 CHRONIC KIDNEY DISEASE NOT ON CHRONIC DIALYSIS, WITH LONG-TERM CURRENT USE OF INSULIN (H): Primary | ICD-10-CM

## 2024-04-16 DIAGNOSIS — E66.01 MORBID OBESITY (H): ICD-10-CM

## 2024-04-16 DIAGNOSIS — E11.22 TYPE 2 DIABETES MELLITUS WITH STAGE 5 CHRONIC KIDNEY DISEASE NOT ON CHRONIC DIALYSIS, WITH LONG-TERM CURRENT USE OF INSULIN (H): Primary | ICD-10-CM

## 2024-04-16 DIAGNOSIS — I12.0 HYPERTENSIVE CHRONIC KIDNEY DISEASE WITH STAGE 5 CHRONIC KIDNEY DISEASE OR END STAGE RENAL DISEASE (H): ICD-10-CM

## 2024-04-16 DIAGNOSIS — Z79.4 TYPE 2 DIABETES MELLITUS WITH STAGE 5 CHRONIC KIDNEY DISEASE NOT ON CHRONIC DIALYSIS, WITH LONG-TERM CURRENT USE OF INSULIN (H): Primary | ICD-10-CM

## 2024-04-16 DIAGNOSIS — Z79.4 ENCOUNTER FOR LONG-TERM (CURRENT) INSULIN USE (H): ICD-10-CM

## 2024-04-16 DIAGNOSIS — E28.39 ESTROGEN DEFICIENCY: ICD-10-CM

## 2024-04-16 DIAGNOSIS — M81.0 OSTEOPOROSIS, UNSPECIFIED OSTEOPOROSIS TYPE, UNSPECIFIED PATHOLOGICAL FRACTURE PRESENCE: ICD-10-CM

## 2024-04-16 LAB
ALBUMIN UR-MCNC: NEGATIVE MG/DL
APPEARANCE UR: CLEAR
BILIRUB UR QL STRIP: NEGATIVE
COLOR UR AUTO: YELLOW
GLUCOSE UR STRIP-MCNC: NEGATIVE MG/DL
HGB UR QL STRIP: NEGATIVE
KETONES UR STRIP-MCNC: NEGATIVE MG/DL
LEUKOCYTE ESTERASE UR QL STRIP: NEGATIVE
NITRATE UR QL: NEGATIVE
PH UR STRIP: 5.5 [PH] (ref 5–7)
RETINOPATHY: POSITIVE
SP GR UR STRIP: 1.01 (ref 1–1.03)
UROBILINOGEN UR STRIP-ACNC: 0.2 E.U./DL

## 2024-04-16 PROCEDURE — 81003 URINALYSIS AUTO W/O SCOPE: CPT | Performed by: FAMILY MEDICINE

## 2024-04-16 PROCEDURE — 99214 OFFICE O/P EST MOD 30 MIN: CPT | Performed by: FAMILY MEDICINE

## 2024-04-16 PROCEDURE — G2211 COMPLEX E/M VISIT ADD ON: HCPCS | Performed by: FAMILY MEDICINE

## 2024-04-16 RX ORDER — DULAGLUTIDE 1.5 MG/.5ML
1.5 INJECTION, SOLUTION SUBCUTANEOUS
Qty: 6 ML | Refills: 3 | Status: SHIPPED | OUTPATIENT
Start: 2024-04-16 | End: 2024-09-13

## 2024-04-16 RX ORDER — INSULIN GLARGINE 300 U/ML
40 INJECTION, SOLUTION SUBCUTANEOUS AT BEDTIME
Qty: 15 ML | Refills: 0 | Status: SHIPPED | OUTPATIENT
Start: 2024-04-16 | End: 2024-07-17

## 2024-04-16 ASSESSMENT — PAIN SCALES - GENERAL: PAINLEVEL: MILD PAIN (2)

## 2024-04-16 NOTE — PROGRESS NOTES
Assessment & Plan     Type 2 diabetes mellitus with stage 5 chronic kidney disease not on chronic dialysis, with long-term current use of insulin (H)  On trulicity,   Long and short acting insulin  Will start jardiance when able  May needs to decrease insulin dose if bottoms out  On aspirin, statin and arb  UTD with eye exam  - dulaglutide (TRULICITY) 1.5 MG/0.5ML pen; Inject 1.5 mg Subcutaneous every 7 days  - insulin glargine U-300 (TOUJEO SOLOSTAR) 300 UNIT/ML (1 units dial) pen; Inject 40 Units Subcutaneous at bedtime INJECT 40 UNITS UNDER THE SKIN AT BEDTIME    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, with long-term current use of insulin, unspecified laterality (H)  same    Encounter for long-term (current) insulin use (H)  May adjust by a couple of units as needed for highs or lowa    Hypertensive chronic kidney disease with stage 5 chronic kidney disease or end stage renal disease (H)  Hypotensive today. They will cut carvedilol in half, may needs to lower more  She does follow with cardiology  She gets symptomatic with standing    Moderate major depression (H)  Doing well on meds    Thrombocytopenia (H24)  Recheck, it is stable    Morbid obesity (H)  Contributes to diabetes and htn    Verrucous carcinoma (H)  Sees derm    Dysuria  Check today given drop in BP  - UA Macroscopic with reflex to Microscopic and Culture - Lab Collect; Future  - UA Macroscopic with reflex to Microscopic and Culture - Lab Collect    Skin lesion  Has hard ovoid lump near rectum  - Adult Dermatology  Referral; Future    Vitreous hemorrhage, right eye (H)  Per eye    Chronic obstructive pulmonary disease, unspecified COPD type (H)  Stable not on any meds    Estrogen deficiency  Due for dexa to get baseline, then stop fosamax and recheck in 2 years  - DX Bone Density; Future    Osteoporosis, unspecified osteoporosis type, unspecified pathological fracture  "presence  same    The longitudinal plan of care for the diagnosis(es)/condition(s) as documented were addressed during this visit. Due to the added complexity in care, I will continue to support Yadi in the subsequent management and with ongoing continuity of care.          BMI  Estimated body mass index is 37.21 kg/m  as calculated from the following:    Height as of 1/9/24: 1.753 m (5' 9\").    Weight as of this encounter: 114.3 kg (252 lb).   Weight management plan: Discussed healthy diet and exercise guidelines          Subjective   Yadi is a 73 year old, presenting for the following health issues:  Diabetes      4/16/2024     9:07 AM   Additional Questions   Roomed by medhat   Accompanied by daughter lorenzo     History of Present Illness       Reason for visit:  Diabetes recheckShe consumes 0 sweetened beverage(s) daily.She exercises with enough effort to increase her heart rate 9 or less minutes per day.  She exercises with enough effort to increase her heart rate 3 or less days per week.   She is taking medications regularly.       Spot on bum    Bumps on hand                  Review of Systems  Constitutional, HEENT, cardiovascular, pulmonary, gi and gu systems are negative, except as otherwise noted.      Objective    BP (!) 86/51   Pulse 84   Temp 98.5  F (36.9  C) (Oral)   Resp 17   Wt 114.3 kg (252 lb)   SpO2 91%   BMI 37.21 kg/m    Body mass index is 37.21 kg/m .  Physical Exam   GENERAL: alert and no distress  SKIN: no suspicious lesions or rashes and solid ovoid tender mass on left buttocks, near rectum about 4x2 mm    Results for orders placed or performed in visit on 04/16/24 (from the past 24 hour(s))   UA Macroscopic with reflex to Microscopic and Culture - Lab Collect    Specimen: Urine, Clean Catch   Result Value Ref Range    Color Urine Yellow Colorless, Straw, Light Yellow, Yellow    Appearance Urine Clear Clear    Glucose Urine Negative Negative mg/dL    Bilirubin Urine Negative Negative "    Ketones Urine Negative Negative mg/dL    Specific Gravity Urine 1.010 1.003 - 1.035    Blood Urine Negative Negative    pH Urine 5.5 5.0 - 7.0    Protein Albumin Urine Negative Negative mg/dL    Urobilinogen Urine 0.2 0.2, 1.0 E.U./dL    Nitrite Urine Negative Negative    Leukocyte Esterase Urine Negative Negative    Narrative    Microscopic not indicated           Signed Electronically by: Sandy Ricci MD

## 2024-04-22 ENCOUNTER — MYC MEDICAL ADVICE (OUTPATIENT)
Dept: FAMILY MEDICINE | Facility: CLINIC | Age: 74
End: 2024-04-22
Payer: COMMERCIAL

## 2024-04-23 ENCOUNTER — OFFICE VISIT (OUTPATIENT)
Dept: DERMATOLOGY | Facility: CLINIC | Age: 74
End: 2024-04-23
Payer: COMMERCIAL

## 2024-04-23 VITALS — DIASTOLIC BLOOD PRESSURE: 68 MMHG | SYSTOLIC BLOOD PRESSURE: 110 MMHG

## 2024-04-23 DIAGNOSIS — D04.71 SQUAMOUS CELL CARCINOMA IN SITU (SCCIS) OF SKIN OF RIGHT LOWER LEG: Primary | ICD-10-CM

## 2024-04-23 DIAGNOSIS — L98.9 SKIN LESION: ICD-10-CM

## 2024-04-23 PROCEDURE — 17313 MOHS 1 STAGE T/A/L: CPT | Performed by: DERMATOLOGY

## 2024-04-23 PROCEDURE — 15271 SKIN SUB GRAFT TRNK/ARM/LEG: CPT | Performed by: DERMATOLOGY

## 2024-04-23 NOTE — NURSING NOTE
Yadi Iniguez's goals for this visit include:   Chief Complaint   Patient presents with    Procedure     Mohs - right anterior lower leg SCCIS       She requests these members of her care team be copied on today's visit information: n/a    PCP: Sandy Ricci    Referring Provider:  Sandy Ricci MD  [address] [place] [address]    There were no vitals taken for this visit.    Do you need any medication refills at today's visit? No  Bhavana Galarza RN

## 2024-04-23 NOTE — LETTER
4/23/2024         RE: Yadi Iniguez  [address]  [place] [address]        Dear Colleague,    Thank you for referring your patient, Yadi Iniguez, to the [facility]. Please see a copy of my visit note below.    [facility] Procedure Note    Dermatology Problem List:  Last skin check 3/13/24  1. 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 S/p ILK for hypertrophic scar in 2013  2. Scalp psoriasis:  - Clobetasol 0.05% solution, T gel     Social history: Daughter Nikki is an RN.  PMHx: DM type 2    Date of Service:  Apr 23, 2024  Surgery: Mohs micrographic surgery    Case 1  Repair Type: Secondary Intent with Puracol Collagen Graft  Repair Size: N/A  Suture Material: None  Tumor Type: SCCis - Squamous Cell Carcinoma in situ  Location: R anterior lower leg   Derm-Path Accession #: [id number]     PreOp Size: 0.6 x 0.8 cm  PostOp Size: 1.9 x 1.9 cm  Mohs Accession #: [id number]  Level of Defect: Fat      Procedure:  We discussed the principles of treatment and most likely complications including scarring, bleeding, infection, swelling, pain, crusting, nerve damage, large wound,  incomplete excision, wound dehiscence,  nerve damage, recurrence, and a second procedure may be recommended to obtain the best cosmetic or functional result.    Informed consent was obtained and the patient underwent the procedure as follows:  The patient was placed supine on the operating table.  The cancer was identified, outlined with a marker, and verified by the patient.  The entire surgical field was prepped with Hibiclens.  The surgical site was anesthetized using Lidocaine 1% with epi 1 : 100,000.      The area of clinically apparent tumor was not debulked. The layer of tissue was then  surgically excised using a #15 blade and was then transferred onto a specimen sheet maintaining the orientation of the specimen. Hemostasis was obtained using hyfrecator. The wound site was then covered with a dressing while the tissue samples were processed for examination.    The excised tissue was transported to the [facility] histology laboratory maintaining the tissue orientation.  The tissue specimen was relaxed so that the entire surgical margin was in a a single horizontal plane for sectioning and inked for precise mapping.  A precise reference map was drawn to reflect the sectioning of the specimen, colored inking of the margins, and orientation on the patient. The tissue was processed using horizontal sectioning of the base and continuous peripheral margins.  The histopathologic sections were reviewed in conjunction with the reference map.    Total blocks: 1    Total slides:  1    There were no cancer cells visualized on examination, therefore Mohs surgery was complete.    REPAIR: Secondary intent with Puracol collagen graft    The patient is status post Mohs micrographic surgery.  The surgical site was examined with attention to normal anatomic and functional relationships.  After consideration and discussion of multiple options with the patient, it was determined that healing by second intention would offer the best chance for preservation/restoration of all normal anatomic and functional relationships.     The open wound was cleaned with chlorhexidine and rinsed with sterile saline, a Puracol collagen graft was utilized to promote healing and protect the wound bed. The graft was secured to the wound base using skin glue. A pressure dressing consisting of non-adherent gauze and tape was applied.   Wound care was discussed with patient both orally and in writing.     Puracol Lot Number: [id number]   Expiration: 7/31/25     Staff Involved:   Scribe/Staff    Scribe Disclosure:   JALYN CARUSO, am serving as a  scribe; to document services personally performed by Flaco Willoughby MD -based on data collection and the provider's statements to me.     Attending attestation:  I personally performed the entire procedure.  I have reviewed the note and edited it as necessary, and agree with its contents.    Flaco Willoughby M.D.  Professor  Director of Dermatologic Surgery  Department of Dermatology  [facility]    Dermatology Surgery Clinic  [facility] [place] [address]        Again, thank you for allowing me to participate in the care of your patient.        Sincerely,        Flaco Willoughby MD

## 2024-04-23 NOTE — PATIENT INSTRUCTIONS
Post-Operative Care for Granulating Site  After your surgery, a pressure bandage will be placed over the area. This will help prevent bleeding. Please follow these instructions as they will help you to prevent complications as your wound heals.  For the First 48 hours After Surgery:  Leave the pressure bandage on and keep it dry. If it should come loose, you may retape it, but do not take it off.  Relax and take it easy. Do not do any vigorous exercise, heavy lifting, or bending forward. This could cause the wound to bleed.  Post-operative pain is usually mild. You may alternate between 1000 mg of Tylenol (acetaminophen) and 400 mg of Ibuprofen every 4 hours.  Do not take more than 4,000mg of acetaminophen in a 24 hour period or 3200 mg of Ibuprofen in a 24 hr period.  Avoid alcohol and vitamin E as these may increase your tendency to bleed.  You may put an ice pack around the bandaged area for 20 minutes every 2-3 hours. This may help reduce swelling, bruising, and pain. Make sure the ice pack is waterproof so that the pressure bandage does not get wet.   You may see a small amount of drainage or blood on your pressure bandage. This is normal. However, if drainage or bleeding continues or saturates the bandage, you will need to apply firm pressure over the bandage with a washcloth for 15 minutes. If bleeding continues after applying pressure for 15 minutes, apply an ice pack to the bandaged area for 15 minutes. If bleeding still continues, go to the nearest emergency room.  48 Hours After Surgery:  Carefully remove the bandage and start daily wound care and dressing changes. You may also now shower and get the wound wet. Use caution when washing the wound, be gentle.  Do not use a wash cloth on the wound.  Daily Wound Care:  Wash with mild soap and water every day until wound appears well-healed.  Rinse well and pat dry.  Apply Vaseline over site with a Q-tip and re-apply a dressing until wound appears well healed.  " A well healed wound is \"pinked over\" with a shiny surface.  When area is \"pinked over\" it is no longer necessary to apply Vaseline.  Call Us If:  You have pain that is not controlled with Tylenol.  You have signs or symptoms of an infection, such as: fever over 100 degrees F, redness, warmth, or foul-smelling or yellow/creamy drainage from the wound.  Who should I call with questions?  [facility]: [phone]  [facility]: [phone]  For urgent needs outside of business hours call the [facility] at [phone] and ask for the dermatology resident on call   "

## 2024-04-23 NOTE — PROGRESS NOTES
[facility] [place] Procedure Note    Dermatology Problem List:  Last skin check 3/13/24  1. 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 S/p ILK for hypertrophic scar in 2013  2. Scalp psoriasis:  - Clobetasol 0.05% solution, T gel     Social history: Daughter Nikki is an RN.  PMHx: DM type 2    Date of Service:  Apr 23, 2024  Surgery: Mohs micrographic surgery    Case 1  Repair Type: Secondary Intent with Puracol Collagen Graft  Repair Size: N/A  Suture Material: None  Tumor Type: SCCis - Squamous Cell Carcinoma in situ  Location: R anterior lower leg   Derm-Path Accession #: [id number]     PreOp Size: 0.6 x 0.8 cm  PostOp Size: 1.9 x 1.9 cm  Mohs Accession #: [id number]  Level of Defect: Fat      Procedure:  We discussed the principles of treatment and most likely complications including scarring, bleeding, infection, swelling, pain, crusting, nerve damage, large wound,  incomplete excision, wound dehiscence,  nerve damage, recurrence, and a second procedure may be recommended to obtain the best cosmetic or functional result.    Informed consent was obtained and the patient underwent the procedure as follows:  The patient was placed supine on the operating table.  The cancer was identified, outlined with a marker, and verified by the patient.  The entire surgical field was prepped with Hibiclens.  The surgical site was anesthetized using Lidocaine 1% with epi 1 : 100,000.      The area of clinically apparent tumor was not debulked. The layer of tissue was then surgically excised using a #15 blade and was then transferred onto a specimen sheet maintaining the orientation of the specimen. Hemostasis was obtained using hyfrecator. The wound site was then covered with a dressing while the tissue samples were processed  for examination.    The excised tissue was transported to the Mohs histology laboratory maintaining the tissue orientation.  The tissue specimen was relaxed so that the entire surgical margin was in a a single horizontal plane for sectioning and inked for precise mapping.  A precise reference map was drawn to reflect the sectioning of the specimen, colored inking of the margins, and orientation on the patient. The tissue was processed using horizontal sectioning of the base and continuous peripheral margins.  The histopathologic sections were reviewed in conjunction with the reference map.    Total blocks: 1    Total slides:  1    There were no cancer cells visualized on examination, therefore Mohs surgery was complete.    REPAIR: Secondary intent with Puracol collagen graft    The patient is status post Mohs micrographic surgery.  The surgical site was examined with attention to normal anatomic and functional relationships.  After consideration and discussion of multiple options with the patient, it was determined that healing by second intention would offer the best chance for preservation/restoration of all normal anatomic and functional relationships.     The open wound was cleaned with chlorhexidine and rinsed with sterile saline, a Puracol collagen graft was utilized to promote healing and protect the wound bed. The graft was secured to the wound base using skin glue. A pressure dressing consisting of non-adherent gauze and tape was applied.   Wound care was discussed with patient both orally and in writing.     Puracol Lot Number: [id number]   Expiration: 7/31/25     Staff Involved:   Scribe/Staff    Scribe Disclosure:   I, JALYN SANCHEZ, am serving as a scribe; to document services personally performed by Flaco Willoughby MD -based on data collection and the provider's statements to me.     Attending attestation:  I personally performed the entire procedure.  I have reviewed the note and edited it as necessary, and  agree with its contents.    Flaco Willoughby M.D.  Professor  Director of Dermatologic Surgery  Department of Dermatology  [facility]    Dermatology Surgery Clinic  [facility] [place] [id number]

## 2024-04-24 ENCOUNTER — E-VISIT (OUTPATIENT)
Dept: FAMILY MEDICINE | Facility: CLINIC | Age: 74
End: 2024-04-24
Payer: COMMERCIAL

## 2024-04-24 DIAGNOSIS — R74.8 ELEVATED LIPASE: Primary | ICD-10-CM

## 2024-04-24 PROCEDURE — 99421 OL DIG E/M SVC 5-10 MIN: CPT | Performed by: FAMILY MEDICINE

## 2024-04-26 ENCOUNTER — TELEPHONE (OUTPATIENT)
Dept: FAMILY MEDICINE | Facility: CLINIC | Age: 74
End: 2024-04-26
Payer: COMMERCIAL

## 2024-04-26 DIAGNOSIS — Z79.4 TYPE 2 DIABETES MELLITUS WITH OTHER DIABETIC KIDNEY COMPLICATION, WITH LONG-TERM CURRENT USE OF INSULIN (H): Primary | ICD-10-CM

## 2024-04-26 DIAGNOSIS — E11.29 TYPE 2 DIABETES MELLITUS WITH OTHER DIABETIC KIDNEY COMPLICATION, WITH LONG-TERM CURRENT USE OF INSULIN (H): Primary | ICD-10-CM

## 2024-04-26 NOTE — TELEPHONE ENCOUNTER
Prior Authorization Retail Medication Request    Medication/Dose: Freestyle Gallito 3 [place]  Diagnosis and ICD code (if different than what is on RX):    New/renewal/insurance change PA/secondary ins. PA:  Previously Tried and Failed:    Rationale:      Insurance   Primary: are Part D  Insurance ID:  [id number]    Thank You  Allison Watkins [facility]  [phone]

## 2024-04-26 NOTE — TELEPHONE ENCOUNTER
Patient currently uses Freestyle Gallito 14 day sensors. These are going to be discontinued by the mfg. Can you send a new rx for Freestyle Gallito 3 sensors and reader. I will submit for a prior auth.(Required for all GCM's)    Thank You  Allison Watkins [facility]  [phone]

## 2024-04-26 NOTE — TELEPHONE ENCOUNTER
Prior Authorization Retail Medication Request    Medication/Dose: Freestyle Gallito 3 Sensors  Diagnosis and ICD code (if different than what is on RX):    New/renewal/insurance change PA/secondary ins. PA:  Previously Tried and Failed:    Rationale:      Insurance   Primary: [employer] Part D  Insurance ID:  [id number]

## 2024-04-27 RX ORDER — KETOROLAC TROMETHAMINE 30 MG/ML
1 INJECTION, SOLUTION INTRAMUSCULAR; INTRAVENOUS ONCE
Qty: 1 EACH | Refills: 0 | Status: SHIPPED | OUTPATIENT
Start: 2024-04-27 | End: 2024-04-27

## 2024-04-27 RX ORDER — BLOOD-GLUCOSE SENSOR
1 EACH MISCELLANEOUS
Qty: 6 EACH | Refills: 5 | Status: SHIPPED | OUTPATIENT
Start: 2024-04-27

## 2024-04-30 ENCOUNTER — LAB (OUTPATIENT)
Dept: LAB | Facility: CLINIC | Age: 74
End: 2024-04-30
Payer: COMMERCIAL

## 2024-04-30 DIAGNOSIS — D64.9 ANEMIA, UNSPECIFIED TYPE: ICD-10-CM

## 2024-04-30 DIAGNOSIS — I50.9 HEART FAILURE, UNSPECIFIED (H): ICD-10-CM

## 2024-04-30 DIAGNOSIS — R74.8 ELEVATED LIPASE: ICD-10-CM

## 2024-04-30 LAB
ALBUMIN SERPL BCG-MCNC: 3.6 G/DL (ref 3.5–5.2)
ALP SERPL-CCNC: 45 U/L (ref 40–150)
ALT SERPL W P-5'-P-CCNC: 17 U/L (ref 0–50)
AMYLASE SERPL-CCNC: 66 U/L (ref 28–100)
ANION GAP SERPL CALCULATED.3IONS-SCNC: 12 MMOL/L (ref 7–15)
AST SERPL W P-5'-P-CCNC: 26 U/L (ref 0–45)
BILIRUB SERPL-MCNC: 0.2 MG/DL
BUN SERPL-MCNC: 29.4 MG/DL (ref 8–23)
CALCIUM SERPL-MCNC: 9.1 MG/DL (ref 8.8–10.2)
CHLORIDE SERPL-SCNC: 102 MMOL/L (ref 98–107)
CREAT SERPL-MCNC: 1.61 MG/DL (ref 0.51–0.95)
DEPRECATED HCO3 PLAS-SCNC: 28 MMOL/L (ref 22–29)
EGFRCR SERPLBLD CKD-EPI 2021: 33 ML/MIN/1.73M2
ERYTHROCYTE [DISTWIDTH] IN BLOOD BY AUTOMATED COUNT: 13.3 % (ref 10–15)
GLUCOSE SERPL-MCNC: 110 MG/DL (ref 70–99)
HCT VFR BLD AUTO: 30.1 % (ref 35–47)
HGB BLD-MCNC: 9.9 G/DL (ref 11.7–15.7)
LIPASE SERPL-CCNC: 82 U/L (ref 13–60)
MCH RBC QN AUTO: 34.1 PG (ref 26.5–33)
MCHC RBC AUTO-ENTMCNC: 32.9 G/DL (ref 31.5–36.5)
MCV RBC AUTO: 104 FL (ref 78–100)
PLATELET # BLD AUTO: 62 10E3/UL (ref 150–450)
POTASSIUM SERPL-SCNC: 3.8 MMOL/L (ref 3.4–5.3)
PROT SERPL-MCNC: 6 G/DL (ref 6.4–8.3)
RBC # BLD AUTO: 2.9 10E6/UL (ref 3.8–5.2)
SODIUM SERPL-SCNC: 142 MMOL/L (ref 135–145)
WBC # BLD AUTO: 3.3 10E3/UL (ref 4–11)

## 2024-04-30 PROCEDURE — 82728 ASSAY OF FERRITIN: CPT

## 2024-04-30 PROCEDURE — 36415 COLL VENOUS BLD VENIPUNCTURE: CPT

## 2024-04-30 PROCEDURE — 80053 COMPREHEN METABOLIC PANEL: CPT

## 2024-04-30 PROCEDURE — 85027 COMPLETE CBC AUTOMATED: CPT

## 2024-04-30 PROCEDURE — 85045 AUTOMATED RETICULOCYTE COUNT: CPT

## 2024-04-30 PROCEDURE — 83540 ASSAY OF IRON: CPT

## 2024-04-30 PROCEDURE — 83690 ASSAY OF LIPASE: CPT

## 2024-04-30 PROCEDURE — 83550 IRON BINDING TEST: CPT

## 2024-04-30 PROCEDURE — 82150 ASSAY OF AMYLASE: CPT

## 2024-05-01 ENCOUNTER — MYC MEDICAL ADVICE (OUTPATIENT)
Dept: FAMILY MEDICINE | Facility: CLINIC | Age: 74
End: 2024-05-01
Payer: COMMERCIAL

## 2024-05-01 DIAGNOSIS — R74.8 ELEVATED LIPASE: ICD-10-CM

## 2024-05-01 DIAGNOSIS — D64.9 ANEMIA, UNSPECIFIED TYPE: Primary | ICD-10-CM

## 2024-05-01 LAB
FERRITIN SERPL-MCNC: 116 NG/ML (ref 11–328)
IRON BINDING CAPACITY (ROCHE): 198 UG/DL (ref 240–430)
IRON SATN MFR SERPL: 40 % (ref 15–46)
IRON SERPL-MCNC: 79 UG/DL (ref 37–145)
RETICS # AUTO: 0.09 10E6/UL
RETICS/RBC NFR AUTO: 3.1 %

## 2024-05-02 ENCOUNTER — VIRTUAL VISIT (OUTPATIENT)
Dept: ONCOLOGY | Facility: HOSPITAL | Age: 74
End: 2024-05-02
Attending: NURSE PRACTITIONER
Payer: COMMERCIAL

## 2024-05-02 VITALS
HEIGHT: 69 IN | WEIGHT: 247 LBS | SYSTOLIC BLOOD PRESSURE: 102 MMHG | BODY MASS INDEX: 36.58 KG/M2 | HEART RATE: 76 BPM | DIASTOLIC BLOOD PRESSURE: 60 MMHG

## 2024-05-02 DIAGNOSIS — D61.818 PANCYTOPENIA (H): ICD-10-CM

## 2024-05-02 DIAGNOSIS — D46.4 REFRACTORY ANEMIA, UNSPECIFIED (H): ICD-10-CM

## 2024-05-02 DIAGNOSIS — D47.2 MGUS (MONOCLONAL GAMMOPATHY OF UNKNOWN SIGNIFICANCE): Primary | ICD-10-CM

## 2024-05-02 PROCEDURE — 99215 OFFICE O/P EST HI 40 MIN: CPT | Mod: 95 | Performed by: NURSE PRACTITIONER

## 2024-05-02 PROCEDURE — G2211 COMPLEX E/M VISIT ADD ON: HCPCS | Mod: 95 | Performed by: NURSE PRACTITIONER

## 2024-05-02 ASSESSMENT — PAIN SCALES - GENERAL: PAINLEVEL: MILD PAIN (2)

## 2024-05-02 NOTE — PROGRESS NOTES
[facility] Progress Note    Patient: Yadi Iniguez  MRN: [id number]  Date of Service: May 2, 2024          Reason for Visit    MGUS    Primary Hematologist: Dr. Wang    Virtual visit      Assessment and Plan  MGUS, IgG kappa  Mild pancytopenias (subacute/chronic): macrocytic anemia, mild leukopenia, thrombocytopenia  Mild splenomegaly (possibly related to CHF +/- ITP)  MGUS labs were stable, unremarkable last year but were inadvertently updated ahead of today's visit.     Chronic macrocytic anemia 11-12 g/dL range has progressed to 9-10 g/dL range over the year.   Chronic thrombocytopenia typically 100-120K, started declining a bit last year ~80K and now 67K.   New mild leukopenia over last 6 mths, normal differential.    Retic %/absolute retic count high end normal, ferritin and iron panel WNL, TSH WNL (on replacement)  Peripheral smear a year ago was unremarkable.  Immature platelet fraction 7.1% (just over normal) last year.    LFTs WNL  Creatinine stable 1.61    Recent CT abd/pelvis (for abd pain) showed new mild splenomegaly (15 cm) since 2014; normal liver; no adenopathy.  Chest CT 9/2023 without adenopathy.    She started icosapent ethyl last year and ASA dose was increased with her stroke last Dec, so thought thrombocytopenia could be related to that +/- chronic ITP.     No other new medications, recent infections outside of recent short-lived GI illness.  No known autoimmune diseases nor symptoms.   No ETOH use.    She is on folate supplementation. Recent folate + Vit B level not checked.    Differentials: MDS/neoplasm, myeloma, hemolysis, vit def, splenomegaly, medication (ASA causing thrombocytopenia, but no other new meds), recent GI viral infection, ITP.  Anemia could be multifactorial related to CKD/anemia of chronic disease.   Splenomegaly could be related to her CHF or ITP. No evident lymphoma on recent CT.    Plan (also reviewed with Dr. Wang):   -copper,  folate, vit B12, cold agglutinin titer, ELGIN, haptoglobin, LDH, peripheral smear, myeloma panel   -Repeat CBC + retic count in 3-4 weeks with return visit. If work-up unrevealing and counts declining, consider bone marrow biopsy at future time.    Hematologic History   3/2022: Very small IgA kappa globulin on immunofixation. Monoclonal peak 0.2 g. Kappa + lambda light chains mildly elevated. No excess monoclonal protein in urine. CKD, stable. Chronic mild anemia related to CKD stable. 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 She did not get her myeloma panel drawn ahead of today's visit, but recent CBC showed new abnormality: progressive macrocytic anemia, thrombocytopenia and mild leukopenia.     Since last year, she had a stroke in late December. On full dose ASA now.  Two weeks ago, evaluated in ED, acute chest pain and nausea/reflux and HTN. Cardiac was ruled out. Lipase was a bit elevated but CT ruled out pancreatitis and serial lipase improving. Symptoms resolved.     Chronic notable fatigue, stable. No fevers/recurrent infections. No weight loss. No night sweats. No bleeding. No abd pain/bloating. No bone pain/fractures. Chronic exertional dyspnea, hx CHF. Chronic osteoarthritis.     Hx folate deficiency, on oral replacement.      ECOG Performance  2 - Ambulatory and independent in all ADLs; cannot work; up > 50% of the time    Physical Exam    General: alert and cooperative. Daughter present.   HEENT: No icterus  Chest: regular respiratory rate  Neuro: non-focal    Lab Results    Recent Results (from the past 168 hour(s))   **CBC with platelets FUTURE 2mo   Result Value Ref Range    WBC Count 3.3 (L) 4.0 - 11.0 10e3/uL    RBC Count 2.90 (L) 3.80 - 5.20 10e6/uL    Hemoglobin 9.9 (L) 11.7 - 15.7 g/dL     Hematocrit 30.1 (L) 35.0 - 47.0 %     (H) 78 - 100 fL    MCH 34.1 (H) 26.5 - 33.0 pg    MCHC 32.9 31.5 - 36.5 g/dL    RDW 13.3 10.0 - 15.0 %    Platelet Count 62 (L) 150 - 450 10e3/uL   **Comprehensive metabolic panel FUTURE 2mo   Result Value Ref Range    Sodium 142 135 - 145 mmol/L    Potassium 3.8 3.4 - 5.3 mmol/L    Carbon Dioxide (CO2) 28 22 - 29 mmol/L    Anion Gap 12 7 - 15 mmol/L    Urea Nitrogen 29.4 (H) 8.0 - 23.0 mg/dL    Creatinine 1.61 (H) 0.51 - 0.95 mg/dL    GFR Estimate 33 (L) >60 mL/min/1.73m2    Calcium 9.1 8.8 - 10.2 mg/dL    Chloride 102 98 - 107 mmol/L    Glucose 110 (H) 70 - 99 mg/dL    Alkaline Phosphatase 45 40 - 150 U/L    AST 26 0 - 45 U/L    ALT 17 0 - 50 U/L    Protein Total 6.0 (L) 6.4 - 8.3 g/dL    Albumin 3.6 3.5 - 5.2 g/dL    Bilirubin Total 0.2 <=1.2 mg/dL   Lipase   Result Value Ref Range    Lipase 82 (H) 13 - 60 U/L   Amylase   Result Value Ref Range    Amylase 66 28 - 100 U/L   Reticulocyte count   Result Value Ref Range    % Reticulocyte 3.1 %    Absolute Reticulocyte 0.090 10e6/uL   **Ferritin FUTURE 2mo   Result Value Ref Range    Ferritin 116 11 - 328 ng/mL   **Iron and iron binding capacity FUTURE 2mo   Result Value Ref Range    Iron 79 37 - 145 ug/dL    Iron Binding Capacity 198 (L) 240 - 430 ug/dL    Iron Sat Index 40 15 - 46 %       Imaging    CT ABDOMEN PELVIS W/O CONTRAST    Result Date: 4/17/2024  For Patients: As a result of the 21st Century Cures Act, medical imaging exams and procedure reports are released immediately into your electronic medical record. You may view this report before your referring provider. If you have questions, please contact your health care provider. EXAM: CT ABDOMEN PELVIS WO LOCATION: [facility] [place] DATE: 4/17/2024 INDICATION: Pancreatitis, acute, severe. Chest and abdominal pain. COMPARISON: CT of the abdomen and pelvis dated 07/21/2014. TECHNIQUE: CT scan of the abdomen and pelvis was performed without IV contrast. Multiplanar  reformats were obtained. Dose reduction techniques were used. CONTRAST: None. FINDINGS: LOWER CHEST: Marked bulky mitral annular calcification. HEPATOBILIARY: Cholecystectomy. Otherwise normal within the limitations of an unenhanced exam. PANCREAS: Normal. SPLEEN: New mild splenomegaly, measuring 15 cm in craniocaudal dimension. ADRENAL GLANDS: Normal. KIDNEYS/BLADDER: Normal. BOWEL: Diverticulosis of the colon. No acute inflammatory change. No obstruction. Normal appendix. LYMPH NODES: Normal. VASCULATURE: Atherosclerosis. Normal caliber abdominal aorta. PELVIC ORGANS: Hysterectomy. No adnexal masses. MUSCULOSKELETAL: Diffuse osteopenia. L4-L5 spinal fusion. Mild degenerative changes. No aggressive osseous lesions.    1.  No acute unenhanced CT findings in the abdomen or pelvis. No CT findings for pancreatitis. 2.  New mild splenomegaly. 3.  Colonic diverticulosis, without diverticulitis. 4.  Marked bulky mitral annular calcification.    XR CHEST B READ 2 VIEWS    Result Date: 4/17/2024  For Patients: As a result of the 21st Century Cures Act, medical imaging exams and procedure reports are released immediately into your electronic medical record. You may view this report before your referring provider. If you have questions, please contact your health care provider. EXAM: XR CHEST 2 VIEWS PA AND LATERAL LOCATION: [facility] DATE: 4/17/2024 INDICATION: Chest pain COMPARISON: 08/08/2017    No acute findings. Clear lungs. Borderline cardiomegaly with dense mitral annular calcification. No pulmonary edema or pleural effusion.    MA Screen Bilateral w/Suraj    Result Date: 4/16/2024  BILATERAL FULL FIELD DIGITAL SCREENING MAMMOGRAM WITH TOMOSYNTHESIS Performed on: 4/15/24 Compared to: 02/07/2023, 02/01/2021, 01/28/2021, 03/31/2020, and 04/20/2018 Technique:  This study was evaluated with the assistance of Computer-Aided Detection.  Breast Tomosynthesis was used in interpretation. Findings: The breasts have scattered  areas of fibroglandular density.  There is no radiographic evidence of malignancy.     IMPRESSION: ACR BI-RADS Category 1: Negative BREAST CANCER SCREENING RECOMMENDATION: Routine yearly mammography beginning at age 40 or as discussed with your provider. The results and recommendations of this examination will be communicated to the patient. I have personally reviewed the examination and initial interpretation and I agree with the findings. Tam Ortiz MD; Alyssa Fritz MD      Total time 40 minutes, to include face to face visit, review of EMR, ordering, documentation and coordination of care on date of service.    complexity modifier for longitudinal care.     Signed by: Fide Santana NP

## 2024-05-02 NOTE — NURSING NOTE
Is the patient currently in the state of [place]? YES    Visit mode:VIDEO    If the visit is dropped, the patient can be reconnected by: VIDEO VISIT: Text to cell phone:   Telephone Information:   Mobile [phone]       Will anyone else be joining the visit? Yes, Pt's daughter is with the pt and will be joining the video visit per pt  (If patient encounters technical issues they should call [phone]    How would you like to obtain your AVS? MyChart    Are changes needed to the allergy or medication list? Pt stated no med changes    Are refills needed on medications prescribed by this physician? NO    Reason for visit: RECHECK    No other vitals to report per pt    Meme GREENWOODF

## 2024-05-02 NOTE — PROGRESS NOTES
Virtual Visit Details    Type of service:  Video Visit   Video Start Time:  1427  Video End Time: 1515    Originating Location (pt. Location): Home    Distant Location (provider location):  On-site  Platform used for Video Visit: Sergio

## 2024-05-07 DIAGNOSIS — E08.3419: ICD-10-CM

## 2024-05-07 DIAGNOSIS — Z79.4: ICD-10-CM

## 2024-05-13 LAB
DAT POLY: NEGATIVE
SPECIMEN EXPIRATION DATE: NORMAL

## 2024-05-13 NOTE — TELEPHONE ENCOUNTER
PA Initiation    Medication: FREESTYLE MARI 3 SENSOR [facility]  Insurance Company: [employer] - Phone [phone] Fax [phone]  Pharmacy Filling the Rx: [place], [place] - [address], [address]  Filling Pharmacy Phone: [phone]  Filling Pharmacy Fax: [phone]  Start Date: 5/13/2024

## 2024-05-13 NOTE — TELEPHONE ENCOUNTER
PA Initiation    Medication: FREESTYLE MARI 3 READER KATIE  Insurance Company: [employer] - Phone [phone] Fax [phone]  Pharmacy Filling the Rx: [place], [place] - [facility] [address], [address]  Filling Pharmacy Phone: [phone]  Filling Pharmacy Fax: [phone]  Start Date: 5/13/2024

## 2024-05-14 ENCOUNTER — ANCILLARY PROCEDURE (OUTPATIENT)
Dept: BONE DENSITY | Facility: CLINIC | Age: 74
End: 2024-05-14
Attending: FAMILY MEDICINE
Payer: COMMERCIAL

## 2024-05-14 ENCOUNTER — LAB (OUTPATIENT)
Dept: LAB | Facility: CLINIC | Age: 74
End: 2024-05-14
Payer: COMMERCIAL

## 2024-05-14 DIAGNOSIS — D46.4 REFRACTORY ANEMIA, UNSPECIFIED (H): ICD-10-CM

## 2024-05-14 DIAGNOSIS — D47.2 MGUS (MONOCLONAL GAMMOPATHY OF UNKNOWN SIGNIFICANCE): ICD-10-CM

## 2024-05-14 DIAGNOSIS — D61.818 PANCYTOPENIA (H): ICD-10-CM

## 2024-05-14 DIAGNOSIS — E53.8 FOLATE DEFICIENCY: ICD-10-CM

## 2024-05-14 DIAGNOSIS — R74.8 ELEVATED LIPASE: ICD-10-CM

## 2024-05-14 DIAGNOSIS — E28.39 ESTROGEN DEFICIENCY: ICD-10-CM

## 2024-05-14 DIAGNOSIS — D64.9 ANEMIA, UNSPECIFIED TYPE: ICD-10-CM

## 2024-05-14 LAB
BASOPHILS # BLD AUTO: 0 10E3/UL (ref 0–0.2)
BASOPHILS NFR BLD AUTO: 1 %
EOSINOPHIL # BLD AUTO: 0.2 10E3/UL (ref 0–0.7)
EOSINOPHIL NFR BLD AUTO: 6 %
ERYTHROCYTE [DISTWIDTH] IN BLOOD BY AUTOMATED COUNT: 13.6 % (ref 10–15)
FOLATE SERPL-MCNC: >40 NG/ML (ref 4.6–34.8)
HAPTOGLOB SERPL-MCNC: 66 MG/DL (ref 30–200)
HCT VFR BLD AUTO: 29.6 % (ref 35–47)
HGB BLD-MCNC: 9.7 G/DL (ref 11.7–15.7)
IMM GRANULOCYTES # BLD: 0 10E3/UL
IMM GRANULOCYTES NFR BLD: 0 %
LDH SERPL L TO P-CCNC: 208 U/L (ref 0–250)
LIPASE SERPL-CCNC: 56 U/L (ref 13–60)
LYMPHOCYTES # BLD AUTO: 1.1 10E3/UL (ref 0.8–5.3)
LYMPHOCYTES NFR BLD AUTO: 27 %
MCH RBC QN AUTO: 34.2 PG (ref 26.5–33)
MCHC RBC AUTO-ENTMCNC: 32.8 G/DL (ref 31.5–36.5)
MCV RBC AUTO: 104 FL (ref 78–100)
MONOCYTES # BLD AUTO: 0.2 10E3/UL (ref 0–1.3)
MONOCYTES NFR BLD AUTO: 6 %
NEUTROPHILS # BLD AUTO: 2.4 10E3/UL (ref 1.6–8.3)
NEUTROPHILS NFR BLD AUTO: 60 %
NRBC # BLD AUTO: 0 10E3/UL
NRBC BLD AUTO-RTO: 0 /100
PLATELET # BLD AUTO: 65 10E3/UL (ref 150–450)
RBC # BLD AUTO: 2.84 10E6/UL (ref 3.8–5.2)
RETICS # AUTO: 0.1 10E6/UL
RETICS/RBC NFR AUTO: 3.4 %
TOTAL PROTEIN SERUM FOR ELP: 5.7 G/DL (ref 6.4–8.3)
VIT B12 SERPL-MCNC: 323 PG/ML (ref 232–1245)
WBC # BLD AUTO: 3.9 10E3/UL (ref 4–11)

## 2024-05-14 PROCEDURE — 83521 IG LIGHT CHAINS FREE EACH: CPT

## 2024-05-14 PROCEDURE — 83615 LACTATE (LD) (LDH) ENZYME: CPT

## 2024-05-14 PROCEDURE — 83010 ASSAY OF HAPTOGLOBIN QUANT: CPT

## 2024-05-14 PROCEDURE — 85025 COMPLETE CBC W/AUTO DIFF WBC: CPT

## 2024-05-14 PROCEDURE — 99207 BLOOD MORPHOLOGY PATHOLOGIST REVIEW: CPT | Performed by: PATHOLOGY

## 2024-05-14 PROCEDURE — 82746 ASSAY OF FOLIC ACID SERUM: CPT

## 2024-05-14 PROCEDURE — 86157 COLD AGGLUTININ TITER: CPT | Mod: 90

## 2024-05-14 PROCEDURE — 77080 DXA BONE DENSITY AXIAL: CPT | Performed by: RADIOLOGY

## 2024-05-14 PROCEDURE — 82784 ASSAY IGA/IGD/IGG/IGM EACH: CPT

## 2024-05-14 PROCEDURE — 86880 COOMBS TEST DIRECT: CPT

## 2024-05-14 PROCEDURE — 82607 VITAMIN B-12: CPT

## 2024-05-14 PROCEDURE — 99000 SPECIMEN HANDLING OFFICE-LAB: CPT

## 2024-05-14 PROCEDURE — 84155 ASSAY OF PROTEIN SERUM: CPT

## 2024-05-14 PROCEDURE — 83690 ASSAY OF LIPASE: CPT

## 2024-05-14 PROCEDURE — 82525 ASSAY OF COPPER: CPT | Mod: 90

## 2024-05-14 PROCEDURE — 84165 PROTEIN E-PHORESIS SERUM: CPT | Performed by: PATHOLOGY

## 2024-05-14 PROCEDURE — 85045 AUTOMATED RETICULOCYTE COUNT: CPT

## 2024-05-14 PROCEDURE — 36415 COLL VENOUS BLD VENIPUNCTURE: CPT

## 2024-05-14 RX ORDER — FOLIC ACID 1 MG/1
1000 TABLET ORAL DAILY
Qty: 90 TABLET | Refills: 1 | Status: SHIPPED | OUTPATIENT
Start: 2024-05-14 | End: 2024-05-30

## 2024-05-14 NOTE — TELEPHONE ENCOUNTER
Prior Authorization Approval    Medication: FREESTYLE MARI 3 READER KATIE  Authorization Effective Date: 5/14/2024  Authorization Expiration Date: 5/14/2027  Approved Dose/Quantity:   Reference #:     Insurance Company: [employer] - Phone [phone] Fax [phone]  Expected CoPay: $    CoPay Card Available:      Financial Assistance Needed:   Which Pharmacy is filling the prescription: [place] [address], [address]  Pharmacy Notified: YES  Patient Notified: **Instructed pharmacy to notify patient when script is ready to /ship.**

## 2024-05-14 NOTE — TELEPHONE ENCOUNTER
Prior Authorization Approval    [facility]  Authorization Effective Date: 5/14/2024  Authorization Expiration Date: 5/14/2027  Approved Dose/Quantity:   Reference #:     Insurance Company: [employer] - Phone [phone] Fax [phone]  Expected CoPay: $    CoPay Card Available:      Financial Assistance Needed:   Which Pharmacy is filling the prescription: [place] [address], [address]  Pharmacy Notified: YES  Patient Notified: **Instructed pharmacy to notify patient when script is ready to /ship.**

## 2024-05-15 LAB
ALBUMIN SERPL ELPH-MCNC: 3.3 G/DL (ref 3.7–5.1)
ALPHA1 GLOB SERPL ELPH-MCNC: 0.2 G/DL (ref 0.2–0.4)
ALPHA2 GLOB SERPL ELPH-MCNC: 0.6 G/DL (ref 0.5–0.9)
B-GLOBULIN SERPL ELPH-MCNC: 0.7 G/DL (ref 0.6–1)
GAMMA GLOB SERPL ELPH-MCNC: 0.9 G/DL (ref 0.7–1.6)
IGA SERPL-MCNC: 175 MG/DL (ref 84–499)
IGG SERPL-MCNC: 836 MG/DL (ref 610–1616)
IGM SERPL-MCNC: 92 MG/DL (ref 35–242)
KAPPA LC FREE SER-MCNC: 5.37 MG/DL (ref 0.33–1.94)
KAPPA LC FREE/LAMBDA FREE SER NEPH: 1.76 {RATIO} (ref 0.26–1.65)
LAMBDA LC FREE SERPL-MCNC: 3.05 MG/DL (ref 0.57–2.63)
M PROTEIN SERPL ELPH-MCNC: 0 G/DL
PATH REPORT.COMMENTS IMP SPEC: NORMAL
PATH REPORT.COMMENTS IMP SPEC: NORMAL
PATH REPORT.FINAL DX SPEC: NORMAL
PATH REPORT.MICROSCOPIC SPEC OTHER STN: NORMAL
PATH REPORT.MICROSCOPIC SPEC OTHER STN: NORMAL
PATH REPORT.RELEVANT HX SPEC: NORMAL
PROT PATTERN SERPL ELPH-IMP: ABNORMAL

## 2024-05-16 LAB — COPPER SERPL-MCNC: 111.3 UG/DL

## 2024-05-17 LAB — CA TITR SERPL AGGL: NORMAL {TITER}

## 2024-05-27 ENCOUNTER — E-VISIT (OUTPATIENT)
Dept: FAMILY MEDICINE | Facility: CLINIC | Age: 74
End: 2024-05-27
Payer: COMMERCIAL

## 2024-05-27 DIAGNOSIS — L03.119 CELLULITIS OF LOWER EXTREMITY, UNSPECIFIED LATERALITY: Primary | ICD-10-CM

## 2024-05-27 PROCEDURE — 99421 OL DIG E/M SVC 5-10 MIN: CPT | Performed by: FAMILY MEDICINE

## 2024-05-28 RX ORDER — CEPHALEXIN 500 MG/1
500 CAPSULE ORAL 2 TIMES DAILY
Qty: 20 CAPSULE | Refills: 0 | Status: SHIPPED | OUTPATIENT
Start: 2024-05-28 | End: 2024-06-26

## 2024-05-30 ENCOUNTER — VIRTUAL VISIT (OUTPATIENT)
Dept: ONCOLOGY | Facility: CLINIC | Age: 74
End: 2024-05-30
Attending: INTERNAL MEDICINE
Payer: COMMERCIAL

## 2024-05-30 VITALS — HEIGHT: 69 IN | BODY MASS INDEX: 36.58 KG/M2 | WEIGHT: 247 LBS

## 2024-05-30 DIAGNOSIS — D69.6 THROMBOCYTOPENIA (H): ICD-10-CM

## 2024-05-30 DIAGNOSIS — N18.32 STAGE 3B CHRONIC KIDNEY DISEASE (H): ICD-10-CM

## 2024-05-30 DIAGNOSIS — D61.818 PANCYTOPENIA (H): Primary | ICD-10-CM

## 2024-05-30 PROCEDURE — G2211 COMPLEX E/M VISIT ADD ON: HCPCS | Mod: 95 | Performed by: INTERNAL MEDICINE

## 2024-05-30 PROCEDURE — 99214 OFFICE O/P EST MOD 30 MIN: CPT | Mod: 95 | Performed by: INTERNAL MEDICINE

## 2024-05-30 ASSESSMENT — PAIN SCALES - GENERAL: PAINLEVEL: MILD PAIN (2)

## 2024-05-30 NOTE — LETTER
5/30/2024         RE: Yadi Iniguez  [address]  [place] [address]        Dear Colleague,    Thank you for referring your patient, Yadi Iniguez, to the [facility] [place]. Please see a copy of my visit note below.      Video-Visit Details    Type of service:  Video Visit    Video Start Time:  9:48 AM  Video End Time: 9:54 AM    Originating Location (pt. Location): Home in [place]    Distant Location (provider location): Off-site    Platform used for Video Visit: [facility] Hematology and Oncology Progress Note    Patient: Yadi Iniguez  MRN: [id number]  Date of Service: May 30, 2024          Reason for Visit    MGUS    Primary Hematologist: Dr. Wang    Virtual visit      Assessment and Plan  MGUS, IgG kappa  Mild pancytopenias (subacute/chronic): macrocytic anemia, mild leukopenia, thrombocytopenia  Mild splenomegaly (possibly related to CHF +/- ITP)  Overall her counts have declined over the last year.  Has macrocytosis with MCV around 104.  Hemoglobin less than 10 now.  Also has mild leukopenia without any neutropenia..  Count around 60,000.  In the past her IPF was elevated suggesting peripheral destruction of the platelets.  This is a feasibility especially with splenomegaly.  Workup so far has been negative.  Negative ELGIN.  LDH and haptoglobin are within normal limits.  No concern for hemolysis.  B12 borderline low at 323.  Folic acid elevated above 40.  SPEP shows no evidence of any monoclonal protein immunofixation.  She continues to have mildly elevated kappa and lambda light chains with elevated ratio at 1.76.  This could potentially be due to kidney issues.  Peripheral smear continues to show mild anemia, leukopenia and thrombocytopenia.  No evidence of any abnormal looking cells.    Overall I explained to her and her daughter that there is no obvious secondary cause for her cytopenias, except for anemia which can be due to chronic kidney  disease.  So there is always concern regarding potential primary bone marrow problem like MDS.  For this she will need a bone marrow biopsy which is an invasive procedure.  She would like to hold off on this.  I agree.  Hemoglobin is not that low.  I will check her levels with her next set of labs in July.  Will also recheck her CBC and see how she does.  I also asked her to start taking vitamin B12 500 mcg daily.  If her hemoglobin is less than 500 and if we are suspecting a potential MDS type picture in the bone marrow, then she would be eligible for erythropoietin supplementation to improve hemoglobin levels.  This would also be a treatment of choice if we think her anemia is due to chronic kidney disease.  Reviewed the rationale behind this.  They are interested in this.  Will see what the labs look like in July and I will follow-up with her after that and decide on treatment.    They are in agreement with the plan.    Hematologic History   3/2022: Very small IgA kappa globulin on immunofixation. Monoclonal peak 0.2 g. Kappa + lambda light chains mildly elevated. No excess monoclonal protein in urine. CKD, stable. Chronic mild anemia related to CKD stable. 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     She has had progressive decline in her hemoglobin levels over the last year or so.  Also has developed significant thrombocytopenia and mild leukopenia.  Workup so far has been negative.  Normal iron studies.  We ordered more lab studies recently and she is here to review the results. She does have some fatigue which is pretty stable.  Similar last year she was diagnosed with stroke.  She is on full dose aspirin.  Denies any blood in stools or urine.      ECOG Performance  2 - Ambulatory and independent in  all ADLs; cannot work; up > 50% of the time    Physical Exam    General: alert and cooperative. Daughter present.   HEENT: No icterus  Chest: regular respiratory rate  Neuro: non-focal    Lab Results    No results found for this or any previous visit (from the past 168 hour(s)).      Imaging    DX Bone Density    Result Date: 5/14/2024  HISTORY: Estrogen deficiency COMPARISON:   none FINDINGS: Image quality: Adequate Lumbar spine T-score in region of L1-L2 = -1.4 HIPS: Mean total hip T-score: -1.9 Left femoral neck T-score = -2 Right femoral neck T-score = -2.8 FRAX: 10 year probability of major osteoporotic fracture: 26.7% 10 year probability of hip fracture: 10.4% The 10 year probability of fracture may be lower than reported if the patient has received treatment. FRAX data should be disregarded in patient's taking bisphosphonates. World Health Organization definition of osteoporosis and osteopenia for  women: Normal: T-score at or above -1.0 Low Bone Mass (Osteopenia): T-score between -1.0 and -2.5. Osteoporosis: T-score at or below -2.5 T-scores are reported for postmenopausal women and men over 50 years of age.     IMPRESSION:  Osteoporosis. JONATHON VOGEL MD   SYSTEM ID:  [id number]     The longitudinal plan of care for the diagnosis(es)/condition(s) as documented were addressed during this visit. Due to the added complexity in care, I will continue to support Yadi in the subsequent management and with ongoing continuity of care.      Signed by: Shawna Wang MD      Again, thank you for allowing me to participate in the care of your patient.        Sincerely,        Shawna Wang MD

## 2024-05-30 NOTE — NURSING NOTE
Is the patient currently in the state of [place]? YES    Visit mode:VIDEO    If the visit is dropped, the patient can be reconnected by: VIDEO VISIT: Text to cell phone:   Telephone Information:   Mobile [phone]       Will anyone else be joining the visit? Nikki (Daughter)   (If patient encounters technical issues they should call [phone] :[phone])    How would you like to obtain your AVS? MyChart    Are changes needed to the allergy or medication list? Pt stated no changes to allergies and Pt stated no med changes    Are refills needed on medications prescribed by this physician? NO    Reason for visit: ELYSSA ORTEGA

## 2024-05-30 NOTE — LETTER
5/30/2024         RE: Yadi Iniguez  [address]  [place] [address]        Dear Colleague,    Thank you for referring your patient, Yadi Iniguez, to the [facility] [place]. Please see a copy of my visit note below.      Video-Visit Details    Type of service:  Video Visit    Video Start Time:  9:48 AM  Video End Time: 9:54 AM    Originating Location (pt. Location): Home in [place]    Distant Location (provider location): Off-site    Platform used for Video Visit: [facility] Hematology and Oncology Progress Note    Patient: Yadi Iniguez  MRN: [id number]  Date of Service: May 30, 2024          Reason for Visit    MGUS    Primary Hematologist: Dr. Wang    Virtual visit      Assessment and Plan  MGUS, IgG kappa  Mild pancytopenias (subacute/chronic): macrocytic anemia, mild leukopenia, thrombocytopenia  Mild splenomegaly (possibly related to CHF +/- ITP)  Overall her counts have declined over the last year.  Has macrocytosis with MCV around 104.  Hemoglobin less than 10 now.  Also has mild leukopenia without any neutropenia..  Count around 60,000.  In the past her IPF was elevated suggesting peripheral destruction of the platelets.  This is a feasibility especially with splenomegaly.  Workup so far has been negative.  Negative ELGIN.  LDH and haptoglobin are within normal limits.  No concern for hemolysis.  B12 borderline low at 323.  Folic acid elevated above 40.  SPEP shows no evidence of any monoclonal protein immunofixation.  She continues to have mildly elevated kappa and lambda light chains with elevated ratio at 1.76.  This could potentially be due to kidney issues.  Peripheral smear continues to show mild anemia, leukopenia and thrombocytopenia.  No evidence of any abnormal looking cells.    Overall I explained to her and her daughter that there is no obvious secondary cause for her cytopenias, except for anemia which can be due to chronic kidney  disease.  So there is always concern regarding potential primary bone marrow problem like MDS.  For this she will need a bone marrow biopsy which is an invasive procedure.  She would like to hold off on this.  I agree.  Hemoglobin is not that low.  I will check her levels with her next set of labs in July.  Will also recheck her CBC and see how she does.  I also asked her to start taking vitamin B12 500 mcg daily.  If her hemoglobin is less than 500 and if we are suspecting a potential MDS type picture in the bone marrow, then she would be eligible for erythropoietin supplementation to improve hemoglobin levels.  This would also be a treatment of choice if we think her anemia is due to chronic kidney disease.  Reviewed the rationale behind this.  They are interested in this.  Will see what the labs look like in July and I will follow-up with her after that and decide on treatment.    They are in agreement with the plan.    Hematologic History   3/2022: Very small IgA kappa globulin on immunofixation. Monoclonal peak 0.2 g. Kappa + lambda light chains mildly elevated. No excess monoclonal protein in urine. CKD, stable. Chronic mild anemia related to CKD stable. 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     She has had progressive decline in her hemoglobin levels over the last year or so.  Also has developed significant thrombocytopenia and mild leukopenia.  Workup so far has been negative.  Normal iron studies.  We ordered more lab studies recently and she is here to review the results. She does have some fatigue which is pretty stable.  Similar last year she was diagnosed with stroke.  She is on full dose aspirin.  Denies any blood in stools or urine.      ECOG Performance  2 - Ambulatory and independent in  all ADLs; cannot work; up > 50% of the time    Physical Exam    General: alert and cooperative. Daughter present.   HEENT: No icterus  Chest: regular respiratory rate  Neuro: non-focal    Lab Results    No results found for this or any previous visit (from the past 168 hour(s)).      Imaging    DX Bone Density    Result Date: 5/14/2024  HISTORY: Estrogen deficiency COMPARISON:   none FINDINGS: Image quality: Adequate Lumbar spine T-score in region of L1-L2 = -1.4 HIPS: Mean total hip T-score: -1.9 Left femoral neck T-score = -2 Right femoral neck T-score = -2.8 FRAX: 10 year probability of major osteoporotic fracture: 26.7% 10 year probability of hip fracture: 10.4% The 10 year probability of fracture may be lower than reported if the patient has received treatment. FRAX data should be disregarded in patient's taking bisphosphonates. World Health Organization definition of osteoporosis and osteopenia for  women: Normal: T-score at or above -1.0 Low Bone Mass (Osteopenia): T-score between -1.0 and -2.5. Osteoporosis: T-score at or below -2.5 T-scores are reported for postmenopausal women and men over 50 years of age.     IMPRESSION:  Osteoporosis. JONATHON VOGEL MD   SYSTEM ID:  [id number]     The longitudinal plan of care for the diagnosis(es)/condition(s) as documented were addressed during this visit. Due to the added complexity in care, I will continue to support Yadi in the subsequent management and with ongoing continuity of care.      Signed by: Shawna Wang MD      Again, thank you for allowing me to participate in the care of your patient.        Sincerely,        Shawna Wang MD

## 2024-05-30 NOTE — PROGRESS NOTES
Video-Visit Details    Type of service:  Video Visit    Video Start Time:  9:48 AM  Video End Time: 9:54 AM    Originating Location (pt. Location): Home in [place]    Distant Location (provider location): Off-site    Platform used for Video Visit: [facility] Hematology and Oncology Progress Note    Patient: Yadi Iniguez  MRN: [id number]  Date of Service: May 30, 2024          Reason for Visit    MGUS    Primary Hematologist: Dr. Wang    Virtual visit      Assessment and Plan  MGUS, IgG kappa  Mild pancytopenias (subacute/chronic): macrocytic anemia, mild leukopenia, thrombocytopenia  Mild splenomegaly (possibly related to CHF +/- ITP)  Overall her counts have declined over the last year.  Has macrocytosis with MCV around 104.  Hemoglobin less than 10 now.  Also has mild leukopenia without any neutropenia..  Count around 60,000.  In the past her IPF was elevated suggesting peripheral destruction of the platelets.  This is a feasibility especially with splenomegaly.  Workup so far has been negative.  Negative ELGIN.  LDH and haptoglobin are within normal limits.  No concern for hemolysis.  B12 borderline low at 323.  Folic acid elevated above 40.  SPEP shows no evidence of any monoclonal protein immunofixation.  She continues to have mildly elevated kappa and lambda light chains with elevated ratio at 1.76.  This could potentially be due to kidney issues.  Peripheral smear continues to show mild anemia, leukopenia and thrombocytopenia.  No evidence of any abnormal looking cells.    Overall I explained to her and her daughter that there is no obvious secondary cause for her cytopenias, except for anemia which can be due to chronic kidney disease.  So there is always concern regarding potential primary bone marrow problem like MDS.  For this she will need a bone marrow biopsy which is an invasive procedure.  She would like to hold off on this.  I agree.  Hemoglobin is not that low.  I will check  her levels with her next set of labs in July.  Will also recheck her CBC and see how she does.  I also asked her to start taking vitamin B12 500 mcg daily.  If her hemoglobin is less than 500 and if we are suspecting a potential MDS type picture in the bone marrow, then she would be eligible for erythropoietin supplementation to improve hemoglobin levels.  This would also be a treatment of choice if we think her anemia is due to chronic kidney disease.  Reviewed the rationale behind this.  They are interested in this.  Will see what the labs look like in July and I will follow-up with her after that and decide on treatment.    They are in agreement with the plan.    Hematologic History   3/2022: Very small IgA kappa globulin on immunofixation. Monoclonal peak 0.2 g. Kappa + lambda light chains mildly elevated. No excess monoclonal protein in urine. CKD, stable. Chronic mild anemia related to CKD stable. 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     She has had progressive decline in her hemoglobin levels over the last year or so.  Also has developed significant thrombocytopenia and mild leukopenia.  Workup so far has been negative.  Normal iron studies.  We ordered more lab studies recently and she is here to review the results. She does have some fatigue which is pretty stable.  Similar last year she was diagnosed with stroke.  She is on full dose aspirin.  Denies any blood in stools or urine.      ECOG Performance  2 - Ambulatory and independent in all ADLs; cannot work; up > 50% of the time    Physical Exam    General: alert and cooperative. Daughter present.   HEENT: No icterus  Chest: regular respiratory rate  Neuro: non-focal    Lab Results    No results found for this or any previous visit (from the  past 168 hour(s)).      Imaging    DX Bone Density    Result Date: 5/14/2024  HISTORY: Estrogen deficiency COMPARISON:   none FINDINGS: Image quality: Adequate Lumbar spine T-score in region of L1-L2 = -1.4 HIPS: Mean total hip T-score: -1.9 Left femoral neck T-score = -2 Right femoral neck T-score = -2.8 FRAX: 10 year probability of major osteoporotic fracture: 26.7% 10 year probability of hip fracture: 10.4% The 10 year probability of fracture may be lower than reported if the patient has received treatment. FRAX data should be disregarded in patient's taking bisphosphonates. World Health Organization definition of osteoporosis and osteopenia for  women: Normal: T-score at or above -1.0 Low Bone Mass (Osteopenia): T-score between -1.0 and -2.5. Osteoporosis: T-score at or below -2.5 T-scores are reported for postmenopausal women and men over 50 years of age.     IMPRESSION:  Osteoporosis. JONATHON VOGEL MD   SYSTEM ID:  [id number]     The longitudinal plan of care for the diagnosis(es)/condition(s) as documented were addressed during this visit. Due to the added complexity in care, I will continue to support Yadi in the subsequent management and with ongoing continuity of care.      Signed by: Shawna Wang MD

## 2024-05-30 NOTE — PROGRESS NOTES
"Virtual Visit Details    Type of service:  Video Visit   Video Start Time: {video visit start/end time for provider to select:977381}  Video End Time:{video visit start/end time for provider to select:483014}    Originating Location (pt. Location): {video visit patient location:371852::\"Home\"}  {PROVIDER LOCATION On-site should be selected for visits conducted from your clinic location or adjoining [facility] hospital, academic office, or other nearby [facility] building. Off-site should be selected for all other provider locations, including home:609409}  Distant Location (provider location):  {virtual location provider:992535}  Platform used for Video Visit: {Virtual Visit Platforms:377935::\"[employer]\"}    "

## 2024-06-14 DIAGNOSIS — N18.32 STAGE 3B CHRONIC KIDNEY DISEASE (H): Primary | ICD-10-CM

## 2024-06-25 NOTE — PROGRESS NOTES
[facility] Neurology Follow Up Visit    Yadi Iniguez MRN# [id number]   Age: 73 year old YOB: 1950     Brief history of symptoms: The patient was initially seen in neurologic consultation on 6/27/2023 for evaluation of tremor. Please see the comprehensive neurologic consultation note from that date in the Epic records for details.     Interval history:   Her left hand tremor is a little worse compared to before. The right hand tremor is about the same.     She denies any new stroke symptoms.       Past Medical History:     Patient Active Problem List   Diagnosis    Hypothyroidism    Hypertension goal BP (blood pressure) < 140/90    Type 2 diabetes, HbA1C goal < 8% (H)    Hyperlipidemia LDL goal <70    Moderate major depression (H)    Incontinence of urine    Neuropathy in diabetes (H)    Eczema    Left lumbar radiculopathy    Encounter for long-term (current) insulin use (H)    CKD (chronic kidney disease) stage 3, GFR 30-59 ml/min (H)    Type 2 diabetes mellitus with other diabetic kidney complication, with long-term current use of insulin (H)    Osteoporosis    Morbid obesity (H)    Thrombocytopenia (H24)    Chronic obstructive pulmonary disease, unspecified COPD type (H)    S/P lumbar fusion    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, with long-term current use of insulin, unspecified laterality (H)    Lumbar facet arthropathy    Lumbar spondylosis    Pulmonary hypertension (H)    Nonrheumatic aortic valve stenosis    Depression, major, recurrent, in partial remission (H24)    DDD (degenerative disc disease), lumbar    Verrucous carcinoma (H)    Right pontine cerebrovascular accident (H)    Left leg weakness    Hypertensive chronic kidney disease with stage 5 chronic kidney disease or end stage renal disease (H)    Vitreous hemorrhage, right eye (H)     Past Medical History:   Diagnosis Date    Acute on chronic diastolic (congestive) heart  failure (H) 10/25/2023    Arthritis 1975    Broken ribs 08/28/2016    3,4,5,6,7, left side    Cancer (H) 2013    Skin cancer    Cerebral infarction (H) 2023    COPD (chronic obstructive pulmonary disease) (H) 08/2017    Depression     Depressive disorder 1988    Dysuria     Glycosuria     Hypertension 1980    Mixed incontinence urge and stress     Nonrheumatic aortic valve stenosis 07/25/2021    Other and unspecified hyperlipidemia     Right shoulder pain     Sleep apnea     cpap    Type II or unspecified type diabetes mellitus without mention of complication, uncontrolled     Uncomplicated asthma 2017    Unspecified hypothyroidism         Past Surgical History:     Past Surgical History:   Procedure Laterality Date    ABDOMEN SURGERY  1978    Gall Bladder  January       hysterectomy  Sept    BACK SURGERY  11/2018    BIOPSY  2013 1993    from left corner of mouth       muscule biopsy    CATARACT IOL, RT/LT Bilateral     2006    CHOLECYSTECTOMY, OPEN      CL AFF SURGICAL PATHOLOGY      COLONOSCOPY  2007    COLONOSCOPY N/A 6/22/2018    Procedure: COMBINED COLONOSCOPY, SINGLE OR MULTIPLE BIOPSY/POLYPECTOMY BY BIOPSY;;  Surgeon: David Parson MD;  Location: [facility] OR    COLONOSCOPY N/A 3/29/2022    Procedure: COLONOSCOPY, FLEXIBLE, WITH LESION REMOVAL USING SNARE;  Surgeon: Ajith Guo MD;  Location:  GI    COLONOSCOPY WITH CO2 INSUFFLATION N/A 6/22/2018    Procedure: COLONOSCOPY WITH CO2 INSUFFLATION;  Colonscopy, ;  Surgeon: David Parson MD;  Location: [facility] OR    EYE SURGERY  2014    ongoing    EYE SURGERY Right 2018    HEAD & NECK SURGERY  2006    Removal of saliva gland left side    INJECT BLOCK MEDIAL BRANCH CERVICAL/THORACIC/LUMBAR Bilateral 9/23/2022    Procedure: Bilateral  L5-S1 medial branch block #1;  Surgeon: Dwayne Sierra MD;  Location: [facility] OR    INJECT BLOCK MEDIAL BRANCH CERVICAL/THORACIC/LUMBAR Bilateral 11/18/2022    Procedure: bilateral L5-S1 medial branch block #2;  Surgeon:  Dwanye Sierra MD;  Location: [facility] OR    OTS FUSION SPINE POSTERIOR LUMBAR MINIMALLY INVASIVE ONE LEVEL N/A 2018    Procedure: Minimally invasive Left L4-5 transforaminal lumbar interbody fusion  Resection of synovial cystic mass adjacent to the L5 root Placement of Med[employer] Voyager percutaneous pedicle screws from L4-5 bilaterally Open reduction of L4-5 grade 2 spondylolisthesis;  Surgeon: Hernando Hill MD;  Location: [facility] OR    PROPHYLACTIC REPAIR RETINAL BREAK, PNEUMATIC RETINOPEXY, COMBINED Left 2016    SOFT TISSUE SURGERY      Carpal Tunnel left wrist    ZZC VAGINAL HYSTERECTOMY          Social History:     Social History     Tobacco Use    Smoking status: Former     Current packs/day: 0.00     Average packs/day: 1 pack/day for 55.1 years (55.1 ttl pk-yrs)     Types: Cigarettes     Start date: 1966     Quit date: 2021     Years since quittin.9     Passive exposure: Never    Smokeless tobacco: Never    Tobacco comments:     quit for 6 months back in 2018   Vaping Use    Vaping status: Never Used   Substance Use Topics    Alcohol use: Not Currently     Comment: Maybe a drink every few months    Drug use: Never        Family History:     Family History   Problem Relation Age of Onset    Musculoskeletal Disorder Mother         MD    Muscular Disorder Mother     Depression Mother     Osteoporosis Mother     Obesity Mother     Cataracts Mother     Genetic Disorder Mother         MD    Muscular Dystrophy Mother     Cancer Father         mesothelioma    Obesity Father     Coronary Artery Disease Father     Hypertension Father     Other Cancer Father     Thyroid Disease Father     Depression Paternal Grandmother     Obesity Paternal Grandmother     Heart Disease Brother         angioplasty    Neurologic Disorder Brother         ms    Diabetes Brother     Hypertension Brother     Hyperlipidemia Brother     Obesity Brother         4 brothers    Genetic Disorder Brother         MS     Other Cancer Brother     Depression Brother     Substance Abuse Brother     Diabetes Brother     Coronary Artery Disease Brother     Hypertension Brother     Obesity Brother     Hypertension Brother     Genetic Disorder Brother         MD    Coronary Artery Disease Brother     Hyperlipidemia Brother     Hypertension Brother     Thyroid Disease Brother     Genetic Disorder Brother         MS    Obesity Brother     Cerebrovascular Disease Nephew     Muscular Dystrophy Nephew     Coronary Artery Disease Nephew     Genetic Disorder Nephew     Coronary Artery Disease Nephew     Breast Cancer Other         Medications:     Current Outpatient Medications   Medication Sig Dispense Refill    acetaminophen (TYLENOL) 500 MG tablet Take 1,000 mg by mouth 2 times daily      alendronate (FOSAMAX) 70 MG tablet TAKE 1 TABLET BY MOUTH EVERY 7 DAYS. TAKE 60 MINUTES BEFORE MORNING MEAL WITH 8 OZ. OF WATER. REMAIN UPRIGHT FOR 30 MINUTES. 12 tablet 3    aspirin (ASA) 325 MG EC tablet Take 325 mg by mouth daily      blood glucose (NO BRAND SPECIFIED) test strip Use to test blood sugar 3 times daily or as directed./ Brand per insurance 300 strip 1    blood glucose monitoring (NO BRAND SPECIFIED) meter device kit Use to test blood sugar 3 times daily or as directed./ Brand per insurance 1 kit 0    calcium carbonate (OS-NATACHA) 1500 (600 Ca) MG tablet Take 600 mg by mouth daily With vitamin D (unknown dose): Patient taking 1 tablet once daily      carvedilol (COREG) 25 MG tablet 1/2 tab PM      cephALEXin (KEFLEX) 500 MG capsule Take 1 capsule (500 mg) by mouth 2 times daily 20 capsule 0    Continuous Glucose Sensor (FREESTYLE MARI 3 SENSOR) [facility] 1 each every 14 days Use 1 sensor every 14 days. Use to read blood sugars per 's instructions. 6 each 5    docusate sodium (COLACE) 100 MG capsule Take 100 mg by mouth daily      dulaglutide (TRULICITY) 1.5 MG/0.5ML pen Inject 1.5 mg Subcutaneous every 7 days 6 mL 3    gentamicin  "(GARAMYCIN) 0.1 % external ointment Apply to wound on leg twice daily until healed. Mix with mupirocin ointment before applying. 30 g 1    icosapent ethyl (VASCEPA) 1 g CAPS capsule Take 2 g by mouth 2 times daily      insulin aspart (NOVOLOG FLEXPEN) 100 UNIT/ML pen INJECT SUBCUTANEOUSLY 24 UNITS BEFORE BREAKFAST, 5 UNITS BEFORE LUNCH, 24 UNITS BEFORE DINNER.  (MAX DOSE 70 UNITS/DAY FOR ADJUSTMENT) (Patient taking differently: INJECT SUBCUTANEOUSLY 26 UNITS BEFORE BREAKFAST, 24UNITS BEFORE DINNER.  (MAX DOSE 70 UNITS/DAY FOR ADJUSTMENT)) 75 mL 1    insulin glargine U-300 (TOUJEO SOLOSTAR) 300 UNIT/ML (1 units dial) pen Inject 40 Units Subcutaneous at bedtime INJECT 40 UNITS UNDER THE SKIN AT BEDTIME 15 mL 0    insulin pen needle (32G X 4 MM) 32G X 4 MM miscellaneous Use 3 pen needles daily or as directed.  Per insurance and patient preference 270 each 0    levothyroxine (SYNTHROID/LEVOTHROID) 150 MCG tablet TAKE 1 TABLET (150 MCG) BY MOUTH DAILY 90 tablet 1    losartan (COZAAR) 25 MG tablet Take 0.5 tablets by mouth daily      mupirocin (BACTROBAN) 2 % external ointment Apply to wound on leg twice daily until healed. Mix with gentamicin ointment before applying. 30 g 1    PARoxetine (PAXIL) 20 MG tablet TAKE ONE TABLET (20 MG) BY MOUTH 2 TIMES DAILY 180 tablet 3    polyethylene glycol (MIRALAX) 17 GM/Dose powder Take 1 packet by mouth daily as needed      primidone (MYSOLINE) 50 MG tablet Take 3 tablets in the morning (150 mg) and 3 tablets at night (150 mg). 540 tablet 3    rosuvastatin (CRESTOR) 40 MG tablet Take 1 tablet by mouth daily      torsemide (DEMADEX) 20 MG tablet Take 40 mg QAM and 20 mg  tablet 3     No current facility-administered medications for this visit.        Allergies:   No Known Allergies     Review of Systems:   As above     Physical Exam:   Vitals: BP (!) 83/49   Pulse 82   Ht 1.753 m (5' 9\")   Wt 112 kg (247 lb)   BMI 36.48 kg/m     General: Seated comfortably in no acute " distress.  Lungs: breathing comfortably  Neurologic:     Mental Status: Fully alert, attentive. Language normal. Speech is mildly slurred and slowed.      Cranial Nerves: Visual fields intact. PERRL. EOMI with normal smooth pursuit. Facial sensation intact/symmetric. Facial movements slightly asymmetric in the left lower face, but this was reportedly like this previously due to parotid surgery. Hearing not formally tested but intact to conversation. Palate elevation symmetric, uvula midline. Mild slurring of speech. Shoulder shrug strong bilaterally. Tongue protrusion midline.     Motor: Moderate right upper extremity action/postural tremor, mild to moderate left side. Muscle tone normal throughout. Rapid finger tapping is clumsy bilaterally and mildly slowed. Strength as below.       Right Left   Shoulder abduction        5 5-   Elbow extension 5 5   Elbow flexion 5 5   Hand  5 5-   Hip flexion 5 4+   Knee flexion 5 5   Knee extension 5 5   Dorsiflexion 5 4+   Plantar flexion 5 5      Sensory: Intact/symmetric to light touch throughout upper and lower extremities.      Coordination: Finger-nose-finger and heel-shin intact without dysmetria.      Data reviewed on previous visits    Imaging:  MRI brain 6/2023  IMPRESSION:  1.  No acute intracranial process.  2.  Generalized brain atrophy and presumed microvascular ischemic changes as detailed above.     MRI lumbar 6/2021  Impression:   1. Anterior and posterior fusion instrumentation at L4-5 with improved  alignment and decreased spinal canal stenosis compared to the  preoperative MRI on 9/12/2018. Also decreased extent of left greater  than right neural foraminal narrowing at L4-5 postoperatively.  2. Slight progression of spondylosis at L3-4 with mild to moderate  left and mild right neural foraminal narrowing.     Carotid ultrasound 6/2023  IMPRESSION: Per sonographic criteria, there are 50-69% stenoses in the  internal carotid arteries  bilaterally.    Laboratory:  BMP with CKD, CBC with mildly low platelets (4/2023)  Serum immunofixation normal, ELP (4/2023)  A1c 7.3, B12 412 (1/2023)  LFTs normal (6/2021)    MRI brain 12/2023  IMPRESSION:   1. MR confirmation of small acute right paramedian pontine infarction.   2.  Mild generalized atrophy.   3.  Mild nonspecific presumed chronic microvascular disease in the cerebral white matters and mello.       MRA neck 12/2023  IMPRESSION:   1. Moderate atherosclerotic disease at the left carotid bifurcation with estimated 50% luminal diameter stenosis at the origin of the internal and external carotid arteries.   2.  Very mild atherosclerotic disease at the right carotid bifurcation without hemodynamically significant right carotid stenosis.   3.  Antegrade vertebral arteries without stenosis.   4.  Suspect mild stenosis at the right proximal subclavian artery.     TTE 12/2023  Interpretation Summary     * The left ventricular systolic function is normal, quantified ejection   fraction is 68%.     * There is moderate aortic valve sclerosis. There is moderate aortic valve   stenosis.    * Aortic valve area is 1.11 cm2 with an index of 0.46 cm2/m2, a peak   velocity of 3.24 m/s, a mean gradient of 30 mmHg, and a dimensionless index of   0.29.    * Mitral valve mean gradient is 5 mmHg with an area (PHT) of 1.72 cm2 and an   area (Cont Eq VTI) of 2.46 cm2.     * Bi-atrial enlargement.     EKG 12/2023 - sinus rhythm    A1c 6.3, LDL 75    Pertinent Investigations since last visit:   None         Assessment and Plan:   Assessment:  Yadi Iniguez is a 73 year old female who presents today for follow-up of essential tremor. She continues on primidone 150 mg BID. She is going to discuss with cardiology possibly switching from Coreg to propranolol. If this is not advised, we could also consider starting Topamax.       Plan:  - Continue primidone 150 mg BID  - Consider above interventions    Follow up in Neurology  clinic in 6 months or sooner if new issues arise    Donnie Keith MD   of Neurology  [facility]

## 2024-06-26 ENCOUNTER — MYC MEDICAL ADVICE (OUTPATIENT)
Dept: NEPHROLOGY | Facility: CLINIC | Age: 74
End: 2024-06-26

## 2024-06-26 ENCOUNTER — OFFICE VISIT (OUTPATIENT)
Dept: NEUROLOGY | Facility: CLINIC | Age: 74
End: 2024-06-26
Payer: COMMERCIAL

## 2024-06-26 VITALS
WEIGHT: 247 LBS | SYSTOLIC BLOOD PRESSURE: 83 MMHG | HEIGHT: 69 IN | HEART RATE: 82 BPM | BODY MASS INDEX: 36.58 KG/M2 | DIASTOLIC BLOOD PRESSURE: 49 MMHG

## 2024-06-26 DIAGNOSIS — G25.0 ESSENTIAL TREMOR: Primary | ICD-10-CM

## 2024-06-26 PROCEDURE — 99214 OFFICE O/P EST MOD 30 MIN: CPT | Performed by: INTERNAL MEDICINE

## 2024-06-26 RX ORDER — CARVEDILOL 6.25 MG/1
6.25 TABLET ORAL 2 TIMES DAILY WITH MEALS
COMMUNITY
Start: 2024-05-01 | End: 2024-07-09

## 2024-06-26 ASSESSMENT — PAIN SCALES - GENERAL: PAINLEVEL: NO PAIN (0)

## 2024-06-26 NOTE — PATIENT INSTRUCTIONS
Propranolol 40 mg daily is the usual starting dose, which I would increase to 40 mg twice daily after 3 days if tolerated. If the switch from coreg to propranolol is ok by the cardiologist, whatever dosage they think is best for the heart / blood pressure, I would do that dosage.

## 2024-06-26 NOTE — LETTER
6/26/2024      Yadi Iniguez  [address]  [place] [address]      Dear Colleague,    Thank you for referring your patient, Yadi Iniguez, to the [facility] [place]. Please see a copy of my visit note below.    [facility] Neurology Follow Up Visit    Yadi Iniguez MRN# [id number]   Age: 73 year old YOB: 1950     Brief history of symptoms: The patient was initially seen in neurologic consultation on 6/27/2023 for evaluation of tremor. Please see the comprehensive neurologic consultation note from that date in the Epic records for details.     Interval history:   Her left hand tremor is a little worse compared to before. The right hand tremor is about the same.     She denies any new stroke symptoms.       Past Medical History:     Patient Active Problem List   Diagnosis     Hypothyroidism     Hypertension goal BP (blood pressure) < 140/90     Type 2 diabetes, HbA1C goal < 8% (H)     Hyperlipidemia LDL goal <70     Moderate major depression (H)     Incontinence of urine     Neuropathy in diabetes (H)     Eczema     Left lumbar radiculopathy     Encounter for long-term (current) insulin use (H)     CKD (chronic kidney disease) stage 3, GFR 30-59 ml/min (H)     Type 2 diabetes mellitus with other diabetic kidney complication, with long-term current use of insulin (H)     Osteoporosis     Morbid obesity (H)     Thrombocytopenia (H24)     Chronic obstructive pulmonary disease, unspecified COPD type (H)     S/P lumbar fusion     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, with long-term current use of insulin, unspecified laterality (H)     Lumbar facet arthropathy     Lumbar spondylosis     Pulmonary hypertension (H)     Nonrheumatic aortic valve stenosis     Depression, major, recurrent, in partial remission (H24)     DDD (degenerative disc disease), lumbar     Verrucous carcinoma (H)     Right pontine  cerebrovascular accident (H)     Left leg weakness     Hypertensive chronic kidney disease with stage 5 chronic kidney disease or end stage renal disease (H)     Vitreous hemorrhage, right eye (H)     Past Medical History:   Diagnosis Date     Acute on chronic diastolic (congestive) heart failure (H) 10/25/2023     Arthritis 1975     Broken ribs 08/28/2016    3,4,5,6,7, left side     Cancer (H) 2013    Skin cancer     Cerebral infarction (H) 2023     COPD (chronic obstructive pulmonary disease) (H) 08/2017     Depression      Depressive disorder 1988     Dysuria      Glycosuria      Hypertension 1980     Mixed incontinence urge and stress      Nonrheumatic aortic valve stenosis 07/25/2021     Other and unspecified hyperlipidemia      Right shoulder pain      Sleep apnea     cpap     Type II or unspecified type diabetes mellitus without mention of complication, uncontrolled      Uncomplicated asthma 2017     Unspecified hypothyroidism         Past Surgical History:     Past Surgical History:   Procedure Laterality Date     ABDOMEN SURGERY  1978    Gall Bladder  January       hysterectomy  Sept     BACK SURGERY  11/2018     BIOPSY  2013 1993    from left corner of mouth       muscule biopsy     CATARACT IOL, RT/LT Bilateral     2006     CHOLECYSTECTOMY, OPEN       CL AFF SURGICAL PATHOLOGY       COLONOSCOPY  2007     COLONOSCOPY N/A 6/22/2018    Procedure: COMBINED COLONOSCOPY, SINGLE OR MULTIPLE BIOPSY/POLYPECTOMY BY BIOPSY;;  Surgeon: David Parson MD;  Location: [facility] OR     COLONOSCOPY N/A 3/29/2022    Procedure: COLONOSCOPY, FLEXIBLE, WITH LESION REMOVAL USING SNARE;  Surgeon: Ajith Guo MD;  Location:  GI     COLONOSCOPY WITH CO2 INSUFFLATION N/A 6/22/2018    Procedure: COLONOSCOPY WITH CO2 INSUFFLATION;  Colonscopy, ;  Surgeon: David Parson MD;  Location: [facility] OR     EYE SURGERY  2014    ongoing     EYE SURGERY Right 2018     HEAD & NECK SURGERY  2006    Removal of saliva gland left  side     INJECT BLOCK MEDIAL BRANCH CERVICAL/THORACIC/LUMBAR Bilateral 2022    Procedure: Bilateral  L5-S1 medial branch block #1;  Surgeon: Dwayne Sierra MD;  Location: [facility] OR     INJECT BLOCK MEDIAL BRANCH CERVICAL/THORACIC/LUMBAR Bilateral 2022    Procedure: bilateral L5-S1 medial branch block #2;  Surgeon: Dwayne Sierra MD;  Location: [facility] OR     OTS FUSION SPINE POSTERIOR LUMBAR MINIMALLY INVASIVE ONE LEVEL N/A 2018    Procedure: Minimally invasive Left L4-5 transforaminal lumbar interbody fusion  Resection of synovial cystic mass adjacent to the L5 root Placement of Medtronic Voyager percutaneous pedicle screws from L4-5 bilaterally Open reduction of L4-5 grade 2 spondylolisthesis;  Surgeon: Hernando Hill MD;  Location: [facility] OR     PROPHYLACTIC REPAIR RETINAL BREAK, PNEUMATIC RETINOPEXY, COMBINED Left 2016     SOFT TISSUE SURGERY      Carpal Tunnel left wrist     ZZC VAGINAL HYSTERECTOMY          Social History:     Social History     Tobacco Use     Smoking status: Former     Current packs/day: 0.00     Average packs/day: 1 pack/day for 55.1 years (55.1 ttl pk-yrs)     Types: Cigarettes     Start date: 1966     Quit date: 2021     Years since quittin.9     Passive exposure: Never     Smokeless tobacco: Never     Tobacco comments:     quit for 6 months back in 2018   Vaping Use     Vaping status: Never Used   Substance Use Topics     Alcohol use: Not Currently     Comment: Maybe a drink every few months     Drug use: Never        Family History:     Family History   Problem Relation Age of Onset     Musculoskeletal Disorder Mother         MD     Muscular Disorder Mother      Depression Mother      Osteoporosis Mother      Obesity Mother      Cataracts Mother      Genetic Disorder Mother         MD     Muscular Dystrophy Mother      Cancer Father         mesothelioma     Obesity Father      Coronary Artery Disease Father      Hypertension Father      Other  Cancer Father      Thyroid Disease Father      Depression Paternal Grandmother      Obesity Paternal Grandmother      Heart Disease Brother         angioplasty     Neurologic Disorder Brother         ms     Diabetes Brother      Hypertension Brother      Hyperlipidemia Brother      Obesity Brother         4 brothers     Genetic Disorder Brother         MS     Other Cancer Brother      Depression Brother      Substance Abuse Brother      Diabetes Brother      Coronary Artery Disease Brother      Hypertension Brother      Obesity Brother      Hypertension Brother      Genetic Disorder Brother         MD     Coronary Artery Disease Brother      Hyperlipidemia Brother      Hypertension Brother      Thyroid Disease Brother      Genetic Disorder Brother         MS     Obesity Brother      Cerebrovascular Disease Nephew      Muscular Dystrophy Nephew      Coronary Artery Disease Nephew      Genetic Disorder Nephew      Coronary Artery Disease Nephew      Breast Cancer Other         Medications:     Current Outpatient Medications   Medication Sig Dispense Refill     acetaminophen (TYLENOL) 500 MG tablet Take 1,000 mg by mouth 2 times daily       alendronate (FOSAMAX) 70 MG tablet TAKE 1 TABLET BY MOUTH EVERY 7 DAYS. TAKE 60 MINUTES BEFORE MORNING MEAL WITH 8 OZ. OF WATER. REMAIN UPRIGHT FOR 30 MINUTES. 12 tablet 3     aspirin (ASA) 325 MG EC tablet Take 325 mg by mouth daily       blood glucose (NO BRAND SPECIFIED) test strip Use to test blood sugar 3 times daily or as directed./ Brand per insurance 300 strip 1     blood glucose monitoring (NO BRAND SPECIFIED) meter device kit Use to test blood sugar 3 times daily or as directed./ Brand per insurance 1 kit 0     calcium carbonate (OS-NATACHA) 1500 (600 Ca) MG tablet Take 600 mg by mouth daily With vitamin D (unknown dose): Patient taking 1 tablet once daily       carvedilol (COREG) 25 MG tablet 1/2 tab PM       cephALEXin (KEFLEX) 500 MG capsule Take 1 capsule (500 mg) by mouth  2 times daily 20 capsule 0     Continuous Glucose Sensor (FREESTYLE MARI 3 SENSOR) [facility] [place] 1 each every 14 days Use 1 sensor every 14 days. Use to read blood sugars per 's instructions. 6 each 5     docusate sodium (COLACE) 100 MG capsule Take 100 mg by mouth daily       dulaglutide (TRULICITY) 1.5 MG/0.5ML pen Inject 1.5 mg Subcutaneous every 7 days 6 mL 3     gentamicin (GARAMYCIN) 0.1 % external ointment Apply to wound on leg twice daily until healed. Mix with mupirocin ointment before applying. 30 g 1     icosapent ethyl (VASCEPA) 1 g CAPS capsule Take 2 g by mouth 2 times daily       insulin aspart (NOVOLOG FLEXPEN) 100 UNIT/ML pen INJECT SUBCUTANEOUSLY 24 UNITS BEFORE BREAKFAST, 5 UNITS BEFORE LUNCH, 24 UNITS BEFORE DINNER.  (MAX DOSE 70 UNITS/DAY FOR ADJUSTMENT) (Patient taking differently: INJECT SUBCUTANEOUSLY 26 UNITS BEFORE BREAKFAST, 24UNITS BEFORE DINNER.  (MAX DOSE 70 UNITS/DAY FOR ADJUSTMENT)) 75 mL 1     insulin glargine U-300 (TOUJEO SOLOSTAR) 300 UNIT/ML (1 units dial) pen Inject 40 Units Subcutaneous at bedtime INJECT 40 UNITS UNDER THE SKIN AT BEDTIME 15 mL 0     insulin pen needle (32G X 4 MM) 32G X 4 MM miscellaneous Use 3 pen needles daily or as directed.  Per insurance and patient preference 270 each 0     levothyroxine (SYNTHROID/LEVOTHROID) 150 MCG tablet TAKE 1 TABLET (150 MCG) BY MOUTH DAILY 90 tablet 1     losartan (COZAAR) 25 MG tablet Take 0.5 tablets by mouth daily       mupirocin (BACTROBAN) 2 % external ointment Apply to wound on leg twice daily until healed. Mix with gentamicin ointment before applying. 30 g 1     PARoxetine (PAXIL) 20 MG tablet TAKE ONE TABLET (20 MG) BY MOUTH 2 TIMES DAILY 180 tablet 3     polyethylene glycol (MIRALAX) 17 GM/Dose powder Take 1 packet by mouth daily as needed       primidone (MYSOLINE) 50 MG tablet Take 3 tablets in the morning (150 mg) and 3 tablets at night (150 mg). 540 tablet 3     rosuvastatin (CRESTOR) 40 MG tablet Take 1 tablet by  "mouth daily       torsemide (DEMADEX) 20 MG tablet Take 40 mg QAM and 20 mg  tablet 3     No current facility-administered medications for this visit.        Allergies:   No Known Allergies     Review of Systems:   As above     Physical Exam:   Vitals: BP (!) 83/49   Pulse 82   Ht 1.753 m (5' 9\")   Wt 112 kg (247 lb)   BMI 36.48 kg/m     General: Seated comfortably in no acute distress.  Lungs: breathing comfortably  Neurologic:     Mental Status: Fully alert, attentive. Language normal. Speech is mildly slurred and slowed.      Cranial Nerves: Visual fields intact. PERRL. EOMI with normal smooth pursuit. Facial sensation intact/symmetric. Facial movements slightly asymmetric in the left lower face, but this was reportedly like this previously due to parotid surgery. Hearing not formally tested but intact to conversation. Palate elevation symmetric, uvula midline. Mild slurring of speech. Shoulder shrug strong bilaterally. Tongue protrusion midline.     Motor: Moderate right upper extremity action/postural tremor, mild to moderate left side. Muscle tone normal throughout. Rapid finger tapping is clumsy bilaterally and mildly slowed. Strength as below.       Right Left   Shoulder abduction        5 5-   Elbow extension 5 5   Elbow flexion 5 5   Hand  5 5-   Hip flexion 5 4+   Knee flexion 5 5   Knee extension 5 5   Dorsiflexion 5 4+   Plantar flexion 5 5      Sensory: Intact/symmetric to light touch throughout upper and lower extremities.      Coordination: Finger-nose-finger and heel-shin intact without dysmetria.      Data reviewed on previous visits    Imaging:  MRI brain 6/2023  IMPRESSION:  1.  No acute intracranial process.  2.  Generalized brain atrophy and presumed microvascular ischemic changes as detailed above.     MRI lumbar 6/2021  Impression:   1. Anterior and posterior fusion instrumentation at L4-5 with improved  alignment and decreased spinal canal stenosis compared to " the  preoperative MRI on 9/12/2018. Also decreased extent of left greater  than right neural foraminal narrowing at L4-5 postoperatively.  2. Slight progression of spondylosis at L3-4 with mild to moderate  left and mild right neural foraminal narrowing.     Carotid ultrasound 6/2023  IMPRESSION: Per sonographic criteria, there are 50-69% stenoses in the  internal carotid arteries bilaterally.    Laboratory:  BMP with CKD, CBC with mildly low platelets (4/2023)  Serum immunofixation normal, ELP (4/2023)  A1c 7.3, B12 412 (1/2023)  LFTs normal (6/2021)    MRI brain 12/2023  IMPRESSION:   1. MR confirmation of small acute right paramedian pontine infarction.   2.  Mild generalized atrophy.   3.  Mild nonspecific presumed chronic microvascular disease in the cerebral white matters and mello.       MRA neck 12/2023  IMPRESSION:   1. Moderate atherosclerotic disease at the left carotid bifurcation with estimated 50% luminal diameter stenosis at the origin of the internal and external carotid arteries.   2.  Very mild atherosclerotic disease at the right carotid bifurcation without hemodynamically significant right carotid stenosis.   3.  Antegrade vertebral arteries without stenosis.   4.  Suspect mild stenosis at the right proximal subclavian artery.     TTE 12/2023  Interpretation Summary     * The left ventricular systolic function is normal, quantified ejection   fraction is 68%.     * There is moderate aortic valve sclerosis. There is moderate aortic valve   stenosis.    * Aortic valve area is 1.11 cm2 with an index of 0.46 cm2/m2, a peak   velocity of 3.24 m/s, a mean gradient of 30 mmHg, and a dimensionless index of   0.29.    * Mitral valve mean gradient is 5 mmHg with an area (PHT) of 1.72 cm2 and an   area (Cont Eq VTI) of 2.46 cm2.     * Bi-atrial enlargement.     EKG 12/2023 - sinus rhythm    A1c 6.3, LDL 75    Pertinent Investigations since last visit:   None         Assessment and Plan:   Assessment:  Yadi  RETA Iniguez is a 73 year old female who presents today for follow-up of essential tremor. She continues on primidone 150 mg BID. She is going to discuss with cardiology possibly switching from Coreg to propranolol. If this is not advised, we could also consider starting Topamax.       Plan:  - Continue primidone 150 mg BID  - Consider above interventions    Follow up in Neurology clinic in 6 months or sooner if new issues arise    Donnie Keith MD   of Neurology  [facility]      Again, thank you for allowing me to participate in the care of your patient.        Sincerely,        Donnie Keith MD

## 2024-07-06 ENCOUNTER — LAB (OUTPATIENT)
Dept: LAB | Facility: CLINIC | Age: 74
End: 2024-07-06
Payer: COMMERCIAL

## 2024-07-06 DIAGNOSIS — N18.32 STAGE 3B CHRONIC KIDNEY DISEASE (H): ICD-10-CM

## 2024-07-06 DIAGNOSIS — D61.818 PANCYTOPENIA (H): ICD-10-CM

## 2024-07-06 LAB
ALBUMIN MFR UR ELPH: 28.3 MG/DL
ALBUMIN SERPL BCG-MCNC: 3.5 G/DL (ref 3.5–5.2)
ANION GAP SERPL CALCULATED.3IONS-SCNC: 10 MMOL/L (ref 7–15)
BASOPHILS # BLD AUTO: 0 10E3/UL (ref 0–0.2)
BASOPHILS NFR BLD AUTO: 1 %
BUN SERPL-MCNC: 28.1 MG/DL (ref 8–23)
CALCIUM SERPL-MCNC: 8.5 MG/DL (ref 8.8–10.2)
CHLORIDE SERPL-SCNC: 104 MMOL/L (ref 98–107)
CREAT SERPL-MCNC: 1.45 MG/DL (ref 0.51–0.95)
CREAT UR-MCNC: 96.6 MG/DL
DEPRECATED HCO3 PLAS-SCNC: 28 MMOL/L (ref 22–29)
EGFRCR SERPLBLD CKD-EPI 2021: 38 ML/MIN/1.73M2
EOSINOPHIL # BLD AUTO: 0.2 10E3/UL (ref 0–0.7)
EOSINOPHIL NFR BLD AUTO: 5 %
ERYTHROCYTE [DISTWIDTH] IN BLOOD BY AUTOMATED COUNT: 13.9 % (ref 10–15)
GLUCOSE SERPL-MCNC: 190 MG/DL (ref 70–99)
HCT VFR BLD AUTO: 31.5 % (ref 35–47)
HGB BLD-MCNC: 10.1 G/DL (ref 11.7–15.7)
IMM GRANULOCYTES # BLD: 0 10E3/UL
IMM GRANULOCYTES NFR BLD: 0 %
LYMPHOCYTES # BLD AUTO: 1 10E3/UL (ref 0.8–5.3)
LYMPHOCYTES NFR BLD AUTO: 31 %
MCH RBC QN AUTO: 33.9 PG (ref 26.5–33)
MCHC RBC AUTO-ENTMCNC: 32.1 G/DL (ref 31.5–36.5)
MCV RBC AUTO: 106 FL (ref 78–100)
MONOCYTES # BLD AUTO: 0.2 10E3/UL (ref 0–1.3)
MONOCYTES NFR BLD AUTO: 7 %
NEUTROPHILS # BLD AUTO: 1.7 10E3/UL (ref 1.6–8.3)
NEUTROPHILS NFR BLD AUTO: 56 %
NRBC # BLD AUTO: 0 10E3/UL
NRBC BLD AUTO-RTO: 0 /100
PHOSPHATE SERPL-MCNC: 3.5 MG/DL (ref 2.5–4.5)
PLATELET # BLD AUTO: 63 10E3/UL (ref 150–450)
PLATELETS.RETICULATED NFR BLD AUTO: 5.4 % (ref 1–7)
POTASSIUM SERPL-SCNC: 4.5 MMOL/L (ref 3.4–5.3)
PROT/CREAT 24H UR: 0.29 MG/MG CR (ref 0–0.2)
RBC # BLD AUTO: 2.98 10E6/UL (ref 3.8–5.2)
RETICS # AUTO: 0.07 10E6/UL (ref 0.03–0.1)
RETICS/RBC NFR AUTO: 2.5 % (ref 0.5–2)
SODIUM SERPL-SCNC: 142 MMOL/L (ref 135–145)
VIT B12 SERPL-MCNC: 1255 PG/ML (ref 232–1245)
WBC # BLD AUTO: 3.1 10E3/UL (ref 4–11)

## 2024-07-06 PROCEDURE — 85025 COMPLETE CBC W/AUTO DIFF WBC: CPT

## 2024-07-06 PROCEDURE — 82607 VITAMIN B-12: CPT

## 2024-07-06 PROCEDURE — 80069 RENAL FUNCTION PANEL: CPT

## 2024-07-06 PROCEDURE — 85055 RETICULATED PLATELET ASSAY: CPT

## 2024-07-06 PROCEDURE — 84156 ASSAY OF PROTEIN URINE: CPT

## 2024-07-06 PROCEDURE — 85045 AUTOMATED RETICULOCYTE COUNT: CPT

## 2024-07-06 PROCEDURE — 36415 COLL VENOUS BLD VENIPUNCTURE: CPT

## 2024-07-09 ENCOUNTER — VIRTUAL VISIT (OUTPATIENT)
Dept: NEPHROLOGY | Facility: CLINIC | Age: 74
End: 2024-07-09
Payer: COMMERCIAL

## 2024-07-09 DIAGNOSIS — Z79.4 TYPE 2 DIABETES MELLITUS WITH OTHER DIABETIC KIDNEY COMPLICATION, WITH LONG-TERM CURRENT USE OF INSULIN (H): ICD-10-CM

## 2024-07-09 DIAGNOSIS — N18.32 ANEMIA DUE TO STAGE 3B CHRONIC KIDNEY DISEASE (H): ICD-10-CM

## 2024-07-09 DIAGNOSIS — D63.1 ANEMIA DUE TO STAGE 3B CHRONIC KIDNEY DISEASE (H): ICD-10-CM

## 2024-07-09 DIAGNOSIS — Z87.891 HISTORY OF TOBACCO USE: ICD-10-CM

## 2024-07-09 DIAGNOSIS — E11.29 TYPE 2 DIABETES MELLITUS WITH OTHER DIABETIC KIDNEY COMPLICATION, WITH LONG-TERM CURRENT USE OF INSULIN (H): ICD-10-CM

## 2024-07-09 DIAGNOSIS — I10 BENIGN ESSENTIAL HYPERTENSION: ICD-10-CM

## 2024-07-09 DIAGNOSIS — N18.32 STAGE 3B CHRONIC KIDNEY DISEASE (H): Primary | ICD-10-CM

## 2024-07-09 PROCEDURE — 99214 OFFICE O/P EST MOD 30 MIN: CPT | Mod: 95 | Performed by: INTERNAL MEDICINE

## 2024-07-09 RX ORDER — PROPRANOLOL HYDROCHLORIDE 20 MG/1
20 TABLET ORAL 2 TIMES DAILY
COMMUNITY
Start: 2024-07-09 | End: 2024-09-13

## 2024-07-09 NOTE — NURSING NOTE
Current patient location: [address] [place] [address]    Is the patient currently in the state of [place]? YES    Visit mode:VIDEO    If the visit is dropped, the patient can be reconnected by: VIDEO VISIT: Text to cell phone:   Telephone Information:   Mobile [phone]       Will anyone else be joining the visit? Yes, Nikki will be with patient  (If patient encounters technical issues they should call [phone])    How would you like to obtain your AVS? MyChart    Are changes needed to the allergy or medication list? Yes, now taking over the counter Vitamin B12.    Are refills needed on medications prescribed by this physician? NO    Reason for visit: RECHECK    Risa ORTEGA

## 2024-07-09 NOTE — PROGRESS NOTES
Virtual Visit Details    Type of service:  Video Visit     Originating Location (pt. Location): Home    Distant Location (provider location):  Off-site  Platform used for Video Visit: [facility]      07/09/24   I was asked to see this patient by Dr. Ricci for evaluation of CKD.     CC: CKD    HPI: Yadi Iniguez is a 73 year old female who presents for evaluation of CKD.  Initial visit March 2022:  Ms. Yao's hx is significant for COPD, hypertension, AV stenosis, depression, DM, hypothyroidism, hyperlipidemia, tobacco hx, CHF/pulmonary hypertension. On review of her creatinine levels, there has been much variability:  - at 0.6 in 2008  - SUZE to 1.4 in Jan 2010 but improved to 0.76 in April 2011  - SUZE to 1.82 in Jan 2017 but improved to ~1 in Sep 2017  - SUZE to 1.45 in April 2018 but improved to 1.2 in Jan 2021  - SUZE to 1.81 in Sept 2021 but more recently 1.5-1.6 since.   She has had some albuminuria dating back to 2010. Fenofibrate therapy since 2018. Currently takes torsemide 20 mg AM and 20 mg PM - followed by cardiology clinic at [facility] for CHF. Dx with diabetes in her 40s.     - History of Hematuria: No  - Swelling: hx but none at this time. Breathing is comfortable. Weight recently 266-271 lbs. Torsemide 20 mg BID at this time. No lighhteadedness/dizziness at this time.   - Hx of UTIs: no  - Hx of stones: no  - Rashes/Joint pain: no rash; joint pain - generalized  - Family hx of kidney disease: no  - NSAID use: only tylenol    07/12/22: video visit. At the time of her initial visit I did a renal ultrasound which showed a 16 mm kidney lesion. MRI was then done which did not show any kidney lesion. We stopped the fenofibrate at that visit as well. She was also noted to have a monoclonal gammopathy - was seen by hematology in May - they have plans to monitor with follow-up 4 months later. Torsemide was increased a month ago when weight was increasing and becoming more symptomatic. Currently on  "torsemide 40 mg twice a day. Weight is stable - 269-272 lbs. Last month she had gotten up to 278 lbs. No swelling currently. Breathing is comfortable. No lightheadedness/dizziness. Blood pressure has been 135/64, 128/56, 141/66, 125/72. 90/58 today in clinic. She felt a little tired. NO lighhteadedness on standing. No orthopnea.     01/10/23: video visit. Seen by cardiology at [facility] yesterday - no changes made to her medications but the cardiologist notes that she would be in favor of trialing a lower dose of torsemide if needed. Was thirsty at the time of the lab test. No swelling. Breathing is comfortable. BP has been 101/-120/58-80. Follows with hematology for MGUS.     04/11/23: video visit. Creatinine has been 1.5-1.8 since 2021. Had SUZE to 2.2 in Jan- diuretic lowered. Sleeping through the night. WEight has been 258-262 lbs.  This is down from 270 last year. No swelling. No SOB. No orthopnea.     07/11/23: video visit. Weight most recently 252-255 lbs most recently. She is not as hungry as she used to be. Currently on 40 mg AM and 20 mg PM. No SOB. No swelling. No lightheadedness per patient. She had some lightheadedness on standing so her amlodipine was cut back - 90-56 BP readings prior to this. Now with the amlodipine change her BP has been 123/60 P 88.  Cardiology appt coming up and they will do a volume assessment in person with them for that visit.     01/15/24: video visit. 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 Sxs included left leg weakness, dizziness, and aphasia. Discharged 12/22/23. She was changed from ASA to plavix and lipitor to crestor.Had follow-up with neurology 1/9/24. There they stopped plavix and started  mg daily. Per their note, BPgoal is <140/90 and less than 130/80 if tolerated. She reports feeling great at this time. She has some left sided weakness still - she is careful walking now. Aphasia has resolved. BP has been \"normal\" - /60-80. She does not go to 90 " often. Usually more typical 110. I questioned her about the low readings which prompted her to go get the actual readings: 140/77, 111/66, 106/64, 103/46, 101/43, 130/62, 128/57.   She is taking torsemide 40 AM and 20 PM, carvedilol 25 mg BID, losartan 12.5 mg daily. No swelling.     07/09/24: video visit. Still left side weakness but overall doing well. She feels steady with her volume status - doesn't feel she is retaining water. Weight has been 245-247 is a steady weight for her. No swelling in the legs. Breathing is comfortable. BP at home has been high in the AM before meds but then down to normal levels. Highest reading in the past week was 143 before meds. Lowest BP reading will get to 90s. Typically 110-120. She has plans to move from carvedilol to propanolol - was just given directions to do propanolol 20 mg BID in place of the carvedilol. Cardiology has planned to do Jardiance 10 as well - they have picked up the prescription but not started it yet as want to make one change at a time.     Current Outpatient Medications   Medication Sig Dispense Refill    propranolol (INDERAL) 20 MG tablet Take 1 tablet (20 mg) by mouth 2 times daily      acetaminophen (TYLENOL) 500 MG tablet Take 1,000 mg by mouth 2 times daily      aspirin (ASA) 325 MG EC tablet Take 325 mg by mouth daily      blood glucose (NO BRAND SPECIFIED) test strip Use to test blood sugar 3 times daily or as directed./ Brand per insurance 300 strip 1    blood glucose monitoring (NO BRAND SPECIFIED) meter device kit Use to test blood sugar 3 times daily or as directed./ Brand per insurance 1 kit 0    calcium carbonate (OS-NATACHA) 1500 (600 Ca) MG tablet Take 600 mg by mouth daily With vitamin D (unknown dose): Patient taking 1 tablet once daily      Continuous Glucose Sensor (FREESTYLE MARI 3 SENSOR) MISC 1 each every 14 days Use 1 sensor every 14 days. Use to read blood sugars per 's instructions. 6 each 5    docusate sodium (COLACE) 100  MG capsule Take 100 mg by mouth daily (Patient not taking: Reported on 6/26/2024)      dulaglutide (TRULICITY) 1.5 MG/0.5ML pen Inject 1.5 mg Subcutaneous every 7 days 6 mL 3    gentamicin (GARAMYCIN) 0.1 % external ointment Apply to wound on leg twice daily until healed. Mix with mupirocin ointment before applying. 30 g 1    icosapent ethyl (VASCEPA) 1 g CAPS capsule Take 2 g by mouth 2 times daily      insulin aspart (NOVOLOG FLEXPEN) 100 UNIT/ML pen INJECT SUBCUTANEOUSLY 24 UNITS BEFORE BREAKFAST, 5 UNITS BEFORE LUNCH, 24 UNITS BEFORE DINNER.  (MAX DOSE 70 UNITS/DAY FOR ADJUSTMENT) (Patient taking differently: INJECT SUBCUTANEOUSLY 26 UNITS BEFORE BREAKFAST, 24UNITS BEFORE DINNER.  (MAX DOSE 70 UNITS/DAY FOR ADJUSTMENT)) 75 mL 1    insulin glargine U-300 (TOUJEO SOLOSTAR) 300 UNIT/ML (1 units dial) pen Inject 40 Units Subcutaneous at bedtime INJECT 40 UNITS UNDER THE SKIN AT BEDTIME 15 mL 0    insulin pen needle (32G X 4 MM) 32G X 4 MM miscellaneous Use 3 pen needles daily or as directed.  Per insurance and patient preference 270 each 0    levothyroxine (SYNTHROID/LEVOTHROID) 150 MCG tablet TAKE 1 TABLET (150 MCG) BY MOUTH DAILY 90 tablet 1    losartan (COZAAR) 25 MG tablet Take 0.5 tablets by mouth daily      mupirocin (BACTROBAN) 2 % external ointment Apply to wound on leg twice daily until healed. Mix with gentamicin ointment before applying. 30 g 1    PARoxetine (PAXIL) 20 MG tablet TAKE ONE TABLET (20 MG) BY MOUTH 2 TIMES DAILY 180 tablet 3    polyethylene glycol (MIRALAX) 17 GM/Dose powder Take 1 packet by mouth daily as needed      primidone (MYSOLINE) 50 MG tablet Take 3 tablets in the morning (150 mg) and 3 tablets at night (150 mg). 540 tablet 3    rosuvastatin (CRESTOR) 40 MG tablet Take 1 tablet by mouth daily      torsemide (DEMADEX) 20 MG tablet Take 40 mg QAM and 20 mg  tablet 3     No current facility-administered medications for this visit.       Exam:  There were no vitals taken for this  visit.  GENERAL APPEARANCE: alert and no distress  Breathing appears nonlabored.     Results:  No visits with results within 1 Day(s) from this visit.   Latest known visit with results is:   Lab on 07/06/2024   Component Date Value Ref Range Status    Vitamin B12 07/06/2024 1,255 (H)  232 - 1,245 pg/mL Final    Immature Platelet Fraction 07/06/2024 5.4  1.0 - 7.0 % Final    Sodium 07/06/2024 142  135 - 145 mmol/L Final    Potassium 07/06/2024 4.5  3.4 - 5.3 mmol/L Final    Chloride 07/06/2024 104  98 - 107 mmol/L Final    Carbon Dioxide (CO2) 07/06/2024 28  22 - 29 mmol/L Final    Anion Gap 07/06/2024 10  7 - 15 mmol/L Final    Glucose 07/06/2024 190 (H)  70 - 99 mg/dL Final    Urea Nitrogen 07/06/2024 28.1 (H)  8.0 - 23.0 mg/dL Final    Creatinine 07/06/2024 1.45 (H)  0.51 - 0.95 mg/dL Final    GFR Estimate 07/06/2024 38 (L)  >60 mL/min/1.73m2 Final    eGFR calculated using 2021 CKD-EPI equation.    Calcium 07/06/2024 8.5 (L)  8.8 - 10.2 mg/dL Final    Albumin 07/06/2024 3.5  3.5 - 5.2 g/dL Final    Phosphorus 07/06/2024 3.5  2.5 - 4.5 mg/dL Final    Total Protein Urine mg/dL 07/06/2024 28.3    mg/dL Final    The reference ranges have not been established in urine protein. The results should be integrated into the clinical context for interpretation.    Total Protein Urine mg/mg Creat 07/06/2024 0.29 (H)  0.00 - 0.20 mg/mg Cr Final    Creatinine Urine mg/dL 07/06/2024 96.6  mg/dL Final    The reference ranges have not been established in urine creatinine. The results should be integrated into the clinical context for interpretation.    % Reticulocyte 07/06/2024 2.5 (H)  0.5 - 2.0 % Final    Absolute Reticulocyte 07/06/2024 0.074  0.025 - 0.095 10e6/uL Final    WBC Count 07/06/2024 3.1 (L)  4.0 - 11.0 10e3/uL Final    RBC Count 07/06/2024 2.98 (L)  3.80 - 5.20 10e6/uL Final    Hemoglobin 07/06/2024 10.1 (L)  11.7 - 15.7 g/dL Final    Hematocrit 07/06/2024 31.5 (L)  35.0 - 47.0 % Final    MCV 07/06/2024 106 (H)  78 -  100 fL Final    MCH 07/06/2024 33.9 (H)  26.5 - 33.0 pg Final    MCHC 07/06/2024 32.1  31.5 - 36.5 g/dL Final    RDW 07/06/2024 13.9  10.0 - 15.0 % Final    Platelet Count 07/06/2024 63 (L)  150 - 450 10e3/uL Final    % Neutrophils 07/06/2024 56  % Final    % Lymphocytes 07/06/2024 31  % Final    % Monocytes 07/06/2024 7  % Final    % Eosinophils 07/06/2024 5  % Final    % Basophils 07/06/2024 1  % Final    % Immature Granulocytes 07/06/2024 0  % Final    NRBCs per 100 WBC 07/06/2024 0  <1 /100 Final    Absolute Neutrophils 07/06/2024 1.7  1.6 - 8.3 10e3/uL Final    Absolute Lymphocytes 07/06/2024 1.0  0.8 - 5.3 10e3/uL Final    Absolute Monocytes 07/06/2024 0.2  0.0 - 1.3 10e3/uL Final    Absolute Eosinophils 07/06/2024 0.2  0.0 - 0.7 10e3/uL Final    Absolute Basophils 07/06/2024 0.0  0.0 - 0.2 10e3/uL Final    Absolute Immature Granulocytes 07/06/2024 0.0  <=0.4 10e3/uL Final    Absolute NRBCs 07/06/2024 0.0  10e3/uL Final                Lab results were reviewed and interpreted.       Assessment/Plan:   Stage 3b chronic kidney disease (H)  AT risk for CKD in the setting of diabetes, hypertension, CHF on diuretics, tobacco hx, obesity as a potential risk for secondary FSGS, and also fenofibrate use. She has no hematuria which is reassuring, likely mild proteinuria secondary to obesity, diabetes, arterionephrosclerosis. We stopped fenofibrate at her previous visit - no improvement in creatinine seen but also had adjustments to torsemide which may have caused the lack of improvement. Would like to avoid fenofibrate going forward. Educated on risk of cardiorenal physiology - want to avoid volume overload for her CHF but also avoid dehydration to avoid SUZE and the potential need to adjust diuretics to keep this balanced. On max lisinopril. Ideally would be on SGLT2 inhibitor but with her GFR so close to 30 and the likelihood of it dropping below 30 once on SGLT2 inhibitor, will hold on starting this for the time  being.    Stable.     CHF:  stable.     DM: last A1C 5.7% in April.     Hypertension: on these meds for cardiac reasons as well - holding on changes for the time bineg.     Hx of tobacco: at risk for secondary FSGS - now away from tobacco which is great to hear    Obesity: educated on benefits of weight loss/healthy lifestyle     Abnormal kidney ultrasound: ultrasound showed a concerning lesion but then the MRI was normal. Reviewed with Radiology July 2022 and they do not feel follow-up needed.     Monoclonal gammopathy: seen by hematology. Has thrombocytopenia as well - defer monitoring/mgmt to hematology.     Anemia: hemoglobin 10.1 - iron sat 40% in April.     Patient Instructions   Follow-up in 6 months with labs prior.    [id number] AM video visit via [employer] - offsite.   Jessika Preston DO

## 2024-07-10 ENCOUNTER — TELEPHONE (OUTPATIENT)
Dept: NEPHROLOGY | Facility: CLINIC | Age: 74
End: 2024-07-10

## 2024-07-11 ENCOUNTER — VIRTUAL VISIT (OUTPATIENT)
Dept: ONCOLOGY | Facility: CLINIC | Age: 74
End: 2024-07-11
Attending: INTERNAL MEDICINE
Payer: COMMERCIAL

## 2024-07-11 VITALS — WEIGHT: 245 LBS | HEIGHT: 69 IN | BODY MASS INDEX: 36.29 KG/M2

## 2024-07-11 DIAGNOSIS — D53.9 MACROCYTIC ANEMIA: Primary | ICD-10-CM

## 2024-07-11 DIAGNOSIS — D46.4 REFRACTORY ANEMIA, UNSPECIFIED (H): ICD-10-CM

## 2024-07-11 DIAGNOSIS — N18.30 ANEMIA IN STAGE 3 CHRONIC KIDNEY DISEASE, UNSPECIFIED WHETHER STAGE 3A OR 3B CKD (H): ICD-10-CM

## 2024-07-11 DIAGNOSIS — D63.1 ANEMIA IN STAGE 3 CHRONIC KIDNEY DISEASE, UNSPECIFIED WHETHER STAGE 3A OR 3B CKD (H): ICD-10-CM

## 2024-07-11 DIAGNOSIS — D61.818 PANCYTOPENIA (H): ICD-10-CM

## 2024-07-11 PROCEDURE — G2211 COMPLEX E/M VISIT ADD ON: HCPCS | Mod: 95 | Performed by: INTERNAL MEDICINE

## 2024-07-11 PROCEDURE — 99213 OFFICE O/P EST LOW 20 MIN: CPT | Mod: 95 | Performed by: INTERNAL MEDICINE

## 2024-07-11 ASSESSMENT — PAIN SCALES - GENERAL: PAINLEVEL: MILD PAIN (2)

## 2024-07-11 NOTE — PROGRESS NOTES
"Virtual Visit Details    Type of service:  Video Visit   Video Start Time: {video visit start/end time for provider to select:813326}  Video End Time:{video visit start/end time for provider to select:441406}    Originating Location (pt. Location): {video visit patient location:086575::\"Home\"}  {PROVIDER LOCATION On-site should be selected for visits conducted from your clinic location or adjoining [facility] hospital, academic office, or other nearby [facility] building. Off-site should be selected for all other provider locations, including home:652599}  Distant Location (provider location):  {virtual location provider:231524}  Platform used for Video Visit: {Virtual Visit Platforms:337754::\"[employer]\"}  "

## 2024-07-11 NOTE — PROGRESS NOTES
Video-Visit Details    Type of service:  Video Visit    Video Start Time: 9:20 AM  Video End Time: 9:27 AM    Originating Location (pt. Location): Home in [place]    Distant Location (provider location): Off-site    Platform used for Video Visit: [facility] Progress Note    Patient: Yadi Iniguez  MRN: [id number]  Date of Service: Jul 11, 2024          Reason for Visit    MGUS    Primary Hematologist: Dr. Wang    Virtual visit      Assessment and Plan  MGUS, IgG kappa  Mild pancytopenias (subacute/chronic): macrocytic anemia, mild leukopenia, thrombocytopenia  Macrocytic anemia  Mild splenomegaly (possibly related to CHF +/- ITP)  Workup so far has been negative.  She has not had a bone marrow biopsy yet.  She has declined this in the past.  Does not want to pursue any invasive procedures at this point in time.  Previously she had some low normal vitamin B12 levels and I asked her to start taking B12 supplements.  She has done so.  Here with repeat labs.  CBC shows slight improvement in the hemoglobin which is currently at 10.1.  Platelet counts are stable at 63,000.  Normal neutrophil count at 1.7.  Her creatinine levels have also come down a bit and currently at 1.45.  B12 levels have improved to 1200.  Clinically doing well.  Denies any new issues.    She did have monoclonal IgG and IgA kappa proteins on peripheral blood in the past but over the last couple of lab tests it looks like this Monocal proteins have disappeared.  She has some elevation in the kappa and lambda light chains with a mild elevation in the ratio which is probably consistent with kidney disease.    The etiology for her macrocytic anemia and thrombocytopenia is not clear.  I think she may have underlying MDS or other primary bone marrow process.  She also has mild splenomegaly.  Previously her immature platelet fraction was elevated which was consistent with possible peripheral destruction of  the platelets.  However labs from couple days ago shows normal IPF.  Again this does not rule out an ITP process.  Overall since her hemoglobin levels are pretty stable we will continue to monitor closely.  Labs every 3 months.  I will see her back in 6 months.  I will add erythropoietin levels to her next set of labs.  If her hemoglobin persistently falls below 10 and if she is symptomatic then potentially could consider at that point in supplementation for anemia and chronic kidney disease.  Bone marrow biopsy will be needed at some point but she wants to avoid this as much as possible.      Hematologic History   3/2022: Very small IgA kappa globulin on immunofixation. Monoclonal peak 0.2 g. Kappa + lambda light chains mildly elevated. No excess monoclonal protein in urine. CKD, stable. Chronic mild anemia related to CKD stable. 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     Denies any new issues since last visit.  She is currently on high-dose aspirin for stroke prevention.  Denies any blood in stools or urine.  Had repeat labs done a couple days ago.      ECOG Performance  2 - Ambulatory and independent in all ADLs; cannot work; up > 50% of the time    Physical Exam    General: alert and cooperative. Daughter present.     Lab Results    Recent Results (from the past 168 hour(s))   Vitamin B12   Result Value Ref Range    Vitamin B12 1,255 (H) 232 - 1,245 pg/mL   Immature PLT Fraction   Result Value Ref Range    Immature Platelet Fraction 5.4 1.0 - 7.0 %   Renal panel   Result Value Ref Range    Sodium 142 135 - 145 mmol/L    Potassium 4.5 3.4 - 5.3 mmol/L    Chloride 104 98 - 107 mmol/L    Carbon Dioxide (CO2) 28 22 - 29 mmol/L    Anion Gap 10 7 - 15 mmol/L    Glucose 190 (H) 70 - 99 mg/dL    Urea Nitrogen  28.1 (H) 8.0 - 23.0 mg/dL    Creatinine 1.45 (H) 0.51 - 0.95 mg/dL    GFR Estimate 38 (L) >60 mL/min/1.73m2    Calcium 8.5 (L) 8.8 - 10.2 mg/dL    Albumin 3.5 3.5 - 5.2 g/dL    Phosphorus 3.5 2.5 - 4.5 mg/dL   Protein  random urine   Result Value Ref Range    Total Protein Urine mg/dL 28.3   mg/dL    Total Protein Urine mg/mg Creat 0.29 (H) 0.00 - 0.20 mg/mg Cr    Creatinine Urine mg/dL 96.6 mg/dL   Reticulocyte count   Result Value Ref Range    % Reticulocyte 2.5 (H) 0.5 - 2.0 %    Absolute Reticulocyte 0.074 0.025 - 0.095 10e6/uL   CBC with platelets and differential   Result Value Ref Range    WBC Count 3.1 (L) 4.0 - 11.0 10e3/uL    RBC Count 2.98 (L) 3.80 - 5.20 10e6/uL    Hemoglobin 10.1 (L) 11.7 - 15.7 g/dL    Hematocrit 31.5 (L) 35.0 - 47.0 %     (H) 78 - 100 fL    MCH 33.9 (H) 26.5 - 33.0 pg    MCHC 32.1 31.5 - 36.5 g/dL    RDW 13.9 10.0 - 15.0 %    Platelet Count 63 (L) 150 - 450 10e3/uL    % Neutrophils 56 %    % Lymphocytes 31 %    % Monocytes 7 %    % Eosinophils 5 %    % Basophils 1 %    % Immature Granulocytes 0 %    NRBCs per 100 WBC 0 <1 /100    Absolute Neutrophils 1.7 1.6 - 8.3 10e3/uL    Absolute Lymphocytes 1.0 0.8 - 5.3 10e3/uL    Absolute Monocytes 0.2 0.0 - 1.3 10e3/uL    Absolute Eosinophils 0.2 0.0 - 0.7 10e3/uL    Absolute Basophils 0.0 0.0 - 0.2 10e3/uL    Absolute Immature Granulocytes 0.0 <=0.4 10e3/uL    Absolute NRBCs 0.0 10e3/uL         Imaging    No results found.    The longitudinal plan of care for the diagnosis(es)/condition(s) as documented were addressed during this visit. Due to the added complexity in care, I will continue to support Yadi in the subsequent management and with ongoing continuity of care.      Signed by: Shawna Wang MD

## 2024-07-11 NOTE — NURSING NOTE
Current patient location: [address] [place] [address]    Is the patient currently in the state of [place]? YES    Visit mode:VIDEO    If the visit is dropped, the patient can be reconnected by: VIDEO VISIT: Text to cell phone:   Telephone Information:   Mobile [phone]       Will anyone else be joining the visit? NO  (If patient encounters technical issues they should call [phone])    How would you like to obtain your AVS? MyChart    Are changes needed to the allergy or medication list? No    Are refills needed on medications prescribed by this physician? NO    Reason for visit: ELYSSA ORTEGA

## 2024-07-11 NOTE — LETTER
7/11/2024      Yadi Iniguez  [address]  [place] [address]      Dear Colleague,    Thank you for referring your patient, Yadi Iniguez, to the [facility] [place]. Please see a copy of my visit note below.      Video-Visit Details    Type of service:  Video Visit    Video Start Time: 9:20 AM  Video End Time: 9:27 AM    Originating Location (pt. Location): Home in [place]    Distant Location (provider location): Off-site    Platform used for Video Visit: [facility] Hematology and Oncology Progress Note    Patient: Yadi Iniguez  MRN: [id number]  Date of Service: Jul 11, 2024          Reason for Visit    MGUS    Primary Hematologist: Dr. Wang    Virtual visit      Assessment and Plan  MGUS, IgG kappa  Mild pancytopenias (subacute/chronic): macrocytic anemia, mild leukopenia, thrombocytopenia  Macrocytic anemia  Mild splenomegaly (possibly related to CHF +/- ITP)  Workup so far has been negative.  She has not had a bone marrow biopsy yet.  She has declined this in the past.  Does not want to pursue any invasive procedures at this point in time.  Previously she had some low normal vitamin B12 levels and I asked her to start taking B12 supplements.  She has done so.  Here with repeat labs.  CBC shows slight improvement in the hemoglobin which is currently at 10.1.  Platelet counts are stable at 63,000.  Normal neutrophil count at 1.7.  Her creatinine levels have also come down a bit and currently at 1.45.  B12 levels have improved to 1200.  Clinically doing well.  Denies any new issues.    She did have monoclonal IgG and IgA kappa proteins on peripheral blood in the past but over the last couple of lab tests it looks like this Monocal proteins have disappeared.  She has some elevation in the kappa and lambda light chains with a mild elevation in the ratio which is probably consistent with kidney disease.    The etiology for her macrocytic anemia and  thrombocytopenia is not clear.  I think she may have underlying MDS or other primary bone marrow process.  She also has mild splenomegaly.  Previously her immature platelet fraction was elevated which was consistent with possible peripheral destruction of the platelets.  However labs from couple days ago shows normal IPF.  Again this does not rule out an ITP process.  Overall since her hemoglobin levels are pretty stable we will continue to monitor closely.  Labs every 3 months.  I will see her back in 6 months.  I will add erythropoietin levels to her next set of labs.  If her hemoglobin persistently falls below 10 and if she is symptomatic then potentially could consider at that point in supplementation for anemia and chronic kidney disease.  Bone marrow biopsy will be needed at some point but she wants to avoid this as much as possible.      Hematologic History   3/2022: Very small IgA kappa globulin on immunofixation. Monoclonal peak 0.2 g. Kappa + lambda light chains mildly elevated. No excess monoclonal protein in urine. CKD, stable. Chronic mild anemia related to CKD stable. 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     Denies any new issues since last visit.  She is currently on high-dose aspirin for stroke prevention.  Denies any blood in stools or urine.  Had repeat labs done a couple days ago.      ECOG Performance  2 - Ambulatory and independent in all ADLs; cannot work; up > 50% of the time    Physical Exam    General: alert and cooperative. Daughter present.     Lab Results    Recent Results (from the past 168 hour(s))   Vitamin B12   Result Value Ref Range    Vitamin B12 1,255 (H) 232 - 1,245 pg/mL   Immature PLT Fraction   Result Value Ref Range    Immature Platelet Fraction 5.4 1.0 - 7.0 %    Renal panel   Result Value Ref Range    Sodium 142 135 - 145 mmol/L    Potassium 4.5 3.4 - 5.3 mmol/L    Chloride 104 98 - 107 mmol/L    Carbon Dioxide (CO2) 28 22 - 29 mmol/L    Anion Gap 10 7 - 15 mmol/L    Glucose 190 (H) 70 - 99 mg/dL    Urea Nitrogen 28.1 (H) 8.0 - 23.0 mg/dL    Creatinine 1.45 (H) 0.51 - 0.95 mg/dL    GFR Estimate 38 (L) >60 mL/min/1.73m2    Calcium 8.5 (L) 8.8 - 10.2 mg/dL    Albumin 3.5 3.5 - 5.2 g/dL    Phosphorus 3.5 2.5 - 4.5 mg/dL   Protein  random urine   Result Value Ref Range    Total Protein Urine mg/dL 28.3   mg/dL    Total Protein Urine mg/mg Creat 0.29 (H) 0.00 - 0.20 mg/mg Cr    Creatinine Urine mg/dL 96.6 mg/dL   Reticulocyte count   Result Value Ref Range    % Reticulocyte 2.5 (H) 0.5 - 2.0 %    Absolute Reticulocyte 0.074 0.025 - 0.095 10e6/uL   CBC with platelets and differential   Result Value Ref Range    WBC Count 3.1 (L) 4.0 - 11.0 10e3/uL    RBC Count 2.98 (L) 3.80 - 5.20 10e6/uL    Hemoglobin 10.1 (L) 11.7 - 15.7 g/dL    Hematocrit 31.5 (L) 35.0 - 47.0 %     (H) 78 - 100 fL    MCH 33.9 (H) 26.5 - 33.0 pg    MCHC 32.1 31.5 - 36.5 g/dL    RDW 13.9 10.0 - 15.0 %    Platelet Count 63 (L) 150 - 450 10e3/uL    % Neutrophils 56 %    % Lymphocytes 31 %    % Monocytes 7 %    % Eosinophils 5 %    % Basophils 1 %    % Immature Granulocytes 0 %    NRBCs per 100 WBC 0 <1 /100    Absolute Neutrophils 1.7 1.6 - 8.3 10e3/uL    Absolute Lymphocytes 1.0 0.8 - 5.3 10e3/uL    Absolute Monocytes 0.2 0.0 - 1.3 10e3/uL    Absolute Eosinophils 0.2 0.0 - 0.7 10e3/uL    Absolute Basophils 0.0 0.0 - 0.2 10e3/uL    Absolute Immature Granulocytes 0.0 <=0.4 10e3/uL    Absolute NRBCs 0.0 10e3/uL         Imaging    No results found.    The longitudinal plan of care for the diagnosis(es)/condition(s) as documented were addressed during this visit. Due to the added complexity in care, I will continue to support Yadi in the subsequent management and with ongoing continuity of care.      Signed  by: Shawna Wang MD      Again, thank you for allowing me to participate in the care of your patient.        Sincerely,        Shawna Wang MD

## 2024-07-11 NOTE — LETTER
7/11/2024      Yadi Iniguez  [address]  [place] [address]      Dear Colleague,    Thank you for referring your patient, Yadi Iniguez, to the [facility] [place]. Please see a copy of my visit note below.      Video-Visit Details    Type of service:  Video Visit    Video Start Time: 9:20 AM  Video End Time: 9:27 AM    Originating Location (pt. Location): Home in [place]    Distant Location (provider location): Off-site    Platform used for Video Visit: [facility] Hematology and Oncology Progress Note    Patient: Yadi Iniguez  MRN: [id number]  Date of Service: Jul 11, 2024          Reason for Visit    MGUS    Primary Hematologist: Dr. Wang    Virtual visit      Assessment and Plan  MGUS, IgG kappa  Mild pancytopenias (subacute/chronic): macrocytic anemia, mild leukopenia, thrombocytopenia  Macrocytic anemia  Mild splenomegaly (possibly related to CHF +/- ITP)  Workup so far has been negative.  She has not had a bone marrow biopsy yet.  She has declined this in the past.  Does not want to pursue any invasive procedures at this point in time.  Previously she had some low normal vitamin B12 levels and I asked her to start taking B12 supplements.  She has done so.  Here with repeat labs.  CBC shows slight improvement in the hemoglobin which is currently at 10.1.  Platelet counts are stable at 63,000.  Normal neutrophil count at 1.7.  Her creatinine levels have also come down a bit and currently at 1.45.  B12 levels have improved to 1200.  Clinically doing well.  Denies any new issues.    She did have monoclonal IgG and IgA kappa proteins on peripheral blood in the past but over the last couple of lab tests it looks like this Monocal proteins have disappeared.  She has some elevation in the kappa and lambda light chains with a mild elevation in the ratio which is probably consistent with kidney disease.    The etiology for her macrocytic anemia and  thrombocytopenia is not clear.  I think she may have underlying MDS or other primary bone marrow process.  She also has mild splenomegaly.  Previously her immature platelet fraction was elevated which was consistent with possible peripheral destruction of the platelets.  However labs from couple days ago shows normal IPF.  Again this does not rule out an ITP process.  Overall since her hemoglobin levels are pretty stable we will continue to monitor closely.  Labs every 3 months.  I will see her back in 6 months.  I will add erythropoietin levels to her next set of labs.  If her hemoglobin persistently falls below 10 and if she is symptomatic then potentially could consider at that point in supplementation for anemia and chronic kidney disease.  Bone marrow biopsy will be needed at some point but she wants to avoid this as much as possible.      Hematologic History   3/2022: Very small IgA kappa globulin on immunofixation. Monoclonal peak 0.2 g. Kappa + lambda light chains mildly elevated. No excess monoclonal protein in urine. CKD, stable. Chronic mild anemia related to CKD stable. 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     Denies any new issues since last visit.  She is currently on high-dose aspirin for stroke prevention.  Denies any blood in stools or urine.  Had repeat labs done a couple days ago.      ECOG Performance  2 - Ambulatory and independent in all ADLs; cannot work; up > 50% of the time    Physical Exam    General: alert and cooperative. Daughter present.     Lab Results    Recent Results (from the past 168 hour(s))   Vitamin B12   Result Value Ref Range    Vitamin B12 1,255 (H) 232 - 1,245 pg/mL   Immature PLT Fraction   Result Value Ref Range    Immature Platelet Fraction 5.4 1.0 - 7.0 %    Renal panel   Result Value Ref Range    Sodium 142 135 - 145 mmol/L    Potassium 4.5 3.4 - 5.3 mmol/L    Chloride 104 98 - 107 mmol/L    Carbon Dioxide (CO2) 28 22 - 29 mmol/L    Anion Gap 10 7 - 15 mmol/L    Glucose 190 (H) 70 - 99 mg/dL    Urea Nitrogen 28.1 (H) 8.0 - 23.0 mg/dL    Creatinine 1.45 (H) 0.51 - 0.95 mg/dL    GFR Estimate 38 (L) >60 mL/min/1.73m2    Calcium 8.5 (L) 8.8 - 10.2 mg/dL    Albumin 3.5 3.5 - 5.2 g/dL    Phosphorus 3.5 2.5 - 4.5 mg/dL   Protein  random urine   Result Value Ref Range    Total Protein Urine mg/dL 28.3   mg/dL    Total Protein Urine mg/mg Creat 0.29 (H) 0.00 - 0.20 mg/mg Cr    Creatinine Urine mg/dL 96.6 mg/dL   Reticulocyte count   Result Value Ref Range    % Reticulocyte 2.5 (H) 0.5 - 2.0 %    Absolute Reticulocyte 0.074 0.025 - 0.095 10e6/uL   CBC with platelets and differential   Result Value Ref Range    WBC Count 3.1 (L) 4.0 - 11.0 10e3/uL    RBC Count 2.98 (L) 3.80 - 5.20 10e6/uL    Hemoglobin 10.1 (L) 11.7 - 15.7 g/dL    Hematocrit 31.5 (L) 35.0 - 47.0 %     (H) 78 - 100 fL    MCH 33.9 (H) 26.5 - 33.0 pg    MCHC 32.1 31.5 - 36.5 g/dL    RDW 13.9 10.0 - 15.0 %    Platelet Count 63 (L) 150 - 450 10e3/uL    % Neutrophils 56 %    % Lymphocytes 31 %    % Monocytes 7 %    % Eosinophils 5 %    % Basophils 1 %    % Immature Granulocytes 0 %    NRBCs per 100 WBC 0 <1 /100    Absolute Neutrophils 1.7 1.6 - 8.3 10e3/uL    Absolute Lymphocytes 1.0 0.8 - 5.3 10e3/uL    Absolute Monocytes 0.2 0.0 - 1.3 10e3/uL    Absolute Eosinophils 0.2 0.0 - 0.7 10e3/uL    Absolute Basophils 0.0 0.0 - 0.2 10e3/uL    Absolute Immature Granulocytes 0.0 <=0.4 10e3/uL    Absolute NRBCs 0.0 10e3/uL         Imaging    No results found.    The longitudinal plan of care for the diagnosis(es)/condition(s) as documented were addressed during this visit. Due to the added complexity in care, I will continue to support Yadi in the subsequent management and with ongoing continuity of care.      Signed  by: Shawna Wang MD      Again, thank you for allowing me to participate in the care of your patient.        Sincerely,        Shawna Wang MD

## 2024-07-17 ENCOUNTER — MYC MEDICAL ADVICE (OUTPATIENT)
Dept: FAMILY MEDICINE | Facility: CLINIC | Age: 74
End: 2024-07-17
Payer: COMMERCIAL

## 2024-07-17 DIAGNOSIS — N18.5 TYPE 2 DIABETES MELLITUS WITH STAGE 5 CHRONIC KIDNEY DISEASE NOT ON CHRONIC DIALYSIS, WITH LONG-TERM CURRENT USE OF INSULIN (H): ICD-10-CM

## 2024-07-17 DIAGNOSIS — E11.22 TYPE 2 DIABETES MELLITUS WITH STAGE 5 CHRONIC KIDNEY DISEASE NOT ON CHRONIC DIALYSIS, WITH LONG-TERM CURRENT USE OF INSULIN (H): ICD-10-CM

## 2024-07-17 DIAGNOSIS — Z79.4 TYPE 2 DIABETES MELLITUS WITH STAGE 5 CHRONIC KIDNEY DISEASE NOT ON CHRONIC DIALYSIS, WITH LONG-TERM CURRENT USE OF INSULIN (H): ICD-10-CM

## 2024-07-17 DIAGNOSIS — I10 HYPERTENSION, ESSENTIAL: Primary | ICD-10-CM

## 2024-07-17 RX ORDER — INSULIN GLARGINE 300 U/ML
40 INJECTION, SOLUTION SUBCUTANEOUS AT BEDTIME
Qty: 15 ML | Refills: 3 | Status: SHIPPED | OUTPATIENT
Start: 2024-07-17

## 2024-07-18 NOTE — PROGRESS NOTES
"  [facility]  Hospitalist Progress Note  Tino Cahu MD 11/07/2018        Reason for hospital stay:  Elective back surgery     Brief Summary of Stay       Yadi Iniguez is a 68 year old female  With h/o DM, COPD , HTN and CKD who was admitted for elective back surgery, he has smooth postoperative course, pain is controlled, she was able to ambulate, no nausea or vomiting, no BM yet.      Assessment and Plan      1.POD#2- s/p lower back fusion.   - stable , pain controlled   -Pain controlled, continue to ambulate.  -Incentive spirometry    2.Postop pain:Controlled on oral pain regimen, IV Dilaudid as needed    3.diabetes mellitus type 2 insulin requiring.  -Continue sliding scale insulin and Levemir as ordered    4.Postop mild blood loss.  Hemoglobin is stable at 12          5.  HTN -controlled , on home med PTA medication including amlodipine, Coreg, losartan     DVT Prophylaxis: Defer to primary service    Code Status: Full Code    Discharge Dispo: per primary     Estimated Disch Date / # of Days until Disch: Per primary, patient is medically stable at this point will continue to follow-up while the patient is in the hospital.        Interval History (Subjective):        Patient is seen and examined, assumed care, no new overnight issues discussed with nursing staff.                  Physical Exam:        Last Vital Signs:  /51 (BP Location: Right arm)  Pulse 84  Temp 97.3  F (36.3  C) (Oral)  Resp 18  Ht 1.727 m (5' 8\")  Wt 117 kg (258 lb)  SpO2 93%  BMI 39.23 kg/m2    I/O last 3 completed shifts:  In: 1060 [P.O.:1060]  Out: 1000 [Urine:1000]  Wt Readings from Last 1 Encounters:   11/05/18 117 kg (258 lb)     Vitals:    11/05/18 0555   Weight: 117 kg (258 lb)            Physical Exam:   Blood pressure 124/51, pulse 84, temperature 97.3  F (36.3  C), temperature source Oral, resp. rate 18, height 1.727 m (5' 8\"), weight 117 kg (258 lb), SpO2 93 %, not currently " breastfeeding.    Constitutional:   awake, alert and cooperative     Neck:   supple, symmetrical, trachea midline, skin normal and no stridor     Lungs:   no increased work of breathing, good air exchange, no retractions and clear to auscultation     Cardiovascular:   normal apical pulses  and normal S1 and S2     Abdomen:   normal bowel sounds and soft     Neurologic:   Mental Status Exam:  Level of Alertness:   awake            Medications:      All current medications were reviewed with changes reflected in problem list.    Current Facility-Administered Medications   Medication     0.9% sodium chloride + KCl 20 mEq/L infusion     [START ON 11/8/2018] acetaminophen (TYLENOL) tablet 650 mg     acetaminophen (TYLENOL) tablet 975 mg     albuterol neb solution 2.5 mg     amLODIPine (NORVASC) tablet 10 mg     atorvastatin (LIPITOR) tablet 40 mg     benzocaine-menthol (CHLORASEPTIC) 6-10 MG lozenge 1 lozenge     carvedilol (COREG) tablet 25 mg     glucose gel 15-30 g    Or     dextrose 50 % injection 25-50 mL    Or     glucagon injection 1 mg     diazepam (VALIUM) tablet 5 mg     diphenhydrAMINE (BENADRYL) solution 12.5 mg    Or     diphenhydrAMINE (BENADRYL) injection 12.5 mg     docusate sodium (COLACE) capsule 100 mg     ranitidine (ZANTAC) tablet 150 mg    Or     famotidine (PEPCID) injection 20 mg     fenofibrate tablet 54 mg     HYDROmorphone (DILAUDID) tablet 2-4 mg     HYDROmorphone (PF) (DILAUDID) injection 0.3-0.5 mg     insulin aspart (NovoLOG) inj (RAPID ACTING)     insulin aspart (NovoLOG) inj (RAPID ACTING)     insulin aspart (NovoLOG) inj (RAPID ACTING)     insulin detemir (LEVEMIR) injection 41 Units     levothyroxine (SYNTHROID/LEVOTHROID) tablet 150 mcg     lidocaine (LMX4) cream     lidocaine 1 % 1 mL     losartan (COZAAR) tablet 100 mg     metoclopramide (REGLAN) tablet 5 mg    Or     metoclopramide (REGLAN) injection 5 mg     naloxone (NARCAN) injection 0.1-0.4 mg     ondansetron (ZOFRAN-ODT) ODT  tab 4 mg    Or     ondansetron (ZOFRAN) injection 4 mg     PARoxetine (PAXIL) tablet 20 mg     primidone (MYSOLINE) tablet 50 mg     prochlorperazine (COMPAZINE) injection 5 mg    Or     prochlorperazine (COMPAZINE) tablet 5 mg     sodium chloride (PF) 0.9% PF flush 3 mL     sodium chloride (PF) 0.9% PF flush 3 mL            Data:         Labs:      Recent Labs  Lab 11/05/18  0625   HGB 12.0     No results for input(s): CULT in the last 168 hours.    Recent Labs  Lab 11/06/18  1138   *         Recent Labs  Lab 11/07/18  1217 11/07/18  0755 11/07/18  0125 11/06/18  2148 11/06/18  1705  11/06/18  1138   GLC  --   --   --   --   --   --  143*   * 109* 147* 123* 132*  < >  --    < > = values in this interval not displayed.      Recent Results (from the past 48 hour(s))   XR Lumbar Spine Port 1 View    Narrative    This exam was marked as non-reportable because it will not be read by a   radiologist or a [place] non-radiologist provider.             XR Surgery LINDA L/T 5 Min Fluoro w [place]    Narrative    This exam was marked as non-reportable because it will not be read by a   radiologist or a [place] non-radiologist provider.                 Imaging:   No results found for this or any previous visit (from the past 24 hour(s)).         yes

## 2024-08-03 ENCOUNTER — LAB (OUTPATIENT)
Dept: LAB | Facility: CLINIC | Age: 74
End: 2024-08-03
Payer: COMMERCIAL

## 2024-08-03 DIAGNOSIS — I10 HYPERTENSION, ESSENTIAL: ICD-10-CM

## 2024-08-03 LAB
ANION GAP SERPL CALCULATED.3IONS-SCNC: 10 MMOL/L (ref 7–15)
BUN SERPL-MCNC: 25.9 MG/DL (ref 8–23)
CALCIUM SERPL-MCNC: 8.8 MG/DL (ref 8.8–10.4)
CHLORIDE SERPL-SCNC: 102 MMOL/L (ref 98–107)
CREAT SERPL-MCNC: 1.55 MG/DL (ref 0.51–0.95)
EGFRCR SERPLBLD CKD-EPI 2021: 35 ML/MIN/1.73M2
GLUCOSE SERPL-MCNC: 166 MG/DL (ref 70–99)
HCO3 SERPL-SCNC: 28 MMOL/L (ref 22–29)
POTASSIUM SERPL-SCNC: 4.6 MMOL/L (ref 3.4–5.3)
SODIUM SERPL-SCNC: 140 MMOL/L (ref 135–145)

## 2024-08-03 PROCEDURE — 80048 BASIC METABOLIC PNL TOTAL CA: CPT

## 2024-08-03 PROCEDURE — 36415 COLL VENOUS BLD VENIPUNCTURE: CPT

## 2024-08-18 ENCOUNTER — HEALTH MAINTENANCE LETTER (OUTPATIENT)
Age: 74
End: 2024-08-18

## 2024-08-26 ENCOUNTER — TELEPHONE (OUTPATIENT)
Dept: MULTI SPECIALTY CLINIC | Facility: CLINIC | Age: 74
End: 2024-08-26
Payer: COMMERCIAL

## 2024-08-26 NOTE — TELEPHONE ENCOUNTER
[facility] Call Center    Phone Message    May a detailed message be left on voicemail: yes     Reason for Call: Other: Pt is to follow up with neph as soon as possible. Pt is currently in a transitional unit. First opening would not be until November as a virtual. Pts daughter who takes care of all of pts appts was wondering if there is any way to be fit in on 9/3/, 9/17/ or 10/1 as those are her days off. Otherwise she will make whatever work     Action Taken: Other: neph    Travel Screening: Not Applicable     Date of Service:

## 2024-08-27 NOTE — TELEPHONE ENCOUNTER
"Attempted return call to Pts Daughter \"Nikki\" (Auth to discuss in chart).  No answer.  Message left to return call.    Calling to offer next available with Dr. Preston this is on HOLD for pt. 9/23/24 at 11:30am [facility].  Pt will need a non fasting lab appt prior.    Dr. Preston does not have availability the days \"Nikki\" requested ( 9/3/, 9/17/ or 10/1 ).    Syeda Guadalupe LPN    "

## 2024-08-27 NOTE — TELEPHONE ENCOUNTER
"Documentation from Call Center staff.  Pts Daughter called and pt is now scheduled with Dr. Preston:  9/23/24 at 11:30am as a Video Visit.  Also Noted \"labs will be scheduled at a later date, per Nikki\".    Syeda Guadalupe LPN    "

## 2024-09-03 NOTE — TELEPHONE ENCOUNTER
See [employer] message.    Routing to provider to advise.    Meme Ramos RN    
Previously Declined (within the last year)

## 2024-09-04 DIAGNOSIS — E03.9 HYPOTHYROIDISM, UNSPECIFIED TYPE: ICD-10-CM

## 2024-09-05 RX ORDER — LEVOTHYROXINE SODIUM 150 UG/1
150 TABLET ORAL DAILY
Qty: 90 TABLET | Refills: 1 | Status: SHIPPED | OUTPATIENT
Start: 2024-09-05 | End: 2024-09-17

## 2024-09-13 ENCOUNTER — VIRTUAL VISIT (OUTPATIENT)
Dept: PHARMACY | Facility: CLINIC | Age: 74
End: 2024-09-13
Payer: COMMERCIAL

## 2024-09-13 ENCOUNTER — MEDICAL CORRESPONDENCE (OUTPATIENT)
Dept: HEALTH INFORMATION MANAGEMENT | Facility: CLINIC | Age: 74
End: 2024-09-13

## 2024-09-13 DIAGNOSIS — G25.0 ESSENTIAL TREMOR: ICD-10-CM

## 2024-09-13 DIAGNOSIS — I50.9 CONGESTIVE HEART FAILURE, UNSPECIFIED HF CHRONICITY, UNSPECIFIED HEART FAILURE TYPE (H): ICD-10-CM

## 2024-09-13 DIAGNOSIS — E11.29 TYPE 2 DIABETES MELLITUS WITH OTHER DIABETIC KIDNEY COMPLICATION, WITH LONG-TERM CURRENT USE OF INSULIN (H): Primary | ICD-10-CM

## 2024-09-13 DIAGNOSIS — I10 HYPERTENSION GOAL BP (BLOOD PRESSURE) < 140/90: ICD-10-CM

## 2024-09-13 DIAGNOSIS — F32.1 MODERATE MAJOR DEPRESSION (H): ICD-10-CM

## 2024-09-13 DIAGNOSIS — K21.9 GASTROESOPHAGEAL REFLUX DISEASE, UNSPECIFIED WHETHER ESOPHAGITIS PRESENT: ICD-10-CM

## 2024-09-13 DIAGNOSIS — Z79.4 TYPE 2 DIABETES MELLITUS WITH OTHER DIABETIC KIDNEY COMPLICATION, WITH LONG-TERM CURRENT USE OF INSULIN (H): Primary | ICD-10-CM

## 2024-09-13 DIAGNOSIS — N18.32 STAGE 3B CHRONIC KIDNEY DISEASE (H): ICD-10-CM

## 2024-09-13 DIAGNOSIS — E03.9 HYPOTHYROIDISM, UNSPECIFIED TYPE: ICD-10-CM

## 2024-09-13 DIAGNOSIS — I27.20 PULMONARY HYPERTENSION (H): ICD-10-CM

## 2024-09-13 PROCEDURE — 99606 MTMS BY PHARM EST 15 MIN: CPT | Mod: 93 | Performed by: PHARMACIST

## 2024-09-13 PROCEDURE — 99607 MTMS BY PHARM ADDL 15 MIN: CPT | Mod: 93 | Performed by: PHARMACIST

## 2024-09-13 RX ORDER — NITROGLYCERIN 0.4 MG/1
TABLET SUBLINGUAL
COMMUNITY
Start: 2024-08-27

## 2024-09-13 RX ORDER — AMIODARONE HYDROCHLORIDE 200 MG/1
200 TABLET ORAL DAILY
COMMUNITY
Start: 2024-08-27

## 2024-09-13 RX ORDER — SENNOSIDES A AND B 8.6 MG/1
8.6 TABLET, FILM COATED ORAL 2 TIMES DAILY PRN
COMMUNITY
Start: 2024-08-23

## 2024-09-13 RX ORDER — ASPIRIN 81 MG/1
81 TABLET ORAL DAILY
COMMUNITY

## 2024-09-13 RX ORDER — AMLODIPINE BESYLATE 5 MG/1
5 TABLET ORAL DAILY
COMMUNITY
Start: 2024-08-27

## 2024-09-13 RX ORDER — CARVEDILOL 25 MG/1
25 TABLET ORAL 2 TIMES DAILY WITH MEALS
COMMUNITY
Start: 2024-08-27

## 2024-09-13 RX ORDER — DULAGLUTIDE 0.75 MG/.5ML
0.75 INJECTION, SOLUTION SUBCUTANEOUS
Status: SHIPPED
Start: 2024-09-13

## 2024-09-13 RX ORDER — PAROXETINE 20 MG/1
40 TABLET, FILM COATED ORAL EVERY EVENING
COMMUNITY
End: 2024-09-17

## 2024-09-13 RX ORDER — PANTOPRAZOLE SODIUM 40 MG/1
1 TABLET, DELAYED RELEASE ORAL DAILY
COMMUNITY
Start: 2024-08-29

## 2024-09-13 RX ORDER — CLOPIDOGREL BISULFATE 75 MG/1
75 TABLET ORAL DAILY
COMMUNITY
Start: 2024-08-27

## 2024-09-13 RX ORDER — LIDOCAINE 4 G/G
PATCH TOPICAL
COMMUNITY
Start: 2024-09-05 | End: 2024-09-27

## 2024-09-13 NOTE — PROGRESS NOTES
Medication Therapy Management (MTM) Encounter    ASSESSMENT:                            Medication Adherence/Access: See below for considerations    Diabetes: A1c is at goal of <7%.  Recent blood sugars are elevated. Would benefit from restarting low dose of Trulicity (to ensure less side effects and she has some on hand - no adjustment in kidney dysfunction).     Hypertension/Chronic kidney disease/Pulmonary hypertension/CHF: Improved.  Plan in place for cardiology follow-up.     Hyperlipidemia: Stable.     GERD: Stable.      Depression: Stable.     Hypothyroidism: Stable. Last TSH is within normal limits.     Essential tremor: Somewhat stable.     PLAN:                            Restart Trulicity at 0.75 MG under the skin weekly.  We will start at the lower dose again to minimize potential side effects of restarting since you've been off for about a month and you stated you had about 3 pens left.     Increase Toujeo to 40 units under the skin daily as prescribed.    Consider increase Novolog to 24 units before breakfast, 5 units before lunch/afternoon snack, and 24 units before suppertime.     Follow-up: I will call you on Friday, September 27th at 11 AM for phone follow-up    SUBJECTIVE/OBJECTIVE:                          Yadi Iniguez is a 74 year old female seen for a transitions of care visit. She was discharged from [facility] on 8/24/2024 for NSTEMI.      Reason for visit: Hospital Follow-Up.    Allergies/ADRs: Reviewed in chart  Past Medical History: Reviewed in chart  Tobacco: She reports that she quit smoking about 3 years ago. Her smoking use included cigarettes. She started smoking about 58 years ago. She has a 55.1 pack-year smoking history. She has never been exposed to tobacco smoke. She has never used smokeless tobacco.  Alcohol: Less than 1 beverage / month    Medication Adherence/Access: self-manages meds and fills at [facility]    Diabetes   Novolog (aspart) short acting insulin: 24  units with breakfast, and 24 units with evening meal.   Toujeo (glargine) long acting insulin 300 units/mL: 38 units daily  Aspirin 81 mg daily (reduced with recent hospital stay)  Remains off Trulicity and Jardiance.   Patient is experiencing the following side effects: constipation - Miralax and Senna available  Blood sugar monitoring: freestyle lucila - data not available during virtual visit today.  Average 202 mg/dL (188, 196, 194, 231)  Time in Target - Above - 63%, Time in Target 37%, Below 0%   Confirms that she has glucose tabs at home and knows how to treat HYPOglycemia.  Current diabetes symptoms: none  Diet/Exercise: 2 full meals with snacking in the afternoon.     Eye exam is up to date  Foot exam: due  Lab Results   Component Value Date    A1C 5.7 2024    A1C 6.6 10/25/2023    A1C 6.6 2023     Hypertension   /Chronic kidney disease/Pulmonary hypertension/CHF:  Follows with Dr. Giraldo for cardiology at [facility] and nephrology Dr. Jessika Preston.  Current medications include:  Aspirin 81 mg daily  Amiodarone 200 mg daily  Amlodipine 5 mg daily  Carvedilol 25 mg twice daily  Clopidogrel 75 mg daily  Torsemide 40 mg every morning/20 mg every evening.    Nitroglycerin 0.4 mg daily as needed  Losartan and Jardiance are on hold.   Patient reports no current medication side effects     BP Readings from Last 3 Encounters:   24 (!) 83/49   24 102/60   24 110/68       Hyperlipidemia   Current meds:  Rosuvastatin 40 m tablet daily  Vascepa 1  gram  twice daily  Patient reports no significant myalgias or other side effects.  The ASCVD Risk score (Marcella ROMERO, et al., 2019) failed to calculate for the following reasons:    The patient has a prior MI or stroke diagnosis     Recent Labs   Lab Test 24  0919 23  1006   CHOL 147 150   HDL 36* 38*   LDL 78 67   TRIG 164* 225*       GERD    Pantoprazole 40 mg once daily   Patient reports no current symptoms.   Patient feels  that current regimen is effective.       Mental Health   Depression  Paroxetine 40 mg every evening.   Patient reports no current medication side effects.  Mood is doing okay overall.       Hypothyroidism   Levothyroxine 150 mcg daily.  Taking first thing in the AM before other meds / food. No concerns.  Patient is having the following symptoms: none.        Essential tremor: Patient is taking primidone 150 mg twice daily.  Finds to be somewhat effective. Denies any side effects.      Today's Vitals: There were no vitals taken for this visit.    Wt Readings from Last 5 Encounters:   07/11/24 245 lb (111.1 kg)   06/26/24 247 lb (112 kg)   05/30/24 247 lb (112 kg)   05/02/24 247 lb (112 kg)   04/16/24 252 lb (114.3 kg)     ----------------  Post Discharge Medication Reconciliation Status: discharge medications reconciled and changed, per note/orders.    I spent 40 minutes with this patient today. All changes were made via collaborative practice agreement with Sandy Ricci MD. A copy of the visit note was provided to the patient's provider(s).    A summary of these recommendations was sent via [employer].    Luis Antonio Bass, PharmD, BCACP  Medication Therapy Management Pharmacist  Voicemail: [phone]    Telemedicine Visit Details  Type of service:  Telephone visit  Start Time:  11:00 AM  End Time:  11:40 AM     Medication Therapy Recommendations  Type 2 diabetes mellitus with other diabetic kidney complication, with long-term current use of insulin (H)    Current Medication: insulin aspart (NOVOLOG FLEXPEN) 100 UNIT/ML pen   Rationale: Does not understand instructions - Adherence - Adherence   Recommendation: Provide Education   Status: Patient Agreed - Adherence/Education          Current Medication: insulin glargine U-300 (TOUJEO SOLOSTAR) 300 UNIT/ML (1 units dial) pen   Rationale: Synergistic therapy - Needs additional medication therapy - Indication   Recommendation: Start Medication - Trulicity 0.75 MG/0.5ML  Sopn - Restart Trulicity at 0.75 mg under the skin weekly   Status: Accepted per CPA          Current Medication: insulin glargine U-300 (TOUJEO SOLOSTAR) 300 UNIT/ML (1 units dial) pen   Rationale: Dose too low - Dosage too low - Effectiveness   Recommendation: Increase Dose - Toujeo SoloStar 300 UNIT/ML Sopn   Status: Accepted per CPA

## 2024-09-13 NOTE — PATIENT INSTRUCTIONS
"Recommendations from today's MTM visit:                                                    MTM (medication therapy management) is a service provided by a clinical pharmacist designed to help you get the most of out of your medicines.      Restart Trulicity at 0.75 MG under the skin weekly.  We will start at the lower dose again to minimize potential side effects of restarting since you've been off for about a month and you stated you had about 3 pens left.     Increase Toujeo to 40 units under the skin daily as prescribed.    Consider increase Novolog to 24 units before breakfast, 5 units before lunch/afternoon snack, and 24 units before suppertime.     Follow-up: I will call you on Friday, September 27th at 11 AM for phone follow-up    It was great speaking with you today.  I value your experience and would be very thankful for your time in providing feedback in our clinic survey. In the next few days, you may receive an email or text message from [employer] with a link to a survey related to your  clinical pharmacist.\"     To schedule another MTM appointment, please call the clinic directly or you may call the MTM scheduling line at [phone] or toll-free at [phone].     My Clinical Pharmacist's contact information:                                                      Please feel free to contact me with any questions or concerns you have.      Luis Antonio Bass, PharmD, [facility]  Medication Therapy Management Pharmacist  Voicemail: [phone]  "

## 2024-09-16 ENCOUNTER — MYC MEDICAL ADVICE (OUTPATIENT)
Dept: NEPHROLOGY | Facility: CLINIC | Age: 74
End: 2024-09-16
Payer: COMMERCIAL

## 2024-09-16 ASSESSMENT — PATIENT HEALTH QUESTIONNAIRE - PHQ9
SUM OF ALL RESPONSES TO PHQ QUESTIONS 1-9: 4
10. IF YOU CHECKED OFF ANY PROBLEMS, HOW DIFFICULT HAVE THESE PROBLEMS MADE IT FOR YOU TO DO YOUR WORK, TAKE CARE OF THINGS AT HOME, OR GET ALONG WITH OTHER PEOPLE: NOT DIFFICULT AT ALL
SUM OF ALL RESPONSES TO PHQ QUESTIONS 1-9: 4

## 2024-09-17 ENCOUNTER — OFFICE VISIT (OUTPATIENT)
Dept: FAMILY MEDICINE | Facility: CLINIC | Age: 74
End: 2024-09-17
Payer: COMMERCIAL

## 2024-09-17 VITALS
WEIGHT: 252 LBS | BODY MASS INDEX: 37.21 KG/M2 | HEART RATE: 74 BPM | SYSTOLIC BLOOD PRESSURE: 81 MMHG | TEMPERATURE: 98.5 F | OXYGEN SATURATION: 90 % | DIASTOLIC BLOOD PRESSURE: 53 MMHG

## 2024-09-17 DIAGNOSIS — D46.4 REFRACTORY ANEMIA, UNSPECIFIED (H): ICD-10-CM

## 2024-09-17 DIAGNOSIS — N18.32 STAGE 3B CHRONIC KIDNEY DISEASE (H): Primary | ICD-10-CM

## 2024-09-17 DIAGNOSIS — R31.0 GROSS HEMATURIA: ICD-10-CM

## 2024-09-17 DIAGNOSIS — N18.32 STAGE 3B CHRONIC KIDNEY DISEASE (H): ICD-10-CM

## 2024-09-17 DIAGNOSIS — N39.0 URINARY TRACT INFECTION WITHOUT HEMATURIA, SITE UNSPECIFIED: ICD-10-CM

## 2024-09-17 DIAGNOSIS — F32.1 MODERATE MAJOR DEPRESSION (H): ICD-10-CM

## 2024-09-17 DIAGNOSIS — E53.8 VITAMIN B12 DEFICIENCY (NON ANEMIC): ICD-10-CM

## 2024-09-17 DIAGNOSIS — E08.3419: Primary | ICD-10-CM

## 2024-09-17 DIAGNOSIS — E03.9 HYPOTHYROIDISM, UNSPECIFIED TYPE: ICD-10-CM

## 2024-09-17 DIAGNOSIS — M25.561 ACUTE PAIN OF RIGHT KNEE: ICD-10-CM

## 2024-09-17 DIAGNOSIS — I21.4 NSTEMI (NON-ST ELEVATED MYOCARDIAL INFARCTION) (H): ICD-10-CM

## 2024-09-17 DIAGNOSIS — R74.8 ELEVATED LIVER ENZYMES: ICD-10-CM

## 2024-09-17 DIAGNOSIS — Z79.4: Primary | ICD-10-CM

## 2024-09-17 DIAGNOSIS — E55.9 VITAMIN D DEFICIENCY: ICD-10-CM

## 2024-09-17 DIAGNOSIS — I48.0 PAF (PAROXYSMAL ATRIAL FIBRILLATION) (H): ICD-10-CM

## 2024-09-17 LAB
ALBUMIN MFR UR ELPH: 28.6 MG/DL
ALBUMIN SERPL BCG-MCNC: 3.5 G/DL (ref 3.5–5.2)
ALBUMIN UR-MCNC: NEGATIVE MG/DL
ALP SERPL-CCNC: 79 U/L (ref 40–150)
ALT SERPL W P-5'-P-CCNC: 21 U/L (ref 0–50)
ANION GAP SERPL CALCULATED.3IONS-SCNC: 10 MMOL/L (ref 7–15)
APPEARANCE UR: ABNORMAL
AST SERPL W P-5'-P-CCNC: 25 U/L (ref 0–45)
BACTERIA #/AREA URNS HPF: ABNORMAL /HPF
BILIRUB SERPL-MCNC: <0.2 MG/DL
BILIRUB UR QL STRIP: NEGATIVE
BUN SERPL-MCNC: 25.5 MG/DL (ref 8–23)
CALCIUM SERPL-MCNC: 8.8 MG/DL (ref 8.8–10.4)
CHLORIDE SERPL-SCNC: 98 MMOL/L (ref 98–107)
COLOR UR AUTO: YELLOW
CREAT SERPL-MCNC: 1.63 MG/DL (ref 0.51–0.95)
CREAT UR-MCNC: 37.5 MG/DL
CREAT UR-MCNC: 37.5 MG/DL
EGFRCR SERPLBLD CKD-EPI 2021: 33 ML/MIN/1.73M2
ERYTHROCYTE [DISTWIDTH] IN BLOOD BY AUTOMATED COUNT: 14.5 % (ref 10–15)
GLUCOSE SERPL-MCNC: 162 MG/DL (ref 70–99)
GLUCOSE UR STRIP-MCNC: NEGATIVE MG/DL
HBA1C MFR BLD: 6.8 % (ref 0–5.6)
HCO3 SERPL-SCNC: 31 MMOL/L (ref 22–29)
HCT VFR BLD AUTO: 30.3 % (ref 35–47)
HGB BLD-MCNC: 9.8 G/DL (ref 11.7–15.7)
HGB UR QL STRIP: ABNORMAL
KETONES UR STRIP-MCNC: NEGATIVE MG/DL
LEUKOCYTE ESTERASE UR QL STRIP: ABNORMAL
MCH RBC QN AUTO: 33.7 PG (ref 26.5–33)
MCHC RBC AUTO-ENTMCNC: 32.3 G/DL (ref 31.5–36.5)
MCV RBC AUTO: 104 FL (ref 78–100)
MICROALBUMIN UR-MCNC: 69.9 MG/L
MICROALBUMIN/CREAT UR: 186.4 MG/G CR (ref 0–25)
NITRATE UR QL: POSITIVE
PH UR STRIP: 7 [PH] (ref 5–7)
PHOSPHATE SERPL-MCNC: 4.4 MG/DL (ref 2.5–4.5)
PLATELET # BLD AUTO: 55 10E3/UL (ref 150–450)
POTASSIUM SERPL-SCNC: 4.1 MMOL/L (ref 3.4–5.3)
PROT SERPL-MCNC: 6.7 G/DL (ref 6.4–8.3)
PROT/CREAT 24H UR: 0.76 MG/MG CR (ref 0–0.2)
RBC # BLD AUTO: 2.91 10E6/UL (ref 3.8–5.2)
RBC #/AREA URNS AUTO: ABNORMAL /HPF
SODIUM SERPL-SCNC: 139 MMOL/L (ref 135–145)
SP GR UR STRIP: 1.01 (ref 1–1.03)
SQUAMOUS #/AREA URNS AUTO: ABNORMAL /LPF
UROBILINOGEN UR STRIP-ACNC: 0.2 E.U./DL
VIT D+METAB SERPL-MCNC: 44 NG/ML (ref 20–50)
WBC # BLD AUTO: 2.8 10E3/UL (ref 4–11)
WBC #/AREA URNS AUTO: >100 /HPF

## 2024-09-17 PROCEDURE — 87086 URINE CULTURE/COLONY COUNT: CPT | Performed by: FAMILY MEDICINE

## 2024-09-17 PROCEDURE — 87186 SC STD MICRODIL/AGAR DIL: CPT | Performed by: FAMILY MEDICINE

## 2024-09-17 PROCEDURE — 85027 COMPLETE CBC AUTOMATED: CPT | Performed by: FAMILY MEDICINE

## 2024-09-17 PROCEDURE — G0008 ADMIN INFLUENZA VIRUS VAC: HCPCS | Performed by: FAMILY MEDICINE

## 2024-09-17 PROCEDURE — 82607 VITAMIN B-12: CPT | Performed by: FAMILY MEDICINE

## 2024-09-17 PROCEDURE — 82306 VITAMIN D 25 HYDROXY: CPT | Performed by: FAMILY MEDICINE

## 2024-09-17 PROCEDURE — 81001 URINALYSIS AUTO W/SCOPE: CPT | Performed by: FAMILY MEDICINE

## 2024-09-17 PROCEDURE — 84156 ASSAY OF PROTEIN URINE: CPT | Performed by: FAMILY MEDICINE

## 2024-09-17 PROCEDURE — G2211 COMPLEX E/M VISIT ADD ON: HCPCS | Performed by: FAMILY MEDICINE

## 2024-09-17 PROCEDURE — 80053 COMPREHEN METABOLIC PANEL: CPT | Performed by: FAMILY MEDICINE

## 2024-09-17 PROCEDURE — 84100 ASSAY OF PHOSPHORUS: CPT | Performed by: FAMILY MEDICINE

## 2024-09-17 PROCEDURE — 83036 HEMOGLOBIN GLYCOSYLATED A1C: CPT | Mod: GZ | Performed by: FAMILY MEDICINE

## 2024-09-17 PROCEDURE — 82043 UR ALBUMIN QUANTITATIVE: CPT | Performed by: FAMILY MEDICINE

## 2024-09-17 PROCEDURE — 99214 OFFICE O/P EST MOD 30 MIN: CPT | Performed by: FAMILY MEDICINE

## 2024-09-17 PROCEDURE — 90662 IIV NO PRSV INCREASED AG IM: CPT | Performed by: FAMILY MEDICINE

## 2024-09-17 PROCEDURE — 87088 URINE BACTERIA CULTURE: CPT | Performed by: FAMILY MEDICINE

## 2024-09-17 PROCEDURE — 82570 ASSAY OF URINE CREATININE: CPT | Performed by: FAMILY MEDICINE

## 2024-09-17 PROCEDURE — 36415 COLL VENOUS BLD VENIPUNCTURE: CPT | Performed by: FAMILY MEDICINE

## 2024-09-17 RX ORDER — PAROXETINE 20 MG/1
40 TABLET, FILM COATED ORAL EVERY EVENING
Qty: 180 TABLET | Refills: 3 | Status: SHIPPED | OUTPATIENT
Start: 2024-09-17

## 2024-09-17 RX ORDER — LIDOCAINE 4 G/G
1 PATCH TOPICAL EVERY 24 HOURS
Qty: 90 PATCH | Refills: 3 | Status: SHIPPED | OUTPATIENT
Start: 2024-09-17

## 2024-09-17 RX ORDER — LEVOTHYROXINE SODIUM 150 UG/1
150 TABLET ORAL DAILY
Qty: 90 TABLET | Refills: 1 | Status: SHIPPED | OUTPATIENT
Start: 2024-09-17

## 2024-09-17 ASSESSMENT — PAIN SCALES - GENERAL: PAINLEVEL: MILD PAIN (3)

## 2024-09-17 NOTE — PROGRESS NOTES
Assessment & Plan     NSTEMI (non-ST elevated myocardial infarction) (H)  Has follow-up with cardiology, had stent placed    PAF (paroxysmal atrial fibrillation) (H)  Has ziopatch on now, ? Aftib with RVR cause MI or vice versa  Has follow-up with [facility]    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, with long-term current use of insulin, unspecified laterality (H)  Well controlled on meds  Had stopped trulicity so now increasing dose back up to previous  - HEMOGLOBIN A1C; Future  - dulaglutide (TRULICITY) 1.5 MG/0.5ML pen; Inject 1.5 mg subcutaneously every 7 days.  - HEMOGLOBIN A1C    Stage 3b chronic kidney disease (H)  Monitoring, she sees nephrology  - CBC with platelets  - Albumin Random Urine Quantitative with Creat Ratio  - Protein  random urine  - Phosphorus    Moderate major depression (H)  Doing well n meds  - PARoxetine (PAXIL) 20 MG tablet; Take 2 tablets (40 mg) by mouth every evening.    Hypothyroidism, unspecified type  Refill, labs UTD  - levothyroxine (SYNTHROID/LEVOTHROID) 150 MCG tablet; Take 1 tablet (150 mcg) by mouth daily.    Acute pain of right knee  From fall with NSTEMI, these patches were working well in the hospitabl  - Lidocaine (LIDOCARE) 4 % Patch; Place 1 patch over 12 hours onto the skin every 24 hours. To knee for pain To prevent lidocaine toxicity, patient should be patch free for 12 hrs daily.    Urinary tract infection without hematuria, site unspecified  Recheck as she was discharged on abx x 3 days  - UA Macroscopic with reflex to Microscopic and Culture - Lab Collect; Future  - UA Macroscopic with reflex to Microscopic and Culture - Lab Collect  - Urine Microscopic Exam  - Urine Culture    Vitamin B12 deficiency (non anemic)  recheck  - Vitamin B12; Future  - Vitamin B12    Elevated liver enzymes  recheck  - Comprehensive metabolic panel (BMP + Alb, Alk Phos, ALT, AST, Total. Bili, TP); Future  - Comprehensive  metabolic panel (BMP + Alb, Alk Phos, ALT, AST, Total. Bili, TP)    Vitamin D deficiency  recheck  - Vitamin D Deficiency; Future  - Vitamin D Deficiency    Refractory anemia, unspecified (H)  Sees hematology        The longitudinal plan of care for the diagnosis(es)/condition(s) as documented were addressed during this visit. Due to the added complexity in care, I will continue to support Yadi in the subsequent management and with ongoing continuity of care.    MED REC REQUIRED  Post Medication Reconciliation Status: discharge medications reconciled and changed, per note/orders        Subjective   Yadi is a 74 year old, presenting for the following health issues:  Hospital F/U (8/16/2024 Heart attack)      9/17/2024     7:49 AM   Additional Questions   Roomed by medhat   Accompanied by daughter Nikki WILLOUGHBY         Hospital Follow-up Visit:    [facility]ing [facility]   Date of Admission: 8/16/24  Date of Discharge: 8/24/24  Reason(s) for Admission: heart attack and SUZE  Was the patient in the ICU or did the patient experience delirium during hospitalization?  Yes   No concerns with her cognition today, it is at baseline  Do you have any other stressors you would like to discuss with your provider? No    Problems taking medications regularly:  None  Medication changes since discharge: noted in chart  Problems adhering to non-medication therapy:  None    Summary of hospitalization:  CareEverywhere information obtained and reviewed  Diagnostic Tests/Treatments reviewed.  Follow up needed: [place] currently on patient  Other Healthcare Providers Involved in Patient s Care:          cardiology  Update since discharge: improved.     Hospital Course   Yadi Iniguez is a(n) 74 y.o. with a history of diabetes mellitus type 2 insulin-dependent, CVA with chronic left-sided weakness, chronic diastolic CHF, obesity, obstructive sleep apnea on CPAP, chronic kidney disease stage III, hypertension, who  was in her usual state of health when she developed jaw pain followed by pain in the anterior chest which was dull pressure-like, and did not radiate to left arm and she is found to have a NSTEMI. Cardiology consulted. The patient was taken to the cath lab on 8/16 and received atherectomy and drug eluting stent to proximal RCA. During atherectomy, patient had RCA perforation treated with the drug eluting stent. Post angiogram, she developed rising Creatinine and oliguria. Nephrology was consulted. She was started on IV diuresis 8/21 and Creatinine started to plateau on 8/21. She had hematuria noted on 8/19 with CT scan showing possible clot material in the bladder. Urology was consulted. She was started on continuous irrigation and urine cleared by 8/21. Outpatient cystoscopy was recommended. Bladder irrigation stopped 8/21. Creatinine started to improve 8/22. She was seen by PT / OT. STR recommended. Social work consulted for STR placement with plans for discharge to STR 8/24/24. Her creatinine continued to improve. Upon review of her UA she was discharged with 3 days of Cefdinir for possible UTI.     Recommendations for Outpatient Provider PCP: Hannah Nagel MD     Admission: 8/16/2024 @ [facility]    Specific recommendations to be addressed at the follow up visit: Follow up with cardiology.   Medication changes: As per discharge med list.   Follow up labs/imaging: Per PCP's discretion.   Other specialty follow-up not included in DC orders: Cardiology.         Follow-Up     Plan of care communicated with patient and family                   Review of Systems  Constitutional, HEENT, cardiovascular, pulmonary, gi and gu systems are negative, except as otherwise noted.      Objective    BP (!) 81/53   Pulse 74   Temp 98.5  F (36.9  C) (Oral)   Wt 114.3 kg (252 lb)   SpO2 90%   BMI 37.21 kg/m    Body mass index is 37.21 kg/m .  Physical Exam   GENERAL: alert and no distress  RESP: lungs clear to  auscultation - no rales, rhonchi or wheezes  CV: regular rate and rhythm, normal S1 S2, no S3 or S4, no murmur, click or rub, no peripheral edema   ABDOMEN: soft, nontender, no hepatosplenomegaly, no masses and bowel sounds normal  MS: no gross musculoskeletal defects noted, mild edema    Results for orders placed or performed in visit on 09/17/24 (from the past 24 hour(s))   HEMOGLOBIN A1C   Result Value Ref Range    Hemoglobin A1C 6.8 (H) 0.0 - 5.6 %   UA Macroscopic with reflex to Microscopic and Culture - Lab Collect    Specimen: Urine, Clean Catch   Result Value Ref Range    Color Urine Yellow Colorless, Straw, Light Yellow, Yellow    Appearance Urine Cloudy (A) Clear    Glucose Urine Negative Negative mg/dL    Bilirubin Urine Negative Negative    Ketones Urine Negative Negative mg/dL    Specific Gravity Urine 1.015 1.003 - 1.035    Blood Urine Small (A) Negative    pH Urine 7.0 5.0 - 7.0    Protein Albumin Urine Negative Negative mg/dL    Urobilinogen Urine 0.2 0.2, 1.0 E.U./dL    Nitrite Urine Positive (A) Negative    Leukocyte Esterase Urine Large (A) Negative   Urine Microscopic Exam   Result Value Ref Range    Bacteria Urine Moderate (A) None Seen /HPF    RBC Urine 5-10 (A) 0-2 /HPF /HPF    WBC Urine >100 (A) 0-5 /HPF /HPF    Squamous Epithelials Urine Few (A) None Seen /LPF           Signed Electronically by: Sandy Ricci MD    Answers submitted by the patient for this visit:  Patient Health Questionnaire (Submitted on 9/16/2024)  If you checked off any problems, how difficult have these problems made it for you to do your work, take care of things at home, or get along with other people?: Not difficult at all  PHQ9 TOTAL SCORE: 4

## 2024-09-18 ENCOUNTER — MYC MEDICAL ADVICE (OUTPATIENT)
Dept: FAMILY MEDICINE | Facility: CLINIC | Age: 74
End: 2024-09-18
Payer: COMMERCIAL

## 2024-09-18 ENCOUNTER — MYC MEDICAL ADVICE (OUTPATIENT)
Dept: ONCOLOGY | Facility: CLINIC | Age: 74
End: 2024-09-18
Payer: COMMERCIAL

## 2024-09-18 LAB — VIT B12 SERPL-MCNC: 590 PG/ML (ref 232–1245)

## 2024-09-18 RX ORDER — CEFUROXIME AXETIL 250 MG/1
250 TABLET ORAL 2 TIMES DAILY
Qty: 14 TABLET | Refills: 0 | Status: SHIPPED | OUTPATIENT
Start: 2024-09-18

## 2024-09-19 LAB
BACTERIA UR CULT: ABNORMAL
BACTERIA UR CULT: ABNORMAL

## 2024-09-20 ENCOUNTER — TELEPHONE (OUTPATIENT)
Dept: FAMILY MEDICINE | Facility: CLINIC | Age: 74
End: 2024-09-20
Payer: COMMERCIAL

## 2024-09-20 NOTE — TELEPHONE ENCOUNTER
Forms faxed to STAT scan, homecare and copy saved in TC folder.     Brook GUSMAN,    [facility]

## 2024-09-20 NOTE — TELEPHONE ENCOUNTER
Forms/Letter Request    Type of form/letter: Home Health Certification    Do we have the form/letter: Yes:     Who is the form from? Home care    Where did/will the form come from? form was faxed in    When is form/letter needed by: n/a    How would you like the form/letter returned: Fax :      Placed in providers basket for review. Please route back to  when completed.    Brook GUSMAN,    [facility]

## 2024-09-23 ENCOUNTER — MEDICAL CORRESPONDENCE (OUTPATIENT)
Dept: HEALTH INFORMATION MANAGEMENT | Facility: CLINIC | Age: 74
End: 2024-09-23

## 2024-09-23 ENCOUNTER — VIRTUAL VISIT (OUTPATIENT)
Dept: NEPHROLOGY | Facility: CLINIC | Age: 74
End: 2024-09-23
Payer: COMMERCIAL

## 2024-09-23 VITALS — WEIGHT: 247 LBS | BODY MASS INDEX: 36.48 KG/M2

## 2024-09-23 DIAGNOSIS — Z87.891 HISTORY OF TOBACCO USE: ICD-10-CM

## 2024-09-23 DIAGNOSIS — N18.32 ANEMIA DUE TO STAGE 3B CHRONIC KIDNEY DISEASE (H): Primary | ICD-10-CM

## 2024-09-23 DIAGNOSIS — E11.29 TYPE 2 DIABETES MELLITUS WITH OTHER DIABETIC KIDNEY COMPLICATION, WITH LONG-TERM CURRENT USE OF INSULIN (H): ICD-10-CM

## 2024-09-23 DIAGNOSIS — I10 BENIGN ESSENTIAL HYPERTENSION: ICD-10-CM

## 2024-09-23 DIAGNOSIS — Z79.4 TYPE 2 DIABETES MELLITUS WITH OTHER DIABETIC KIDNEY COMPLICATION, WITH LONG-TERM CURRENT USE OF INSULIN (H): ICD-10-CM

## 2024-09-23 DIAGNOSIS — D63.1 ANEMIA DUE TO STAGE 3B CHRONIC KIDNEY DISEASE (H): Primary | ICD-10-CM

## 2024-09-23 DIAGNOSIS — N18.32 STAGE 3B CHRONIC KIDNEY DISEASE (H): ICD-10-CM

## 2024-09-23 PROCEDURE — 99215 OFFICE O/P EST HI 40 MIN: CPT | Mod: 95 | Performed by: INTERNAL MEDICINE

## 2024-09-23 ASSESSMENT — PAIN SCALES - GENERAL: PAINLEVEL: NO PAIN (0)

## 2024-09-23 NOTE — PROGRESS NOTES
Virtual Visit Details    Type of service:  Video Visit     Originating Location (pt. Location): Home    Distant Location (provider location):  Off-site  Platform used for Video Visit: AmWell      09/23/24   I was asked to see this patient by Dr. Ricci for evaluation of CKD.     CC: CKD    HPI: Yadi Iniguez is a 74 year old female who presents for evaluation of CKD.  Initial visit March 2022:  Ms. Yao's hx is significant for COPD, hypertension, AV stenosis, depression, DM, hypothyroidism, hyperlipidemia, tobacco hx, CHF/pulmonary hypertension. On review of her creatinine levels, there has been much variability:  - at 0.6 in 2008  - SUZE to 1.4 in Jan 2010 but improved to 0.76 in April 2011  - SUZE to 1.82 in Jan 2017 but improved to ~1 in Sep 2017  - SUZE to 1.45 in April 2018 but improved to 1.2 in Jan 2021  - SUZE to 1.81 in Sept 2021 but more recently 1.5-1.6 since.   She has had some albuminuria dating back to 2010. Fenofibrate therapy since 2018. Currently takes torsemide 20 mg AM and 20 mg PM - followed by cardiology clinic at [facility] for CHF. Dx with diabetes in her 40s.     - History of Hematuria: No  - Swelling: hx but none at this time. Breathing is comfortable. Weight recently 266-271 lbs. Torsemide 20 mg BID at this time. No lighhteadedness/dizziness at this time.   - Hx of UTIs: no  - Hx of stones: no  - Rashes/Joint pain: no rash; joint pain - generalized  - Family hx of kidney disease: no  - NSAID use: only tylenol    07/12/22: video visit. At the time of her initial visit I did a renal ultrasound which showed a 16 mm kidney lesion. MRI was then done which did not show any kidney lesion. We stopped the fenofibrate at that visit as well. She was also noted to have a monoclonal gammopathy - was seen by hematology in May - they have plans to monitor with follow-up 4 months later. Torsemide was increased a month ago when weight was increasing and becoming more symptomatic. Currently on  "torsemide 40 mg twice a day. Weight is stable - 269-272 lbs. Last month she had gotten up to 278 lbs. No swelling currently. Breathing is comfortable. No lightheadedness/dizziness. Blood pressure has been 135/64, 128/56, 141/66, 125/72. 90/58 today in clinic. She felt a little tired. NO lighhteadedness on standing. No orthopnea.     01/10/23: video visit. Seen by cardiology at [facility] yesterday - no changes made to her medications but the cardiologist notes that she would be in favor of trialing a lower dose of torsemide if needed. Was thirsty at the time of the lab test. No swelling. Breathing is comfortable. BP has been 101/-120/58-80. Follows with hematology for MGUS.     04/11/23: video visit. Creatinine has been 1.5-1.8 since 2021. Had SUZE to 2.2 in Jan- diuretic lowered. Sleeping through the night. WEight has been 258-262 lbs.  This is down from 270 last year. No swelling. No SOB. No orthopnea.     07/11/23: video visit. Weight most recently 252-255 lbs most recently. She is not as hungry as she used to be. Currently on 40 mg AM and 20 mg PM. No SOB. No swelling. No lightheadedness per patient. She had some lightheadedness on standing so her amlodipine was cut back - 90-56 BP readings prior to this. Now with the amlodipine change her BP has been 123/60 P 88.  Cardiology appt coming up and they will do a volume assessment in person with them for that visit.     01/15/24: video visit. 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 Sxs included left leg weakness, dizziness, and aphasia. Discharged 12/22/23. She was changed from ASA to plavix and lipitor to crestor.Had follow-up with neurology 1/9/24. There they stopped plavix and started  mg daily. Per their note, BPgoal is <140/90 and less than 130/80 if tolerated. She reports feeling great at this time. She has some left sided weakness still - she is careful walking now. Aphasia has resolved. BP has been \"normal\" - /60-80. She does not go to 90 " "often. Usually more typical 110. I questioned her about the low readings which prompted her to go get the actual readings: 140/77, 111/66, 106/64, 103/46, 101/43, 130/62, 128/57.   She is taking torsemide 40 AM and 20 PM, carvedilol 25 mg BID, losartan 12.5 mg daily. No swelling.     07/09/24: video visit. Still left side weakness but overall doing well. She feels steady with her volume status - doesn't feel she is retaining water. Weight has been 245-247 is a steady weight for her. No swelling in the legs. Breathing is comfortable. BP at home has been high in the AM before meds but then down to normal levels. Highest reading in the past week was 143 before meds. Lowest BP reading will get to 90s. Typically 110-120. She has plans to move from carvedilol to propanolol - was just given directions to do propanolol 20 mg BID in place of the carvedilol. Cardiology has planned to do Jardiance 10 as well - they have picked up the prescription but not started it yet as want to make one change at a time.     09/23/24: video visit. Creatinine baseline seems to be 1.45-1.7 with a few times rising above this. Stable at 1.63 at this time.   Admitted to [facility] 8/16-8/24/24 with NSTEMI, SUZE, bladder clots/hematuria. Nephrology was consulted and in their notes they felt her SUZE was contrast induced nephropathy. Cr Peaked at 5.1. Creatinine was 2.52 on 8/24/24 at times of discharge - now improved back to her previous baseline - currently 1.63.    Feeling good now - breathing is stable. Current weight 247 lbs - still feels like a good/stable weight. Swelling has improved significantly - back to normal. Came home from TCU with some swelling - much improved now - back to \"chicken legs\". BP at home is 124/57 this AM. They have been in touch with cardiology regarding some low pressures. 98/47 at times. Currently losartan and jardiance are on hold. Cystoscopy scheduled for today with urology.     Current Outpatient Medications "   Medication Sig Dispense Refill    acetaminophen (TYLENOL) 500 MG tablet Take 1,000 mg by mouth 2 times daily      amiodarone (PACERONE) 200 MG tablet Take 200 mg by mouth daily.      amLODIPine (NORVASC) 5 MG tablet Take 5 mg by mouth daily.      aspirin 81 MG EC tablet Take 81 mg by mouth daily.      blood glucose (NO BRAND SPECIFIED) test strip Use to test blood sugar 3 times daily or as directed./ Brand per insurance 300 strip 1    blood glucose monitoring (NO BRAND SPECIFIED) meter device kit Use to test blood sugar 3 times daily or as directed./ Brand per insurance 1 kit 0    calcium carbonate (OS-NATACHA) 1500 (600 Ca) MG tablet Take 600 mg by mouth daily With vitamin D (unknown dose): Patient taking 1 tablet once daily      carvedilol (COREG) 25 MG tablet Take 25 mg by mouth 2 times daily (with meals).      cefuroxime (CEFTIN) 250 MG tablet Take 1 tablet (250 mg) by mouth 2 times daily. 14 tablet 0    clopidogrel (PLAVIX) 75 MG tablet Take 75 mg by mouth daily.      Continuous Glucose Sensor (FREESTYLE MARI 3 SENSOR) [facility] 1 each every 14 days Use 1 sensor every 14 days. Use to read blood sugars per 's instructions. 6 each 5    dulaglutide (TRULICITY) 0.75 MG/0.5ML pen Inject 0.75 mg subcutaneously every 7 days.      dulaglutide (TRULICITY) 1.5 MG/0.5ML pen Inject 1.5 mg subcutaneously every 7 days. 6 mL 1    icosapent ethyl (VASCEPA) 1 g CAPS capsule Take 2 g by mouth 2 times daily      insulin aspart (NOVOLOG FLEXPEN) 100 UNIT/ML pen INJECT SUBCUTANEOUSLY 24 UNITS BEFORE BREAKFAST, 5 UNITS BEFORE LUNCH, 24 UNITS BEFORE DINNER.  (MAX DOSE 70 UNITS/DAY FOR ADJUSTMENT) (Patient taking differently: INJECT SUBCUTANEOUSLY 26 UNITS BEFORE BREAKFAST, 24UNITS BEFORE DINNER.  (MAX DOSE 70 UNITS/DAY FOR ADJUSTMENT)) 75 mL 1    insulin glargine U-300 (TOUJEO SOLOSTAR) 300 UNIT/ML (1 units dial) pen Inject 40 Units subcutaneously at bedtime INJECT 40 UNITS UNDER THE SKIN AT BEDTIME (Patient taking differently:  Inject 38 Units subcutaneously at bedtime. INJECT 40 UNITS UNDER THE SKIN AT BEDTIME) 15 mL 3    insulin pen needle (32G X 4 MM) 32G X 4 MM miscellaneous Use 3 pen needles daily or as directed.  Per insurance and patient preference 270 each 0    levothyroxine (SYNTHROID/LEVOTHROID) 150 MCG tablet Take 1 tablet (150 mcg) by mouth daily. 90 tablet 1    Lidocaine (LIDOCARE) 4 % Patch Place 1 patch over 12 hours onto the skin every 24 hours. To knee for pain To prevent lidocaine toxicity, patient should be patch free for 12 hrs daily. 90 patch 3    Lidocaine (LIDOCARE) 4 % Patch Apply to intact skin to cover most painful area for max 12hr per 24hr period      nitroGLYcerin (NITROSTAT) 0.4 MG sublingual tablet Place 1 Tablet (0.4 mg) under the tongue every 5 minutes if needed for Chest pain 1st choice (Hold for SBP less than 90 mmHg.). Up to 3 tablets in 15 minutes      pantoprazole (PROTONIX) 40 MG EC tablet Take 1 tablet by mouth daily.      PARoxetine (PAXIL) 20 MG tablet Take 2 tablets (40 mg) by mouth every evening. 180 tablet 3    polyethylene glycol (MIRALAX) 17 GM/Dose powder Take 1 packet by mouth daily as needed      primidone (MYSOLINE) 50 MG tablet Take 3 tablets in the morning (150 mg) and 3 tablets at night (150 mg). 540 tablet 3    rosuvastatin (CRESTOR) 40 MG tablet Take 1 tablet by mouth daily      senna (SENOKOT) 8.6 MG tablet Take 8.6 mg by mouth 2 times daily as needed for constipation.      torsemide (DEMADEX) 20 MG tablet Take 40 mg QAM and 20 mg  tablet 3     No current facility-administered medications for this visit.       Exam:  Wt 112 kg (247 lb)   BMI 36.48 kg/m    GENERAL APPEARANCE: alert and no distress  Breathing appears nonlabored.     Results:  No visits with results within 1 Day(s) from this visit.   Latest known visit with results is:   Office Visit on 09/17/2024   Component Date Value Ref Range Status    WBC Count 09/17/2024 2.8 (L)  4.0 - 11.0 10e3/uL Final    RBC Count  09/17/2024 2.91 (L)  3.80 - 5.20 10e6/uL Final    Hemoglobin 09/17/2024 9.8 (L)  11.7 - 15.7 g/dL Final    Hematocrit 09/17/2024 30.3 (L)  35.0 - 47.0 % Final    MCV 09/17/2024 104 (H)  78 - 100 fL Final    MCH 09/17/2024 33.7 (H)  26.5 - 33.0 pg Final    MCHC 09/17/2024 32.3  31.5 - 36.5 g/dL Final    RDW 09/17/2024 14.5  10.0 - 15.0 % Final    Platelet Count 09/17/2024 55 (L)  150 - 450 10e3/uL Final    Creatinine Urine mg/dL 09/17/2024 37.5  mg/dL Final    The reference ranges have not been established in urine creatinine. The results should be integrated into the clinical context for interpretation.    Albumin Urine mg/L 09/17/2024 69.9  mg/L Final    The reference ranges have not been established in urine albumin. The results should be integrated into the clinical context for interpretation.    Albumin Urine mg/g Cr 09/17/2024 186.40 (H)  0.00 - 25.00 mg/g Cr Final    Microalbuminuria is defined as an albumin:creatinine ratio of 17 to 299 for males and 25 to 299 for females. A ratio of albumin:creatinine of 300 or higher is indicative of overt proteinuria.  Due to biologic variability, positive results should be confirmed by a second, first-morning random or 24-hour timed urine specimen. If there is discrepancy, a third specimen is recommended. When 2 out of 3 results are in the microalbuminuria range, this is evidence for incipient nephropathy and warrants increased efforts at glucose control, blood pressure control, and institution of therapy with an angiotensin-converting-enzyme (ACE) inhibitor (if the patient can tolerate it).      Total Protein Urine mg/dL 09/17/2024 28.6    mg/dL Final    The reference ranges have not been established in urine protein. The results should be integrated into the clinical context for interpretation.    Total Protein Urine mg/mg Creat 09/17/2024 0.76 (H)  0.00 - 0.20 mg/mg Cr Final    Creatinine Urine mg/dL 09/17/2024 37.5  mg/dL Final    The reference ranges have not been  established in urine creatinine. The results should be integrated into the clinical context for interpretation.    Hemoglobin A1C 09/17/2024 6.8 (H)  0.0 - 5.6 % Final    Normal <5.7%   Prediabetes 5.7-6.4%    Diabetes 6.5% or higher     Note: Adopted from ADA consensus guidelines.    Color Urine 09/17/2024 Yellow  Colorless, Straw, Light Yellow, Yellow Final    Appearance Urine 09/17/2024 Cloudy (A)  Clear Final    Glucose Urine 09/17/2024 Negative  Negative mg/dL Final    Bilirubin Urine 09/17/2024 Negative  Negative Final    Ketones Urine 09/17/2024 Negative  Negative mg/dL Final    Specific Gravity Urine 09/17/2024 1.015  1.003 - 1.035 Final    Blood Urine 09/17/2024 Small (A)  Negative Final    pH Urine 09/17/2024 7.0  5.0 - 7.0 Final    Protein Albumin Urine 09/17/2024 Negative  Negative mg/dL Final    Urobilinogen Urine 09/17/2024 0.2  0.2, 1.0 E.U./dL Final    Nitrite Urine 09/17/2024 Positive (A)  Negative Final    Leukocyte Esterase Urine 09/17/2024 Large (A)  Negative Final    Vitamin B12 09/17/2024 590  232 - 1,245 pg/mL Final    Sodium 09/17/2024 139  135 - 145 mmol/L Final    Potassium 09/17/2024 4.1  3.4 - 5.3 mmol/L Final    Carbon Dioxide (CO2) 09/17/2024 31 (H)  22 - 29 mmol/L Final    Anion Gap 09/17/2024 10  7 - 15 mmol/L Final    Urea Nitrogen 09/17/2024 25.5 (H)  8.0 - 23.0 mg/dL Final    Creatinine 09/17/2024 1.63 (H)  0.51 - 0.95 mg/dL Final    GFR Estimate 09/17/2024 33 (L)  >60 mL/min/1.73m2 Final    eGFR calculated using 2021 CKD-EPI equation.    Calcium 09/17/2024 8.8  8.8 - 10.4 mg/dL Final    Reference intervals for this test were updated on 7/16/2024 to reflect our healthy population more accurately. There may be differences in the flagging of prior results with similar values performed with this method. Those prior results can be interpreted in the context of the updated reference intervals.    Chloride 09/17/2024 98  98 - 107 mmol/L Final    Glucose 09/17/2024 162 (H)  70 - 99 mg/dL  Final    Alkaline Phosphatase 09/17/2024 79  40 - 150 U/L Final    AST 09/17/2024 25  0 - 45 U/L Final    ALT 09/17/2024 21  0 - 50 U/L Final    Protein Total 09/17/2024 6.7  6.4 - 8.3 g/dL Final    Albumin 09/17/2024 3.5  3.5 - 5.2 g/dL Final    Bilirubin Total 09/17/2024 <0.2  <=1.2 mg/dL Final    Vitamin D, Total (25-Hydroxy) 09/17/2024 44  20 - 50 ng/mL Final    optimum levels    Phosphorus 09/17/2024 4.4  2.5 - 4.5 mg/dL Final    Bacteria Urine 09/17/2024 Moderate (A)  None Seen /HPF Final    RBC Urine 09/17/2024 5-10 (A)  0-2 /HPF /HPF Final    WBC Urine 09/17/2024 >100 (A)  0-5 /HPF /HPF Final    Squamous Epithelials Urine 09/17/2024 Few (A)  None Seen /LPF Final    Culture 09/17/2024 >100,000 CFU/mL Pseudomonas aeruginosa (A)   Final    Culture 09/17/2024 >100,000 CFU/mL Pseudomonas aeruginosa (A)   Final      Lab results were reviewed and interpreted.       Assessment/Plan:   Stage 3b chronic kidney disease (H)  At risk for CKD in the setting of diabetes, hypertension, CHF on diuretics, tobacco hx, obesity as a potential risk for secondary FSGS, and also fenofibrate use. She has no hematuria which is reassuring, likely mild proteinuria secondary to obesity, diabetes, arterionephrosclerosis. We stopped fenofibrate at her previous visit - no improvement in creatinine seen but also had adjustments to torsemide which may have caused the lack of improvement. Would like to avoid fenofibrate going forward. Educated on risk of cardiorenal physiology - want to avoid volume overload for her CHF but also avoid dehydration to avoid SUZE and the potential need to adjust diuretics to keep this balanced. Stable.    With recent SUZE during hospitalization - pleased to see she is now back to her previous baseline. We will montor again in a few weeks to assure stability. Will defer to cardiology as to timing of adding back jardiance and losartan with recent STEMI and BPs low at times. If added back, would recommend labs in the  weeks following to assure stability. Ideally would avoid pressures less than 100.     CHF:  s/p recent STEMI - as noted above, will defer jardiance and losartan mgmt to cardiology for the time being. She is currently off of both and creatinine is back at her previous baseline.      DM: last A1C 6.8%    Hypertension: as noted above, I am going to defer to cardiology on adjustment of medications for the time being.     Hx of tobacco: at risk for secondary FSGS - away from tobacco    Obesity: educated on benefits of weight loss/healthy lifestyle     Abnormal kidney ultrasound: ultrasound showed a concerning lesion but then the MRI was normal. Reviewed with Radiology July 2022 and they do not feel follow-up needed.     Monoclonal gammopathy: seen by hematology. Has thrombocytopenia as well - defer monitoring/mgmt to hematology.     Anemia: hemoglobin 9.8 - recent hematuria episode-  will get iron studies with upcoming labs    Hematuria: has cystoscopy/urology follow-up this afternoon.     Patient Instructions   Repeat labs In 2 weeks - [facility] [employer]     [id number] AM video visit via [employer] - onsite [place]    43 minutes spent by me on the date of the encounter doing chart review, review of test results, interpretation of tests, patient visit, and documentation     Jessika Preston, DO

## 2024-09-23 NOTE — NURSING NOTE
Current patient location: [address] [place] [address]    Is the patient currently in the state of [place]? YES    Visit mode:VIDEO    If the visit is dropped, the patient can be reconnected by: VIDEO VISIT: Text to cell phone:   Telephone Information:   Mobile [phone]       Will anyone else be joining the visit? NO  (If patient encounters technical issues they should call [phone]    How would you like to obtain your AVS? MyChart    Are changes needed to the allergy or medication list? No    Are refills needed on medications prescribed by this physician? NO    Rooming Documentation:  Questionnaire(s) not pre-assigned    Reason for visit: Video Visit (Recheck)    Tammie ORTEGA

## 2024-09-25 ENCOUNTER — PATIENT OUTREACH (OUTPATIENT)
Dept: CARE COORDINATION | Facility: CLINIC | Age: 74
End: 2024-09-25
Payer: COMMERCIAL

## 2024-09-27 ENCOUNTER — VIRTUAL VISIT (OUTPATIENT)
Dept: PHARMACY | Facility: CLINIC | Age: 74
End: 2024-09-27
Payer: COMMERCIAL

## 2024-09-27 DIAGNOSIS — E11.29 TYPE 2 DIABETES MELLITUS WITH OTHER DIABETIC KIDNEY COMPLICATION, WITH LONG-TERM CURRENT USE OF INSULIN (H): Primary | ICD-10-CM

## 2024-09-27 DIAGNOSIS — Z79.4 TYPE 2 DIABETES MELLITUS WITH OTHER DIABETIC KIDNEY COMPLICATION, WITH LONG-TERM CURRENT USE OF INSULIN (H): Primary | ICD-10-CM

## 2024-09-27 PROCEDURE — 99606 MTMS BY PHARM EST 15 MIN: CPT | Mod: 93 | Performed by: PHARMACIST

## 2024-09-27 NOTE — PROGRESS NOTES
Medication Therapy Management (MTM) Encounter    ASSESSMENT:                            Medication Adherence/Access: See below for considerations    Diabetes: Improved    PLAN:                            ***    Follow-up: {followuptest2:089246}    SUBJECTIVE/OBJECTIVE:                          Yadi Iniguez is a 74 year old female seen for a follow-up visit.       Reason for visit: Diabetes Follow-Up.    Allergies/ADRs: Reviewed in chart  Past Medical History: Reviewed in chart  Tobacco: She reports that she quit smoking about 3 years ago. Her smoking use included cigarettes. She started smoking about 58 years ago. She has a 55.1 pack-year smoking history. She has never been exposed to tobacco smoke. She has never used smokeless tobacco.  Alcohol: Less than 1 beverage / month    Medication Adherence/Access: self-manages meds and fills at  pharmacy    Diabetes   Novolog (aspart) short acting insulin: 20 units with breakfast, and 20 units with evening meal.   Toujeo (glargine) long acting insulin 300 units/mL: 30 units daily  Trulicity 0.75 mg weekly  Aspirin 81 mg daily (reduced with recent hospital stay)  Remains off Trulicity and Jardiance.   Patient is experiencing the following side effects: constipation - Miralax and Senna available  Confirms that she has glucose tabs at home and knows how to treat HYPOglycemia.  Current diabetes symptoms: none  Diet/Exercise: 2 full meals with snacking in the afternoon.     Blood sugar monitoring: freestyle lucila   Average 153 mg/dL (135, 165, 156, 160)  Time in Target - Above - 23%, Time in Target 77%, Below 0%   Eye exam is up to date  Foot exam: due  Lab Results   Component Value Date    A1C 6.8 09/17/2024    A1C 5.7 04/13/2024     Today's Vitals: There were no vitals taken for this visit.    BP Readings from Last 3 Encounters:   09/17/24 (!) 81/53   06/26/24 (!) 83/49   05/02/24 102/60     Wt Readings from Last 5 Encounters:   09/23/24 247 lb (112 kg)   09/17/24 252 lb  (114.3 kg)   07/11/24 245 lb (111.1 kg)   06/26/24 247 lb (112 kg)   05/30/24 247 lb (112 kg)     ----------------      I spent 10 minutes with this patient today. All changes were made via collaborative practice agreement with Sandy Ricci MD. A copy of the visit note was provided to the patient's provider(s).    A summary of these recommendations was sent via [employer].    Luis Antonio Bass, PharmD, BCACP  Medication Therapy Management Pharmacist  Voicemail: [phone]    Telemedicine Visit Details  The patient's medications can be safely assessed via a telemedicine encounter.  Type of service:  Telephone visit  Originating Location (pt. Location): Home  {PROVIDER LOCATION On-site should be selected for visits conducted from your clinic location or adjoining [facility] hospital, academic office, or other nearby [facility] building. Off-site should be selected for all other provider locations, including home:[id number]}  Distant Location (provider location):  On-site  Start Time:  11:00 AM  End Time:  11:10 AM     Medication Therapy Recommendations  No medication therapy recommendations to display

## 2024-09-27 NOTE — PROGRESS NOTES
Medication Therapy Management (MTM) Encounter    ASSESSMENT:                            Medication Adherence/Access: See below for considerations    Diabetes: Improved.  Would benefit from continuing plan for increasing GLP-1 agonist dose.     PLAN:                            Increase Trulicity to 1.5 mg under the skin weekly as previously discussed.     Follow-up: I will call you on Friday, October 18th at 11 AM for phone follow-up    SUBJECTIVE/OBJECTIVE:                          Yadi Iniguez is a 74 year old female seen for a follow-up visit.       Reason for visit: Diabetes Follow-Up.    Allergies/ADRs: Reviewed in chart  Past Medical History: Reviewed in chart  Tobacco: She reports that she quit smoking about 3 years ago. Her smoking use included cigarettes. She started smoking about 58 years ago. She has a 55.1 pack-year smoking history. She has never been exposed to tobacco smoke. She has never used smokeless tobacco.  Alcohol: Less than 1 beverage / month    Medication Adherence/Access: self-manages meds and fills at  pharmacy    Diabetes   Novolog (aspart) short acting insulin: 20 units with breakfast, and 20 units with evening meal.   Toujeo (glargine) long acting insulin 300 units/mL: 30 units daily  Trulicity 0.75 mg weekly  Aspirin 81 mg daily (reduced with recent hospital stay)  Remains off Trulicity and Jardiance.   Patient is experiencing the following side effects: constipation - Miralax and Senna available  Confirms that she has glucose tabs at home and knows how to treat HYPOglycemia.  Current diabetes symptoms: none  Diet/Exercise: 2 full meals with snacking in the afternoon.     Blood sugar monitoring: freestyle lucila   Average 153 mg/dL (135, 165, 156, 160)  Time in Target - Above - 23%, Time in Target 77%, Below 0%   Eye exam is up to date  Foot exam: due  Lab Results   Component Value Date    A1C 6.8 09/17/2024    A1C 5.7 04/13/2024     Today's Vitals: There were no vitals taken for this  visit.    BP Readings from Last 3 Encounters:   09/17/24 (!) 81/53   06/26/24 (!) 83/49   05/02/24 102/60     Wt Readings from Last 5 Encounters:   09/23/24 247 lb (112 kg)   09/17/24 252 lb (114.3 kg)   07/11/24 245 lb (111.1 kg)   06/26/24 247 lb (112 kg)   05/30/24 247 lb (112 kg)     ----------------      I spent 10 minutes with this patient today. All changes were made via collaborative practice agreement with Sadny Ricci MD. A copy of the visit note was provided to the patient's provider(s).    A summary of these recommendations was sent via [email or web address].    Luis Antonio Bass, PharmD, [facility]  Medication Therapy Management Pharmacist  Voicemail: [phone]    Telemedicine Visit Details  The patient's medications can be safely assessed via a telemedicine encounter.  Type of service:  Telephone visit  Originating Location (pt. Location): Home    Distant Location (provider location):  On-site  Start Time:  11:00 AM  End Time:  11:10 AM     Medication Therapy Recommendations  No medication therapy recommendations to display

## 2024-09-30 NOTE — PATIENT INSTRUCTIONS
"Recommendations from today's MTM visit:                                                    MTM (medication therapy management) is a service provided by a clinical pharmacist designed to help you get the most of out of your medicines.      Increase Trulicity to 1.5 mg under the skin weekly as previously discussed.     Follow-up: I will call you on Friday, October 18th at 11 AM for phone follow-up      It was great speaking with you today.  I value your experience and would be very thankful for your time in providing feedback in our clinic survey. In the next few days, you may receive an email or text message from [employer] with a link to a survey related to your  clinical pharmacist.\"     To schedule another MTM appointment, please call the clinic directly or you may call the MTM scheduling line at [phone] or toll-free at [phone].     My Clinical Pharmacist's contact information:                                                      Please feel free to contact me with any questions or concerns you have.      Luis Antonio Bass, Cash, [facility]  Medication Therapy Management Pharmacist  Voicemail: [phone]    "

## 2024-10-02 ENCOUNTER — MYC MEDICAL ADVICE (OUTPATIENT)
Dept: FAMILY MEDICINE | Facility: CLINIC | Age: 74
End: 2024-10-02
Payer: COMMERCIAL

## 2024-10-02 NOTE — TELEPHONE ENCOUNTER
This has been added to the Appointment Notes for the date of Preop Evaluation.  Millicent HARDY    [facility]

## 2024-10-09 ENCOUNTER — PATIENT OUTREACH (OUTPATIENT)
Dept: CARE COORDINATION | Facility: CLINIC | Age: 74
End: 2024-10-09
Payer: COMMERCIAL

## 2024-10-12 ENCOUNTER — LAB (OUTPATIENT)
Dept: LAB | Facility: CLINIC | Age: 74
End: 2024-10-12
Payer: COMMERCIAL

## 2024-10-12 DIAGNOSIS — D63.1 ANEMIA DUE TO STAGE 3B CHRONIC KIDNEY DISEASE (H): ICD-10-CM

## 2024-10-12 DIAGNOSIS — N18.32 STAGE 3B CHRONIC KIDNEY DISEASE (H): ICD-10-CM

## 2024-10-12 DIAGNOSIS — N39.0 URINARY TRACT INFECTION WITHOUT HEMATURIA, SITE UNSPECIFIED: ICD-10-CM

## 2024-10-12 DIAGNOSIS — D46.4 REFRACTORY ANEMIA, UNSPECIFIED (H): ICD-10-CM

## 2024-10-12 DIAGNOSIS — N18.32 ANEMIA DUE TO STAGE 3B CHRONIC KIDNEY DISEASE (H): ICD-10-CM

## 2024-10-12 DIAGNOSIS — D61.818 PANCYTOPENIA (H): ICD-10-CM

## 2024-10-12 LAB
ANION GAP SERPL CALCULATED.3IONS-SCNC: 11 MMOL/L (ref 7–15)
BUN SERPL-MCNC: 29.6 MG/DL (ref 8–23)
CALCIUM SERPL-MCNC: 8.7 MG/DL (ref 8.8–10.4)
CHLORIDE SERPL-SCNC: 98 MMOL/L (ref 98–107)
CREAT SERPL-MCNC: 1.99 MG/DL (ref 0.51–0.95)
EGFRCR SERPLBLD CKD-EPI 2021: 26 ML/MIN/1.73M2
FERRITIN SERPL-MCNC: 32 NG/ML (ref 11–328)
GLUCOSE SERPL-MCNC: 234 MG/DL (ref 70–99)
HCO3 SERPL-SCNC: 29 MMOL/L (ref 22–29)
IRON BINDING CAPACITY (ROCHE): 292 UG/DL (ref 240–430)
IRON SATN MFR SERPL: 34 % (ref 15–46)
IRON SERPL-MCNC: 100 UG/DL (ref 37–145)
POTASSIUM SERPL-SCNC: 4.2 MMOL/L (ref 3.4–5.3)
SODIUM SERPL-SCNC: 138 MMOL/L (ref 135–145)

## 2024-10-12 PROCEDURE — 82728 ASSAY OF FERRITIN: CPT

## 2024-10-12 PROCEDURE — 82668 ASSAY OF ERYTHROPOIETIN: CPT | Mod: 90

## 2024-10-12 PROCEDURE — 83540 ASSAY OF IRON: CPT

## 2024-10-12 PROCEDURE — 80048 BASIC METABOLIC PNL TOTAL CA: CPT

## 2024-10-12 PROCEDURE — 83550 IRON BINDING TEST: CPT

## 2024-10-12 PROCEDURE — 85025 COMPLETE CBC W/AUTO DIFF WBC: CPT

## 2024-10-12 PROCEDURE — 36415 COLL VENOUS BLD VENIPUNCTURE: CPT

## 2024-10-12 PROCEDURE — 87086 URINE CULTURE/COLONY COUNT: CPT

## 2024-10-12 PROCEDURE — 99000 SPECIMEN HANDLING OFFICE-LAB: CPT

## 2024-10-13 LAB
BACTERIA UR CULT: NORMAL
EPO SERPL-ACNC: 21 MU/ML

## 2024-10-14 LAB
BASOPHILS # BLD AUTO: 0 10E3/UL (ref 0–0.2)
BASOPHILS NFR BLD AUTO: 1 %
EOSINOPHIL # BLD AUTO: 0.1 10E3/UL (ref 0–0.7)
EOSINOPHIL NFR BLD AUTO: 4 %
ERYTHROCYTE [DISTWIDTH] IN BLOOD BY AUTOMATED COUNT: 14.3 % (ref 10–15)
HCT VFR BLD AUTO: 31.7 % (ref 35–47)
HGB BLD-MCNC: 10.4 G/DL (ref 11.7–15.7)
IMM GRANULOCYTES # BLD: 0 10E3/UL
IMM GRANULOCYTES NFR BLD: 1 %
LYMPHOCYTES # BLD AUTO: 0.9 10E3/UL (ref 0.8–5.3)
LYMPHOCYTES NFR BLD AUTO: 28 %
MCH RBC QN AUTO: 34 PG (ref 26.5–33)
MCHC RBC AUTO-ENTMCNC: 32.8 G/DL (ref 31.5–36.5)
MCV RBC AUTO: 104 FL (ref 78–100)
MONOCYTES # BLD AUTO: 0.2 10E3/UL (ref 0–1.3)
MONOCYTES NFR BLD AUTO: 6 %
NEUTROPHILS # BLD AUTO: 2 10E3/UL (ref 1.6–8.3)
NEUTROPHILS NFR BLD AUTO: 61 %
NRBC # BLD AUTO: 0 10E3/UL
NRBC BLD AUTO-RTO: 0 /100
PLATELET # BLD AUTO: 67 10E3/UL (ref 150–450)
RBC # BLD AUTO: 3.06 10E6/UL (ref 3.8–5.2)
WBC # BLD AUTO: 3.2 10E3/UL (ref 4–11)

## 2024-10-16 ENCOUNTER — TRANSFERRED RECORDS (OUTPATIENT)
Dept: HEALTH INFORMATION MANAGEMENT | Facility: CLINIC | Age: 74
End: 2024-10-16

## 2024-10-18 ENCOUNTER — VIRTUAL VISIT (OUTPATIENT)
Dept: PHARMACY | Facility: CLINIC | Age: 74
End: 2024-10-18
Payer: COMMERCIAL

## 2024-10-18 DIAGNOSIS — N18.32 STAGE 3B CHRONIC KIDNEY DISEASE (H): ICD-10-CM

## 2024-10-18 DIAGNOSIS — Z79.4 TYPE 2 DIABETES MELLITUS WITH OTHER DIABETIC KIDNEY COMPLICATION, WITH LONG-TERM CURRENT USE OF INSULIN (H): Primary | ICD-10-CM

## 2024-10-18 DIAGNOSIS — I25.2 HISTORY OF NON-ST ELEVATION MYOCARDIAL INFARCTION (NSTEMI): ICD-10-CM

## 2024-10-18 DIAGNOSIS — I10 HYPERTENSION GOAL BP (BLOOD PRESSURE) < 140/90: ICD-10-CM

## 2024-10-18 DIAGNOSIS — E11.29 TYPE 2 DIABETES MELLITUS WITH OTHER DIABETIC KIDNEY COMPLICATION, WITH LONG-TERM CURRENT USE OF INSULIN (H): Primary | ICD-10-CM

## 2024-10-18 DIAGNOSIS — I27.20 PULMONARY HYPERTENSION (H): ICD-10-CM

## 2024-10-18 DIAGNOSIS — I50.9 CONGESTIVE HEART FAILURE, UNSPECIFIED HF CHRONICITY, UNSPECIFIED HEART FAILURE TYPE (H): ICD-10-CM

## 2024-10-18 PROCEDURE — 99606 MTMS BY PHARM EST 15 MIN: CPT | Mod: 93 | Performed by: PHARMACIST

## 2024-10-18 RX ORDER — CARVEDILOL 12.5 MG/1
12.5 TABLET ORAL 2 TIMES DAILY WITH MEALS
COMMUNITY
Start: 2024-10-07

## 2024-10-18 NOTE — PROGRESS NOTES
Medication Therapy Management (MTM) Encounter    ASSESSMENT:                            Medication Adherence/Access: See below for considerations.    Diabetes: A1c is at goal of <8%.  Improved with increase in GLP-1 agonist dose.     CAD/Chronic kidney disease/Pulmonary hypertension/CHF: Improved. Plan in place with cardiology.     PLAN:                            Continue to take your current medications as prescribed.     Follow-up: 6 months or sooner if needed    SUBJECTIVE/OBJECTIVE:                          Yadi Iniguez is a 74 year old female seen for a follow-up visit.       Reason for visit: Diabetes Follow-up    Allergies/ADRs: Reviewed in chart  Past Medical History: Reviewed in chart  Tobacco: She reports that she quit smoking about 3 years ago. Her smoking use included cigarettes. She started smoking about 58 years ago. She has a 55.1 pack-year smoking history. She has never been exposed to tobacco smoke. She has never used smokeless tobacco.  Alcohol: Less than 1 beverage / month    Medication Adherence/Access: self-manages meds and fills at  pharmacy    Diabetes   Novolog (aspart) short acting insulin: 24 units with breakfast, and 24 units with evening meal.   Toujeo (glargine) long acting insulin 300 units/mL: 36 units daily  Trulicity 1.5 mg weekly  Aspirin 81 mg daily (reduced with recent hospital stay)  Remains off Trulicity and Jardiance.   Patient is experiencing the following side effects: constipation - Miralax and Senna available  Confirms that she has glucose tabs at home and knows how to treat HYPOglycemia.  Current diabetes symptoms: none  Diet/Exercise: 2 full meals with snacking in the afternoon.     Blood sugar monitoring: freestyle lucila   Average 142 mg/dL (138, 139, 141, 150)   153 mg/dL (135, 165, 156, 160)  Time in Target - Above - 9%, Time in Target 91%, Below 0%   Eye exam is up to date  Foot exam: due  Lab Results   Component Value Date    A1C 6.8 09/17/2024    A1C 5.7  04/13/2024    A1C 6.6 10/25/2023     Hypertension   CAD/Chronic kidney disease/Pulmonary hypertension/CHF:  Follows with Dr. Giraldo for cardiology at [facility] and nephrology Dr. Jessika Preston.  Current medications include:  Aspirin 81 mg daily  Amiodarone 200 mg daily  Amlodipine 5 mg daily  Carvedilol 12.5 mg twice daily (dose reduced)  Clopidogrel 75 mg daily  Torsemide 40 mg every morning/20 mg every evening.    Nitroglycerin 0.4 mg daily as needed  Losartan and Jardiance are on hold.   Patient reports no current medication side effects  Participating in Cardiac Rehab     BP Readings from Last 3 Encounters:   09/17/24 (!) 81/53   06/26/24 (!) 83/49   05/02/24 102/60       Today's Vitals: There were no vitals taken for this visit.    Wt Readings from Last 5 Encounters:   09/23/24 247 lb (112 kg)   09/17/24 252 lb (114.3 kg)   07/11/24 245 lb (111.1 kg)   06/26/24 247 lb (112 kg)   05/30/24 247 lb (112 kg)       ----------------      I spent 10 minutes with this patient today. A copy of the visit note was provided to the patient's provider(s).    A summary of these recommendations was sent via [employer].    Luis Antonio Bass, PharmD, BCACP  Medication Therapy Management Pharmacist  Voicemail: [phone]    Telemedicine Visit Details  The patient's medications can be safely assessed via a telemedicine encounter.  Type of service:  Telephone visit  Originating Location (pt. Location): Home    Distant Location (provider location):  On-site  Start Time:  11:00 AM  End Time:  11:10 AM     Medication Therapy Recommendations  No medication therapy recommendations to display

## 2024-10-18 NOTE — PATIENT INSTRUCTIONS
"Recommendations from today's MTM visit:                                                    MTM (medication therapy management) is a service provided by a clinical pharmacist designed to help you get the most of out of your medicines.      Continue to take your current medications as prescribed.     Follow-up: 6 months or sooner if needed    It was great speaking with you today.  I value your experience and would be very thankful for your time in providing feedback in our clinic survey. In the next few days, you may receive an email or text message from [employer] with a link to a survey related to your  clinical pharmacist.\"     To schedule another MTM appointment, please call the clinic directly or you may call the MTM scheduling line at [phone] or toll-free at [phone].     My Clinical Pharmacist's contact information:                                                      Please feel free to contact me with any questions or concerns you have.      Luis Antonio Bass, PharmD, [facility]  Medication Therapy Management Pharmacist  Voicemail: [phone]  "

## 2024-10-21 ENCOUNTER — MEDICAL CORRESPONDENCE (OUTPATIENT)
Dept: HEALTH INFORMATION MANAGEMENT | Facility: CLINIC | Age: 74
End: 2024-10-21
Payer: COMMERCIAL

## 2024-11-02 ENCOUNTER — MYC MEDICAL ADVICE (OUTPATIENT)
Dept: NEPHROLOGY | Facility: CLINIC | Age: 74
End: 2024-11-02
Payer: COMMERCIAL

## 2024-11-02 DIAGNOSIS — N18.32 STAGE 3B CHRONIC KIDNEY DISEASE (H): Primary | ICD-10-CM

## 2024-11-05 ENCOUNTER — MEDICAL CORRESPONDENCE (OUTPATIENT)
Dept: HEALTH INFORMATION MANAGEMENT | Facility: CLINIC | Age: 74
End: 2024-11-05
Payer: COMMERCIAL

## 2024-11-06 ENCOUNTER — TELEPHONE (OUTPATIENT)
Dept: NEPHROLOGY | Facility: CLINIC | Age: 74
End: 2024-11-06
Payer: COMMERCIAL

## 2024-11-08 NOTE — TELEPHONE ENCOUNTER
2nd attempt to contact pt.  No answer.  message left on  to return call or respond to Dr. Carisa Love message dated 11/05/24.    Syeda Guadalupe LPN    
Attempted to contact pt.  No answer.  Message left on [employer] to view Dr. Preston's message on [employer] and respond or return call to clinic.    Syeda Guadalupe LPN      [employer] notification pt has not read lab result note message sent by Dr. Preston:    The last creatinine measurement was slightly elevated. I recommend that we repeat labs again in the next week or so as another check to assure this was just a one time rise. IN the meantime, please send updates on home blood pressure readings as well as confirm your current torsemide dose and whether you are having any swelling.     Recommendations and information needed:  1. Repeat labs now - lab order in place and can be done at the [facility] closest to you  2. Are you having any swelling?  3. Recent blood pressure readings?  4. What dose of torsemide are you taking currently?     Jessika Preston DO   Written by Jessika Preston,  on 11/5/2024  6:27 AM CST    Component      Latest Ref Rng 10/12/2024  9:59 AM   Sodium      135 - 145 mmol/L 138    Potassium      3.4 - 5.3 mmol/L 4.2    Chloride      98 - 107 mmol/L 98    Carbon Dioxide (CO2)      22 - 29 mmol/L 29    Anion Gap      7 - 15 mmol/L 11    Urea Nitrogen      8.0 - 23.0 mg/dL 29.6 (H)    Creatinine      0.51 - 0.95 mg/dL 1.99 (H)    GFR Estimate      >60 mL/min/1.73m2 26 (L)    Calcium      8.8 - 10.4 mg/dL 8.7 (L)    Glucose      70 - 99 mg/dL 234 (H)    Iron      37 - 145 ug/dL 100    Iron Binding Capacity      240 - 430 ug/dL 292    Iron Sat Index      15 - 46 % 34    Ferritin      11 - 328 ng/mL 32       Legend:  (H) High  (L) Low  
Pt responded to Dr. Preston via [employer] today.  Syeda Guadalupe LPN    
(3) assistive equipment and person

## 2024-11-16 ENCOUNTER — LAB (OUTPATIENT)
Dept: LAB | Facility: CLINIC | Age: 74
End: 2024-11-16
Payer: COMMERCIAL

## 2024-11-16 DIAGNOSIS — I25.10 CORONARY ARTERY DISEASE: ICD-10-CM

## 2024-11-16 DIAGNOSIS — D61.818 PANCYTOPENIA (H): ICD-10-CM

## 2024-11-16 DIAGNOSIS — D46.4 REFRACTORY ANEMIA, UNSPECIFIED (H): ICD-10-CM

## 2024-11-16 DIAGNOSIS — N18.32 STAGE 3B CHRONIC KIDNEY DISEASE (H): ICD-10-CM

## 2024-11-16 LAB
ANION GAP SERPL CALCULATED.3IONS-SCNC: 10 MMOL/L (ref 7–15)
BUN SERPL-MCNC: 29 MG/DL (ref 8–23)
CALCIUM SERPL-MCNC: 9.2 MG/DL (ref 8.8–10.4)
CHLORIDE SERPL-SCNC: 100 MMOL/L (ref 98–107)
CREAT SERPL-MCNC: 1.76 MG/DL (ref 0.51–0.95)
EGFRCR SERPLBLD CKD-EPI 2021: 30 ML/MIN/1.73M2
FERRITIN SERPL-MCNC: 30 NG/ML (ref 11–328)
GLUCOSE SERPL-MCNC: 141 MG/DL (ref 70–99)
HCO3 SERPL-SCNC: 30 MMOL/L (ref 22–29)
HOLD SPECIMEN: NORMAL
IRON BINDING CAPACITY (ROCHE): 313 UG/DL (ref 240–430)
IRON SATN MFR SERPL: 28 % (ref 15–46)
IRON SERPL-MCNC: 88 UG/DL (ref 37–145)
POTASSIUM SERPL-SCNC: 4.5 MMOL/L (ref 3.4–5.3)
PTH-INTACT SERPL-MCNC: 30 PG/ML (ref 15–65)
SODIUM SERPL-SCNC: 140 MMOL/L (ref 135–145)

## 2024-11-16 PROCEDURE — 80061 LIPID PANEL: CPT

## 2024-11-16 PROCEDURE — 82728 ASSAY OF FERRITIN: CPT

## 2024-11-16 PROCEDURE — 83540 ASSAY OF IRON: CPT

## 2024-11-16 PROCEDURE — 83970 ASSAY OF PARATHORMONE: CPT

## 2024-11-16 PROCEDURE — 80048 BASIC METABOLIC PNL TOTAL CA: CPT

## 2024-11-16 PROCEDURE — 83550 IRON BINDING TEST: CPT

## 2024-11-16 PROCEDURE — 36415 COLL VENOUS BLD VENIPUNCTURE: CPT

## 2024-11-19 LAB
CHOLEST SERPL-MCNC: 175 MG/DL
FASTING STATUS PATIENT QL REPORTED: ABNORMAL
HDLC SERPL-MCNC: 46 MG/DL
LDLC SERPL CALC-MCNC: 61 MG/DL
NONHDLC SERPL-MCNC: 129 MG/DL
TRIGL SERPL-MCNC: 342 MG/DL

## 2024-12-10 ENCOUNTER — OFFICE VISIT (OUTPATIENT)
Dept: FAMILY MEDICINE | Facility: CLINIC | Age: 74
End: 2024-12-10
Payer: COMMERCIAL

## 2024-12-10 VITALS
BODY MASS INDEX: 38.06 KG/M2 | DIASTOLIC BLOOD PRESSURE: 53 MMHG | RESPIRATION RATE: 16 BRPM | WEIGHT: 257 LBS | OXYGEN SATURATION: 94 % | TEMPERATURE: 97.1 F | HEART RATE: 90 BPM | HEIGHT: 69 IN | SYSTOLIC BLOOD PRESSURE: 82 MMHG

## 2024-12-10 DIAGNOSIS — Z79.4 TYPE 2 DIABETES MELLITUS WITH OTHER DIABETIC KIDNEY COMPLICATION, WITH LONG-TERM CURRENT USE OF INSULIN (H): ICD-10-CM

## 2024-12-10 DIAGNOSIS — E03.9 HYPOTHYROIDISM, UNSPECIFIED TYPE: ICD-10-CM

## 2024-12-10 DIAGNOSIS — K59.01 SLOW TRANSIT CONSTIPATION: ICD-10-CM

## 2024-12-10 DIAGNOSIS — N18.5 TYPE 2 DIABETES MELLITUS WITH STAGE 5 CHRONIC KIDNEY DISEASE NOT ON CHRONIC DIALYSIS, WITH LONG-TERM CURRENT USE OF INSULIN (H): ICD-10-CM

## 2024-12-10 DIAGNOSIS — J44.9 CHRONIC OBSTRUCTIVE PULMONARY DISEASE, UNSPECIFIED COPD TYPE (H): Primary | ICD-10-CM

## 2024-12-10 DIAGNOSIS — E11.29 TYPE 2 DIABETES MELLITUS WITH OTHER DIABETIC KIDNEY COMPLICATION, WITH LONG-TERM CURRENT USE OF INSULIN (H): ICD-10-CM

## 2024-12-10 DIAGNOSIS — E08.3419: ICD-10-CM

## 2024-12-10 DIAGNOSIS — Z79.4 TYPE 2 DIABETES MELLITUS WITH STAGE 5 CHRONIC KIDNEY DISEASE NOT ON CHRONIC DIALYSIS, WITH LONG-TERM CURRENT USE OF INSULIN (H): ICD-10-CM

## 2024-12-10 DIAGNOSIS — I10 HYPERTENSION GOAL BP (BLOOD PRESSURE) < 140/90: ICD-10-CM

## 2024-12-10 DIAGNOSIS — E11.22 TYPE 2 DIABETES MELLITUS WITH STAGE 5 CHRONIC KIDNEY DISEASE NOT ON CHRONIC DIALYSIS, WITH LONG-TERM CURRENT USE OF INSULIN (H): ICD-10-CM

## 2024-12-10 DIAGNOSIS — I63.50 RIGHT PONTINE CEREBROVASCULAR ACCIDENT (H): ICD-10-CM

## 2024-12-10 DIAGNOSIS — F32.1 MODERATE MAJOR DEPRESSION (H): ICD-10-CM

## 2024-12-10 DIAGNOSIS — I27.20 PULMONARY HYPERTENSION (H): ICD-10-CM

## 2024-12-10 DIAGNOSIS — Z87.891 PERSONAL HISTORY OF TOBACCO USE: ICD-10-CM

## 2024-12-10 DIAGNOSIS — E78.5 HYPERLIPIDEMIA LDL GOAL <70: ICD-10-CM

## 2024-12-10 DIAGNOSIS — E11.9 TYPE 2 DIABETES, HBA1C GOAL < 8% (H): ICD-10-CM

## 2024-12-10 DIAGNOSIS — Z12.2 ENCOUNTER FOR SCREENING FOR MALIGNANT NEOPLASM OF LUNG: ICD-10-CM

## 2024-12-10 DIAGNOSIS — Z79.4: ICD-10-CM

## 2024-12-10 DIAGNOSIS — Q23.0 AORTIC VALVE STENOSIS, CONGENITAL: ICD-10-CM

## 2024-12-10 DIAGNOSIS — F17.211 NICOTINE DEPENDENCE, CIGARETTES, IN REMISSION: ICD-10-CM

## 2024-12-10 PROBLEM — N18.32 STAGE 3B CHRONIC KIDNEY DISEASE (H): Status: ACTIVE | Noted: 2017-02-22

## 2024-12-10 PROCEDURE — 84443 ASSAY THYROID STIM HORMONE: CPT | Performed by: INTERNAL MEDICINE

## 2024-12-10 PROCEDURE — 99214 OFFICE O/P EST MOD 30 MIN: CPT | Performed by: INTERNAL MEDICINE

## 2024-12-10 PROCEDURE — 82570 ASSAY OF URINE CREATININE: CPT | Performed by: INTERNAL MEDICINE

## 2024-12-10 PROCEDURE — 82043 UR ALBUMIN QUANTITATIVE: CPT | Performed by: INTERNAL MEDICINE

## 2024-12-10 PROCEDURE — G0296 VISIT TO DETERM LDCT ELIG: HCPCS | Performed by: INTERNAL MEDICINE

## 2024-12-10 PROCEDURE — 36415 COLL VENOUS BLD VENIPUNCTURE: CPT | Performed by: INTERNAL MEDICINE

## 2024-12-10 RX ORDER — SENNOSIDES A AND B 8.6 MG/1
8.6 TABLET, FILM COATED ORAL 2 TIMES DAILY PRN
Qty: 60 TABLET | Refills: 3 | Status: SHIPPED | OUTPATIENT
Start: 2024-12-10

## 2024-12-10 RX ORDER — PAROXETINE 20 MG/1
40 TABLET, FILM COATED ORAL EVERY EVENING
Qty: 180 TABLET | Refills: 3 | Status: SHIPPED | OUTPATIENT
Start: 2024-12-10

## 2024-12-10 RX ORDER — INSULIN GLARGINE 300 U/ML
36 INJECTION, SOLUTION SUBCUTANEOUS AT BEDTIME
Qty: 1.5 ML | Refills: 3 | Status: SHIPPED | OUTPATIENT
Start: 2024-12-10

## 2024-12-10 RX ORDER — LEVOTHYROXINE SODIUM 150 UG/1
150 TABLET ORAL DAILY
Qty: 90 TABLET | Refills: 1 | Status: SHIPPED | OUTPATIENT
Start: 2024-12-10

## 2024-12-10 RX ORDER — INSULIN GLARGINE 300 U/ML
36 INJECTION, SOLUTION SUBCUTANEOUS AT BEDTIME
Qty: 1.5 ML | Refills: 3 | Status: SHIPPED | OUTPATIENT
Start: 2024-12-10 | End: 2024-12-10

## 2024-12-10 RX ORDER — INSULIN ASPART 100 [IU]/ML
24 INJECTION, SOLUTION INTRAVENOUS; SUBCUTANEOUS 2 TIMES DAILY WITH MEALS
Qty: 15 ML | Refills: 3 | Status: SHIPPED | OUTPATIENT
Start: 2024-12-10

## 2024-12-10 ASSESSMENT — PAIN SCALES - GENERAL: PAINLEVEL_OUTOF10: NO PAIN (0)

## 2024-12-10 NOTE — PATIENT INSTRUCTIONS
Lung Cancer Screening   Frequently Asked Questions  If you are at high-risk for lung cancer, getting screened with low-dose computed tomography (LDCT) every year can help save your life. This handout offers answers to some of the most common questions about lung cancer screening. If you have other questions, please call [phone] ([phone]).     What is it?  Lung cancer screening uses special X-ray technology to create an image of your lung tissue. The exam is quick and easy and takes less than 10 seconds. We don t give you any medicine or use any needles. You can eat before and after the exam. You don t need to change your clothes as long as the clothing on your chest doesn t contain metal. But, you do need to be able to hold your breath for at least 6 seconds during the exam.    What is the goal of lung cancer screening?  The goal of lung cancer screening is to save lives. Many times, lung cancer is not found until a person starts having physical symptoms. Lung cancer screening can help detect lung cancer in the earliest stages when it may be easier to treat.    Who should be screened for lung cancer?  We suggest lung cancer screening for anyone who is at high-risk for lung cancer. You are in the high-risk group if you:      are between the ages of 55 and 79, and    have smoked at least 1 pack of cigarettes a day for 20 or more years, and    still smoke or have quit within the past 15 years.    However, if you have a new cough or shortness of breath, you should talk to your doctor before being screened.    Why does it matter if I have symptoms?  Certain symptoms can be a sign that you have a condition in your lungs that should be checked and treated by your doctor. These symptoms include fever, chest pain, a new or changing cough, shortness of breath that you have never felt before, coughing up blood or unexplained weight loss. Having any of these symptoms can greatly affect the results of lung  cancer screening.       Should all smokers get an LDCT lung cancer screening exam?  It depends. Lung cancer screening is for a very specific group of men and women who have a history of heavy smoking over a long period of time (see  Who should be screened for lung cancer  above).  I am in the high-risk group, but have been diagnosed with cancer in the past. Is LDCT lung cancer screening right for me?  In some cases, you should not have LDCT lung screening, such as when your doctor is already following your cancer with CT scan studies. Your doctor will help you decide if LDCT lung screening is right for you.  Do I need to have a screening exam every year?  Yes. If you are in the high-risk group described earlier, you should get an LDCT lung cancer screening exam every year until you are 79, or are no longer willing or able to undergo screening and possible procedures to diagnose and treat lung cancer.  How effective is LDCT at preventing death from lung cancer?  Studies have shown that LDCT lung cancer screening can lower the risk of death from lung cancer by 20 percent in people who are at high-risk.  What are the risks?  There are some risks and limitations of LDCT lung cancer screening. We want to make sure you understand the risks and benefits, so please let us know if you have any questions. Your doctor may want to talk with you more about these risks.    Radiation exposure: As with any exam that uses radiation, there is a very small increased risk of cancer. The amount of radiation in LDCT is small--about the same amount a person would get from a mammogram. Your doctor orders the exam when he or she feels the potential benefits outweigh the risks.    False negatives: No test is perfect, including LDCT. It is possible that you may have a medical condition, including lung cancer, that is not found during your exam. This is called a false negative result.    False positives and more testing: LDCT very often finds  something in the lung that could be cancer, but in fact is not. This is called a false positive result. False positive tests often cause anxiety. To make sure these findings are not cancer, you may need to have more tests. These tests will be done only if you give us permission. Sometimes patients need a treatment that can have side effects, such as a biopsy. For more information on false positives, see  What can I expect from the results?     Findings not related to lung cancer: Your LDCT exam also takes pictures of areas of your body next to your lungs. In a very small number of cases, the CT scan will show an abnormal finding in one of these areas, such as your kidneys, adrenal glands, liver or thyroid. This finding may not be serious, but you may need more tests. Your doctor can help you decide what other tests you may need, if any.  What can I expect from the results?  About 1 out of 4 LDCT exams will find something that may need more tests. Most of the time, these findings are lung nodules. Lung nodules are very small collections of tissue in the lung. These nodules are very common, and the vast majority--more than 97 percent--are not cancer (benign). Most are normal lymph nodes or small areas of scarring from past infections.  But, if a small lung nodule is found to be cancer, the cancer can be cured more than 90 percent of the time. To know if the nodule is cancer, we may need to get more images before your next yearly screening exam. If the nodule has suspicious features (for example, it is large, has an odd shape or grows over time), we will refer you to a specialist for further testing.  Will my doctor also get the results?  Yes. Your doctor will get a copy of your results.  Is it okay to keep smoking now that there s a cancer screening exam?  No. Tobacco is one of the strongest cancer-causing agents. It causes not only lung cancer, but other cancers and cardiovascular (heart) diseases as well. The damage  caused by smoking builds over time. This means that the longer you smoke, the higher your risk of disease. While it is never too late to quit, the sooner you quit, the better.  Where can I find help to quit smoking?  The best way to prevent lung cancer is to stop smoking. If you have already quit smoking, congratulations and keep it up! For help on quitting smoking, please call [place] at 1-440-QUITNOW (1-420.288.7025) or the American [employer] Society at 1-499.555.7017 to find local resources near you.  One-on-one health coaching:  If you d prefer to work individually with a health care provider on tobacco cessation, we offer:      Medication Therapy Management:  Our specially trained pharmacists work closely with you and your doctor to help you quit smoking.  Call [phone] or [phone] (toll free).

## 2024-12-10 NOTE — PROGRESS NOTES
Assessment & Plan     (J44.9) Chronic obstructive pulmonary disease, unspecified COPD type (H)  (primary encounter diagnosis)  Comment: She has COPD she is very stable.  She does not need refills on her medications.  Plan:      (E11.22,  N18.5,  Z79.4) Type 2 diabetes mellitus with stage 5 chronic kidney disease not on chronic dialysis, with long-term current use of insulin (H)  (E08.3419,  Z79.4) 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, with long-term current use of insulin, unspecified laterality (H)  Comment: She has diabetes with triopathy.  Her renal function has not been improving after having a CT scan with IV contrast.  Her blood sugar control has been excellent.  Her hemoglobin A1c goal is less than 8.0.  Her last hemoglobin A1c was 6.8.  She is on Toujeo and NovoLog.  Plan: insulin aspart (NOVOLOG FLEXPEN) 100 UNIT/ML         pen, insulin glargine U-300 (TOUJEO SOLOSTAR)         300 UNIT/ML (1 units dial) pen, we will check a albumin Random         Urine Quantitative with Creat Ratio             (E03.9) Hypothyroidism, unspecified type  Comment: She has a history of hypothyroidism she is on Synthroid.  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  She is not on a statin  Plan:      (I10) Hypertension goal BP (blood pressure) < 140/90  Comment: Blood pressures running a little bit on the low side.  She was at cardiac rehab  "this morning.  She may have got a little on the dry side   Plan: Continue current meds, encourage oral intake    (I27.20) Pulmonary hypertension (H)  Comment: Noted  Plan: Follow-up with cardiology    (K59.01) Slow transit constipation  Comment: On Senokot and MiraLAX  Plan: Refill  senna (SENOKOT) 8.6 MG tablet             (Q23.0) Aortic valve stenosis, congenital  Comment: She has a congenital aortic valve stenosis.  We do not have a recent echocardiogram on the chart.  Her daughter states that she had an echocardiogram at [facility] within the past year.  She is going to have follow-up with cardiology in a couple months.  They are planning to do an echocardiogram at that time  Plan:       (Z12.2) Encounter for screening for malignant neoplasm of lung  Comment: Noted.  Previous CT scan showed 2 small pulmonary nodules.  She stopped smoking 4 years ago.  She has a 50 to 60 pack year history  Plan: CT Chest Lung Cancer Scrn Low Dose wo             (F17.211) Nicotine dependence, cigarettes, in remission          BMI  Estimated body mass index is 37.95 kg/m  as calculated from the following:    Height as of this encounter: 1.753 m (5' 9\").    Weight as of this encounter: 116.6 kg (257 lb).   Weight management plan: Discussed healthy diet and exercise guidelines      FUTURE APPOINTMENTS:       - Follow-up visit in 6 months     Adelina Grullon is a 74 year old, presenting for the following health issues:  Establish Care and Chronic Disease Management        12/10/2024     1:13 PM   Additional Questions   Roomed by Eufemia LITTLE   Accompanied by Daughter Nikki     History of Present Illness       Reason for visit:  Initial visit.   She is taking medications regularly.  74-year-old female presents for an initial visit.  She has a history of diabetes mellitus type 2 with neuropathy, retinopathy, and nephropathy stage IIIb.  She has hypertension, hyperlipidemia, pulmonary hypertension, hypothyroidism, coronary artery " "disease, cerebrovascular disease, and chronic constipation.  She has actually been doing quite well.  Her blood sugars have improved.  Her renal function has actually improved.  She is followed closely by cardiology.  She is on Plavix as well as aspirin for 6 months after her stents were placed.  Her stents were placed last July at [facility].         Review of Systems  Constitutional, HEENT, cardiovascular, pulmonary, gi and gu systems are negative, except as otherwise noted.      Objective    BP (!) 82/53   Pulse 90   Temp 97.1  F (36.2  C) (Tympanic)   Resp 16   Ht 1.753 m (5' 9\")   Wt 116.6 kg (257 lb)   SpO2 94%   BMI 37.95 kg/m    Body mass index is 37.95 kg/m .  Physical Exam   GENERAL: alert and no distress  EYES: Eyes grossly normal to inspection, PERRL and conjunctivae and sclerae normal  RESP: lungs clear to auscultation - no rales, rhonchi or wheezes  CV: regular rate and rhythm, normal S1 S2, no S3 or S4, no murmur, click or rub, no peripheral edema  ABDOMEN: soft, nontender, no hepatosplenomegaly, no masses and bowel sounds normal  MS: no gross musculoskeletal defects noted, no edema  SKIN: no suspicious lesions or rashes  NEURO: Normal strength and tone, mentation intact and speech normal  PSYCH: mentation appears normal, affect normal/bright  Diabetic foot exam: There is a chronic wound on her right medial great toe.  There is minimal surrounding erythema.  There is no drainage.  There is a well-healed eschar.  She has decreased sensation to light touch          Signed Electronically by: Juventino Dias MD  Lung Cancer Screening Shared Decision Making Visit     Yadi Iniguez, a 74 year old female, is eligible for lung cancer screening    History   Smoking Status    Former    Types: Cigarettes   Smokeless Tobacco    Never       I have discussed with patient the risks and benefits of screening for lung cancer with low-dose CT.     The risks include:    radiation exposure: one low dose " chest CT has as much ionizing radiation as about 15 chest x-rays, or 6 months of background radiation living in [place]      false positives: most findings/nodules are NOT cancer, but some might still require additional diagnostic evaluation, including biopsy    over-diagnosis: some slow growing cancers that might never have been clinically significant will be detected and treated unnecessarily     The benefit of early detection of lung cancer is contingent upon adherence to annual screening or more frequent follow up if indicated.     Furthermore, to benefit from screening, Yadi must be willing and able to undergo diagnostic procedures, if indicated. Although no specific guide is available for determining severity of comorbidities, it is reasonable to withhold screening in patients who have greater mortality risk from other diseases.     We did discuss that the best way to prevent lung cancer is to not smoke.    Some patients may value a numeric estimation of lung cancer risk when evaluating if lung cancer screening is right for them, here is one calculator:    ShouldIScreen

## 2024-12-11 LAB
CREAT UR-MCNC: 45.9 MG/DL
MICROALBUMIN UR-MCNC: <12 MG/L
MICROALBUMIN/CREAT UR: NORMAL MG/G{CREAT}
TSH SERPL DL<=0.005 MIU/L-ACNC: 0.54 UIU/ML (ref 0.3–4.2)

## 2024-12-18 ENCOUNTER — TELEPHONE (OUTPATIENT)
Dept: FAMILY MEDICINE | Facility: CLINIC | Age: 74
End: 2024-12-18
Payer: COMMERCIAL

## 2024-12-18 NOTE — TELEPHONE ENCOUNTER
Forms/Letter Request    Type of form/letter: FMLA - Unknown      Is Release of Information needed?: No.    Do we have the form/letter: Yes: PLACED IN DR[employer].    Who is the form from? Patient.    Where did/will the form come from? Patient or family brought in       When is form/letter needed by: ASAP    How would you like the form/letter returned: Fax : [phone], [facility]. ALSO, MARIANO FERRO WOULD LIKE TO PICK IT UP AFTER IT IS FAXED.    Patient Notified form requests are processed in 5-7 business days:Yes    Could we send this information to you in [employer] or would you prefer to receive a phone call?:   Patient would prefer a phone call   Okay to leave a detailed message?: Yes at Cell number on file:    Telephone Information: MARIANO FERRO   [employer] [phone]     Kimber Gee on 12/18/2024 at 4:21 PM

## 2024-12-19 NOTE — TELEPHONE ENCOUNTER
Form placed in your basket to complete. When done, place in TC basket.Caroline POOLE [facility]

## 2024-12-28 ENCOUNTER — HEALTH MAINTENANCE LETTER (OUTPATIENT)
Age: 74
End: 2024-12-28

## 2025-01-15 ENCOUNTER — TELEPHONE (OUTPATIENT)
Dept: PHARMACY | Facility: OTHER | Age: 75
End: 2025-01-15
Payer: COMMERCIAL

## 2025-01-15 NOTE — TELEPHONE ENCOUNTER
MTM Recruitment: [facility] insurance     Referral outreach attempt #1 on January 15, 2025      Outcome: left voicemail- Call back number [phone]    Enma Manuel CMA  [place]

## (undated) DEVICE — SUCTION FRAZIER 12FR W/OBTURATOR 33120

## (undated) DEVICE — GLOVE PROTEXIS BLUE W/NEU-THERA 8.5  2D73EB85

## (undated) DEVICE — MIDAS REX DISSECTING TOOL 5.5MM T14MH25

## (undated) DEVICE — ESU CLEANER TIP 31142717

## (undated) DEVICE — Device

## (undated) DEVICE — TUBING SUCTION 12"X1/4" N612

## (undated) DEVICE — GOWN REINFORCED XXLG 9071

## (undated) DEVICE — SOL WATER IRRIG 1000ML BOTTLE 07139-09

## (undated) DEVICE — PEN MARKING SKIN W/LABELS 31145884

## (undated) DEVICE — GLOVE PROTEXIS W/NEU-THERA 7.0  2D73TE70

## (undated) DEVICE — ESU BIPOLAR SEALER AQUAMANTYS 6MM 23-112-1

## (undated) DEVICE — TRAY PAIN INJECTION 97A 640

## (undated) DEVICE — PACK SET-UP STD 9102

## (undated) DEVICE — SOL NACL 0.9% INJ 250ML BAG 2B1322Q

## (undated) DEVICE — SUCTION MANIFOLD NEPTUNE 2 SYS 4 PORT 0702-020-000

## (undated) DEVICE — NDL SPINAL 18GA 3.5" 405184

## (undated) DEVICE — SU VICRYL 2-0 CT-1 18' J739D

## (undated) DEVICE — GLOVE PROTEXIS W/NEU-THERA 7.5  2D73TE75

## (undated) DEVICE — LINEN TOWEL PACK X5 5464

## (undated) DEVICE — PREP CHLORAPREP W/ORANGE TINT 10.5ML 930715

## (undated) DEVICE — DECANTER BAG 2002S

## (undated) DEVICE — MARKER SPHERES PASSIVE MEDT PACK 5 8801075

## (undated) DEVICE — PIN PERCUTANEOUS NAVIGATION FOR SPINE O-ARM150MM 9733236

## (undated) DEVICE — STPL SKIN PROXIMATE 35 WIDE PMW35

## (undated) DEVICE — LINEN POUCH DBL 5427

## (undated) DEVICE — DRSG TELFA ISLAND 4X10"

## (undated) DEVICE — NDL SPINAL 25GA 3.5" QUINCKE 405180

## (undated) DEVICE — DEVICE DUST COLLECTOR BONE BOX S-3500

## (undated) DEVICE — SPONGE COTTONOID 1/2X1" 80-1402

## (undated) DEVICE — LINEN ORTHO ACL PACK 5447

## (undated) DEVICE — DRAPE COVER C-ARM SEAMLESS SNAP-KAP 03-KP26 LATEX FREE

## (undated) DEVICE — SU VICRYL 0 CT-1 CR 8X18" J740D

## (undated) DEVICE — DRAPE IOBAN INCISE 23X17" 6650EZ

## (undated) DEVICE — GLOVE PROTEXIS W/NEU-THERA 8.5  2D73TE85

## (undated) DEVICE — CUSHION INSERT LG PRONE VIEW JACKSON TABLE

## (undated) DEVICE — DRAPE MAYO STAND 23X54 8337

## (undated) DEVICE — ESU ELEC BLADE 4" COATED

## (undated) DEVICE — DRSG GAUZE 4X4" 8044

## (undated) DEVICE — CATH TRAY FOLEY SURESTEP 16FR DRAIN BAG STATOCK A899916

## (undated) DEVICE — LINEN DRAPE 54X72" 5467

## (undated) DEVICE — SPONGE SURGIFOAM 100 1974

## (undated) DEVICE — SU WND CLOSURE VLOC 180 ABS 3-0 12" P-14 VLOCL0114

## (undated) DEVICE — DRAPE MICROSCOPE OPMI ZEISS 48X118" 306071-0000-000

## (undated) DEVICE — GOWN IMPERVIOUS BREATHABLE SMART XLG 89045

## (undated) DEVICE — DRSG KERLIX FLUFFS X5

## (undated) DEVICE — ESU GROUND PAD ADULT W/CORD E7507

## (undated) DEVICE — RX SURGIFLO HEMOSTATIC MATRIX 8ML 2991

## (undated) DEVICE — PACK SMALL SPINE RIDGES

## (undated) RX ORDER — FENTANYL CITRATE 50 UG/ML
INJECTION, SOLUTION INTRAMUSCULAR; INTRAVENOUS
Status: DISPENSED
Start: 2018-06-22

## (undated) RX ORDER — DEXAMETHASONE SODIUM PHOSPHATE 4 MG/ML
INJECTION, SOLUTION INTRA-ARTICULAR; INTRALESIONAL; INTRAMUSCULAR; INTRAVENOUS; SOFT TISSUE
Status: DISPENSED
Start: 2018-11-05

## (undated) RX ORDER — CEFAZOLIN SODIUM 2 G/100ML
INJECTION, SOLUTION INTRAVENOUS
Status: DISPENSED
Start: 2018-11-05

## (undated) RX ORDER — SIMETHICONE 40MG/0.6ML
SUSPENSION, DROPS(FINAL DOSAGE FORM)(ML) ORAL
Status: DISPENSED
Start: 2018-06-22

## (undated) RX ORDER — FENTANYL CITRATE 50 UG/ML
INJECTION, SOLUTION INTRAMUSCULAR; INTRAVENOUS
Status: DISPENSED
Start: 2018-11-05

## (undated) RX ORDER — LIDOCAINE HYDROCHLORIDE 10 MG/ML
INJECTION, SOLUTION EPIDURAL; INFILTRATION; INTRACAUDAL; PERINEURAL
Status: DISPENSED
Start: 2018-11-05

## (undated) RX ORDER — BUPIVACAINE HYDROCHLORIDE 2.5 MG/ML
INJECTION, SOLUTION EPIDURAL; INFILTRATION; INTRACAUDAL
Status: DISPENSED
Start: 2022-11-18

## (undated) RX ORDER — PROPOFOL 10 MG/ML
INJECTION, EMULSION INTRAVENOUS
Status: DISPENSED
Start: 2018-11-05

## (undated) RX ORDER — FENTANYL CITRATE 50 UG/ML
INJECTION, SOLUTION INTRAMUSCULAR; INTRAVENOUS
Status: DISPENSED
Start: 2022-03-29

## (undated) RX ORDER — ONDANSETRON 2 MG/ML
INJECTION INTRAMUSCULAR; INTRAVENOUS
Status: DISPENSED
Start: 2018-11-05

## (undated) RX ORDER — LIDOCAINE HYDROCHLORIDE 20 MG/ML
INJECTION, SOLUTION EPIDURAL; INFILTRATION; INTRACAUDAL; PERINEURAL
Status: DISPENSED
Start: 2022-11-18

## (undated) RX ORDER — IOPAMIDOL 408 MG/ML
INJECTION, SOLUTION INTRATHECAL
Status: DISPENSED
Start: 2022-11-18

## (undated) RX ORDER — GLYCOPYRROLATE 0.2 MG/ML
INJECTION INTRAMUSCULAR; INTRAVENOUS
Status: DISPENSED
Start: 2018-11-05

## (undated) RX ORDER — LIDOCAINE HYDROCHLORIDE 10 MG/ML
INJECTION, SOLUTION EPIDURAL; INFILTRATION; INTRACAUDAL; PERINEURAL
Status: DISPENSED
Start: 2022-11-18

## (undated) RX ORDER — VANCOMYCIN HYDROCHLORIDE 1 G/20ML
INJECTION, POWDER, LYOPHILIZED, FOR SOLUTION INTRAVENOUS
Status: DISPENSED
Start: 2018-11-05

## (undated) RX ORDER — NEOSTIGMINE METHYLSULFATE 1 MG/ML
VIAL (ML) INJECTION
Status: DISPENSED
Start: 2018-11-05

## (undated) RX ORDER — BUPIVACAINE HYDROCHLORIDE AND EPINEPHRINE 5; 5 MG/ML; UG/ML
INJECTION, SOLUTION EPIDURAL; INTRACAUDAL; PERINEURAL
Status: DISPENSED
Start: 2018-11-05